# Patient Record
Sex: FEMALE | Race: WHITE | Employment: OTHER | URBAN - METROPOLITAN AREA
[De-identification: names, ages, dates, MRNs, and addresses within clinical notes are randomized per-mention and may not be internally consistent; named-entity substitution may affect disease eponyms.]

---

## 2017-01-24 ENCOUNTER — TELEPHONE (OUTPATIENT)
Dept: FAMILY MEDICINE CLINIC | Age: 77
End: 2017-01-24

## 2017-03-21 ENCOUNTER — OFFICE VISIT (OUTPATIENT)
Dept: FAMILY MEDICINE CLINIC | Age: 77
End: 2017-03-21

## 2017-03-21 VITALS
BODY MASS INDEX: 38.09 KG/M2 | OXYGEN SATURATION: 95 % | DIASTOLIC BLOOD PRESSURE: 70 MMHG | WEIGHT: 236 LBS | HEART RATE: 65 BPM | SYSTOLIC BLOOD PRESSURE: 138 MMHG

## 2017-03-21 DIAGNOSIS — E11.8 TYPE 2 DIABETES MELLITUS WITH COMPLICATION, WITH LONG-TERM CURRENT USE OF INSULIN (HCC): Primary | ICD-10-CM

## 2017-03-21 DIAGNOSIS — Z79.4 TYPE 2 DIABETES MELLITUS WITH COMPLICATION, WITH LONG-TERM CURRENT USE OF INSULIN (HCC): Primary | ICD-10-CM

## 2017-03-21 DIAGNOSIS — I27.20 PULMONARY HYPERTENSION (HCC): ICD-10-CM

## 2017-03-21 DIAGNOSIS — E03.8 OTHER SPECIFIED HYPOTHYROIDISM: ICD-10-CM

## 2017-03-21 DIAGNOSIS — Z12.31 ENCOUNTER FOR SCREENING MAMMOGRAM FOR BREAST CANCER: ICD-10-CM

## 2017-03-21 DIAGNOSIS — E78.5 HYPERLIPIDEMIA, UNSPECIFIED HYPERLIPIDEMIA TYPE: ICD-10-CM

## 2017-03-21 DIAGNOSIS — I10 ESSENTIAL HYPERTENSION: ICD-10-CM

## 2017-03-21 LAB — HBA1C MFR BLD: 7.1 %

## 2017-03-21 PROCEDURE — 83036 HEMOGLOBIN GLYCOSYLATED A1C: CPT | Performed by: FAMILY MEDICINE

## 2017-03-21 PROCEDURE — 99214 OFFICE O/P EST MOD 30 MIN: CPT | Performed by: FAMILY MEDICINE

## 2017-03-21 ASSESSMENT — ENCOUNTER SYMPTOMS
SHORTNESS OF BREATH: 0
COUGH: 0

## 2017-03-24 ENCOUNTER — HOSPITAL ENCOUNTER (OUTPATIENT)
Dept: WOMENS IMAGING | Age: 77
Discharge: HOME | End: 2017-03-24
Attending: FAMILY MEDICINE | Admitting: FAMILY MEDICINE

## 2017-03-24 DIAGNOSIS — Z12.31 ENCOUNTER FOR SCREENING MAMMOGRAM FOR BREAST CANCER: ICD-10-CM

## 2017-03-24 LAB
ALBUMIN SERPL-MCNC: 4.3 GM/DL (ref 3.4–5)
ALP BLD-CCNC: 54 IU/L (ref 40–128)
ALT SERPL-CCNC: 12 U/L (ref 10–40)
ANION GAP SERPL CALCULATED.3IONS-SCNC: 12 MMOL/L (ref 4–16)
AST SERPL-CCNC: 13 IU/L (ref 15–37)
BILIRUB SERPL-MCNC: 0.3 MG/DL (ref 0–1)
BUN BLDV-MCNC: 15 MG/DL (ref 6–23)
CALCIUM SERPL-MCNC: 9.5 MG/DL (ref 8.3–10.6)
CHLORIDE BLD-SCNC: 102 MMOL/L (ref 99–110)
CHOLESTEROL: 184 MG/DL
CO2: 29 MMOL/L (ref 21–32)
CREAT SERPL-MCNC: 0.8 MG/DL (ref 0.6–1.1)
GFR AFRICAN AMERICAN: >60 ML/MIN/1.73M2
GFR NON-AFRICAN AMERICAN: >60 ML/MIN/1.73M2
GLUCOSE BLD-MCNC: 110 MG/DL (ref 70–140)
HCT VFR BLD CALC: 38.6 % (ref 37–47)
HDLC SERPL-MCNC: 62 MG/DL
HEMOGLOBIN: 12.5 GM/DL (ref 12.5–16)
LDL CHOLESTEROL CALCULATED: 81 MG/DL
MCH RBC QN AUTO: 30.2 PG (ref 27–31)
MCHC RBC AUTO-ENTMCNC: 32.4 % (ref 32–36)
MCV RBC AUTO: 93.2 FL (ref 78–100)
PDW BLD-RTO: 14.4 % (ref 11.7–14.9)
PLATELET # BLD: 276 K/CU MM (ref 140–440)
PMV BLD AUTO: 10 FL (ref 7.5–11.1)
POTASSIUM SERPL-SCNC: 4 MMOL/L (ref 3.5–5.1)
RBC # BLD: 4.14 M/CU MM (ref 4.2–5.4)
SODIUM BLD-SCNC: 143 MMOL/L (ref 135–145)
T4 FREE: 1.97 NG/DL (ref 0.9–1.8)
TOTAL PROTEIN: 7.1 GM/DL (ref 6.4–8.2)
TRIGL SERPL-MCNC: 204 MG/DL
TSH HIGH SENSITIVITY: 0.08 UIU/ML (ref 0.27–4.2)
WBC # BLD: 9 K/CU MM (ref 4–10.5)

## 2017-04-21 DIAGNOSIS — E03.8 OTHER SPECIFIED HYPOTHYROIDISM: Primary | ICD-10-CM

## 2017-04-21 DIAGNOSIS — E78.2 MIXED HYPERLIPIDEMIA: ICD-10-CM

## 2017-04-21 RX ORDER — LEVOTHYROXINE SODIUM 0.15 MG/1
150 TABLET ORAL DAILY
Qty: 30 TABLET | Refills: 3 | Status: SHIPPED | OUTPATIENT
Start: 2017-04-21 | End: 2017-06-27 | Stop reason: SDUPTHER

## 2017-04-21 RX ORDER — FENOFIBRATE 145 MG/1
145 TABLET, COATED ORAL DAILY
Qty: 30 TABLET | Refills: 3 | Status: SHIPPED | OUTPATIENT
Start: 2017-04-21 | End: 2017-06-27

## 2017-04-26 DIAGNOSIS — R06.02 SOB (SHORTNESS OF BREATH): ICD-10-CM

## 2017-04-26 DIAGNOSIS — E11.8 TYPE 2 DIABETES MELLITUS WITH COMPLICATION, WITH LONG-TERM CURRENT USE OF INSULIN (HCC): Primary | ICD-10-CM

## 2017-04-26 DIAGNOSIS — Z79.4 TYPE 2 DIABETES MELLITUS WITH COMPLICATION, WITH LONG-TERM CURRENT USE OF INSULIN (HCC): Primary | ICD-10-CM

## 2017-04-27 ENCOUNTER — TELEPHONE (OUTPATIENT)
Dept: FAMILY MEDICINE CLINIC | Age: 77
End: 2017-04-27

## 2017-06-27 ENCOUNTER — OFFICE VISIT (OUTPATIENT)
Dept: FAMILY MEDICINE CLINIC | Age: 77
End: 2017-06-27

## 2017-06-27 VITALS
WEIGHT: 234.6 LBS | SYSTOLIC BLOOD PRESSURE: 123 MMHG | HEIGHT: 66 IN | OXYGEN SATURATION: 98 % | BODY MASS INDEX: 37.7 KG/M2 | DIASTOLIC BLOOD PRESSURE: 70 MMHG | HEART RATE: 66 BPM

## 2017-06-27 DIAGNOSIS — Z79.4 TYPE 2 DIABETES MELLITUS WITH COMPLICATION, WITH LONG-TERM CURRENT USE OF INSULIN (HCC): Primary | ICD-10-CM

## 2017-06-27 DIAGNOSIS — B37.9 CANDIDA INFECTION: ICD-10-CM

## 2017-06-27 DIAGNOSIS — K21.9 GASTROESOPHAGEAL REFLUX DISEASE WITHOUT ESOPHAGITIS: ICD-10-CM

## 2017-06-27 DIAGNOSIS — E78.2 MIXED HYPERLIPIDEMIA: ICD-10-CM

## 2017-06-27 DIAGNOSIS — I10 ESSENTIAL HYPERTENSION: ICD-10-CM

## 2017-06-27 DIAGNOSIS — E03.8 OTHER SPECIFIED HYPOTHYROIDISM: ICD-10-CM

## 2017-06-27 DIAGNOSIS — E11.8 TYPE 2 DIABETES MELLITUS WITH COMPLICATION, WITH LONG-TERM CURRENT USE OF INSULIN (HCC): Primary | ICD-10-CM

## 2017-06-27 LAB
HBA1C MFR BLD: 8.1 %
T4 FREE: 2.1 NG/DL (ref 0.9–1.8)
TSH SERPL DL<=0.05 MIU/L-ACNC: 0.4 UIU/ML (ref 0.27–4.2)

## 2017-06-27 PROCEDURE — 99213 OFFICE O/P EST LOW 20 MIN: CPT | Performed by: FAMILY MEDICINE

## 2017-06-27 PROCEDURE — G0444 DEPRESSION SCREEN ANNUAL: HCPCS | Performed by: FAMILY MEDICINE

## 2017-06-27 PROCEDURE — 36415 COLL VENOUS BLD VENIPUNCTURE: CPT | Performed by: FAMILY MEDICINE

## 2017-06-27 PROCEDURE — 83036 HEMOGLOBIN GLYCOSYLATED A1C: CPT | Performed by: FAMILY MEDICINE

## 2017-06-27 RX ORDER — NYSTATIN 100000 U/G
CREAM TOPICAL
Qty: 60 G | Refills: 2 | Status: SHIPPED | OUTPATIENT
Start: 2017-06-27 | End: 2018-11-19 | Stop reason: SDUPTHER

## 2017-06-27 RX ORDER — LEVOTHYROXINE SODIUM 0.15 MG/1
150 TABLET ORAL DAILY
Qty: 30 TABLET | Refills: 3 | Status: SHIPPED | OUTPATIENT
Start: 2017-06-27 | End: 2017-06-27 | Stop reason: SDUPTHER

## 2017-06-27 RX ORDER — LEVOTHYROXINE SODIUM 0.15 MG/1
150 TABLET ORAL DAILY
Qty: 90 TABLET | Refills: 3 | Status: SHIPPED | OUTPATIENT
Start: 2017-06-27 | End: 2018-08-28 | Stop reason: SDUPTHER

## 2017-06-27 RX ORDER — OMEPRAZOLE 20 MG/1
20 TABLET, DELAYED RELEASE ORAL DAILY
Qty: 90 TABLET | Refills: 3 | Status: SHIPPED | OUTPATIENT
Start: 2017-06-27 | End: 2017-09-20 | Stop reason: SDUPTHER

## 2017-06-27 RX ORDER — NYSTATIN 100000 U/G
CREAM TOPICAL
Qty: 60 G | Refills: 2 | Status: SHIPPED | OUTPATIENT
Start: 2017-06-27 | End: 2017-06-27 | Stop reason: SDUPTHER

## 2017-06-27 RX ORDER — FENOFIBRATE 145 MG/1
145 TABLET, COATED ORAL DAILY
Qty: 30 TABLET | Refills: 3 | Status: CANCELLED | OUTPATIENT
Start: 2017-06-27

## 2017-06-27 ASSESSMENT — PATIENT HEALTH QUESTIONNAIRE - PHQ9
SUM OF ALL RESPONSES TO PHQ QUESTIONS 1-9: 0
3. TROUBLE FALLING OR STAYING ASLEEP: 0
5. POOR APPETITE OR OVEREATING: 0
4. FEELING TIRED OR HAVING LITTLE ENERGY: 0
1. LITTLE INTEREST OR PLEASURE IN DOING THINGS: 0
9. THOUGHTS THAT YOU WOULD BE BETTER OFF DEAD, OR OF HURTING YOURSELF: 0
2. FEELING DOWN, DEPRESSED OR HOPELESS: 0
7. TROUBLE CONCENTRATING ON THINGS, SUCH AS READING THE NEWSPAPER OR WATCHING TELEVISION: 0
8. MOVING OR SPEAKING SO SLOWLY THAT OTHER PEOPLE COULD HAVE NOTICED. OR THE OPPOSITE, BEING SO FIGETY OR RESTLESS THAT YOU HAVE BEEN MOVING AROUND A LOT MORE THAN USUAL: 0
10. IF YOU CHECKED OFF ANY PROBLEMS, HOW DIFFICULT HAVE THESE PROBLEMS MADE IT FOR YOU TO DO YOUR WORK, TAKE CARE OF THINGS AT HOME, OR GET ALONG WITH OTHER PEOPLE: 0
SUM OF ALL RESPONSES TO PHQ9 QUESTIONS 1 & 2: 0
6. FEELING BAD ABOUT YOURSELF - OR THAT YOU ARE A FAILURE OR HAVE LET YOURSELF OR YOUR FAMILY DOWN: 0

## 2017-06-27 ASSESSMENT — ENCOUNTER SYMPTOMS
COUGH: 0
SHORTNESS OF BREATH: 0

## 2017-07-31 DIAGNOSIS — I10 ESSENTIAL HYPERTENSION: ICD-10-CM

## 2017-07-31 RX ORDER — METOPROLOL SUCCINATE 100 MG/1
TABLET, EXTENDED RELEASE ORAL
Qty: 90 TABLET | Refills: 3 | Status: SHIPPED | OUTPATIENT
Start: 2017-07-31 | End: 2018-11-08 | Stop reason: SDUPTHER

## 2017-09-20 DIAGNOSIS — K21.9 GASTROESOPHAGEAL REFLUX DISEASE WITHOUT ESOPHAGITIS: ICD-10-CM

## 2017-09-20 DIAGNOSIS — E11.8 TYPE 2 DIABETES MELLITUS WITH COMPLICATION, WITH LONG-TERM CURRENT USE OF INSULIN (HCC): ICD-10-CM

## 2017-09-20 DIAGNOSIS — I10 ESSENTIAL HYPERTENSION: ICD-10-CM

## 2017-09-20 DIAGNOSIS — Z79.4 TYPE 2 DIABETES MELLITUS WITH COMPLICATION, WITH LONG-TERM CURRENT USE OF INSULIN (HCC): ICD-10-CM

## 2017-09-20 RX ORDER — LISINOPRIL 20 MG/1
20 TABLET ORAL 2 TIMES DAILY
Qty: 180 TABLET | Refills: 3 | Status: SHIPPED | OUTPATIENT
Start: 2017-09-20 | End: 2017-09-21 | Stop reason: SDUPTHER

## 2017-09-20 RX ORDER — OMEPRAZOLE 20 MG/1
20 TABLET, DELAYED RELEASE ORAL DAILY
Qty: 90 TABLET | Refills: 3 | Status: SHIPPED | OUTPATIENT
Start: 2017-09-20 | End: 2017-09-21 | Stop reason: SDUPTHER

## 2017-09-20 RX ORDER — FUROSEMIDE 40 MG/1
40 TABLET ORAL DAILY
Qty: 90 TABLET | Refills: 3 | Status: SHIPPED | OUTPATIENT
Start: 2017-09-20 | End: 2017-09-21 | Stop reason: SDUPTHER

## 2017-09-21 DIAGNOSIS — Z79.4 TYPE 2 DIABETES MELLITUS WITH COMPLICATION, WITH LONG-TERM CURRENT USE OF INSULIN (HCC): ICD-10-CM

## 2017-09-21 DIAGNOSIS — K21.9 GASTROESOPHAGEAL REFLUX DISEASE WITHOUT ESOPHAGITIS: ICD-10-CM

## 2017-09-21 DIAGNOSIS — E11.8 TYPE 2 DIABETES MELLITUS WITH COMPLICATION, WITH LONG-TERM CURRENT USE OF INSULIN (HCC): ICD-10-CM

## 2017-09-21 DIAGNOSIS — I10 ESSENTIAL HYPERTENSION: ICD-10-CM

## 2017-09-21 RX ORDER — FUROSEMIDE 40 MG/1
40 TABLET ORAL DAILY
Qty: 90 TABLET | Refills: 3 | Status: SHIPPED | OUTPATIENT
Start: 2017-09-21 | End: 2018-11-19 | Stop reason: SDUPTHER

## 2017-09-21 RX ORDER — LISINOPRIL 20 MG/1
20 TABLET ORAL 2 TIMES DAILY
Qty: 180 TABLET | Refills: 3 | Status: SHIPPED | OUTPATIENT
Start: 2017-09-21 | End: 2018-11-08 | Stop reason: DRUGHIGH

## 2017-09-21 RX ORDER — OMEPRAZOLE 20 MG/1
20 TABLET, DELAYED RELEASE ORAL DAILY
Qty: 90 TABLET | Refills: 3 | Status: SHIPPED | OUTPATIENT
Start: 2017-09-21

## 2017-10-02 ENCOUNTER — OFFICE VISIT (OUTPATIENT)
Dept: FAMILY MEDICINE CLINIC | Age: 77
End: 2017-10-02

## 2017-10-02 VITALS
BODY MASS INDEX: 36.15 KG/M2 | DIASTOLIC BLOOD PRESSURE: 68 MMHG | OXYGEN SATURATION: 96 % | SYSTOLIC BLOOD PRESSURE: 132 MMHG | WEIGHT: 224 LBS | HEART RATE: 69 BPM

## 2017-10-02 DIAGNOSIS — E11.8 TYPE 2 DIABETES MELLITUS WITH COMPLICATION, WITH LONG-TERM CURRENT USE OF INSULIN (HCC): Primary | ICD-10-CM

## 2017-10-02 DIAGNOSIS — Z79.4 TYPE 2 DIABETES MELLITUS WITH COMPLICATION, WITH LONG-TERM CURRENT USE OF INSULIN (HCC): Primary | ICD-10-CM

## 2017-10-02 DIAGNOSIS — L08.1 ERYTHRASMA: ICD-10-CM

## 2017-10-02 DIAGNOSIS — I10 ESSENTIAL HYPERTENSION: ICD-10-CM

## 2017-10-02 DIAGNOSIS — Z23 NEEDS FLU SHOT: ICD-10-CM

## 2017-10-02 DIAGNOSIS — E03.9 ACQUIRED HYPOTHYROIDISM: ICD-10-CM

## 2017-10-02 DIAGNOSIS — E78.2 MIXED HYPERLIPIDEMIA: ICD-10-CM

## 2017-10-02 LAB — HBA1C MFR BLD: 6.7 %

## 2017-10-02 PROCEDURE — 83036 HEMOGLOBIN GLYCOSYLATED A1C: CPT | Performed by: FAMILY MEDICINE

## 2017-10-02 PROCEDURE — 90662 IIV NO PRSV INCREASED AG IM: CPT | Performed by: FAMILY MEDICINE

## 2017-10-02 PROCEDURE — 99214 OFFICE O/P EST MOD 30 MIN: CPT | Performed by: FAMILY MEDICINE

## 2017-10-02 PROCEDURE — G0008 ADMIN INFLUENZA VIRUS VAC: HCPCS | Performed by: FAMILY MEDICINE

## 2017-10-02 RX ORDER — CLINDAMYCIN PHOSPHATE 10 UG/ML
LOTION TOPICAL
Qty: 60 G | Refills: 2 | Status: SHIPPED | OUTPATIENT
Start: 2017-10-02 | End: 2017-11-01

## 2017-10-02 ASSESSMENT — ENCOUNTER SYMPTOMS
SHORTNESS OF BREATH: 0
COUGH: 0

## 2017-10-02 NOTE — MR AVS SNAPSHOT
Pneumococcal Polysaccharide ([id number]) 1/1/2010      Preventive Care        Date Due    Tetanus Combination Vaccine (1 - Tdap) 5/17/1961    Diabetic Foot Exam 4/2/2017    Yearly Flu Vaccine (1) 9/1/2017    Colonoscopy 7/31/2018 (Originally 5/17/1992)    Cholesterol Screening 3/24/2018    Eye Exam By An Eye Doctor 5/25/2018    Hemoglobin A1C (Test For Long-Term Glucose Control) 6/27/2018    Mammograms are recommended every 2 years for low/average risk patients aged 48 - 69, and every year for high risk patients per updated national guidelines. However these guidelines can be individualized by your provider. 3/24/2019            [employer] Signup           Our records indicate that you have an active [employer] account. You can view your After Visit Summary by going to [email or web address] and logging in with your [employer] username and password. If you don't have a [employer] username and password but a parent or guardian has access to your record, the parent or guardian should login with their own [employer] username and password and access your record to view the After Visit Summary. Additional Information  If you have questions, please contact the physician practice where you receive care. Remember, [employer] is NOT to be used for urgent needs. For medical emergencies, dial 911. For questions regarding your [employer] account call [phone]. If you have a clinical question, please call your doctor's office.

## 2017-10-02 NOTE — PROGRESS NOTES
Donald Floyd  1940  10/02/17    Chief Complaint   Patient presents with    3 Month Follow-Up    Diabetes     BS around 100-130           HPI Comments: Patient here for 3 months f/u regarding DM II, HTN, hyperlipidemia, hypothyroidism. Doing fine the cream did help the rash under the breast but not a lot. Past Medical History:   Diagnosis Date    Abnormal nuclear stress test 4/08;7/07 4/08-EF=67%,Mild->Mod.isch. LAD;7/07-EF=70%,Suggests poss. Mild Ant.wall ischemia.  Cardiac arrest ([facility] [address] 2008    Diabetes mellitus ([facility] [address]     H/O cardiac catheterization 4/08    No signif.CAD.    H/O cardiovascular stress test 04/11/08    mild to mod ischemia in the LAD region, abnormal study, EF 67%, patient developed mild chest pain and throat tightness    H/O cardiovascular stress test 5/3/2016    lexiscan-moderate ischemia apical,RF66%    H/O Doppler ultrasound 07/16/2007    CAROTID DOPLER- intimal thickening but no significant atherosclerotic plaque noted in the right or left internal carotid artery, doppler flow velocities within the right and left internal carotid artery are elevated, consistent with a mild, less than 50% stenosis    H/O echocardiogram 10/14/08    EF>55%, normal LV systolic function, normal left ventricular wall thickness, impaired LV relaxation    H/O echocardiogram 6/18/13, 10/08;07/07 6/13- EF >55% Impaired LV relaxation, Mild concentric LV hypertrophy, No sig AS, THEO underestimated. 10/08-EF=>55%,NL study;7/07-EF=>55%,Mild valv. AS.    H/O echocardiogram 5/16/2016 12/3/14    5/16 EF 55-60% normal study 12/14EF 60%. MIld mitral and tricuspid insufficiencies. Mildly sclerotic aortic valve which appears to be mildly stenosed with aortic valve area of 1.4 however this does not appear to be hemodynamically signficant aortic stenosis. Mildy hypertropic left ventricle with normal global and regional left ventricular systolic function.     H/O left heart catheterization by cutdown 05/23/2016 no significant disease in all vessels. EF 55%    Hernia     after lap band removal    Hx of cardiovascular stress test 06/18/2013 6/13-EF 51%, no scitnigraphic evidence of inducible myocardial ischemia    Hyperlipidemia     Hypertension     Irritable bowel syndrome     Kidney stones     Obesity     Osteoarthritis     Thyroid disease     Type II or unspecified type diabetes mellitus without mention of complication, not stated as uncontrolled      Past Surgical History:   Procedure Laterality Date    CARDIAC CATHETERIZATION  04/18/08    continue risk factor modification with strict control of her risk factors with diet and control of diabetes, high blood pressure and hyperlipidemia    GASTRIC BAND  08/2008    revision of gastric band placement    HYSTERECTOMY      LAP BAND      put in in May Removed in August    LITHOTRIPSY     [id number]      right    ROTATOR CUFF REPAIR       Family History   Problem Relation Age of Onset    Cancer Sister     COPD Brother     Heart Disease Brother     Cancer Mother      melonoma    Migraines Mother     Cancer Father      carcinoma    High Blood Pressure Father     Heart Disease Father      Social History     Social History    Marital status:      Spouse name: N/A    Number of children: N/A    Years of education: N/A     Occupational History    Not on file.      Social History Main Topics    Smoking status: Former Smoker     Packs/day: 0.50     Years: 3.00     Types: Cigarettes     Quit date: 8/9/1994    Smokeless tobacco: Never Used      Comment: reviewed 7/20/15    Alcohol use No    Drug use: No    Sexual activity: Not Currently     Partners: Male      Comment:      Other Topics Concern    Not on file     Social History Narrative    ** Merged History Encounter **            Allergies   Allergen Reactions    Scopolamine      Combative and delusional    Penicillins Hives     Current Outpatient Prescriptions   Medication Systems   Constitutional: Negative for activity change, chills and fever. Respiratory: Negative for cough and shortness of breath. Cardiovascular: Negative for chest pain and leg swelling. Genitourinary: Negative for dysuria and frequency. Skin: Positive for rash (under the breast fold). Neurological: Negative for dizziness and headaches. Psychiatric/Behavioral: Negative for agitation. The patient is not nervous/anxious. /68 (Site: Right Arm, Position: Sitting, Cuff Size: Medium Adult)  Pulse 69  Wt 224 lb (101.6 kg)  SpO2 96%  BMI 36.15 kg/m2    Physical Exam   Constitutional: She is oriented to person, place, and time. She appears well-developed and well-nourished. No distress. HENT:   Head: Normocephalic and atraumatic. Mouth/Throat: No oropharyngeal exudate. Eyes: Conjunctivae are normal. Pupils are equal, round, and reactive to light. Neck: Normal range of motion. No JVD present. No thyromegaly present. Cardiovascular: Normal rate and regular rhythm. Exam reveals no gallop and no friction rub. Murmur heard. Pulmonary/Chest: Effort normal and breath sounds normal. No respiratory distress. She has no wheezes. She has no rales. She exhibits no tenderness. Musculoskeletal: Normal range of motion. She exhibits no edema. Lymphadenopathy:     She has no cervical adenopathy. Neurological: She is alert and oriented to person, place, and time. Skin: Rash (maculopapular rash affecting big area under both breast folds) noted. She is not diaphoretic. Psychiatric: She has a normal mood and affect. Her behavior is normal. Thought content normal.       ASSESSMENT/ PLAN:    1. Type 2 diabetes mellitus with complication, with long-term current use of insulin (HCC)  - POCT glycosylated hemoglobin (Hb A1C)  - Getting better    2. Essential hypertension  - stable    3. Mixed hyperlipidemia  - Stable    4. Acquired hypothyroidism  - stable    5.  Erythrasma  - start:  -

## 2017-11-15 ENCOUNTER — TELEPHONE (OUTPATIENT)
Dept: FAMILY MEDICINE CLINIC | Age: 77
End: 2017-11-15

## 2017-11-15 DIAGNOSIS — E78.5 HYPERLIPIDEMIA, UNSPECIFIED HYPERLIPIDEMIA TYPE: ICD-10-CM

## 2017-11-15 RX ORDER — ALLOPURINOL 300 MG/1
300 TABLET ORAL DAILY
Qty: 90 TABLET | Refills: 1 | Status: SHIPPED | OUTPATIENT
Start: 2017-11-15 | End: 2018-06-21 | Stop reason: SDUPTHER

## 2017-11-15 RX ORDER — SIMVASTATIN 20 MG
TABLET ORAL
Qty: 90 TABLET | Refills: 1 | Status: SHIPPED | OUTPATIENT
Start: 2017-11-15 | End: 2018-06-21 | Stop reason: SDUPTHER

## 2017-11-15 NOTE — TELEPHONE ENCOUNTER
From: Hedy Reynoso  Sent: 11/15/2017 8:03 AM EST  Subject: Medication Renewal Request    Hedy Reynoso would like a refill of the following medications:  simvastatin (ZOCOR) 20 MG tablet Korey Dey MD]  allopurinol (ZYLOPRIM) 300 MG tablet Korey Dey MD]    Preferred pharmacy: [place] [address], [address] [phone] - F [phone]    Comment:  I need new refills called into [facility] for the 2 meds since they have NO REFILLS remaining Thank you [address]

## 2017-12-01 RX ORDER — AMLODIPINE BESYLATE 10 MG/1
10 TABLET ORAL DAILY
Qty: 90 TABLET | Refills: 3 | Status: SHIPPED | OUTPATIENT
Start: 2017-12-01 | End: 2018-11-19 | Stop reason: SDUPTHER

## 2017-12-18 ENCOUNTER — OFFICE VISIT (OUTPATIENT)
Dept: FAMILY MEDICINE CLINIC | Age: 77
End: 2017-12-18

## 2017-12-18 VITALS
OXYGEN SATURATION: 98 % | DIASTOLIC BLOOD PRESSURE: 80 MMHG | HEART RATE: 77 BPM | WEIGHT: 219 LBS | SYSTOLIC BLOOD PRESSURE: 143 MMHG | TEMPERATURE: 98.3 F | BODY MASS INDEX: 35.35 KG/M2

## 2017-12-18 DIAGNOSIS — J02.9 SORE THROAT: Primary | ICD-10-CM

## 2017-12-18 PROCEDURE — 99213 OFFICE O/P EST LOW 20 MIN: CPT | Performed by: FAMILY MEDICINE

## 2017-12-18 RX ORDER — AZITHROMYCIN 250 MG/1
TABLET, FILM COATED ORAL
Qty: 1 PACKET | Refills: 0 | Status: SHIPPED | OUTPATIENT
Start: 2017-12-18 | End: 2017-12-28

## 2017-12-18 ASSESSMENT — ENCOUNTER SYMPTOMS
COUGH: 1
TROUBLE SWALLOWING: 1
VOMITING: 0
NAUSEA: 0
ABDOMINAL PAIN: 0
SINUS PAIN: 0
SINUS PRESSURE: 0
SHORTNESS OF BREATH: 0
RHINORRHEA: 0
SORE THROAT: 1

## 2017-12-18 NOTE — PROGRESS NOTES
Nancy Lance  1940 12/18/17    Chief Complaint   Patient presents with    Pharyngitis     almost 2 weeks     Sinusitis           Patient here c/o:      Pharyngitis   This is a new problem. Episode onset: 1 wk. The problem occurs constantly. The problem has been unchanged. Associated symptoms include congestion, coughing, fatigue, headaches, myalgias and a sore throat. Pertinent negatives include no abdominal pain, arthralgias, chest pain, chills, diaphoresis, fever, nausea or vomiting. Nothing aggravates the symptoms. She has tried NSAIDs and ice for the symptoms. The treatment provided no relief. Past Medical History:   Diagnosis Date    Abnormal nuclear stress test 4/08;7/07 4/08-EF=67%,Mild->Mod.isch. LAD;7/07-EF=70%,Suggests poss. Mild Ant.wall ischemia.  Cardiac arrest ([facility] [address] 2008    Diabetes mellitus ([facility] [address]     H/O cardiac catheterization 4/08    No signif.CAD.    H/O cardiovascular stress test 04/11/08    mild to mod ischemia in the LAD region, abnormal study, EF 67%, patient developed mild chest pain and throat tightness    H/O cardiovascular stress test 5/3/2016    lexiscan-moderate ischemia apical,RF66%    H/O Doppler ultrasound 07/16/2007    CAROTID DOPLER- intimal thickening but no significant atherosclerotic plaque noted in the right or left internal carotid artery, doppler flow velocities within the right and left internal carotid artery are elevated, consistent with a mild, less than 50% stenosis    H/O echocardiogram 10/14/08    EF>55%, normal LV systolic function, normal left ventricular wall thickness, impaired LV relaxation    H/O echocardiogram 6/18/13, 10/08;07/07 6/13- EF >55% Impaired LV relaxation, Mild concentric LV hypertrophy, No sig AS, THEO underestimated. 10/08-EF=>55%,NL study;7/07-EF=>55%,Mild valv. AS.    H/O echocardiogram 5/16/2016 12/3/14    5/16 EF 55-60% normal study 12/14EF 60%. MIld mitral and tricuspid insufficiencies.  Mildly sclerotic aortic valve which appears to be mildly stenosed with aortic valve area of 1.4 however this does not appear to be hemodynamically signficant aortic stenosis. Mildy hypertropic left ventricle with normal global and regional left ventricular systolic function.  H/O left heart catheterization by cutdown 05/23/2016    no significant disease in all vessels. EF 55%    Hernia     after lap band removal    Hx of cardiovascular stress test 06/18/2013 6/13-EF 51%, no scitnigraphic evidence of inducible myocardial ischemia    Hyperlipidemia     Hypertension     Irritable bowel syndrome     Kidney stones     Obesity     Osteoarthritis     Thyroid disease     Type II or unspecified type diabetes mellitus without mention of complication, not stated as uncontrolled      Past Surgical History:   Procedure Laterality Date    CARDIAC CATHETERIZATION  04/18/08    continue risk factor modification with strict control of her risk factors with diet and control of diabetes, high blood pressure and hyperlipidemia    GASTRIC BAND  08/2008    revision of gastric band placement    HYSTERECTOMY      LAP BAND      put in in May Removed in August    LITHOTRIPSY     [address]      right    ROTATOR CUFF REPAIR       Family History   Problem Relation Age of Onset    Cancer Sister     COPD Brother     Heart Disease Brother     Cancer Mother      melonoma    Migraines Mother     Cancer Father      carcinoma    High Blood Pressure Father     Heart Disease Father      Social History     Social History    Marital status:      Spouse name: N/A    Number of children: N/A    Years of education: N/A     Occupational History    Not on file.      Social History Main Topics    Smoking status: Former Smoker     Packs/day: 0.50     Years: 3.00     Types: Cigarettes     Quit date: 8/9/1994    Smokeless tobacco: Never Used      Comment: reviewed 7/20/15    Alcohol use No    Drug use: No    Sexual activity: Not Currently Musculoskeletal: Normal range of motion. She exhibits no edema. Lymphadenopathy:     She has no cervical adenopathy. Neurological: She is alert and oriented to person, place, and time. Skin: She is not diaphoretic. Psychiatric: She has a normal mood and affect. Her behavior is normal.       ASSESSMENT/ PLAN:    1. Sore throat  - azithromycin (ZITHROMAX) 250 MG tablet; Take 2 tabs (500 mg) on Day 1, and take 1 tab (250 mg) on days 2 through 5. Dispense: 1 packet; Refill: 0  - Lots of fluids. - Appropriate prescription are addressed. - After visit summery provided. - Questions answered and patient verbalizes understanding.  - Call for any problem, questions, or concerns. Return if symptoms worsen or fail to improve.

## 2017-12-18 NOTE — PATIENT INSTRUCTIONS
Please contact our office if you have any further questions or concerns. [phone]        We are committed to providing you the best care possible. If you receive a survey after visiting one of our offices, please take time to share your experience concerning your physician office visit. These surveys are confidential and no health information about you is shared. We are eager to improve for you and we are counting on your feedback to help make that happen.

## 2018-04-02 ENCOUNTER — OFFICE VISIT (OUTPATIENT)
Dept: FAMILY MEDICINE CLINIC | Age: 78
End: 2018-04-02

## 2018-04-02 VITALS
HEART RATE: 72 BPM | OXYGEN SATURATION: 98 % | DIASTOLIC BLOOD PRESSURE: 64 MMHG | BODY MASS INDEX: 36.15 KG/M2 | WEIGHT: 224 LBS | SYSTOLIC BLOOD PRESSURE: 132 MMHG

## 2018-04-02 DIAGNOSIS — E11.8 TYPE 2 DIABETES MELLITUS WITH COMPLICATION, WITH LONG-TERM CURRENT USE OF INSULIN (HCC): Primary | ICD-10-CM

## 2018-04-02 DIAGNOSIS — Z79.4 TYPE 2 DIABETES MELLITUS WITH COMPLICATION, WITH LONG-TERM CURRENT USE OF INSULIN (HCC): Primary | ICD-10-CM

## 2018-04-02 DIAGNOSIS — E78.2 MIXED HYPERLIPIDEMIA: ICD-10-CM

## 2018-04-02 DIAGNOSIS — I10 ESSENTIAL HYPERTENSION: ICD-10-CM

## 2018-04-02 DIAGNOSIS — E03.9 ACQUIRED HYPOTHYROIDISM: ICD-10-CM

## 2018-04-02 LAB — HBA1C MFR BLD: 7.1 %

## 2018-04-02 PROCEDURE — 83036 HEMOGLOBIN GLYCOSYLATED A1C: CPT | Performed by: FAMILY MEDICINE

## 2018-04-02 PROCEDURE — 99214 OFFICE O/P EST MOD 30 MIN: CPT | Performed by: FAMILY MEDICINE

## 2018-04-02 ASSESSMENT — ENCOUNTER SYMPTOMS
SHORTNESS OF BREATH: 0
COUGH: 0

## 2018-04-06 ENCOUNTER — NURSE ONLY (OUTPATIENT)
Dept: FAMILY MEDICINE CLINIC | Age: 78
End: 2018-04-06

## 2018-04-06 DIAGNOSIS — E78.2 MIXED HYPERLIPIDEMIA: ICD-10-CM

## 2018-04-06 DIAGNOSIS — E03.9 ACQUIRED HYPOTHYROIDISM: ICD-10-CM

## 2018-04-06 DIAGNOSIS — E11.8 TYPE 2 DIABETES MELLITUS WITH COMPLICATION, WITH LONG-TERM CURRENT USE OF INSULIN (HCC): ICD-10-CM

## 2018-04-06 DIAGNOSIS — Z79.4 TYPE 2 DIABETES MELLITUS WITH COMPLICATION, WITH LONG-TERM CURRENT USE OF INSULIN (HCC): ICD-10-CM

## 2018-04-06 LAB
A/G RATIO: 1.5 (ref 1.1–2.2)
ALBUMIN SERPL-MCNC: 4.3 G/DL (ref 3.4–5)
ALP BLD-CCNC: 56 U/L (ref 40–129)
ALT SERPL-CCNC: 15 U/L (ref 10–40)
ANION GAP SERPL CALCULATED.3IONS-SCNC: 18 MMOL/L (ref 3–16)
AST SERPL-CCNC: 16 U/L (ref 15–37)
BASOPHILS ABSOLUTE: 0 K/UL (ref 0–0.2)
BASOPHILS RELATIVE PERCENT: 0.4 %
BILIRUB SERPL-MCNC: 0.3 MG/DL (ref 0–1)
BUN BLDV-MCNC: 18 MG/DL (ref 7–20)
CALCIUM SERPL-MCNC: 9.3 MG/DL (ref 8.3–10.6)
CHLORIDE BLD-SCNC: 101 MMOL/L (ref 99–110)
CHOLESTEROL, TOTAL: 215 MG/DL (ref 0–199)
CO2: 26 MMOL/L (ref 21–32)
CREAT SERPL-MCNC: 0.7 MG/DL (ref 0.6–1.2)
EOSINOPHILS ABSOLUTE: 0.4 K/UL (ref 0–0.6)
EOSINOPHILS RELATIVE PERCENT: 4.8 %
GFR AFRICAN AMERICAN: >60
GFR NON-AFRICAN AMERICAN: >60
GLOBULIN: 2.9 G/DL
GLUCOSE BLD-MCNC: 100 MG/DL (ref 70–99)
HCT VFR BLD CALC: 37.2 % (ref 36–48)
HDLC SERPL-MCNC: 71 MG/DL (ref 40–60)
HEMOGLOBIN: 12.3 G/DL (ref 12–16)
LDL CHOLESTEROL CALCULATED: 120 MG/DL
LYMPHOCYTES ABSOLUTE: 3.2 K/UL (ref 1–5.1)
LYMPHOCYTES RELATIVE PERCENT: 37.4 %
MCH RBC QN AUTO: 29.8 PG (ref 26–34)
MCHC RBC AUTO-ENTMCNC: 33 G/DL (ref 31–36)
MCV RBC AUTO: 90.2 FL (ref 80–100)
MONOCYTES ABSOLUTE: 0.7 K/UL (ref 0–1.3)
MONOCYTES RELATIVE PERCENT: 7.8 %
NEUTROPHILS ABSOLUTE: 4.2 K/UL (ref 1.7–7.7)
NEUTROPHILS RELATIVE PERCENT: 49.6 %
PDW BLD-RTO: 14.4 % (ref 12.4–15.4)
PLATELET # BLD: 268 K/UL (ref 135–450)
PMV BLD AUTO: 8.1 FL (ref 5–10.5)
POTASSIUM SERPL-SCNC: 3.8 MMOL/L (ref 3.5–5.1)
RBC # BLD: 4.12 M/UL (ref 4–5.2)
SODIUM BLD-SCNC: 145 MMOL/L (ref 136–145)
T4 FREE: 1.4 NG/DL (ref 0.9–1.8)
TOTAL PROTEIN: 7.2 G/DL (ref 6.4–8.2)
TRIGL SERPL-MCNC: 119 MG/DL (ref 0–150)
TSH SERPL DL<=0.05 MIU/L-ACNC: 1.72 UIU/ML (ref 0.27–4.2)
VLDLC SERPL CALC-MCNC: 24 MG/DL
WBC # BLD: 8.5 K/UL (ref 4–11)

## 2018-04-06 PROCEDURE — 36415 COLL VENOUS BLD VENIPUNCTURE: CPT | Performed by: FAMILY MEDICINE

## 2018-06-21 DIAGNOSIS — E78.5 HYPERLIPIDEMIA, UNSPECIFIED HYPERLIPIDEMIA TYPE: ICD-10-CM

## 2018-06-21 RX ORDER — ALLOPURINOL 300 MG/1
300 TABLET ORAL DAILY
Qty: 90 TABLET | Refills: 1 | Status: SHIPPED | OUTPATIENT
Start: 2018-06-21 | End: 2018-11-20 | Stop reason: SDUPTHER

## 2018-06-21 RX ORDER — SIMVASTATIN 20 MG
TABLET ORAL
Qty: 90 TABLET | Refills: 1 | Status: SHIPPED | OUTPATIENT
Start: 2018-06-21 | End: 2018-11-20 | Stop reason: SDUPTHER

## 2018-08-28 DIAGNOSIS — E03.8 OTHER SPECIFIED HYPOTHYROIDISM: ICD-10-CM

## 2018-08-28 RX ORDER — LEVOTHYROXINE SODIUM 0.15 MG/1
150 TABLET ORAL DAILY
Qty: 90 TABLET | Refills: 0 | Status: SHIPPED | OUTPATIENT
Start: 2018-08-28 | End: 2018-11-19 | Stop reason: SDUPTHER

## 2018-11-08 ENCOUNTER — OFFICE VISIT (OUTPATIENT)
Dept: FAMILY MEDICINE CLINIC | Age: 78
End: 2018-11-08
Payer: MEDICARE

## 2018-11-08 VITALS
SYSTOLIC BLOOD PRESSURE: 152 MMHG | HEIGHT: 66 IN | WEIGHT: 210 LBS | HEART RATE: 87 BPM | BODY MASS INDEX: 33.75 KG/M2 | OXYGEN SATURATION: 94 % | DIASTOLIC BLOOD PRESSURE: 80 MMHG

## 2018-11-08 DIAGNOSIS — I10 ESSENTIAL HYPERTENSION: ICD-10-CM

## 2018-11-08 DIAGNOSIS — Z79.4 TYPE 2 DIABETES MELLITUS WITH COMPLICATION, WITH LONG-TERM CURRENT USE OF INSULIN (HCC): Primary | ICD-10-CM

## 2018-11-08 DIAGNOSIS — E03.9 ACQUIRED HYPOTHYROIDISM: ICD-10-CM

## 2018-11-08 DIAGNOSIS — Z23 NEED FOR IMMUNIZATION AGAINST INFLUENZA: ICD-10-CM

## 2018-11-08 DIAGNOSIS — E78.2 MIXED HYPERLIPIDEMIA: ICD-10-CM

## 2018-11-08 DIAGNOSIS — E11.8 TYPE 2 DIABETES MELLITUS WITH COMPLICATION, WITH LONG-TERM CURRENT USE OF INSULIN (HCC): Primary | ICD-10-CM

## 2018-11-08 DIAGNOSIS — L08.1 ERYTHRASMA: ICD-10-CM

## 2018-11-08 LAB — HBA1C MFR BLD: 6.8 %

## 2018-11-08 PROCEDURE — G8427 DOCREV CUR MEDS BY ELIG CLIN: HCPCS | Performed by: FAMILY MEDICINE

## 2018-11-08 PROCEDURE — G8482 FLU IMMUNIZE ORDER/ADMIN: HCPCS | Performed by: FAMILY MEDICINE

## 2018-11-08 PROCEDURE — 1036F TOBACCO NON-USER: CPT | Performed by: FAMILY MEDICINE

## 2018-11-08 PROCEDURE — G8417 CALC BMI ABV UP PARAM F/U: HCPCS | Performed by: FAMILY MEDICINE

## 2018-11-08 PROCEDURE — 4040F PNEUMOC VAC/ADMIN/RCVD: CPT | Performed by: FAMILY MEDICINE

## 2018-11-08 PROCEDURE — 99214 OFFICE O/P EST MOD 30 MIN: CPT | Performed by: FAMILY MEDICINE

## 2018-11-08 PROCEDURE — 1090F PRES/ABSN URINE INCON ASSESS: CPT | Performed by: FAMILY MEDICINE

## 2018-11-08 PROCEDURE — 1123F ACP DISCUSS/DSCN MKR DOCD: CPT | Performed by: FAMILY MEDICINE

## 2018-11-08 PROCEDURE — G0008 ADMIN INFLUENZA VIRUS VAC: HCPCS | Performed by: FAMILY MEDICINE

## 2018-11-08 PROCEDURE — 90662 IIV NO PRSV INCREASED AG IM: CPT | Performed by: FAMILY MEDICINE

## 2018-11-08 PROCEDURE — 83036 HEMOGLOBIN GLYCOSYLATED A1C: CPT | Performed by: FAMILY MEDICINE

## 2018-11-08 PROCEDURE — 1101F PT FALLS ASSESS-DOCD LE1/YR: CPT | Performed by: FAMILY MEDICINE

## 2018-11-08 PROCEDURE — G8399 PT W/DXA RESULTS DOCUMENT: HCPCS | Performed by: FAMILY MEDICINE

## 2018-11-08 RX ORDER — FLUTICASONE PROPIONATE 50 MCG
1 SPRAY, SUSPENSION (ML) NASAL DAILY
Qty: 1 BOTTLE | Refills: 3 | Status: SHIPPED | OUTPATIENT
Start: 2018-11-08

## 2018-11-08 RX ORDER — KETOCONAZOLE 20 MG/G
CREAM TOPICAL
Qty: 60 G | Refills: 2 | Status: SHIPPED | OUTPATIENT
Start: 2018-11-08 | End: 2022-04-20

## 2018-11-08 RX ORDER — LISINOPRIL 40 MG/1
40 TABLET ORAL DAILY
Qty: 90 TABLET | Refills: 3 | Status: SHIPPED | OUTPATIENT
Start: 2018-11-08 | End: 2018-11-19 | Stop reason: SDUPTHER

## 2018-11-08 RX ORDER — LORATADINE 10 MG/1
10 TABLET ORAL DAILY
Qty: 1 TABLET | Refills: 3 | Status: SHIPPED | OUTPATIENT
Start: 2018-11-08 | End: 2019-05-13 | Stop reason: SDUPTHER

## 2018-11-08 RX ORDER — METOPROLOL SUCCINATE 100 MG/1
100 TABLET, EXTENDED RELEASE ORAL DAILY
Qty: 90 TABLET | Refills: 3 | Status: SHIPPED | OUTPATIENT
Start: 2018-11-08 | End: 2019-10-21 | Stop reason: SDUPTHER

## 2018-11-08 RX ORDER — CLINDAMYCIN PHOSPHATE 10 UG/ML
10 LOTION TOPICAL 2 TIMES DAILY
COMMUNITY
End: 2022-04-20

## 2018-11-08 RX ORDER — LISINOPRIL 40 MG/1
40 TABLET ORAL 2 TIMES DAILY
Qty: 90 TABLET | Refills: 3 | Status: SHIPPED | OUTPATIENT
Start: 2018-11-08 | End: 2018-11-08 | Stop reason: DRUGHIGH

## 2018-11-08 ASSESSMENT — PATIENT HEALTH QUESTIONNAIRE - PHQ9
1. LITTLE INTEREST OR PLEASURE IN DOING THINGS: 0
2. FEELING DOWN, DEPRESSED OR HOPELESS: 0
SUM OF ALL RESPONSES TO PHQ QUESTIONS 1-9: 0
SUM OF ALL RESPONSES TO PHQ QUESTIONS 1-9: 0
SUM OF ALL RESPONSES TO PHQ9 QUESTIONS 1 & 2: 0

## 2018-11-08 NOTE — PATIENT INSTRUCTIONS
Watch closely for changes in your health, and be sure to contact your doctor if:    · You do not get better as expected. Where can you learn more? Go to [email or web address] and sign in to your [employer] account. Enter K870 in the [facility] box to learn more about \"Yeast Skin Infection: Care Instructions. \"     If you do not have an account, please click on the \"Sign Up Now\" link. Current as of: April 18, 2018  Content Version: 11.8  © 4498-5686 Healthwise, Incorporated. Care instructions adapted under license by [facility] [place] ([facility]). If you have questions about a medical condition or this instruction, always ask your healthcare professional. [address] any warranty or liability for your use of this information.

## 2018-11-08 NOTE — PROGRESS NOTES
and fatigue. Respiratory: Negative for cough and shortness of breath. Cardiovascular: Negative for chest pain and leg swelling. Genitourinary: Negative for dysuria, frequency and hematuria. Neurological: Negative for dizziness and headaches. Psychiatric/Behavioral: Negative for agitation. The patient is not nervous/anxious.         Lab Results   Component Value Date    WBC 8.5 04/06/2018    HGB 12.3 04/06/2018    HCT 37.2 04/06/2018    MCV 90.2 04/06/2018     04/06/2018     Lab Results   Component Value Date     04/06/2018    K 3.8 04/06/2018     04/06/2018    CO2 26 04/06/2018    BUN 18 04/06/2018    CREATININE 0.7 04/06/2018    GLUCOSE 100 (H) 04/06/2018    CALCIUM 9.3 04/06/2018    PROT 7.2 04/06/2018    LABALBU 4.3 04/06/2018    BILITOT 0.3 04/06/2018    ALKPHOS 56 04/06/2018    AST 16 04/06/2018    ALT 15 04/06/2018    LABGLOM >60 04/06/2018    GFRAA >60 04/06/2018    AGRATIO 1.5 04/06/2018    GLOB 2.9 04/06/2018     Lab Results   Component Value Date    CHOL 215 (H) 04/06/2018    CHOL 184 03/24/2017    CHOL 226 (H) 04/05/2016     Lab Results   Component Value Date    TRIG 119 04/06/2018    TRIG 204 (H) 03/24/2017    TRIG 187 (H) 04/05/2016     Lab Results   Component Value Date    HDL 71 (H) 04/06/2018    HDL 62 03/24/2017    HDL 65 (H) 04/05/2016     Lab Results   Component Value Date    LDLCALC 120 (H) 04/06/2018    LDLCALC 81 03/24/2017    LDLCALC 124 (H) 04/05/2016     Lab Results   Component Value Date    LABA1C 6.8 11/08/2018     Lab Results   Component Value Date    TSH 1.72 04/06/2018    TSHHS 0.079 (L) 03/24/2017         BP (!) 152/80 (Site: Left Upper Arm, Position: Sitting, Cuff Size: Medium Adult)   Pulse 87   Ht 5' 6\" (1.676 m)   Wt 210 lb (95.3 kg)   SpO2 94%   BMI 33.89 kg/m²     BP Readings from Last 3 Encounters:   11/08/18 (!) 152/80   04/02/18 132/64   12/18/17 (!) 143/80       Wt Readings from Last 3 Encounters:   11/08/18 210 lb (95.3 kg)   04/02/18 224 lb

## 2018-11-10 ASSESSMENT — ENCOUNTER SYMPTOMS
COUGH: 0
SHORTNESS OF BREATH: 0

## 2018-11-19 DIAGNOSIS — Z79.4 TYPE 2 DIABETES MELLITUS WITH COMPLICATION, WITH LONG-TERM CURRENT USE OF INSULIN (HCC): ICD-10-CM

## 2018-11-19 DIAGNOSIS — E03.8 OTHER SPECIFIED HYPOTHYROIDISM: ICD-10-CM

## 2018-11-19 DIAGNOSIS — E11.8 TYPE 2 DIABETES MELLITUS WITH COMPLICATION, WITH LONG-TERM CURRENT USE OF INSULIN (HCC): ICD-10-CM

## 2018-11-19 DIAGNOSIS — I10 ESSENTIAL HYPERTENSION: ICD-10-CM

## 2018-11-19 DIAGNOSIS — B37.9 CANDIDA INFECTION: ICD-10-CM

## 2018-11-19 RX ORDER — LISINOPRIL 40 MG/1
40 TABLET ORAL DAILY
Qty: 90 TABLET | Refills: 3 | Status: SHIPPED | OUTPATIENT
Start: 2018-11-19 | End: 2020-01-10

## 2018-11-19 RX ORDER — FUROSEMIDE 40 MG/1
40 TABLET ORAL DAILY
Qty: 90 TABLET | Refills: 3 | Status: SHIPPED | OUTPATIENT
Start: 2018-11-19 | End: 2020-01-10

## 2018-11-19 RX ORDER — AMLODIPINE BESYLATE 10 MG/1
10 TABLET ORAL DAILY
Qty: 90 TABLET | Refills: 3 | Status: SHIPPED | OUTPATIENT
Start: 2018-11-19 | End: 2020-01-10

## 2018-11-19 RX ORDER — LEVOTHYROXINE SODIUM 0.15 MG/1
150 TABLET ORAL DAILY
Qty: 90 TABLET | Refills: 5 | Status: SHIPPED | OUTPATIENT
Start: 2018-11-19 | End: 2018-12-18 | Stop reason: SDUPTHER

## 2018-11-19 RX ORDER — NYSTATIN 100000 U/G
CREAM TOPICAL
Qty: 60 G | Refills: 2 | Status: SHIPPED | OUTPATIENT
Start: 2018-11-19 | End: 2021-11-12

## 2018-11-20 DIAGNOSIS — E78.5 HYPERLIPIDEMIA, UNSPECIFIED HYPERLIPIDEMIA TYPE: ICD-10-CM

## 2018-11-20 RX ORDER — ALLOPURINOL 300 MG/1
300 TABLET ORAL DAILY
Qty: 90 TABLET | Refills: 1 | Status: SHIPPED | OUTPATIENT
Start: 2018-11-20 | End: 2019-08-22 | Stop reason: SDUPTHER

## 2018-11-20 RX ORDER — SIMVASTATIN 20 MG
TABLET ORAL
Qty: 90 TABLET | Refills: 1 | Status: SHIPPED | OUTPATIENT
Start: 2018-11-20 | End: 2019-08-22 | Stop reason: SDUPTHER

## 2018-12-18 DIAGNOSIS — E03.8 OTHER SPECIFIED HYPOTHYROIDISM: ICD-10-CM

## 2018-12-18 RX ORDER — LEVOTHYROXINE SODIUM 0.15 MG/1
150 TABLET ORAL DAILY
Qty: 90 TABLET | Refills: 5 | Status: SHIPPED | OUTPATIENT
Start: 2018-12-18 | End: 2019-08-27 | Stop reason: SDUPTHER

## 2019-05-13 ENCOUNTER — OFFICE VISIT (OUTPATIENT)
Dept: FAMILY MEDICINE CLINIC | Age: 79
End: 2019-05-13
Payer: MEDICARE

## 2019-05-13 VITALS
WEIGHT: 217.4 LBS | OXYGEN SATURATION: 96 % | HEART RATE: 65 BPM | DIASTOLIC BLOOD PRESSURE: 72 MMHG | BODY MASS INDEX: 35.09 KG/M2 | SYSTOLIC BLOOD PRESSURE: 140 MMHG

## 2019-05-13 DIAGNOSIS — E78.2 MIXED HYPERLIPIDEMIA: ICD-10-CM

## 2019-05-13 DIAGNOSIS — I10 ESSENTIAL HYPERTENSION: ICD-10-CM

## 2019-05-13 DIAGNOSIS — E11.8 TYPE 2 DIABETES MELLITUS WITH COMPLICATION, WITH LONG-TERM CURRENT USE OF INSULIN (HCC): Primary | ICD-10-CM

## 2019-05-13 DIAGNOSIS — J44.9 CHRONIC OBSTRUCTIVE PULMONARY DISEASE, UNSPECIFIED COPD TYPE (HCC): ICD-10-CM

## 2019-05-13 DIAGNOSIS — E03.9 ACQUIRED HYPOTHYROIDISM: ICD-10-CM

## 2019-05-13 DIAGNOSIS — Z79.4 TYPE 2 DIABETES MELLITUS WITH COMPLICATION, WITH LONG-TERM CURRENT USE OF INSULIN (HCC): Primary | ICD-10-CM

## 2019-05-13 LAB — HBA1C MFR BLD: 7.7 %

## 2019-05-13 PROCEDURE — G8399 PT W/DXA RESULTS DOCUMENT: HCPCS | Performed by: FAMILY MEDICINE

## 2019-05-13 PROCEDURE — 1123F ACP DISCUSS/DSCN MKR DOCD: CPT | Performed by: FAMILY MEDICINE

## 2019-05-13 PROCEDURE — 1090F PRES/ABSN URINE INCON ASSESS: CPT | Performed by: FAMILY MEDICINE

## 2019-05-13 PROCEDURE — G8926 SPIRO NO PERF OR DOC: HCPCS | Performed by: FAMILY MEDICINE

## 2019-05-13 PROCEDURE — 99214 OFFICE O/P EST MOD 30 MIN: CPT | Performed by: FAMILY MEDICINE

## 2019-05-13 PROCEDURE — 1036F TOBACCO NON-USER: CPT | Performed by: FAMILY MEDICINE

## 2019-05-13 PROCEDURE — G8417 CALC BMI ABV UP PARAM F/U: HCPCS | Performed by: FAMILY MEDICINE

## 2019-05-13 PROCEDURE — 83036 HEMOGLOBIN GLYCOSYLATED A1C: CPT | Performed by: FAMILY MEDICINE

## 2019-05-13 PROCEDURE — G8427 DOCREV CUR MEDS BY ELIG CLIN: HCPCS | Performed by: FAMILY MEDICINE

## 2019-05-13 PROCEDURE — 3023F SPIROM DOC REV: CPT | Performed by: FAMILY MEDICINE

## 2019-05-13 PROCEDURE — 4040F PNEUMOC VAC/ADMIN/RCVD: CPT | Performed by: FAMILY MEDICINE

## 2019-05-13 RX ORDER — BUDESONIDE AND FORMOTEROL FUMARATE DIHYDRATE 80; 4.5 UG/1; UG/1
2 AEROSOL RESPIRATORY (INHALATION) 2 TIMES DAILY
COMMUNITY
End: 2021-10-13 | Stop reason: SDUPTHER

## 2019-05-13 RX ORDER — LISINOPRIL 10 MG/1
10 TABLET ORAL DAILY
Qty: 30 TABLET | Refills: 3 | Status: SHIPPED | OUTPATIENT
Start: 2019-05-13 | End: 2019-06-10 | Stop reason: SDUPTHER

## 2019-05-13 RX ORDER — LORATADINE 10 MG/1
10 TABLET ORAL DAILY
Qty: 1 TABLET | Refills: 3 | Status: SHIPPED | OUTPATIENT
Start: 2019-05-13 | End: 2021-11-12

## 2019-05-13 ASSESSMENT — PATIENT HEALTH QUESTIONNAIRE - PHQ9
SUM OF ALL RESPONSES TO PHQ9 QUESTIONS 1 & 2: 1
1. LITTLE INTEREST OR PLEASURE IN DOING THINGS: 0
2. FEELING DOWN, DEPRESSED OR HOPELESS: 1
SUM OF ALL RESPONSES TO PHQ QUESTIONS 1-9: 1
SUM OF ALL RESPONSES TO PHQ QUESTIONS 1-9: 1

## 2019-05-13 ASSESSMENT — ENCOUNTER SYMPTOMS
SINUS PRESSURE: 0
SINUS PAIN: 0
WHEEZING: 0
COUGH: 0
SHORTNESS OF BREATH: 0
SORE THROAT: 0

## 2019-06-10 DIAGNOSIS — I10 ESSENTIAL HYPERTENSION: ICD-10-CM

## 2019-06-10 RX ORDER — LISINOPRIL 10 MG/1
10 TABLET ORAL DAILY
Qty: 90 TABLET | Refills: 1 | Status: SHIPPED | OUTPATIENT
Start: 2019-06-10 | End: 2020-03-12

## 2019-06-18 ENCOUNTER — OFFICE VISIT (OUTPATIENT)
Dept: FAMILY MEDICINE CLINIC | Age: 79
End: 2019-06-18
Payer: MEDICARE

## 2019-06-18 VITALS
WEIGHT: 211.8 LBS | HEIGHT: 67 IN | HEART RATE: 59 BPM | OXYGEN SATURATION: 95 % | DIASTOLIC BLOOD PRESSURE: 60 MMHG | BODY MASS INDEX: 33.24 KG/M2 | SYSTOLIC BLOOD PRESSURE: 140 MMHG

## 2019-06-18 DIAGNOSIS — E03.9 ACQUIRED HYPOTHYROIDISM: ICD-10-CM

## 2019-06-18 DIAGNOSIS — E11.8 TYPE 2 DIABETES MELLITUS WITH COMPLICATION, WITH LONG-TERM CURRENT USE OF INSULIN (HCC): ICD-10-CM

## 2019-06-18 DIAGNOSIS — I10 ESSENTIAL HYPERTENSION: Primary | ICD-10-CM

## 2019-06-18 DIAGNOSIS — E78.2 MIXED HYPERLIPIDEMIA: ICD-10-CM

## 2019-06-18 DIAGNOSIS — Z00.00 ROUTINE GENERAL MEDICAL EXAMINATION AT A HEALTH CARE FACILITY: ICD-10-CM

## 2019-06-18 DIAGNOSIS — Z79.4 TYPE 2 DIABETES MELLITUS WITH COMPLICATION, WITH LONG-TERM CURRENT USE OF INSULIN (HCC): ICD-10-CM

## 2019-06-18 DIAGNOSIS — Z12.31 ENCOUNTER FOR SCREENING MAMMOGRAM FOR BREAST CANCER: ICD-10-CM

## 2019-06-18 PROCEDURE — G0438 PPPS, INITIAL VISIT: HCPCS | Performed by: FAMILY MEDICINE

## 2019-06-18 PROCEDURE — 4040F PNEUMOC VAC/ADMIN/RCVD: CPT | Performed by: FAMILY MEDICINE

## 2019-06-18 PROCEDURE — 1123F ACP DISCUSS/DSCN MKR DOCD: CPT | Performed by: FAMILY MEDICINE

## 2019-06-18 ASSESSMENT — ANXIETY QUESTIONNAIRES: GAD7 TOTAL SCORE: 2

## 2019-06-18 ASSESSMENT — PATIENT HEALTH QUESTIONNAIRE - PHQ9
SUM OF ALL RESPONSES TO PHQ QUESTIONS 1-9: 2
SUM OF ALL RESPONSES TO PHQ QUESTIONS 1-9: 2

## 2019-06-18 ASSESSMENT — LIFESTYLE VARIABLES: HOW OFTEN DO YOU HAVE A DRINK CONTAINING ALCOHOL: 0

## 2019-06-18 NOTE — PROGRESS NOTES
Medicare Annual Wellness Visit  Name: Fidencio Phillips Date: 2019   MRN: [id number] Sex: Female   Age: 70 y.o. Ethnicity: Non-/Non    : 1940 Race: Landon Carter is here for Medicare AWV    Screenings for behavioral, psychosocial and functional/safety risks, and cognitive dysfunction are all negative except as indicated below. These results, as well as other patient data from the [address] form, are documented in Flowsheets linked to this Encounter. Allergies   Allergen Reactions    Scopolamine      Combative and delusional    Penicillins Hives     Prior to Visit Medications    Medication Sig Taking? Authorizing Provider   lisinopril (PRINIVIL;ZESTRIL) 10 MG tablet Take 1 tablet by mouth daily Yes Romina Sanders MD   loratadine (CLARITIN) 10 MG tablet Take 1 tablet by mouth daily Yes Romina Sanders MD   levothyroxine (LEVOTHROID) 150 MCG tablet Take 1 tablet by mouth daily Yes Romina Sanders MD   simvastatin (ZOCOR) 20 MG tablet TAKE 1 TABLET EVERY MORNINGBEFORE BREAKFAST Yes Romina Sanders MD   allopurinol (ZYLOPRIM) 300 MG tablet Take 1 tablet by mouth daily Yes Romina Sanders MD   furosemide (LASIX) 40 MG tablet Take 1 tablet by mouth daily Yes Romina Sanders MD   metFORMIN (GLUCOPHAGE) 500 MG tablet Take 2 tablets by mouth 2 times daily (with meals) Yes Romina Sanders MD   insulin detemir (LEVEMIR FLEXTOUCH) 100 UNIT/ML injection pen Inject 35 Units into the skin nightly Dose to be adjusted according to AM FBS readings.  Yes Romina Sanders MD   amLODIPine (NORVASC) 10 MG tablet Take 1 tablet by mouth daily Yes Romina Sanders MD   lisinopril (PRINIVIL;ZESTRIL) 40 MG tablet Take 1 tablet by mouth daily Yes Romina Sanders MD   metoprolol succinate (TOPROL XL) 100 MG extended release tablet Take 1 tablet by mouth daily Yes Romina Sanders MD   Insulin Pen Needle 29G X 12.7MM MISC 1 each by Does not apply route daily Yes Romina Sanders MD   calcium carbonate (TUMS) 500 MG chewable tablet Take 2 tablets by mouth as needed. Yes Historical Provider, MD   aspirin 81 MG EC tablet Take 81 mg by mouth daily. Yes Historical Provider, MD   budesonide-formoterol (SYMBICORT) 80-4.5 MCG/ACT AERO Inhale 2 puffs into the lungs 2 times daily  Historical Provider, MD   nystatin (MYCOSTATIN) 731161 UNIT/GM cream Apply topically 2 times daily. Giovanni Cockayne, MD   clindamycin (CLEOCIN T) 1 % lotion Apply 10 mg/mL topically 2 times daily Apply topically 2 times daily. Historical Provider, MD   fluticasone (FLONASE) 50 MCG/ACT nasal spray 1 spray by Each Nare route daily  Giovanni Cockayne, MD   ketoconazole (NIZORAL) 2 % cream Apply topically daily. Giovanni Cockayne, MD   insulin aspart (NOVOLOG FLEXPEN) 100 UNIT/ML injection pen Inject 20 Units into the skin 3 times daily (before meals)  Giovanni Cockayne, MD   omeprazole (PRILOSEC OTC) 20 MG tablet Take 1 tablet by mouth daily  Giovanni Cockayne, MD     Past Medical History:   Diagnosis Date    Abnormal nuclear stress test 4/08;7/07 4/08-EF=67%,Mild->Mod.isch. LAD;7/07-EF=70%,Suggests poss. Mild Ant.wall ischemia.     Cardiac arrest ([facility] [address] 2008    Diabetes mellitus ([facility] [address]     H/O cardiac catheterization 4/08    No signif.CAD.    H/O cardiovascular stress test 04/11/08    mild to mod ischemia in the LAD region, abnormal study, EF 67%, patient developed mild chest pain and throat tightness    H/O cardiovascular stress test 5/3/2016    lexiscan-moderate ischemia apical,RF66%    H/O Doppler ultrasound 07/16/2007    CAROTID DOPLER- intimal thickening but no significant atherosclerotic plaque noted in the right or left internal carotid artery, doppler flow velocities within the right and left internal carotid artery are elevated, consistent with a mild, less than 50% stenosis    H/O echocardiogram 10/14/08    EF>55%, normal LV systolic function, normal left ventricular wall thickness, impaired LV relaxation    H/O echocardiogram 6/18/13, 10/08;07/07 6/13- EF >55% Impaired LV relaxation, Mild concentric LV hypertrophy, No sig AS, THEO underestimated. 10/08-EF=>55%,NL study;7/07-EF=>55%,Mild valv. AS.    H/O echocardiogram 5/16/2016 12/3/14    5/16 EF 55-60% normal study 12/14EF 60%. MIld mitral and tricuspid insufficiencies. Mildly sclerotic aortic valve which appears to be mildly stenosed with aortic valve area of 1.4 however this does not appear to be hemodynamically signficant aortic stenosis. Mildy hypertropic left ventricle with normal global and regional left ventricular systolic function.  H/O left heart catheterization by cutdown 05/23/2016    no significant disease in all vessels.  EF 55%    Hernia     after lap band removal    Hx of cardiovascular stress test 06/18/2013 6/13-EF 51%, no scitnigraphic evidence of inducible myocardial ischemia    Hyperlipidemia     Hypertension     Irritable bowel syndrome     Kidney stones     Obesity     Osteoarthritis     Thyroid disease     Type II or unspecified type diabetes mellitus without mention of complication, not stated as uncontrolled      Past Surgical History:   Procedure Laterality Date    CARDIAC CATHETERIZATION  04/18/08    continue risk factor modification with strict control of her risk factors with diet and control of diabetes, high blood pressure and hyperlipidemia    GASTRIC BAND  08/2008    revision of gastric band placement    HYSTERECTOMY      LAP BAND      put in in May Removed in August    LITHOTRIPSY     [address]      right    ROTATOR CUFF REPAIR       Family History   Problem Relation Age of Onset    Cancer Sister     COPD Brother     Heart Disease Brother     Cancer Mother         melonoma    Migraines Mother     Cancer Father         carcinoma    High Blood Pressure Father     Heart Disease Father        CareTeam (Including outside providers/suppliers regularly involved in providing care):   Patient Care Team:  Giovanni Cockayne, MD as PCP - General (Family Medicine)  Roxie Leyva MD as PCP - [facility] Empaneled Provider  Andressa Ivy MD as Consulting Physician (Cardiology)    Wt Readings from Last 3 Encounters:   06/18/19 211 lb 12.8 oz (96.1 kg)   05/13/19 217 lb 6.4 oz (98.6 kg)   11/08/18 210 lb (95.3 kg)     Vitals:    06/18/19 1107   BP: 126/82   Site: Left Upper Arm   Position: Sitting   Cuff Size: Medium Adult   Pulse: 59   SpO2: 95%   Weight: 211 lb 12.8 oz (96.1 kg)   Height: 5' 7\" (1.702 m)     Body mass index is 33.17 kg/m². Based upon direct observation of the patient, evaluation of cognition reveals recent and remote memory intact. Patient's complete Health Risk Assessment and screening values have been reviewed and are found in Flowsheets. The following problems were reviewed today and where indicated follow up appointments were made and/or referrals ordered. Positive Risk Factor Screenings with Interventions:     General Health:  General  In general, how would you say your health is?: Fair  In the past 7 days, have you experienced any of the following? New or Increased Pain, New or Increased Fatigue, Loneliness, Social Isolation, Stress or Anger?: (!) Stress  Do you get the social and emotional support that you need?: Yes  Do you have a Living Will?: Yes  General Health Risk Interventions:  ·     Health Habits/Nutrition:  Health Habits/Nutrition  Do you exercise for at least 20 minutes 2-3 times per week?: (!) No  Have you lost any weight without trying in the past 3 months?: (!) Yes  Do you eat fewer than 2 meals per day?: No  Have you seen a dentist within the past year?: (!) No  Body mass index is 33.17 kg/m².   Health Habits/Nutrition Interventions:  · good nutrition, going to see a dentiest, her D retiered    Hearing/Vision:  Hearing/Vision  Do you or your family notice any trouble with your hearing?: (!) Yes  Do you have difficulty driving, watching TV, or doing any of your daily activities because of your eyesight?: No  Have you had an eye exam within the past year?: Yes  Hearing/Vision Interventions:  · general are okay, may need to see the ENT, f/u with the eye clinic every year    Personalized Preventive Plan   Current Health Maintenance Status  Immunization History   Administered Date(s) Administered    Influenza, High Dose (Fluzone 65 yrs and older) 09/27/2013, 10/21/2014, 12/21/2016, 10/02/2017, 11/08/2018    Pneumococcal 13-valent Conjugate ([id number]) 12/21/2016    Pneumococcal Polysaccharide ([id number]) 01/01/2010        Health Maintenance   Topic Date Due    DTaP/Tdap/Td vaccine (1 - Tdap) 05/17/1961    Shingles Vaccine (1 of 2) 05/17/1992    Breast cancer screen  03/24/2019    TSH testing  04/06/2019    Potassium monitoring  04/06/2019    Creatinine monitoring  04/06/2019    DEXA (modify frequency per FRAX score)  Completed    Flu vaccine  Completed    Pneumococcal 65+ years Vaccine  Completed     Recommendations for Preventive Services Due: see orders and patient instructions/AVS.  .   Recommended screening schedule for the next 5-10 years is provided to the patient in written form: see Patient Instructions/AVS.

## 2019-06-18 NOTE — PATIENT INSTRUCTIONS
Personalized Preventive Plan for Isaiah Chan - 6/18/2019  Medicare offers a range of preventive health benefits. Some of the tests and screenings are paid in full while other may be subject to a deductible, co-insurance, and/or copay. Some of these benefits include a comprehensive review of your medical history including lifestyle, illnesses that may run in your family, and various assessments and screenings as appropriate. After reviewing your medical record and screening and assessments performed today your provider may have ordered immunizations, labs, imaging, and/or referrals for you. A list of these orders (if applicable) as well as your Preventive Care list are included within your After Visit Summary for your review. Other Preventive Recommendations:    · A preventive eye exam performed by an eye specialist is recommended every 1-2 years to screen for glaucoma; cataracts, macular degeneration, and other eye disorders. · A preventive dental visit is recommended every 6 months. · Try to get at least 150 minutes of exercise per week or 10,000 steps per day on a pedometer . · Order or download the FREE \"Exercise & Physical Activity: Your Everyday Guide\" from [employer]. Call 4-760.824.1647 or search [employer] online. · You need 5013-8084 mg of calcium and 8926-2924 IU of vitamin D per day. It is possible to meet your calcium requirement with diet alone, but a vitamin D supplement is usually necessary to meet this goal.  · When exposed to the sun, use a sunscreen that protects against both UVA and UVB radiation with an SPF of 30 or greater. Reapply every 2 to 3 hours or after sweating, drying off with a towel, or swimming. · Always wear a seat belt when traveling in a car. Always wear a helmet when riding a bicycle or motorcycle.

## 2019-07-29 ENCOUNTER — OFFICE VISIT (OUTPATIENT)
Dept: CARDIOLOGY CLINIC | Age: 79
End: 2019-07-29
Payer: MEDICARE

## 2019-07-29 VITALS
BODY MASS INDEX: 34.39 KG/M2 | DIASTOLIC BLOOD PRESSURE: 80 MMHG | HEIGHT: 66 IN | WEIGHT: 214 LBS | SYSTOLIC BLOOD PRESSURE: 160 MMHG | HEART RATE: 78 BPM

## 2019-07-29 DIAGNOSIS — I10 ESSENTIAL HYPERTENSION: Primary | ICD-10-CM

## 2019-07-29 DIAGNOSIS — E78.5 HYPERLIPIDEMIA, UNSPECIFIED HYPERLIPIDEMIA TYPE: ICD-10-CM

## 2019-07-29 PROCEDURE — G8427 DOCREV CUR MEDS BY ELIG CLIN: HCPCS | Performed by: INTERNAL MEDICINE

## 2019-07-29 PROCEDURE — 1090F PRES/ABSN URINE INCON ASSESS: CPT | Performed by: INTERNAL MEDICINE

## 2019-07-29 PROCEDURE — G8399 PT W/DXA RESULTS DOCUMENT: HCPCS | Performed by: INTERNAL MEDICINE

## 2019-07-29 PROCEDURE — 4040F PNEUMOC VAC/ADMIN/RCVD: CPT | Performed by: INTERNAL MEDICINE

## 2019-07-29 PROCEDURE — 99214 OFFICE O/P EST MOD 30 MIN: CPT | Performed by: INTERNAL MEDICINE

## 2019-07-29 PROCEDURE — G8417 CALC BMI ABV UP PARAM F/U: HCPCS | Performed by: INTERNAL MEDICINE

## 2019-07-29 PROCEDURE — 1036F TOBACCO NON-USER: CPT | Performed by: INTERNAL MEDICINE

## 2019-07-29 PROCEDURE — 1123F ACP DISCUSS/DSCN MKR DOCD: CPT | Performed by: INTERNAL MEDICINE

## 2019-07-29 NOTE — PROGRESS NOTES
control of diabetes, high blood pressure and hyperlipidemia    GASTRIC BAND  2008    revision of gastric band placement    HYSTERECTOMY      LAP BAND      put in in May Removed in August    LITHOTRIPSY     [address]      right    ROTATOR CUFF REPAIR        As reviewed   Family History   Problem Relation Age of Onset    Cancer Sister     COPD Brother     Heart Disease Brother     Cancer Mother         melonoma    Migraines Mother     Cancer Father         carcinoma    High Blood Pressure Father     Heart Disease Father      Social History     Tobacco Use    Smoking status: Former Smoker     Packs/day: 0.50     Years: 3.00     Pack years: 1.50     Types: Cigarettes     Last attempt to quit: 1994     Years since quittin.9    Smokeless tobacco: Never Used   Substance Use Topics    Alcohol use: No     Alcohol/week: 0.0 standard drinks      Review of Systems:    Constitutional: Negative for diaphoresis and fatigue  Psychological:Negative for anxiety or depression  HENT: Negative for headaches, nasal congestion, sinus pain or vertigo  Eyes: Negative for visual disturbance.    Endocrine: Negative for polydipsia/polyuria  Respiratory: Negative for shortness of breath  Cardiovascular: Negative for chest pain, dyspnea on exertion, claudication, edema, irregular heartbeat, murmur, palpitations or shortness of breath  Gastrointestinal: Negative for abdominal pain or heartburn  Genito-Urinary: Negative for urinary frequency/urgency  Musculoskeletal: Negative for muscle pain, muscular weakness, negative for pain in arm and leg or swelling in foot and leg  Neurological: Negative for dizziness, headaches, memory loss, numbness/tingling, visual changes, syncope  Dermatological: Negative for rash    Objective:  BP (!) 160/80   Pulse 78   Ht 5' 6\" (1.676 m)   Wt 214 lb (97.1 kg)   BMI 34.54 kg/m²   Wt Readings from Last 3 Encounters:   19 214 lb (97.1 kg)   19 211 lb 12.8 oz (96.1 MEASURES REVIEWED:      Impression:    No diagnosis found. Patient Active Problem List   Diagnosis Code    Hyperlipidemia E78.5    Hypertension I10    Hyperuricemia E79.0    Vitamin D deficiency X01.5    Complications of bariatric procedures K95.89    Fatigue R53.83    SOB (shortness of breath) on exertion R06.02    Obesity E66.9    Bilateral leg edema R60.0    SOB (shortness of breath) R06.02    Mild persistent asthma without complication Y74.73    Pulmonary hypertension (HCC) I27.20    Abnormal nuclear cardiac imaging test R93.1    Hypothyroidism E03.9    Essential hypertension I10    Type 2 diabetes mellitus with complication, with long-term current use of insulin ([facility]) E11.8, Z79.4    Erythrasma L08.1       Assessment & Plan:    -  Hypertension: Patients blood pressure is abnormal. Patient is advised about low sodium diet. Present medical regimen will not be changed. -  LIPID MANAGEMENT:  Available lipid  lab data reviewed  and patient was given dietary advice. NCEP- ATP III guidelines reviewed with patient. -   Changes  in medicines made: No                                -   DIABETES MELLITUS: Available pertinent lab data reviewed   and  patient was given dietary advice . Advised to check blood glucose level on a regular basis. -   Changes  in medicines made:  No                        Adrian Cotto  [facility] [place]

## 2019-08-12 ENCOUNTER — HOSPITAL ENCOUNTER (OUTPATIENT)
Dept: WOMENS IMAGING | Age: 79
Discharge: HOME | End: 2019-08-12
Payer: MEDICARE

## 2019-08-12 DIAGNOSIS — Z12.31 ENCOUNTER FOR SCREENING MAMMOGRAM FOR BREAST CANCER: ICD-10-CM

## 2019-08-12 PROCEDURE — 77067 SCR MAMMO BI INCL CAD: CPT

## 2019-08-19 ENCOUNTER — HOSPITAL ENCOUNTER (OUTPATIENT)
Age: 79
Discharge: HOME | End: 2019-08-19
Payer: MEDICARE

## 2019-08-19 LAB
ALBUMIN SERPL-MCNC: 4.5 GM/DL (ref 3.4–5)
ALP BLD-CCNC: 60 IU/L (ref 40–128)
ALT SERPL-CCNC: 13 U/L (ref 10–40)
ANION GAP SERPL CALCULATED.3IONS-SCNC: 15 MMOL/L (ref 4–16)
AST SERPL-CCNC: 18 IU/L (ref 15–37)
BASOPHILS ABSOLUTE: 0 K/CU MM
BASOPHILS RELATIVE PERCENT: 0.5 % (ref 0–1)
BILIRUB SERPL-MCNC: 0.4 MG/DL (ref 0–1)
BUN BLDV-MCNC: 32 MG/DL (ref 6–23)
CALCIUM SERPL-MCNC: 10.2 MG/DL (ref 8.3–10.6)
CHLORIDE BLD-SCNC: 103 MMOL/L (ref 99–110)
CHOLESTEROL, FASTING: 216 MG/DL
CO2: 25 MMOL/L (ref 21–32)
CREAT SERPL-MCNC: 1.1 MG/DL (ref 0.6–1.1)
DIFFERENTIAL TYPE: ABNORMAL
EOSINOPHILS ABSOLUTE: 0.5 K/CU MM
EOSINOPHILS RELATIVE PERCENT: 5.2 % (ref 0–3)
GFR AFRICAN AMERICAN: 58 ML/MIN/1.73M2
GFR NON-AFRICAN AMERICAN: 48 ML/MIN/1.73M2
GLUCOSE BLD-MCNC: 92 MG/DL (ref 70–99)
HCT VFR BLD CALC: 39.4 % (ref 37–47)
HDLC SERPL-MCNC: 63 MG/DL
HEMOGLOBIN: 12 GM/DL (ref 12.5–16)
IMMATURE NEUTROPHIL %: 0.2 % (ref 0–0.43)
LDL CHOLESTEROL DIRECT: 132 MG/DL
LYMPHOCYTES ABSOLUTE: 2.8 K/CU MM
LYMPHOCYTES RELATIVE PERCENT: 33 % (ref 24–44)
MCH RBC QN AUTO: 29.5 PG (ref 27–31)
MCHC RBC AUTO-ENTMCNC: 30.5 % (ref 32–36)
MCV RBC AUTO: 96.8 FL (ref 78–100)
MONOCYTES ABSOLUTE: 0.6 K/CU MM
MONOCYTES RELATIVE PERCENT: 7.4 % (ref 0–4)
NUCLEATED RBC %: 0 %
PDW BLD-RTO: 14 % (ref 11.7–14.9)
PLATELET # BLD: 276 K/CU MM (ref 140–440)
PMV BLD AUTO: 9.9 FL (ref 7.5–11.1)
POTASSIUM SERPL-SCNC: 4.1 MMOL/L (ref 3.5–5.1)
RBC # BLD: 4.07 M/CU MM (ref 4.2–5.4)
SEGMENTED NEUTROPHILS ABSOLUTE COUNT: 4.6 K/CU MM
SEGMENTED NEUTROPHILS RELATIVE PERCENT: 53.7 % (ref 36–66)
SODIUM BLD-SCNC: 143 MMOL/L (ref 135–145)
T4 FREE: 1.95 NG/DL (ref 0.9–1.8)
TOTAL IMMATURE NEUTOROPHIL: 0.02 K/CU MM
TOTAL NUCLEATED RBC: 0 K/CU MM
TOTAL PROTEIN: 7.4 GM/DL (ref 6.4–8.2)
TRIGLYCERIDE, FASTING: 177 MG/DL
TSH HIGH SENSITIVITY: 0.22 UIU/ML (ref 0.27–4.2)
WBC # BLD: 8.6 K/CU MM (ref 4–10.5)

## 2019-08-19 PROCEDURE — 80061 LIPID PANEL: CPT

## 2019-08-19 PROCEDURE — 85025 COMPLETE CBC W/AUTO DIFF WBC: CPT

## 2019-08-19 PROCEDURE — 84439 ASSAY OF FREE THYROXINE: CPT

## 2019-08-19 PROCEDURE — 84443 ASSAY THYROID STIM HORMONE: CPT

## 2019-08-19 PROCEDURE — 80053 COMPREHEN METABOLIC PANEL: CPT

## 2019-08-19 PROCEDURE — 36415 COLL VENOUS BLD VENIPUNCTURE: CPT

## 2019-08-22 DIAGNOSIS — E78.5 HYPERLIPIDEMIA, UNSPECIFIED HYPERLIPIDEMIA TYPE: ICD-10-CM

## 2019-08-22 RX ORDER — SIMVASTATIN 20 MG
TABLET ORAL
Qty: 90 TABLET | Refills: 1 | Status: SHIPPED | OUTPATIENT
Start: 2019-08-22 | End: 2020-01-10

## 2019-08-22 RX ORDER — ALLOPURINOL 300 MG/1
300 TABLET ORAL DAILY
Qty: 90 TABLET | Refills: 1 | Status: SHIPPED | OUTPATIENT
Start: 2019-08-22 | End: 2020-01-10

## 2019-08-27 DIAGNOSIS — D64.9 ANEMIA, UNSPECIFIED TYPE: Primary | ICD-10-CM

## 2019-08-27 DIAGNOSIS — E03.8 OTHER SPECIFIED HYPOTHYROIDISM: ICD-10-CM

## 2019-08-27 RX ORDER — LEVOTHYROXINE SODIUM 137 UG/1
137 TABLET ORAL DAILY
Qty: 90 TABLET | Refills: 3 | Status: SHIPPED | OUTPATIENT
Start: 2019-08-27 | End: 2020-06-22 | Stop reason: SDUPTHER

## 2019-09-24 ENCOUNTER — HOSPITAL ENCOUNTER (OUTPATIENT)
Age: 79
Discharge: HOME | End: 2019-09-24
Payer: MEDICARE

## 2019-09-24 LAB
FOLATE: 12.3 NG/ML (ref 3.1–17.5)
VITAMIN B-12: 223.3 PG/ML (ref 211–911)

## 2019-09-24 PROCEDURE — 82746 ASSAY OF FOLIC ACID SERUM: CPT

## 2019-09-24 PROCEDURE — 36415 COLL VENOUS BLD VENIPUNCTURE: CPT

## 2019-09-24 PROCEDURE — 82607 VITAMIN B-12: CPT

## 2019-10-21 DIAGNOSIS — I10 ESSENTIAL HYPERTENSION: ICD-10-CM

## 2019-10-21 RX ORDER — METOPROLOL SUCCINATE 100 MG/1
TABLET, EXTENDED RELEASE ORAL
Qty: 90 TABLET | Refills: 3 | Status: SHIPPED | OUTPATIENT
Start: 2019-10-21 | End: 2021-01-11

## 2019-11-19 ENCOUNTER — NURSE ONLY (OUTPATIENT)
Dept: FAMILY MEDICINE CLINIC | Age: 79
End: 2019-11-19
Payer: MEDICARE

## 2019-11-19 DIAGNOSIS — Z23 NEED FOR VACCINATION FOR H FLU TYPE B: ICD-10-CM

## 2019-11-19 DIAGNOSIS — D50.8 OTHER IRON DEFICIENCY ANEMIA: ICD-10-CM

## 2019-11-19 DIAGNOSIS — Z23 NEED FOR INFLUENZA VACCINATION: ICD-10-CM

## 2019-11-19 DIAGNOSIS — E03.9 ACQUIRED HYPOTHYROIDISM: Primary | ICD-10-CM

## 2019-11-19 LAB
T4 FREE: 1.7 NG/DL (ref 0.9–1.8)
TSH SERPL DL<=0.05 MIU/L-ACNC: 1.37 UIU/ML (ref 0.27–4.2)

## 2019-11-19 PROCEDURE — 96372 THER/PROPH/DIAG INJ SC/IM: CPT | Performed by: FAMILY MEDICINE

## 2019-11-19 PROCEDURE — 36415 COLL VENOUS BLD VENIPUNCTURE: CPT | Performed by: FAMILY MEDICINE

## 2019-11-19 PROCEDURE — G0008 ADMIN INFLUENZA VIRUS VAC: HCPCS | Performed by: FAMILY MEDICINE

## 2019-11-19 PROCEDURE — 90653 IIV ADJUVANT VACCINE IM: CPT | Performed by: FAMILY MEDICINE

## 2019-11-19 RX ORDER — CYANOCOBALAMIN 1000 UG/ML
1000 INJECTION INTRAMUSCULAR; SUBCUTANEOUS ONCE
Status: COMPLETED | OUTPATIENT
Start: 2019-11-19 | End: 2019-11-19

## 2019-11-19 RX ADMIN — CYANOCOBALAMIN 1000 MCG: 1000 INJECTION INTRAMUSCULAR; SUBCUTANEOUS at 11:19

## 2019-12-10 ENCOUNTER — NURSE ONLY (OUTPATIENT)
Dept: FAMILY MEDICINE CLINIC | Age: 79
End: 2019-12-10
Payer: MEDICARE

## 2019-12-10 DIAGNOSIS — E53.8 B12 DEFICIENCY: ICD-10-CM

## 2019-12-10 PROCEDURE — 96372 THER/PROPH/DIAG INJ SC/IM: CPT | Performed by: FAMILY MEDICINE

## 2019-12-10 RX ORDER — CYANOCOBALAMIN 1000 UG/ML
1000 INJECTION INTRAMUSCULAR; SUBCUTANEOUS ONCE
Status: COMPLETED | OUTPATIENT
Start: 2019-12-10 | End: 2019-12-10

## 2019-12-10 RX ADMIN — CYANOCOBALAMIN 1000 MCG: 1000 INJECTION INTRAMUSCULAR; SUBCUTANEOUS at 10:47

## 2019-12-19 ENCOUNTER — NURSE ONLY (OUTPATIENT)
Dept: FAMILY MEDICINE CLINIC | Age: 79
End: 2019-12-19
Payer: MEDICARE

## 2019-12-19 DIAGNOSIS — E53.8 B12 DEFICIENCY: ICD-10-CM

## 2019-12-19 PROCEDURE — 96372 THER/PROPH/DIAG INJ SC/IM: CPT | Performed by: FAMILY MEDICINE

## 2019-12-19 RX ORDER — CYANOCOBALAMIN 1000 UG/ML
1000 INJECTION INTRAMUSCULAR; SUBCUTANEOUS ONCE
Status: COMPLETED | OUTPATIENT
Start: 2019-12-19 | End: 2019-12-19

## 2019-12-19 RX ADMIN — CYANOCOBALAMIN 1000 MCG: 1000 INJECTION INTRAMUSCULAR; SUBCUTANEOUS at 11:28

## 2019-12-24 ENCOUNTER — NURSE ONLY (OUTPATIENT)
Dept: FAMILY MEDICINE CLINIC | Age: 79
End: 2019-12-24
Payer: MEDICARE

## 2019-12-24 DIAGNOSIS — E53.8 B12 DEFICIENCY: Primary | ICD-10-CM

## 2019-12-24 PROCEDURE — 90471 IMMUNIZATION ADMIN: CPT | Performed by: FAMILY MEDICINE

## 2019-12-24 PROCEDURE — 96372 THER/PROPH/DIAG INJ SC/IM: CPT | Performed by: FAMILY MEDICINE

## 2019-12-24 RX ORDER — CYANOCOBALAMIN 1000 UG/ML
1000 INJECTION INTRAMUSCULAR; SUBCUTANEOUS ONCE
Status: COMPLETED | OUTPATIENT
Start: 2019-12-24 | End: 2019-12-24

## 2019-12-24 RX ADMIN — CYANOCOBALAMIN 1000 MCG: 1000 INJECTION INTRAMUSCULAR; SUBCUTANEOUS at 10:35

## 2020-01-10 RX ORDER — ALLOPURINOL 300 MG/1
TABLET ORAL
Qty: 90 TABLET | Refills: 1 | Status: SHIPPED | OUTPATIENT
Start: 2020-01-10 | End: 2020-06-22 | Stop reason: SDUPTHER

## 2020-01-10 RX ORDER — LISINOPRIL 40 MG/1
TABLET ORAL
Qty: 90 TABLET | Refills: 3 | Status: SHIPPED | OUTPATIENT
Start: 2020-01-10 | End: 2021-01-11

## 2020-01-10 RX ORDER — AMLODIPINE BESYLATE 10 MG/1
TABLET ORAL
Qty: 90 TABLET | Refills: 3 | Status: SHIPPED | OUTPATIENT
Start: 2020-01-10 | End: 2021-02-01

## 2020-01-10 RX ORDER — SIMVASTATIN 20 MG
TABLET ORAL
Qty: 90 TABLET | Refills: 1 | Status: SHIPPED | OUTPATIENT
Start: 2020-01-10 | End: 2020-06-22 | Stop reason: SDUPTHER

## 2020-01-10 RX ORDER — FUROSEMIDE 40 MG/1
TABLET ORAL
Qty: 90 TABLET | Refills: 3 | Status: SHIPPED | OUTPATIENT
Start: 2020-01-10 | End: 2021-01-11

## 2020-01-27 ENCOUNTER — NURSE ONLY (OUTPATIENT)
Dept: FAMILY MEDICINE CLINIC | Age: 80
End: 2020-01-27
Payer: MEDICARE

## 2020-01-27 PROCEDURE — 96372 THER/PROPH/DIAG INJ SC/IM: CPT | Performed by: FAMILY MEDICINE

## 2020-01-27 RX ORDER — CYANOCOBALAMIN 1000 UG/ML
1000 INJECTION INTRAMUSCULAR; SUBCUTANEOUS ONCE
Status: COMPLETED | OUTPATIENT
Start: 2020-01-27 | End: 2020-01-27

## 2020-01-27 RX ADMIN — CYANOCOBALAMIN 1000 MCG: 1000 INJECTION INTRAMUSCULAR; SUBCUTANEOUS at 10:30

## 2020-02-24 ENCOUNTER — NURSE ONLY (OUTPATIENT)
Dept: FAMILY MEDICINE CLINIC | Age: 80
End: 2020-02-24
Payer: MEDICARE

## 2020-02-24 PROCEDURE — 96372 THER/PROPH/DIAG INJ SC/IM: CPT | Performed by: FAMILY MEDICINE

## 2020-02-24 RX ORDER — CYANOCOBALAMIN 1000 UG/ML
1000 INJECTION INTRAMUSCULAR; SUBCUTANEOUS ONCE
Status: COMPLETED | OUTPATIENT
Start: 2020-02-24 | End: 2020-02-24

## 2020-02-24 RX ADMIN — CYANOCOBALAMIN 1000 MCG: 1000 INJECTION INTRAMUSCULAR; SUBCUTANEOUS at 11:55

## 2020-03-23 ENCOUNTER — NURSE ONLY (OUTPATIENT)
Dept: FAMILY MEDICINE CLINIC | Age: 80
End: 2020-03-23
Payer: MEDICARE

## 2020-03-23 PROCEDURE — 96372 THER/PROPH/DIAG INJ SC/IM: CPT | Performed by: FAMILY MEDICINE

## 2020-03-23 RX ORDER — CYANOCOBALAMIN 1000 UG/ML
1000 INJECTION INTRAMUSCULAR; SUBCUTANEOUS ONCE
Status: COMPLETED | OUTPATIENT
Start: 2020-03-23 | End: 2020-03-23

## 2020-03-23 RX ADMIN — CYANOCOBALAMIN 1000 MCG: 1000 INJECTION INTRAMUSCULAR; SUBCUTANEOUS at 11:26

## 2020-04-23 ENCOUNTER — NURSE ONLY (OUTPATIENT)
Dept: FAMILY MEDICINE CLINIC | Age: 80
End: 2020-04-23
Payer: MEDICARE

## 2020-04-23 PROCEDURE — 96372 THER/PROPH/DIAG INJ SC/IM: CPT | Performed by: FAMILY MEDICINE

## 2020-04-23 RX ORDER — CYANOCOBALAMIN 1000 UG/ML
1000 INJECTION INTRAMUSCULAR; SUBCUTANEOUS ONCE
Status: COMPLETED | OUTPATIENT
Start: 2020-04-23 | End: 2020-04-23

## 2020-04-23 RX ADMIN — CYANOCOBALAMIN 1000 MCG: 1000 INJECTION INTRAMUSCULAR; SUBCUTANEOUS at 10:14

## 2020-05-05 RX ORDER — LISINOPRIL 10 MG/1
TABLET ORAL
Qty: 90 TABLET | Refills: 3 | Status: SHIPPED | OUTPATIENT
Start: 2020-05-05 | End: 2021-05-31

## 2020-05-05 RX ORDER — INSULIN DETEMIR 100 [IU]/ML
28 INJECTION, SOLUTION SUBCUTANEOUS NIGHTLY
Qty: 15 PEN | Refills: 5 | Status: SHIPPED | OUTPATIENT
Start: 2020-05-05 | End: 2020-05-05 | Stop reason: SDUPTHER

## 2020-05-05 RX ORDER — INSULIN DETEMIR 100 [IU]/ML
28 INJECTION, SOLUTION SUBCUTANEOUS NIGHTLY
Qty: 15 PEN | Refills: 5 | Status: SHIPPED | OUTPATIENT
Start: 2020-05-05 | End: 2020-05-06 | Stop reason: SDUPTHER

## 2020-05-06 ENCOUNTER — TELEPHONE (OUTPATIENT)
Dept: FAMILY MEDICINE CLINIC | Age: 80
End: 2020-05-06

## 2020-05-06 RX ORDER — INSULIN DETEMIR 100 [IU]/ML
35 INJECTION, SOLUTION SUBCUTANEOUS NIGHTLY
Qty: 15 PEN | Refills: 5 | Status: SHIPPED | OUTPATIENT
Start: 2020-05-06 | End: 2021-06-29 | Stop reason: DRUGHIGH

## 2020-06-22 ENCOUNTER — OFFICE VISIT (OUTPATIENT)
Dept: FAMILY MEDICINE CLINIC | Age: 80
End: 2020-06-22
Payer: MEDICARE

## 2020-06-22 VITALS
OXYGEN SATURATION: 98 % | DIASTOLIC BLOOD PRESSURE: 79 MMHG | WEIGHT: 207 LBS | SYSTOLIC BLOOD PRESSURE: 125 MMHG | HEART RATE: 85 BPM | BODY MASS INDEX: 36.68 KG/M2 | HEIGHT: 63 IN

## 2020-06-22 PROBLEM — D64.9 ANEMIA: Status: ACTIVE | Noted: 2020-06-22

## 2020-06-22 PROBLEM — E53.8 B12 DEFICIENCY: Status: ACTIVE | Noted: 2020-06-22

## 2020-06-22 LAB — HBA1C MFR BLD: 6.6 %

## 2020-06-22 PROCEDURE — 1036F TOBACCO NON-USER: CPT | Performed by: FAMILY MEDICINE

## 2020-06-22 PROCEDURE — G0439 PPPS, SUBSEQ VISIT: HCPCS | Performed by: FAMILY MEDICINE

## 2020-06-22 PROCEDURE — 1090F PRES/ABSN URINE INCON ASSESS: CPT | Performed by: FAMILY MEDICINE

## 2020-06-22 PROCEDURE — 4040F PNEUMOC VAC/ADMIN/RCVD: CPT | Performed by: FAMILY MEDICINE

## 2020-06-22 PROCEDURE — 83036 HEMOGLOBIN GLYCOSYLATED A1C: CPT | Performed by: FAMILY MEDICINE

## 2020-06-22 PROCEDURE — 99214 OFFICE O/P EST MOD 30 MIN: CPT | Performed by: FAMILY MEDICINE

## 2020-06-22 PROCEDURE — G8417 CALC BMI ABV UP PARAM F/U: HCPCS | Performed by: FAMILY MEDICINE

## 2020-06-22 PROCEDURE — G8428 CUR MEDS NOT DOCUMENT: HCPCS | Performed by: FAMILY MEDICINE

## 2020-06-22 PROCEDURE — G8399 PT W/DXA RESULTS DOCUMENT: HCPCS | Performed by: FAMILY MEDICINE

## 2020-06-22 PROCEDURE — 1123F ACP DISCUSS/DSCN MKR DOCD: CPT | Performed by: FAMILY MEDICINE

## 2020-06-22 PROCEDURE — 36415 COLL VENOUS BLD VENIPUNCTURE: CPT | Performed by: FAMILY MEDICINE

## 2020-06-22 RX ORDER — SIMVASTATIN 20 MG
20 TABLET ORAL NIGHTLY
Qty: 90 TABLET | Refills: 3 | Status: SHIPPED | OUTPATIENT
Start: 2020-06-22 | End: 2021-07-26

## 2020-06-22 RX ORDER — LEVOTHYROXINE SODIUM 137 UG/1
137 TABLET ORAL DAILY
Qty: 90 TABLET | Refills: 3 | Status: SHIPPED | OUTPATIENT
Start: 2020-06-22 | End: 2021-07-26

## 2020-06-22 RX ORDER — ALLOPURINOL 300 MG/1
300 TABLET ORAL DAILY
Qty: 90 TABLET | Refills: 3 | Status: SHIPPED | OUTPATIENT
Start: 2020-06-22 | End: 2021-07-29

## 2020-06-22 RX ORDER — NAPROXEN 500 MG/1
500 TABLET ORAL 2 TIMES DAILY WITH MEALS
Qty: 14 TABLET | Refills: 3 | Status: SHIPPED | OUTPATIENT
Start: 2020-06-22 | End: 2021-06-07

## 2020-06-22 ASSESSMENT — ENCOUNTER SYMPTOMS
COUGH: 0
SHORTNESS OF BREATH: 0
ABDOMINAL PAIN: 0
BACK PAIN: 0

## 2020-06-22 ASSESSMENT — PATIENT HEALTH QUESTIONNAIRE - PHQ9
SUM OF ALL RESPONSES TO PHQ QUESTIONS 1-9: 2
SUM OF ALL RESPONSES TO PHQ QUESTIONS 1-9: 2

## 2020-06-22 ASSESSMENT — LIFESTYLE VARIABLES: HOW OFTEN DO YOU HAVE A DRINK CONTAINING ALCOHOL: 0

## 2020-06-22 NOTE — PROGRESS NOTES
stenosis    H/O echocardiogram 10/14/08    EF>55%, normal LV systolic function, normal left ventricular wall thickness, impaired LV relaxation    H/O echocardiogram 6/18/13, 10/08;07/07 6/13- EF >55% Impaired LV relaxation, Mild concentric LV hypertrophy, No sig AS, THEO underestimated. 10/08-EF=>55%,NL study;7/07-EF=>55%,Mild valv. AS.    H/O echocardiogram 5/16/2016 12/3/14    5/16 EF 55-60% normal study 12/14EF 60%. MIld mitral and tricuspid insufficiencies. Mildly sclerotic aortic valve which appears to be mildly stenosed with aortic valve area of 1.4 however this does not appear to be hemodynamically signficant aortic stenosis. Mildy hypertropic left ventricle with normal global and regional left ventricular systolic function.  H/O left heart catheterization by cutdown 05/23/2016    no significant disease in all vessels.  EF 55%    Hernia     after lap band removal    Hx of cardiovascular stress test 06/18/2013 6/13-EF 51%, no scitnigraphic evidence of inducible myocardial ischemia    Hyperlipidemia     Hypertension     Irritable bowel syndrome     Kidney stones     Obesity     Osteoarthritis     Thyroid disease     Type II or unspecified type diabetes mellitus without mention of complication, not stated as uncontrolled      Past Surgical History:   Procedure Laterality Date    CARDIAC CATHETERIZATION  04/18/08    continue risk factor modification with strict control of her risk factors with diet and control of diabetes, high blood pressure and hyperlipidemia    GASTRIC BAND  08/2008    revision of gastric band placement    HYSTERECTOMY      LAP BAND      put in in May Removed in August    LITHOTRIPSY      ROTATOR CUFF REPAIR      right    ROTATOR CUFF REPAIR       Family History   Problem Relation Age of Onset    Cancer Sister     COPD Brother     Heart Disease Brother     Cancer Mother         melonoma    Migraines Mother     Cancer Father         carcinoma    High Blood tablet Take 2 tablets by mouth 2 times daily (with meals) 360 tablet 3    simvastatin (ZOCOR) 20 MG tablet Take 1 tablet by mouth nightly 90 tablet 3    allopurinol (ZYLOPRIM) 300 MG tablet Take 1 tablet by mouth daily 90 tablet 3    levothyroxine (SYNTHROID) 137 MCG tablet Take 1 tablet by mouth daily 90 tablet 3    insulin detemir (LEVEMIR FLEXTOUCH) 100 UNIT/ML injection pen Inject 35 Units into the skin nightly Dose to be adjusted according to AM FBS readings. 15 pen 5    lisinopril (PRINIVIL;ZESTRIL) 10 MG tablet Take 1 tablet by mouth once daily 90 tablet 3    amLODIPine (NORVASC) 10 MG tablet TAKE 1 TABLET DAILY 90 tablet 3    furosemide (LASIX) 40 MG tablet TAKE 1 TABLET DAILY 90 tablet 3    lisinopril (PRINIVIL;ZESTRIL) 40 MG tablet TAKE 1 TABLET DAILY 90 tablet 3    metoprolol succinate (TOPROL XL) 100 MG extended release tablet TAKE 1 TABLET DAILY 90 tablet 3    loratadine (CLARITIN) 10 MG tablet Take 1 tablet by mouth daily 1 tablet 3    budesonide-formoterol (SYMBICORT) 80-4.5 MCG/ACT AERO Inhale 2 puffs into the lungs 2 times daily      nystatin (MYCOSTATIN) 883737 UNIT/GM cream Apply topically 2 times daily. 60 g 2    clindamycin (CLEOCIN T) 1 % lotion Apply 10 mg/mL topically 2 times daily Apply topically 2 times daily.  fluticasone (FLONASE) 50 MCG/ACT nasal spray 1 spray by Each Nare route daily 1 Bottle 3    ketoconazole (NIZORAL) 2 % cream Apply topically daily. 60 g 2    Insulin Pen Needle 29G X 12.7MM MISC 1 each by Does not apply route daily 300 each 3    insulin aspart (NOVOLOG FLEXPEN) 100 UNIT/ML injection pen Inject 20 Units into the skin 3 times daily (before meals) 3 Pen 11    omeprazole (PRILOSEC OTC) 20 MG tablet Take 1 tablet by mouth daily 90 tablet 3    calcium carbonate (TUMS) 500 MG chewable tablet Take 2 tablets by mouth as needed.  aspirin 81 MG EC tablet Take 81 mg by mouth daily. No current facility-administered medications for this visit. Review of Systems   Constitutional: Negative for activity change, chills, fatigue and fever. HENT: Negative for congestion. Respiratory: Negative for cough and shortness of breath. Cardiovascular: Negative for chest pain, leg swelling and syncope. Gastrointestinal: Negative for abdominal pain. Genitourinary: Negative for dysuria, frequency and hematuria. Musculoskeletal: Positive for neck pain. Negative for back pain. Neurological: Negative for dizziness, tingling, weakness, numbness and headaches. Psychiatric/Behavioral: Negative for agitation. The patient is not nervous/anxious. Lab Results   Component Value Date    WBC 8.6 08/19/2019    HGB 12.0 (L) 08/19/2019    HCT 39.4 08/19/2019    MCV 96.8 08/19/2019     08/19/2019     Lab Results   Component Value Date     08/19/2019    K 4.1 08/19/2019     08/19/2019    CO2 25 08/19/2019    BUN 32 (H) 08/19/2019    CREATININE 1.1 08/19/2019    GLUCOSE 92 08/19/2019    CALCIUM 10.2 08/19/2019    PROT 7.4 08/19/2019    LABALBU 4.5 08/19/2019    BILITOT 0.4 08/19/2019    ALKPHOS 60 08/19/2019    AST 18 08/19/2019    ALT 13 08/19/2019    LABGLOM 48 (L) 08/19/2019    GFRAA 58 (L) 08/19/2019    AGRATIO 1.5 04/06/2018    GLOB 2.9 04/06/2018     Lab Results   Component Value Date    CHOL 215 (H) 04/06/2018    CHOL 184 03/24/2017    CHOL 226 (H) 04/05/2016     Lab Results   Component Value Date    TRIG 119 04/06/2018    TRIG 204 (H) 03/24/2017    TRIG 187 (H) 04/05/2016     Lab Results   Component Value Date    HDL 63 08/19/2019    HDL 71 (H) 04/06/2018    HDL 62 03/24/2017     Lab Results   Component Value Date    LDLCALC 120 (H) 04/06/2018    LDLCALC 81 03/24/2017    LDLCALC 124 (H) 04/05/2016     Lab Results   Component Value Date    LABA1C 7.7 05/13/2019     Lab Results   Component Value Date    TSH 1.37 11/19/2019    TSHHS 0.222 (L) 08/19/2019         There were no vitals taken for this visit.     BP Readings from Last 3 Encounters:   06/22/20 125/79   07/29/19 (!) 160/80   06/18/19 (!) 140/60       Wt Readings from Last 3 Encounters:   06/22/20 207 lb (93.9 kg)   07/29/19 214 lb (97.1 kg)   06/18/19 211 lb 12.8 oz (96.1 kg)         Physical Exam  Constitutional:       General: She is not in acute distress. Appearance: Normal appearance. She is well-developed. She is obese. She is not ill-appearing or diaphoretic. HENT:      Head: Normocephalic and atraumatic. Eyes:      General: No scleral icterus. Pupils: Pupils are equal, round, and reactive to light. Neck:      Musculoskeletal: Normal range of motion and neck supple. Cardiovascular:      Rate and Rhythm: Normal rate and regular rhythm. Heart sounds: Normal heart sounds. No murmur. Pulmonary:      Effort: Pulmonary effort is normal.      Breath sounds: Normal breath sounds. No wheezing. Musculoskeletal:      Cervical back: She exhibits tenderness. She exhibits normal range of motion, no swelling, no edema, no deformity, no pain and no spasm. Right lower leg: No edema. Left lower leg: No edema. Neurological:      General: No focal deficit present. Mental Status: She is alert and oriented to person, place, and time. Psychiatric:         Mood and Affect: Mood normal.         Behavior: Behavior normal.         ASSESSMENT/ PLAN:    1. Type 2 diabetes mellitus with complication, with long-term current use of insulin (HCC)  - POCT glycosylated hemoglobin (Hb A1C)  - stable    2. Essential hypertension  - stable  - Comprehensive Metabolic Panel, Fasting    3. Hyperlipidemia, unspecified hyperlipidemia type  - stable    4. Acquired hypothyroidism  - stable    5. Anemia, unspecified type  - stable  - CBC Auto Differential    6. B12 deficiency  - Vitamin B12 & Folate    7. Neck pain  - try:  - naproxen (NAPROSYN) 500 MG tablet; Take 1 tablet by mouth 2 times daily (with meals) for 7 days  Dispense: 14 tablet;  Refill: 3                -

## 2020-06-22 NOTE — PROGRESS NOTES
Medicare Annual Wellness Visit  Name: Graciela Palomino Date: 2020   MRN: [id number] Sex: Female   Age: 68 y.o. Ethnicity: Non-/Non    : 1940 Race: Bryce Best is here for Medicare AWV    Screenings for behavioral, psychosocial and functional/safety risks, and cognitive dysfunction are all negative except as indicated below. These results, as well as other patient data from the [address] form, are documented in Flowsheets linked to this Encounter. Allergies   Allergen Reactions    Scopolamine      Combative and delusional    Penicillins Hives         Prior to Visit Medications    Medication Sig Taking? Authorizing Provider   metFORMIN (GLUCOPHAGE) 500 MG tablet Take 2 tablets by mouth 2 times daily (with meals) Yes William Dillon MD   simvastatin (ZOCOR) 20 MG tablet Take 1 tablet by mouth nightly Yes William Dillon MD   allopurinol (ZYLOPRIM) 300 MG tablet Take 1 tablet by mouth daily Yes William Dillon MD   levothyroxine (SYNTHROID) 137 MCG tablet Take 1 tablet by mouth daily Yes William Dillon MD   insulin detemir (LEVEMIR FLEXTOUCH) 100 UNIT/ML injection pen Inject 35 Units into the skin nightly Dose to be adjusted according to AM FBS readings. Yes William Dillon MD   lisinopril (PRINIVIL;ZESTRIL) 10 MG tablet Take 1 tablet by mouth once daily Yes William Dillon MD   amLODIPine (NORVASC) 10 MG tablet TAKE 1 TABLET DAILY Yes William Dillon MD   furosemide (LASIX) 40 MG tablet TAKE 1 TABLET DAILY Yes William Dillon MD   lisinopril (PRINIVIL;ZESTRIL) 40 MG tablet TAKE 1 TABLET DAILY Yes William Dillon MD   metoprolol succinate (TOPROL XL) 100 MG extended release tablet TAKE 1 TABLET DAILY Yes William Dillon MD   nystatin (MYCOSTATIN) 642075 UNIT/GM cream Apply topically 2 times daily.  Yes William Dillon MD   fluticasone (FLONASE) 50 MCG/ACT nasal spray 1 spray by Each Nare route daily Yes William Dillon MD   ketoconazole (NIZORAL) 2 % cream Apply No  Have you had an eye exam within the past year?: Yes  Hearing/Vision Interventions:  · doing fine    Personalized Preventive Plan   Current Health Maintenance Status  Immunization History   Administered Date(s) Administered    Influenza, High Dose (Fluzone 65 yrs and older) 09/27/2013, 10/21/2014, 12/21/2016, 10/02/2017, 11/08/2018    Influenza, Triv, inactivated, subunit, adjuvanted, IM (Fluad 65 yrs and older) 11/19/2019    Pneumococcal Conjugate 13-valent ([id number]) 12/21/2016    Pneumococcal Polysaccharide ([id number]) 01/01/2010        Health Maintenance   Topic Date Due    DTaP/Tdap/Td vaccine (1 - Tdap) 05/17/1961    Shingles Vaccine (1 of 2) 05/17/1992    Annual Wellness Visit (AWV)  05/29/2019    Lipid screen  08/19/2020    Potassium monitoring  08/19/2020    Creatinine monitoring  08/19/2020    TSH testing  11/19/2020    Breast cancer screen  08/12/2021    DEXA (modify frequency per FRAX score)  Completed    Flu vaccine  Completed    Pneumococcal 65+ years Vaccine  Completed    Hepatitis A vaccine  Aged Out    Hib vaccine  Aged Out    Meningococcal (ACWY) vaccine  Aged Out     Recommendations for [employer] [place] Due: see orders and patient instructions/AVS.  . Recommended screening schedule for the next 5-10 years is provided to the patient in written form: see Patient Floresita Medina was seen today for medicare awv. Diagnoses and all orders for this visit:    Routine general medical examination at a health care facility    Uncontrolled type 2 diabetes mellitus with insulin therapy ([facility] [address]  -     metFORMIN (GLUCOPHAGE) 500 MG tablet; Take 2 tablets by mouth 2 times daily (with meals)    Hyperlipidemia, unspecified hyperlipidemia type  -     simvastatin (ZOCOR) 20 MG tablet; Take 1 tablet by mouth nightly    Other specified hypothyroidism  -     levothyroxine (SYNTHROID) 137 MCG tablet;  Take 1 tablet by mouth daily    Other orders  -     allopurinol (ZYLOPRIM) 300 MG tablet;  Take 1 tablet by mouth daily

## 2020-06-23 VITALS
OXYGEN SATURATION: 98 % | SYSTOLIC BLOOD PRESSURE: 125 MMHG | WEIGHT: 207 LBS | TEMPERATURE: 97.2 F | BODY MASS INDEX: 36.67 KG/M2 | DIASTOLIC BLOOD PRESSURE: 79 MMHG | HEART RATE: 85 BPM

## 2020-06-23 LAB
A/G RATIO: 1.4 (ref 1.1–2.2)
ALBUMIN SERPL-MCNC: 4.3 G/DL (ref 3.4–5)
ALP BLD-CCNC: 67 U/L (ref 40–129)
ALT SERPL-CCNC: 17 U/L (ref 10–40)
ANION GAP SERPL CALCULATED.3IONS-SCNC: 19 MMOL/L (ref 3–16)
AST SERPL-CCNC: 24 U/L (ref 15–37)
BASOPHILS ABSOLUTE: 0.1 K/UL (ref 0–0.2)
BASOPHILS RELATIVE PERCENT: 0.8 %
BILIRUB SERPL-MCNC: 0.3 MG/DL (ref 0–1)
BUN BLDV-MCNC: 24 MG/DL (ref 7–20)
CALCIUM SERPL-MCNC: 9.7 MG/DL (ref 8.3–10.6)
CHLORIDE BLD-SCNC: 100 MMOL/L (ref 99–110)
CO2: 25 MMOL/L (ref 21–32)
CREAT SERPL-MCNC: 1.1 MG/DL (ref 0.6–1.2)
EOSINOPHILS ABSOLUTE: 0.4 K/UL (ref 0–0.6)
EOSINOPHILS RELATIVE PERCENT: 5.1 %
FOLATE: 12.8 NG/ML (ref 4.78–24.2)
GFR AFRICAN AMERICAN: 58
GFR NON-AFRICAN AMERICAN: 48
GLOBULIN: 3 G/DL
GLUCOSE FASTING: 94 MG/DL (ref 70–99)
HCT VFR BLD CALC: 37.4 % (ref 36–48)
HEMOGLOBIN: 12.2 G/DL (ref 12–16)
LYMPHOCYTES ABSOLUTE: 2.1 K/UL (ref 1–5.1)
LYMPHOCYTES RELATIVE PERCENT: 27.8 %
MCH RBC QN AUTO: 29.9 PG (ref 26–34)
MCHC RBC AUTO-ENTMCNC: 32.5 G/DL (ref 31–36)
MCV RBC AUTO: 92.2 FL (ref 80–100)
MONOCYTES ABSOLUTE: 0.6 K/UL (ref 0–1.3)
MONOCYTES RELATIVE PERCENT: 7.3 %
NEUTROPHILS ABSOLUTE: 4.5 K/UL (ref 1.7–7.7)
NEUTROPHILS RELATIVE PERCENT: 59 %
PDW BLD-RTO: 16 % (ref 12.4–15.4)
PLATELET # BLD: 276 K/UL (ref 135–450)
PMV BLD AUTO: 8.6 FL (ref 5–10.5)
POTASSIUM SERPL-SCNC: 4 MMOL/L (ref 3.5–5.1)
RBC # BLD: 4.06 M/UL (ref 4–5.2)
SODIUM BLD-SCNC: 144 MMOL/L (ref 136–145)
TOTAL PROTEIN: 7.3 G/DL (ref 6.4–8.2)
VITAMIN B-12: 734 PG/ML (ref 211–911)
WBC # BLD: 7.6 K/UL (ref 4–11)

## 2021-01-09 DIAGNOSIS — I10 ESSENTIAL HYPERTENSION: ICD-10-CM

## 2021-01-11 RX ORDER — METOPROLOL SUCCINATE 100 MG/1
TABLET, EXTENDED RELEASE ORAL
Qty: 90 TABLET | Refills: 3 | Status: SHIPPED | OUTPATIENT
Start: 2021-01-11 | End: 2022-01-10

## 2021-01-11 RX ORDER — FUROSEMIDE 40 MG/1
TABLET ORAL
Qty: 90 TABLET | Refills: 3 | Status: SHIPPED | OUTPATIENT
Start: 2021-01-11 | End: 2021-10-15 | Stop reason: SDUPTHER

## 2021-01-11 RX ORDER — LISINOPRIL 40 MG/1
TABLET ORAL
Qty: 90 TABLET | Refills: 3 | Status: SHIPPED | OUTPATIENT
Start: 2021-01-11 | End: 2022-01-10

## 2021-02-01 RX ORDER — AMLODIPINE BESYLATE 10 MG/1
TABLET ORAL
Qty: 90 TABLET | Refills: 3 | Status: SHIPPED | OUTPATIENT
Start: 2021-02-01 | End: 2022-01-10

## 2021-05-06 ENCOUNTER — OFFICE VISIT (OUTPATIENT)
Dept: FAMILY MEDICINE CLINIC | Age: 81
End: 2021-05-06
Payer: MEDICARE

## 2021-05-06 VITALS
SYSTOLIC BLOOD PRESSURE: 128 MMHG | HEIGHT: 63 IN | DIASTOLIC BLOOD PRESSURE: 76 MMHG | BODY MASS INDEX: 39.12 KG/M2 | WEIGHT: 220.8 LBS | HEART RATE: 56 BPM | OXYGEN SATURATION: 95 % | TEMPERATURE: 97.7 F

## 2021-05-06 DIAGNOSIS — L03.116 LEFT LEG CELLULITIS: Primary | ICD-10-CM

## 2021-05-06 PROCEDURE — 4040F PNEUMOC VAC/ADMIN/RCVD: CPT | Performed by: NURSE PRACTITIONER

## 2021-05-06 PROCEDURE — G8399 PT W/DXA RESULTS DOCUMENT: HCPCS | Performed by: NURSE PRACTITIONER

## 2021-05-06 PROCEDURE — G8427 DOCREV CUR MEDS BY ELIG CLIN: HCPCS | Performed by: NURSE PRACTITIONER

## 2021-05-06 PROCEDURE — 1036F TOBACCO NON-USER: CPT | Performed by: NURSE PRACTITIONER

## 2021-05-06 PROCEDURE — 1123F ACP DISCUSS/DSCN MKR DOCD: CPT | Performed by: NURSE PRACTITIONER

## 2021-05-06 PROCEDURE — 99213 OFFICE O/P EST LOW 20 MIN: CPT | Performed by: NURSE PRACTITIONER

## 2021-05-06 PROCEDURE — G8417 CALC BMI ABV UP PARAM F/U: HCPCS | Performed by: NURSE PRACTITIONER

## 2021-05-06 PROCEDURE — 1090F PRES/ABSN URINE INCON ASSESS: CPT | Performed by: NURSE PRACTITIONER

## 2021-05-06 RX ORDER — CEPHALEXIN 500 MG/1
500 CAPSULE ORAL 4 TIMES DAILY
COMMUNITY
End: 2021-06-07

## 2021-05-06 RX ORDER — LISINOPRIL 10 MG/1
10 TABLET ORAL DAILY
COMMUNITY
End: 2021-06-07

## 2021-05-06 ASSESSMENT — PATIENT HEALTH QUESTIONNAIRE - PHQ9
SUM OF ALL RESPONSES TO PHQ QUESTIONS 1-9: 0
2. FEELING DOWN, DEPRESSED OR HOPELESS: 0
SUM OF ALL RESPONSES TO PHQ QUESTIONS 1-9: 0
1. LITTLE INTEREST OR PLEASURE IN DOING THINGS: 0
SUM OF ALL RESPONSES TO PHQ QUESTIONS 1-9: 0

## 2021-05-06 NOTE — PROGRESS NOTES
2021     David Castro (:  1940) is a 68 y.o. female, here for evaluation of the following medical concerns:    Presented to [facility] ER 2021 with the following complaint     Left leg swelling, redness and \"seeping\". Pt states it has been that way for about 2 weeks. Prior to that the right leg had the same issue but cleared up on its own. Pt noticed it just started seeping today. Painful and itchy. Pt is diabetic. She was given Keflex 500 mg every 6 hours for 7 days. She has 2 days of antibiotic left. States that the right leg has resolved and just has scabs, but the left leg has improved greatly. She has been wearing compression socks and keeping her leg elevated. Reports some itching, but compression socks and soap and water help. Denies fever sweating chills. Blood pressure was elevated at ER visit, but within normal limits today    Average glucose in the mornings 130  Patient is on Metformin and long-acting insulin nightly. States that her blood sugar had been dropping in the mornings and she was feeling shaky, so she stopped taking her Metformin for about 3 weeks and her glucose went up to 150-170 fasting in the morning so she started taking it again. Is currently taking Levemir 28 units nightly. Does not check her glucose before bed. Review of Systems    Prior to Visit Medications    Medication Sig Taking?  Authorizing Provider   cephALEXin (KEFLEX) 500 MG capsule Take 500 mg by mouth 4 times daily Yes Historical Provider, MD   lisinopril (PRINIVIL;ZESTRIL) 10 MG tablet Take 10 mg by mouth daily Yes Historical Provider, MD   amLODIPine (NORVASC) 10 MG tablet TAKE 1 TABLET DAILY Yes Omid Gastelum MD   metoprolol succinate (TOPROL XL) 100 MG extended release tablet TAKE 1 TABLET DAILY Yes Omid Gastelum MD   lisinopril (PRINIVIL;ZESTRIL) 40 MG tablet TAKE 1 TABLET DAILY Yes Omid Gastelum MD   furosemide (LASIX) 40 MG tablet TAKE 1 TABLET DAILY Yes Dinah Catarino Rangel MD   metFORMIN (GLUCOPHAGE) 500 MG tablet Take 2 tablets by mouth 2 times daily (with meals) Yes Sofia Yuan MD   simvastatin (ZOCOR) 20 MG tablet Take 1 tablet by mouth nightly Yes Sofia Yuan MD   allopurinol (ZYLOPRIM) 300 MG tablet Take 1 tablet by mouth daily Yes Sofia Yuan MD   levothyroxine (SYNTHROID) 137 MCG tablet Take 1 tablet by mouth daily Yes Sofia Yuan MD   insulin detemir (LEVEMIR FLEXTOUCH) 100 UNIT/ML injection pen Inject 35 Units into the skin nightly Dose to be adjusted according to AM FBS readings. Yes Sofia Yuan MD   lisinopril (PRINIVIL;ZESTRIL) 10 MG tablet Take 1 tablet by mouth once daily Yes Sofia Yuan MD   Insulin Pen Needle 29G X 12.7MM MISC 1 each by Does not apply route daily Yes Sofia Yuan MD   omeprazole (PRILOSEC OTC) 20 MG tablet Take 1 tablet by mouth daily Yes Sofia Yuan MD   calcium carbonate (TUMS) 500 MG chewable tablet Take 2 tablets by mouth as needed. Yes Historical Provider, MD   aspirin 81 MG EC tablet Take 81 mg by mouth daily. Yes Historical Provider, MD   naproxen (NAPROSYN) 500 MG tablet Take 1 tablet by mouth 2 times daily (with meals) for 7 days  Sofia Yuan MD   loratadine (CLARITIN) 10 MG tablet Take 1 tablet by mouth daily  Patient not taking: Reported on 5/6/2021  Sofia Yuan MD   budesonide-formoterol (SYMBICORT) 80-4.5 MCG/ACT AERO Inhale 2 puffs into the lungs 2 times daily  Historical Provider, MD   nystatin (MYCOSTATIN) 267531 UNIT/GM cream Apply topically 2 times daily. Patient not taking: Reported on 5/6/2021  Sofia Yuan MD   clindamycin (CLEOCIN T) 1 % lotion Apply 10 mg/mL topically 2 times daily Apply topically 2 times daily. Historical Provider, MD   fluticasone (FLONASE) 50 MCG/ACT nasal spray 1 spray by Each Nare route daily  Patient not taking: Reported on 5/6/2021  Sofia Yuan MD   ketoconazole (NIZORAL) 2 % cream Apply topically daily.   Patient not taking: Reported on 5/6/2021 baseline. Psychiatric:         Mood and Affect: Mood normal.         Behavior: Behavior normal.         Thought Content: Thought content normal.         Judgment: Judgment normal.         ASSESSMENT/PLAN:  1. Left leg cellulitis  Improved, but not resolved completely. Continue to wash with soap and water twice daily. Do not itch or scratch off scabs  Keep glucose under control. Elevate extremity wear compression hose  Tylenol for pain    Follow-up if not resolved  Has 2 days left of Keflex. Diabetes  -if glucose less than 100 in the morning fasting, decrease nighttime Levemir by 2 units. Let PCP know if she does need to do this. All care gaps addressed     All questions answered    Discussed use, benefit, and side effects of prescribed medications. Barriers to compliance discussed. All patient questions answered. Pt voiced understanding. Present to the ER for any emergent or acute symptoms not managed at home or in office. Please note that this chart was generated using dragon dictation software. Although every effort was made to ensure the accuracy of this automated transcription, some errors in transcription may have occurred. No follow-ups on file. An electronic signature was used to authenticate this note.     --NIKUNJ Abdul NP on 5/6/2021 at 2:03 PM

## 2021-06-07 ENCOUNTER — OFFICE VISIT (OUTPATIENT)
Dept: FAMILY MEDICINE CLINIC | Age: 81
End: 2021-06-07
Payer: MEDICARE

## 2021-06-07 VITALS
WEIGHT: 218.4 LBS | SYSTOLIC BLOOD PRESSURE: 140 MMHG | HEART RATE: 68 BPM | OXYGEN SATURATION: 97 % | DIASTOLIC BLOOD PRESSURE: 78 MMHG | BODY MASS INDEX: 38.69 KG/M2

## 2021-06-07 DIAGNOSIS — L03.116 LEFT LEG CELLULITIS: ICD-10-CM

## 2021-06-07 DIAGNOSIS — L03.116 LEFT LEG CELLULITIS: Primary | ICD-10-CM

## 2021-06-07 LAB
BASOPHILS ABSOLUTE: 0 K/UL (ref 0–0.2)
BASOPHILS RELATIVE PERCENT: 0.5 %
EOSINOPHILS ABSOLUTE: 0.3 K/UL (ref 0–0.6)
EOSINOPHILS RELATIVE PERCENT: 3.4 %
HCT VFR BLD CALC: 36.3 % (ref 36–48)
HEMOGLOBIN: 12 G/DL (ref 12–16)
LYMPHOCYTES ABSOLUTE: 2.2 K/UL (ref 1–5.1)
LYMPHOCYTES RELATIVE PERCENT: 23.4 %
MCH RBC QN AUTO: 30.8 PG (ref 26–34)
MCHC RBC AUTO-ENTMCNC: 33.1 G/DL (ref 31–36)
MCV RBC AUTO: 93 FL (ref 80–100)
MONOCYTES ABSOLUTE: 0.7 K/UL (ref 0–1.3)
MONOCYTES RELATIVE PERCENT: 7.3 %
NEUTROPHILS ABSOLUTE: 6.2 K/UL (ref 1.7–7.7)
NEUTROPHILS RELATIVE PERCENT: 65.4 %
PDW BLD-RTO: 16.1 % (ref 12.4–15.4)
PLATELET # BLD: 284 K/UL (ref 135–450)
PMV BLD AUTO: 8.7 FL (ref 5–10.5)
RBC # BLD: 3.9 M/UL (ref 4–5.2)
WBC # BLD: 9.5 K/UL (ref 4–11)

## 2021-06-07 PROCEDURE — 99214 OFFICE O/P EST MOD 30 MIN: CPT | Performed by: FAMILY MEDICINE

## 2021-06-07 PROCEDURE — G8427 DOCREV CUR MEDS BY ELIG CLIN: HCPCS | Performed by: FAMILY MEDICINE

## 2021-06-07 PROCEDURE — G8417 CALC BMI ABV UP PARAM F/U: HCPCS | Performed by: FAMILY MEDICINE

## 2021-06-07 PROCEDURE — 1090F PRES/ABSN URINE INCON ASSESS: CPT | Performed by: FAMILY MEDICINE

## 2021-06-07 PROCEDURE — G8399 PT W/DXA RESULTS DOCUMENT: HCPCS | Performed by: FAMILY MEDICINE

## 2021-06-07 PROCEDURE — 4040F PNEUMOC VAC/ADMIN/RCVD: CPT | Performed by: FAMILY MEDICINE

## 2021-06-07 PROCEDURE — 1123F ACP DISCUSS/DSCN MKR DOCD: CPT | Performed by: FAMILY MEDICINE

## 2021-06-07 PROCEDURE — 1036F TOBACCO NON-USER: CPT | Performed by: FAMILY MEDICINE

## 2021-06-07 RX ORDER — SULFAMETHOXAZOLE AND TRIMETHOPRIM 800; 160 MG/1; MG/1
1 TABLET ORAL 2 TIMES DAILY
Qty: 20 TABLET | Refills: 0 | Status: SHIPPED | OUTPATIENT
Start: 2021-06-07 | End: 2021-06-17

## 2021-06-07 RX ORDER — TRIAMCINOLONE ACETONIDE 1 MG/G
CREAM TOPICAL
Qty: 60 G | Refills: 0 | Status: SHIPPED | OUTPATIENT
Start: 2021-06-07 | End: 2021-11-12

## 2021-06-07 ASSESSMENT — ENCOUNTER SYMPTOMS
COUGH: 0
SHORTNESS OF BREATH: 0

## 2021-06-07 NOTE — PROGRESS NOTES
Elizabeth Gordon  1940 06/07/21    Chief Complaint   Patient presents with    Cellulitis     Patient here for cellulitis in left leg           Patient here for f/u regarding her left leg cellulitis, was seen las month, and keflex was given, patient stats still has some redness, itchy, some swelling, denies fever. She use the ointment cream, but didn't help. Past Medical History:   Diagnosis Date    Abnormal nuclear stress test 4/08;7/07 4/08-EF=67%,Mild->Mod.isch. LAD;7/07-EF=70%,Suggests poss. Mild Ant.wall ischemia.  Cardiac arrest ([facility] [address] 2008    Diabetes mellitus ([facility] [address]     H/O cardiac catheterization 4/08    No signif.CAD.    H/O cardiovascular stress test 04/11/08    mild to mod ischemia in the LAD region, abnormal study, EF 67%, patient developed mild chest pain and throat tightness    H/O cardiovascular stress test 5/3/2016    lexiscan-moderate ischemia apical,RF66%    H/O Doppler ultrasound 07/16/2007    CAROTID DOPLER- intimal thickening but no significant atherosclerotic plaque noted in the right or left internal carotid artery, doppler flow velocities within the right and left internal carotid artery are elevated, consistent with a mild, less than 50% stenosis    H/O echocardiogram 10/14/08    EF>55%, normal LV systolic function, normal left ventricular wall thickness, impaired LV relaxation    H/O echocardiogram 6/18/13, 10/08;07/07 6/13- EF >55% Impaired LV relaxation, Mild concentric LV hypertrophy, No sig AS, THEO underestimated. 10/08-EF=>55%,NL study;7/07-EF=>55%,Mild valv. AS.    H/O echocardiogram 5/16/2016 12/3/14    5/16 EF 55-60% normal study 12/14EF 60%. MIld mitral and tricuspid insufficiencies. Mildly sclerotic aortic valve which appears to be mildly stenosed with aortic valve area of 1.4 however this does not appear to be hemodynamically signficant aortic stenosis. Mildy hypertropic left ventricle with normal global and regional left ventricular systolic function.     H/O left heart catheterization by cutdown 2016    no significant disease in all vessels. EF 55%    Hernia     after lap band removal    Hx of cardiovascular stress test 2013-EF 51%, no scitnigraphic evidence of inducible myocardial ischemia    Hyperlipidemia     Hypertension     Irritable bowel syndrome     Kidney stones     Obesity     Osteoarthritis     Thyroid disease     Type II or unspecified type diabetes mellitus without mention of complication, not stated as uncontrolled      Past Surgical History:   Procedure Laterality Date    CARDIAC CATHETERIZATION  08    continue risk factor modification with strict control of her risk factors with diet and control of diabetes, high blood pressure and hyperlipidemia    GASTRIC BAND  2008    revision of gastric band placement    HYSTERECTOMY      LAP BAND      put in in May Removed in August    LITHOTRIPSY     [address]      right    ROTATOR CUFF REPAIR       Family History   Problem Relation Age of Onset    Cancer Sister     COPD Brother     Heart Disease Brother     Cancer Mother         melonoma    Migraines Mother     Cancer Father         carcinoma    High Blood Pressure Father     Heart Disease Father      Social History     Socioeconomic History    Marital status:       Spouse name: Not on file    Number of children: Not on file    Years of education: Not on file    Highest education level: Not on file   Occupational History    Not on file   Tobacco Use    Smoking status: Former Smoker     Packs/day: 0.50     Years: 3.00     Pack years: 1.50     Types: Cigarettes     Quit date: 1994     Years since quittin.8    Smokeless tobacco: Never Used   Substance and Sexual Activity    Alcohol use: No     Alcohol/week: 0.0 standard drinks    Drug use: No    Sexual activity: Not Currently     Partners: Male     Comment:    Other Topics Concern    Not on file   Social History Narrative    ** Merged History Encounter **          Social Determinants of Health     Financial Resource Strain:     Difficulty of Paying Living Expenses:    Food Insecurity:     Worried About Running Out of Food in the Last Year:     [address] in the Last Year:    Transportation Needs:     Lack of Transportation (Medical):  Lack of Transportation (Non-Medical):    Physical Activity:     Days of Exercise per Week:     Minutes of Exercise per Session:    Stress:     Feeling of Stress :    Social Connections:     Frequency of Communication with Friends and Family:     Frequency of Social Gatherings with Friends and Family:     Attends Bahai Services:     Active Member of Clubs or Organizations:     Attends Club or Organization Meetings:     Marital Status:    Intimate Partner Violence:     Fear of Current or Ex-Partner:     Emotionally Abused:     Physically Abused:     Sexually Abused: Allergies   Allergen Reactions    Scopolamine      Combative and delusional    Penicillins Hives     Current Outpatient Medications   Medication Sig Dispense Refill    sulfamethoxazole-trimethoprim (BACTRIM DS;SEPTRA DS) 800-160 MG per tablet Take 1 tablet by mouth 2 times daily for 10 days 20 tablet 0    triamcinolone (KENALOG) 0.1 % cream Apply topically 2 times daily.  60 g 0    lisinopril (PRINIVIL;ZESTRIL) 10 MG tablet Take 1 tablet by mouth once daily 90 tablet 3    amLODIPine (NORVASC) 10 MG tablet TAKE 1 TABLET DAILY 90 tablet 3    metoprolol succinate (TOPROL XL) 100 MG extended release tablet TAKE 1 TABLET DAILY 90 tablet 3    lisinopril (PRINIVIL;ZESTRIL) 40 MG tablet TAKE 1 TABLET DAILY 90 tablet 3    furosemide (LASIX) 40 MG tablet TAKE 1 TABLET DAILY 90 tablet 3    metFORMIN (GLUCOPHAGE) 500 MG tablet Take 2 tablets by mouth 2 times daily (with meals) 360 tablet 3    simvastatin (ZOCOR) 20 MG tablet Take 1 tablet by mouth nightly 90 tablet 3    allopurinol (ZYLOPRIM) 300 MG tablet Take 1 tablet by mouth daily 90 tablet 3    levothyroxine (SYNTHROID) 137 MCG tablet Take 1 tablet by mouth daily 90 tablet 3    insulin detemir (LEVEMIR FLEXTOUCH) 100 UNIT/ML injection pen Inject 35 Units into the skin nightly Dose to be adjusted according to AM FBS readings. 15 pen 5    budesonide-formoterol (SYMBICORT) 80-4.5 MCG/ACT AERO Inhale 2 puffs into the lungs 2 times daily      clindamycin (CLEOCIN T) 1 % lotion Apply 10 mg/mL topically 2 times daily Apply topically 2 times daily.  Insulin Pen Needle 29G X 12.7MM MISC 1 each by Does not apply route daily 300 each 3    calcium carbonate (TUMS) 500 MG chewable tablet Take 2 tablets by mouth as needed.  aspirin 81 MG EC tablet Take 81 mg by mouth daily.  loratadine (CLARITIN) 10 MG tablet Take 1 tablet by mouth daily (Patient not taking: Reported on 5/6/2021) 1 tablet 3    nystatin (MYCOSTATIN) 971109 UNIT/GM cream Apply topically 2 times daily. (Patient not taking: Reported on 5/6/2021) 60 g 2    fluticasone (FLONASE) 50 MCG/ACT nasal spray 1 spray by Each Nare route daily (Patient not taking: Reported on 5/6/2021) 1 Bottle 3    ketoconazole (NIZORAL) 2 % cream Apply topically daily. (Patient not taking: Reported on 5/6/2021) 60 g 2    insulin aspart (NOVOLOG FLEXPEN) 100 UNIT/ML injection pen Inject 20 Units into the skin 3 times daily (before meals) (Patient not taking: Reported on 5/6/2021) 3 Pen 11    omeprazole (PRILOSEC OTC) 20 MG tablet Take 1 tablet by mouth daily (Patient not taking: Reported on 6/7/2021) 90 tablet 3     No current facility-administered medications for this visit. Review of Systems   Constitutional: Negative for activity change, chills, fatigue and fever. Respiratory: Negative for cough and shortness of breath. Cardiovascular: Negative for chest pain and leg swelling. Genitourinary: Negative for dysuria and frequency. Skin: Positive for rash (LLE).    Neurological: Negative for dizziness, weakness, numbness and headaches. Psychiatric/Behavioral: Negative for agitation. The patient is not nervous/anxious. Lab Results   Component Value Date    WBC 7.6 06/22/2020    HGB 12.2 06/22/2020    HCT 37.4 06/22/2020    MCV 92.2 06/22/2020     06/22/2020     Lab Results   Component Value Date     06/22/2020    K 4.0 06/22/2020     06/22/2020    CO2 25 06/22/2020    BUN 24 (H) 06/22/2020    CREATININE 1.1 06/22/2020    GLUCOSE 92 08/19/2019    CALCIUM 9.7 06/22/2020    PROT 7.3 06/22/2020    LABALBU 4.3 06/22/2020    BILITOT 0.3 06/22/2020    ALKPHOS 67 06/22/2020    AST 24 06/22/2020    ALT 17 06/22/2020    LABGLOM 48 (A) 06/22/2020    GFRAA 58 (A) 06/22/2020    AGRATIO 1.4 06/22/2020    GLOB 3.0 06/22/2020     Lab Results   Component Value Date    CHOL 215 (H) 04/06/2018    CHOL 184 03/24/2017    CHOL 226 (H) 04/05/2016     Lab Results   Component Value Date    TRIG 119 04/06/2018    TRIG 204 (H) 03/24/2017    TRIG 187 (H) 04/05/2016     Lab Results   Component Value Date    HDL 63 08/19/2019    HDL 71 (H) 04/06/2018    HDL 62 03/24/2017     Lab Results   Component Value Date    LDLCALC 120 (H) 04/06/2018    LDLCALC 81 03/24/2017    LDLCALC 124 (H) 04/05/2016     Lab Results   Component Value Date    LABA1C 6.6 06/22/2020     Lab Results   Component Value Date    TSH 1.37 11/19/2019    TSHHS 0.222 (L) 08/19/2019         BP (!) 140/78 (Site: Left Upper Arm, Position: Sitting, Cuff Size: Medium Adult)   Pulse 68   Wt 218 lb 6.4 oz (99.1 kg)   SpO2 97%   BMI 38.69 kg/m²     BP Readings from Last 3 Encounters:   06/07/21 (!) 140/78   05/06/21 128/76   06/22/20 125/79       Wt Readings from Last 3 Encounters:   06/07/21 218 lb 6.4 oz (99.1 kg)   05/06/21 220 lb 12.8 oz (100.2 kg)   06/22/20 207 lb (93.9 kg)         Physical Exam  Constitutional:       General: She is not in acute distress. Appearance: Normal appearance. She is well-developed. She is obese.  She is not

## 2021-06-08 LAB
ANION GAP SERPL CALCULATED.3IONS-SCNC: 24 MMOL/L (ref 3–16)
BUN BLDV-MCNC: 37 MG/DL (ref 7–20)
CALCIUM SERPL-MCNC: 10.1 MG/DL (ref 8.3–10.6)
CHLORIDE BLD-SCNC: 108 MMOL/L (ref 99–110)
CO2: 17 MMOL/L (ref 21–32)
CREAT SERPL-MCNC: 1.7 MG/DL (ref 0.6–1.2)
GFR AFRICAN AMERICAN: 35
GFR NON-AFRICAN AMERICAN: 29
GLUCOSE BLD-MCNC: 197 MG/DL (ref 70–99)
POTASSIUM SERPL-SCNC: 4.5 MMOL/L (ref 3.5–5.1)
SODIUM BLD-SCNC: 149 MMOL/L (ref 136–145)

## 2021-06-14 ENCOUNTER — OFFICE VISIT (OUTPATIENT)
Dept: FAMILY MEDICINE CLINIC | Age: 81
End: 2021-06-14
Payer: MEDICARE

## 2021-06-14 VITALS
DIASTOLIC BLOOD PRESSURE: 78 MMHG | HEART RATE: 63 BPM | WEIGHT: 220.2 LBS | OXYGEN SATURATION: 95 % | SYSTOLIC BLOOD PRESSURE: 138 MMHG | BODY MASS INDEX: 39.01 KG/M2

## 2021-06-14 DIAGNOSIS — L03.116 LEFT LEG CELLULITIS: Primary | ICD-10-CM

## 2021-06-14 DIAGNOSIS — N18.9 CHRONIC KIDNEY DISEASE, UNSPECIFIED CKD STAGE: ICD-10-CM

## 2021-06-14 PROCEDURE — 1090F PRES/ABSN URINE INCON ASSESS: CPT | Performed by: FAMILY MEDICINE

## 2021-06-14 PROCEDURE — 1123F ACP DISCUSS/DSCN MKR DOCD: CPT | Performed by: FAMILY MEDICINE

## 2021-06-14 PROCEDURE — 4040F PNEUMOC VAC/ADMIN/RCVD: CPT | Performed by: FAMILY MEDICINE

## 2021-06-14 PROCEDURE — 99214 OFFICE O/P EST MOD 30 MIN: CPT | Performed by: FAMILY MEDICINE

## 2021-06-14 PROCEDURE — G8427 DOCREV CUR MEDS BY ELIG CLIN: HCPCS | Performed by: FAMILY MEDICINE

## 2021-06-14 PROCEDURE — 1036F TOBACCO NON-USER: CPT | Performed by: FAMILY MEDICINE

## 2021-06-14 PROCEDURE — G8417 CALC BMI ABV UP PARAM F/U: HCPCS | Performed by: FAMILY MEDICINE

## 2021-06-14 PROCEDURE — G8399 PT W/DXA RESULTS DOCUMENT: HCPCS | Performed by: FAMILY MEDICINE

## 2021-06-14 ASSESSMENT — ENCOUNTER SYMPTOMS
COUGH: 0
SHORTNESS OF BREATH: 0

## 2021-06-14 NOTE — PROGRESS NOTES
Leeanna Mackenziebeverly  1940 06/14/21    Chief Complaint   Patient presents with    Other     1 week F/U regarding her left leg cellulitis and discussed the lab result           Patient here for 1 week follow-up regarding her left leg cellulitis, she is still on the antibiotic, the swelling is much better and the redness is almost gone. Denies chest pain or shortness of breath, patient also here to discuss the blood work resolved      Past Medical History:   Diagnosis Date    Abnormal nuclear stress test 4/08;7/07 4/08-EF=67%,Mild->Mod.isch. LAD;7/07-EF=70%,Suggests poss. Mild Ant.wall ischemia.  Cardiac arrest ([facility] [address] 2008    Diabetes mellitus ([facility] [address]     H/O cardiac catheterization 4/08    No signif.CAD.    H/O cardiovascular stress test 04/11/08    mild to mod ischemia in the LAD region, abnormal study, EF 67%, patient developed mild chest pain and throat tightness    H/O cardiovascular stress test 5/3/2016    lexiscan-moderate ischemia apical,RF66%    H/O Doppler ultrasound 07/16/2007    CAROTID DOPLER- intimal thickening but no significant atherosclerotic plaque noted in the right or left internal carotid artery, doppler flow velocities within the right and left internal carotid artery are elevated, consistent with a mild, less than 50% stenosis    H/O echocardiogram 10/14/08    EF>55%, normal LV systolic function, normal left ventricular wall thickness, impaired LV relaxation    H/O echocardiogram 6/18/13, 10/08;07/07 6/13- EF >55% Impaired LV relaxation, Mild concentric LV hypertrophy, No sig AS, THEO underestimated. 10/08-EF=>55%,NL study;7/07-EF=>55%,Mild valv. AS.    H/O echocardiogram 5/16/2016 12/3/14    5/16 EF 55-60% normal study 12/14EF 60%. MIld mitral and tricuspid insufficiencies. Mildly sclerotic aortic valve which appears to be mildly stenosed with aortic valve area of 1.4 however this does not appear to be hemodynamically signficant aortic stenosis.  Mildy hypertropic left ventricle with normal global and regional left ventricular systolic function.  H/O left heart catheterization by cutdown 2016    no significant disease in all vessels. EF 55%    Hernia     after lap band removal    Hx of cardiovascular stress test 2013-EF 51%, no scitnigraphic evidence of inducible myocardial ischemia    Hyperlipidemia     Hypertension     Irritable bowel syndrome     Kidney stones     Obesity     Osteoarthritis     Thyroid disease     Type II or unspecified type diabetes mellitus without mention of complication, not stated as uncontrolled      Past Surgical History:   Procedure Laterality Date    CARDIAC CATHETERIZATION  08    continue risk factor modification with strict control of her risk factors with diet and control of diabetes, high blood pressure and hyperlipidemia    GASTRIC BAND  2008    revision of gastric band placement    HYSTERECTOMY      LAP BAND      put in in May Removed in August    LITHOTRIPSY     [address]      right    ROTATOR CUFF REPAIR       Family History   Problem Relation Age of Onset    Cancer Sister     COPD Brother     Heart Disease Brother     Cancer Mother         melonoma    Migraines Mother     Cancer Father         carcinoma    High Blood Pressure Father     Heart Disease Father      Social History     Socioeconomic History    Marital status:       Spouse name: Not on file    Number of children: Not on file    Years of education: Not on file    Highest education level: Not on file   Occupational History    Not on file   Tobacco Use    Smoking status: Former Smoker     Packs/day: 0.50     Years: 3.00     Pack years: 1.50     Types: Cigarettes     Quit date: 1994     Years since quittin.8    Smokeless tobacco: Never Used   Substance and Sexual Activity    Alcohol use: No     Alcohol/week: 0.0 standard drinks    Drug use: No    Sexual activity: Not Currently     Partners: Male     Comment: tablet 3    levothyroxine (SYNTHROID) 137 MCG tablet Take 1 tablet by mouth daily 90 tablet 3    insulin detemir (LEVEMIR FLEXTOUCH) 100 UNIT/ML injection pen Inject 35 Units into the skin nightly Dose to be adjusted according to AM FBS readings. 15 pen 5    loratadine (CLARITIN) 10 MG tablet Take 1 tablet by mouth daily 1 tablet 3    budesonide-formoterol (SYMBICORT) 80-4.5 MCG/ACT AERO Inhale 2 puffs into the lungs 2 times daily      nystatin (MYCOSTATIN) 373774 UNIT/GM cream Apply topically 2 times daily. 60 g 2    clindamycin (CLEOCIN T) 1 % lotion Apply 10 mg/mL topically 2 times daily Apply topically 2 times daily.  fluticasone (FLONASE) 50 MCG/ACT nasal spray 1 spray by Each Nare route daily 1 Bottle 3    ketoconazole (NIZORAL) 2 % cream Apply topically daily. 60 g 2    Insulin Pen Needle 29G X 12.7MM MISC 1 each by Does not apply route daily 300 each 3    insulin aspart (NOVOLOG FLEXPEN) 100 UNIT/ML injection pen Inject 20 Units into the skin 3 times daily (before meals) 3 Pen 11    omeprazole (PRILOSEC OTC) 20 MG tablet Take 1 tablet by mouth daily 90 tablet 3    calcium carbonate (TUMS) 500 MG chewable tablet Take 2 tablets by mouth as needed.  aspirin 81 MG EC tablet Take 81 mg by mouth daily. No current facility-administered medications for this visit. Review of Systems   Constitutional: Negative for activity change, chills, fatigue and fever. Respiratory: Negative for cough and shortness of breath. Cardiovascular: Negative for chest pain and leg swelling. Genitourinary: Negative for dysuria and frequency. Skin: Positive for rash (LLE, better). Neurological: Negative for dizziness, numbness and headaches.        Lab Results   Component Value Date    WBC 9.5 06/07/2021    HGB 12.0 06/07/2021    HCT 36.3 06/07/2021    MCV 93.0 06/07/2021     06/07/2021     Lab Results   Component Value Date     (H) 06/07/2021    K 4.5 06/07/2021     06/07/2021 CO2 17 (L) 06/07/2021    BUN 37 (H) 06/07/2021    CREATININE 1.7 (H) 06/07/2021    GLUCOSE 197 (H) 06/07/2021    CALCIUM 10.1 06/07/2021    PROT 7.3 06/22/2020    LABALBU 4.3 06/22/2020    BILITOT 0.3 06/22/2020    ALKPHOS 67 06/22/2020    AST 24 06/22/2020    ALT 17 06/22/2020    LABGLOM 29 (A) 06/07/2021    GFRAA 35 (A) 06/07/2021    AGRATIO 1.4 06/22/2020    GLOB 3.0 06/22/2020     Lab Results   Component Value Date    CHOL 215 (H) 04/06/2018    CHOL 184 03/24/2017    CHOL 226 (H) 04/05/2016     Lab Results   Component Value Date    TRIG 119 04/06/2018    TRIG 204 (H) 03/24/2017    TRIG 187 (H) 04/05/2016     Lab Results   Component Value Date    HDL 63 08/19/2019    HDL 71 (H) 04/06/2018    HDL 62 03/24/2017     Lab Results   Component Value Date    LDLCALC 120 (H) 04/06/2018    LDLCALC 81 03/24/2017    LDLCALC 124 (H) 04/05/2016     Lab Results   Component Value Date    LABA1C 6.6 06/22/2020     Lab Results   Component Value Date    TSH 1.37 11/19/2019    TSHHS 0.222 (L) 08/19/2019         /78 (Site: Left Upper Arm, Position: Sitting, Cuff Size: Medium Adult)   Pulse 63   Wt 220 lb 3.2 oz (99.9 kg)   SpO2 95%   BMI 39.01 kg/m²     BP Readings from Last 3 Encounters:   06/14/21 138/78   06/07/21 (!) 140/78   05/06/21 128/76       Wt Readings from Last 3 Encounters:   06/14/21 220 lb 3.2 oz (99.9 kg)   06/07/21 218 lb 6.4 oz (99.1 kg)   05/06/21 220 lb 12.8 oz (100.2 kg)         Physical Exam  Constitutional:       General: She is not in acute distress. Appearance: Normal appearance. She is well-developed. She is obese. She is not ill-appearing or diaphoretic. HENT:      Head: Normocephalic and atraumatic. Cardiovascular:      Rate and Rhythm: Normal rate and regular rhythm. Heart sounds: Normal heart sounds. No murmur heard. Pulmonary:      Effort: Pulmonary effort is normal.      Breath sounds: Normal breath sounds.    Musculoskeletal:      Cervical back: Normal range of motion and neck supple. No swelling, deformity or spasms. Right lower leg: No edema. Left lower leg: Edema present. Comments: This morning the erythema and the redness is much better   Neurological:      General: No focal deficit present. Mental Status: She is alert and oriented to person, place, and time. Psychiatric:         Mood and Affect: Mood normal.         Behavior: Behavior normal.         ASSESSMENT/ PLAN:    1. Left leg cellulitis  -Improving and she still did not finish the antibiotic    2. Chronic kidney disease, unspecified CKD stage  -Creatinine 1.7, recommend to avoid the NSAIDs              - All old blood work reviewed with the patient  - Appropriate prescription are addressed. - After visit summery provided. - Questions answered and patient verbalizes understanding.  - Call for any problem, questions, or concerns. Return if symptoms worsen or fail to improve.

## 2021-06-29 ENCOUNTER — OFFICE VISIT (OUTPATIENT)
Dept: FAMILY MEDICINE CLINIC | Age: 81
End: 2021-06-29
Payer: MEDICARE

## 2021-06-29 VITALS
DIASTOLIC BLOOD PRESSURE: 68 MMHG | SYSTOLIC BLOOD PRESSURE: 160 MMHG | OXYGEN SATURATION: 98 % | HEIGHT: 63 IN | HEART RATE: 59 BPM | BODY MASS INDEX: 38.13 KG/M2 | WEIGHT: 215.2 LBS

## 2021-06-29 VITALS
OXYGEN SATURATION: 98 % | HEART RATE: 59 BPM | BODY MASS INDEX: 38.13 KG/M2 | HEIGHT: 63 IN | DIASTOLIC BLOOD PRESSURE: 65 MMHG | WEIGHT: 215.2 LBS | SYSTOLIC BLOOD PRESSURE: 138 MMHG

## 2021-06-29 DIAGNOSIS — E78.5 HYPERLIPIDEMIA, UNSPECIFIED HYPERLIPIDEMIA TYPE: ICD-10-CM

## 2021-06-29 DIAGNOSIS — E03.9 ACQUIRED HYPOTHYROIDISM: ICD-10-CM

## 2021-06-29 DIAGNOSIS — E11.8 TYPE 2 DIABETES MELLITUS WITH COMPLICATION, WITH LONG-TERM CURRENT USE OF INSULIN (HCC): Primary | ICD-10-CM

## 2021-06-29 DIAGNOSIS — Z00.00 ROUTINE GENERAL MEDICAL EXAMINATION AT A HEALTH CARE FACILITY: Primary | ICD-10-CM

## 2021-06-29 DIAGNOSIS — N18.9 CHRONIC KIDNEY DISEASE, UNSPECIFIED CKD STAGE: ICD-10-CM

## 2021-06-29 DIAGNOSIS — E11.8 TYPE 2 DIABETES MELLITUS WITH COMPLICATION, WITH LONG-TERM CURRENT USE OF INSULIN (HCC): ICD-10-CM

## 2021-06-29 DIAGNOSIS — I10 ESSENTIAL HYPERTENSION: ICD-10-CM

## 2021-06-29 DIAGNOSIS — Z79.4 TYPE 2 DIABETES MELLITUS WITH COMPLICATION, WITH LONG-TERM CURRENT USE OF INSULIN (HCC): ICD-10-CM

## 2021-06-29 DIAGNOSIS — Z79.4 TYPE 2 DIABETES MELLITUS WITH COMPLICATION, WITH LONG-TERM CURRENT USE OF INSULIN (HCC): Primary | ICD-10-CM

## 2021-06-29 DIAGNOSIS — Z12.31 ENCOUNTER FOR SCREENING MAMMOGRAM FOR BREAST CANCER: ICD-10-CM

## 2021-06-29 LAB
A/G RATIO: 1.3 (ref 1.1–2.2)
ALBUMIN SERPL-MCNC: 4.1 G/DL (ref 3.4–5)
ALP BLD-CCNC: 81 U/L (ref 40–129)
ALT SERPL-CCNC: 14 U/L (ref 10–40)
ANION GAP SERPL CALCULATED.3IONS-SCNC: 16 MMOL/L (ref 3–16)
AST SERPL-CCNC: 17 U/L (ref 15–37)
BASOPHILS ABSOLUTE: 0 K/UL (ref 0–0.2)
BASOPHILS RELATIVE PERCENT: 0.5 %
BILIRUB SERPL-MCNC: 0.4 MG/DL (ref 0–1)
BUN BLDV-MCNC: 42 MG/DL (ref 7–20)
CALCIUM SERPL-MCNC: 9.7 MG/DL (ref 8.3–10.6)
CHLORIDE BLD-SCNC: 107 MMOL/L (ref 99–110)
CHOLESTEROL, TOTAL: 233 MG/DL (ref 0–199)
CO2: 19 MMOL/L (ref 21–32)
CREAT SERPL-MCNC: 1.7 MG/DL (ref 0.6–1.2)
EOSINOPHILS ABSOLUTE: 0.5 K/UL (ref 0–0.6)
EOSINOPHILS RELATIVE PERCENT: 6.5 %
GFR AFRICAN AMERICAN: 35
GFR NON-AFRICAN AMERICAN: 29
GLOBULIN: 3.2 G/DL
GLUCOSE FASTING: 107 MG/DL (ref 70–99)
HCT VFR BLD CALC: 34.9 % (ref 36–48)
HDLC SERPL-MCNC: 56 MG/DL (ref 40–60)
HEMOGLOBIN: 11.6 G/DL (ref 12–16)
LDL CHOLESTEROL CALCULATED: 145 MG/DL
LYMPHOCYTES ABSOLUTE: 2.2 K/UL (ref 1–5.1)
LYMPHOCYTES RELATIVE PERCENT: 29.1 %
MCH RBC QN AUTO: 31.2 PG (ref 26–34)
MCHC RBC AUTO-ENTMCNC: 33.2 G/DL (ref 31–36)
MCV RBC AUTO: 94 FL (ref 80–100)
MONOCYTES ABSOLUTE: 0.6 K/UL (ref 0–1.3)
MONOCYTES RELATIVE PERCENT: 7.7 %
NEUTROPHILS ABSOLUTE: 4.2 K/UL (ref 1.7–7.7)
NEUTROPHILS RELATIVE PERCENT: 56.2 %
PDW BLD-RTO: 16.9 % (ref 12.4–15.4)
PLATELET # BLD: 237 K/UL (ref 135–450)
PMV BLD AUTO: 8.4 FL (ref 5–10.5)
POTASSIUM SERPL-SCNC: 4.9 MMOL/L (ref 3.5–5.1)
RBC # BLD: 3.71 M/UL (ref 4–5.2)
SODIUM BLD-SCNC: 142 MMOL/L (ref 136–145)
T4 FREE: 1.6 NG/DL (ref 0.9–1.8)
TOTAL PROTEIN: 7.3 G/DL (ref 6.4–8.2)
TRIGL SERPL-MCNC: 160 MG/DL (ref 0–150)
TSH SERPL DL<=0.05 MIU/L-ACNC: 0.73 UIU/ML (ref 0.27–4.2)
VLDLC SERPL CALC-MCNC: 32 MG/DL
WBC # BLD: 7.4 K/UL (ref 4–11)

## 2021-06-29 PROCEDURE — G0439 PPPS, SUBSEQ VISIT: HCPCS | Performed by: FAMILY MEDICINE

## 2021-06-29 PROCEDURE — 4040F PNEUMOC VAC/ADMIN/RCVD: CPT | Performed by: FAMILY MEDICINE

## 2021-06-29 PROCEDURE — 99214 OFFICE O/P EST MOD 30 MIN: CPT | Performed by: FAMILY MEDICINE

## 2021-06-29 PROCEDURE — G8399 PT W/DXA RESULTS DOCUMENT: HCPCS | Performed by: FAMILY MEDICINE

## 2021-06-29 PROCEDURE — 1090F PRES/ABSN URINE INCON ASSESS: CPT | Performed by: FAMILY MEDICINE

## 2021-06-29 PROCEDURE — 1036F TOBACCO NON-USER: CPT | Performed by: FAMILY MEDICINE

## 2021-06-29 PROCEDURE — G8417 CALC BMI ABV UP PARAM F/U: HCPCS | Performed by: FAMILY MEDICINE

## 2021-06-29 PROCEDURE — G8427 DOCREV CUR MEDS BY ELIG CLIN: HCPCS | Performed by: FAMILY MEDICINE

## 2021-06-29 PROCEDURE — 1123F ACP DISCUSS/DSCN MKR DOCD: CPT | Performed by: FAMILY MEDICINE

## 2021-06-29 RX ORDER — INSULIN DETEMIR 100 [IU]/ML
28 INJECTION, SOLUTION SUBCUTANEOUS NIGHTLY
Qty: 15 PEN | Refills: 5 | Status: SHIPPED | OUTPATIENT
Start: 2021-06-29 | End: 2021-09-14

## 2021-06-29 ASSESSMENT — PATIENT HEALTH QUESTIONNAIRE - PHQ9
SUM OF ALL RESPONSES TO PHQ QUESTIONS 1-9: 0
2. FEELING DOWN, DEPRESSED OR HOPELESS: 0
1. LITTLE INTEREST OR PLEASURE IN DOING THINGS: 0
SUM OF ALL RESPONSES TO PHQ9 QUESTIONS 1 & 2: 0

## 2021-06-29 ASSESSMENT — ENCOUNTER SYMPTOMS
BACK PAIN: 0
SHORTNESS OF BREATH: 0
COUGH: 0
ABDOMINAL PAIN: 0

## 2021-06-29 ASSESSMENT — LIFESTYLE VARIABLES: HOW OFTEN DO YOU HAVE A DRINK CONTAINING ALCOHOL: 0

## 2021-06-29 NOTE — PROGRESS NOTES
Medicare Annual Wellness Visit  Name: Martín Smith Date: 2021   MRN: [id number] Sex: Female   Age: 80 y.o. Ethnicity: Non-/Non    : 1940 Race: Samantha Benson is here for Medicare AWV    Screenings for behavioral, psychosocial and functional/safety risks, and cognitive dysfunction are all negative except as indicated below. These results, as well as other patient data from the [address] [facility], [place] by [facility] [address] form, are documented in Flowsheets linked to this Encounter. Allergies   Allergen Reactions    Scopolamine      Combative and delusional    Penicillins Hives         Prior to Visit Medications    Medication Sig Taking? Authorizing Provider   insulin detemir (LEVEMIR FLEXTOUCH) 100 UNIT/ML injection pen Inject 28 Units into the skin nightly Dose to be adjusted according to AM FBS readings. Yes Valentina Marley MD   triamcinolone (KENALOG) 0.1 % cream Apply topically 2 times daily.  Yes Valentina Marley MD   lisinopril (PRINIVIL;ZESTRIL) 10 MG tablet Take 1 tablet by mouth once daily Yes Valentina Marley MD   amLODIPine (NORVASC) 10 MG tablet TAKE 1 TABLET DAILY Yes Valentina Marley MD   metoprolol succinate (TOPROL XL) 100 MG extended release tablet TAKE 1 TABLET DAILY Yes Valentina Marley MD   lisinopril (PRINIVIL;ZESTRIL) 40 MG tablet TAKE 1 TABLET DAILY Yes Valentina Marley MD   furosemide (LASIX) 40 MG tablet TAKE 1 TABLET DAILY Yes Valentina Marley MD   simvastatin (ZOCOR) 20 MG tablet Take 1 tablet by mouth nightly Yes Valentina Marley MD   allopurinol (ZYLOPRIM) 300 MG tablet Take 1 tablet by mouth daily Yes Valentina Marley MD   levothyroxine (SYNTHROID) 137 MCG tablet Take 1 tablet by mouth daily Yes Valentina Marley MD   loratadine (CLARITIN) 10 MG tablet Take 1 tablet by mouth daily Yes Valentina Marley MD   budesonide-formoterol (SYMBICORT) 80-4.5 MCG/ACT AERO Inhale 2 puffs into the lungs 2 times daily Yes Historical MD Georgette   nystatin (MYCOSTATIN) 432550 UNIT/GM cream Apply LV relaxation, Mild concentric LV hypertrophy, No sig AS, THEO underestimated. 10/08-EF=>55%,NL study;7/07-EF=>55%,Mild valv. AS.    H/O echocardiogram 5/16/2016 12/3/14    5/16 EF 55-60% normal study 12/14EF 60%. MIld mitral and tricuspid insufficiencies. Mildly sclerotic aortic valve which appears to be mildly stenosed with aortic valve area of 1.4 however this does not appear to be hemodynamically signficant aortic stenosis. Mildy hypertropic left ventricle with normal global and regional left ventricular systolic function.  H/O left heart catheterization by cutdown 05/23/2016    no significant disease in all vessels.  EF 55%    Hernia     after lap band removal    Hx of cardiovascular stress test 06/18/2013 6/13-EF 51%, no scitnigraphic evidence of inducible myocardial ischemia    Hyperlipidemia     Hypertension     Irritable bowel syndrome     Kidney stones     Obesity     Osteoarthritis     Thyroid disease     Type II or unspecified type diabetes mellitus without mention of complication, not stated as uncontrolled        Past Surgical History:   Procedure Laterality Date    CARDIAC CATHETERIZATION  04/18/08    continue risk factor modification with strict control of her risk factors with diet and control of diabetes, high blood pressure and hyperlipidemia    GASTRIC BAND  08/2008    revision of gastric band placement    HYSTERECTOMY      LAP BAND      put in in May Removed in August    LITHOTRIPSY     [address]      right    ROTATOR CUFF REPAIR           Family History   Problem Relation Age of Onset    Cancer Sister     COPD Brother     Heart Disease Brother     Cancer Mother         melonoma    Migraines Mother     Cancer Father         carcinoma    High Blood Pressure Father     Heart Disease Father        CareTeam (Including outside providers/suppliers regularly involved in providing care):   Patient Care Team:  Debbie Zuluaga MD as PCP - General (Family Medicine)  Lizbeth Iverson MD as PCP - [facility] Empaneled Provider  Arminda Kayser, MD as Consulting Physician (Cardiology)    Wt Readings from Last 3 Encounters:   06/29/21 215 lb 3.2 oz (97.6 kg)   06/29/21 215 lb 3.2 oz (97.6 kg)   06/14/21 220 lb 3.2 oz (99.9 kg)     Vitals:    06/29/21 1041   BP: (!) 160/68   Site: Left Upper Arm   Position: Sitting   Cuff Size: Large Adult   Pulse: 59   SpO2: 98%   Weight: 215 lb 3.2 oz (97.6 kg)   Height: 5' 3\" (1.6 m)     Body mass index is 38.12 kg/m². Based upon direct observation of the patient, evaluation of cognition reveals recent and remote memory intact. Patient's complete Health Risk Assessment and screening values have been reviewed and are found in Flowsheets. The following problems were reviewed today and where indicated follow up appointments were made and/or referrals ordered. Positive Risk Factor Screenings with Interventions:            General Health and ACP:  General  In general, how would you say your health is?: Fair  In the past 7 days, have you experienced any of the following?  New or Increased Pain, New or Increased Fatigue, Loneliness, Social Isolation, Stress or Anger?: None of These  Do you get the social and emotional support that you need?: Yes  Do you have a Living Will?: Yes  Advance Directives     Power of [facility] Will ACP-Advance Directive ACP-Power of     Not on File Not on File Not on File Not on File      General Health Risk Interventions:  · doing fine    Health Habits/Nutrition:  Health Habits/Nutrition  Do you exercise for at least 20 minutes 2-3 times per week?: (!) No  Have you lost any weight without trying in the past 3 months?: No  Do you eat only one meal per day?: No  Have you seen the dentist within the past year?: Yes  Body mass index: (!) 38.12  Health Habits/Nutrition Interventions:  · f/u with the dentist    Hearing/Vision:  No exam data present  Hearing/Vision  Do you or your family notice any trouble with your hearing that hasn't been managed with hearing aids?: (!) Yes  Do you have difficulty driving, watching TV, or doing any of your daily activities because of your eyesight?: (!) Yes  Have you had an eye exam within the past year?: (!) No  Hearing/Vision Interventions:  · both okay      Personalized Preventive Plan   Current Health Maintenance Status  Immunization History   Administered Date(s) Administered    Influenza, High Dose (Fluzone 65 yrs and older) 09/27/2013, 10/21/2014, 12/21/2016, 10/02/2017, 11/08/2018    Influenza, Triv, inactivated, subunit, adjuvanted, IM (Fluad 65 yrs and older) 11/19/2019    Pneumococcal Conjugate 13-valent ([id number]) 12/21/2016    Pneumococcal Polysaccharide ([id number]) 01/01/2010        Health Maintenance   Topic Date Due    Hepatitis C screen  Never done    COVID-19 Vaccine (1) Never done    DTaP/Tdap/Td vaccine (1 - Tdap) Never done    Shingles Vaccine (1 of 2) Never done   [employer] Visit (AWV)  Never done    Lipid screen  08/19/2020    TSH testing  11/19/2020    Breast cancer screen  08/12/2021    Flu vaccine (Season Ended) 09/01/2021    Potassium monitoring  06/07/2022    Creatinine monitoring  06/07/2022    DEXA (modify frequency per FRAX score)  Completed    Pneumococcal 65+ years Vaccine  Completed    Hepatitis A vaccine  Aged Out    Hib vaccine  Aged Out    Meningococcal (ACWY) vaccine  Aged Out     Recommendations for [employer] Due: see orders and patient instructions/AVS.  . Recommended screening schedule for the next 5-10 years is provided to the patient in written form: see Patient Tracy Rivers was seen today for medicare awv. Diagnoses and all orders for this visit:    Routine general medical examination at a health care facility    Other orders  -     insulin detemir (LEVEMIR FLEXTOUCH) 100 UNIT/ML injection pen;  Inject 28 Units into the skin nightly Dose to be adjusted according to AM FBS readings.

## 2021-06-29 NOTE — PROGRESS NOTES
this does not appear to be hemodynamically signficant aortic stenosis. Mildy hypertropic left ventricle with normal global and regional left ventricular systolic function.  H/O left heart catheterization by cutdown 2016    no significant disease in all vessels. EF 55%    Hernia     after lap band removal    Hx of cardiovascular stress test 2013-EF 51%, no scitnigraphic evidence of inducible myocardial ischemia    Hyperlipidemia     Hypertension     Irritable bowel syndrome     Kidney stones     Obesity     Osteoarthritis     Thyroid disease     Type II or unspecified type diabetes mellitus without mention of complication, not stated as uncontrolled      Past Surgical History:   Procedure Laterality Date    CARDIAC CATHETERIZATION  08    continue risk factor modification with strict control of her risk factors with diet and control of diabetes, high blood pressure and hyperlipidemia    GASTRIC BAND  2008    revision of gastric band placement    HYSTERECTOMY      LAP BAND      put in in May Removed in August    LITHOTRIPSY     [address]      right    ROTATOR CUFF REPAIR       Family History   Problem Relation Age of Onset    Cancer Sister     COPD Brother     Heart Disease Brother     Cancer Mother         melonoma    Migraines Mother     Cancer Father         carcinoma    High Blood Pressure Father     Heart Disease Father      Social History     Socioeconomic History    Marital status:      Spouse name: Not on file    Number of children: Not on file    Years of education: Not on file    Highest education level: Not on file   Occupational History    Not on file   Tobacco Use    Smoking status: Former Smoker     Packs/day: 0.50     Years: 3.00     Pack years: 1.50     Types: Cigarettes     Quit date: 1994     Years since quittin.9    Smokeless tobacco: Never Used   Substance and Sexual Activity    Alcohol use:  No Alcohol/week: 0.0 standard drinks    Drug use: No    Sexual activity: Not Currently     Partners: Male     Comment:    Other Topics Concern    Not on file   Social History Narrative    ** Merged History Encounter **          Social Determinants of Health     Financial Resource Strain:     Difficulty of Paying Living Expenses:    Food Insecurity:     Worried About Running Out of Food in the Last Year:     Ran Out of Food in the Last Year:    Transportation Needs:     Lack of Transportation (Medical):  Lack of Transportation (Non-Medical):    Physical Activity:     Days of Exercise per Week:     Minutes of Exercise per Session:    Stress:     Feeling of Stress :    Social Connections:     Frequency of Communication with Friends and Family:     Frequency of Social Gatherings with Friends and Family:     Attends Christianity Services:     Active Member of Clubs or Organizations:     Attends Club or Organization Meetings:     Marital Status:    Intimate Partner Violence:     Fear of Current or Ex-Partner:     Emotionally Abused:     Physically Abused:     Sexually Abused: Allergies   Allergen Reactions    Scopolamine      Combative and delusional    Penicillins Hives     Current Outpatient Medications   Medication Sig Dispense Refill    triamcinolone (KENALOG) 0.1 % cream Apply topically 2 times daily.  60 g 0    lisinopril (PRINIVIL;ZESTRIL) 10 MG tablet Take 1 tablet by mouth once daily 90 tablet 3    amLODIPine (NORVASC) 10 MG tablet TAKE 1 TABLET DAILY 90 tablet 3    metoprolol succinate (TOPROL XL) 100 MG extended release tablet TAKE 1 TABLET DAILY 90 tablet 3    lisinopril (PRINIVIL;ZESTRIL) 40 MG tablet TAKE 1 TABLET DAILY 90 tablet 3    furosemide (LASIX) 40 MG tablet TAKE 1 TABLET DAILY 90 tablet 3    simvastatin (ZOCOR) 20 MG tablet Take 1 tablet by mouth nightly 90 tablet 3    allopurinol (ZYLOPRIM) 300 MG tablet Take 1 tablet by mouth daily 90 tablet 3    06/07/2021    HGB 12.0 06/07/2021    HCT 36.3 06/07/2021    MCV 93.0 06/07/2021     06/07/2021     Lab Results   Component Value Date     (H) 06/07/2021    K 4.5 06/07/2021     06/07/2021    CO2 17 (L) 06/07/2021    BUN 37 (H) 06/07/2021    CREATININE 1.7 (H) 06/07/2021    GLUCOSE 197 (H) 06/07/2021    CALCIUM 10.1 06/07/2021    PROT 7.3 06/22/2020    LABALBU 4.3 06/22/2020    BILITOT 0.3 06/22/2020    ALKPHOS 67 06/22/2020    AST 24 06/22/2020    ALT 17 06/22/2020    LABGLOM 29 (A) 06/07/2021    GFRAA 35 (A) 06/07/2021    AGRATIO 1.4 06/22/2020    GLOB 3.0 06/22/2020     Lab Results   Component Value Date    CHOL 215 (H) 04/06/2018    CHOL 184 03/24/2017    CHOL 226 (H) 04/05/2016     Lab Results   Component Value Date    TRIG 119 04/06/2018    TRIG 204 (H) 03/24/2017    TRIG 187 (H) 04/05/2016     Lab Results   Component Value Date    HDL 63 08/19/2019    HDL 71 (H) 04/06/2018    HDL 62 03/24/2017     Lab Results   Component Value Date    LDLCALC 120 (H) 04/06/2018    LDLCALC 81 03/24/2017    LDLCALC 124 (H) 04/05/2016     Lab Results   Component Value Date    LABA1C 6.6 06/22/2020     Lab Results   Component Value Date    TSH 1.37 11/19/2019    TSHHS 0.222 (L) 08/19/2019     Lab Results   Component Value Date    LABA1C 6.6 06/22/2020     Lab Results   Component Value Date    .8 07/24/2014         /65   Pulse 59   Ht 5' 3\" (1.6 m)   Wt 215 lb 3.2 oz (97.6 kg)   SpO2 98%   BMI 38.12 kg/m²     BP Readings from Last 3 Encounters:   06/29/21 138/65   06/29/21 (!) 160/68   06/14/21 138/78       Wt Readings from Last 3 Encounters:   06/29/21 215 lb 3.2 oz (97.6 kg)   06/29/21 215 lb 3.2 oz (97.6 kg)   06/14/21 220 lb 3.2 oz (99.9 kg)         Physical Exam  Constitutional:       General: She is not in acute distress. Appearance: Normal appearance. She is well-developed. She is obese. She is not ill-appearing or diaphoretic. HENT:      Head: Normocephalic and atraumatic.    Eyes: General: No scleral icterus. Pupils: Pupils are equal, round, and reactive to light. Neck:      Thyroid: No thyromegaly. Cardiovascular:      Rate and Rhythm: Normal rate and regular rhythm. Heart sounds: Normal heart sounds. No murmur heard. Pulmonary:      Effort: Pulmonary effort is normal.      Breath sounds: Normal breath sounds. No wheezing or rales. Musculoskeletal:         General: Normal range of motion. Cervical back: Normal range of motion and neck supple. No rigidity. Right lower leg: No edema. Left lower leg: No edema. Neurological:      General: No focal deficit present. Mental Status: She is alert and oriented to person, place, and time. Psychiatric:         Mood and Affect: Mood normal.         Behavior: Behavior normal.         ASSESSMENT/ PLAN:    1. Type 2 diabetes mellitus with complication, with long-term current use of insulin (HCC)  - stable  - Hemoglobin A1C; Future    2. Essential hypertension  - stable    3. Acquired hypothyroidism  - stable  - TSH without Reflex; Future  - T4, Free; Future    4. Hyperlipidemia, unspecified hyperlipidemia type  - stable  - Lipid Panel; Future    5. Chronic kidney disease, unspecified CKD stage  -stable  - Comprehensive Metabolic Panel, Fasting; Future  - CBC Auto Differential; Future    6. Encounter for screening mammogram for breast cancer  - ZACHARY DIGITAL SCREEN W CAD BILATERAL; Future            - All old blood work reviewed with the patient  - Appropriate prescription are addressed. - After visit summery provided. - Questions answered and patient verbalizes understanding.  - Call for any problem, questions, or concerns. Return in about 6 months (around 12/29/2021).

## 2021-06-30 LAB
ESTIMATED AVERAGE GLUCOSE: 171.4 MG/DL
HBA1C MFR BLD: 7.6 %

## 2021-07-26 DIAGNOSIS — E03.8 OTHER SPECIFIED HYPOTHYROIDISM: ICD-10-CM

## 2021-07-26 DIAGNOSIS — E78.5 HYPERLIPIDEMIA, UNSPECIFIED HYPERLIPIDEMIA TYPE: ICD-10-CM

## 2021-07-26 RX ORDER — SIMVASTATIN 20 MG
TABLET ORAL
Qty: 90 TABLET | Refills: 3 | Status: SHIPPED | OUTPATIENT
Start: 2021-07-26 | End: 2022-09-22 | Stop reason: SDUPTHER

## 2021-07-26 RX ORDER — LEVOTHYROXINE SODIUM 137 MCG
TABLET ORAL
Qty: 90 TABLET | Refills: 3 | Status: SHIPPED | OUTPATIENT
Start: 2021-07-26 | End: 2022-09-22 | Stop reason: SDUPTHER

## 2021-07-29 RX ORDER — ALLOPURINOL 300 MG/1
TABLET ORAL
Qty: 90 TABLET | Refills: 3 | Status: SHIPPED | OUTPATIENT
Start: 2021-07-29 | End: 2022-09-22 | Stop reason: SDUPTHER

## 2021-09-14 RX ORDER — INSULIN DETEMIR 100 [IU]/ML
INJECTION, SOLUTION SUBCUTANEOUS
Qty: 30 ML | Refills: 3 | Status: SHIPPED | OUTPATIENT
Start: 2021-09-14

## 2021-10-13 DIAGNOSIS — J44.9 CHRONIC OBSTRUCTIVE PULMONARY DISEASE, UNSPECIFIED COPD TYPE (HCC): ICD-10-CM

## 2021-10-13 RX ORDER — BUDESONIDE AND FORMOTEROL FUMARATE DIHYDRATE 80; 4.5 UG/1; UG/1
2 AEROSOL RESPIRATORY (INHALATION) 2 TIMES DAILY
Qty: 10.2 G | Refills: 5 | Status: SHIPPED | OUTPATIENT
Start: 2021-10-13

## 2021-10-13 NOTE — TELEPHONE ENCOUNTER
----- Message from Larisa Luong sent at 10/13/2021 12:33 PM EDT -----  Subject: Refill Request    QUESTIONS  Name of Medication? Other - budesonide-formoterol (SYMBICORT) 80-4.5   MCG/ACT AERO  Patient-reported dosage and instructions? Inhale 2 puffs into the lungs 2   times daily  How many days do you have left? 0  Preferred Pharmacy? [facility] phone number (if available)? [phone]  Additional Information for Provider? Please update patient pharmacy   [facility]? [facility] [address] [facility] [address] [place] [address]- Patient would like this medication to   be sent this pharmacy - Thank yo  ---------------------------------------------------------------------------  --------------  [address]  What is the best way for the office to contact you? OK to leave message on   voicemail  Preferred Call Back Phone Number?  [phone]

## 2021-10-15 ENCOUNTER — OFFICE VISIT (OUTPATIENT)
Dept: FAMILY MEDICINE CLINIC | Age: 81
End: 2021-10-15
Payer: MEDICARE

## 2021-10-15 VITALS
OXYGEN SATURATION: 97 % | DIASTOLIC BLOOD PRESSURE: 80 MMHG | SYSTOLIC BLOOD PRESSURE: 130 MMHG | BODY MASS INDEX: 40.21 KG/M2 | WEIGHT: 227 LBS | HEART RATE: 82 BPM

## 2021-10-15 DIAGNOSIS — I27.20 PULMONARY HYPERTENSION (HCC): ICD-10-CM

## 2021-10-15 DIAGNOSIS — I10 ESSENTIAL HYPERTENSION: Primary | ICD-10-CM

## 2021-10-15 DIAGNOSIS — E78.5 HYPERLIPIDEMIA, UNSPECIFIED HYPERLIPIDEMIA TYPE: ICD-10-CM

## 2021-10-15 DIAGNOSIS — E03.9 ACQUIRED HYPOTHYROIDISM: ICD-10-CM

## 2021-10-15 DIAGNOSIS — Z79.4 TYPE 2 DIABETES MELLITUS WITH COMPLICATION, WITH LONG-TERM CURRENT USE OF INSULIN (HCC): ICD-10-CM

## 2021-10-15 DIAGNOSIS — J44.9 CHRONIC OBSTRUCTIVE PULMONARY DISEASE, UNSPECIFIED COPD TYPE (HCC): ICD-10-CM

## 2021-10-15 DIAGNOSIS — E11.8 TYPE 2 DIABETES MELLITUS WITH COMPLICATION, WITH LONG-TERM CURRENT USE OF INSULIN (HCC): ICD-10-CM

## 2021-10-15 DIAGNOSIS — R60.0 LOCALIZED EDEMA: ICD-10-CM

## 2021-10-15 DIAGNOSIS — E66.01 OBESITY, CLASS III, BMI 40-49.9 (MORBID OBESITY) (HCC): ICD-10-CM

## 2021-10-15 PROCEDURE — 3051F HG A1C>EQUAL 7.0%<8.0%: CPT | Performed by: FAMILY MEDICINE

## 2021-10-15 PROCEDURE — 1123F ACP DISCUSS/DSCN MKR DOCD: CPT | Performed by: FAMILY MEDICINE

## 2021-10-15 PROCEDURE — 4040F PNEUMOC VAC/ADMIN/RCVD: CPT | Performed by: FAMILY MEDICINE

## 2021-10-15 PROCEDURE — G8484 FLU IMMUNIZE NO ADMIN: HCPCS | Performed by: FAMILY MEDICINE

## 2021-10-15 PROCEDURE — 99214 OFFICE O/P EST MOD 30 MIN: CPT | Performed by: FAMILY MEDICINE

## 2021-10-15 PROCEDURE — G8399 PT W/DXA RESULTS DOCUMENT: HCPCS | Performed by: FAMILY MEDICINE

## 2021-10-15 PROCEDURE — G8427 DOCREV CUR MEDS BY ELIG CLIN: HCPCS | Performed by: FAMILY MEDICINE

## 2021-10-15 PROCEDURE — G8417 CALC BMI ABV UP PARAM F/U: HCPCS | Performed by: FAMILY MEDICINE

## 2021-10-15 PROCEDURE — 1090F PRES/ABSN URINE INCON ASSESS: CPT | Performed by: FAMILY MEDICINE

## 2021-10-15 PROCEDURE — G8926 SPIRO NO PERF OR DOC: HCPCS | Performed by: FAMILY MEDICINE

## 2021-10-15 PROCEDURE — 1036F TOBACCO NON-USER: CPT | Performed by: FAMILY MEDICINE

## 2021-10-15 PROCEDURE — 3023F SPIROM DOC REV: CPT | Performed by: FAMILY MEDICINE

## 2021-10-15 RX ORDER — FUROSEMIDE 40 MG/1
TABLET ORAL
Qty: 180 TABLET | Refills: 3 | Status: SHIPPED | OUTPATIENT
Start: 2021-10-15 | End: 2022-03-15 | Stop reason: SDUPTHER

## 2021-10-15 NOTE — PROGRESS NOTES
Chuy Reyes  1940  10/16/21    Chief Complaint   Patient presents with    6 Month Follow-Up    Diabetes    Hypertension    Hyperlipidemia    COPD    Hypothyroidism           Patient here for 6 months f/u regarding her DM II, hyperlipidemia, HTN, COPD, and hypothyroidism, her BS doing fine, The patient is taking hypertensive medications compliantly without side effects. Denies chest pain, dyspnea, edema, or TIA's. She is taking her cholesterol medications, she is taking her thyroid medication. Patient also stats her leg swollen, she is on lasix 40 mg daily, not helping. Past Medical History:   Diagnosis Date    Abnormal nuclear stress test 4/08;7/07 4/08-EF=67%,Mild->Mod.isch. LAD;7/07-EF=70%,Suggests poss. Mild Ant.wall ischemia.  Cardiac arrest ([facility] [address] 2008    Diabetes mellitus ([facility] [address]     H/O cardiac catheterization 4/08    No signif.CAD.    H/O cardiovascular stress test 04/11/08    mild to mod ischemia in the LAD region, abnormal study, EF 67%, patient developed mild chest pain and throat tightness    H/O cardiovascular stress test 5/3/2016    lexiscan-moderate ischemia apical,RF66%    H/O Doppler ultrasound 07/16/2007    CAROTID DOPLER- intimal thickening but no significant atherosclerotic plaque noted in the right or left internal carotid artery, doppler flow velocities within the right and left internal carotid artery are elevated, consistent with a mild, less than 50% stenosis    H/O echocardiogram 10/14/08    EF>55%, normal LV systolic function, normal left ventricular wall thickness, impaired LV relaxation    H/O echocardiogram 6/18/13, 10/08;07/07 6/13- EF >55% Impaired LV relaxation, Mild concentric LV hypertrophy, No sig AS, THEO underestimated. 10/08-EF=>55%,NL study;7/07-EF=>55%,Mild valv. AS.    H/O echocardiogram 5/16/2016 12/3/14    5/16 EF 55-60% normal study 12/14EF 60%. MIld mitral and tricuspid insufficiencies.  Mildly sclerotic aortic valve which appears to be mildly stenosed with aortic valve area of 1.4 however this does not appear to be hemodynamically signficant aortic stenosis. Mildy hypertropic left ventricle with normal global and regional left ventricular systolic function.  H/O left heart catheterization by cutdown 2016    no significant disease in all vessels. EF 55%    Hernia     after lap band removal    Hx of cardiovascular stress test 2013-EF 51%, no scitnigraphic evidence of inducible myocardial ischemia    Hyperlipidemia     Hypertension     Irritable bowel syndrome     Kidney stones     Obesity     Osteoarthritis     Thyroid disease     Type II or unspecified type diabetes mellitus without mention of complication, not stated as uncontrolled      Past Surgical History:   Procedure Laterality Date    CARDIAC CATHETERIZATION  08    continue risk factor modification with strict control of her risk factors with diet and control of diabetes, high blood pressure and hyperlipidemia    GASTRIC BAND  2008    revision of gastric band placement    HYSTERECTOMY      LAP BAND      put in in May Removed in August    LITHOTRIPSY     [id number]      right    ROTATOR CUFF REPAIR       Family History   Problem Relation Age of Onset    Cancer Sister     COPD Brother     Heart Disease Brother     Cancer Mother         melonoma    Migraines Mother     Cancer Father         carcinoma    High Blood Pressure Father     Heart Disease Father      Social History     Socioeconomic History    Marital status:       Spouse name: Not on file    Number of children: Not on file    Years of education: Not on file    Highest education level: Not on file   Occupational History    Not on file   Tobacco Use    Smoking status: Former Smoker     Packs/day: 0.50     Years: 3.00     Pack years: 1.50     Types: Cigarettes     Quit date: 1994     Years since quittin.2    Smokeless tobacco: Never Used   Substance and Sexual Activity    Alcohol use: No     Alcohol/week: 0.0 standard drinks    Drug use: No    Sexual activity: Not Currently     Partners: Male     Comment:    Other Topics Concern    Not on file   Social History Narrative    ** Merged History Encounter **          Social Determinants of Health     Financial Resource Strain:     Difficulty of Paying Living Expenses:    Food Insecurity:     Worried About Running Out of Food in the Last Year:     Ran Out of Food in the Last Year:    Transportation Needs:     Lack of Transportation (Medical):  Lack of Transportation (Non-Medical):    Physical Activity:     Days of Exercise per Week:     Minutes of Exercise per Session:    Stress:     Feeling of Stress :    Social Connections:     Frequency of Communication with Friends and Family:     Frequency of Social Gatherings with Friends and Family:     Attends Worship Services:     Active Member of Clubs or Organizations:     Attends Club or Organization Meetings:     Marital Status:    Intimate Partner Violence:     Fear of Current or Ex-Partner:     Emotionally Abused:     Physically Abused:     Sexually Abused: Allergies   Allergen Reactions    Scopolamine      Combative and delusional    Penicillins Hives     Current Outpatient Medications   Medication Sig Dispense Refill    furosemide (LASIX) 40 MG tablet TAKE 1 tab bid 180 tablet 3    budesonide-formoterol (SYMBICORT) 80-4.5 MCG/ACT AERO Inhale 2 puffs into the lungs 2 times daily 10.2 g 5    LEVEMIR FLEXTOUCH 100 UNIT/ML injection pen INJECT 28 UNITS SUBCUTANEOUSLY NIGHTLY, DOSE TO BE ADJUSTED ACCORDING TO MORNING FASTING BLOOD SUGAR READINGS 30 mL 3    allopurinol (ZYLOPRIM) 300 MG tablet TAKE 1 TABLET DAILY 90 tablet 3    simvastatin (ZOCOR) 20 MG tablet TAKE 1 TABLET NIGHTLY 90 tablet 3    SYNTHROID 137 MCG tablet TAKE 1 TABLET DAILY 90 tablet 3    triamcinolone (KENALOG) 0.1 % cream Apply topically 2 times daily.  61 g 0    lisinopril (PRINIVIL;ZESTRIL) 10 MG tablet Take 1 tablet by mouth once daily 90 tablet 3    amLODIPine (NORVASC) 10 MG tablet TAKE 1 TABLET DAILY 90 tablet 3    metoprolol succinate (TOPROL XL) 100 MG extended release tablet TAKE 1 TABLET DAILY 90 tablet 3    lisinopril (PRINIVIL;ZESTRIL) 40 MG tablet TAKE 1 TABLET DAILY 90 tablet 3    loratadine (CLARITIN) 10 MG tablet Take 1 tablet by mouth daily 1 tablet 3    nystatin (MYCOSTATIN) 229428 UNIT/GM cream Apply topically 2 times daily. 60 g 2    clindamycin (CLEOCIN T) 1 % lotion Apply 10 mg/mL topically 2 times daily Apply topically 2 times daily.  fluticasone (FLONASE) 50 MCG/ACT nasal spray 1 spray by Each Nare route daily 1 Bottle 3    ketoconazole (NIZORAL) 2 % cream Apply topically daily. 60 g 2    Insulin Pen Needle 29G X 12.7MM MISC 1 each by Does not apply route daily 300 each 3    insulin aspart (NOVOLOG FLEXPEN) 100 UNIT/ML injection pen Inject 20 Units into the skin 3 times daily (before meals) 3 Pen 11    omeprazole (PRILOSEC OTC) 20 MG tablet Take 1 tablet by mouth daily 90 tablet 3    calcium carbonate (TUMS) 500 MG chewable tablet Take 2 tablets by mouth as needed.  aspirin 81 MG EC tablet Take 81 mg by mouth daily. No current facility-administered medications for this visit. Review of Systems   Constitutional: Negative for activity change, chills, fatigue and fever. HENT: Negative for congestion. Respiratory: Negative for cough and shortness of breath. Cardiovascular: Positive for leg swelling. Negative for chest pain. Genitourinary: Negative for dysuria and frequency. Musculoskeletal: Negative for back pain. Neurological: Negative for dizziness, numbness and headaches. Psychiatric/Behavioral: Negative for agitation and sleep disturbance. The patient is not nervous/anxious.         Lab Results   Component Value Date    WBC 7.4 06/29/2021    HGB 11.6 (L) 06/29/2021    HCT 34.9 (L) 06/29/2021 MCV 94.0 06/29/2021     06/29/2021     Lab Results   Component Value Date     06/29/2021    K 4.9 06/29/2021     06/29/2021    CO2 19 (L) 06/29/2021    BUN 42 (H) 06/29/2021    CREATININE 1.7 (H) 06/29/2021    GLUCOSE 197 (H) 06/07/2021    CALCIUM 9.7 06/29/2021    PROT 7.3 06/29/2021    LABALBU 4.1 06/29/2021    BILITOT 0.4 06/29/2021    ALKPHOS 81 06/29/2021    AST 17 06/29/2021    ALT 14 06/29/2021    LABGLOM 29 (A) 06/29/2021    GFRAA 35 (A) 06/29/2021    AGRATIO 1.3 06/29/2021    GLOB 3.2 06/29/2021     Lab Results   Component Value Date    CHOL 233 (H) 06/29/2021    CHOL 215 (H) 04/06/2018    CHOL 184 03/24/2017     Lab Results   Component Value Date    TRIG 160 (H) 06/29/2021    TRIG 119 04/06/2018    TRIG 204 (H) 03/24/2017     Lab Results   Component Value Date    HDL 56 06/29/2021    HDL 63 08/19/2019    HDL 71 (H) 04/06/2018     Lab Results   Component Value Date    LDLCALC 145 (H) 06/29/2021    LDLCALC 120 (H) 04/06/2018    LDLCALC 81 03/24/2017     Lab Results   Component Value Date    LABA1C 7.6 06/29/2021     Lab Results   Component Value Date    TSH 0.73 06/29/2021    TSHHS 0.222 (L) 08/19/2019         /80   Pulse 82   Wt 227 lb (103 kg)   SpO2 97%   BMI 40.21 kg/m²     BP Readings from Last 3 Encounters:   10/15/21 130/80   06/29/21 138/65   06/29/21 (!) 160/68       Wt Readings from Last 3 Encounters:   10/15/21 227 lb (103 kg)   06/29/21 215 lb 3.2 oz (97.6 kg)   06/29/21 215 lb 3.2 oz (97.6 kg)         Physical Exam  Constitutional:       Appearance: Normal appearance. She is obese. She is not ill-appearing. HENT:      Head: Normocephalic and atraumatic. Eyes:      General: No scleral icterus. Pupils: Pupils are equal, round, and reactive to light. Cardiovascular:      Rate and Rhythm: Normal rate and regular rhythm. Heart sounds: Normal heart sounds. No murmur heard.      Pulmonary:      Effort: Pulmonary effort is normal.      Breath sounds: Normal breath sounds. No wheezing. Musculoskeletal:      Cervical back: Normal range of motion and neck supple. No rigidity. Right lower leg: Edema present. Left lower leg: Edema present. Neurological:      Mental Status: She is alert and oriented to person, place, and time. Psychiatric:         Mood and Affect: Mood normal.         Behavior: Behavior normal.         ASSESSMENT/ PLAN:    1. Essential hypertension  - stable    2. Type 2 diabetes mellitus with complication, with long-term current use of insulin (HCC)  - stable    3. Hyperlipidemia, unspecified hyperlipidemia type  - stable    4. Acquired hypothyroidism  - stable    5. Chronic obstructive pulmonary disease, unspecified COPD type (HCC)  - stable    6. Pulmonary hypertension (HCC)  - stable    7. Obesity, Class III, BMI 40-49.9 (morbid obesity) ([facility] [address]  - encourage lose wt    8. Localized edema  - increase lasix 80 mg 2 times a wk,or as needed  - furosemide (LASIX) 40 MG tablet; TAKE 1 tab bid  Dispense: 180 tablet; Refill: 3              - All old blood work reviewed with the patient  - Appropriate prescription are addressed. - After visit summery provided. - Questions answered and patient verbalizes understanding.  - Call for any problem, questions, or concerns. Return in about 6 months (around 4/15/2022).

## 2021-10-16 PROBLEM — E66.01 OBESITY, CLASS III, BMI 40-49.9 (MORBID OBESITY) (HCC): Status: ACTIVE | Noted: 2021-10-16

## 2021-10-16 PROBLEM — E66.813 OBESITY, CLASS III, BMI 40-49.9 (MORBID OBESITY): Status: ACTIVE | Noted: 2021-10-16

## 2021-10-16 ASSESSMENT — ENCOUNTER SYMPTOMS
COUGH: 0
BACK PAIN: 0
SHORTNESS OF BREATH: 0

## 2021-11-02 ENCOUNTER — OFFICE VISIT (OUTPATIENT)
Dept: CARDIOLOGY CLINIC | Age: 81
End: 2021-11-02
Payer: MEDICARE

## 2021-11-02 VITALS
HEART RATE: 70 BPM | BODY MASS INDEX: 36.35 KG/M2 | HEIGHT: 66 IN | DIASTOLIC BLOOD PRESSURE: 80 MMHG | WEIGHT: 226.2 LBS | SYSTOLIC BLOOD PRESSURE: 130 MMHG

## 2021-11-02 DIAGNOSIS — M79.89 SWELLING OF EXTREMITY: ICD-10-CM

## 2021-11-02 DIAGNOSIS — R06.02 SOB (SHORTNESS OF BREATH): ICD-10-CM

## 2021-11-02 DIAGNOSIS — I10 ESSENTIAL HYPERTENSION: Primary | ICD-10-CM

## 2021-11-02 PROCEDURE — G8399 PT W/DXA RESULTS DOCUMENT: HCPCS | Performed by: INTERNAL MEDICINE

## 2021-11-02 PROCEDURE — 93000 ELECTROCARDIOGRAM COMPLETE: CPT | Performed by: INTERNAL MEDICINE

## 2021-11-02 PROCEDURE — 99214 OFFICE O/P EST MOD 30 MIN: CPT | Performed by: INTERNAL MEDICINE

## 2021-11-02 PROCEDURE — 1036F TOBACCO NON-USER: CPT | Performed by: INTERNAL MEDICINE

## 2021-11-02 PROCEDURE — 4040F PNEUMOC VAC/ADMIN/RCVD: CPT | Performed by: INTERNAL MEDICINE

## 2021-11-02 PROCEDURE — 1090F PRES/ABSN URINE INCON ASSESS: CPT | Performed by: INTERNAL MEDICINE

## 2021-11-02 PROCEDURE — G8484 FLU IMMUNIZE NO ADMIN: HCPCS | Performed by: INTERNAL MEDICINE

## 2021-11-02 PROCEDURE — G8417 CALC BMI ABV UP PARAM F/U: HCPCS | Performed by: INTERNAL MEDICINE

## 2021-11-02 PROCEDURE — G8427 DOCREV CUR MEDS BY ELIG CLIN: HCPCS | Performed by: INTERNAL MEDICINE

## 2021-11-02 PROCEDURE — 1123F ACP DISCUSS/DSCN MKR DOCD: CPT | Performed by: INTERNAL MEDICINE

## 2021-11-02 NOTE — LETTER
Ildefonso Mishra  1940  [id number]    Have you had any Chest Pain that is not new? - No  If Yes DO EKG - How does it feel -   How long does the pain last -      How long have you been having the pain -    Did you take a    And did it relieve the pain -      DO EKG IF: Patient has a Heart Rate above 100 or below 40     CAD (Coronary Artery Disease) patient should have one on file every 6 months        Have you had any Shortness of Breath - Yes  If Yes - When on exertion    Have you had any dizziness - No  If Yes DO ORTHOSTATIC BP - when do you feel dizzy    How long does it last .        Sitting wait 5 minutes do supine (laying down) wait 5 minutes then do standing - log each in \"vitals\" area in Epic   Be sure to ask what symptoms they are having if they get dizzy while completing ortho stats such as: room spinning, nausea, etc.    Have you had any palpitations that are not new? - No  If Yes DO EKG - Do you feel your heart How long does it last - . Is the patient on any of the following medications -   If Yes DO EKG - Needs done every 6 months    Do you have any edema - swelling in Legs, Had celluitis in May 2021    If Yes - CHECK TO SEE IF THE EDEMA IS PITTING  How long have they been having edema - Months  If Yes - Have they worn compression stockings Yes      Vein \"LEG PROBLEM Questionnaire\"  1. Do you have prominent leg veins? No   2. Do you have any skin discoloration? No  3. Do you have any healed or active sores? No  4. Do you have swelling of the legs? Yes  5. Do you have a family history of varicose veins? No  6. Does your profession involve pro-longed        standing or heavy lifting? No  7. Have you been fighting overweight problems? Yes  8. Do you have restless legs? No  9. Do you have any night time cramps? Yes  10.  Do you have any of the following in your legs:             When did you have your last labs drawn   Where did you have them done     If we do not have these labs you are retrieve these labs for these providers!     Do you have a surgery or procedure scheduled in the near future - No

## 2021-11-02 NOTE — PATIENT INSTRUCTIONS
Please hold on to these instructions the  will call you within 1-9 business days when we receive authorization from your insurance. Nuclear Stress Test    WHAT TO EXPECT:   ? You will need to confirm the test or it could be cancelled. ? This test will take approximately 2 hours: 1 hour in the AM &    1 hour in the PM. You will be given a time by the Technologist after the first part is completed to come back. ? You will be given a medication, through an IV in the hand, this will safely simulate exercise. This IV is also needed to inject the radioactive isotope unless you are able toe walk the treadmill. ? You will receive an injection in the AM & PM before the pictures. ? Using a special camera, you will have one set of pictures of your heart taken in the AM and a set of pictures in the PM.     PREPARATION FOR TEST:  ? Eat a light breakfast such as water or juice and toast.  ? If you are DIABETIC: Eat a normal breakfast with NO CAFFEINE and take your insulin as normal.   ? AVOID ALL FOODS & DRINKS containing CAFFEINE 12 HOURS PRIOR TO THE TEST: Including coffee, Tea, Tara and other soft drinks even those labeled  caffeine free or decaffeinated.  ? HOLD THESE MEDICATIONS Persantine & Theophylline (Theodur)  24 hours prior & bring your inhaler with you. Please hold on to these instructions the  will call you within 1-9 business days when we receive authorization from your insurance. Echocardiogram    WHAT TO EXPECT:   ? This test will take approximately 45 minutes. ? It is an ultrasound of the heart. ? It can look at the valves and chambers inside the heart   ? There is no special instructions for this test.     If you are unable to keep this appointment, please notify us 24 hours prior to test at [phone]. Please hold on to these instructions the  will call you within 1-9 business days when we receive authorization from your insurance.    Venous Ultrasound    WHAT TO EXPECT:   ? This test will take approximately 60 minutes. ? It is an ultrasound of the veins. ? It can look for blood clots in the extremities or  for venous reflux. ? There is no special instructions for this test.     If you are unable to keep this appointment, please notify us 24 hours prior to test at [phone].

## 2021-11-02 NOTE — PROGRESS NOTES
CARDIOLOGY NOTE      11/2/2021    RE: Monserrat Bermudez  (1940)                               TO:  Dr. Coleman Bowling MD            CHIEF COMPLAINT   Chintan Billingsley is a 80 y.o. female who was seen today for management of  htn                                    HPI:   Patient is here for    - Hypertension,isnot   well controlled, pt is  compliant with medicines  - Hyperlipidimea, lipids are in acceptable range. Pt  is  compliant with medicines  - Diabetes mellitus, blood glucose level is  well controlled.  Pt is compliant with meds and diet                  The patient does not have cardiac complaints  But is fatigued and is SOB exertional getting worse     Monserrat Bermudez has the following history recorded in care path:  Patient Active Problem List    Diagnosis Date Noted    Obesity, Class III, BMI 40-49.9 (morbid obesity) ([facility] [address] 10/16/2021    Chronic obstructive pulmonary disease, unspecified COPD type ([facility] [address] 10/15/2021    Chronic kidney disease 06/14/2021    Anemia 06/22/2020    B12 deficiency 06/22/2020    Type 2 diabetes mellitus with complication, with long-term current use of insulin ([facility] [address] 10/02/2017    Erythrasma 10/02/2017    Hypothyroidism 12/21/2016    Essential hypertension 12/21/2016    Abnormal nuclear cardiac imaging test     Mild persistent asthma without complication 27/07/6876    Pulmonary hypertension (HCC) 03/16/2016    SOB (shortness of breath) 12/01/2014    Obesity 07/05/2013    Localized edema 07/05/2013    Fatigue 06/04/2013    SOB (shortness of breath) on exertion 06/04/2013    Hyperlipidemia 08/09/2012    Hyperuricemia 08/09/2012    Vitamin D deficiency 98/44/2433    Complications of bariatric procedures 08/09/2012     Current Outpatient Medications   Medication Sig Dispense Refill    furosemide (LASIX) 40 MG tablet TAKE 1 tab bid 180 tablet 3    budesonide-formoterol (SYMBICORT) 80-4.5 MCG/ACT AERO Inhale 2 puffs into the lungs 2 times daily 10.2 g 5    LEVEMIR FLEXTOUCH 100 UNIT/ML injection pen INJECT 28 UNITS SUBCUTANEOUSLY NIGHTLY, DOSE TO BE ADJUSTED ACCORDING TO MORNING FASTING BLOOD SUGAR READINGS 30 mL 3    allopurinol (ZYLOPRIM) 300 MG tablet TAKE 1 TABLET DAILY 90 tablet 3    simvastatin (ZOCOR) 20 MG tablet TAKE 1 TABLET NIGHTLY 90 tablet 3    SYNTHROID 137 MCG tablet TAKE 1 TABLET DAILY 90 tablet 3    triamcinolone (KENALOG) 0.1 % cream Apply topically 2 times daily. 60 g 0    lisinopril (PRINIVIL;ZESTRIL) 10 MG tablet Take 1 tablet by mouth once daily 90 tablet 3    amLODIPine (NORVASC) 10 MG tablet TAKE 1 TABLET DAILY 90 tablet 3    metoprolol succinate (TOPROL XL) 100 MG extended release tablet TAKE 1 TABLET DAILY 90 tablet 3    lisinopril (PRINIVIL;ZESTRIL) 40 MG tablet TAKE 1 TABLET DAILY 90 tablet 3    loratadine (CLARITIN) 10 MG tablet Take 1 tablet by mouth daily 1 tablet 3    nystatin (MYCOSTATIN) 056248 UNIT/GM cream Apply topically 2 times daily. 60 g 2    clindamycin (CLEOCIN T) 1 % lotion Apply 10 mg/mL topically 2 times daily Apply topically 2 times daily.  fluticasone (FLONASE) 50 MCG/ACT nasal spray 1 spray by Each Nare route daily 1 Bottle 3    ketoconazole (NIZORAL) 2 % cream Apply topically daily. 60 g 2    Insulin Pen Needle 29G X 12.7MM MISC 1 each by Does not apply route daily 300 each 3    insulin aspart (NOVOLOG FLEXPEN) 100 UNIT/ML injection pen Inject 20 Units into the skin 3 times daily (before meals) 3 Pen 11    omeprazole (PRILOSEC OTC) 20 MG tablet Take 1 tablet by mouth daily 90 tablet 3    calcium carbonate (TUMS) 500 MG chewable tablet Take 2 tablets by mouth as needed.  aspirin 81 MG EC tablet Take 81 mg by mouth daily. No current facility-administered medications for this visit. Allergies: Scopolamine and Penicillins  Past Medical History:   Diagnosis Date    Abnormal nuclear stress test 4/08;7/07 4/08-EF=67%,Mild->Mod.isch. LAD;7/07-EF=70%,Suggests poss. Mild Ant.wall ischemia.     Cardiac arrest Laterality Date    CARDIAC CATHETERIZATION  08    continue risk factor modification with strict control of her risk factors with diet and control of diabetes, high blood pressure and hyperlipidemia    GASTRIC BAND  2008    revision of gastric band placement    HYSTERECTOMY      LAP BAND      put in in May Removed in August    LITHOTRIPSY     [address]      right    ROTATOR CUFF REPAIR        As reviewed   Family History   Problem Relation Age of Onset    Cancer Sister     COPD Brother     Heart Disease Brother     Cancer Mother         melonoma    Migraines Mother     Cancer Father         carcinoma    High Blood Pressure Father     Heart Disease Father      Social History     Tobacco Use    Smoking status: Former Smoker     Packs/day: 0.50     Years: 3.00     Pack years: 1.50     Types: Cigarettes     Quit date: 1994     Years since quittin.2    Smokeless tobacco: Never Used   Substance Use Topics    Alcohol use: No     Alcohol/week: 0.0 standard drinks      Review of Systems:    Constitutional: Negative for diaphoresis and fatigue  Psychological:Negative for anxiety or depression  HENT: Negative for headaches, nasal congestion, sinus pain or vertigo  Eyes: Negative for visual disturbance.    Endocrine: Negative for polydipsia/polyuria  Respiratory:  shortness of breath  Cardiovascular: Negative for chest pain, dyspnea on exertion, claudication, edema, irregular heartbeat, murmur, palpitations or shortness of breath  Gastrointestinal: Negative for abdominal pain or heartburn  Genito-Urinary: Negative for urinary frequency/urgency  Musculoskeletal: Negative for muscle pain, muscular weakness, negative for pain in arm and leg or swelling in foot and leg  Neurological: Negative for dizziness, headaches, memory loss, numbness/tingling, visual changes, syncope  Dermatological: Negative for rash    Objective:  /80   Pulse 70   Ht 5' 6\" (1.676 m)   Wt 226 lb 3.2 oz (102.6 kg)   BMI 36.51 kg/m²   Wt Readings from Last 3 Encounters:   11/02/21 226 lb 3.2 oz (102.6 kg)   10/15/21 227 lb (103 kg)   06/29/21 215 lb 3.2 oz (97.6 kg)     Body mass index is 36.51 kg/m². GENERAL - Alert, oriented, pleasant, in no apparent distress. EYES: No jaundice, no conjunctival pallor. SKIN: It is warm & dry. No rashes. No Echhymosis    HEENT - No clinically significant abnormalities seen. Neck - Supple. No jugular venous distention noted. No carotid bruits. Cardiovascular - Normal S1 and S2 without obvious murmur or gallop. Extremities - No cyanosis, clubbing, or significant edema. Pulmonary - No respiratory distress. No wheezes or rales. Abdomen - No masses, tenderness, or organomegaly. Musculoskeletal - No significant edema. No joint deformities. No muscle wasting. Neurologic - Cranial nerves II through XII are grossly intact. There were no gross focal neurologic abnormalities.     Lab Review   No results found for: CKTOTAL, CKMB, CKMBINDEX, TROPONINT  BNP:  No results found for: BNP  PT/INR:    Lab Results   Component Value Date    INR 0.93 05/21/2016     Lab Results   Component Value Date    LABA1C 7.6 06/29/2021    LABA1C 6.6 06/22/2020     Lab Results   Component Value Date    WBC 7.4 06/29/2021    HCT 34.9 (L) 06/29/2021    MCV 94.0 06/29/2021     06/29/2021     Lab Results   Component Value Date    CHOL 233 (H) 06/29/2021    TRIG 160 (H) 06/29/2021    HDL 56 06/29/2021    LDLCALC 145 (H) 06/29/2021    LDLDIRECT 132 (H) 08/19/2019     Lab Results   Component Value Date    ALT 14 06/29/2021    AST 17 06/29/2021     BMP:    Lab Results   Component Value Date     06/29/2021    K 4.9 06/29/2021     06/29/2021    CO2 19 06/29/2021    BUN 42 06/29/2021    CREATININE 1.7 06/29/2021     CMP:   Lab Results   Component Value Date     06/29/2021    K 4.9 06/29/2021     06/29/2021    CO2 19 06/29/2021    BUN 42 06/29/2021    PROT 7.3 06/29/2021 PROT 8.4 08/09/2012     TSH:    Lab Results   Component Value Date    TSH 0.73 06/29/2021    TSHHS 0.222 08/19/2019       QUALITY MEASURES REVIEWED:      Impression:    1. Essential hypertension       Patient Active Problem List   Diagnosis Code    Hyperlipidemia E78.5    Hyperuricemia E79.0    Vitamin D deficiency L93.4    Complications of bariatric procedures K95.89    Fatigue R53.83    SOB (shortness of breath) on exertion R06.02    Obesity E66.9    Localized edema R60.0    SOB (shortness of breath) R06.02    Mild persistent asthma without complication D82.51    Pulmonary hypertension ([facility]) I27.20    Abnormal nuclear cardiac imaging test R93.1    Hypothyroidism E03.9    Essential hypertension I10    Type 2 diabetes mellitus with complication, with long-term current use of insulin ([facility]) E11.8, Z79.4    Erythrasma L08.1    Anemia D64.9    B12 deficiency E53.8    Chronic kidney disease N18.9    Chronic obstructive pulmonary disease, unspecified COPD type ([facility]) J44.9    Obesity, Class III, BMI 40-49.9 (morbid obesity) ([facility]) E66.01       Assessment & Plan:    - SOB: ann and echo    -  Hypertension: Patients blood pressure is abnormal. Patient is advised about low sodium diet. Present medical regimen will not be changed. - Cellulitis in LE: on meds  V dopler        -  LIPID MANAGEMENT:  Available lipid  lab data reviewed  and patient was given dietary advice. NCEP- ATP III guidelines reviewed with patient. -   Changes  in medicines made: No                                -   DIABETES MELLITUS: Available pertinent lab data reviewed   and  patient was given dietary advice . Advised to check blood glucose level on a regular basis. -   Changes  in medicines made:  Joanna Zhu MA  [facility] [place]

## 2021-11-03 ENCOUNTER — PROCEDURE VISIT (OUTPATIENT)
Dept: CARDIOLOGY CLINIC | Age: 81
End: 2021-11-03
Payer: MEDICARE

## 2021-11-03 DIAGNOSIS — M79.89 SWELLING OF EXTREMITY: Primary | ICD-10-CM

## 2021-11-03 DIAGNOSIS — R06.02 SOB (SHORTNESS OF BREATH): ICD-10-CM

## 2021-11-03 LAB
LV EF: 58 %
LVEF MODALITY: NORMAL

## 2021-11-03 PROCEDURE — 93970 EXTREMITY STUDY: CPT | Performed by: INTERNAL MEDICINE

## 2021-11-03 PROCEDURE — 93306 TTE W/DOPPLER COMPLETE: CPT | Performed by: INTERNAL MEDICINE

## 2021-11-08 ENCOUNTER — PROCEDURE VISIT (OUTPATIENT)
Dept: CARDIOLOGY CLINIC | Age: 81
End: 2021-11-08
Payer: MEDICARE

## 2021-11-08 DIAGNOSIS — I10 ESSENTIAL HYPERTENSION: ICD-10-CM

## 2021-11-08 DIAGNOSIS — R06.02 SOB (SHORTNESS OF BREATH): ICD-10-CM

## 2021-11-08 LAB
LV EF: 60 %
LVEF MODALITY: NORMAL

## 2021-11-08 PROCEDURE — 93016 CV STRESS TEST SUPVJ ONLY: CPT | Performed by: INTERNAL MEDICINE

## 2021-11-08 PROCEDURE — 93018 CV STRESS TEST I&R ONLY: CPT | Performed by: INTERNAL MEDICINE

## 2021-11-08 PROCEDURE — 78452 HT MUSCLE IMAGE SPECT MULT: CPT | Performed by: INTERNAL MEDICINE

## 2021-11-08 PROCEDURE — 93017 CV STRESS TEST TRACING ONLY: CPT | Performed by: INTERNAL MEDICINE

## 2021-11-08 PROCEDURE — A9500 TC99M SESTAMIBI: HCPCS | Performed by: INTERNAL MEDICINE

## 2021-11-10 ENCOUNTER — TELEPHONE (OUTPATIENT)
Dept: CARDIOLOGY CLINIC | Age: 81
End: 2021-11-10

## 2021-11-10 NOTE — TELEPHONE ENCOUNTER
LM for patient to return my call. Small sized defect of mild severity which is reversible involving septal    wall of myocardium. Abnormal Lexiscan nuclear scintigraphic study suggestive    of abnormal myocardial perfusion. Mild degree of septal wall ischemia noted    involving a small sized area of left ventricular myocardium. Gated images    demonstrate normal left ventricular systolic function with EF of 60 %.      Recommendation    Suggest office visit to discuss results . Left ventricular function and size is normal, EF is estimated at 55-60%. Mild left ventricular hypertrophy. Normal diastolic filling pattern for age. No regional wall motion abnormalities were detected. Moderately dilated left atrium. Mildly sclerotic aortic valve with mild aortic stenosis mean gradient of   15mmHg, THEO 1,64cm2. Moderate mitral and mild tricuspid regurgitation is present. Mild Pulmonary hypertension noted with RVSP of 40mmHg. No evidence of pericardial effusion.

## 2021-11-12 ENCOUNTER — OFFICE VISIT (OUTPATIENT)
Dept: CARDIOLOGY CLINIC | Age: 81
End: 2021-11-12
Payer: MEDICARE

## 2021-11-12 ENCOUNTER — TELEPHONE (OUTPATIENT)
Dept: CARDIOLOGY CLINIC | Age: 81
End: 2021-11-12

## 2021-11-12 VITALS
SYSTOLIC BLOOD PRESSURE: 158 MMHG | BODY MASS INDEX: 36.13 KG/M2 | WEIGHT: 224.8 LBS | HEIGHT: 66 IN | HEART RATE: 62 BPM | DIASTOLIC BLOOD PRESSURE: 70 MMHG

## 2021-11-12 DIAGNOSIS — R07.2 PRECORDIAL CHEST PAIN: Primary | ICD-10-CM

## 2021-11-12 PROCEDURE — 1123F ACP DISCUSS/DSCN MKR DOCD: CPT | Performed by: INTERNAL MEDICINE

## 2021-11-12 PROCEDURE — 1036F TOBACCO NON-USER: CPT | Performed by: INTERNAL MEDICINE

## 2021-11-12 PROCEDURE — 99214 OFFICE O/P EST MOD 30 MIN: CPT | Performed by: INTERNAL MEDICINE

## 2021-11-12 PROCEDURE — G8427 DOCREV CUR MEDS BY ELIG CLIN: HCPCS | Performed by: INTERNAL MEDICINE

## 2021-11-12 PROCEDURE — 4040F PNEUMOC VAC/ADMIN/RCVD: CPT | Performed by: INTERNAL MEDICINE

## 2021-11-12 PROCEDURE — G8484 FLU IMMUNIZE NO ADMIN: HCPCS | Performed by: INTERNAL MEDICINE

## 2021-11-12 PROCEDURE — G8417 CALC BMI ABV UP PARAM F/U: HCPCS | Performed by: INTERNAL MEDICINE

## 2021-11-12 PROCEDURE — G8399 PT W/DXA RESULTS DOCUMENT: HCPCS | Performed by: INTERNAL MEDICINE

## 2021-11-12 PROCEDURE — 1090F PRES/ABSN URINE INCON ASSESS: CPT | Performed by: INTERNAL MEDICINE

## 2021-11-12 NOTE — PROGRESS NOTES
times daily 10.2 g 5    LEVEMIR FLEXTOUCH 100 UNIT/ML injection pen INJECT 28 UNITS SUBCUTANEOUSLY NIGHTLY, DOSE TO BE ADJUSTED ACCORDING TO MORNING FASTING BLOOD SUGAR READINGS 30 mL 3    allopurinol (ZYLOPRIM) 300 MG tablet TAKE 1 TABLET DAILY 90 tablet 3    simvastatin (ZOCOR) 20 MG tablet TAKE 1 TABLET NIGHTLY 90 tablet 3    SYNTHROID 137 MCG tablet TAKE 1 TABLET DAILY 90 tablet 3    lisinopril (PRINIVIL;ZESTRIL) 10 MG tablet Take 1 tablet by mouth once daily 90 tablet 3    amLODIPine (NORVASC) 10 MG tablet TAKE 1 TABLET DAILY 90 tablet 3    metoprolol succinate (TOPROL XL) 100 MG extended release tablet TAKE 1 TABLET DAILY 90 tablet 3    lisinopril (PRINIVIL;ZESTRIL) 40 MG tablet TAKE 1 TABLET DAILY 90 tablet 3    clindamycin (CLEOCIN T) 1 % lotion Apply 10 mg/mL topically 2 times daily Apply topically 2 times daily.  fluticasone (FLONASE) 50 MCG/ACT nasal spray 1 spray by Each Nare route daily 1 Bottle 3    ketoconazole (NIZORAL) 2 % cream Apply topically daily. 60 g 2    Insulin Pen Needle 29G X 12.7MM MISC 1 each by Does not apply route daily 300 each 3    omeprazole (PRILOSEC OTC) 20 MG tablet Take 1 tablet by mouth daily 90 tablet 3    calcium carbonate (TUMS) 500 MG chewable tablet Take 2 tablets by mouth as needed.  aspirin 81 MG EC tablet Take 81 mg by mouth daily. No current facility-administered medications for this visit. Allergies: Scopolamine and Penicillins  Past Medical History:   Diagnosis Date    Abnormal nuclear stress test 4/08;7/07 4/08-EF=67%,Mild->Mod.isch. LAD;7/07-EF=70%,Suggests poss. Mild Ant.wall ischemia.     Cardiac arrest ([facility] [address] 2008    Diabetes mellitus ([facility] [address]     H/O cardiac catheterization 4/08    No signif.CAD.    H/O cardiovascular stress test 04/11/08    mild to mod ischemia in the LAD region, abnormal study, EF 67%, patient developed mild chest pain and throat tightness    H/O cardiovascular stress test 5/3/2016    lexiscan-moderate ischemia apical,RF66%    H/O Doppler ultrasound 07/16/2007    CAROTID DOPLER- intimal thickening but no significant atherosclerotic plaque noted in the right or left internal carotid artery, doppler flow velocities within the right and left internal carotid artery are elevated, consistent with a mild, less than 50% stenosis    H/O echocardiogram 10/14/08    EF>55%, normal LV systolic function, normal left ventricular wall thickness, impaired LV relaxation    H/O echocardiogram 6/18/13, 10/08;07/07 6/13- EF >55% Impaired LV relaxation, Mild concentric LV hypertrophy, No sig AS, THEO underestimated. 10/08-EF=>55%,NL study;7/07-EF=>55%,Mild valv. AS.    H/O echocardiogram 5/16/2016 12/3/14    5/16 EF 55-60% normal study 12/14EF 60%. MIld mitral and tricuspid insufficiencies. Mildly sclerotic aortic valve which appears to be mildly stenosed with aortic valve area of 1.4 however this does not appear to be hemodynamically signficant aortic stenosis. Mildy hypertropic left ventricle with normal global and regional left ventricular systolic function.  H/O left heart catheterization by cutdown 05/23/2016    no significant disease in all vessels.  EF 55%    Hernia     after lap band removal    Hx of cardiovascular stress test 06/18/2013 6/13-EF 51%, no scitnigraphic evidence of inducible myocardial ischemia    Hyperlipidemia     Hypertension     Irritable bowel syndrome     Kidney stones     Obesity     Osteoarthritis     Thyroid disease     Type II or unspecified type diabetes mellitus without mention of complication, not stated as uncontrolled      Past Surgical History:   Procedure Laterality Date    CARDIAC CATHETERIZATION  04/18/08    continue risk factor modification with strict control of her risk factors with diet and control of diabetes, high blood pressure and hyperlipidemia    GASTRIC BAND  08/2008    revision of gastric band placement    HYSTERECTOMY      LAP BAND      put in in May Removed in August    LITHOTRIPSY      ROTATOR CUFF REPAIR      right    ROTATOR CUFF REPAIR        As reviewed   Family History   Problem Relation Age of Onset    Cancer Sister     COPD Brother     Heart Disease Brother     Cancer Mother         melonoma    Migraines Mother     Cancer Father         carcinoma    High Blood Pressure Father     Heart Disease Father      Social History     Tobacco Use    Smoking status: Former Smoker     Packs/day: 0.50     Years: 3.00     Pack years: 1.50     Types: Cigarettes     Quit date: 1994     Years since quittin.2    Smokeless tobacco: Never Used   Substance Use Topics    Alcohol use: No     Alcohol/week: 0.0 standard drinks      Review of Systems:    Constitutional: Negative for diaphoresis and fatigue  Psychological:Negative for anxiety or depression  HENT: Negative for headaches, nasal congestion, sinus pain or vertigo  Eyes: Negative for visual disturbance. Endocrine: Negative for polydipsia/polyuria  Respiratory:  shortness of breath  Cardiovascular: Negative for chest pain, dyspnea on exertion, claudication, edema, irregular heartbeat, murmur, palpitations or shortness of breath  Gastrointestinal: Negative for abdominal pain or heartburn  Genito-Urinary: Negative for urinary frequency/urgency  Musculoskeletal: Negative for muscle pain, muscular weakness, negative for pain in arm and leg or swelling in foot and leg  Neurological: Negative for dizziness, headaches, memory loss, numbness/tingling, visual changes, syncope  Dermatological: Negative for rash    Objective:  BP (!) 158/70 (Site: Left Upper Arm, Position: Sitting, Cuff Size: Large Adult)   Pulse 62   Ht 5' 6\" (1.676 m)   Wt 224 lb 12.8 oz (102 kg)   BMI 36.28 kg/m²   Wt Readings from Last 3 Encounters:   21 224 lb 12.8 oz (102 kg)   21 226 lb 3.2 oz (102.6 kg)   10/15/21 227 lb (103 kg)     Body mass index is 36.28 kg/m².   GENERAL - Alert, oriented, pleasant, in no apparent distress. EYES: No jaundice, no conjunctival pallor. SKIN: It is warm & dry. No rashes. No Echhymosis    HEENT - No clinically significant abnormalities seen. Neck - Supple. No jugular venous distention noted. No carotid bruits. Cardiovascular - Normal S1 and S2 without obvious murmur or gallop. Extremities - No cyanosis, clubbing, or significant edema. Pulmonary - No respiratory distress. No wheezes or rales. Abdomen - No masses, tenderness, or organomegaly. Musculoskeletal - No significant edema. No joint deformities. No muscle wasting. Neurologic - Cranial nerves II through XII are grossly intact. There were no gross focal neurologic abnormalities. Lab Review   No results found for: CKTOTAL, CKMB, CKMBINDEX, TROPONINT  BNP:  No results found for: BNP  PT/INR:    Lab Results   Component Value Date    INR 0.93 05/21/2016     Lab Results   Component Value Date    LABA1C 7.6 06/29/2021    LABA1C 6.6 06/22/2020     Lab Results   Component Value Date    WBC 7.4 06/29/2021    HCT 34.9 (L) 06/29/2021    MCV 94.0 06/29/2021     06/29/2021     Lab Results   Component Value Date    CHOL 233 (H) 06/29/2021    TRIG 160 (H) 06/29/2021    HDL 56 06/29/2021    LDLCALC 145 (H) 06/29/2021    LDLDIRECT 132 (H) 08/19/2019     Lab Results   Component Value Date    ALT 14 06/29/2021    AST 17 06/29/2021     BMP:    Lab Results   Component Value Date     06/29/2021    K 4.9 06/29/2021     06/29/2021    CO2 19 06/29/2021    BUN 42 06/29/2021    CREATININE 1.7 06/29/2021     CMP:   Lab Results   Component Value Date     06/29/2021    K 4.9 06/29/2021     06/29/2021    CO2 19 06/29/2021    BUN 42 06/29/2021    PROT 7.3 06/29/2021    PROT 8.4 08/09/2012     TSH:    Lab Results   Component Value Date    TSH 0.73 06/29/2021    TSHHS 0.222 08/19/2019       QUALITY MEASURES REVIEWED:      Impression:    No diagnosis found.    Patient Active Problem List   Diagnosis Code    Hyperlipidemia E78.5    Hyperuricemia E79.0    Vitamin D deficiency T61.0    Complications of bariatric procedures K95.89    Fatigue R53.83    SOB (shortness of breath) on exertion R06.02    Obesity E66.9    Localized edema R60.0    SOB (shortness of breath) R06.02    Mild persistent asthma without complication Y24.00    Pulmonary hypertension ([facility]) I27.20    Abnormal nuclear cardiac imaging test R93.1    Hypothyroidism E03.9    Essential hypertension I10    Type 2 diabetes mellitus with complication, with long-term current use of insulin ([facility]) E11.8, Z79.4    Erythrasma L08.1    Anemia D64.9    B12 deficiency E53.8    Chronic kidney disease N18.9    Chronic obstructive pulmonary disease, unspecified COPD type ([facility]) J44.9    Obesity, Class III, BMI 40-49.9 (morbid obesity) ([facility]) E66.01       Assessment & Plan:    - SOB: abn cardiolite CTA coronary  Hold diuretics before CTA    Had a normal LHC in 16   Has SOB   LHC  DW pt    -  Hypertension: Patients blood pressure is abnormal. Patient is advised about low sodium diet. Present medical regimen will not be changed.          - [facility] has mild AS        - Cellulitis in LE: on meds  V dopler    Conclusions        Summary        No evidence of DVT or SVT in the bilateral common femoral vein, femoral    vein, popliteal vein, or greater saphenous vein.    Possible chronic, non-occlusive SVT of the Right SSV.    Left SSV poorly visualized; small vessels versus chronic SVT.    No significant reflux noted in the veins of the right lower extremity.    No significant reflux noted in the veins of the left lower extremity.        Signature        ------------------------------------------------------------------    Electronically signed by Dave Fajardo MD   [id number] physician) on 11/03/2021 at 05:48 PM    ------------------------------------------------------------------         -  LIPID MANAGEMENT:  Available lipid  lab data reviewed  and patient was given dietary advice. NCEP- ATP III guidelines reviewed with patient. -   Changes  in medicines made: No                                -   DIABETES MELLITUS: Available pertinent lab data reviewed   and  patient was given dietary advice . Advised to check blood glucose level on a regular basis. -   Changes  in medicines made:  No                        Marifer Hauser MA  [facility]

## 2021-11-12 NOTE — TELEPHONE ENCOUNTER
I have called the patient twice and there is no vm and someone answers and hangs up the phone .      The CTA cardiac is scheduled for 12/22/2021 arrival time 7:30 am test at 8:00 am @ [facility] STAT labs on arrival .     NPO midnight the night before

## 2021-11-12 NOTE — PATIENT INSTRUCTIONS
**It is YOUR responsibilty to bring medication bottles and/or updated medication list to [address] [place] [address]. This will allow us to better serve you and all your healthcare needs**    Please be informed that if you contact our office outside of normal business hours the physician on call cannot help with any scheduling or rescheduling issues, procedure instruction questions or any type of medication issue. We advise you for any urgent/emergency that you go to the nearest emergency room!     PLEASE CALL OUR OFFICE DURING NORMAL BUSINESS HOURS    Monday - Friday   8 am to 5 pm    [place]: [place] [address]: [phone]    [place]:  [phone]

## 2021-11-12 NOTE — PROGRESS NOTES
Pt. Denies CP, dizziness, palpitations, no upcoming surgeries. Pt.  Affirms SOB on exertion, bilateral edema

## 2021-12-22 ENCOUNTER — HOSPITAL ENCOUNTER (OUTPATIENT)
Dept: CT IMAGING | Age: 81
Discharge: HOME | End: 2021-12-22
Payer: MEDICARE

## 2021-12-22 VITALS — DIASTOLIC BLOOD PRESSURE: 81 MMHG | HEART RATE: 79 BPM | SYSTOLIC BLOOD PRESSURE: 180 MMHG

## 2021-12-22 DIAGNOSIS — R07.2 PRECORDIAL CHEST PAIN: ICD-10-CM

## 2021-12-22 LAB
GFR AFRICAN AMERICAN: 50 ML/MIN/1.73M2
GFR NON-AFRICAN AMERICAN: 41 ML/MIN/1.73M2
POC CREATININE: 1.6 MG/DL (ref 0.6–1.1)

## 2021-12-22 PROCEDURE — 6360000004 HC RX CONTRAST MEDICATION: Performed by: INTERNAL MEDICINE

## 2021-12-22 PROCEDURE — 75574 CT ANGIO HRT W/3D IMAGE: CPT | Performed by: INTERNAL MEDICINE

## 2021-12-22 PROCEDURE — 75574 CT ANGIO HRT W/3D IMAGE: CPT

## 2021-12-22 PROCEDURE — 6370000000 HC RX 637 (ALT 250 FOR IP): Performed by: INTERNAL MEDICINE

## 2021-12-22 RX ORDER — METOPROLOL TARTRATE 50 MG/1
100 TABLET, FILM COATED ORAL ONCE
Status: COMPLETED | OUTPATIENT
Start: 2021-12-22 | End: 2021-12-22

## 2021-12-22 RX ADMIN — METOPROLOL TARTRATE 100 MG: 50 TABLET, FILM COATED ORAL at 08:25

## 2021-12-22 RX ADMIN — IOPAMIDOL 120 ML: 755 INJECTION, SOLUTION INTRAVENOUS at 09:34

## 2021-12-29 ENCOUNTER — OFFICE VISIT (OUTPATIENT)
Dept: FAMILY MEDICINE CLINIC | Age: 81
End: 2021-12-29
Payer: MEDICARE

## 2021-12-29 VITALS
WEIGHT: 215.4 LBS | HEART RATE: 69 BPM | SYSTOLIC BLOOD PRESSURE: 130 MMHG | BODY MASS INDEX: 34.77 KG/M2 | OXYGEN SATURATION: 99 % | DIASTOLIC BLOOD PRESSURE: 75 MMHG

## 2021-12-29 DIAGNOSIS — E78.5 HYPERLIPIDEMIA, UNSPECIFIED HYPERLIPIDEMIA TYPE: ICD-10-CM

## 2021-12-29 DIAGNOSIS — E03.9 ACQUIRED HYPOTHYROIDISM: ICD-10-CM

## 2021-12-29 DIAGNOSIS — N30.01 ACUTE CYSTITIS WITH HEMATURIA: ICD-10-CM

## 2021-12-29 DIAGNOSIS — R53.83 FATIGUE, UNSPECIFIED TYPE: ICD-10-CM

## 2021-12-29 DIAGNOSIS — I10 ESSENTIAL HYPERTENSION: ICD-10-CM

## 2021-12-29 DIAGNOSIS — E11.8 TYPE 2 DIABETES MELLITUS WITH COMPLICATION, WITH LONG-TERM CURRENT USE OF INSULIN (HCC): Primary | ICD-10-CM

## 2021-12-29 DIAGNOSIS — J44.9 CHRONIC OBSTRUCTIVE PULMONARY DISEASE, UNSPECIFIED COPD TYPE (HCC): ICD-10-CM

## 2021-12-29 DIAGNOSIS — R60.0 LOCALIZED EDEMA: ICD-10-CM

## 2021-12-29 DIAGNOSIS — N18.9 CHRONIC KIDNEY DISEASE, UNSPECIFIED CKD STAGE: ICD-10-CM

## 2021-12-29 DIAGNOSIS — Z79.4 TYPE 2 DIABETES MELLITUS WITH COMPLICATION, WITH LONG-TERM CURRENT USE OF INSULIN (HCC): Primary | ICD-10-CM

## 2021-12-29 LAB
A/G RATIO: 1.2 (ref 1.1–2.2)
ALBUMIN SERPL-MCNC: 4.3 G/DL (ref 3.4–5)
ALP BLD-CCNC: 86 U/L (ref 40–129)
ALT SERPL-CCNC: 13 U/L (ref 10–40)
ANION GAP SERPL CALCULATED.3IONS-SCNC: 17 MMOL/L (ref 3–16)
AST SERPL-CCNC: 18 U/L (ref 15–37)
BASOPHILS ABSOLUTE: 0 K/UL (ref 0–0.2)
BASOPHILS RELATIVE PERCENT: 0.3 %
BILIRUB SERPL-MCNC: <0.2 MG/DL (ref 0–1)
BILIRUBIN, POC: NEGATIVE
BLOOD URINE, POC: NORMAL
BUN BLDV-MCNC: 27 MG/DL (ref 7–20)
CALCIUM SERPL-MCNC: 9.9 MG/DL (ref 8.3–10.6)
CHLORIDE BLD-SCNC: 106 MMOL/L (ref 99–110)
CLARITY, POC: CLEAR
CO2: 24 MMOL/L (ref 21–32)
COLOR, POC: YELLOW
CREAT SERPL-MCNC: 1.9 MG/DL (ref 0.6–1.2)
EOSINOPHILS ABSOLUTE: 0.8 K/UL (ref 0–0.6)
EOSINOPHILS RELATIVE PERCENT: 7.5 %
GFR AFRICAN AMERICAN: 31
GFR NON-AFRICAN AMERICAN: 25
GLUCOSE FASTING: 126 MG/DL (ref 70–99)
GLUCOSE URINE, POC: NEGATIVE
HBA1C MFR BLD: 7.2 %
HCT VFR BLD CALC: 37.7 % (ref 36–48)
HEMOGLOBIN: 12.1 G/DL (ref 12–16)
KETONES, POC: NEGATIVE
LEUKOCYTE EST, POC: NORMAL
LYMPHOCYTES ABSOLUTE: 3.2 K/UL (ref 1–5.1)
LYMPHOCYTES RELATIVE PERCENT: 30 %
MCH RBC QN AUTO: 30.1 PG (ref 26–34)
MCHC RBC AUTO-ENTMCNC: 32.2 G/DL (ref 31–36)
MCV RBC AUTO: 93.5 FL (ref 80–100)
MONOCYTES ABSOLUTE: 0.8 K/UL (ref 0–1.3)
MONOCYTES RELATIVE PERCENT: 7.3 %
NEUTROPHILS ABSOLUTE: 5.8 K/UL (ref 1.7–7.7)
NEUTROPHILS RELATIVE PERCENT: 54.9 %
NITRITE, POC: POSITIVE
PDW BLD-RTO: 16 % (ref 12.4–15.4)
PH, POC: 5.5
PLATELET # BLD: 310 K/UL (ref 135–450)
PMV BLD AUTO: 8.1 FL (ref 5–10.5)
POTASSIUM SERPL-SCNC: 4.2 MMOL/L (ref 3.5–5.1)
PROTEIN, POC: NORMAL
RBC # BLD: 4.03 M/UL (ref 4–5.2)
SODIUM BLD-SCNC: 147 MMOL/L (ref 136–145)
SPECIFIC GRAVITY, POC: 1.02
T4 FREE: 1.7 NG/DL (ref 0.9–1.8)
TOTAL PROTEIN: 7.9 G/DL (ref 6.4–8.2)
TSH SERPL DL<=0.05 MIU/L-ACNC: 2.85 UIU/ML (ref 0.27–4.2)
UROBILINOGEN, POC: NORMAL
WBC # BLD: 10.5 K/UL (ref 4–11)

## 2021-12-29 PROCEDURE — 1090F PRES/ABSN URINE INCON ASSESS: CPT | Performed by: FAMILY MEDICINE

## 2021-12-29 PROCEDURE — G8427 DOCREV CUR MEDS BY ELIG CLIN: HCPCS | Performed by: FAMILY MEDICINE

## 2021-12-29 PROCEDURE — 1036F TOBACCO NON-USER: CPT | Performed by: FAMILY MEDICINE

## 2021-12-29 PROCEDURE — 83036 HEMOGLOBIN GLYCOSYLATED A1C: CPT | Performed by: FAMILY MEDICINE

## 2021-12-29 PROCEDURE — 3051F HG A1C>EQUAL 7.0%<8.0%: CPT | Performed by: FAMILY MEDICINE

## 2021-12-29 PROCEDURE — 99214 OFFICE O/P EST MOD 30 MIN: CPT | Performed by: FAMILY MEDICINE

## 2021-12-29 PROCEDURE — 1123F ACP DISCUSS/DSCN MKR DOCD: CPT | Performed by: FAMILY MEDICINE

## 2021-12-29 PROCEDURE — 4040F PNEUMOC VAC/ADMIN/RCVD: CPT | Performed by: FAMILY MEDICINE

## 2021-12-29 PROCEDURE — G8417 CALC BMI ABV UP PARAM F/U: HCPCS | Performed by: FAMILY MEDICINE

## 2021-12-29 PROCEDURE — G8926 SPIRO NO PERF OR DOC: HCPCS | Performed by: FAMILY MEDICINE

## 2021-12-29 PROCEDURE — 3023F SPIROM DOC REV: CPT | Performed by: FAMILY MEDICINE

## 2021-12-29 PROCEDURE — G8484 FLU IMMUNIZE NO ADMIN: HCPCS | Performed by: FAMILY MEDICINE

## 2021-12-29 PROCEDURE — G8399 PT W/DXA RESULTS DOCUMENT: HCPCS | Performed by: FAMILY MEDICINE

## 2021-12-29 PROCEDURE — 81002 URINALYSIS NONAUTO W/O SCOPE: CPT | Performed by: FAMILY MEDICINE

## 2021-12-29 RX ORDER — NITROFURANTOIN 25; 75 MG/1; MG/1
100 CAPSULE ORAL 2 TIMES DAILY
Qty: 20 CAPSULE | Refills: 0 | Status: SHIPPED | OUTPATIENT
Start: 2021-12-29 | End: 2022-01-08

## 2021-12-29 ASSESSMENT — ENCOUNTER SYMPTOMS
VOMITING: 0
CONSTIPATION: 0
ABDOMINAL PAIN: 0
NAUSEA: 0
SHORTNESS OF BREATH: 0
COUGH: 0
DIARRHEA: 0
BACK PAIN: 0

## 2021-12-29 NOTE — PROGRESS NOTES
Selwyn Model  1940 12/29/21    Chief Complaint   Patient presents with    3 Month Follow-Up    Diabetes    Hypertension    Hyperlipidemia    Hypothyroidism    COPD    Leg Swelling           Patient here for 3 months f/u regarding her DM II, hyperlipidemia, HTN, COPD, and hypothyroidism, her BS doing fine, The patient is taking hypertensive medications compliantly without side effects. Denies chest pain, dyspnea, edema, or TIA's. She is taking her cholesterol medications, she is taking her thyroid medication. Her leg swelling much better, and on lasix 40 mg daily, and 80 mg may be once a wk.  patient also c/o been fatigue, no energy x 4 days, was having urinary frequency, no fever, no cough, no N/V or abdominal pain. Past Medical History:   Diagnosis Date    Abnormal nuclear stress test 4/08;7/07 4/08-EF=67%,Mild->Mod.isch. LAD;7/07-EF=70%,Suggests poss. Mild Ant.wall ischemia.  Cardiac arrest ([facility] [address] 2008    Diabetes mellitus ([facility] [address]     H/O cardiac catheterization 4/08    No signif.CAD.    H/O cardiovascular stress test 04/11/08    mild to mod ischemia in the LAD region, abnormal study, EF 67%, patient developed mild chest pain and throat tightness    H/O cardiovascular stress test 5/3/2016    lexiscan-moderate ischemia apical,RF66%    H/O Doppler ultrasound 07/16/2007    CAROTID DOPLER- intimal thickening but no significant atherosclerotic plaque noted in the right or left internal carotid artery, doppler flow velocities within the right and left internal carotid artery are elevated, consistent with a mild, less than 50% stenosis    H/O echocardiogram 10/14/08    EF>55%, normal LV systolic function, normal left ventricular wall thickness, impaired LV relaxation    H/O echocardiogram 6/18/13, 10/08;07/07 6/13- EF >55% Impaired LV relaxation, Mild concentric LV hypertrophy, No sig AS, THEO underestimated. 10/08-EF=>55%,NL study;7/07-EF=>55%,Mild valv. AS.    H/O echocardiogram 5/16/2016 12/3/14    5/16 EF 55-60% normal study 12/14EF 60%. MIld mitral and tricuspid insufficiencies. Mildly sclerotic aortic valve which appears to be mildly stenosed with aortic valve area of 1.4 however this does not appear to be hemodynamically signficant aortic stenosis. Mildy hypertropic left ventricle with normal global and regional left ventricular systolic function.  H/O left heart catheterization by cutdown 05/23/2016    no significant disease in all vessels. EF 55%    Hernia     after lap band removal    Hx of cardiovascular stress test 06/18/2013 6/13-EF 51%, no scitnigraphic evidence of inducible myocardial ischemia    Hyperlipidemia     Hypertension     Irritable bowel syndrome     Kidney stones     Obesity     Osteoarthritis     Thyroid disease     Type II or unspecified type diabetes mellitus without mention of complication, not stated as uncontrolled      Past Surgical History:   Procedure Laterality Date    CARDIAC CATHETERIZATION  04/18/08    continue risk factor modification with strict control of her risk factors with diet and control of diabetes, high blood pressure and hyperlipidemia    GASTRIC BAND  08/2008    revision of gastric band placement    HYSTERECTOMY      LAP BAND      put in in May Removed in August    LITHOTRIPSY     [address]      right    ROTATOR CUFF REPAIR       Family History   Problem Relation Age of Onset    Cancer Sister     COPD Brother     Heart Disease Brother     Cancer Mother         melonoma    Migraines Mother     Cancer Father         carcinoma    High Blood Pressure Father     Heart Disease Father      Social History     Socioeconomic History    Marital status:       Spouse name: Not on file    Number of children: Not on file    Years of education: Not on file    Highest education level: Not on file   Occupational History    Not on file   Tobacco Use    Smoking status: Former Smoker     Packs/day: 0.50     Years: 3.00 Pack years: 1.50     Types: Cigarettes     Quit date: 1994     Years since quittin.4    Smokeless tobacco: Never Used   Substance and Sexual Activity    Alcohol use: No     Alcohol/week: 0.0 standard drinks    Drug use: No    Sexual activity: Not Currently     Partners: Male     Comment:    Other Topics Concern    Not on file   Social History Narrative    ** Merged History Encounter **          Social Determinants of Health     Financial Resource Strain:     Difficulty of Paying Living Expenses: Not on file   Food Insecurity:     Worried About Running Out of Food in the Last Year: Not on file    Iram of Food in the Last Year: Not on file   Transportation Needs:     Lack of Transportation (Medical): Not on file    Lack of Transportation (Non-Medical):  Not on file   Physical Activity:     Days of Exercise per Week: Not on file    Minutes of Exercise per Session: Not on file   Stress:     Feeling of Stress : Not on file   Social Connections:     Frequency of Communication with Friends and Family: Not on file    Frequency of Social Gatherings with Friends and Family: Not on file    Attends Christianity Services: Not on file    Active Member of [address] [place] [address] [employer] or Organizations: Not on file    Attends Club or Organization Meetings: Not on file    Marital Status: Not on file   Intimate Partner Violence:     Fear of Current or Ex-Partner: Not on file    Emotionally Abused: Not on file    Physically Abused: Not on file    Sexually Abused: Not on file   Housing Stability:     Unable to Pay for Housing in the Last Year: Not on file    Number of [place] in the Last Year: Not on file    Unstable Housing in the Last Year: Not on file       Allergies   Allergen Reactions    Scopolamine      Combative and delusional    Penicillins Hives     Current Outpatient Medications   Medication Sig Dispense Refill    metFORMIN (GLUCOPHAGE) 500 MG tablet Take 500 mg by mouth 2 times daily (with meals)      nitrofurantoin, macrocrystal-monohydrate, (MACROBID) 100 MG capsule Take 1 capsule by mouth 2 times daily for 10 days 20 capsule 0    furosemide (LASIX) 40 MG tablet TAKE 1 tab bid 180 tablet 3    budesonide-formoterol (SYMBICORT) 80-4.5 MCG/ACT AERO Inhale 2 puffs into the lungs 2 times daily 10.2 g 5    LEVEMIR FLEXTOUCH 100 UNIT/ML injection pen INJECT 28 UNITS SUBCUTANEOUSLY NIGHTLY, DOSE TO BE ADJUSTED ACCORDING TO MORNING FASTING BLOOD SUGAR READINGS 30 mL 3    allopurinol (ZYLOPRIM) 300 MG tablet TAKE 1 TABLET DAILY 90 tablet 3    simvastatin (ZOCOR) 20 MG tablet TAKE 1 TABLET NIGHTLY 90 tablet 3    SYNTHROID 137 MCG tablet TAKE 1 TABLET DAILY 90 tablet 3    lisinopril (PRINIVIL;ZESTRIL) 10 MG tablet Take 1 tablet by mouth once daily 90 tablet 3    amLODIPine (NORVASC) 10 MG tablet TAKE 1 TABLET DAILY 90 tablet 3    metoprolol succinate (TOPROL XL) 100 MG extended release tablet TAKE 1 TABLET DAILY 90 tablet 3    lisinopril (PRINIVIL;ZESTRIL) 40 MG tablet TAKE 1 TABLET DAILY 90 tablet 3    clindamycin (CLEOCIN T) 1 % lotion Apply 10 mg/mL topically 2 times daily Apply topically 2 times daily.  fluticasone (FLONASE) 50 MCG/ACT nasal spray 1 spray by Each Nare route daily 1 Bottle 3    ketoconazole (NIZORAL) 2 % cream Apply topically daily. 60 g 2    Insulin Pen Needle 29G X 12.7MM MISC 1 each by Does not apply route daily 300 each 3    omeprazole (PRILOSEC OTC) 20 MG tablet Take 1 tablet by mouth daily 90 tablet 3    calcium carbonate (TUMS) 500 MG chewable tablet Take 2 tablets by mouth as needed.  aspirin 81 MG EC tablet Take 81 mg by mouth daily. No current facility-administered medications for this visit. Review of Systems   Constitutional: Positive for activity change and fatigue. Negative for chills and fever. HENT: Negative for congestion. Respiratory: Negative for cough and shortness of breath.     Cardiovascular: Positive for leg swelling (much better). Negative for chest pain. Gastrointestinal: Negative for abdominal pain, constipation, diarrhea, nausea and vomiting. Genitourinary: Positive for frequency. Negative for dysuria and hematuria. Musculoskeletal: Negative for back pain. Neurological: Negative for dizziness, numbness and headaches. Psychiatric/Behavioral: Negative for agitation and sleep disturbance. The patient is not nervous/anxious.         Lab Results   Component Value Date    WBC 7.4 06/29/2021    HGB 11.6 (L) 06/29/2021    HCT 34.9 (L) 06/29/2021    MCV 94.0 06/29/2021     06/29/2021     Lab Results   Component Value Date     06/29/2021    K 4.9 06/29/2021     06/29/2021    CO2 19 (L) 06/29/2021    BUN 42 (H) 06/29/2021    CREATININE 1.6 (H) 12/22/2021    GLUCOSE 197 (H) 06/07/2021    CALCIUM 9.7 06/29/2021    PROT 7.3 06/29/2021    LABALBU 4.1 06/29/2021    BILITOT 0.4 06/29/2021    ALKPHOS 81 06/29/2021    AST 17 06/29/2021    ALT 14 06/29/2021    LABGLOM 41 (L) 12/22/2021    GFRAA 50 (L) 12/22/2021    AGRATIO 1.3 06/29/2021    GLOB 3.2 06/29/2021     Lab Results   Component Value Date    CHOL 233 (H) 06/29/2021    CHOL 215 (H) 04/06/2018    CHOL 184 03/24/2017     Lab Results   Component Value Date    TRIG 160 (H) 06/29/2021    TRIG 119 04/06/2018    TRIG 204 (H) 03/24/2017     Lab Results   Component Value Date    HDL 56 06/29/2021    HDL 63 08/19/2019    HDL 71 (H) 04/06/2018     Lab Results   Component Value Date    LDLCALC 145 (H) 06/29/2021    LDLCALC 120 (H) 04/06/2018    LDLCALC 81 03/24/2017     Lab Results   Component Value Date    LABA1C 7.6 06/29/2021     Lab Results   Component Value Date    TSH 0.73 06/29/2021    TSHHS 0.222 (L) 08/19/2019         /75   Pulse 69   Wt 215 lb 6.4 oz (97.7 kg)   SpO2 99%   BMI 34.77 kg/m²     BP Readings from Last 3 Encounters:   12/29/21 130/75   12/22/21 (!) 180/81   11/12/21 (!) 158/70       Wt Readings from Last 3 Encounters:   12/29/21 215 lb 6.4 oz (97.7 kg)   11/12/21 224 lb 12.8 oz (102 kg)   11/02/21 226 lb 3.2 oz (102.6 kg)         Physical Exam  Constitutional:       General: She is not in acute distress. Appearance: Normal appearance. She is obese. She is not ill-appearing or diaphoretic. HENT:      Head: Normocephalic and atraumatic. Eyes:      General: No scleral icterus. Pupils: Pupils are equal, round, and reactive to light. Cardiovascular:      Rate and Rhythm: Normal rate and regular rhythm. Heart sounds: Normal heart sounds. No murmur heard. Pulmonary:      Effort: Pulmonary effort is normal.      Breath sounds: Normal breath sounds. No wheezing. Musculoskeletal:      Cervical back: Normal range of motion and neck supple. No rigidity. Right lower leg: Edema present. Left lower leg: Edema present. Comments: Much better     Neurological:      General: No focal deficit present. Mental Status: She is alert and oriented to person, place, and time. Cranial Nerves: No cranial nerve deficit. Psychiatric:         Mood and Affect: Mood normal.         Behavior: Behavior normal.         ASSESSMENT/ PLAN:    1. Type 2 diabetes mellitus with complication, with long-term current use of insulin (HCC)  - stable, under control  - metFORMIN (GLUCOPHAGE) 500 MG tablet; Take 500 mg by mouth 2 times daily (with meals)  - POCT glycosylated hemoglobin (Hb A1C)    2. Essential hypertension  - stable    3. Hyperlipidemia, unspecified hyperlipidemia type  - stable    4. Acquired hypothyroidism  - stable    5. Chronic obstructive pulmonary disease, unspecified COPD type (HCC)  - stable    6. Localized edema  - better    7. Chronic kidney disease, unspecified CKD stage  - stable    8. Fatigue, unspecified type  - Comprehensive Metabolic Panel, Fasting; Future  - CBC Auto Differential; Future  - TSH without Reflex; Future  - T4, Free; Future  - POCT Urinalysis no Micro    9.  Acute cystitis with hematuria  - nitrofurantoin, macrocrystal-monohydrate, (MACROBID) 100 MG capsule; Take 1 capsule by mouth 2 times daily for 10 days  Dispense: 20 capsule; Refill: 0              - All old blood work reviewed with the patient  - Appropriate prescription are addressed. - After visit summery provided. - Questions answered and patient verbalizes understanding.  - Call for any problem, questions, or concerns.  - RTC if symptoms worse. Return in about 3 months (around 3/29/2022).

## 2022-01-09 DIAGNOSIS — I10 ESSENTIAL HYPERTENSION: ICD-10-CM

## 2022-01-10 RX ORDER — METOPROLOL SUCCINATE 100 MG/1
TABLET, EXTENDED RELEASE ORAL
Qty: 90 TABLET | Refills: 3 | Status: SHIPPED | OUTPATIENT
Start: 2022-01-10

## 2022-01-10 RX ORDER — AMLODIPINE BESYLATE 10 MG/1
TABLET ORAL
Qty: 90 TABLET | Refills: 3 | Status: SHIPPED | OUTPATIENT
Start: 2022-01-10

## 2022-01-10 RX ORDER — LISINOPRIL 40 MG/1
TABLET ORAL
Qty: 90 TABLET | Refills: 3 | Status: SHIPPED | OUTPATIENT
Start: 2022-01-10 | End: 2022-04-20

## 2022-01-12 ENCOUNTER — OFFICE VISIT (OUTPATIENT)
Dept: CARDIOLOGY CLINIC | Age: 82
End: 2022-01-12
Payer: MEDICARE

## 2022-01-12 VITALS
HEIGHT: 66 IN | HEART RATE: 64 BPM | WEIGHT: 218.8 LBS | DIASTOLIC BLOOD PRESSURE: 72 MMHG | BODY MASS INDEX: 35.17 KG/M2 | SYSTOLIC BLOOD PRESSURE: 130 MMHG

## 2022-01-12 DIAGNOSIS — R07.2 PRECORDIAL CHEST PAIN: Primary | ICD-10-CM

## 2022-01-12 PROCEDURE — G8417 CALC BMI ABV UP PARAM F/U: HCPCS | Performed by: INTERNAL MEDICINE

## 2022-01-12 PROCEDURE — 1123F ACP DISCUSS/DSCN MKR DOCD: CPT | Performed by: INTERNAL MEDICINE

## 2022-01-12 PROCEDURE — 99214 OFFICE O/P EST MOD 30 MIN: CPT | Performed by: INTERNAL MEDICINE

## 2022-01-12 PROCEDURE — 1090F PRES/ABSN URINE INCON ASSESS: CPT | Performed by: INTERNAL MEDICINE

## 2022-01-12 PROCEDURE — 1036F TOBACCO NON-USER: CPT | Performed by: INTERNAL MEDICINE

## 2022-01-12 PROCEDURE — G8399 PT W/DXA RESULTS DOCUMENT: HCPCS | Performed by: INTERNAL MEDICINE

## 2022-01-12 PROCEDURE — G8427 DOCREV CUR MEDS BY ELIG CLIN: HCPCS | Performed by: INTERNAL MEDICINE

## 2022-01-12 PROCEDURE — G8484 FLU IMMUNIZE NO ADMIN: HCPCS | Performed by: INTERNAL MEDICINE

## 2022-01-12 PROCEDURE — 4040F PNEUMOC VAC/ADMIN/RCVD: CPT | Performed by: INTERNAL MEDICINE

## 2022-01-12 RX ORDER — LISINOPRIL 10 MG/1
10 TABLET ORAL DAILY
COMMUNITY
End: 2022-03-15 | Stop reason: SDUPTHER

## 2022-01-12 NOTE — PROGRESS NOTES
CARDIOLOGY NOTE      1/12/2022    RE: Yisel Benítez  (1940)                               TO:  Dr. Anabelle Pop MD            CHIEF COMPLAINT   Angela Peterson is a 80 y.o. female who was seen today for management of  htn                          Fu on CTA          HPI:                   Pt has h/o htn, hyperlipidimea, DM, seen today for  fu.  Pt had COVID has no CP, SOB    Yisel Benítez has the following history recorded in care path:  Patient Active Problem List    Diagnosis Date Noted    Uncontrolled type 2 diabetes mellitus with insulin therapy ([facility] [address] 12/29/2021    Obesity, Class III, BMI 40-49.9 (morbid obesity) ([facility] [address] 10/16/2021    Chronic obstructive pulmonary disease, unspecified COPD type ([facility] [address] 10/15/2021    Chronic kidney disease 06/14/2021    Anemia 06/22/2020    B12 deficiency 06/22/2020    Type 2 diabetes mellitus with complication, with long-term current use of insulin ([facility] [address] 10/02/2017    Erythrasma 10/02/2017    Hypothyroidism 12/21/2016    Essential hypertension 12/21/2016    Abnormal nuclear cardiac imaging test     Mild persistent asthma without complication 63/15/6090    Pulmonary hypertension (HCC) 03/16/2016    SOB (shortness of breath) 12/01/2014    Obesity 07/05/2013    Localized edema 07/05/2013    Fatigue 06/04/2013    SOB (shortness of breath) on exertion 06/04/2013    Hyperlipidemia 08/09/2012    Hyperuricemia 08/09/2012    Vitamin D deficiency 91/77/4517    Complications of bariatric procedures 08/09/2012     Current Outpatient Medications   Medication Sig Dispense Refill    lisinopril (PRINIVIL;ZESTRIL) 10 MG tablet Take 10 mg by mouth daily      metoprolol succinate (TOPROL XL) 100 MG extended release tablet TAKE 1 TABLET DAILY 90 tablet 3    lisinopril (PRINIVIL;ZESTRIL) 40 MG tablet TAKE 1 TABLET DAILY 90 tablet 3    amLODIPine (NORVASC) 10 MG tablet TAKE 1 TABLET DAILY 90 tablet 3    furosemide (LASIX) 40 MG tablet TAKE 1 tab bid 180 tablet 3    budesonide-formoterol (SYMBICORT) 80-4.5 MCG/ACT AERO Inhale 2 puffs into the lungs 2 times daily 10.2 g 5    LEVEMIR FLEXTOUCH 100 UNIT/ML injection pen INJECT 28 UNITS SUBCUTANEOUSLY NIGHTLY, DOSE TO BE ADJUSTED ACCORDING TO MORNING FASTING BLOOD SUGAR READINGS 30 mL 3    allopurinol (ZYLOPRIM) 300 MG tablet TAKE 1 TABLET DAILY 90 tablet 3    simvastatin (ZOCOR) 20 MG tablet TAKE 1 TABLET NIGHTLY 90 tablet 3    SYNTHROID 137 MCG tablet TAKE 1 TABLET DAILY 90 tablet 3    clindamycin (CLEOCIN T) 1 % lotion Apply 10 mg/mL topically 2 times daily Apply topically 2 times daily.  fluticasone (FLONASE) 50 MCG/ACT nasal spray 1 spray by Each Nare route daily 1 Bottle 3    ketoconazole (NIZORAL) 2 % cream Apply topically daily. 60 g 2    Insulin Pen Needle 29G X 12.7MM MISC 1 each by Does not apply route daily 300 each 3    omeprazole (PRILOSEC OTC) 20 MG tablet Take 1 tablet by mouth daily 90 tablet 3    calcium carbonate (TUMS) 500 MG chewable tablet Take 2 tablets by mouth as needed.  aspirin 81 MG EC tablet Take 81 mg by mouth daily.  metFORMIN (GLUCOPHAGE) 500 MG tablet Take 500 mg by mouth 2 times daily (with meals) (Patient not taking: Reported on 1/12/2022)       No current facility-administered medications for this visit. Allergies: Scopolamine and Penicillins  Past Medical History:   Diagnosis Date    Abnormal nuclear stress test 4/08;7/07 4/08-EF=67%,Mild->Mod.isch. LAD;7/07-EF=70%,Suggests poss. Mild Ant.wall ischemia.     Cardiac arrest ([facility] [address] 2008    Diabetes mellitus ([facility] [address]     H/O cardiac catheterization 4/08    No signif.CAD.    H/O cardiovascular stress test 04/11/08    mild to mod ischemia in the LAD region, abnormal study, EF 67%, patient developed mild chest pain and throat tightness    H/O cardiovascular stress test 5/3/2016    lexiscan-moderate ischemia apical,RF66%    H/O Doppler ultrasound 07/16/2007    CAROTID DOPLER- intimal thickening but no significant atherosclerotic plaque noted in the right or left internal carotid artery, doppler flow velocities within the right and left internal carotid artery are elevated, consistent with a mild, less than 50% stenosis    H/O echocardiogram 10/14/08    EF>55%, normal LV systolic function, normal left ventricular wall thickness, impaired LV relaxation    H/O echocardiogram 6/18/13, 10/08;07/07 6/13- EF >55% Impaired LV relaxation, Mild concentric LV hypertrophy, No sig AS, THEO underestimated. 10/08-EF=>55%,NL study;7/07-EF=>55%,Mild valv. AS.    H/O echocardiogram 5/16/2016 12/3/14    5/16 EF 55-60% normal study 12/14EF 60%. MIld mitral and tricuspid insufficiencies. Mildly sclerotic aortic valve which appears to be mildly stenosed with aortic valve area of 1.4 however this does not appear to be hemodynamically signficant aortic stenosis. Mildy hypertropic left ventricle with normal global and regional left ventricular systolic function.  H/O left heart catheterization by cutdown 05/23/2016    no significant disease in all vessels.  EF 55%    Hernia     after lap band removal    Hx of cardiovascular stress test 06/18/2013 6/13-EF 51%, no scitnigraphic evidence of inducible myocardial ischemia    Hyperlipidemia     Hypertension     Irritable bowel syndrome     Kidney stones     Obesity     Osteoarthritis     Thyroid disease     Type II or unspecified type diabetes mellitus without mention of complication, not stated as uncontrolled      Past Surgical History:   Procedure Laterality Date    CARDIAC CATHETERIZATION  04/18/08    continue risk factor modification with strict control of her risk factors with diet and control of diabetes, high blood pressure and hyperlipidemia    GASTRIC BAND  08/2008    revision of gastric band placement    HYSTERECTOMY      LAP BAND      put in in May Removed in August    LITHOTRIPSY      ROTATOR CUFF REPAIR      right    ROTATOR CUFF REPAIR        As reviewed   Family History   Problem Relation Age of Onset    Cancer Sister     COPD Brother     Heart Disease Brother     Cancer Mother         melonoma    Migraines Mother     Cancer Father         carcinoma    High Blood Pressure Father     Heart Disease Father      Social History     Tobacco Use    Smoking status: Former Smoker     Packs/day: 0.50     Years: 3.00     Pack years: 1.50     Types: Cigarettes     Quit date: 1994     Years since quittin.4    Smokeless tobacco: Never Used   Substance Use Topics    Alcohol use: No     Alcohol/week: 0.0 standard drinks      Review of Systems:    Constitutional: Negative for diaphoresis and fatigue  Psychological:Negative for anxiety or depression  HENT: Negative for headaches, nasal congestion, sinus pain or vertigo  Eyes: Negative for visual disturbance. Endocrine: Negative for polydipsia/polyuria  Respiratory: Negative for shortness of breath  Cardiovascular: Negative for chest pain, dyspnea on exertion, claudication, edema, irregular heartbeat, murmur, palpitations or shortness of breath  Gastrointestinal: Negative for abdominal pain or heartburn  Genito-Urinary: Negative for urinary frequency/urgency  Musculoskeletal: Negative for muscle pain, muscular weakness, negative for pain in arm and leg or swelling in foot and leg  Neurological: Negative for dizziness, headaches, memory loss, numbness/tingling, visual changes, syncope  Dermatological: Negative for rash    Objective:    Vitals:    22 1058   BP: 130/72   Pulse: 64   Weight: 218 lb 12.8 oz (99.2 kg)   Height: 5' 6\" (1.676 m)     /72   Pulse 64   Ht 5' 6\" (1.676 m)   Wt 218 lb 12.8 oz (99.2 kg)   BMI 35.32 kg/m²     No flowsheet data found. Wt Readings from Last 3 Encounters:   22 218 lb 12.8 oz (99.2 kg)   21 215 lb 6.4 oz (97.7 kg)   21 224 lb 12.8 oz (102 kg)     Body mass index is 35.32 kg/m². GENERAL - Alert, oriented, pleasant, in no apparent distress.   EYES: No jaundice, no conjunctival pallor. SKIN: It is warm & dry. No rashes. No Echhymosis    HEENT  No clinically significant abnormalities seen. Neck - Supple. No jugular venous distention noted. No carotid bruits. Cardiovascular  Normal S1 and S2 without obvious murmur or gallop. Extremities - No cyanosis, clubbing, or significant edema. Pulmonary  No respiratory distress. No wheezes or rales. Abdomen  No masses, tenderness, or organomegaly. Musculoskeletal  No significant edema. No joint deformities. No muscle wasting. Neurologic  Cranial nerves II through XII are grossly intact. There were no gross focal neurologic abnormalities.     Lab Review   No results found for: CKTOTAL, CKMB, CKMBINDEX, TROPONINT  BNP:  No results found for: BNP  PT/INR:    Lab Results   Component Value Date    INR 0.93 05/21/2016     Lab Results   Component Value Date    LABA1C 7.2 12/29/2021    LABA1C 7.6 06/29/2021     Lab Results   Component Value Date    WBC 10.5 12/29/2021    HCT 37.7 12/29/2021    MCV 93.5 12/29/2021     12/29/2021     Lab Results   Component Value Date    CHOL 233 (H) 06/29/2021    TRIG 160 (H) 06/29/2021    HDL 56 06/29/2021    LDLCALC 145 (H) 06/29/2021    LDLDIRECT 132 (H) 08/19/2019     Lab Results   Component Value Date    ALT 13 12/29/2021    AST 18 12/29/2021     BMP:    Lab Results   Component Value Date     12/29/2021    K 4.2 12/29/2021     12/29/2021    CO2 24 12/29/2021    BUN 27 12/29/2021    CREATININE 1.9 12/29/2021     CMP:   Lab Results   Component Value Date     12/29/2021    K 4.2 12/29/2021     12/29/2021    CO2 24 12/29/2021    BUN 27 12/29/2021    PROT 7.9 12/29/2021    PROT 8.4 08/09/2012     TSH:    Lab Results   Component Value Date    TSH 2.85 12/29/2021    TSHHS 0.222 08/19/2019           Assessment & Plan:    - CTA  Will check for coronaries    - CKD per PCP    -  Hypertension: Patients blood pressure is normal. Patient is advised about low sodium diet. Present medical regimen will not be changed. on lisinopril          -  LIPID MANAGEMENT:  Importance of lipid levels discussed with patient   and patient was given dietary advice. NCEP- ATP III guidelines reviewed with patient. -   Changes  in medicines made: No                 On zocor               -   DIABETES MELLITUS: Available pertinent lab data reviewed   and  patient was given dietary advice . Advised to check blood glucose level on a regular basis. -   Changes  in medicines made: No        On onsulin      - VHD: Mild AS  Conclusions      Summary   Left ventricular function and size is normal, EF is estimated at 55-60%. Mild left ventricular hypertrophy. Normal diastolic filling pattern for age. No regional wall motion abnormalities were detected. Moderately dilated left atrium. Mildly sclerotic aortic valve with mild aortic stenosis mean gradient of   15mmHg, THEO 1,64cm2. Moderate mitral and mild tricuspid regurgitation is present. Mild Pulmonary hypertension noted with RVSP of 40mmHg. No evidence of pericardial effusion.         Shanique Villela MD    [facility] [place]

## 2022-01-12 NOTE — PROCEDURES
CARDIAC FINDINGS:  Cardiac CT scanning was performed, CT angiography during contrast administration using a 64. Scanner using a slice thickness of 0.5 mm and Gantry rotation time of 600 ms. Scanning was performed using 110 K  using auto adjustment of mA  Adjustment achieved low dose, retrospective gating was used  Contrast Type: isovue 370   Contrast used: 1cc/lb Max 100 c  Scan length: Excellent    Left Main Coronary artery:  Left main originates normally from the left coronary sinus and gives rise to the left anterior descending artery and left circumflex artery. Left anterior descending artery:  Left anterior descending artery is a normal caliber vessel that is heavily calcified in the proximal and mid segment with eccentric plaque causing about 40 to 50% stenosis mid and distal LAD has a soft plaque about 30 to 40% stenosis distal segment of the LAD is not seen    Left circumflex artery:  Left circumflex artery is a moderate size vessel that gives off a marginal branch. The left circumflex artery is diffusely calcified in the proximal and mid segment at least 40 reports 50% stenosis    Right coronary artery:  Right coronary artery originates from the right coronary sinus. It seems to be the dominant vessel and gives rise to the posterior descending artery and posterior ventricular artery. The RCA is moderate size vessel. Natali Karst Posterior descending artery and posterior lateral branches are free of any significant disease. Pericardium:  Pericardium is normal in appearance without any thickening, calcification or pericardial fluid. Cardiac structures: The estimated ejection fraction is: 64%.      Interpretation summary:  LAD and circumflex both have 40 to 50% of possibly more high-grade eccentric heavily calcified lesions in the proximal and mid segment distal LAD is not seen due to poor imaging and flow through the LAD high-grade stenosis cannot be ruled out in the LAD  RCA is widely patent  Estimated ejection fraction 64%  Myocardium does not show any evidence of infarct or thickening. NON-CARDIAC FINDINGS - []      Please note that only cardiac images and windows were evaluated by cardiology. Images reviewed for interpretation for cardiac structures and coronary artery disease only.   Any non cardiac assessment  require separate radiology report

## 2022-03-14 ENCOUNTER — TELEPHONE (OUTPATIENT)
Dept: FAMILY MEDICINE CLINIC | Age: 82
End: 2022-03-14

## 2022-03-14 ENCOUNTER — CARE COORDINATION (OUTPATIENT)
Dept: CARE COORDINATION | Age: 82
End: 2022-03-14

## 2022-03-14 SDOH — SOCIAL STABILITY: SOCIAL NETWORK: IN A TYPICAL WEEK, HOW MANY TIMES DO YOU TALK ON THE PHONE WITH FAMILY, FRIENDS, OR NEIGHBORS?: THREE TIMES A WEEK

## 2022-03-14 SDOH — HEALTH STABILITY: MENTAL HEALTH: HOW OFTEN DO YOU HAVE A DRINK CONTAINING ALCOHOL?: NEVER

## 2022-03-14 SDOH — ECONOMIC STABILITY: TRANSPORTATION INSECURITY
IN THE PAST 12 MONTHS, HAS THE LACK OF TRANSPORTATION KEPT YOU FROM MEDICAL APPOINTMENTS OR FROM GETTING MEDICATIONS?: NO

## 2022-03-14 SDOH — SOCIAL STABILITY: SOCIAL NETWORK
DO YOU BELONG TO ANY CLUBS OR ORGANIZATIONS SUCH AS CHURCH GROUPS UNIONS, FRATERNAL OR ATHLETIC GROUPS, OR SCHOOL GROUPS?: YES

## 2022-03-14 SDOH — HEALTH STABILITY: MENTAL HEALTH
STRESS IS WHEN SOMEONE FEELS TENSE, NERVOUS, ANXIOUS, OR CAN'T SLEEP AT NIGHT BECAUSE THEIR MIND IS TROUBLED. HOW STRESSED ARE YOU?: ONLY A LITTLE

## 2022-03-14 SDOH — ECONOMIC STABILITY: TRANSPORTATION INSECURITY
IN THE PAST 12 MONTHS, HAS LACK OF TRANSPORTATION KEPT YOU FROM MEETINGS, WORK, OR FROM GETTING THINGS NEEDED FOR DAILY LIVING?: NO

## 2022-03-14 SDOH — SOCIAL STABILITY: SOCIAL NETWORK: ARE YOU MARRIED, WIDOWED, DIVORCED, SEPARATED, NEVER MARRIED, OR LIVING WITH A PARTNER?: WIDOWED

## 2022-03-14 SDOH — SOCIAL STABILITY: SOCIAL NETWORK: HOW OFTEN DO YOU GET TOGETHER WITH FRIENDS OR RELATIVES?: ONCE A WEEK

## 2022-03-14 SDOH — ECONOMIC STABILITY: FOOD INSECURITY: WITHIN THE PAST 12 MONTHS, THE FOOD YOU BOUGHT JUST DIDN'T LAST AND YOU DIDN'T HAVE MONEY TO GET MORE.: NEVER TRUE

## 2022-03-14 SDOH — ECONOMIC STABILITY: HOUSING INSECURITY: IN THE LAST 12 MONTHS, HOW MANY PLACES HAVE YOU LIVED?: 1

## 2022-03-14 SDOH — HEALTH STABILITY: PHYSICAL HEALTH: ON AVERAGE, HOW MANY MINUTES DO YOU ENGAGE IN EXERCISE AT THIS LEVEL?: 10 MIN

## 2022-03-14 SDOH — ECONOMIC STABILITY: INCOME INSECURITY: IN THE LAST 12 MONTHS, WAS THERE A TIME WHEN YOU WERE NOT ABLE TO PAY THE MORTGAGE OR RENT ON TIME?: NO

## 2022-03-14 SDOH — HEALTH STABILITY: PHYSICAL HEALTH: ON AVERAGE, HOW MANY DAYS PER WEEK DO YOU ENGAGE IN MODERATE TO STRENUOUS EXERCISE (LIKE A BRISK WALK)?: 2 DAYS

## 2022-03-14 SDOH — ECONOMIC STABILITY: FOOD INSECURITY: WITHIN THE PAST 12 MONTHS, YOU WORRIED THAT YOUR FOOD WOULD RUN OUT BEFORE YOU GOT MONEY TO BUY MORE.: NEVER TRUE

## 2022-03-14 SDOH — ECONOMIC STABILITY: HOUSING INSECURITY
IN THE LAST 12 MONTHS, WAS THERE A TIME WHEN YOU DID NOT HAVE A STEADY PLACE TO SLEEP OR SLEPT IN A SHELTER (INCLUDING NOW)?: NO

## 2022-03-14 SDOH — ECONOMIC STABILITY: INCOME INSECURITY: HOW HARD IS IT FOR YOU TO PAY FOR THE VERY BASICS LIKE FOOD, HOUSING, MEDICAL CARE, AND HEATING?: NOT VERY HARD

## 2022-03-14 NOTE — TELEPHONE ENCOUNTER
Nurse from Care coordination called to inform provider that patient is having issues with swelling in the feet and ankles, to the point of being painful. Patient reported to only taking her lasix once daily, script is written for twice daily, Please advise.

## 2022-03-14 NOTE — CARE COORDINATION
Contacted Kavin Damico and left voicemail regarding Dietitian referral. Left call back number and will follow up as appropriate.        [facility], [address] [place] [address]

## 2022-03-14 NOTE — CARE COORDINATION
Ambulatory Care Coordination Note  3/14/2022  CM Risk Score: 3  Charlson 10 Year Mortality Risk Score: 100%     ACC: Hanane Jiang RN    Summary Note:   Spoke with patient for ACM follow up; [facility] eligible for enrollment. Oriented patient to the role of ACM. Patient is agreeable to ACM enrollment. Medications:  Medication reconciliation completed. Patient is managing her won medications. Patient is noted to be prescribed Lasix 40 mg BID and has been taking 40 mg once daily and taking a second dose as needed. Instructed on the importance of compliance with medications as directed. Discussed pre-packaged medications. Patient is to consider. Pharmacy referral to be made to support better compliance. COPD:  Patient notes increased swelling to feet and ankles. Denies increased SOB, CP, wheezing or cough. Reports that legs feet tender and tight. Encouraged elevation; low sodium diet. ACM to update Provider in r/t status/ patient report that she is not taking diuretic as directed. Encouraged compliance with medication as directed; routine Provider follow up. Reviewed COPD zone management tool and s.s to report to MD. Copy sent via MY Chart. Patient is agreeable to [facility] PCP follow up. DM;  Most recent BS was 140. Last A1C was 7.6. Patient reports that she generally only monitors her BS twice weekly. Reports that she does check more often when she \"does not feel right\". Patient reports that she is non-compliant with diet at times and has occasional episodes of hypoglycemia thru the evening. Instructed on the importance of compliance with diet, exercise, routine BS monitoring. Instructed on the rile of 15. Reviewed Diabetes zone management tool and s/s to report to MD.  Copy sent via My Chart for reference. RD referral to be made to support improved compliance with diabetic diet. Patient denies any questions or further needs at this time.   ACM contact information provided should needs arise. Plan for next outreach:  Zone management review, assess support needs, Address care gaps. Spoke with PCP office. Message left for MA to update in r/t patient status/ need to clarify medication dosage for patient. Follow up appointment scheduled 3/15/22. Follow up contact with patient. Confirmed appointment date and time as above. Ambulatory Care Coordination Assessment    Care Coordination Protocol  Program Enrollment: Complex Care  Referral from Primary Care Provider: No  Week 1 - Initial Assessment     Do you have all of your prescriptions and are they filled?: Yes  Barriers to medication adherence: None  Are you able to afford your medications?: Yes  How often do you have trouble taking your medications the way you have been told to take them?: I always take them as prescribed. Do you have Home O2 Therapy?: No      Ability to seek help/take action for Emergent Urgent situations i.e. fire, crime, inclement weather or health crisis. : Independent  Ability to ambulate to restroom: Independent  Ability handle personal hygeine needs (bathing/dressing/grooming): Independent  Ability to manage Medications: Independent  Ability to prepare Food Preparation: Independent  Ability to maintain home (clean home, laundry): Needs Assistance  Ability to drive and/or has transportation: Independent  Ability to do shopping: Needs Assistance  Ability to manage finances:  Independent  Is patient able to live independently?: Yes     Current Housing: Private Residence        Per the Fall Risk Screening, did the patient have 2 or more falls or 1 fall with injury in the past year?: No     Frequent urination at night?: Yes  Do you use rails/bars?: No  Do you have a non-slip tub mat?: Yes     Are you experiencing loss of meaning?: No  Are you experiencing loss of hope and peace?: No     Thinking about your patient's physical health needs, are there any symptoms or problems (risk indicators) you are unsure about that require further investigation?: No identified areas of uncertainly or problems already being investigated   Are the patients physical health problems impacting on their mental well-being?: No identified areas of concern   Are there any problems with your patients lifestyle behaviors (alcohol, drugs, diet, exercise) that are impacting on physical or mental well-being?: No identified areas of concern   Do you have any other concerns about your patients mental well-being? How would you rate their severity and impact on the patient?: No identified areas of concern   How would you rate their home environment in terms of safety and stability (including domestic violence, insecure housing, neighbor harassment)?: Consistently safe, supportive, stable, no identified problems   How do daily activities impact on the patient's well-being? (include current or anticipated unemployment, work, caregiving, access to transportation or other): No identified problems or perceived positive benefits   How would you rate their social network (family, work, friends)?: Good participation with social networks   How would you rate their financial resources (including ability to afford all required medical care)?: Financially secure, resources adequate, no identified problems   How wells does the patient now understand their health and well-being (symptoms, signs or risk factors) and what they need to do to manage their health?: Reasonable to good understanding and already engages in managing health or is willing to undertake better management   How well do you think your patient can engage in healthcare discussions?  (Barriers include language, deafness, aphasia, alcohol or drug problems, learning difficulties, concentration): Clear and open communication, no identified barriers   Do other services need to be involved to help this patient?: Other care/services not required at this time   Are current services involved with this patient well-coordinated? (Include coordination with other services you are now recommendation): All required care/services in place and well-coordinated   Suggested Interventions and Community Resources  Fall Risk Prevention: In Process Pharmacist: In Process   Registered Dietician: In Process   Zone Management Tools: In Process         Set up/Review an Education Plan, Set up/Review Goals, Schedule an appointment with the patient's PCP              Prior to Admission medications    Medication Sig Start Date End Date Taking? Authorizing Provider   lisinopril (PRINIVIL;ZESTRIL) 10 MG tablet Take 10 mg by mouth daily   Yes Historical Provider, MD   metoprolol succinate (TOPROL XL) 100 MG extended release tablet TAKE 1 TABLET DAILY 1/10/22  Yes Omid Gastelum MD   lisinopril (PRINIVIL;ZESTRIL) 40 MG tablet TAKE 1 TABLET DAILY 1/10/22  Yes Omid Gastelum MD   amLODIPine (NORVASC) 10 MG tablet TAKE 1 TABLET DAILY 1/10/22  Yes Omid Gastelum MD   metFORMIN (GLUCOPHAGE) 500 MG tablet Take 500 mg by mouth 2 times daily (with meals)    Yes Historical Provider, MD   furosemide (LASIX) 40 MG tablet TAKE 1 tab bid  Patient taking differently: TAKE 1 tab bid:  Patient reports that she has only been taking 40 mg daily.  10/15/21  Yes Omid Gastelum MD   budesonide-formoterol (SYMBICORT) 80-4.5 MCG/ACT AERO Inhale 2 puffs into the lungs 2 times daily 10/13/21  Yes Omid Gastelum MD   LEVEMIR FLEXTOUCH 100 UNIT/ML injection pen INJECT 28 UNITS SUBCUTANEOUSLY NIGHTLY, DOSE TO BE ADJUSTED ACCORDING TO MORNING FASTING BLOOD SUGAR READINGS 9/14/21  Yes Omid Gastelum MD   allopurinol (ZYLOPRIM) 300 MG tablet TAKE 1 TABLET DAILY 7/29/21  Yes Omid Gastelum MD   simvastatin (ZOCOR) 20 MG tablet TAKE 1 TABLET NIGHTLY 7/26/21  Yes Omid Gastelum MD   SYNTHROID 137 MCG tablet TAKE 1 TABLET DAILY 7/26/21  Yes Omid Gastelum MD   fluticasone (FLONASE) 50 MCG/ACT nasal spray 1 spray by Each Nare route daily 11/8/18  Yes Kinjal Pandey MD   ketoconazole (NIZORAL) 2 % cream Apply topically daily. 11/8/18  Yes Kinjal Pandey MD   Insulin Pen Needle 29G X 12.7MM MISC 1 each by Does not apply route daily 9/21/17  Yes Kinjal Pandey MD   omeprazole (PRILOSEC OTC) 20 MG tablet Take 1 tablet by mouth daily 9/21/17  Yes Kinjal Pandey MD   calcium carbonate (TUMS) 500 MG chewable tablet Take 2 tablets by mouth as needed. Yes Historical Provider, MD   aspirin 81 MG EC tablet Take 81 mg by mouth daily. Yes Historical Provider, MD   clindamycin (CLEOCIN T) 1 % lotion Apply 10 mg/mL topically 2 times daily Apply topically 2 times daily. Patient not taking: Reported on 3/14/2022    Historical Provider, MD       Future Appointments   Date Time Provider Teena Opal   [facility]2022 11:00 [facility]   7/5/2022 10:30 AM MD Yoseph Barlow [facility]   7/13/2022 11:30 AM Janna Morejon MD [facility]     ,   Diabetes Assessment    Medic Alert ID: No  Meal Planning: Calorie counting, Carb counting   How often do you test your blood sugar?: Other (Comment: twice weekly and as needed)   Do you have barriers with adherence to non-pharmacologic self-management interventions?  (Nutrition/Exercise/Self-Monitoring): No   Have you ever had to go to the ED for symptoms of low blood sugar?: No       Do you have hyperglycemia symptoms?: No   Do you have hypoglycemia symptoms?: Yes   Frequency of Episodes: 1 Per: Month   Last Blood Sugar Value: 140   Blood Sugar Monitoring Regimen: Once a Day   Blood Sugar Trends: Steady Decrease      ,   COPD Assessment    Does the patient understand envrionmental exposure?: Yes  Is the patient able to verbalize Rescue vs. Long Acting medications?: No  Does the patient have a nebulizer?: No  Does the patient use a space with inhaled medications?: No            Symptoms:     Symptom course: no change  Change in chronic cough?: No/At Baseline  Change in sputum?: No/At Baseline      and   General Assessment    Do you have any symptoms that are causing concern?: Yes  Progression since Onset: Gradually Worsening  Reported Symptoms: Other (Comment: leg swelling)

## 2022-03-15 ENCOUNTER — OFFICE VISIT (OUTPATIENT)
Dept: FAMILY MEDICINE CLINIC | Age: 82
End: 2022-03-15
Payer: MEDICARE

## 2022-03-15 VITALS
BODY MASS INDEX: 36.57 KG/M2 | SYSTOLIC BLOOD PRESSURE: 144 MMHG | HEART RATE: 66 BPM | OXYGEN SATURATION: 97 % | WEIGHT: 226.6 LBS | DIASTOLIC BLOOD PRESSURE: 80 MMHG

## 2022-03-15 DIAGNOSIS — N18.9 CHRONIC KIDNEY DISEASE, UNSPECIFIED CKD STAGE: ICD-10-CM

## 2022-03-15 DIAGNOSIS — Z79.4 TYPE 2 DIABETES MELLITUS WITH COMPLICATION, WITH LONG-TERM CURRENT USE OF INSULIN (HCC): ICD-10-CM

## 2022-03-15 DIAGNOSIS — J44.9 CHRONIC OBSTRUCTIVE PULMONARY DISEASE, UNSPECIFIED COPD TYPE (HCC): ICD-10-CM

## 2022-03-15 DIAGNOSIS — I27.20 PULMONARY HYPERTENSION (HCC): ICD-10-CM

## 2022-03-15 DIAGNOSIS — E11.8 TYPE 2 DIABETES MELLITUS WITH COMPLICATION, WITH LONG-TERM CURRENT USE OF INSULIN (HCC): ICD-10-CM

## 2022-03-15 DIAGNOSIS — G89.29 CHRONIC PAIN OF LEFT KNEE: ICD-10-CM

## 2022-03-15 DIAGNOSIS — M25.562 CHRONIC PAIN OF LEFT KNEE: ICD-10-CM

## 2022-03-15 DIAGNOSIS — R60.0 LOCALIZED EDEMA: Primary | ICD-10-CM

## 2022-03-15 DIAGNOSIS — M79.89 LEG SWELLING: ICD-10-CM

## 2022-03-15 PROCEDURE — G8427 DOCREV CUR MEDS BY ELIG CLIN: HCPCS | Performed by: FAMILY MEDICINE

## 2022-03-15 PROCEDURE — 99214 OFFICE O/P EST MOD 30 MIN: CPT | Performed by: FAMILY MEDICINE

## 2022-03-15 PROCEDURE — 1123F ACP DISCUSS/DSCN MKR DOCD: CPT | Performed by: FAMILY MEDICINE

## 2022-03-15 PROCEDURE — 1036F TOBACCO NON-USER: CPT | Performed by: FAMILY MEDICINE

## 2022-03-15 PROCEDURE — 4040F PNEUMOC VAC/ADMIN/RCVD: CPT | Performed by: FAMILY MEDICINE

## 2022-03-15 PROCEDURE — G8399 PT W/DXA RESULTS DOCUMENT: HCPCS | Performed by: FAMILY MEDICINE

## 2022-03-15 PROCEDURE — G8417 CALC BMI ABV UP PARAM F/U: HCPCS | Performed by: FAMILY MEDICINE

## 2022-03-15 PROCEDURE — G8484 FLU IMMUNIZE NO ADMIN: HCPCS | Performed by: FAMILY MEDICINE

## 2022-03-15 PROCEDURE — 1090F PRES/ABSN URINE INCON ASSESS: CPT | Performed by: FAMILY MEDICINE

## 2022-03-15 PROCEDURE — 3023F SPIROM DOC REV: CPT | Performed by: FAMILY MEDICINE

## 2022-03-15 RX ORDER — LISINOPRIL 10 MG/1
10 TABLET ORAL DAILY
Qty: 90 TABLET | Refills: 3 | Status: SHIPPED | OUTPATIENT
Start: 2022-03-15 | End: 2022-04-20

## 2022-03-15 RX ORDER — FUROSEMIDE 40 MG/1
TABLET ORAL
Qty: 180 TABLET | Refills: 3 | Status: SHIPPED | OUTPATIENT
Start: 2022-03-15 | End: 2022-04-20 | Stop reason: ALTCHOICE

## 2022-03-15 RX ORDER — HYDROCODONE BITARTRATE AND ACETAMINOPHEN 5; 325 MG/1; MG/1
1 TABLET ORAL EVERY 6 HOURS PRN
Qty: 28 TABLET | Refills: 0 | Status: SHIPPED | OUTPATIENT
Start: 2022-03-15 | End: 2022-03-22

## 2022-03-15 RX ORDER — EMPAGLIFLOZIN 10 MG/1
1 TABLET, FILM COATED ORAL DAILY
Qty: 90 TABLET | Refills: 3 | Status: SHIPPED | OUTPATIENT
Start: 2022-03-15

## 2022-03-15 ASSESSMENT — ENCOUNTER SYMPTOMS
BACK PAIN: 0
ABDOMINAL PAIN: 0
SHORTNESS OF BREATH: 0
COUGH: 0

## 2022-03-15 NOTE — PROGRESS NOTES
1.4 however this does not appear to be hemodynamically signficant aortic stenosis. Mildy hypertropic left ventricle with normal global and regional left ventricular systolic function.  H/O left heart catheterization by cutdown 2016    no significant disease in all vessels. EF 55%    Hernia     after lap band removal    Hx of cardiovascular stress test 2013-EF 51%, no scitnigraphic evidence of inducible myocardial ischemia    Hyperlipidemia     Hypertension     Irritable bowel syndrome     Kidney stones     Obesity     Osteoarthritis     Thyroid disease     Type II or unspecified type diabetes mellitus without mention of complication, not stated as uncontrolled      Past Surgical History:   Procedure Laterality Date    CARDIAC CATHETERIZATION  08    continue risk factor modification with strict control of her risk factors with diet and control of diabetes, high blood pressure and hyperlipidemia    GASTRIC BAND  2008    revision of gastric band placement    HYSTERECTOMY      LAP BAND      put in in May Removed in August    LITHOTRIPSY     [address]      right    ROTATOR CUFF REPAIR       Family History   Problem Relation Age of Onset    Cancer Sister     COPD Brother     Heart Disease Brother     Cancer Mother         melonoma    Migraines Mother     Cancer Father         carcinoma    High Blood Pressure Father     Heart Disease Father      Social History     Socioeconomic History    Marital status:      Spouse name: Not on file    Number of children: 3    Years of education: high school    Highest education level: 12th grade   Occupational History    Not on file   Tobacco Use    Smoking status: Former Smoker     Packs/day: 0.50     Years: 3.00     Pack years: 1.50     Types: Cigarettes     Quit date: 1994     Years since quittin.6    Smokeless tobacco: Never Used   Substance and Sexual Activity    Alcohol use:  No Alcohol/week: 0.0 standard drinks    Drug use: No    Sexual activity: Not Currently     Partners: Male     Comment:    Other Topics Concern    Not on file   Social History Narrative    Lives with son and daughter in law; is able to drive      Social Determinants of Health     Financial Resource Strain: Low Risk     Difficulty of Paying Living Expenses: Not very hard   Food Insecurity: No Food Insecurity    Worried About Running Out of Food in the Last Year: Never true    Iram of Food in the Last Year: Never true   Transportation Needs: No Transportation Needs    Lack of Transportation (Medical): No    Lack of Transportation (Non-Medical): No   Physical Activity: Insufficiently Active    Days of Exercise per Week: 2 days    Minutes of Exercise per Session: 10 min   Stress: No Stress Concern Present    Feeling of Stress : Only a little   Social Connections: Unknown    Frequency of Communication with Friends and Family: Three times a week    Frequency of Social Gatherings with Friends and Family: Once a week    Attends [employer]ology Services: Not on file   [employer] of [employer]s or Organizations: Yes    Attends Club or Organization Meetings: Not on file    Marital Status:     Intimate Partner Violence:     Fear of Current or Ex-Partner: Not on file    Emotionally Abused: Not on file    Physically Abused: Not on file    Sexually Abused: Not on file   Housing Stability: [address] Unable to Pay for Housing in the Last Year: No    Number of Places Lived in the Last Year: 1    Unstable Housing in the Last Year: No       Allergies   Allergen Reactions    Scopolamine      Combative and delusional    Penicillins Hives     Current Outpatient Medications   Medication Sig Dispense Refill    lisinopril (PRINIVIL;ZESTRIL) 10 MG tablet Take 1 tablet by mouth daily 90 tablet 3    furosemide (LASIX) 40 MG tablet TAKE 1 tab bid 180 tablet 3    empagliflozin (JARDIANCE) 10 MG tablet Take 1 tablet by mouth daily 90 tablet 3    HYDROcodone-acetaminophen (NORCO) 5-325 MG per tablet Take 1 tablet by mouth every 6 hours as needed for Pain for up to 7 days. Intended supply: 7 days. Take lowest dose possible to manage pain 28 tablet 0    metoprolol succinate (TOPROL XL) 100 MG extended release tablet TAKE 1 TABLET DAILY 90 tablet 3    lisinopril (PRINIVIL;ZESTRIL) 40 MG tablet TAKE 1 TABLET DAILY 90 tablet 3    amLODIPine (NORVASC) 10 MG tablet TAKE 1 TABLET DAILY 90 tablet 3    budesonide-formoterol (SYMBICORT) 80-4.5 MCG/ACT AERO Inhale 2 puffs into the lungs 2 times daily 10.2 g 5    LEVEMIR FLEXTOUCH 100 UNIT/ML injection pen INJECT 28 UNITS SUBCUTANEOUSLY NIGHTLY, DOSE TO BE ADJUSTED ACCORDING TO MORNING FASTING BLOOD SUGAR READINGS 30 mL 3    allopurinol (ZYLOPRIM) 300 MG tablet TAKE 1 TABLET DAILY 90 tablet 3    simvastatin (ZOCOR) 20 MG tablet TAKE 1 TABLET NIGHTLY 90 tablet 3    SYNTHROID 137 MCG tablet TAKE 1 TABLET DAILY 90 tablet 3    fluticasone (FLONASE) 50 MCG/ACT nasal spray 1 spray by Each Nare route daily 1 Bottle 3    ketoconazole (NIZORAL) 2 % cream Apply topically daily. 60 g 2    Insulin Pen Needle 29G X 12.7MM MISC 1 each by Does not apply route daily 300 each 3    omeprazole (PRILOSEC OTC) 20 MG tablet Take 1 tablet by mouth daily 90 tablet 3    calcium carbonate (TUMS) 500 MG chewable tablet Take 2 tablets by mouth as needed.  aspirin 81 MG EC tablet Take 81 mg by mouth daily.  clindamycin (CLEOCIN T) 1 % lotion Apply 10 mg/mL topically 2 times daily Apply topically 2 times daily. (Patient not taking: Reported on 3/14/2022)       No current facility-administered medications for this visit. Review of Systems   Constitutional: Positive for activity change. Negative for chills, fatigue and fever. HENT: Negative for congestion. Respiratory: Negative for cough and shortness of breath. Cardiovascular: Positive for leg swelling (getting worse). Negative for chest pain. Gastrointestinal: Negative for abdominal pain. Genitourinary: Positive for frequency. Negative for dysuria and hematuria. Musculoskeletal: Negative for back pain. Neurological: Negative for dizziness, numbness and headaches. Psychiatric/Behavioral: Negative for agitation and sleep disturbance. The patient is not nervous/anxious.         Lab Results   Component Value Date    WBC 10.5 12/29/2021    HGB 12.1 12/29/2021    HCT 37.7 12/29/2021    MCV 93.5 12/29/2021     12/29/2021     Lab Results   Component Value Date     (H) 12/29/2021    K 4.2 12/29/2021     12/29/2021    CO2 24 12/29/2021    BUN 27 (H) 12/29/2021    CREATININE 1.9 (H) 12/29/2021    GLUCOSE 197 (H) 06/07/2021    CALCIUM 9.9 12/29/2021    PROT 7.9 12/29/2021    LABALBU 4.3 12/29/2021    BILITOT <0.2 12/29/2021    ALKPHOS 86 12/29/2021    AST 18 12/29/2021    ALT 13 12/29/2021    LABGLOM 25 (A) 12/29/2021    GFRAA 31 (A) 12/29/2021    AGRATIO 1.2 12/29/2021    GLOB 3.2 06/29/2021     Lab Results   Component Value Date    CHOL 233 (H) 06/29/2021    CHOL 215 (H) 04/06/2018    CHOL 184 03/24/2017     Lab Results   Component Value Date    TRIG 160 (H) 06/29/2021    TRIG 119 04/06/2018    TRIG 204 (H) 03/24/2017     Lab Results   Component Value Date    HDL 56 06/29/2021    HDL 63 08/19/2019    HDL 71 (H) 04/06/2018     Lab Results   Component Value Date    LDLCALC 145 (H) 06/29/2021    LDLCALC 120 (H) 04/06/2018    LDLCALC 81 03/24/2017     Lab Results   Component Value Date    LABA1C 7.2 12/29/2021     Lab Results   Component Value Date    TSH 2.85 12/29/2021    TSHHS 0.222 (L) 08/19/2019         BP (!) 144/80 (Site: Left Upper Arm, Position: Sitting, Cuff Size: Large Adult)   Pulse 66   Wt 226 lb 9.6 oz (102.8 kg)   SpO2 97%   BMI 36.57 kg/m²     BP Readings from Last 3 Encounters:   03/15/22 (!) 144/80   01/12/22 130/72   12/29/21 130/75       Wt Readings from Last 3 Encounters:   03/15/22 226 lb 9.6 oz (102.8 kg)   01/12/22 218 lb 12.8 oz (99.2 kg)   12/29/21 215 lb 6.4 oz (97.7 kg)         Physical Exam  Constitutional:       General: She is not in acute distress. Appearance: Normal appearance. She is obese. She is not ill-appearing or diaphoretic. HENT:      Head: Normocephalic and atraumatic. Eyes:      General: No scleral icterus. Pupils: Pupils are equal, round, and reactive to light. Cardiovascular:      Rate and Rhythm: Normal rate and regular rhythm. Heart sounds: Normal heart sounds. No murmur heard. Pulmonary:      Effort: Pulmonary effort is normal.      Breath sounds: Normal breath sounds. No wheezing. Musculoskeletal:      Cervical back: Normal range of motion and neck supple. No rigidity. Right lower leg: Edema present. Left lower leg: Edema present. Comments: Getting worse     Neurological:      General: No focal deficit present. Mental Status: She is alert and oriented to person, place, and time. Cranial Nerves: No cranial nerve deficit. Psychiatric:         Mood and Affect: Mood normal.         Behavior: Behavior normal.         ASSESSMENT/ PLAN:    1. Localized edema  - increase the lasix to bid until the swelling getting better  - furosemide (LASIX) 40 MG tablet; TAKE 1 tab bid  Dispense: 180 tablet; Refill: 3  - [facility] ([facility]) - Jed Stinson MD, Nephrology, [place]    2. Leg swelling  - on lasix 40 mg bid  - [facility] ([facility]) - Jed Stinson MD, Nephrology, [place]    3. Chronic kidney disease, unspecified CKD stage  - [facility] ([facility]) - Jed Stinson MD, Nephrology, [place]    4. Chronic obstructive pulmonary disease, unspecified COPD type ([facility])  - stable    5. Type 2 diabetes mellitus with complication, with long-term current use of insulin ([facility])  - d/c the metformin because of the CKD and start: jardiance with the levemir  Last A1C 7.2 last 12/21  - empagliflozin (JARDIANCE) 10 MG tablet;  Take 1 tablet by mouth daily Dispense: 90 tablet; Refill: 3    6. Pulmonary hypertension (H[facility])  - stable  - lisinopril (PRINIVIL;ZESTRIL) 10 MG tablet; Take 1 tablet by mouth daily  Dispense: 90 tablet; Refill: 3    7. Chronic pain of left knee  - try the norcovas needed  - HYDROcodone-acetaminophen (NORCO) 5-325 MG per tablet; Take 1 tablet by mouth every 6 hours as needed for Pain for up to 7 days. Intended supply: 7 days. Take lowest dose possible to manage pain  Dispense: 28 tablet; Refill: 0              - All old blood work reviewed with the patient  - Appropriate prescription are addressed. - After visit summery provided. - Questions answered and patient verbalizes understanding.  - Call for any problem, questions, or concerns. Return in about 4 months (around 7/15/2022).

## 2022-03-21 ENCOUNTER — CARE COORDINATION (OUTPATIENT)
Dept: CARE COORDINATION | Age: 82
End: 2022-03-21

## 2022-03-21 NOTE — CARE COORDINATION
Contacted [address] regarding Dietitian referral. Pt answered, RD explained reason for call and role in care. Pt declined nutrition assessment/education at this time. Patient hung up on RD. RD will notify [facility]. RD will follow/assist with patient return call.       [facility], [address] [place] [address]

## 2022-03-23 ENCOUNTER — TELEPHONE (OUTPATIENT)
Dept: FAMILY MEDICINE CLINIC | Age: 82
End: 2022-03-23

## 2022-03-28 ENCOUNTER — CARE COORDINATION (OUTPATIENT)
Dept: CARE COORDINATION | Age: 82
End: 2022-03-28

## 2022-04-04 ENCOUNTER — CARE COORDINATION (OUTPATIENT)
Dept: CARE COORDINATION | Age: 82
End: 2022-04-04

## 2022-04-08 ENCOUNTER — TELEPHONE (OUTPATIENT)
Dept: FAMILY MEDICINE CLINIC | Age: 82
End: 2022-04-08

## 2022-04-08 NOTE — TELEPHONE ENCOUNTER
Corina [facility] Transitions Initial Follow Up Call    Outreach made within 2 business days of discharge: Yes    Patient: Jo Durant Patient : 1940   MRN:   Reason for Admission: There are no discharge diagnoses documented for the most recent discharge. Discharge Date: 16       Spoke with: care coordinator from [facility]     Discharge department/facility: [facility]    TCM Interactive Patient Contact:  Was patient able to fill all prescriptions: Yes  Was patient instructed to bring all medications to the follow-up visit: Yes  Is patient taking all medications as directed in the discharge summary?  Yes  Does patient understand their discharge instructions: Yes  Does patient have questions or concerns that need addressed prior to 7-14 day follow up office visit: yes - no      Scheduled appointment with PCP within 7-14 days    Follow Up  Future Appointments   Date Time Provider Teena Joseph   2022  1:00 PM MD Yoseph Mart   5/10/2022 11:15 AM Rasheeda Guaman MD [id number] [facility]   2022 10:30 AM MD Yoseph Mart   2022 11:30 AM Bandar Nguyen MD [facility]       Kadie Gray MA

## 2022-04-14 ENCOUNTER — CARE COORDINATION (OUTPATIENT)
Dept: CARE COORDINATION | Age: 82
End: 2022-04-14

## 2022-04-14 NOTE — CARE COORDINATION
Attempted to reach patient for ACM follow up. No answer to phone. Message left with ACM contact information and request for call back. UTR letter sent via My Chart.

## 2022-04-20 PROBLEM — N18.30 CHRONIC RENAL DISEASE, STAGE III (HCC): Status: ACTIVE | Noted: 2022-04-20

## 2022-04-20 PROBLEM — F33.0 MAJOR DEPRESSIVE DISORDER, RECURRENT, MILD (HCC): Status: ACTIVE | Noted: 2022-04-20

## 2022-04-20 PROBLEM — F33.9 MAJOR DEPRESSIVE DISORDER, RECURRENT, UNSPECIFIED (HCC): Status: ACTIVE | Noted: 2022-04-20

## 2022-04-20 PROBLEM — F33.1 MAJOR DEPRESSIVE DISORDER, RECURRENT, MODERATE (HCC): Status: ACTIVE | Noted: 2022-04-20

## 2022-04-20 PROBLEM — N20.0 RENAL STONE: Status: ACTIVE | Noted: 2022-04-20

## 2022-04-20 PROBLEM — N18.4 CHRONIC KIDNEY DISEASE, STAGE IV (SEVERE) (HCC): Status: ACTIVE | Noted: 2022-04-20

## 2022-05-13 ENCOUNTER — TELEPHONE (OUTPATIENT)
Dept: CARDIOLOGY CLINIC | Age: 82
End: 2022-05-13

## 2022-05-13 NOTE — TELEPHONE ENCOUNTER
Cardiologist: Dr. Daigle Shamika  Surgeon: Dr. Marizol Dominique  Surgery: Cysto right SWL right stent  Anesthesia: General  Date: 7/14/2022  FAX# [phone]  # [phone]    Last OV 1/12/2022  W/Mateo    -  Hypertension: Patients blood pressure is normal. Patient is advised about low sodium diet. Present medical regimen will not be changed. on lisinopril        -  LIPID MANAGEMENT:  Importance of lipid levels discussed with patient   and patient was given dietary advice. NCEP- ATP III guidelines reviewed with patient. -   Changes  in medicines made: No                On zocor            -   DIABETES MELLITUS: Available pertinent lab data reviewed   and  patient was given dietary advice . Advised to check blood glucose level on a regular basis. -   Changes  in medicines made: No        On onsulin      - VHD: Mild AS  Conclusions      NM- 11/8/2021  Small sized defect of mild severity which is reversible involving septal    wall of myocardium. Abnormal Lexiscan nuclear scintigraphic study suggestive    of abnormal myocardial perfusion. Mild degree of septal wall ischemia noted    involving a small sized area of left ventricular myocardium. Gated images    demonstrate normal left ventricular systolic function with EF of 60 %.           Echo- 11/3/2021   Left ventricular function and size is normal, EF is estimated at 55-60%. Mild left ventricular hypertrophy. Normal diastolic filling pattern for age. No regional wall motion abnormalities were detected. Moderately dilated left atrium. Mildly sclerotic aortic valve with mild aortic stenosis mean gradient of   15mmHg, THEO 1,64cm2. Moderate mitral and mild tricuspid regurgitation is present. Mild Pulmonary hypertension noted with RVSP of 40mmHg. No evidence of pericardial effusion.       CATH- 4/2008      Aspirin

## 2022-05-19 ENCOUNTER — TELEPHONE (OUTPATIENT)
Dept: CARDIOLOGY CLINIC | Age: 82
End: 2022-05-19

## 2022-05-27 ENCOUNTER — HOSPITAL ENCOUNTER (OUTPATIENT)
Age: 82
Discharge: HOME | End: 2022-05-27
Payer: MEDICARE

## 2022-05-27 DIAGNOSIS — N20.0 RENAL STONE: ICD-10-CM

## 2022-05-27 DIAGNOSIS — I27.20 PULMONARY HYPERTENSION (HCC): ICD-10-CM

## 2022-05-27 DIAGNOSIS — J44.9 CHRONIC OBSTRUCTIVE PULMONARY DISEASE, UNSPECIFIED COPD TYPE (HCC): ICD-10-CM

## 2022-05-27 DIAGNOSIS — N18.4 CHRONIC KIDNEY DISEASE, STAGE IV (SEVERE) (HCC): ICD-10-CM

## 2022-05-27 DIAGNOSIS — E66.9 CLASS 2 OBESITY WITHOUT SERIOUS COMORBIDITY WITH BODY MASS INDEX (BMI) OF 35.0 TO 35.9 IN ADULT, UNSPECIFIED OBESITY TYPE: ICD-10-CM

## 2022-05-27 DIAGNOSIS — E11.8 TYPE 2 DIABETES MELLITUS WITH COMPLICATION, WITH LONG-TERM CURRENT USE OF INSULIN (HCC): ICD-10-CM

## 2022-05-27 DIAGNOSIS — Z79.4 TYPE 2 DIABETES MELLITUS WITH COMPLICATION, WITH LONG-TERM CURRENT USE OF INSULIN (HCC): ICD-10-CM

## 2022-05-27 DIAGNOSIS — R53.83 OTHER FATIGUE: ICD-10-CM

## 2022-05-27 LAB
ALBUMIN SERPL-MCNC: 4.2 GM/DL (ref 3.4–5)
ALBUMIN, U: NORMAL %
ALPHA-1-GLOBULIN, U: NORMAL %
ALPHA-2-GLOBULIN, U: NORMAL %
ANION GAP SERPL CALCULATED.3IONS-SCNC: 13 MMOL/L (ref 4–16)
BACTERIA: NEGATIVE /HPF
BASOPHILS ABSOLUTE: 0 K/CU MM
BASOPHILS RELATIVE PERCENT: 0.3 % (ref 0–1)
BETA GLOBULIN, U: NORMAL %
BILIRUBIN URINE: NEGATIVE MG/DL
BLOOD, URINE: ABNORMAL
BUN BLDV-MCNC: 33 MG/DL (ref 6–23)
CALCIUM SERPL-MCNC: 9.8 MG/DL (ref 8.3–10.6)
CHLORIDE BLD-SCNC: 104 MMOL/L (ref 99–110)
CHLORIDE URINE RANDOM: 113 MMOL/L (ref 43–210)
CLARITY: CLEAR
CO2: 25 MMOL/L (ref 21–32)
COLOR: ABNORMAL
CREAT SERPL-MCNC: 1.8 MG/DL (ref 0.6–1.1)
CREATININE URINE: 25.3 MG/DL (ref 28–217)
DIFFERENTIAL TYPE: ABNORMAL
EOSINOPHILS ABSOLUTE: 0.4 K/CU MM
EOSINOPHILS RELATIVE PERCENT: 3.6 % (ref 0–3)
ESTIMATED AVERAGE GLUCOSE: 194 MG/DL
GAMMA GLOBULIN, U: NORMAL %
GFR AFRICAN AMERICAN: 33 ML/MIN/1.73M2
GFR NON-AFRICAN AMERICAN: 27 ML/MIN/1.73M2
GLUCOSE BLD-MCNC: 145 MG/DL (ref 70–99)
GLUCOSE, URINE: 500 MG/DL
HBA1C MFR BLD: 8.4 % (ref 4.2–6.3)
HCT VFR BLD CALC: 37.9 % (ref 37–47)
HEMOGLOBIN: 11.6 GM/DL (ref 12.5–16)
IMMATURE NEUTROPHIL %: 0.5 % (ref 0–0.43)
KETONES, URINE: NEGATIVE MG/DL
LEUKOCYTE ESTERASE, URINE: ABNORMAL
LYMPHOCYTES ABSOLUTE: 2.8 K/CU MM
LYMPHOCYTES RELATIVE PERCENT: 28.2 % (ref 24–44)
MCH RBC QN AUTO: 30.1 PG (ref 27–31)
MCHC RBC AUTO-ENTMCNC: 30.6 % (ref 32–36)
MCV RBC AUTO: 98.2 FL (ref 78–100)
MONOCYTES ABSOLUTE: 0.8 K/CU MM
MONOCYTES RELATIVE PERCENT: 8.4 % (ref 0–4)
MUCUS: ABNORMAL HPF
NITRITE URINE, QUANTITATIVE: NEGATIVE
NUCLEATED RBC %: 0 %
PDW BLD-RTO: 14.6 % (ref 11.7–14.9)
PH, URINE: 5.5 (ref 5–8)
PHOSPHORUS: 4.7 MG/DL (ref 2.5–4.9)
PLATELET # BLD: 320 K/CU MM (ref 140–440)
PMV BLD AUTO: 10.2 FL (ref 7.5–11.1)
POTASSIUM SERPL-SCNC: 5.2 MMOL/L (ref 3.5–5.1)
POTASSIUM, UR: 15.8 MMOL/L (ref 22–119)
PROT/CREAT RATIO, UR: 4
PROTEIN UA: 100 MG/DL
RBC # BLD: 3.86 M/CU MM (ref 4.2–5.4)
RBC URINE: 12 /HPF (ref 0–6)
SEGMENTED NEUTROPHILS ABSOLUTE COUNT: 5.8 K/CU MM
SEGMENTED NEUTROPHILS RELATIVE PERCENT: 59 % (ref 36–66)
SODIUM BLD-SCNC: 142 MMOL/L (ref 135–145)
SODIUM URINE: 113 MMOL/L (ref 35–167)
SPECIFIC GRAVITY UA: 1.01 (ref 1–1.03)
SPEP INTERPRETATION: NORMAL
SQUAMOUS EPITHELIAL: <1 /HPF
TOTAL IMMATURE NEUTOROPHIL: 0.05 K/CU MM
TOTAL NUCLEATED RBC: 0 K/CU MM
TRICHOMONAS: ABNORMAL /HPF
TSH HIGH SENSITIVITY: 0.75 UIU/ML (ref 0.27–4.2)
URINE TOTAL PROTEIN: 102.3 MG/DL
URINE TOTAL PROTEIN: 102.3 MG/DL
UROBILINOGEN, URINE: 0.2 MG/DL (ref 0.2–1)
WBC # BLD: 9.9 K/CU MM (ref 4–10.5)
WBC CLUMP: ABNORMAL /HPF
WBC UA: 12 /HPF (ref 0–5)

## 2022-05-27 PROCEDURE — 84133 ASSAY OF URINE POTASSIUM: CPT

## 2022-05-27 PROCEDURE — 84100 ASSAY OF PHOSPHORUS: CPT

## 2022-05-27 PROCEDURE — 84156 ASSAY OF PROTEIN URINE: CPT

## 2022-05-27 PROCEDURE — 84443 ASSAY THYROID STIM HORMONE: CPT

## 2022-05-27 PROCEDURE — 86335 IMMUNFIX E-PHORSIS/URINE/CSF: CPT

## 2022-05-27 PROCEDURE — 82040 ASSAY OF SERUM ALBUMIN: CPT

## 2022-05-27 PROCEDURE — 84155 ASSAY OF PROTEIN SERUM: CPT

## 2022-05-27 PROCEDURE — 85025 COMPLETE CBC W/AUTO DIFF WBC: CPT

## 2022-05-27 PROCEDURE — 84300 ASSAY OF URINE SODIUM: CPT

## 2022-05-27 PROCEDURE — 83520 IMMUNOASSAY QUANT NOS NONAB: CPT

## 2022-05-27 PROCEDURE — 36415 COLL VENOUS BLD VENIPUNCTURE: CPT

## 2022-05-27 PROCEDURE — 80048 BASIC METABOLIC PNL TOTAL CA: CPT

## 2022-05-27 PROCEDURE — 81001 URINALYSIS AUTO W/SCOPE: CPT

## 2022-05-27 PROCEDURE — 82436 ASSAY OF URINE CHLORIDE: CPT

## 2022-05-27 PROCEDURE — 84165 PROTEIN E-PHORESIS SERUM: CPT

## 2022-05-27 PROCEDURE — 84166 PROTEIN E-PHORESIS/URINE/CSF: CPT

## 2022-05-27 PROCEDURE — 86160 COMPLEMENT ANTIGEN: CPT

## 2022-05-27 PROCEDURE — 82570 ASSAY OF URINE CREATININE: CPT

## 2022-05-27 PROCEDURE — 83036 HEMOGLOBIN GLYCOSYLATED A1C: CPT

## 2022-05-30 LAB
COMPLEMENT C3: 179 MG/DL (ref 90–180)
COMPLEMENT C4: 39 MG/DL (ref 10–40)
FREE KAPPA LT CHAINS,UR: 42.48 MG/L (ref 0–32.9)
FREE LAMBDA LT CHAINS,UR: 9.9 MG/L (ref 0–3.79)
HOURS COLLECTED: ABNORMAL
INTERPRETATION: ABNORMAL
PROTEIN, TOTAL URINE: ABNORMAL MG/D
URINE FREE KAPPA EXCRETION/DAY: ABNORMAL MG/D
URINE FREE LAMBDA EXCRETION/DAY: ABNORMAL MG/D
VOLUME: ABNORMAL

## 2022-06-02 LAB
ALBUMIN SERPL-MCNC: 3.59 G/DL (ref 3.75–5.01)
ALPHA-1-GLOBULIN: 0.42 G/DL (ref 0.19–0.46)
ALPHA-2-GLOBULIN: 1.2 G/DL (ref 0.48–1.05)
BETA GLOBULIN: 0.92 G/DL (ref 0.48–1.1)
GAMMA: 1.17 G/DL (ref 0.62–1.51)
IMMUNOFIXATION REFLEX: ABNORMAL
PROTEIN ELECTROPHORESIS, SERUM: ABNORMAL
SPE/IFE INTERPRETATION: ABNORMAL
TOTAL PROTEIN: 7.3 G/DL (ref 6.3–8.2)

## 2022-06-03 PROBLEM — Q61.9 CYSTIC KIDNEY DISEASE: Status: ACTIVE | Noted: 2022-06-03

## 2022-06-13 ENCOUNTER — OFFICE VISIT (OUTPATIENT)
Dept: CARDIOLOGY CLINIC | Age: 82
End: 2022-06-13
Payer: MEDICARE

## 2022-06-13 VITALS
HEART RATE: 54 BPM | SYSTOLIC BLOOD PRESSURE: 136 MMHG | DIASTOLIC BLOOD PRESSURE: 86 MMHG | WEIGHT: 215.8 LBS | BODY MASS INDEX: 32.71 KG/M2 | HEIGHT: 68 IN

## 2022-06-13 DIAGNOSIS — I10 ESSENTIAL HYPERTENSION: Primary | ICD-10-CM

## 2022-06-13 PROCEDURE — 99214 OFFICE O/P EST MOD 30 MIN: CPT | Performed by: INTERNAL MEDICINE

## 2022-06-13 PROCEDURE — G8399 PT W/DXA RESULTS DOCUMENT: HCPCS | Performed by: INTERNAL MEDICINE

## 2022-06-13 PROCEDURE — 93000 ELECTROCARDIOGRAM COMPLETE: CPT | Performed by: INTERNAL MEDICINE

## 2022-06-13 PROCEDURE — 1123F ACP DISCUSS/DSCN MKR DOCD: CPT | Performed by: INTERNAL MEDICINE

## 2022-06-13 PROCEDURE — G8417 CALC BMI ABV UP PARAM F/U: HCPCS | Performed by: INTERNAL MEDICINE

## 2022-06-13 PROCEDURE — 1036F TOBACCO NON-USER: CPT | Performed by: INTERNAL MEDICINE

## 2022-06-13 PROCEDURE — 1090F PRES/ABSN URINE INCON ASSESS: CPT | Performed by: INTERNAL MEDICINE

## 2022-06-13 PROCEDURE — G8427 DOCREV CUR MEDS BY ELIG CLIN: HCPCS | Performed by: INTERNAL MEDICINE

## 2022-06-13 NOTE — LETTER
Selina Tavares  1940 20[birth year]    Have you had any Chest Pain that is not new? - No  If Yes DO EKG - How does it feel -    How long does the pain last -      How long have you been having the pain -    Did you take a    And did it relieve the pain -      DO EKG IF: Patient has a Heart Rate above 100 or below 40     CAD (Coronary Artery Disease) patient should have one on file every 6 months        Have you had any Shortness of Breath - Yes  If Yes - When on exertion    Have you had any dizziness - No  If Yes DO ORTHOSTATIC BP - when do you feel dizzy    How long does it last .        Sitting wait 5 minutes do supine (laying down) wait 5 minutes then do standing - log each in \"vitals\" area in Epic   Be sure to ask what symptoms they are having if they get dizzy while completing ortho stats such as: room spinning, nausea, etc.    Have you had any palpitations that are not new? -   If Yes DO EKG - Do you feel your heart   How long does it last - . Is the patient on any of the following medications -   If Yes DO EKG - Needs done every 6 months    Do you have any edema - swelling in Yes for years     If Yes - CHECK TO SEE IF THE EDEMA IS PITTING  How long have they been having edema -   If Yes - Have they worn compression stockings   If they have worn compression stockings      Vein \"LEG PROBLEM Questionnaire\"  1. Do you have prominent leg veins? No   2. Do you have any skin discoloration? No  3. Do you have any healed or active sores? No  4. Do you have swelling of the legs? No  5. Do you have a family history of varicose veins? No  6. Does your profession involve pro-longed        standing or heavy lifting? No  7. Have you been fighting overweight problems? No  8. Do you have restless legs? No  9. Do you have any night time cramps? No  10.  Do you have any of the following in your legs:             When did you have your last labs drawn   Where did you have them done   What doctor ordered     If we do not have these labs you are retrieve these labs for these providers! Do you have a surgery or procedure scheduled in the near future - Yes  If Yes- DO EKG  If Yes - Who is the surgery with? Dr. Jeanne Stcak   Phone number of physician   Fax number of physician   Type of surgery Kidney Stones.  7/14/2022 Quentin 84 THIS INFORMATION TO FANTA ANGLIN     Ask patient if they want to sign up for [employer] if they are not already signed up     Check to see if we have an E-MAIL on file for the patient     Check medication list thoroughly!!! AND RECONCILE OUTSIDE MEDICATIONS  If dose has changed change the entire order not just the MG  BE SURE TO ASK PATIENT IF THEY NEED MEDICATION REFILLS     At check out add to every patient's \"wrap up\" the following dot phrase AFTERHOURSEDUCATION and ensure we explain this to our patients

## 2022-06-13 NOTE — PROGRESS NOTES
CARDIOLOGY NOTE      6/13/2022    RE: Jo Durant  (1940)                               TO:  Dr. Daniela Toribio MD            [address] is a [de-identified] y.o. female who was seen today for management of  htn  Patient is here for preop clearance                                    HPI:                   Pt has h/o htn, hyperlipidimea, DM, seen today for  fu.  Pt had COVID has no CP, but gets SOB  Already had abnormal stress test    Jo Durant has the following history recorded in care path:  Patient Active Problem List    Diagnosis Date Noted    Cystic kidney disease 06/03/2022    Chronic kidney disease, stage IV (severe) ([address] 04/20/2022    Chronic renal disease, stage III ([address] [292904] 04/20/2022    Renal stone 04/20/2022    Major depressive disorder, recurrent, mild 04/20/2022    Major depressive disorder, recurrent, moderate 04/20/2022    Major depressive disorder, recurrent, unspecified 04/20/2022    Leg swelling 03/15/2022    Chronic pain of left knee 03/15/2022    Uncontrolled type 2 diabetes mellitus with insulin therapy ([address] 12/29/2021    Obesity, Class III, BMI 40-49.9 (morbid obesity) ([address] 10/16/2021    Chronic obstructive pulmonary disease, unspecified COPD type ([address] 10/15/2021    Chronic kidney disease 06/14/2021    Anemia 06/22/2020    B12 deficiency 06/22/2020    Type 2 diabetes mellitus with complication, with long-term current use of insulin ([address] 10/02/2017    Erythrasma 10/02/2017    Hypothyroidism 12/21/2016    Essential hypertension 12/21/2016    Abnormal nuclear cardiac imaging test     Mild persistent asthma without complication 14/93/4770    Pulmonary hypertension ([address] 03/16/2016    SOB (shortness of breath) 12/01/2014    Obesity 07/05/2013    Localized edema 07/05/2013    Fatigue 06/04/2013    SOB (shortness of breath) on exertion 06/04/2013    Hyperlipidemia 08/09/2012    Hyperuricemia 08/09/2012    Vitamin D deficiency 08/09/2012    Complications of bariatric procedures 08/09/2012     Current Outpatient Medications   Medication Sig Dispense Refill    torsemide (DEMADEX) 20 MG tablet Take 1 tablet by mouth in the morning and at bedtime 180 tablet 3    citalopram (CELEXA) 20 mg tablet Take 1 tablet by mouth daily 90 tablet 3    losartan (COZAAR) 25 MG tablet Take 1 tablet by mouth daily 90 tablet 3    hydrALAZINE (APRESOLINE) 25 MG tablet Take 1 tablet by mouth 3 times daily 90 tablet 3    Handicap Placard MISC by Does not apply route Expires 5/19/2027 1 each 0    acetaminophen (TYLENOL) 325 MG tablet Take 650 mg by mouth as needed for Pain      empagliflozin (JARDIANCE) 10 MG tablet Take 1 tablet by mouth daily 90 tablet 3    metoprolol succinate (TOPROL XL) 100 MG extended release tablet TAKE 1 TABLET DAILY 90 tablet 3    amLODIPine (NORVASC) 10 MG tablet TAKE 1 TABLET DAILY 90 tablet 3    budesonide-formoterol (SYMBICORT) 80-4.5 MCG/ACT AERO Inhale 2 puffs into the lungs 2 times daily 10.2 g 5    LEVEMIR FLEXTOUCH 100 UNIT/ML injection pen INJECT 28 UNITS SUBCUTANEOUSLY NIGHTLY, DOSE TO BE ADJUSTED ACCORDING TO MORNING FASTING BLOOD SUGAR READINGS 30 mL 3    allopurinol (ZYLOPRIM) 300 MG tablet TAKE 1 TABLET DAILY 90 tablet 3    simvastatin (ZOCOR) 20 MG tablet TAKE 1 TABLET NIGHTLY 90 tablet 3    SYNTHROID 137 MCG tablet TAKE 1 TABLET DAILY 90 tablet 3    fluticasone (FLONASE) 50 MCG/ACT nasal spray 1 spray by Each Nare route daily 1 Bottle 3    Insulin Pen Needle 29G X 12.7MM MISC 1 each by Does not apply route daily 300 each 3    omeprazole (PRILOSEC OTC) 20 MG tablet Take 1 tablet by mouth daily 90 tablet 3    calcium carbonate (TUMS) 500 MG chewable tablet Take 2 tablets by mouth as needed.  aspirin 81 MG EC tablet Take 81 mg by mouth daily. No current facility-administered medications for this visit.      Allergies: Scopolamine, Amoxicillin, Bactrim [sulfamethoxazole-trimethoprim], and Penicillins  Past Medical History:   Diagnosis Date    Abnormal nuclear stress test 4/08;7/07 4/08-EF=67%,Mild->Mod.isch. LAD;7/07-EF=70%,Suggests poss. Mild Ant.wall ischemia.  Cardiac arrest [facility] 2008    Diabetes mellitus ([facility]     H/O cardiac catheterization 4/08    No signif.CAD.    H/O cardiovascular stress test 04/11/08    mild to mod ischemia in the LAD region, abnormal study, EF 67%, patient developed mild chest pain and throat tightness    H/O cardiovascular stress test 5/3/2016    lexiscan-moderate ischemia apical,RF66%    H/O Doppler ultrasound 07/16/2007    CAROTID DOPLER- intimal thickening but no significant atherosclerotic plaque noted in the right or left internal carotid artery, doppler flow velocities within the right and left internal carotid artery are elevated, consistent with a mild, less than 50% stenosis    H/O echocardiogram 10/14/08    EF>55%, normal LV systolic function, normal left ventricular wall thickness, impaired LV relaxation    H/O echocardiogram 6/18/13, 10/08;07/07 6/13- EF >55% Impaired LV relaxation, Mild concentric LV hypertrophy, No sig AS, THEO underestimated. 10/08-EF=>55%,NL study;7/07-EF=>55%,Mild valv. AS.    H/O echocardiogram 5/16/2016 12/3/14    5/16 EF 55-60% normal study 12/14EF 60%. MIld mitral and tricuspid insufficiencies. Mildly sclerotic aortic valve which appears to be mildly stenosed with aortic valve area of 1.4 however this does not appear to be hemodynamically signficant aortic stenosis. Mildy hypertropic left ventricle with normal global and regional left ventricular systolic function.  H/O left heart catheterization by cutdown 05/23/2016    no significant disease in all vessels.  EF 55%    Hernia     after lap band removal    Hx of cardiovascular stress test 06/18/2013 6/13-EF 51%, no scitnigraphic evidence of inducible myocardial ischemia    Hyperlipidemia     Hypertension     Irritable bowel syndrome     Kidney stones     Obesity     Osteoarthritis     Thyroid disease     Type II or unspecified type diabetes mellitus without mention of complication, not stated as uncontrolled      Past Surgical History:   Procedure Laterality Date    CARDIAC CATHETERIZATION  08    continue risk factor modification with strict control of her risk factors with diet and control of diabetes, high blood pressure and hyperlipidemia    GASTRIC BAND  2008    revision of gastric band placement    HYSTERECTOMY (CERVIX STATUS UNKNOWN)      LAP BAND      put in in May Removed in August    LITHOTRIPSY     [address]      right    ROTATOR CUFF REPAIR        As reviewed   Family History   Problem Relation Age of Onset    Cancer Sister     COPD Brother     Heart Disease Brother     Cancer Mother         melonoma    Migraines Mother     Cancer Father         carcinoma    High Blood Pressure Father     Heart Disease Father      Social History     Tobacco Use    Smoking status: Former Smoker     Packs/day: 0.50     Years: 3.00     Pack years: 1.50     Types: Cigarettes     Quit date: 1994     Years since quittin.8    Smokeless tobacco: Never Used   Substance Use Topics    Alcohol use: No     Alcohol/week: 0.0 standard drinks      Review of Systems:    Constitutional: Negative for diaphoresis and fatigue  Psychological:Negative for anxiety or depression  HENT: Negative for headaches, nasal congestion, sinus pain or vertigo  Eyes: Negative for visual disturbance.    Endocrine: Negative for polydipsia/polyuria  Respiratory: Negative for shortness of breath  Cardiovascular: Negative for chest pain, dyspnea on exertion, claudication, edema, irregular heartbeat, murmur, palpitations or shortness of breath  Gastrointestinal: Negative for abdominal pain or heartburn  Genito-Urinary: Negative for urinary frequency/urgency  Musculoskeletal: Negative for muscle pain, muscular weakness, negative for pain in arm and leg or swelling in foot and leg  Neurological: Negative for dizziness, headaches, memory loss, numbness/tingling, visual changes, syncope  Dermatological: Negative for rash    Objective:    Vitals:    06/13/22 1347   BP: 136/86   Pulse: 54   Weight: 215 lb 12.8 oz (97.9 kg)   Height: 5' 8\" (1.727 m)     /86   Pulse 54   Ht 5' 8\" (1.727 m)   Wt 215 lb 12.8 oz (97.9 kg)   BMI 32.81 kg/m²     No flowsheet data found. Wt Readings from Last 3 Encounters:   06/13/22 215 lb 12.8 oz (97.9 kg)   06/03/22 219 lb (99.3 kg)   04/20/22 217 lb 12.8 oz (98.8 kg)     Body mass index is 32.81 kg/m². GENERAL - Alert, oriented, pleasant, in no apparent distress. EYES: No jaundice, no conjunctival pallor. SKIN: It is warm & dry. No rashes. No Echhymosis    HEENT  No clinically significant abnormalities seen. Neck - Supple. No jugular venous distention noted. No carotid bruits. Cardiovascular  Normal S1 and S2 without obvious murmur or gallop. Extremities - No cyanosis, clubbing, or significant edema. Pulmonary  No respiratory distress. No wheezes or rales. Abdomen  No masses, tenderness, or organomegaly. Musculoskeletal  No significant edema. No joint deformities. No muscle wasting. Neurologic  Cranial nerves II through XII are grossly intact. There were no gross focal neurologic abnormalities.     Lab Review   No results found for: CKTOTAL, CKMB, CKMBINDEX, TROPONINT  BNP:  No results found for: BNP  PT/INR:    Lab Results   Component Value Date    INR 0.93 05/21/2016     Lab Results   Component Value Date    LABA1C 8.4 (H) 05/27/2022    LABA1C 7.2 12/29/2021     Lab Results   Component Value Date    WBC 9.9 05/27/2022    HCT 37.9 05/27/2022    MCV 98.2 05/27/2022     05/27/2022     Lab Results   Component Value Date    CHOL 233 (H) 06/29/2021    TRIG 160 (H) 06/29/2021    HDL 56 06/29/2021    LDLCALC 145 (H) 06/29/2021    LDLDIRECT 132 (H) 08/19/2019     Lab Results   Component Value Date    ALT 13 12/29/2021    AST 18 12/29/2021     BMP:    Lab Results   Component Value Date     05/27/2022    K 5.2 05/27/2022     05/27/2022    CO2 25 05/27/2022    BUN 33 05/27/2022    CREATININE 1.8 05/27/2022     CMP:   Lab Results   Component Value Date     05/27/2022    K 5.2 05/27/2022     05/27/2022    CO2 25 05/27/2022    BUN 33 05/27/2022    PROT 7.3 05/27/2022    PROT 8.4 08/09/2012     TSH:    Lab Results   Component Value Date    TSH 2.85 12/29/2021    TSHHS 0.751 05/27/2022           Assessment & Plan:    -Patient is here for preop clearance  Will get LHC   Has been having exertional dyspnea and has abnormal stress test  Will get IV hydration prior to      - CKD per PCP    -  Hypertension: Patients blood pressure is normal. Patient is advised about low sodium diet. Present medical regimen will not be changed. on lisinopril compliant blood pressure is well controlled compliant          -  LIPID MANAGEMENT:  Importance of lipid levels discussed with patient   and patient was given dietary advice. NCEP- ATP III guidelines reviewed with patient. -   Changes  in medicines made: No                 On zocor compliant we will check the lipid profile              -   DIABETES MELLITUS: Available pertinent lab data reviewed   and  patient was given dietary advice . Advised to check blood glucose level on a regular basis. -   Changes  in medicines made: No        On onsulin and Jardiance compliant we will check A1c      - VHD: Mild AS  Conclusions      Summary   Left ventricular function and size is normal, EF is estimated at 55-60%. Mild left ventricular hypertrophy. Normal diastolic filling pattern for age. No regional wall motion abnormalities were detected. Moderately dilated left atrium. Mildly sclerotic aortic valve with mild aortic stenosis mean gradient of   15mmHg, THEO 1,64cm2. Moderate mitral and mild tricuspid regurgitation is present.    Mild Pulmonary hypertension noted with RVSP of 40mmHg. No evidence of pericardial effusion.         Anand Gill MD    [facility]

## 2022-06-14 ENCOUNTER — TELEPHONE (OUTPATIENT)
Dept: CARDIOLOGY CLINIC | Age: 82
End: 2022-06-14

## 2022-06-14 NOTE — TELEPHONE ENCOUNTER
[facility] in regards to cardiac clearance for surgery sent fax 3x an still haven't received anything QV#[phone]

## 2022-06-18 DIAGNOSIS — E03.8 OTHER SPECIFIED HYPOTHYROIDISM: ICD-10-CM

## 2022-06-18 RX ORDER — LEVOTHYROXINE SODIUM 137 MCG
TABLET ORAL
Qty: 90 TABLET | Refills: 3 | OUTPATIENT
Start: 2022-06-18

## 2022-06-23 ENCOUNTER — NURSE ONLY (OUTPATIENT)
Dept: CARDIOLOGY CLINIC | Age: 82
End: 2022-06-23
Payer: MEDICARE

## 2022-06-23 ENCOUNTER — HOSPITAL ENCOUNTER (OUTPATIENT)
Age: 82
Discharge: HOME | End: 2022-06-23
Payer: MEDICARE

## 2022-06-23 ENCOUNTER — HOSPITAL ENCOUNTER (OUTPATIENT)
Dept: GENERAL RADIOLOGY | Age: 82
Discharge: HOME | End: 2022-06-23
Payer: MEDICARE

## 2022-06-23 DIAGNOSIS — Z01.810 PRE-OPERATIVE CARDIOVASCULAR EXAMINATION: ICD-10-CM

## 2022-06-23 DIAGNOSIS — Z01.818 PRE-PROCEDURAL EXAMINATION: ICD-10-CM

## 2022-06-23 LAB
ABO/RH: NORMAL
ANION GAP SERPL CALCULATED.3IONS-SCNC: 15 MMOL/L (ref 4–16)
ANTIBODY SCREEN: NEGATIVE
BUN BLDV-MCNC: 55 MG/DL (ref 6–23)
CALCIUM SERPL-MCNC: 9.4 MG/DL (ref 8.3–10.6)
CHLORIDE BLD-SCNC: 102 MMOL/L (ref 99–110)
CO2: 22 MMOL/L (ref 21–32)
COMMENT: NORMAL
CREAT SERPL-MCNC: 2.2 MG/DL (ref 0.6–1.1)
GFR AFRICAN AMERICAN: 26 ML/MIN/1.73M2
GFR NON-AFRICAN AMERICAN: 21 ML/MIN/1.73M2
GLUCOSE BLD-MCNC: 150 MG/DL (ref 70–99)
POTASSIUM SERPL-SCNC: 4.6 MMOL/L (ref 3.5–5.1)
SODIUM BLD-SCNC: 139 MMOL/L (ref 135–145)

## 2022-06-23 PROCEDURE — 86901 BLOOD TYPING SEROLOGIC RH(D): CPT

## 2022-06-23 PROCEDURE — 80048 BASIC METABOLIC PNL TOTAL CA: CPT

## 2022-06-23 PROCEDURE — 86850 RBC ANTIBODY SCREEN: CPT

## 2022-06-23 PROCEDURE — 71046 X-RAY EXAM CHEST 2 VIEWS: CPT

## 2022-06-23 PROCEDURE — 36415 COLL VENOUS BLD VENIPUNCTURE: CPT

## 2022-06-23 PROCEDURE — 86900 BLOOD TYPING SEROLOGIC ABO: CPT

## 2022-06-23 PROCEDURE — 99211 OFF/OP EST MAY X REQ PHY/QHP: CPT | Performed by: INTERNAL MEDICINE

## 2022-06-23 NOTE — PROGRESS NOTES
Patient was here in office & educated on [facility] for Dx: Stephon Nelson. Procedure is scheduled for 6/28/22 @ 12:00, w/arrival @ 10:00, @ [facility]. Pre-admission orders were given to patient for labs & CXR, which are due 6/23/22 @ [facility]. Procedure and risks were explained to patient. Consent forms were signed. Instructions were given to patient to remain NPO after midnight the night before procedure. Patient may take morning meds the morning of procedure with small amount of water. Patient is asked to call hospital @ [phone], 1 to 2 days before procedure to pre-register. Patient was notified that procedure could be delayed due to an emergency. Patient voiced understanding.  Copies of consent, pre-testing orders, & instructions scanned into media

## 2022-06-27 NOTE — H&P
Latonia Napier is a [de-identified] y.o. female who was seen today for management of  htn  Patient is here for [facility]                                    HPI:                    Pt has h/o htn, hyperlipidimea, DM, seen today for  fu.  Pt had COVID has no CP, but gets SOB  Already had abnormal stress test     Segundo Giraldo has the following history recorded in care path:       Patient Active Problem List     Diagnosis Date Noted    Cystic kidney disease 06/03/2022    Chronic kidney disease, stage IV (severe) ([address] 04/20/2022    Chronic renal disease, stage III ([address] [[id number]] 04/20/2022    Renal stone 04/20/2022    Major depressive disorder, recurrent, mild 04/20/2022    Major depressive disorder, recurrent, moderate 04/20/2022    Major depressive disorder, recurrent, unspecified 04/20/2022    Leg swelling 03/15/2022    Chronic pain of left knee 03/15/2022    Uncontrolled type 2 diabetes mellitus with insulin therapy ([address] 12/29/2021    Obesity, Class III, BMI 40-49.9 (morbid obesity) ([address] 10/16/2021    Chronic obstructive pulmonary disease, unspecified COPD type ([address] 10/15/2021    Chronic kidney disease 06/14/2021    Anemia 06/22/2020    B12 deficiency 06/22/2020    Type 2 diabetes mellitus with complication, with long-term current use of insulin ([address] 10/02/2017    Erythrasma 10/02/2017    Hypothyroidism 12/21/2016    Essential hypertension 12/21/2016    Abnormal nuclear cardiac imaging test      Mild persistent asthma without complication 97/30/4274    Pulmonary hypertension ([address] 03/16/2016    SOB (shortness of breath) 12/01/2014    Obesity 07/05/2013    Localized edema 07/05/2013    Fatigue 06/04/2013    SOB (shortness of breath) on exertion 06/04/2013    Hyperlipidemia 08/09/2012    Hyperuricemia 08/09/2012    Vitamin D deficiency 09/32/1174    Complications of bariatric procedures 08/09/2012      Current Facility-Administered Medications          Current Outpatient Medications   Medication Sig Dispense Refill    torsemide (DEMADEX) 20 MG tablet Take 1 tablet by mouth in the morning and at bedtime 180 tablet 3    citalopram (CELEXA) 20 mg tablet Take 1 tablet by mouth daily 90 tablet 3    losartan (COZAAR) 25 MG tablet Take 1 tablet by mouth daily 90 tablet 3    hydrALAZINE (APRESOLINE) 25 MG tablet Take 1 tablet by mouth 3 times daily 90 tablet 3    Handicap Placard MISC by Does not apply route Expires 5/19/2027 1 each 0    acetaminophen (TYLENOL) 325 MG tablet Take 650 mg by mouth as needed for Pain        empagliflozin (JARDIANCE) 10 MG tablet Take 1 tablet by mouth daily 90 tablet 3    metoprolol succinate (TOPROL XL) 100 MG extended release tablet TAKE 1 TABLET DAILY 90 tablet 3    amLODIPine (NORVASC) 10 MG tablet TAKE 1 TABLET DAILY 90 tablet 3    budesonide-formoterol (SYMBICORT) 80-4.5 MCG/ACT AERO Inhale 2 puffs into the lungs 2 times daily 10.2 g 5    LEVEMIR FLEXTOUCH 100 UNIT/ML injection pen INJECT 28 UNITS SUBCUTANEOUSLY NIGHTLY, DOSE TO BE ADJUSTED ACCORDING TO MORNING FASTING BLOOD SUGAR READINGS 30 mL 3    allopurinol (ZYLOPRIM) 300 MG tablet TAKE 1 TABLET DAILY 90 tablet 3    simvastatin (ZOCOR) 20 MG tablet TAKE 1 TABLET NIGHTLY 90 tablet 3    SYNTHROID 137 MCG tablet TAKE 1 TABLET DAILY 90 tablet 3    fluticasone (FLONASE) 50 MCG/ACT nasal spray 1 spray by Each Nare route daily 1 Bottle 3    Insulin Pen Needle 29G X 12.7MM MISC 1 each by Does not apply route daily 300 each 3    omeprazole (PRILOSEC OTC) 20 MG tablet Take 1 tablet by mouth daily 90 tablet 3    calcium carbonate (TUMS) 500 MG chewable tablet Take 2 tablets by mouth as needed.        aspirin 81 MG EC tablet Take 81 mg by mouth daily.            No current facility-administered medications for this visit.         Allergies: Scopolamine, Amoxicillin, Bactrim [sulfamethoxazole-trimethoprim], and Penicillins  Past Medical History        Past Medical History:   Diagnosis Date    Abnormal nuclear stress test 4/08;7/07 4/08-EF=67%,Mild->Mod.isch. LAD;7/07-EF=70%,Suggests poss. Mild Ant.wall ischemia.  Cardiac arrest ([facility] 2008    Diabetes mellitus ([facility]      H/O cardiac catheterization 4/08     No signif.CAD.    H/O cardiovascular stress test 04/11/08     mild to mod ischemia in the LAD region, abnormal study, EF 67%, patient developed mild chest pain and throat tightness    H/O cardiovascular stress test 5/3/2016     lexiscan-moderate ischemia apical,RF66%    H/O Doppler ultrasound 07/16/2007     CAROTID DOPLER- intimal thickening but no significant atherosclerotic plaque noted in the right or left internal carotid artery, doppler flow velocities within the right and left internal carotid artery are elevated, consistent with a mild, less than 50% stenosis    H/O echocardiogram 10/14/08     EF>55%, normal LV systolic function, normal left ventricular wall thickness, impaired LV relaxation    H/O echocardiogram 6/18/13, 10/08;07/07 6/13- EF >55% Impaired LV relaxation, Mild concentric LV hypertrophy, No sig AS, THEO underestimated. 10/08-EF=>55%,NL study;7/07-EF=>55%,Mild valv. AS.    H/O echocardiogram 5/16/2016 12/3/14     5/16 EF 55-60% normal study 12/14EF 60%. MIld mitral and tricuspid insufficiencies. Mildly sclerotic aortic valve which appears to be mildly stenosed with aortic valve area of 1.4 however this does not appear to be hemodynamically signficant aortic stenosis. Mildy hypertropic left ventricle with normal global and regional left ventricular systolic function.  H/O left heart catheterization by cutdown 05/23/2016     no significant disease in all vessels.  EF 55%    Hernia       after lap band removal    Hx of cardiovascular stress test 06/18/2013 6/13-EF 51%, no scitnigraphic evidence of inducible myocardial ischemia    Hyperlipidemia      Hypertension      Irritable bowel syndrome      Kidney stones      Obesity      Osteoarthritis      Thyroid disease      Type II or unspecified type diabetes mellitus without mention of complication, not stated as uncontrolled           Past Surgical History         Past Surgical History:   Procedure Laterality Date    CARDIAC CATHETERIZATION   08     continue risk factor modification with strict control of her risk factors with diet and control of diabetes, high blood pressure and hyperlipidemia    GASTRIC BAND   2008     revision of gastric band placement    HYSTERECTOMY (CERVIX STATUS UNKNOWN)        LAP BAND         put in in May Removed in August    LITHOTRIPSY        ROTATOR CUFF REPAIR         right    ROTATOR CUFF REPAIR              As reviewed   Family History         Family History   Problem Relation Age of Onset    Cancer Sister      COPD Brother      Heart Disease Brother      Cancer Mother           melonoma    Migraines Mother      Cancer Father           carcinoma    High Blood Pressure Father      Heart Disease Father           Social History            Tobacco Use    Smoking status: Former Smoker       Packs/day: 0.50       Years: 3.00       Pack years: 1.50       Types: Cigarettes       Quit date: 1994       Years since quittin.8    Smokeless tobacco: Never Used   Substance Use Topics    Alcohol use: No       Alcohol/week: 0.0 standard drinks      Review of Systems:    Constitutional: Negative for diaphoresis and fatigue  Psychological:Negative for anxiety or depression  HENT: Negative for headaches, nasal congestion, sinus pain or vertigo  Eyes: Negative for visual disturbance.    Endocrine: Negative for polydipsia/polyuria  Respiratory: Negative for shortness of breath  Cardiovascular: Negative for chest pain, dyspnea on exertion, claudication, edema, irregular heartbeat, murmur, palpitations or shortness of breath  Gastrointestinal: Negative for abdominal pain or heartburn  Genito-Urinary: Negative for urinary frequency/urgency  Musculoskeletal: Negative for muscle pain, muscular weakness, negative for pain in arm and leg or swelling in foot and leg  Neurological: Negative for dizziness, headaches, memory loss, numbness/tingling, visual changes, syncope  Dermatological: Negative for rash     Objective:     Vitals       Vitals:     06/13/22 1347   BP: 136/86   Pulse: 54   Weight: 215 lb 12.8 oz (97.9 kg)   Height: 5' 8\" (1.727 m)         /86   Pulse 54   Ht 5' 8\" (1.727 m)   Wt 215 lb 12.8 oz (97.9 kg)   BMI 32.81 kg/m²      No flowsheet data found.          Wt Readings from Last 3 Encounters:   06/13/22 215 lb 12.8 oz (97.9 kg)   06/03/22 219 lb (99.3 kg)   04/20/22 217 lb 12.8 oz (98.8 kg)      Body mass index is 32.81 kg/m². GENERAL - Alert, oriented, pleasant, in no apparent distress. EYES: No jaundice, no conjunctival pallor. SKIN: It is warm & dry. No rashes. No Echhymosis    HEENT - No clinically significant abnormalities seen. Neck - Supple. No jugular venous distention noted. No carotid bruits. Cardiovascular - Normal S1 and S2 without obvious murmur or gallop. Extremities - No cyanosis, clubbing, or significant edema. Pulmonary - No respiratory distress. No wheezes or rales. Abdomen - No masses, tenderness, or organomegaly. Musculoskeletal - No significant edema. No joint deformities. No muscle wasting. Neurologic - Cranial nerves II through XII are grossly intact.   There were no gross focal neurologic abnormalities.     Lab Review   No results found for: CKTOTAL, CKMB, CKMBINDEX, TROPONINT  BNP:  No results found for: BNP  PT/INR:          Lab Results   Component Value Date     INR 0.93 05/21/2016            Lab Results   Component Value Date     LABA1C 8.4 (H) 05/27/2022     LABA1C 7.2 12/29/2021            Lab Results   Component Value Date     WBC 9.9 05/27/2022     HCT 37.9 05/27/2022     MCV 98.2 05/27/2022      05/27/2022            Lab Results   Component Value Date     CHOL 233 (H) 06/29/2021     TRIG 160 (H) 06/29/2021     HDL 56 06/29/2021     LDLCALC 145 (H) 06/29/2021     LDLDIRECT 132 (H) 08/19/2019            Lab Results   Component Value Date     ALT 13 12/29/2021     AST 18 12/29/2021      BMP:          Lab Results   Component Value Date      05/27/2022     K 5.2 05/27/2022      05/27/2022     CO2 25 05/27/2022     BUN 33 05/27/2022     CREATININE 1.8 05/27/2022      CMP:         Lab Results   Component Value Date      05/27/2022     K 5.2 05/27/2022      05/27/2022     CO2 25 05/27/2022     BUN 33 05/27/2022     PROT 7.3 05/27/2022     PROT 8.4 08/09/2012      TSH:          Lab Results   Component Value Date     TSH 2.85 12/29/2021     TSHHS 0.751 05/27/2022               Assessment & Plan:     -Patient is here for preop clearance  Will get LHC   Has been having exertional dyspnea and has abnormal stress test  Will get IV hydration prior to        - CKD per PCP     -  Hypertension: Patients blood pressure is normal. Patient is advised about low sodium diet. Present medical regimen will not be changed. on lisinopril compliant blood pressure is well controlled compliant              -  LIPID MANAGEMENT:  Importance of lipid levels discussed with patient   and patient was given dietary advice. NCEP- ATP III guidelines reviewed with patient. -   Changes  in medicines made: No                 On zocor compliant we will check the lipid profile                -   DIABETES MELLITUS: Available pertinent lab data reviewed   and  patient was given dietary advice . Advised to check blood glucose level on a regular basis. -   Changes  in medicines made: No        On onsulin and Jardiance compliant we will check A1c      - VHD: Mild AS  Conclusions      Summary   Left ventricular function and size is normal, EF is estimated at 55-60%.    Mild left ventricular hypertrophy.   Normal diastolic filling pattern for age.   No regional wall motion abnormalities were detected.   Moderately dilated left atrium.   CTRSHU sclerotic aortic valve with mild aortic stenosis mean gradient of   15mmHg, THEO 1,64cm2.   Moderate mitral and mild tricuspid regurgitation is present.   Mild Pulmonary hypertension noted with RVSP of 40mmHg.   No evidence of pericardial effusion.           Mariann Dewitt MD    [address] [facility]

## 2022-06-28 ENCOUNTER — HOSPITAL ENCOUNTER (OUTPATIENT)
Dept: CARDIAC CATH/INVASIVE PROCEDURES | Age: 82
Discharge: HOME | End: 2022-06-28
Attending: INTERNAL MEDICINE | Admitting: INTERNAL MEDICINE
Payer: MEDICARE

## 2022-06-28 VITALS
TEMPERATURE: 95.6 F | RESPIRATION RATE: 20 BRPM | HEIGHT: 66 IN | OXYGEN SATURATION: 97 % | WEIGHT: 215 LBS | BODY MASS INDEX: 34.55 KG/M2 | DIASTOLIC BLOOD PRESSURE: 55 MMHG | SYSTOLIC BLOOD PRESSURE: 126 MMHG | HEART RATE: 57 BPM

## 2022-06-28 LAB — GLUCOSE BLD-MCNC: 165 MG/DL (ref 70–99)

## 2022-06-28 PROCEDURE — 93454 CORONARY ARTERY ANGIO S&I: CPT | Performed by: INTERNAL MEDICINE

## 2022-06-28 PROCEDURE — C1769 GUIDE WIRE: HCPCS

## 2022-06-28 PROCEDURE — 2580000003 HC RX 258: Performed by: INTERNAL MEDICINE

## 2022-06-28 PROCEDURE — C1887 CATHETER, GUIDING: HCPCS

## 2022-06-28 PROCEDURE — 6360000002 HC RX W HCPCS

## 2022-06-28 PROCEDURE — 82962 GLUCOSE BLOOD TEST: CPT

## 2022-06-28 PROCEDURE — 2709999900 HC NON-CHARGEABLE SUPPLY

## 2022-06-28 PROCEDURE — 6360000004 HC RX CONTRAST MEDICATION

## 2022-06-28 PROCEDURE — 2500000003 HC RX 250 WO HCPCS

## 2022-06-28 PROCEDURE — 6370000000 HC RX 637 (ALT 250 FOR IP): Performed by: INTERNAL MEDICINE

## 2022-06-28 PROCEDURE — 93454 CORONARY ARTERY ANGIO S&I: CPT

## 2022-06-28 PROCEDURE — C1894 INTRO/SHEATH, NON-LASER: HCPCS

## 2022-06-28 RX ORDER — DIAZEPAM 5 MG/1
5 TABLET ORAL ONCE
Status: COMPLETED | OUTPATIENT
Start: 2022-06-28 | End: 2022-06-28

## 2022-06-28 RX ORDER — SODIUM CHLORIDE 9 MG/ML
INJECTION, SOLUTION INTRAVENOUS CONTINUOUS
Status: DISCONTINUED | OUTPATIENT
Start: 2022-06-28 | End: 2022-06-28 | Stop reason: HOSPADM

## 2022-06-28 RX ORDER — ACETAMINOPHEN 325 MG/1
650 TABLET ORAL EVERY 4 HOURS PRN
Status: DISCONTINUED | OUTPATIENT
Start: 2022-06-28 | End: 2022-06-28 | Stop reason: HOSPADM

## 2022-06-28 RX ORDER — SODIUM CHLORIDE 0.9 % (FLUSH) 0.9 %
5-40 SYRINGE (ML) INJECTION PRN
Status: DISCONTINUED | OUTPATIENT
Start: 2022-06-28 | End: 2022-06-28 | Stop reason: HOSPADM

## 2022-06-28 RX ORDER — SODIUM CHLORIDE 9 MG/ML
INJECTION, SOLUTION INTRAVENOUS PRN
Status: DISCONTINUED | OUTPATIENT
Start: 2022-06-28 | End: 2022-06-28 | Stop reason: HOSPADM

## 2022-06-28 RX ORDER — DIPHENHYDRAMINE HCL 25 MG
25 TABLET ORAL ONCE
Status: COMPLETED | OUTPATIENT
Start: 2022-06-28 | End: 2022-06-28

## 2022-06-28 RX ORDER — SODIUM CHLORIDE 0.9 % (FLUSH) 0.9 %
5-40 SYRINGE (ML) INJECTION EVERY 12 HOURS SCHEDULED
Status: DISCONTINUED | OUTPATIENT
Start: 2022-06-28 | End: 2022-06-28 | Stop reason: HOSPADM

## 2022-06-28 RX ADMIN — DIAZEPAM 5 MG: 5 TABLET ORAL at 08:35

## 2022-06-28 RX ADMIN — SODIUM CHLORIDE: 9 INJECTION, SOLUTION INTRAVENOUS at 08:32

## 2022-06-28 RX ADMIN — DIPHENHYDRAMINE HCL 25 MG: 25 TABLET ORAL at 08:35

## 2022-06-28 NOTE — FLOWSHEET NOTE
To holding area. Assessment completed and questions answered. Call light in reach. Pt with redness and small boil to upper inner arm, states got stung by wasp last HS

## 2022-06-28 NOTE — PROGRESS NOTES
Discharge instructions reviewed. Questions answered. Belongings gathered and checked with admission list. PIV removed. Radial Site WNL. Patient discharged to home.

## 2022-07-12 ENCOUNTER — OFFICE VISIT (OUTPATIENT)
Dept: CARDIOLOGY CLINIC | Age: 82
End: 2022-07-12
Payer: MEDICARE

## 2022-07-12 VITALS
HEART RATE: 56 BPM | HEIGHT: 66 IN | DIASTOLIC BLOOD PRESSURE: 62 MMHG | SYSTOLIC BLOOD PRESSURE: 134 MMHG | BODY MASS INDEX: 34.39 KG/M2 | WEIGHT: 214 LBS

## 2022-07-12 DIAGNOSIS — I73.9 CLAUDICATION (HCC): ICD-10-CM

## 2022-07-12 DIAGNOSIS — Z01.810 PRE-OPERATIVE CARDIOVASCULAR EXAMINATION: Primary | ICD-10-CM

## 2022-07-12 PROCEDURE — 99214 OFFICE O/P EST MOD 30 MIN: CPT | Performed by: INTERNAL MEDICINE

## 2022-07-12 PROCEDURE — G8417 CALC BMI ABV UP PARAM F/U: HCPCS | Performed by: INTERNAL MEDICINE

## 2022-07-12 PROCEDURE — 1124F ACP DISCUSS-NO DSCNMKR DOCD: CPT | Performed by: INTERNAL MEDICINE

## 2022-07-12 PROCEDURE — 1090F PRES/ABSN URINE INCON ASSESS: CPT | Performed by: INTERNAL MEDICINE

## 2022-07-12 PROCEDURE — G8427 DOCREV CUR MEDS BY ELIG CLIN: HCPCS | Performed by: INTERNAL MEDICINE

## 2022-07-12 PROCEDURE — 1036F TOBACCO NON-USER: CPT | Performed by: INTERNAL MEDICINE

## 2022-07-12 PROCEDURE — G8399 PT W/DXA RESULTS DOCUMENT: HCPCS | Performed by: INTERNAL MEDICINE

## 2022-07-12 NOTE — PROGRESS NOTES
Becky Singh  1940 2029    Have you had any Chest Pain that is not new? - No  If Yes DO EKG - How does it feel -    How long does the pain last -      How long have you been having the pain -    Did you take a    And did it relieve the pain -      DO EKG IF: Patient has a Heart Rate above 100 or below 40     CAD (Coronary Artery Disease) patient should have one on file every 6 months        Have you had any Shortness of Breath - Yes  If Yes - When on exertion    Have you had any dizziness - No  If Yes DO ORTHOSTATIC BP - when do you feel dizzy    How long does it last .        Sitting wait 5 minutes do supine (laying down) wait 5 minutes then do standing - log each in \"vitals\" area in Epic   Be sure to ask what symptoms they are having if they get dizzy while completing ortho stats such as: room spinning, nausea, etc.    Have you had any palpitations that are not new? - No  If Yes DO EKG - Do you feel your heart   How long does it last - . Is the patient on any of the following medications -   If Yes DO EKG - Needs done every 6 months    Do you have any edema - swelling in ankles    If Yes - CHECK TO SEE IF THE EDEMA IS PITTING  How long have they been having edema - Years  If Yes - Have they worn compression stockings Yes  If they have worn compression stockings  Years    Vein \"LEG PROBLEM Questionnaire\"  1. Do you have prominent leg veins? 2. Do you have any skin discoloration? 3. Do you have any healed or active sores? 4. Do you have swelling of the legs? 5. Do you have a family history of varicose veins? 6. Does your profession involve pro-longed        standing or heavy lifting? 7. Have you been fighting overweight problems? 8. Do you have restless legs? 9. Do you have any night time cramps?     10. Do you have any of the following in your legs:             When did you have your last labs drawn   Where did you have

## 2022-07-12 NOTE — PROGRESS NOTES
CARDIOLOGY NOTE      7/12/2022    RE: Chuy Reyes  (1940)                               TO:  Dr. Fernando Raya MD            [facility] [address] is a [de-identified] y.o. female who was seen today for management of  htn  Patient is here for preop clearance                                    HPI:                   Pt has h/o htn, hyperlipidimea, DM, seen today for  fu.  Pt had COVID has no CP, but gets SOB  Already had abnormal stress test  Has no CAD on 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    Chronic kidney disease, stage IV (severe) ([address] 04/20/2022    Chronic renal disease, stage III ([address] [175407] 04/20/2022    Renal stone 04/20/2022    Major depressive disorder, recurrent, mild 04/20/2022    Major depressive disorder, recurrent, moderate 04/20/2022    Major depressive disorder, recurrent, unspecified 04/20/2022    Leg swelling 03/15/2022    Chronic pain of left knee 03/15/2022    Uncontrolled type 2 diabetes mellitus with insulin therapy ([address] 12/29/2021    Obesity, Class III, BMI 40-49.9 (morbid obesity) ([address] 10/16/2021    Chronic obstructive pulmonary disease, unspecified COPD type ([address] 10/15/2021    Chronic kidney disease 06/14/2021    Anemia 06/22/2020    B12 deficiency 06/22/2020    Type 2 diabetes mellitus with complication, with long-term current use of insulin ([address] 10/02/2017    Erythrasma 10/02/2017    Hypothyroidism 12/21/2016    Essential hypertension 12/21/2016    Abnormal nuclear cardiac imaging test     Mild persistent asthma without complication 18/70/2663    Pulmonary hypertension ([address] 03/16/2016    SOB (shortness of breath) 12/01/2014    Obesity 07/05/2013    Localized edema 07/05/2013    Fatigue 06/04/2013    SOB (shortness of breath) on exertion 06/04/2013    Hyperlipidemia 08/09/2012    Hyperuricemia 08/09/2012    Vitamin D deficiency 16/19/6461    Complications of bariatric procedures 08/09/2012     Current Outpatient Medications   Medication Sig Dispense Refill    torsemide (DEMADEX) 20 MG tablet Take 1 tablet by mouth in the morning and at bedtime 180 tablet 3    citalopram (CELEXA) 20 mg tablet Take 1 tablet by mouth daily 90 tablet 3    losartan (COZAAR) 25 MG tablet Take 1 tablet by mouth daily 90 tablet 3    hydrALAZINE (APRESOLINE) 25 MG tablet Take 1 tablet by mouth 3 times daily 90 tablet 3    Handicap Placard MISC by Does not apply route Expires 5/19/2027 1 each 0    acetaminophen (TYLENOL) 325 MG tablet Take 650 mg by mouth as needed for Pain      empagliflozin (JARDIANCE) 10 MG tablet Take 1 tablet by mouth daily 90 tablet 3    metoprolol succinate (TOPROL XL) 100 MG extended release tablet TAKE 1 TABLET DAILY 90 tablet 3    amLODIPine (NORVASC) 10 MG tablet TAKE 1 TABLET DAILY 90 tablet 3    budesonide-formoterol (SYMBICORT) 80-4.5 MCG/ACT AERO Inhale 2 puffs into the lungs 2 times daily 10.2 g 5    LEVEMIR FLEXTOUCH 100 UNIT/ML injection pen INJECT 28 UNITS SUBCUTANEOUSLY NIGHTLY, DOSE TO BE ADJUSTED ACCORDING TO MORNING FASTING BLOOD SUGAR READINGS 30 mL 3    allopurinol (ZYLOPRIM) 300 MG tablet TAKE 1 TABLET DAILY 90 tablet 3    simvastatin (ZOCOR) 20 MG tablet TAKE 1 TABLET NIGHTLY 90 tablet 3    SYNTHROID 137 MCG tablet TAKE 1 TABLET DAILY 90 tablet 3    fluticasone (FLONASE) 50 MCG/ACT nasal spray 1 spray by Each Nare route daily 1 Bottle 3    Insulin Pen Needle 29G X 12.7MM MISC 1 each by Does not apply route daily 300 each 3    omeprazole (PRILOSEC OTC) 20 MG tablet Take 1 tablet by mouth daily 90 tablet 3    calcium carbonate (TUMS) 500 MG chewable tablet Take 2 tablets by mouth as needed.  aspirin 81 MG EC tablet Take 81 mg by mouth daily. No current facility-administered medications for this visit. Allergies: Scopolamine, Amoxicillin, Bactrim [sulfamethoxazole-trimethoprim], and Penicillins  Past Medical History:   Diagnosis Date    Abnormal nuclear stress test 4/08;7/07 4/08-EF=67%,Mild->Mod.isch. LAD;7/07-EF=70%,Suggests poss. Mild Ant.wall ischemia.  [facility] 2008    Diabetes mellitus [facility]     H/O cardiac catheterization 4/08    No signif.CAD.    H/O cardiovascular stress test 04/11/08    mild to mod ischemia in the LAD region, abnormal study, EF 67%, patient developed mild chest pain and throat tightness    H/O cardiovascular stress test 5/3/2016    lexiscan-moderate ischemia apical,RF66%    H/O Doppler ultrasound 07/16/2007    CAROTID DOPLER- intimal thickening but no significant atherosclerotic plaque noted in the right or left internal carotid artery, doppler flow velocities within the right and left internal carotid artery are elevated, consistent with a mild, less than 50% stenosis    H/O echocardiogram 10/14/08    EF>55%, normal LV systolic function, normal left ventricular wall thickness, impaired LV relaxation    H/O echocardiogram 6/18/13, 10/08;07/07 6/13- EF >55% Impaired LV relaxation, Mild concentric LV hypertrophy, No sig AS, THEO underestimated. 10/08-EF=>55%,NL study;7/07-EF=>55%,Mild valv. AS.    H/O echocardiogram 5/16/2016 12/3/14    5/16 EF 55-60% normal study 12/14EF 60%. MIld mitral and tricuspid insufficiencies. Mildly sclerotic aortic valve which appears to be mildly stenosed with aortic valve area of 1.4 however this does not appear to be hemodynamically signficant aortic stenosis. Mildy hypertropic left ventricle with normal global and regional left ventricular systolic function.  H/O left heart catheterization by cutdown 05/23/2016    no significant disease in all vessels.  EF 55%    Hernia     after lap band removal    Hx of cardiovascular stress test 06/18/2013 6/13-EF 51%, no scitnigraphic evidence of inducible myocardial ischemia    Hyperlipidemia     Hypertension     Irritable bowel syndrome     Kidney stones     Obesity     Osteoarthritis     Thyroid disease     Type II or unspecified type diabetes mellitus without mention of complication, not stated as uncontrolled      Past Surgical History:   Procedure Laterality Date    CARDIAC CATHETERIZATION  08    continue risk factor modification with strict control of her risk factors with diet and control of diabetes, high blood pressure and hyperlipidemia    GASTRIC BAND  2008    revision of gastric band placement    HYSTERECTOMY (CERVIX STATUS UNKNOWN)      LAP BAND      put in in May Removed in August    LITHOTRIPSY     [address]      right    ROTATOR CUFF REPAIR        As reviewed   Family History   Problem Relation Age of Onset    Cancer Sister     COPD Brother     Heart Disease Brother     Cancer Mother         melonoma    Migraines Mother     Cancer Father         carcinoma    High Blood Pressure Father     Heart Disease Father      Social History     Tobacco Use    Smoking status: Former Smoker     Packs/day: 0.50     Years: 3.00     Pack years: 1.50     Types: Cigarettes     Quit date: 1994     Years since quittin.9    Smokeless tobacco: Never Used   Substance Use Topics    Alcohol use: No     Alcohol/week: 0.0 standard drinks      Review of Systems:    Constitutional: Negative for diaphoresis and fatigue  Psychological:Negative for anxiety or depression  HENT: Negative for headaches, nasal congestion, sinus pain or vertigo  Eyes: Negative for visual disturbance.    Endocrine: Negative for polydipsia/polyuria  Respiratory: Negative for shortness of breath  Cardiovascular: Negative for chest pain, dyspnea on exertion, claudication, edema, irregular heartbeat, murmur, palpitations or shortness of breath  Gastrointestinal: Negative for abdominal pain or heartburn  Genito-Urinary: Negative for urinary frequency/urgency  Musculoskeletal: Negative for muscle pain, muscular weakness, negative for pain in arm and leg or swelling in foot and leg  Neurological: Negative for dizziness, headaches, memory loss, numbness/tingling, visual changes, syncope  Dermatological: Negative for rash    Objective: There were no vitals filed for this visit. There were no vitals taken for this visit. No flowsheet data found. Wt Readings from Last 3 Encounters:   06/28/22 215 lb (97.5 kg)   06/13/22 215 lb 12.8 oz (97.9 kg)   06/03/22 219 lb (99.3 kg)     There is no height or weight on file to calculate BMI. GENERAL - Alert, oriented, pleasant, in no apparent distress. EYES: No jaundice, no conjunctival pallor. SKIN: It is warm & dry. No rashes. No Echhymosis    HEENT - No clinically significant abnormalities seen. Neck - Supple. No jugular venous distention noted. No carotid bruits. Cardiovascular - Normal S1 and S2 without obvious murmur or gallop. Extremities - No cyanosis, clubbing, or significant edema. Pulmonary - No respiratory distress. No wheezes or rales. Abdomen - No masses, tenderness, or organomegaly. Musculoskeletal - No significant edema. No joint deformities. No muscle wasting. Neurologic - Cranial nerves II through XII are grossly intact. There were no gross focal neurologic abnormalities.     Lab Review   No results found for: CKTOTAL, CKMB, CKMBINDEX, TROPONINT  BNP:  No results found for: BNP  PT/INR:    Lab Results   Component Value Date    INR 0.93 05/21/2016     Lab Results   Component Value Date    LABA1C 8.4 (H) 05/27/2022    LABA1C 7.2 12/29/2021     Lab Results   Component Value Date    WBC 9.9 05/27/2022    HCT 37.9 05/27/2022    MCV 98.2 05/27/2022     05/27/2022     Lab Results   Component Value Date    CHOL 233 (H) 06/29/2021    TRIG 160 (H) 06/29/2021    HDL 56 06/29/2021    LDLCALC 145 (H) 06/29/2021    LDLDIRECT 132 (H) 08/19/2019     Lab Results Component Value Date    ALT 13 12/29/2021    AST 18 12/29/2021     BMP:    Lab Results   Component Value Date/Time     06/23/2022 02:01 PM    K 4.6 06/23/2022 02:01 PM     06/23/2022 02:01 PM    CO2 22 06/23/2022 02:01 PM    BUN 55 06/23/2022 02:01 PM    CREATININE 2.2 06/23/2022 02:01 PM     CMP:   Lab Results   Component Value Date/Time     06/23/2022 02:01 PM    K 4.6 06/23/2022 02:01 PM     06/23/2022 02:01 PM    CO2 22 06/23/2022 02:01 PM    BUN 55 06/23/2022 02:01 PM    PROT 7.3 05/27/2022 12:43 PM    PROT 8.4 08/09/2012 11:08 AM     TSH:    Lab Results   Component Value Date/Time    TSH 2.85 12/29/2021 11:27 AM    TSHHS 0.751 05/27/2022 12:43 PM           Assessment & Plan:    -Patient is here for preop clearance   Low cardiac risk    Getting urolithiasis surgery      - CKD per PCP and contreras    -  Hypertension: Patients blood pressure is normal. Patient is advised about low sodium diet. Present medical regimen will not be changed. on lisinopril compliant blood pressure is well controlled compliant          -  LIPID MANAGEMENT:  Importance of lipid levels discussed with patient   and patient was given dietary advice. NCEP- ATP III guidelines reviewed with patient. -   Changes  in medicines made: No                 On zocor compliant we will check the lipid profile         - cluadication : AICHA as patient has multiple risk factors and at high risk of peripheral arterial disease hence will obtain AICHA         -   DIABETES MELLITUS: Available pertinent lab data reviewed   and  patient was given dietary advice . Advised to check blood glucose level on a regular basis. -   Changes  in medicines made: No        On onsulin and Jardiance compliant we will check A1c      - VHD: Mild AS  Conclusions      Summary   Left ventricular function and size is normal, EF is estimated at 55-60%. Mild left ventricular hypertrophy. Normal diastolic filling pattern for age.    No regional wall motion abnormalities were detected. Moderately dilated left atrium. Mildly sclerotic aortic valve with mild aortic stenosis mean gradient of   15mmHg, THEO 1,64cm2. Moderate mitral and mild tricuspid regurgitation is present. Mild Pulmonary hypertension noted with RVSP of 40mmHg. No evidence of pericardial effusion.     LOW CARDIAC RISK FOR UROLOGIC SURGERY  MONITOR RENAL STATUS    Damian Sierra MD    [facility] [place]

## 2022-07-12 NOTE — PATIENT INSTRUCTIONS
Please be informed that if you contact our office outside of normal business hours the physician on call cannot help with any scheduling or rescheduling issues, procedure instruction questions or any type of medication issue. We advise you for any urgent/emergency that you go to the nearest emergency room! PLEASE CALL OUR OFFICE DURING NORMAL BUSINESS HOURS    Monday - Friday   8 am to 5 pm    [place]: [phone]    [place]:  [phone]  **It is YOUR responsibilty to bring medication bottles and/or updated medication list to [address] [place] [address]. This will allow us to better serve you and all your healthcare needs**  [facility] Locations - No appointment necessary. Sites open Monday to Friday. Call your preferred location for test preparation, business hours and other information you need. [employer] accepts [employer]. [facility] [address] [place] [address], [address] [facility]  Phone: [phone] [facility] [address] [facility]  [phone].   [place], [address]  Phone: [phone]

## 2022-07-22 ENCOUNTER — HOSPITAL ENCOUNTER (OUTPATIENT)
Age: 82
Discharge: HOME | End: 2022-07-22
Payer: MEDICARE

## 2022-07-22 ENCOUNTER — PROCEDURE VISIT (OUTPATIENT)
Dept: CARDIOLOGY CLINIC | Age: 82
End: 2022-07-22
Payer: MEDICARE

## 2022-07-22 DIAGNOSIS — N20.0 RENAL STONE: ICD-10-CM

## 2022-07-22 DIAGNOSIS — N18.4 CHRONIC KIDNEY DISEASE, STAGE IV (SEVERE) (HCC): ICD-10-CM

## 2022-07-22 DIAGNOSIS — Q61.9 CYSTIC KIDNEY DISEASE: ICD-10-CM

## 2022-07-22 DIAGNOSIS — E66.9 CLASS 2 OBESITY WITHOUT SERIOUS COMORBIDITY WITH BODY MASS INDEX (BMI) OF 35.0 TO 35.9 IN ADULT, UNSPECIFIED OBESITY TYPE: ICD-10-CM

## 2022-07-22 DIAGNOSIS — F33.0 MAJOR DEPRESSIVE DISORDER, RECURRENT, MILD (HCC): ICD-10-CM

## 2022-07-22 DIAGNOSIS — I73.9 CLAUDICATION (HCC): ICD-10-CM

## 2022-07-22 DIAGNOSIS — I10 ESSENTIAL HYPERTENSION: ICD-10-CM

## 2022-07-22 LAB
ALBUMIN SERPL-MCNC: 4.3 GM/DL (ref 3.4–5)
ANION GAP SERPL CALCULATED.3IONS-SCNC: 14 MMOL/L (ref 4–16)
BACTERIA: ABNORMAL /HPF
BASOPHILS ABSOLUTE: 0 K/CU MM
BASOPHILS RELATIVE PERCENT: 0.3 % (ref 0–1)
BILIRUBIN URINE: NEGATIVE MG/DL
BLOOD, URINE: ABNORMAL
BUN BLDV-MCNC: 38 MG/DL (ref 6–23)
CALCIUM SERPL-MCNC: 9.6 MG/DL (ref 8.3–10.6)
CHLORIDE BLD-SCNC: 101 MMOL/L (ref 99–110)
CLARITY: ABNORMAL
CO2: 24 MMOL/L (ref 21–32)
COLOR: YELLOW
CREAT SERPL-MCNC: 1.9 MG/DL (ref 0.6–1.1)
CREATININE URINE: 75.1 MG/DL (ref 28–217)
DIFFERENTIAL TYPE: ABNORMAL
EOSINOPHILS ABSOLUTE: 0.3 K/CU MM
EOSINOPHILS RELATIVE PERCENT: 3.4 % (ref 0–3)
GFR AFRICAN AMERICAN: 31 ML/MIN/1.73M2
GFR NON-AFRICAN AMERICAN: 25 ML/MIN/1.73M2
GLUCOSE BLD-MCNC: 173 MG/DL (ref 70–99)
GLUCOSE, URINE: >1000 MG/DL
HCT VFR BLD CALC: 36.3 % (ref 37–47)
HEMOGLOBIN: 11.4 GM/DL (ref 12.5–16)
IMMATURE NEUTROPHIL %: 0.3 % (ref 0–0.43)
KETONES, URINE: NEGATIVE MG/DL
LEUKOCYTE ESTERASE, URINE: ABNORMAL
LYMPHOCYTES ABSOLUTE: 2.3 K/CU MM
LYMPHOCYTES RELATIVE PERCENT: 25.3 % (ref 24–44)
MCH RBC QN AUTO: 29.5 PG (ref 27–31)
MCHC RBC AUTO-ENTMCNC: 31.4 % (ref 32–36)
MCV RBC AUTO: 93.8 FL (ref 78–100)
MONOCYTES ABSOLUTE: 0.8 K/CU MM
MONOCYTES RELATIVE PERCENT: 8.6 % (ref 0–4)
MUCUS: ABNORMAL HPF
NITRITE URINE, QUANTITATIVE: NEGATIVE
NUCLEATED RBC %: 0 %
PDW BLD-RTO: 14.6 % (ref 11.7–14.9)
PH, URINE: 5.5 (ref 5–8)
PHOSPHORUS: 4.7 MG/DL (ref 2.5–4.9)
PLATELET # BLD: 298 K/CU MM (ref 140–440)
PMV BLD AUTO: 10.2 FL (ref 7.5–11.1)
POTASSIUM SERPL-SCNC: 3.8 MMOL/L (ref 3.5–5.1)
PROT/CREAT RATIO, UR: 2.4
PROTEIN UA: 100 MG/DL
RBC # BLD: 3.87 M/CU MM (ref 4.2–5.4)
RBC URINE: 1 /HPF (ref 0–6)
SEGMENTED NEUTROPHILS ABSOLUTE COUNT: 5.6 K/CU MM
SEGMENTED NEUTROPHILS RELATIVE PERCENT: 62.1 % (ref 36–66)
SODIUM BLD-SCNC: 139 MMOL/L (ref 135–145)
SPECIFIC GRAVITY UA: 1.02 (ref 1–1.03)
SQUAMOUS EPITHELIAL: 2 /HPF
TOTAL IMMATURE NEUTOROPHIL: 0.03 K/CU MM
TOTAL NUCLEATED RBC: 0 K/CU MM
TRICHOMONAS: ABNORMAL /HPF
URIC ACID: 5.4 MG/DL (ref 2.6–6)
URINE TOTAL PROTEIN: 179.3 MG/DL
UROBILINOGEN, URINE: 0.2 MG/DL (ref 0.2–1)
WBC # BLD: 8.9 K/CU MM (ref 4–10.5)
WBC CLUMP: ABNORMAL /HPF
WBC UA: 74 /HPF (ref 0–5)

## 2022-07-22 PROCEDURE — 81001 URINALYSIS AUTO W/SCOPE: CPT

## 2022-07-22 PROCEDURE — 93922 UPR/L XTREMITY ART 2 LEVELS: CPT | Performed by: INTERNAL MEDICINE

## 2022-07-22 PROCEDURE — 82040 ASSAY OF SERUM ALBUMIN: CPT

## 2022-07-22 PROCEDURE — 85025 COMPLETE CBC W/AUTO DIFF WBC: CPT

## 2022-07-22 PROCEDURE — 83970 ASSAY OF PARATHORMONE: CPT

## 2022-07-22 PROCEDURE — 84550 ASSAY OF BLOOD/URIC ACID: CPT

## 2022-07-22 PROCEDURE — 84100 ASSAY OF PHOSPHORUS: CPT

## 2022-07-22 PROCEDURE — 36415 COLL VENOUS BLD VENIPUNCTURE: CPT

## 2022-07-22 PROCEDURE — 82570 ASSAY OF URINE CREATININE: CPT

## 2022-07-22 PROCEDURE — 80048 BASIC METABOLIC PNL TOTAL CA: CPT

## 2022-07-22 PROCEDURE — 84156 ASSAY OF PROTEIN URINE: CPT

## 2022-07-24 LAB — PARATHYROID HORMONE INTACT: 126 PG/ML (ref 15–65)

## 2022-08-30 RX ORDER — INSULIN DETEMIR 100 [IU]/ML
INJECTION, SOLUTION SUBCUTANEOUS
Qty: 30 ML | Refills: 3 | OUTPATIENT
Start: 2022-08-30

## 2022-09-22 ENCOUNTER — OFFICE VISIT (OUTPATIENT)
Dept: CARDIOLOGY CLINIC | Age: 82
End: 2022-09-22
Payer: MEDICARE

## 2022-09-22 ENCOUNTER — HOSPITAL ENCOUNTER (OUTPATIENT)
Age: 82
Discharge: HOME | End: 2022-09-22
Payer: MEDICARE

## 2022-09-22 VITALS
HEART RATE: 46 BPM | SYSTOLIC BLOOD PRESSURE: 132 MMHG | DIASTOLIC BLOOD PRESSURE: 78 MMHG | WEIGHT: 218 LBS | OXYGEN SATURATION: 98 % | BODY MASS INDEX: 35.03 KG/M2 | HEIGHT: 66 IN | RESPIRATION RATE: 16 BRPM

## 2022-09-22 DIAGNOSIS — E03.8 OTHER SPECIFIED HYPOTHYROIDISM: ICD-10-CM

## 2022-09-22 DIAGNOSIS — E78.5 HYPERLIPIDEMIA, UNSPECIFIED HYPERLIPIDEMIA TYPE: ICD-10-CM

## 2022-09-22 DIAGNOSIS — R00.1 BRADYCARDIA: Primary | ICD-10-CM

## 2022-09-22 LAB
ALBUMIN SERPL-MCNC: 4.6 GM/DL (ref 3.4–5)
ANION GAP SERPL CALCULATED.3IONS-SCNC: 15 MMOL/L (ref 4–16)
BACTERIA: ABNORMAL /HPF
BASOPHILS ABSOLUTE: 0 K/CU MM
BASOPHILS RELATIVE PERCENT: 0.4 % (ref 0–1)
BILIRUBIN URINE: NEGATIVE MG/DL
BLOOD, URINE: ABNORMAL
BUN BLDV-MCNC: 57 MG/DL (ref 6–23)
CALCIUM SERPL-MCNC: 9.6 MG/DL (ref 8.3–10.6)
CHLORIDE BLD-SCNC: 99 MMOL/L (ref 99–110)
CLARITY: ABNORMAL
CO2: 22 MMOL/L (ref 21–32)
COLOR: YELLOW
CREAT SERPL-MCNC: 3 MG/DL (ref 0.6–1.1)
CREATININE URINE: 39 MG/DL (ref 28–217)
DIFFERENTIAL TYPE: ABNORMAL
EOSINOPHILS ABSOLUTE: 0.3 K/CU MM
EOSINOPHILS RELATIVE PERCENT: 3 % (ref 0–3)
GFR AFRICAN AMERICAN: 18 ML/MIN/1.73M2
GFR NON-AFRICAN AMERICAN: 15 ML/MIN/1.73M2
GLUCOSE FASTING: 128 MG/DL (ref 70–99)
GLUCOSE, URINE: 250 MG/DL
HCT VFR BLD CALC: 40.2 % (ref 37–47)
HEMOGLOBIN: 12.5 GM/DL (ref 12.5–16)
HYALINE CASTS: 2 /LPF
IMMATURE NEUTROPHIL %: 0.5 % (ref 0–0.43)
KETONES, URINE: NEGATIVE MG/DL
LEUKOCYTE ESTERASE, URINE: ABNORMAL
LYMPHOCYTES ABSOLUTE: 2.4 K/CU MM
LYMPHOCYTES RELATIVE PERCENT: 29 % (ref 24–44)
MCH RBC QN AUTO: 29.3 PG (ref 27–31)
MCHC RBC AUTO-ENTMCNC: 31.1 % (ref 32–36)
MCV RBC AUTO: 94.4 FL (ref 78–100)
MONOCYTES ABSOLUTE: 0.5 K/CU MM
MONOCYTES RELATIVE PERCENT: 6.5 % (ref 0–4)
NITRITE URINE, QUANTITATIVE: NEGATIVE
NUCLEATED RBC %: 0 %
PDW BLD-RTO: 15.7 % (ref 11.7–14.9)
PH, URINE: 5.5 (ref 5–8)
PLATELET # BLD: 249 K/CU MM (ref 140–440)
PMV BLD AUTO: 10 FL (ref 7.5–11.1)
POTASSIUM SERPL-SCNC: 4.9 MMOL/L (ref 3.5–5.1)
PROT/CREAT RATIO, UR: 1.7
PROTEIN UA: 30 MG/DL
RBC # BLD: 4.26 M/CU MM (ref 4.2–5.4)
RBC URINE: 18 /HPF (ref 0–6)
SEGMENTED NEUTROPHILS ABSOLUTE COUNT: 5 K/CU MM
SEGMENTED NEUTROPHILS RELATIVE PERCENT: 60.6 % (ref 36–66)
SODIUM BLD-SCNC: 136 MMOL/L (ref 135–145)
SPECIFIC GRAVITY UA: 1.01 (ref 1–1.03)
SQUAMOUS EPITHELIAL: <1 /HPF
TOTAL IMMATURE NEUTOROPHIL: 0.04 K/CU MM
TOTAL NUCLEATED RBC: 0 K/CU MM
TRICHOMONAS: ABNORMAL /HPF
URINE TOTAL PROTEIN: 65.2 MG/DL
UROBILINOGEN, URINE: 0.2 MG/DL (ref 0.2–1)
WBC # BLD: 8.3 K/CU MM (ref 4–10.5)
WBC CLUMP: ABNORMAL /HPF
WBC UA: 131 /HPF (ref 0–5)

## 2022-09-22 PROCEDURE — 1124F ACP DISCUSS-NO DSCNMKR DOCD: CPT | Performed by: INTERNAL MEDICINE

## 2022-09-22 PROCEDURE — 93000 ELECTROCARDIOGRAM COMPLETE: CPT | Performed by: INTERNAL MEDICINE

## 2022-09-22 PROCEDURE — G8417 CALC BMI ABV UP PARAM F/U: HCPCS | Performed by: INTERNAL MEDICINE

## 2022-09-22 PROCEDURE — 1090F PRES/ABSN URINE INCON ASSESS: CPT | Performed by: INTERNAL MEDICINE

## 2022-09-22 PROCEDURE — 84156 ASSAY OF PROTEIN URINE: CPT

## 2022-09-22 PROCEDURE — 82570 ASSAY OF URINE CREATININE: CPT

## 2022-09-22 PROCEDURE — 82040 ASSAY OF SERUM ALBUMIN: CPT

## 2022-09-22 PROCEDURE — 36415 COLL VENOUS BLD VENIPUNCTURE: CPT

## 2022-09-22 PROCEDURE — G8427 DOCREV CUR MEDS BY ELIG CLIN: HCPCS | Performed by: INTERNAL MEDICINE

## 2022-09-22 PROCEDURE — 85025 COMPLETE CBC W/AUTO DIFF WBC: CPT

## 2022-09-22 PROCEDURE — 1036F TOBACCO NON-USER: CPT | Performed by: INTERNAL MEDICINE

## 2022-09-22 PROCEDURE — 80048 BASIC METABOLIC PNL TOTAL CA: CPT

## 2022-09-22 PROCEDURE — 99214 OFFICE O/P EST MOD 30 MIN: CPT | Performed by: INTERNAL MEDICINE

## 2022-09-22 PROCEDURE — 81001 URINALYSIS AUTO W/SCOPE: CPT

## 2022-09-22 PROCEDURE — G8399 PT W/DXA RESULTS DOCUMENT: HCPCS | Performed by: INTERNAL MEDICINE

## 2022-09-22 RX ORDER — SIMVASTATIN 20 MG
TABLET ORAL
Qty: 90 TABLET | Refills: 3 | Status: SHIPPED | OUTPATIENT
Start: 2022-09-22

## 2022-09-22 RX ORDER — ALLOPURINOL 300 MG/1
TABLET ORAL
Qty: 90 TABLET | Refills: 3 | Status: SHIPPED | OUTPATIENT
Start: 2022-09-22

## 2022-09-22 RX ORDER — LEVOTHYROXINE SODIUM 137 UG/1
TABLET ORAL
Qty: 90 TABLET | Refills: 3 | Status: SHIPPED | OUTPATIENT
Start: 2022-09-22

## 2022-09-22 NOTE — PROGRESS NOTES
CARDIOLOGY NOTE      9/22/2022    RE: Jasmeet Jean  (1940)                               TO:  Dr. Timothy Johnson MD            Noble Mcmillan is a [de-identified] y.o. female who was seen today for management of  htn  Patient is here for follow-up on AICHA                                  HPI:                   Pt has h/o htn, hyperlipidimea, DM, seen today for  fu.  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    Chronic kidney disease, stage IV (severe) ([facility] [address] 04/20/2022    Chronic renal disease, stage III [facility]) [350524] 04/20/2022    Renal stone 04/20/2022    Major depressive disorder, recurrent, mild 04/20/2022    Major depressive disorder, recurrent, moderate 04/20/2022    Major depressive disorder, recurrent, unspecified 04/20/2022    Leg swelling 03/15/2022    Chronic pain of left knee 03/15/2022    Uncontrolled type 2 diabetes mellitus with insulin therapy ([address] 12/29/2021    Obesity, Class III, BMI 40-49.9 (morbid obesity) ([address] 10/16/2021    Chronic obstructive pulmonary disease, unspecified COPD type ([address] 10/15/2021    Chronic kidney disease 06/14/2021    Anemia 06/22/2020    B12 deficiency 06/22/2020    Type 2 diabetes mellitus with complication, with long-term current use of insulin ([address] 10/02/2017    Erythrasma 10/02/2017    Hypothyroidism 12/21/2016    Essential hypertension 12/21/2016    Abnormal nuclear cardiac imaging test     Mild persistent asthma without complication 93/19/3134    Pulmonary hypertension ([address] 03/16/2016    SOB (shortness of breath) 12/01/2014    Obesity 07/05/2013    Localized edema 07/05/2013    Fatigue 06/04/2013    SOB (shortness of breath) on exertion 06/04/2013    Hyperlipidemia 08/09/2012    Hyperuricemia 08/09/2012    Vitamin D deficiency 08/09/2012 Complications of bariatric procedures 08/09/2012     Current Outpatient Medications   Medication Sig Dispense Refill    levothyroxine (SYNTHROID) 137 MCG tablet TAKE 1 TABLET DAILY 90 tablet 3    allopurinol (ZYLOPRIM) 300 MG tablet TAKE 1 TABLET DAILY 90 tablet 3    simvastatin (ZOCOR) 20 MG tablet TAKE 1 TABLET NIGHTLY 90 tablet 3    torsemide (DEMADEX) 20 MG tablet Take 1 tablet by mouth in the morning and at bedtime 180 tablet 3    citalopram (CELEXA) 20 mg tablet Take 1 tablet by mouth daily 90 tablet 3    losartan (COZAAR) 25 MG tablet Take 1 tablet by mouth daily 90 tablet 3    hydrALAZINE (APRESOLINE) 25 MG tablet Take 1 tablet by mouth 3 times daily 90 tablet 3    Handicap Placard MISC by Does not apply route Expires 5/19/2027 1 each 0    acetaminophen (TYLENOL) 325 MG tablet Take 650 mg by mouth as needed for Pain      empagliflozin (JARDIANCE) 10 MG tablet Take 1 tablet by mouth daily 90 tablet 3    metoprolol succinate (TOPROL XL) 100 MG extended release tablet TAKE 1 TABLET DAILY 90 tablet 3    amLODIPine (NORVASC) 10 MG tablet TAKE 1 TABLET DAILY 90 tablet 3    budesonide-formoterol (SYMBICORT) 80-4.5 MCG/ACT AERO Inhale 2 puffs into the lungs 2 times daily 10.2 g 5    LEVEMIR FLEXTOUCH 100 UNIT/ML injection pen INJECT 28 UNITS SUBCUTANEOUSLY NIGHTLY, DOSE TO BE ADJUSTED ACCORDING TO MORNING FASTING BLOOD SUGAR READINGS 30 mL 3    fluticasone (FLONASE) 50 MCG/ACT nasal spray 1 spray by Each Nare route daily 1 Bottle 3    Insulin Pen Needle 29G X 12.7MM MISC 1 each by Does not apply route daily 300 each 3    omeprazole (PRILOSEC OTC) 20 MG tablet Take 1 tablet by mouth daily 90 tablet 3    calcium carbonate (TUMS) 500 MG chewable tablet Take 2 tablets by mouth as needed. aspirin 81 MG EC tablet Take 81 mg by mouth daily. No current facility-administered medications for this visit.      Allergies: Scopolamine, Amoxicillin, Bactrim [sulfamethoxazole-trimethoprim], and Penicillins  Past Medical History:   Diagnosis Date    Abnormal nuclear stress test 04/08/2007 4/08-EF=67%,Mild->Mod.isch. LAD;7/07-EF=70%,Suggests poss. Mild Ant.wall ischemia. Cardiac arrest ([facility] [address] 2008    Diabetes mellitus ([facility] [address]     H/O cardiac catheterization 04/2008    No signif.CAD.    H/O cardiovascular stress test 04/11/2008    mild to mod ischemia in the LAD region, abnormal study, EF 67%, patient developed mild chest pain and throat tightness    H/O cardiovascular stress test 05/03/2016    lexiscan-moderate ischemia apical,RF66%    H/O Doppler ultrasound 07/16/2007    CAROTID DOPLER- intimal thickening but no significant atherosclerotic plaque noted in the right or left internal carotid artery, doppler flow velocities within the right and left internal carotid artery are elevated, consistent with a mild, less than 50% stenosis    H/O echocardiogram 10/14/2008    EF>55%, normal LV systolic function, normal left ventricular wall thickness, impaired LV relaxation    H/O echocardiogram 06/18/2013 6/13- EF >55% Impaired LV relaxation, Mild concentric LV hypertrophy, No sig AS, THEO underestimated. 10/08-EF=>55%,NL study;7/07-EF=>55%,Mild valv. AS.    H/O echocardiogram 05/16/2016 5/16 EF 55-60% normal study 12/14EF 60%. MIld mitral and tricuspid insufficiencies. Mildly sclerotic aortic valve which appears to be mildly stenosed with aortic valve area of 1.4 however this does not appear to be hemodynamically signficant aortic stenosis. Mildy hypertropic left ventricle with normal global and regional left ventricular systolic function. H/O left heart cath 05/23/2016    no significant disease in all vessels.  EF 55%    H/O left heart cath 06/08/2022    No significant disease    Hernia     after lap band removal    Hx of cardiovascular stress test 06/18/2013 6/13-EF 51%, no scitnigraphic evidence of inducible myocardial ischemia    Hyperlipidemia     Hypertension     Irritable bowel syndrome     Kidney stones     Obesity Osteoarthritis     Thyroid disease     Type II or unspecified type diabetes mellitus without mention of complication, not stated as uncontrolled      Past Surgical History:   Procedure Laterality Date    CARDIAC CATHETERIZATION  08    continue risk factor modification with strict control of her risk factors with diet and control of diabetes, high blood pressure and hyperlipidemia    GASTRIC BAND  2008    revision of gastric band placement    HYSTERECTOMY (CERVIX STATUS UNKNOWN)      LAP BAND      put in in May Removed in August    LITHOTRIPSY      [address]      right    ROTATOR CUFF REPAIR        As reviewed   Family History   Problem Relation Age of Onset    Cancer Sister     COPD Brother     Heart Disease Brother     Cancer Mother         melonoma    Migraines Mother     Cancer Father         carcinoma    High Blood Pressure Father     Heart Disease Father      Social History     Tobacco Use    Smoking status: Former     Packs/day: 0.50     Years: 3.00     Pack years: 1.50     Types: Cigarettes     Quit date: 1994     Years since quittin.1    Smokeless tobacco: Never   Substance Use Topics    Alcohol use: No     Alcohol/week: 0.0 standard drinks      Review of Systems:    Constitutional: Negative for diaphoresis and fatigue  Psychological:Negative for anxiety or depression  HENT: Negative for headaches, nasal congestion, sinus pain or vertigo  Eyes: Negative for visual disturbance.    Endocrine: Negative for polydipsia/polyuria  Respiratory: Negative for shortness of breath  Cardiovascular: Negative for chest pain, dyspnea on exertion, claudication, edema, irregular heartbeat, murmur, palpitations or shortness of breath  Gastrointestinal: Negative for abdominal pain or heartburn  Genito-Urinary: Negative for urinary frequency/urgency  Musculoskeletal: Negative for muscle pain, muscular weakness, negative for pain in arm and leg or swelling in foot and leg  Neurological: Negative for dizziness, headaches, memory loss, numbness/tingling, visual changes, syncope  Dermatological: Negative for rash    Objective:    Vitals:    09/22/22 1059   BP: 132/78   Pulse: (!) 46   Resp: 16   SpO2: 98%   Weight: 218 lb (98.9 kg)   Height: 5' 6\" (1.676 m)     /78   Pulse (!) 46   Resp 16   Ht 5' 6\" (1.676 m)   Wt 218 lb (98.9 kg)   SpO2 98%   BMI 35.19 kg/m²     No flowsheet data found. Wt Readings from Last 3 Encounters:   09/22/22 218 lb (98.9 kg)   08/04/22 210 lb (95.3 kg)   07/12/22 214 lb (97.1 kg)     Body mass index is 35.19 kg/m². GENERAL - Alert, oriented, pleasant, in no apparent distress. EYES: No jaundice, no conjunctival pallor. SKIN: It is warm & dry. No rashes. No Echhymosis    HEENT - No clinically significant abnormalities seen. Neck - Supple. No jugular venous distention noted. No carotid bruits. Cardiovascular - Normal S1 and S2 without obvious murmur or gallop. Extremities - No cyanosis, clubbing, or significant edema. Pulmonary - No respiratory distress. No wheezes or rales. Abdomen - No masses, tenderness, or organomegaly. Musculoskeletal - No significant edema. No joint deformities. No muscle wasting. Neurologic - Cranial nerves II through XII are grossly intact. There were no gross focal neurologic abnormalities.     Lab Review   No results found for: CKTOTAL, CKMB, CKMBINDEX, TROPONINT  BNP:  No results found for: BNP  PT/INR:    Lab Results   Component Value Date    INR 0.93 05/21/2016     Lab Results   Component Value Date    LABA1C 8.4 (H) 05/27/2022    LABA1C 7.2 12/29/2021     Lab Results   Component Value Date    WBC 8.9 07/22/2022    HCT 36.3 (L) 07/22/2022    MCV 93.8 07/22/2022     07/22/2022     Lab Results   Component Value Date    CHOL 233 (H) 06/29/2021    TRIG 160 (H) 06/29/2021    HDL 56 06/29/2021    LDLCALC 145 (H) 06/29/2021    LDLDIRECT 132 (H) 08/19/2019     Lab Results   Component Value Date    ALT 13 12/29/2021    AST 18 12/29/2021     BMP:    Lab Results   Component Value Date/Time     07/22/2022 10:03 AM    K 3.8 07/22/2022 10:03 AM     07/22/2022 10:03 AM    CO2 24 07/22/2022 10:03 AM    BUN 38 07/22/2022 10:03 AM    CREATININE 1.9 07/22/2022 10:03 AM     CMP:   Lab Results   Component Value Date/Time     07/22/2022 10:03 AM    K 3.8 07/22/2022 10:03 AM     07/22/2022 10:03 AM    CO2 24 07/22/2022 10:03 AM    BUN 38 07/22/2022 10:03 AM    PROT 7.3 05/27/2022 12:43 PM    PROT 8.4 08/09/2012 11:08 AM     TSH:    Lab Results   Component Value Date/Time    TSH 2.85 12/29/2021 11:27 AM    TSHHS 0.751 05/27/2022 12:43 PM           Assessment & Plan:    -Patient is here for preop clearance   Low cardiac risk    Getting urolithiasis surgery      - CKD per PCP and diane Olson 1.9    -  Hypertension: Patients blood pressure is normal. Patient is advised about low sodium diet. Present medical regimen will not be changed. on lisinopril compliant blood pressure is well controlled compliant          -  LIPID MANAGEMENT:  Importance of lipid levels discussed with patient   and patient was given dietary advice. NCEP- ATP III guidelines reviewed with patient. -   Changes  in medicines made: No                 On zocor compliant we will check the lipid profile         - cluadication : AICHA as patient has multiple risk factors and at high risk of peripheral arterial disease hence will obtain AICHA were normal    - Bradycardia: will monitor     -   DIABETES MELLITUS: Available pertinent lab data reviewed   and  patient was given dietary advice . Advised to check blood glucose level on a regular basis. -   Changes  in medicines made: No        On onsulin and Jardiance compliant we will check A1c      - VHD: Mild AS  Yearly echo  Conclusions      Summary   Left ventricular function and size is normal, EF is estimated at 55-60%. Mild left ventricular hypertrophy. Normal diastolic filling pattern for age.    No

## 2022-10-07 ENCOUNTER — TELEPHONE (OUTPATIENT)
Dept: CARDIOLOGY CLINIC | Age: 82
End: 2022-10-07

## 2022-10-07 NOTE — TELEPHONE ENCOUNTER
[address] [place] [address]., [facility] needs faxed over: All recent testing, When last test where performed.  Fax number is : [phone] Attn: Coby/ICU

## 2022-10-24 ENCOUNTER — OFFICE VISIT (OUTPATIENT)
Dept: CARDIOLOGY CLINIC | Age: 82
End: 2022-10-24
Payer: MEDICARE

## 2022-10-24 VITALS — SYSTOLIC BLOOD PRESSURE: 126 MMHG | HEART RATE: 58 BPM | DIASTOLIC BLOOD PRESSURE: 52 MMHG

## 2022-10-24 DIAGNOSIS — I50.30 HEART FAILURE WITH PRESERVED EJECTION FRACTION, UNSPECIFIED HF CHRONICITY (HCC): Primary | ICD-10-CM

## 2022-10-24 PROCEDURE — 99214 OFFICE O/P EST MOD 30 MIN: CPT | Performed by: INTERNAL MEDICINE

## 2022-10-24 PROCEDURE — 1124F ACP DISCUSS-NO DSCNMKR DOCD: CPT | Performed by: INTERNAL MEDICINE

## 2022-10-24 PROCEDURE — 1036F TOBACCO NON-USER: CPT | Performed by: INTERNAL MEDICINE

## 2022-10-24 PROCEDURE — G8484 FLU IMMUNIZE NO ADMIN: HCPCS | Performed by: INTERNAL MEDICINE

## 2022-10-24 PROCEDURE — 1090F PRES/ABSN URINE INCON ASSESS: CPT | Performed by: INTERNAL MEDICINE

## 2022-10-24 PROCEDURE — G8417 CALC BMI ABV UP PARAM F/U: HCPCS | Performed by: INTERNAL MEDICINE

## 2022-10-24 PROCEDURE — G8399 PT W/DXA RESULTS DOCUMENT: HCPCS | Performed by: INTERNAL MEDICINE

## 2022-10-24 PROCEDURE — G8427 DOCREV CUR MEDS BY ELIG CLIN: HCPCS | Performed by: INTERNAL MEDICINE

## 2022-10-24 RX ORDER — TRAMADOL HYDROCHLORIDE 50 MG/1
TABLET ORAL
COMMUNITY
Start: 2022-10-11

## 2022-10-24 RX ORDER — HYDROCODONE BITARTRATE AND ACETAMINOPHEN 5; 325 MG/1; MG/1
TABLET ORAL
COMMUNITY
Start: 2022-10-14

## 2022-10-24 NOTE — PROGRESS NOTES
CARDIOLOGY NOTE      10/24/2022    RE: Deepti Valente  (1940)                               TO:  Dr. Manolo Platt MD            Wilner Ferguson is a [de-identified] y.o. female who was seen today for management of  htn                                    HPI:                   Pt has h/o htn, hyperlipidimea, DM, seen today for  fu.  Pt had COVID has no CP, had a normal cath has normal EF  Had a mechanical fall fracture of the left shoulder and needed surgery however the patient had some cardiac labs done i.e. 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    Chronic kidney disease, stage IV (severe) ([address] 04/20/2022    Chronic renal disease, stage III [facility]) [007567] 04/20/2022    Renal stone 04/20/2022    Major depressive disorder, recurrent, mild 04/20/2022    Major depressive disorder, recurrent, moderate 04/20/2022    Major depressive disorder, recurrent, unspecified 04/20/2022    Leg swelling 03/15/2022    Chronic pain of left knee 03/15/2022    Uncontrolled type 2 diabetes mellitus with insulin therapy 12/29/2021    Obesity, Class III, BMI 40-49.9 (morbid obesity) ([address] 10/16/2021    Chronic obstructive pulmonary disease, unspecified COPD type ([address] 10/15/2021    Chronic kidney disease 06/14/2021    Anemia 06/22/2020    B12 deficiency 06/22/2020    Type 2 diabetes mellitus with complication, with long-term current use of insulin ([address] 10/02/2017    Erythrasma 10/02/2017    Hypothyroidism 12/21/2016    Essential hypertension 12/21/2016    Abnormal nuclear cardiac imaging test     Mild persistent asthma without complication 09/53/5804    Pulmonary hypertension ([address] 03/16/2016    SOB (shortness of breath) 12/01/2014    Obesity 07/05/2013    Localized edema 07/05/2013    Fatigue 06/04/2013    SOB (shortness of breath) on exertion 06/04/2013    Hyperlipidemia 08/09/2012    Hyperuricemia 08/09/2012    Vitamin D deficiency 75/12/8390    Complications of bariatric procedures 08/09/2012     Current Outpatient Medications   Medication Sig Dispense Refill    traMADol (ULTRAM) 50 MG tablet tramadol 50 mg tablet   TAKE 1 TABLET BY MOUTH EVERY 6 HOURS AS NEEDED FOR UP TO 7 DAYS.      levothyroxine (SYNTHROID) 137 MCG tablet TAKE 1 TABLET DAILY 90 tablet 3    allopurinol (ZYLOPRIM) 300 MG tablet TAKE 1 TABLET DAILY 90 tablet 3    simvastatin (ZOCOR) 20 MG tablet TAKE 1 TABLET NIGHTLY 90 tablet 3    torsemide (DEMADEX) 20 MG tablet Take 1 tablet by mouth in the morning and at bedtime 180 tablet 3    citalopram (CELEXA) 20 mg tablet Take 1 tablet by mouth daily 90 tablet 3    losartan (COZAAR) 25 MG tablet Take 1 tablet by mouth daily 90 tablet 3    hydrALAZINE (APRESOLINE) 25 MG tablet Take 1 tablet by mouth 3 times daily 90 tablet 3    Handicap Placard MISC by Does not apply route Expires 5/19/2027 1 each 0    acetaminophen (TYLENOL) 325 MG tablet Take 650 mg by mouth as needed for Pain      empagliflozin (JARDIANCE) 10 MG tablet Take 1 tablet by mouth daily 90 tablet 3    metoprolol succinate (TOPROL XL) 100 MG extended release tablet TAKE 1 TABLET DAILY 90 tablet 3    amLODIPine (NORVASC) 10 MG tablet TAKE 1 TABLET DAILY 90 tablet 3    budesonide-formoterol (SYMBICORT) 80-4.5 MCG/ACT AERO Inhale 2 puffs into the lungs 2 times daily 10.2 g 5    LEVEMIR FLEXTOUCH 100 UNIT/ML injection pen INJECT 28 UNITS SUBCUTANEOUSLY NIGHTLY, DOSE TO BE ADJUSTED ACCORDING TO MORNING FASTING BLOOD SUGAR READINGS 30 mL 3    fluticasone (FLONASE) 50 MCG/ACT nasal spray 1 spray by Each Nare route daily 1 Bottle 3    Insulin Pen Needle 29G X 12.7MM MISC 1 each by Does not apply route daily 300 each 3    omeprazole (PRILOSEC OTC) 20 MG tablet Take 1 tablet by mouth daily 90 tablet 3    calcium carbonate (TUMS) 500 MG chewable tablet Take 2 tablets by mouth as needed. aspirin 81 MG EC tablet Take 81 mg by mouth daily. HYDROcodone-acetaminophen (NORCO) 5-325 MG per tablet       aspirin 325 MG EC tablet TAKE 1 TABLET BY MOUTH TWICE DAILY FOR 14 DAYS       No current facility-administered medications for this visit. Allergies: Scopolamine, Amoxicillin, Bactrim [sulfamethoxazole-trimethoprim], and Penicillins  Past Medical History:   Diagnosis Date    Abnormal nuclear stress test 04/08/2007 4/08-EF=67%,Mild->Mod.isch. LAD;7/07-EF=70%,Suggests poss. Mild Ant.wall ischemia. Cardiac arrest [facility] 2008    Diabetes mellitus ([facility]     H/O cardiac catheterization 04/2008    No signif.CAD.    H/O cardiovascular stress test 04/11/2008    mild to mod ischemia in the LAD region, abnormal study, EF 67%, patient developed mild chest pain and throat tightness    H/O cardiovascular stress test 05/03/2016    lexiscan-moderate ischemia apical,RF66%    H/O Doppler ultrasound 07/16/2007    CAROTID DOPLER- intimal thickening but no significant atherosclerotic plaque noted in the right or left internal carotid artery, doppler flow velocities within the right and left internal carotid artery are elevated, consistent with a mild, less than 50% stenosis    H/O echocardiogram 10/14/2008    EF>55%, normal LV systolic function, normal left ventricular wall thickness, impaired LV relaxation    H/O echocardiogram 06/18/2013 6/13- EF >55% Impaired LV relaxation, Mild concentric LV hypertrophy, No sig AS, THEO underestimated. 10/08-EF=>55%,NL study;7/07-EF=>55%,Mild valv. AS.    H/O echocardiogram 05/16/2016 5/16 EF 55-60% normal study 12/14EF 60%. MIld mitral and tricuspid insufficiencies.  Mildly sclerotic aortic valve which appears to be mildly stenosed with aortic valve area of 1.4 however this does not appear to be hemodynamically signficant aortic stenosis. Mildy hypertropic left ventricle with normal global and regional left ventricular systolic function. H/O left heart cath 2016    no significant disease in all vessels. EF 55%    H/O left heart cath 2022    No significant disease    Hernia     after lap band removal    Hx of cardiovascular stress test 2013-EF 51%, no scitnigraphic evidence of inducible myocardial ischemia    Hyperlipidemia     Hypertension     Irritable bowel syndrome     Kidney stones     Obesity     Osteoarthritis     Thyroid disease     Type II or unspecified type diabetes mellitus without mention of complication, not stated as uncontrolled      Past Surgical History:   Procedure Laterality Date    CARDIAC CATHETERIZATION  08    continue risk factor modification with strict control of her risk factors with diet and control of diabetes, high blood pressure and hyperlipidemia    GASTRIC BAND  2008    revision of gastric band placement    HYSTERECTOMY (CERVIX STATUS UNKNOWN)      LAP BAND      put in in May Removed in August    LITHOTRIPSY      [address]      right    ROTATOR CUFF REPAIR        As reviewed   Family History   Problem Relation Age of Onset    Cancer Sister     COPD Brother     Heart Disease Brother     Cancer Mother         melonoma    Migraines Mother     Cancer Father         carcinoma    High Blood Pressure Father     Heart Disease Father      Social History     Tobacco Use    Smoking status: Former     Packs/day: 0.50     Years: 3.00     Pack years: 1.50     Types: Cigarettes     Quit date: 1994     Years since quittin.2    Smokeless tobacco: Never   Substance Use Topics    Alcohol use: No     Alcohol/week: 0.0 standard drinks      Review of Systems:    Constitutional: Negative for diaphoresis and fatigue  Psychological:Negative for anxiety or depression  HENT: Negative for headaches, nasal congestion, sinus pain or vertigo  Eyes: Negative for visual disturbance. Endocrine: Negative for polydipsia/polyuria  Respiratory: Negative for shortness of breath  Cardiovascular: Negative for chest pain, dyspnea on exertion, claudication, edema, irregular heartbeat, murmur, palpitations or shortness of breath  Gastrointestinal: Negative for abdominal pain or heartburn  Genito-Urinary: Negative for urinary frequency/urgency  Musculoskeletal: Negative for muscle pain, muscular weakness, negative for pain in arm and leg or swelling in foot and leg  Neurological: Negative for dizziness, headaches, memory loss, numbness/tingling, visual changes, syncope  Dermatological: Negative for rash    Objective:    Vitals:    10/24/22 1326   BP: (!) 126/52   Site: Right Upper Arm   Position: Sitting   Cuff Size: Medium Adult   Pulse: 58     BP (!) 126/52 (Site: Right Upper Arm, Position: Sitting, Cuff Size: Medium Adult)   Pulse 58     No flowsheet data found. Wt Readings from Last 3 Encounters:   09/22/22 218 lb (98.9 kg)   08/04/22 210 lb (95.3 kg)   07/12/22 214 lb (97.1 kg)     There is no height or weight on file to calculate BMI. GENERAL - Alert, oriented, pleasant, in no apparent distress. EYES: No jaundice, no conjunctival pallor. SKIN: It is warm & dry. No rashes. No Echhymosis    HEENT - No clinically significant abnormalities seen. Neck - Supple. No jugular venous distention noted. No carotid bruits. Cardiovascular - Normal S1 and S2 without obvious murmur or gallop. Extremities - No cyanosis, clubbing, or significant edema. Pulmonary - No respiratory distress. No wheezes or rales. Abdomen - No masses, tenderness, or organomegaly. Musculoskeletal - No significant edema. No joint deformities. No muscle wasting. Neurologic - Cranial nerves II through XII are grossly intact. There were no gross focal neurologic abnormalities.     Lab Review   No results found for: CKTOTAL, CKMB, CKMBINDEX, TROPONINT  BNP:  No results found for: BNP  PT/INR:    Lab Results   Component Value Date    INR 0.93 05/21/2016     Lab Results   Component Value Date    LABA1C 8.4 (H) 05/27/2022    LABA1C 7.2 12/29/2021     Lab Results   Component Value Date    WBC 8.3 09/22/2022    HCT 40.2 09/22/2022    MCV 94.4 09/22/2022     09/22/2022     Lab Results   Component Value Date    CHOL 233 (H) 06/29/2021    TRIG 160 (H) 06/29/2021    HDL 56 06/29/2021    LDLCALC 145 (H) 06/29/2021    LDLDIRECT 132 (H) 08/19/2019     Lab Results   Component Value Date    ALT 13 12/29/2021    AST 18 12/29/2021     BMP:    Lab Results   Component Value Date/Time     09/22/2022 11:54 AM    K 4.9 09/22/2022 11:54 AM    CL 99 09/22/2022 11:54 AM    CO2 22 09/22/2022 11:54 AM    BUN 57 09/22/2022 11:54 AM    CREATININE 3.0 09/22/2022 11:54 AM     CMP:   Lab Results   Component Value Date/Time     09/22/2022 11:54 AM    K 4.9 09/22/2022 11:54 AM    CL 99 09/22/2022 11:54 AM    CO2 22 09/22/2022 11:54 AM    BUN 57 09/22/2022 11:54 AM    PROT 7.3 05/27/2022 12:43 PM    PROT 8.4 08/09/2012 11:08 AM     TSH:    Lab Results   Component Value Date/Time    TSH 2.85 12/29/2021 11:27 AM    TSHHS 0.751 05/27/2022 12:43 PM           Assessment & Plan:          - CKD per PCP and diane Olson 1.9    -  Hypertension: Patients blood pressure is normal. Patient is advised about low sodium diet. Present medical regimen will not be changed. on lisinopril compliant blood pressure is well controlled compliant          -  LIPID MANAGEMENT:  Importance of lipid levels discussed with patient   and patient was given dietary advice. NCEP- ATP III guidelines reviewed with patient. -   Changes  in medicines made:  No                 On zocor compliant we will check the lipid profile         - cluadication : AICHA as patient has multiple risk factors and at high risk of peripheral arterial disease hence will obtain AICHA were normal    - Bradycardia: will monitor     -   DIABETES MELLITUS: Available pertinent lab data reviewed   and patient was given dietary advice . Advised to check blood glucose level on a regular basis.       -   Changes  in medicines made: No        On onsulin and Jardiance compliant we will check A1c      - VHD: Mild AS  Candiecho  Patient's BNP was elevated however for her age it was not an elevated level hence will obtain an echocardiogram for further assessment      From [facility]    [de-identified] y.o. female with a past medical history of  type 2 diabetes, GERD, hypertension, hyperlipidemia, morbid obesity and kidney stone who is presenting to ED with accidental fall that led into left shoulder fracture , patient denied having any symptoms prior to the fall, she said that she fell over a chair leg hitting the left shoulder and her face and she denies any loss of consciousness, she did have some nausea and headache afterwards , left shoulder pain is severe, no numbness or weakness to non-dominant left hand, no neck pain, CP, AP , the patient was admitted for further eval.      Shoshana Thompson MD    [facility] [place]

## 2022-11-13 DIAGNOSIS — E78.5 HYPERLIPIDEMIA, UNSPECIFIED HYPERLIPIDEMIA TYPE: ICD-10-CM

## 2022-11-13 DIAGNOSIS — E03.8 OTHER SPECIFIED HYPOTHYROIDISM: ICD-10-CM

## 2022-11-13 RX ORDER — LEVOTHYROXINE SODIUM 137 MCG
TABLET ORAL
Qty: 90 TABLET | Refills: 3 | OUTPATIENT
Start: 2022-11-13

## 2022-11-13 RX ORDER — SIMVASTATIN 20 MG
TABLET ORAL
Qty: 90 TABLET | Refills: 3 | OUTPATIENT
Start: 2022-11-13

## 2022-11-13 RX ORDER — ALLOPURINOL 300 MG/1
TABLET ORAL
Qty: 90 TABLET | Refills: 3 | OUTPATIENT
Start: 2022-11-13

## 2022-11-13 RX ORDER — INSULIN DETEMIR 100 [IU]/ML
INJECTION, SOLUTION SUBCUTANEOUS
Qty: 30 ML | Refills: 3 | OUTPATIENT
Start: 2022-11-13

## 2022-11-17 ENCOUNTER — PROCEDURE VISIT (OUTPATIENT)
Dept: CARDIOLOGY CLINIC | Age: 82
End: 2022-11-17
Payer: MEDICARE

## 2022-11-17 DIAGNOSIS — I50.30 HEART FAILURE WITH PRESERVED EJECTION FRACTION, UNSPECIFIED HF CHRONICITY (HCC): ICD-10-CM

## 2022-11-17 DIAGNOSIS — I38 VHD (VALVULAR HEART DISEASE): Primary | ICD-10-CM

## 2022-11-17 LAB
LV EF: 58 %
LVEF MODALITY: NORMAL

## 2022-11-17 PROCEDURE — 93306 TTE W/DOPPLER COMPLETE: CPT | Performed by: INTERNAL MEDICINE

## 2022-11-18 ENCOUNTER — TELEPHONE (OUTPATIENT)
Dept: CARDIOLOGY CLINIC | Age: 82
End: 2022-11-18

## 2022-11-18 NOTE — TELEPHONE ENCOUNTER
Technically difficult examination, patient unable to lie flat or on left   side due to recent shoulder surgery. Left ventricular function and size is normal, EF is estimated at 55-60%. Mild left ventricular hypertrophy. Normal diastolic filling pattern for age. No regional wall motion abnormalities were detected. Mildly dilated left atrium. Moderately sclerotic aortic valve with mild aortic stenosis, mean gradient   of 19mmHg, THEO 1.46cm2. Mitral annular calcification is present. Mild mitral and tricuspid regurgitation is present. Mild Pulmonary hypertension with RVSP of 42mmhg. No evidence of pericardial effusion. Compared to previous echo ,no significant changes noted. sara in 6 mos for AS    Left message of normal results, OK per patient communication form.

## 2023-01-25 DIAGNOSIS — Z79.4 TYPE 2 DIABETES MELLITUS WITH COMPLICATION, WITH LONG-TERM CURRENT USE OF INSULIN (HCC): ICD-10-CM

## 2023-01-25 DIAGNOSIS — I10 ESSENTIAL HYPERTENSION: ICD-10-CM

## 2023-01-25 DIAGNOSIS — E11.8 TYPE 2 DIABETES MELLITUS WITH COMPLICATION, WITH LONG-TERM CURRENT USE OF INSULIN (HCC): ICD-10-CM

## 2023-01-25 RX ORDER — METOPROLOL SUCCINATE 100 MG/1
TABLET, EXTENDED RELEASE ORAL
Qty: 90 TABLET | Refills: 3 | OUTPATIENT
Start: 2023-01-25

## 2023-01-25 RX ORDER — AMLODIPINE BESYLATE 10 MG/1
TABLET ORAL
Qty: 90 TABLET | Refills: 3 | OUTPATIENT
Start: 2023-01-25

## 2023-01-25 RX ORDER — EMPAGLIFLOZIN 10 MG/1
TABLET, FILM COATED ORAL
Qty: 90 TABLET | Refills: 3 | OUTPATIENT
Start: 2023-01-25

## 2023-02-09 ENCOUNTER — TELEPHONE (OUTPATIENT)
Dept: CARDIOLOGY CLINIC | Age: 83
End: 2023-02-09

## 2023-02-09 NOTE — TELEPHONE ENCOUNTER
[facility] needs EKG and interpretation sent to fax [phone]. Having procedure 02/10/23.

## 2023-02-20 DIAGNOSIS — I10 ESSENTIAL HYPERTENSION: ICD-10-CM

## 2023-02-20 RX ORDER — METOPROLOL SUCCINATE 100 MG/1
TABLET, EXTENDED RELEASE ORAL
Qty: 90 TABLET | Refills: 3 | OUTPATIENT
Start: 2023-02-20

## 2023-03-01 PROBLEM — I38 VALVULAR HEART DISEASE: Status: ACTIVE | Noted: 2023-03-01

## 2023-03-01 PROBLEM — N30.00 ACUTE CYSTITIS WITHOUT HEMATURIA: Status: ACTIVE | Noted: 2023-03-01

## 2023-03-13 ENCOUNTER — TELEPHONE (OUTPATIENT)
Dept: CARDIOLOGY CLINIC | Age: 83
End: 2023-03-13

## 2023-03-13 NOTE — TELEPHONE ENCOUNTER
Cardiologist: Dr. Ling Galindo  Surgeon: Dr. Elver Farmer  Surgery: Colonoscopy  Anesthesia: Propofol  Date: 3/14/2023  FAX# [phone]  # [phone]    Last OV 10/24/2022 w/Mateo         - CKD per PCP and diane Olson 1.9     -  Hypertension: Patients blood pressure is normal. Patient is advised about low sodium diet. Present medical regimen will not be changed. on lisinopril compliant blood pressure is well controlled compliant              -  LIPID MANAGEMENT:  Importance of lipid levels discussed with patient   and patient was given dietary advice. NCEP- ATP III guidelines reviewed with patient. -   Changes  in medicines made: No                 On zocor compliant we will check the lipid profile          - cluadication : AICHA as patient has multiple risk factors and at high risk of peripheral arterial disease hence will obtain AICHA were normal     - Bradycardia: will monitor     -   DIABETES MELLITUS: Available pertinent lab data reviewed   and  patient was given dietary advice . Advised to check blood glucose level on a regular basis. -   Changes  in medicines made: No        On onsulin and Jardiance compliant we will check A1c      - VHD: Mild AS  Anita  Patient's BNP was elevated however for her age it was not an elevated level hence will obtain an echocardiogram for further assessment          Last EKG- 9/22/2022        NM-11/8/2021  Small sized defect of mild severity which is reversible involving septal    wall of myocardium. Abnormal Lexiscan nuclear scintigraphic study suggestive    of abnormal myocardial perfusion. Mild degree of septal wall ischemia noted    involving a small sized area of left ventricular myocardium. Gated images    demonstrate normal left ventricular systolic function with EF of 60 %. Echo- 11/17/2022   Technically difficult examination, patient unable to lie flat or on left   side due to recent shoulder surgery.    Left ventricular function and size is normal, EF is estimated at 55-60%. Mild left ventricular hypertrophy. Normal diastolic filling pattern for age. No regional wall motion abnormalities were detected. Mildly dilated left atrium. Moderately sclerotic aortic valve with mild aortic stenosis, mean gradient   of 19mmHg, THEO 1.46cm2. Mitral annular calcification is present. Mild mitral and tricuspid regurgitation is present. Mild Pulmonary hypertension with RVSP of 42mmhg. No evidence of pericardial effusion. Compared to previous echo ,no significant changes noted.    reecho in 6 mos for AS      Cath- 6/29/2022   lad, cx and rca patent   no sig disease        Aspirin

## 2023-03-14 ENCOUNTER — HOSPITAL ENCOUNTER (OUTPATIENT)
Age: 83
Setting detail: SPECIMEN
Discharge: HOME | End: 2023-03-14
Payer: MEDICARE

## 2023-03-14 PROCEDURE — 88305 TISSUE EXAM BY PATHOLOGIST: CPT

## 2023-03-20 ENCOUNTER — HOSPITAL ENCOUNTER (OUTPATIENT)
Age: 83
Setting detail: SPECIMEN
Discharge: HOME | End: 2023-03-20

## 2023-03-20 PROCEDURE — 82360 CALCULUS ASSAY QUANT: CPT

## 2023-03-24 LAB
APPEARANCE STONE: NORMAL
COMPN STONE: NORMAL
SPECIMEN WT: 157 MG

## 2023-03-28 ENCOUNTER — OFFICE VISIT (OUTPATIENT)
Dept: CARDIOLOGY CLINIC | Age: 83
End: 2023-03-28
Payer: MEDICARE

## 2023-03-28 VITALS
WEIGHT: 191.45 LBS | DIASTOLIC BLOOD PRESSURE: 80 MMHG | BODY MASS INDEX: 30.77 KG/M2 | SYSTOLIC BLOOD PRESSURE: 138 MMHG | HEIGHT: 66 IN | HEART RATE: 88 BPM

## 2023-03-28 DIAGNOSIS — I38 VALVULAR HEART DISEASE: Primary | ICD-10-CM

## 2023-03-28 PROCEDURE — 3079F DIAST BP 80-89 MM HG: CPT | Performed by: INTERNAL MEDICINE

## 2023-03-28 PROCEDURE — G8417 CALC BMI ABV UP PARAM F/U: HCPCS | Performed by: INTERNAL MEDICINE

## 2023-03-28 PROCEDURE — G8399 PT W/DXA RESULTS DOCUMENT: HCPCS | Performed by: INTERNAL MEDICINE

## 2023-03-28 PROCEDURE — 1090F PRES/ABSN URINE INCON ASSESS: CPT | Performed by: INTERNAL MEDICINE

## 2023-03-28 PROCEDURE — 3075F SYST BP GE 130 - 139MM HG: CPT | Performed by: INTERNAL MEDICINE

## 2023-03-28 PROCEDURE — 99214 OFFICE O/P EST MOD 30 MIN: CPT | Performed by: INTERNAL MEDICINE

## 2023-03-28 PROCEDURE — 1124F ACP DISCUSS-NO DSCNMKR DOCD: CPT | Performed by: INTERNAL MEDICINE

## 2023-03-28 PROCEDURE — G8427 DOCREV CUR MEDS BY ELIG CLIN: HCPCS | Performed by: INTERNAL MEDICINE

## 2023-03-28 PROCEDURE — G8484 FLU IMMUNIZE NO ADMIN: HCPCS | Performed by: INTERNAL MEDICINE

## 2023-03-28 PROCEDURE — 1036F TOBACCO NON-USER: CPT | Performed by: INTERNAL MEDICINE

## 2023-03-28 NOTE — PROGRESS NOTES
for muscle pain, muscular weakness, negative for pain in arm and leg or swelling in foot and leg  Neurological: Negative for dizziness, headaches, memory loss, numbness/tingling, visual changes, syncope  Dermatological: Negative for rash    Objective:    Vitals:    03/28/23 1441   BP: 138/80   Site: Right Upper Arm   Position: Sitting   Cuff Size: Medium Adult   Pulse: 88   Weight: 191 lb 7.2 oz (86.8 kg)   Height: 5' 6\" (1.676 m)     /80 (Site: Right Upper Arm, Position: Sitting, Cuff Size: Medium Adult)   Pulse 88   Ht 5' 6\" (1.676 m)   Wt 191 lb 7.2 oz (86.8 kg)   BMI 30.90 kg/m²     No flowsheet data found. Wt Readings from Last 3 Encounters:   03/28/23 191 lb 7.2 oz (86.8 kg)   03/01/23 186 lb 9.6 oz (84.6 kg)   09/22/22 218 lb (98.9 kg)     Body mass index is 30.9 kg/m². GENERAL - Alert, oriented, pleasant, in no apparent distress. EYES: No jaundice, no conjunctival pallor. SKIN: It is warm & dry. No rashes. No Echhymosis    HEENT - No clinically significant abnormalities seen. Neck - Supple. No jugular venous distention noted. No carotid bruits. Cardiovascular - Normal S1 and S2 with3/6 SE murmur . Extremities - No cyanosis, clubbing, or significant edema. Pulmonary - No respiratory distress. No wheezes or rales. Abdomen - No masses, tenderness, or organomegaly. Musculoskeletal - No significant edema. No joint deformities. No muscle wasting. Neurologic - Cranial nerves II through XII are grossly intact. There were no gross focal neurologic abnormalities.     Lab Review   No results found for: CKTOTAL, CKMB, CKMBINDEX, TROPONINT  BNP:  No results found for: BNP  PT/INR:    Lab Results   Component Value Date    INR 0.93 05/21/2016     Lab Results   Component Value Date    LABA1C 8.4 (H) 05/27/2022    LABA1C 7.2 12/29/2021     Lab Results   Component Value Date    WBC 8.3 09/22/2022    HCT 40.2 09/22/2022    MCV 94.4 09/22/2022     09/22/2022     Lab Results   Component

## 2023-03-28 NOTE — PATIENT INSTRUCTIONS
**It is YOUR responsibilty to bring medication bottles and/or updated medication list to [address] [place] [address]. This will allow us to better serve you and all your healthcare needs**    Please be informed that if you contact our office outside of normal business hours the physician on call cannot help with any scheduling or rescheduling issues, procedure instruction questions or any type of medication issue. We advise you for any urgent/emergency that you go to the nearest emergency room! PLEASE CALL OUR OFFICE DURING NORMAL BUSINESS HOURS    Monday - Friday   8 am to 5 pm    [address]: [phone]    [place]:  [phone]    Thank you for allowing us to care for you today! We want to ensure we can follow your treatment plan and we strive to give you the best outcomes and experience possible. If you ever have a life threatening emergency and call 911 - for an ambulance (EMS)   Our providers can only care for you at:   [facility] or [facility]. Even if you have someone take you or you drive yourself we can only care for you in a [facility] facility. Our providers are not setup at the other healthcare locations!

## 2023-03-29 ENCOUNTER — TELEPHONE (OUTPATIENT)
Dept: CARDIOLOGY CLINIC | Age: 83
End: 2023-03-29

## 2023-03-29 NOTE — TELEPHONE ENCOUNTER
Cardiologist: Dr. Jory Liu  Surgeon: Dr. Lainey Salomon  Surgery: cystoscopy, Quilla Presto manipulation  Anesthesia: General  Date: ASAP- 4/19/2023  FAX# [phone]  # [phone]    Last OV 3/28/2023 w/Mateo         - [facility] recent past no sig disease        - CKD per PCP and contrears     -  Hypertension: Patients blood pressure is normal. Patient is advised about low sodium diet. Present medical regimen will not be changed. on lisinopril compliant blood pressure is well controlled compliant              -  LIPID MANAGEMENT:  Importance of lipid levels discussed with patient   and patient was given dietary advice. NCEP- ATP III guidelines reviewed with patient. -   Changes  in medicines made: No                 On zocor compliant we will check the lipid profile          - cluadication : AICHA as patient has multiple risk factors and at high risk of peripheral arterial disease hence will obtain AICHA were normal           -   DIABETES MELLITUS: Available pertinent lab data reviewed   and  patient was given dietary advice . Advised to check blood glucose level on a regular basis. -   Changes  in medicines made: No        On onsulin and Jardiance compliant we will check A1c      - VHD: Mild AS  Candiecho  Patient's BNP was elevated however for her age it was not an elevated level hence will obtain an echocardiogram for further assessment  Summary   Technically difficult examination, patient unable to lie flat or on left   side due to recent shoulder surgery. Left ventricular function and size is normal, EF is estimated at 55-60%. Mild left ventricular hypertrophy. Normal diastolic filling pattern for age. No regional wall motion abnormalities were detected. Mildly dilated left atrium. Moderately sclerotic aortic valve with mild aortic stenosis, mean gradient   of 19mmHg, THEO 1.46cm2. Mitral annular calcification is present. Mild mitral and tricuspid regurgitation is present.    Mild Pulmonary hypertension

## 2023-03-30 ENCOUNTER — HOSPITAL ENCOUNTER (OUTPATIENT)
Age: 83
Discharge: HOME | End: 2023-03-30
Payer: MEDICARE

## 2023-03-30 DIAGNOSIS — E11.8 TYPE 2 DIABETES MELLITUS WITH COMPLICATION, WITH LONG-TERM CURRENT USE OF INSULIN (HCC): ICD-10-CM

## 2023-03-30 DIAGNOSIS — I10 ESSENTIAL HYPERTENSION: ICD-10-CM

## 2023-03-30 DIAGNOSIS — N30.00 ACUTE CYSTITIS WITHOUT HEMATURIA: ICD-10-CM

## 2023-03-30 DIAGNOSIS — Q61.9 CYSTIC KIDNEY DISEASE: ICD-10-CM

## 2023-03-30 DIAGNOSIS — N18.4 CHRONIC KIDNEY DISEASE, STAGE IV (SEVERE) (HCC): ICD-10-CM

## 2023-03-30 DIAGNOSIS — Z79.4 TYPE 2 DIABETES MELLITUS WITH COMPLICATION, WITH LONG-TERM CURRENT USE OF INSULIN (HCC): ICD-10-CM

## 2023-03-30 DIAGNOSIS — I38 VALVULAR HEART DISEASE: ICD-10-CM

## 2023-03-30 DIAGNOSIS — N20.0 RENAL STONE: ICD-10-CM

## 2023-03-30 DIAGNOSIS — F33.0 MAJOR DEPRESSIVE DISORDER, RECURRENT, MILD (HCC): ICD-10-CM

## 2023-03-30 LAB
ALBUMIN SERPL-MCNC: 3.9 GM/DL (ref 3.4–5)
ANION GAP SERPL CALCULATED.3IONS-SCNC: 10 MMOL/L (ref 4–16)
BACTERIA: NEGATIVE /HPF
BASOPHILS ABSOLUTE: 0 K/CU MM
BASOPHILS RELATIVE PERCENT: 0.3 % (ref 0–1)
BILIRUBIN URINE: NEGATIVE MG/DL
BLOOD, URINE: ABNORMAL
BUN SERPL-MCNC: 27 MG/DL (ref 6–23)
CALCIUM SERPL-MCNC: 9.1 MG/DL (ref 8.3–10.6)
CHLORIDE BLD-SCNC: 102 MMOL/L (ref 99–110)
CLARITY: ABNORMAL
CO2: 23 MMOL/L (ref 21–32)
COLOR: YELLOW
CREAT SERPL-MCNC: 1.5 MG/DL (ref 0.6–1.1)
CREATININE URINE: 40.7 MG/DL (ref 28–217)
DIFFERENTIAL TYPE: ABNORMAL
EOSINOPHILS ABSOLUTE: 0.2 K/CU MM
EOSINOPHILS RELATIVE PERCENT: 1.9 % (ref 0–3)
GFR SERPL CREATININE-BSD FRML MDRD: 35 ML/MIN/1.73M2
GLUCOSE SERPL-MCNC: 114 MG/DL (ref 70–99)
GLUCOSE, URINE: NEGATIVE MG/DL
HCT VFR BLD CALC: 30.7 % (ref 37–47)
HEMOGLOBIN: 9.3 GM/DL (ref 12.5–16)
HYALINE CASTS: 2 /LPF
IMMATURE NEUTROPHIL %: 0.3 % (ref 0–0.43)
KETONES, URINE: NEGATIVE MG/DL
LEUKOCYTE ESTERASE, URINE: ABNORMAL
LYMPHOCYTES ABSOLUTE: 3.1 K/CU MM
LYMPHOCYTES RELATIVE PERCENT: 26.1 % (ref 24–44)
MCH RBC QN AUTO: 28.6 PG (ref 27–31)
MCHC RBC AUTO-ENTMCNC: 30.3 % (ref 32–36)
MCV RBC AUTO: 94.5 FL (ref 78–100)
MONOCYTES ABSOLUTE: 0.8 K/CU MM
MONOCYTES RELATIVE PERCENT: 6.3 % (ref 0–4)
MUCUS: ABNORMAL HPF
NITRITE URINE, QUANTITATIVE: NEGATIVE
NUCLEATED RBC %: 0 %
PDW BLD-RTO: 17.1 % (ref 11.7–14.9)
PH, URINE: 5.5 (ref 5–8)
PHOSPHORUS: 3.4 MG/DL (ref 2.5–4.9)
PLATELET # BLD: 294 K/CU MM (ref 140–440)
PMV BLD AUTO: 9.4 FL (ref 7.5–11.1)
POTASSIUM SERPL-SCNC: 4.2 MMOL/L (ref 3.5–5.1)
PROT/CREAT RATIO, UR: 1.8
PROTEIN UA: 100 MG/DL
RBC # BLD: 3.25 M/CU MM (ref 4.2–5.4)
RBC URINE: 16 /HPF (ref 0–6)
SEGMENTED NEUTROPHILS ABSOLUTE COUNT: 7.7 K/CU MM
SEGMENTED NEUTROPHILS RELATIVE PERCENT: 65.1 % (ref 36–66)
SODIUM BLD-SCNC: 135 MMOL/L (ref 135–145)
SPECIFIC GRAVITY UA: 1.01 (ref 1–1.03)
SQUAMOUS EPITHELIAL: <1 /HPF
TOTAL IMMATURE NEUTOROPHIL: 0.04 K/CU MM
TOTAL NUCLEATED RBC: 0 K/CU MM
UNCLASSIFIED CAST: ABNORMAL /LPF
URINE TOTAL PROTEIN: 74.6 MG/DL
UROBILINOGEN, URINE: 0.2 MG/DL (ref 0.2–1)
WBC # BLD: 11.9 K/CU MM (ref 4–10.5)
WBC CLUMP: ABNORMAL /HPF
WBC UA: 96 /HPF (ref 0–5)

## 2023-03-30 PROCEDURE — 81001 URINALYSIS AUTO W/SCOPE: CPT

## 2023-03-30 PROCEDURE — 36415 COLL VENOUS BLD VENIPUNCTURE: CPT

## 2023-03-30 PROCEDURE — 85025 COMPLETE CBC W/AUTO DIFF WBC: CPT

## 2023-03-30 PROCEDURE — 80048 BASIC METABOLIC PNL TOTAL CA: CPT

## 2023-03-30 PROCEDURE — 87086 URINE CULTURE/COLONY COUNT: CPT

## 2023-03-30 PROCEDURE — 82040 ASSAY OF SERUM ALBUMIN: CPT

## 2023-03-30 PROCEDURE — 84100 ASSAY OF PHOSPHORUS: CPT

## 2023-03-30 PROCEDURE — 84156 ASSAY OF PROTEIN URINE: CPT

## 2023-03-30 PROCEDURE — 82570 ASSAY OF URINE CREATININE: CPT

## 2023-03-31 LAB
CULTURE: NORMAL
Lab: NORMAL
SPECIMEN: NORMAL

## 2023-04-19 ENCOUNTER — HOSPITAL ENCOUNTER (OUTPATIENT)
Age: 83
Setting detail: SPECIMEN
Discharge: HOME | End: 2023-04-19

## 2023-04-19 PROCEDURE — 82360 CALCULUS ASSAY QUANT: CPT

## 2023-04-20 ENCOUNTER — TELEPHONE (OUTPATIENT)
Dept: SURGERY | Age: 83
End: 2023-04-20

## 2023-04-20 NOTE — TELEPHONE ENCOUNTER
SPOKE TO Tuan Ruano REGARDING SURGERY (MAINE RT LEELEE W/ INC HERNIA REPAIR) SCHEDULED @ [facility].  NOTIFIED OF DATES, TIMES AND LOCATION    PHONE ASSESSMENT   SURGERY - 5/5/23 @ 100  P/O - 5/18/23 @ 200    NPO AFTER MIDNIGHT  SUPREP - 12 TABS 4PM DAY BEFORE   HOLD BLOOD THINNERS - 81MG ASA DO NOT HOLD

## 2023-04-21 ENCOUNTER — TELEPHONE (OUTPATIENT)
Dept: SURGERY | Age: 83
End: 2023-04-21

## 2023-04-21 NOTE — TELEPHONE ENCOUNTER
CASE DETAILS  NOTIFICATION/PRIOR AUTHORIZATION NUMBER CASE STATUS CASE STATUS REASON PRIMARY CARE PHYSICIAN  [id number] Anticipated Admission Complete Awaiting Admission Victoria Bueno  ADVANCE NOTIFY DATE/TIME ADMISSION NOTIFY DATE/TIME EXPECTED DATES OF SERVICE ACTUAL DATES OF SERVICE  04/20/2023 11:09 AM CDT - 05/05/2023 - 05/06/2023 -  COVERAGE STATUS  OVERALL COVERAGE STATUS  Covered/Approved  1-2 CODE DESCRIPTION COVERAGE  STATUS DECISION DATE  [facility]  Coverage determination is reflected for the facility admission and is not a guarantee of payment for ongoing services. Covered/Approved 04/20/2023 1 [id number] Repair of anterior abdominal hernia(s) ( more Covered/Approved 04/20/2023 2 [id number] Laparoscopy, surgical; colectomy, partia more Covered/Approved 04/20/2023   VIEW PRIOR AUTHORIZATION LETTERS IN DOCUMENT LIBRARY      CLINICAL NOTES  Add a clinical note  ATTACH CLINICAL DOCUMENTATION  DATE FILE NAME STATUS   04/20/2023 cuco. pdf [id number] Initial Clinical 1 View PDF   Select files    The maximum file size that can be attached is 25MB, but you can attach multiple files. Acceptable file types are bmp, doc, docx, gif, jpg, jpeg, pdf, png, tiff and txt. Filename can contain only: a-z, A-Z, 0-9, space, underscore and dash/hyphen. Please refer to Medical Records Requirements for Pre-Service to determine what specific clinical documentation is required to be attached to this request.      PATIENT DETAILS  PATIENT NAME RELATIONSHIP VERBAL LANGUAGE PREFERENCE MESSAGE  [employer] Employee English A future timeline may be available for this member. For future coverage please call the telephone number located on the back of the [facility] card.   MEMBER NUMBER EFFECTIVE DATE WRITTEN LANGUAGE PREFERENCE  [id number] 01/01/2023 English  GROUP NUMBER TERMINATION DATE  [id number] 12/31/2023  PRODUCT INSURANCE TYPE  HMO Medicare  SUBMITTING PROVIDER DETAILS  NAME ADDRESS   Amanda Tillman  [address]

## 2023-04-24 ENCOUNTER — HOSPITAL ENCOUNTER (OUTPATIENT)
Age: 83
Discharge: HOME | End: 2023-04-24
Payer: MEDICARE

## 2023-04-24 ENCOUNTER — TELEPHONE (OUTPATIENT)
Dept: SURGERY | Age: 83
End: 2023-04-24

## 2023-04-24 DIAGNOSIS — I10 ESSENTIAL HYPERTENSION: ICD-10-CM

## 2023-04-24 DIAGNOSIS — Q61.9 CYSTIC KIDNEY DISEASE: ICD-10-CM

## 2023-04-24 DIAGNOSIS — N18.4 CHRONIC KIDNEY DISEASE, STAGE IV (SEVERE) (HCC): ICD-10-CM

## 2023-04-24 DIAGNOSIS — N20.0 RENAL STONE: ICD-10-CM

## 2023-04-24 DIAGNOSIS — F33.0 MAJOR DEPRESSIVE DISORDER, RECURRENT, MILD (HCC): ICD-10-CM

## 2023-04-24 LAB
ALBUMIN SERPL-MCNC: 4.1 GM/DL (ref 3.4–5)
ANION GAP SERPL CALCULATED.3IONS-SCNC: 11 MMOL/L (ref 4–16)
BACTERIA: NEGATIVE /HPF
BILIRUBIN URINE: NEGATIVE MG/DL
BLOOD, URINE: ABNORMAL
BUN SERPL-MCNC: 34 MG/DL (ref 6–23)
CALCIUM SERPL-MCNC: 9.5 MG/DL (ref 8.3–10.6)
CHLORIDE BLD-SCNC: 103 MMOL/L (ref 99–110)
CLARITY: CLEAR
CO2: 26 MMOL/L (ref 21–32)
COLOR: YELLOW
CREAT SERPL-MCNC: 1.6 MG/DL (ref 0.6–1.1)
CREATININE URINE: 29.9 MG/DL (ref 28–217)
GFR SERPL CREATININE-BSD FRML MDRD: 32 ML/MIN/1.73M2
GLUCOSE SERPL-MCNC: 104 MG/DL (ref 70–99)
GLUCOSE, URINE: NEGATIVE MG/DL
HYALINE CASTS: 0 /LPF
KETONES, URINE: NEGATIVE MG/DL
LEUKOCYTE ESTERASE, URINE: ABNORMAL
MUCUS: ABNORMAL HPF
NITRITE URINE, QUANTITATIVE: NEGATIVE
PH, URINE: 5 (ref 5–8)
PHOSPHORUS: 4.3 MG/DL (ref 2.5–4.9)
POTASSIUM SERPL-SCNC: 4.6 MMOL/L (ref 3.5–5.1)
PROT/CREAT RATIO, UR: 1.9
PROTEIN UA: 30 MG/DL
RBC URINE: ABNORMAL /HPF (ref 0–6)
REASON FOR REJECTION: NORMAL
REJECTED TEST: NORMAL
SODIUM BLD-SCNC: 140 MMOL/L (ref 135–145)
SPECIFIC GRAVITY UA: 1.01 (ref 1–1.03)
TRICHOMONAS: ABNORMAL /HPF
URINE TOTAL PROTEIN: 56.4 MG/DL
UROBILINOGEN, URINE: 0.2 MG/DL (ref 0.2–1)
WBC UA: 9 /HPF (ref 0–5)

## 2023-04-24 PROCEDURE — 84100 ASSAY OF PHOSPHORUS: CPT

## 2023-04-24 PROCEDURE — 82040 ASSAY OF SERUM ALBUMIN: CPT

## 2023-04-24 PROCEDURE — 82570 ASSAY OF URINE CREATININE: CPT

## 2023-04-24 PROCEDURE — 80048 BASIC METABOLIC PNL TOTAL CA: CPT

## 2023-04-24 PROCEDURE — 81001 URINALYSIS AUTO W/SCOPE: CPT

## 2023-04-24 PROCEDURE — 36415 COLL VENOUS BLD VENIPUNCTURE: CPT

## 2023-04-24 PROCEDURE — 84156 ASSAY OF PROTEIN URINE: CPT

## 2023-04-24 PROCEDURE — 87086 URINE CULTURE/COLONY COUNT: CPT

## 2023-04-25 LAB
CULTURE: NORMAL
Lab: NORMAL
SPECIMEN: NORMAL

## 2023-04-26 ENCOUNTER — HOSPITAL ENCOUNTER (OUTPATIENT)
Age: 83
Setting detail: SPECIMEN
Discharge: HOME | End: 2023-04-26
Payer: MEDICARE

## 2023-04-26 LAB
APPEARANCE STONE: NORMAL
BASOPHILS ABSOLUTE: 0 K/CU MM
BASOPHILS RELATIVE PERCENT: 0.3 % (ref 0–1)
COMPN STONE: NORMAL
DIFFERENTIAL TYPE: ABNORMAL
EOSINOPHILS ABSOLUTE: 0.3 K/CU MM
EOSINOPHILS RELATIVE PERCENT: 2.8 % (ref 0–3)
HCT VFR BLD CALC: 31.9 % (ref 37–47)
HEMOGLOBIN: 9.9 GM/DL (ref 12.5–16)
IMMATURE NEUTROPHIL %: 0.3 % (ref 0–0.43)
LYMPHOCYTES ABSOLUTE: 3.4 K/CU MM
LYMPHOCYTES RELATIVE PERCENT: 33.8 % (ref 24–44)
MCH RBC QN AUTO: 28.4 PG (ref 27–31)
MCHC RBC AUTO-ENTMCNC: 31 % (ref 32–36)
MCV RBC AUTO: 91.4 FL (ref 78–100)
MONOCYTES ABSOLUTE: 0.8 K/CU MM
MONOCYTES RELATIVE PERCENT: 7.8 % (ref 0–4)
NUCLEATED RBC %: 0 %
PDW BLD-RTO: 16.5 % (ref 11.7–14.9)
PLATELET # BLD: 281 K/CU MM (ref 140–440)
PMV BLD AUTO: 9.3 FL (ref 7.5–11.1)
RBC # BLD: 3.49 M/CU MM (ref 4.2–5.4)
SEGMENTED NEUTROPHILS ABSOLUTE COUNT: 5.5 K/CU MM
SEGMENTED NEUTROPHILS RELATIVE PERCENT: 55 % (ref 36–66)
SPECIMEN WT: 38 MG
TOTAL IMMATURE NEUTOROPHIL: 0.03 K/CU MM
TOTAL NUCLEATED RBC: 0 K/CU MM
WBC # BLD: 10 K/CU MM (ref 4–10.5)

## 2023-04-26 PROCEDURE — 85025 COMPLETE CBC W/AUTO DIFF WBC: CPT

## 2023-05-02 ENCOUNTER — ANESTHESIA EVENT (OUTPATIENT)
Dept: OPERATING ROOM | Age: 83
End: 2023-05-02
Payer: MEDICARE

## 2023-05-02 NOTE — ANESTHESIA PRE PROCEDURE
Department of Anesthesiology  Preprocedure Note       Name:  Cam Whitefield   Age:  [de-identified] y.o.  :  1940                                          MRN:  [id number]         Date:  2023      Surgeon: Peace Vivas):  Pancho Aranda MD    Procedure: Procedure(s):  RIGHT BOWEL RESECTION HEMICOLECTOMY LAPAROSCOPIC ROBOTIC XI  HERNIA INCISIONAL REPAIR LAPAROSCOPIC ROBOTIC    Medications prior to admission:   Prior to Admission medications    Medication Sig Start Date End Date Taking?  Authorizing Provider   estradiol (ESTRACE) 0.1 MG/GM vaginal cream INSERT 1 GRAM VAGINALLY THREE TIMES A WEEK  Patient not taking: Reported on 2023 2/10/23   Historical Provider, MD   torsemide (DEMADEX) 20 MG tablet Take 1 tablet by mouth daily 3/1/23   Salvador Lang MD   traMADol (ULTRAM) 50 MG tablet  10/11/22   Historical Provider, MD   levothyroxine (SYNTHROID) 137 MCG tablet TAKE 1 TABLET DAILY 22   Santana Romero MD   allopurinol (ZYLOPRIM) 300 MG tablet TAKE 1 TABLET DAILY 22   Santana Romero MD   simvastatin (ZOCOR) 20 MG tablet TAKE 1 TABLET NIGHTLY 22   Santana Romero MD   citalopram (CELEXA) 20 mg tablet Take 1 tablet by mouth daily  Patient not taking: Reported on 2023 6/3/22   Salvador Lang MD   hydrALAZINE (APRESOLINE) 25 MG tablet Take 1 tablet by mouth 3 times daily 6/3/22   Ingrid Taylor MD   Handicap Placard MISC by Does not apply route Expires 2027   NIKUNJ Smyth - CNP   acetaminophen (TYLENOL) 325 MG tablet Take 2 tablets by mouth as needed for Pain    Historical Provider, MD   empagliflozin (JARDIANCE) 10 MG tablet Take 1 tablet by mouth daily  Patient not taking: Reported on 3/28/2023 3/15/22   Kristen House MD   metoprolol succinate (TOPROL XL) 100 MG extended release tablet TAKE 1 TABLET DAILY 1/10/22   Kristen House MD   amLODIPine (NORVASC) 10 MG tablet TAKE 1 TABLET DAILY 1/10/22   Kristen House MD   budesonide-formoterol (SYMBICORT) 80-4.5 MCG/ACT AERO

## 2023-05-03 ENCOUNTER — HOSPITAL ENCOUNTER (INPATIENT)
Age: 83
LOS: 21 days | Discharge: NURSING, REHAB OR LONG-TERM CARE | DRG: 329 | End: 2023-05-25
Attending: SURGERY | Admitting: SURGERY
Payer: MEDICARE

## 2023-05-03 ENCOUNTER — ANESTHESIA (OUTPATIENT)
Dept: OPERATING ROOM | Age: 83
End: 2023-05-03
Payer: MEDICARE

## 2023-05-03 DIAGNOSIS — K63.89 CECUM MASS: ICD-10-CM

## 2023-05-03 DIAGNOSIS — R06.03 RESPIRATORY DISTRESS: Primary | ICD-10-CM

## 2023-05-03 LAB
BASOPHILS ABSOLUTE: 0 K/CU MM
BASOPHILS RELATIVE PERCENT: 0.1 % (ref 0–1)
DIFFERENTIAL TYPE: ABNORMAL
EOSINOPHILS ABSOLUTE: 0 K/CU MM
EOSINOPHILS RELATIVE PERCENT: 0 % (ref 0–3)
GLUCOSE BLD-MCNC: 107 MG/DL (ref 70–99)
GLUCOSE BLD-MCNC: 140 MG/DL (ref 70–99)
GLUCOSE BLD-MCNC: 236 MG/DL (ref 70–99)
GLUCOSE BLD-MCNC: 247 MG/DL (ref 70–99)
HCT VFR BLD CALC: 28.4 % (ref 37–47)
HEMOGLOBIN: 8.7 GM/DL (ref 12.5–16)
IMMATURE NEUTROPHIL %: 0.4 % (ref 0–0.43)
LYMPHOCYTES ABSOLUTE: 1.2 K/CU MM
LYMPHOCYTES RELATIVE PERCENT: 10.2 % (ref 24–44)
MCH RBC QN AUTO: 28.2 PG (ref 27–31)
MCHC RBC AUTO-ENTMCNC: 30.6 % (ref 32–36)
MCV RBC AUTO: 91.9 FL (ref 78–100)
MONOCYTES ABSOLUTE: 0.4 K/CU MM
MONOCYTES RELATIVE PERCENT: 3 % (ref 0–4)
NUCLEATED RBC %: 0 %
PDW BLD-RTO: 16.1 % (ref 11.7–14.9)
PLATELET # BLD: 242 K/CU MM (ref 140–440)
PMV BLD AUTO: 9.3 FL (ref 7.5–11.1)
RBC # BLD: 3.09 M/CU MM (ref 4.2–5.4)
SEGMENTED NEUTROPHILS ABSOLUTE COUNT: 10.3 K/CU MM
SEGMENTED NEUTROPHILS RELATIVE PERCENT: 86.3 % (ref 36–66)
TOTAL IMMATURE NEUTOROPHIL: 0.05 K/CU MM
TOTAL NUCLEATED RBC: 0 K/CU MM
WBC # BLD: 11.9 K/CU MM (ref 4–10.5)

## 2023-05-03 PROCEDURE — 3700000000 HC ANESTHESIA ATTENDED CARE: Performed by: SURGERY

## 2023-05-03 PROCEDURE — 2580000003 HC RX 258: Performed by: PHYSICIAN ASSISTANT

## 2023-05-03 PROCEDURE — 3600000009 HC SURGERY ROBOT BASE: Performed by: SURGERY

## 2023-05-03 PROCEDURE — 44205 LAP COLECTOMY PART W/ILEUM: CPT | Performed by: SURGERY

## 2023-05-03 PROCEDURE — 2709999900 HC NON-CHARGEABLE SUPPLY: Performed by: SURGERY

## 2023-05-03 PROCEDURE — C1889 IMPLANT/INSERT DEVICE, NOC: HCPCS | Performed by: SURGERY

## 2023-05-03 PROCEDURE — 2700000000 HC OXYGEN THERAPY PER DAY

## 2023-05-03 PROCEDURE — 94150 VITAL CAPACITY TEST: CPT

## 2023-05-03 PROCEDURE — 36415 COLL VENOUS BLD VENIPUNCTURE: CPT

## 2023-05-03 PROCEDURE — 0DTF4ZZ RESECTION OF RIGHT LARGE INTESTINE, PERCUTANEOUS ENDOSCOPIC APPROACH: ICD-10-PCS | Performed by: SURGERY

## 2023-05-03 PROCEDURE — 2580000003 HC RX 258: Performed by: ANESTHESIOLOGY

## 2023-05-03 PROCEDURE — 2500000003 HC RX 250 WO HCPCS: Performed by: PHYSICIAN ASSISTANT

## 2023-05-03 PROCEDURE — 6360000002 HC RX W HCPCS: Performed by: NURSE ANESTHETIST, CERTIFIED REGISTERED

## 2023-05-03 PROCEDURE — S2900 ROBOTIC SURGICAL SYSTEM: HCPCS | Performed by: SURGERY

## 2023-05-03 PROCEDURE — 0WQF0ZZ REPAIR ABDOMINAL WALL, OPEN APPROACH: ICD-10-PCS | Performed by: SURGERY

## 2023-05-03 PROCEDURE — 85025 COMPLETE CBC W/AUTO DIFF WBC: CPT

## 2023-05-03 PROCEDURE — 6370000000 HC RX 637 (ALT 250 FOR IP): Performed by: PHYSICIAN ASSISTANT

## 2023-05-03 PROCEDURE — 82962 GLUCOSE BLOOD TEST: CPT

## 2023-05-03 PROCEDURE — 6360000002 HC RX W HCPCS: Performed by: ANESTHESIOLOGY

## 2023-05-03 PROCEDURE — 3700000001 HC ADD 15 MINUTES (ANESTHESIA): Performed by: SURGERY

## 2023-05-03 PROCEDURE — 94664 DEMO&/EVAL PT USE INHALER: CPT

## 2023-05-03 PROCEDURE — 2500000003 HC RX 250 WO HCPCS: Performed by: NURSE ANESTHETIST, CERTIFIED REGISTERED

## 2023-05-03 PROCEDURE — 87040 BLOOD CULTURE FOR BACTERIA: CPT

## 2023-05-03 PROCEDURE — 3600000019 HC SURGERY ROBOT ADDTL 15MIN: Performed by: SURGERY

## 2023-05-03 PROCEDURE — 2720000010 HC SURG SUPPLY STERILE: Performed by: SURGERY

## 2023-05-03 PROCEDURE — 6360000002 HC RX W HCPCS: Performed by: PHYSICIAN ASSISTANT

## 2023-05-03 PROCEDURE — 7100000000 HC PACU RECOVERY - FIRST 15 MIN: Performed by: SURGERY

## 2023-05-03 PROCEDURE — 2500000003 HC RX 250 WO HCPCS: Performed by: SURGERY

## 2023-05-03 PROCEDURE — 8E0W4CZ ROBOTIC ASSISTED PROCEDURE OF TRUNK REGION, PERCUTANEOUS ENDOSCOPIC APPROACH: ICD-10-PCS | Performed by: SURGERY

## 2023-05-03 PROCEDURE — 44205 LAP COLECTOMY PART W/ILEUM: CPT | Performed by: PHYSICIAN ASSISTANT

## 2023-05-03 PROCEDURE — 88307 TISSUE EXAM BY PATHOLOGIST: CPT

## 2023-05-03 PROCEDURE — 94761 N-INVAS EAR/PLS OXIMETRY MLT: CPT

## 2023-05-03 PROCEDURE — APPNB180 APP NON BILLABLE TIME > 60 MINS: Performed by: PHYSICIAN ASSISTANT

## 2023-05-03 PROCEDURE — 7100000001 HC PACU RECOVERY - ADDTL 15 MIN: Performed by: SURGERY

## 2023-05-03 PROCEDURE — 94640 AIRWAY INHALATION TREATMENT: CPT

## 2023-05-03 DEVICE — CLIP INT L POLYMER LOK LIG HEM O LOK: Type: IMPLANTABLE DEVICE | Site: ABDOMEN | Status: FUNCTIONAL

## 2023-05-03 RX ORDER — SODIUM CHLORIDE 0.9 % (FLUSH) 0.9 %
5-40 SYRINGE (ML) INJECTION PRN
Status: DISCONTINUED | OUTPATIENT
Start: 2023-05-03 | End: 2023-05-03 | Stop reason: HOSPADM

## 2023-05-03 RX ORDER — SODIUM CHLORIDE 9 MG/ML
INJECTION, SOLUTION INTRAVENOUS CONTINUOUS
Status: DISCONTINUED | OUTPATIENT
Start: 2023-05-03 | End: 2023-05-04

## 2023-05-03 RX ORDER — INSULIN LISPRO 100 [IU]/ML
0-8 INJECTION, SOLUTION INTRAVENOUS; SUBCUTANEOUS
Status: DISCONTINUED | OUTPATIENT
Start: 2023-05-03 | End: 2023-05-04

## 2023-05-03 RX ORDER — SODIUM CHLORIDE 0.9 % (FLUSH) 0.9 %
5-40 SYRINGE (ML) INJECTION EVERY 12 HOURS SCHEDULED
Status: DISCONTINUED | OUTPATIENT
Start: 2023-05-03 | End: 2023-05-03 | Stop reason: HOSPADM

## 2023-05-03 RX ORDER — DEXAMETHASONE SODIUM PHOSPHATE 4 MG/ML
INJECTION, SOLUTION INTRA-ARTICULAR; INTRALESIONAL; INTRAMUSCULAR; INTRAVENOUS; SOFT TISSUE PRN
Status: DISCONTINUED | OUTPATIENT
Start: 2023-05-03 | End: 2023-05-03 | Stop reason: SDUPTHER

## 2023-05-03 RX ORDER — DEXTROSE MONOHYDRATE 100 MG/ML
INJECTION, SOLUTION INTRAVENOUS CONTINUOUS PRN
Status: DISCONTINUED | OUTPATIENT
Start: 2023-05-03 | End: 2023-05-19

## 2023-05-03 RX ORDER — EPHEDRINE SULFATE 50 MG/ML
INJECTION INTRAVENOUS PRN
Status: DISCONTINUED | OUTPATIENT
Start: 2023-05-03 | End: 2023-05-03 | Stop reason: SDUPTHER

## 2023-05-03 RX ORDER — TORSEMIDE 20 MG/1
20 TABLET ORAL DAILY
Status: DISCONTINUED | OUTPATIENT
Start: 2023-05-03 | End: 2023-05-14

## 2023-05-03 RX ORDER — OXYCODONE HYDROCHLORIDE 5 MG/1
5 TABLET ORAL EVERY 4 HOURS PRN
Status: DISCONTINUED | OUTPATIENT
Start: 2023-05-03 | End: 2023-05-04

## 2023-05-03 RX ORDER — ROCURONIUM BROMIDE 10 MG/ML
INJECTION, SOLUTION INTRAVENOUS PRN
Status: DISCONTINUED | OUTPATIENT
Start: 2023-05-03 | End: 2023-05-03 | Stop reason: SDUPTHER

## 2023-05-03 RX ORDER — GLUCAGON 1 MG/ML
1 KIT INJECTION PRN
Status: DISCONTINUED | OUTPATIENT
Start: 2023-05-03 | End: 2023-05-25 | Stop reason: HOSPADM

## 2023-05-03 RX ORDER — ONDANSETRON 2 MG/ML
4 INJECTION INTRAMUSCULAR; INTRAVENOUS
Status: DISCONTINUED | OUTPATIENT
Start: 2023-05-03 | End: 2023-05-03 | Stop reason: HOSPADM

## 2023-05-03 RX ORDER — SODIUM CHLORIDE, SODIUM LACTATE, POTASSIUM CHLORIDE, CALCIUM CHLORIDE 600; 310; 30; 20 MG/100ML; MG/100ML; MG/100ML; MG/100ML
INJECTION, SOLUTION INTRAVENOUS CONTINUOUS
Status: DISCONTINUED | OUTPATIENT
Start: 2023-05-03 | End: 2023-05-03 | Stop reason: HOSPADM

## 2023-05-03 RX ORDER — FENTANYL CITRATE 50 UG/ML
25 INJECTION, SOLUTION INTRAMUSCULAR; INTRAVENOUS EVERY 5 MIN PRN
Status: COMPLETED | OUTPATIENT
Start: 2023-05-03 | End: 2023-05-03

## 2023-05-03 RX ORDER — ALBUMIN, HUMAN INJ 5% 5 %
12.5 SOLUTION INTRAVENOUS ONCE
Status: DISCONTINUED | OUTPATIENT
Start: 2023-05-03 | End: 2023-05-03 | Stop reason: CLARIF

## 2023-05-03 RX ORDER — SODIUM CHLORIDE 9 MG/ML
INJECTION, SOLUTION INTRAVENOUS PRN
Status: DISCONTINUED | OUTPATIENT
Start: 2023-05-03 | End: 2023-05-25 | Stop reason: HOSPADM

## 2023-05-03 RX ORDER — ALLOPURINOL 100 MG/1
300 TABLET ORAL DAILY
Status: DISCONTINUED | OUTPATIENT
Start: 2023-05-03 | End: 2023-05-25 | Stop reason: HOSPADM

## 2023-05-03 RX ORDER — LABETALOL HYDROCHLORIDE 5 MG/ML
10 INJECTION, SOLUTION INTRAVENOUS
Status: DISCONTINUED | OUTPATIENT
Start: 2023-05-03 | End: 2023-05-03 | Stop reason: HOSPADM

## 2023-05-03 RX ORDER — METRONIDAZOLE 500 MG/100ML
500 INJECTION, SOLUTION INTRAVENOUS ONCE
Status: COMPLETED | OUTPATIENT
Start: 2023-05-03 | End: 2023-05-03

## 2023-05-03 RX ORDER — METOPROLOL SUCCINATE 50 MG/1
100 TABLET, EXTENDED RELEASE ORAL DAILY
Status: DISCONTINUED | OUTPATIENT
Start: 2023-05-04 | End: 2023-05-05

## 2023-05-03 RX ORDER — SODIUM CHLORIDE 0.9 % (FLUSH) 0.9 %
5-40 SYRINGE (ML) INJECTION EVERY 12 HOURS SCHEDULED
Status: DISCONTINUED | OUTPATIENT
Start: 2023-05-03 | End: 2023-05-25 | Stop reason: HOSPADM

## 2023-05-03 RX ORDER — BUDESONIDE AND FORMOTEROL FUMARATE DIHYDRATE 80; 4.5 UG/1; UG/1
2 AEROSOL RESPIRATORY (INHALATION) 2 TIMES DAILY
Status: DISCONTINUED | OUTPATIENT
Start: 2023-05-03 | End: 2023-05-08

## 2023-05-03 RX ORDER — HYDRALAZINE HYDROCHLORIDE 20 MG/ML
10 INJECTION INTRAMUSCULAR; INTRAVENOUS
Status: DISCONTINUED | OUTPATIENT
Start: 2023-05-03 | End: 2023-05-03 | Stop reason: HOSPADM

## 2023-05-03 RX ORDER — ONDANSETRON 2 MG/ML
4 INJECTION INTRAMUSCULAR; INTRAVENOUS
Status: COMPLETED | OUTPATIENT
Start: 2023-05-03 | End: 2023-05-03

## 2023-05-03 RX ORDER — SODIUM CHLORIDE 9 MG/ML
INJECTION, SOLUTION INTRAVENOUS PRN
Status: DISCONTINUED | OUTPATIENT
Start: 2023-05-03 | End: 2023-05-03 | Stop reason: HOSPADM

## 2023-05-03 RX ORDER — HYDROMORPHONE HYDROCHLORIDE 1 MG/ML
1 INJECTION, SOLUTION INTRAMUSCULAR; INTRAVENOUS; SUBCUTANEOUS EVERY 4 HOURS PRN
Status: DISCONTINUED | OUTPATIENT
Start: 2023-05-03 | End: 2023-05-04

## 2023-05-03 RX ORDER — SODIUM CHLORIDE, SODIUM LACTATE, POTASSIUM CHLORIDE, CALCIUM CHLORIDE 600; 310; 30; 20 MG/100ML; MG/100ML; MG/100ML; MG/100ML
INJECTION, SOLUTION INTRAVENOUS CONTINUOUS PRN
Status: DISCONTINUED | OUTPATIENT
Start: 2023-05-03 | End: 2023-05-03 | Stop reason: SDUPTHER

## 2023-05-03 RX ORDER — INSULIN LISPRO 100 [IU]/ML
0-4 INJECTION, SOLUTION INTRAVENOUS; SUBCUTANEOUS NIGHTLY
Status: DISCONTINUED | OUTPATIENT
Start: 2023-05-03 | End: 2023-05-04

## 2023-05-03 RX ORDER — ONDANSETRON 4 MG/1
4 TABLET, ORALLY DISINTEGRATING ORAL EVERY 8 HOURS PRN
Status: DISCONTINUED | OUTPATIENT
Start: 2023-05-03 | End: 2023-05-25 | Stop reason: HOSPADM

## 2023-05-03 RX ORDER — FENTANYL CITRATE 50 UG/ML
INJECTION, SOLUTION INTRAMUSCULAR; INTRAVENOUS PRN
Status: DISCONTINUED | OUTPATIENT
Start: 2023-05-03 | End: 2023-05-03 | Stop reason: SDUPTHER

## 2023-05-03 RX ORDER — SODIUM CHLORIDE 9 MG/ML
25 INJECTION, SOLUTION INTRAVENOUS PRN
Status: DISCONTINUED | OUTPATIENT
Start: 2023-05-03 | End: 2023-05-03 | Stop reason: HOSPADM

## 2023-05-03 RX ORDER — FENTANYL CITRATE 50 UG/ML
50 INJECTION, SOLUTION INTRAMUSCULAR; INTRAVENOUS EVERY 5 MIN PRN
Status: DISCONTINUED | OUTPATIENT
Start: 2023-05-03 | End: 2023-05-03 | Stop reason: HOSPADM

## 2023-05-03 RX ORDER — LIDOCAINE HYDROCHLORIDE 20 MG/ML
INJECTION, SOLUTION INTRAVENOUS PRN
Status: DISCONTINUED | OUTPATIENT
Start: 2023-05-03 | End: 2023-05-03 | Stop reason: SDUPTHER

## 2023-05-03 RX ORDER — HYDRALAZINE HYDROCHLORIDE 25 MG/1
25 TABLET, FILM COATED ORAL 3 TIMES DAILY
Status: DISCONTINUED | OUTPATIENT
Start: 2023-05-03 | End: 2023-05-05

## 2023-05-03 RX ORDER — ONDANSETRON 2 MG/ML
INJECTION INTRAMUSCULAR; INTRAVENOUS PRN
Status: DISCONTINUED | OUTPATIENT
Start: 2023-05-03 | End: 2023-05-03 | Stop reason: SDUPTHER

## 2023-05-03 RX ORDER — ENOXAPARIN SODIUM 100 MG/ML
40 INJECTION SUBCUTANEOUS DAILY
Status: DISCONTINUED | OUTPATIENT
Start: 2023-05-03 | End: 2023-05-04

## 2023-05-03 RX ORDER — CYCLOBENZAPRINE HCL 10 MG
10 TABLET ORAL 3 TIMES DAILY PRN
Status: DISCONTINUED | OUTPATIENT
Start: 2023-05-03 | End: 2023-05-25 | Stop reason: HOSPADM

## 2023-05-03 RX ORDER — SODIUM CHLORIDE 0.9 % (FLUSH) 0.9 %
5-40 SYRINGE (ML) INJECTION PRN
Status: DISCONTINUED | OUTPATIENT
Start: 2023-05-03 | End: 2023-05-25 | Stop reason: HOSPADM

## 2023-05-03 RX ORDER — PROPOFOL 10 MG/ML
INJECTION, EMULSION INTRAVENOUS PRN
Status: DISCONTINUED | OUTPATIENT
Start: 2023-05-03 | End: 2023-05-03 | Stop reason: SDUPTHER

## 2023-05-03 RX ORDER — BUPIVACAINE HYDROCHLORIDE 5 MG/ML
INJECTION, SOLUTION EPIDURAL; INTRACAUDAL
Status: COMPLETED | OUTPATIENT
Start: 2023-05-03 | End: 2023-05-03

## 2023-05-03 RX ORDER — ONDANSETRON 2 MG/ML
4 INJECTION INTRAMUSCULAR; INTRAVENOUS EVERY 6 HOURS PRN
Status: DISCONTINUED | OUTPATIENT
Start: 2023-05-03 | End: 2023-05-25 | Stop reason: HOSPADM

## 2023-05-03 RX ORDER — AMLODIPINE BESYLATE 10 MG/1
10 TABLET ORAL DAILY
Status: DISCONTINUED | OUTPATIENT
Start: 2023-05-04 | End: 2023-05-05

## 2023-05-03 RX ADMIN — FENTANYL CITRATE 25 MCG: 50 INJECTION, SOLUTION INTRAMUSCULAR; INTRAVENOUS at 08:39

## 2023-05-03 RX ADMIN — ALLOPURINOL 300 MG: 100 TABLET ORAL at 16:29

## 2023-05-03 RX ADMIN — FENTANYL CITRATE 50 MCG: 50 INJECTION, SOLUTION INTRAMUSCULAR; INTRAVENOUS at 07:58

## 2023-05-03 RX ADMIN — LIDOCAINE HYDROCHLORIDE 100 MG: 20 INJECTION, SOLUTION INTRAVENOUS at 07:58

## 2023-05-03 RX ADMIN — METRONIDAZOLE 500 MG: 500 INJECTION, SOLUTION INTRAVENOUS at 07:51

## 2023-05-03 RX ADMIN — INSULIN LISPRO 2 UNITS: 100 INJECTION, SOLUTION INTRAVENOUS; SUBCUTANEOUS at 17:21

## 2023-05-03 RX ADMIN — OXYCODONE 5 MG: 5 TABLET ORAL at 22:54

## 2023-05-03 RX ADMIN — EPHEDRINE SULFATE 10 MG: 50 INJECTION INTRAVENOUS at 08:30

## 2023-05-03 RX ADMIN — ENOXAPARIN SODIUM 40 MG: 100 INJECTION SUBCUTANEOUS at 16:31

## 2023-05-03 RX ADMIN — ONDANSETRON 4 MG: 2 INJECTION INTRAMUSCULAR; INTRAVENOUS at 09:58

## 2023-05-03 RX ADMIN — SODIUM CHLORIDE: 9 INJECTION, SOLUTION INTRAVENOUS at 10:31

## 2023-05-03 RX ADMIN — DEXAMETHASONE SODIUM PHOSPHATE 4 MG: 4 INJECTION, SOLUTION INTRAMUSCULAR; INTRAVENOUS at 07:58

## 2023-05-03 RX ADMIN — HYDRALAZINE HYDROCHLORIDE 25 MG: 25 TABLET, FILM COATED ORAL at 22:55

## 2023-05-03 RX ADMIN — ONDANSETRON 4 MG: 2 INJECTION INTRAMUSCULAR; INTRAVENOUS at 07:58

## 2023-05-03 RX ADMIN — EPHEDRINE SULFATE 10 MG: 50 INJECTION INTRAVENOUS at 09:00

## 2023-05-03 RX ADMIN — EPHEDRINE SULFATE 10 MG: 50 INJECTION INTRAVENOUS at 08:43

## 2023-05-03 RX ADMIN — BUDESONIDE AND FORMOTEROL FUMARATE DIHYDRATE 2 PUFF: 80; 4.5 AEROSOL RESPIRATORY (INHALATION) at 21:19

## 2023-05-03 RX ADMIN — TORSEMIDE 20 MG: 20 TABLET ORAL at 16:29

## 2023-05-03 RX ADMIN — PROPOFOL 110 MG: 10 INJECTION, EMULSION INTRAVENOUS at 07:58

## 2023-05-03 RX ADMIN — EPHEDRINE SULFATE 10 MG: 50 INJECTION INTRAVENOUS at 08:22

## 2023-05-03 RX ADMIN — SODIUM CHLORIDE, SODIUM LACTATE, POTASSIUM CHLORIDE, CALCIUM CHLORIDE: 600; 310; 30; 20 INJECTION, SOLUTION INTRAVENOUS at 07:36

## 2023-05-03 RX ADMIN — FENTANYL CITRATE 25 MCG: 50 INJECTION, SOLUTION INTRAMUSCULAR; INTRAVENOUS at 10:35

## 2023-05-03 RX ADMIN — HYDRALAZINE HYDROCHLORIDE 25 MG: 25 TABLET, FILM COATED ORAL at 16:30

## 2023-05-03 RX ADMIN — ROCURONIUM BROMIDE 10 MG: 10 INJECTION INTRAVENOUS at 08:57

## 2023-05-03 RX ADMIN — OXYCODONE 5 MG: 5 TABLET ORAL at 16:30

## 2023-05-03 RX ADMIN — FENTANYL CITRATE 25 MCG: 50 INJECTION, SOLUTION INTRAMUSCULAR; INTRAVENOUS at 12:35

## 2023-05-03 RX ADMIN — GENTAMICIN SULFATE 296.4 MG: 40 INJECTION, SOLUTION INTRAMUSCULAR; INTRAVENOUS at 07:51

## 2023-05-03 RX ADMIN — ROCURONIUM BROMIDE 20 MG: 10 INJECTION INTRAVENOUS at 08:36

## 2023-05-03 RX ADMIN — SUGAMMADEX 167 MG: 100 INJECTION, SOLUTION INTRAVENOUS at 09:28

## 2023-05-03 RX ADMIN — SODIUM CHLORIDE, POTASSIUM CHLORIDE, SODIUM LACTATE AND CALCIUM CHLORIDE: 600; 310; 30; 20 INJECTION, SOLUTION INTRAVENOUS at 06:53

## 2023-05-03 RX ADMIN — SODIUM CHLORIDE: 9 INJECTION, SOLUTION INTRAVENOUS at 22:55

## 2023-05-03 RX ADMIN — FENTANYL CITRATE 25 MCG: 50 INJECTION, SOLUTION INTRAMUSCULAR; INTRAVENOUS at 13:33

## 2023-05-03 RX ADMIN — FENTANYL CITRATE 25 MCG: 50 INJECTION, SOLUTION INTRAMUSCULAR; INTRAVENOUS at 10:56

## 2023-05-03 RX ADMIN — EPHEDRINE SULFATE 10 MG: 50 INJECTION INTRAVENOUS at 09:09

## 2023-05-03 RX ADMIN — FENTANYL CITRATE 25 MCG: 50 INJECTION, SOLUTION INTRAMUSCULAR; INTRAVENOUS at 09:23

## 2023-05-03 SDOH — ECONOMIC STABILITY: HOUSING INSECURITY: IN THE LAST 12 MONTHS, HOW MANY PLACES HAVE YOU LIVED?: 1

## 2023-05-03 SDOH — HEALTH STABILITY: PHYSICAL HEALTH: ON AVERAGE, HOW MANY MINUTES DO YOU ENGAGE IN EXERCISE AT THIS LEVEL?: 10 MIN

## 2023-05-03 SDOH — HEALTH STABILITY: PHYSICAL HEALTH: ON AVERAGE, HOW MANY DAYS PER WEEK DO YOU ENGAGE IN MODERATE TO STRENUOUS EXERCISE (LIKE A BRISK WALK)?: 3 DAYS

## 2023-05-03 SDOH — ECONOMIC STABILITY: FOOD INSECURITY: WITHIN THE PAST 12 MONTHS, THE FOOD YOU BOUGHT JUST DIDN'T LAST AND YOU DIDN'T HAVE MONEY TO GET MORE.: NEVER TRUE

## 2023-05-03 SDOH — ECONOMIC STABILITY: FOOD INSECURITY: WITHIN THE PAST 12 MONTHS, YOU WORRIED THAT YOUR FOOD WOULD RUN OUT BEFORE YOU GOT MONEY TO BUY MORE.: NEVER TRUE

## 2023-05-03 SDOH — ECONOMIC STABILITY: INCOME INSECURITY: IN THE LAST 12 MONTHS, WAS THERE A TIME WHEN YOU WERE NOT ABLE TO PAY THE MORTGAGE OR RENT ON TIME?: NO

## 2023-05-03 ASSESSMENT — PAIN DESCRIPTION - PAIN TYPE
TYPE: SURGICAL PAIN

## 2023-05-03 ASSESSMENT — PAIN DESCRIPTION - LOCATION
LOCATION: ABDOMEN

## 2023-05-03 ASSESSMENT — SOCIAL DETERMINANTS OF HEALTH (SDOH)
HOW OFTEN DO YOU GET TOGETHER WITH FRIENDS OR RELATIVES?: MORE THAN THREE TIMES A WEEK
WITHIN THE LAST YEAR, HAVE TO BEEN RAPED OR FORCED TO HAVE ANY KIND OF SEXUAL ACTIVITY BY YOUR PARTNER OR EX-PARTNER?: NO
HOW OFTEN DO YOU ATTEND CHURCH OR RELIGIOUS SERVICES?: NEVER
WITHIN THE LAST YEAR, HAVE YOU BEEN HUMILIATED OR EMOTIONALLY ABUSED IN OTHER WAYS BY YOUR PARTNER OR EX-PARTNER?: NO
IN A TYPICAL WEEK, HOW MANY TIMES DO YOU TALK ON THE PHONE WITH FAMILY, FRIENDS, OR NEIGHBORS?: MORE THAN THREE TIMES A WEEK
HOW OFTEN DO YOU ATTENT MEETINGS OF THE CLUB OR ORGANIZATION YOU BELONG TO?: NEVER
WITHIN THE LAST YEAR, HAVE YOU BEEN KICKED, HIT, SLAPPED, OR OTHERWISE PHYSICALLY HURT BY YOUR PARTNER OR EX-PARTNER?: NO
HOW HARD IS IT FOR YOU TO PAY FOR THE VERY BASICS LIKE FOOD, HOUSING, MEDICAL CARE, AND HEATING?: SOMEWHAT HARD
WITHIN THE LAST YEAR, HAVE YOU BEEN AFRAID OF YOUR PARTNER OR EX-PARTNER?: NO
DO YOU BELONG TO ANY CLUBS OR ORGANIZATIONS SUCH AS CHURCH GROUPS UNIONS, FRATERNAL OR ATHLETIC GROUPS, OR SCHOOL GROUPS?: NO

## 2023-05-03 ASSESSMENT — PAIN DESCRIPTION - FREQUENCY
FREQUENCY: INTERMITTENT
FREQUENCY: CONTINUOUS
FREQUENCY: INTERMITTENT
FREQUENCY: INTERMITTENT
FREQUENCY: CONTINUOUS
FREQUENCY: CONTINUOUS

## 2023-05-03 ASSESSMENT — PATIENT HEALTH QUESTIONNAIRE - PHQ9
1. LITTLE INTEREST OR PLEASURE IN DOING THINGS: NOT AT ALL
SUM OF ALL RESPONSES TO PHQ9 QUESTIONS 1 & 2: 0
2. FEELING DOWN, DEPRESSED OR HOPELESS: NOT AT ALL

## 2023-05-03 ASSESSMENT — PAIN DESCRIPTION - DESCRIPTORS
DESCRIPTORS: DISCOMFORT

## 2023-05-03 ASSESSMENT — PAIN DESCRIPTION - DIRECTION: RADIATING_TOWARDS: NONW

## 2023-05-03 ASSESSMENT — PAIN DESCRIPTION - ORIENTATION
ORIENTATION: MID

## 2023-05-03 ASSESSMENT — PAIN - FUNCTIONAL ASSESSMENT
PAIN_FUNCTIONAL_ASSESSMENT: PREVENTS OR INTERFERES SOME ACTIVE ACTIVITIES AND ADLS
PAIN_FUNCTIONAL_ASSESSMENT: PREVENTS OR INTERFERES SOME ACTIVE ACTIVITIES AND ADLS
PAIN_FUNCTIONAL_ASSESSMENT: 0-10
PAIN_FUNCTIONAL_ASSESSMENT: PREVENTS OR INTERFERES SOME ACTIVE ACTIVITIES AND ADLS

## 2023-05-03 ASSESSMENT — PAIN DESCRIPTION - ONSET
ONSET: GRADUAL

## 2023-05-03 ASSESSMENT — LIFESTYLE VARIABLES
HOW OFTEN DO YOU HAVE A DRINK CONTAINING ALCOHOL: NEVER
HOW MANY STANDARD DRINKS CONTAINING ALCOHOL DO YOU HAVE ON A TYPICAL DAY: PATIENT DOES NOT DRINK
HOW OFTEN DO YOU HAVE A DRINK CONTAINING ALCOHOL: NEVER
HOW MANY STANDARD DRINKS CONTAINING ALCOHOL DO YOU HAVE ON A TYPICAL DAY: PATIENT DOES NOT DRINK

## 2023-05-03 ASSESSMENT — PAIN SCALES - GENERAL
PAINLEVEL_OUTOF10: 10
PAINLEVEL_OUTOF10: 6
PAINLEVEL_OUTOF10: 6
PAINLEVEL_OUTOF10: 5
PAINLEVEL_OUTOF10: 6
PAINLEVEL_OUTOF10: 5
PAINLEVEL_OUTOF10: 6

## 2023-05-03 ASSESSMENT — ENCOUNTER SYMPTOMS: SHORTNESS OF BREATH: 1

## 2023-05-03 NOTE — ANESTHESIA PROCEDURE NOTES
Arterial Line:    An arterial line was placed using surface landmarks, in the OR for the following indication(s): Leigh Schmitz A 20 gauge (size), 1 and 1/4 inch (length), Arrow (type) catheter was placed, Seldinger technique used, into the left radial artery, secured by suture and tape. Anesthesia type: Local  Local infiltration: Injection    Events:  patient tolerated procedure well with no complications, EBL 0mL and EBL < 5mL. 5/3/2023 7:43 AM5/3/2023 7:43 AM  Anesthesiologist: Ivan Nuñez MD  Resident/CRNA: NIKUNJ Key - CRNA  Performed: Anesthesiologist   Preanesthetic Checklist  Completed: patient identified, IV checked, site marked, risks and benefits discussed, surgical/procedural consents, equipment checked, pre-op evaluation, timeout performed, anesthesia consent given, oxygen available, monitors applied/VS acknowledged, fire risk safety assessment completed and verbalized and blood product R/B/A discussed and consented

## 2023-05-04 ENCOUNTER — APPOINTMENT (OUTPATIENT)
Dept: GENERAL RADIOLOGY | Age: 83
DRG: 329 | End: 2023-05-04
Attending: SURGERY
Payer: MEDICARE

## 2023-05-04 PROBLEM — K63.89 MASS OF CECUM: Status: ACTIVE | Noted: 2023-05-04

## 2023-05-04 LAB
ALBUMIN SERPL-MCNC: 3.7 GM/DL (ref 3.4–5)
ALP BLD-CCNC: 68 IU/L (ref 40–128)
ALT SERPL-CCNC: 8 U/L (ref 10–40)
ANION GAP SERPL CALCULATED.3IONS-SCNC: 10 MMOL/L (ref 4–16)
ANION GAP SERPL CALCULATED.3IONS-SCNC: 11 MMOL/L (ref 4–16)
ANION GAP SERPL CALCULATED.3IONS-SCNC: 13 MMOL/L (ref 4–16)
ANION GAP SERPL CALCULATED.3IONS-SCNC: 13 MMOL/L (ref 4–16)
ANION GAP SERPL CALCULATED.3IONS-SCNC: 17 MMOL/L (ref 4–16)
ANISOCYTOSIS: ABNORMAL
AST SERPL-CCNC: 13 IU/L (ref 15–37)
BANDED NEUTROPHILS ABSOLUTE COUNT: 0.55 K/CU MM
BANDED NEUTROPHILS RELATIVE PERCENT: 2 % (ref 5–11)
BASE EXCESS: 11 (ref 0–2.4)
BASE EXCESS: 3 (ref 0–2.4)
BASE EXCESS: 3 (ref 0–2.4)
BASE EXCESS: 5 (ref 0–2.4)
BASOPHILS ABSOLUTE: 0 K/CU MM
BASOPHILS ABSOLUTE: 0 K/CU MM
BASOPHILS RELATIVE PERCENT: 0.2 % (ref 0–1)
BASOPHILS RELATIVE PERCENT: 0.2 % (ref 0–1)
BILIRUB SERPL-MCNC: 0.2 MG/DL (ref 0–1)
BUN SERPL-MCNC: 37 MG/DL (ref 6–23)
BUN SERPL-MCNC: 38 MG/DL (ref 6–23)
BUN SERPL-MCNC: 38 MG/DL (ref 6–23)
BUN SERPL-MCNC: 39 MG/DL (ref 6–23)
BUN SERPL-MCNC: 40 MG/DL (ref 6–23)
CALCIUM SERPL-MCNC: 8.7 MG/DL (ref 8.3–10.6)
CALCIUM SERPL-MCNC: 9 MG/DL (ref 8.3–10.6)
CALCIUM SERPL-MCNC: 9 MG/DL (ref 8.3–10.6)
CALCIUM SERPL-MCNC: 9.5 MG/DL (ref 8.3–10.6)
CALCIUM SERPL-MCNC: 9.9 MG/DL (ref 8.3–10.6)
CARBON MONOXIDE, BLOOD: 1.5 % (ref 0–5)
CARBON MONOXIDE, BLOOD: 1.6 % (ref 0–5)
CARBON MONOXIDE, BLOOD: 2.1 % (ref 0–5)
CARBON MONOXIDE, BLOOD: 2.2 % (ref 0–5)
CHLORIDE BLD-SCNC: 104 MMOL/L (ref 99–110)
CHLORIDE BLD-SCNC: 104 MMOL/L (ref 99–110)
CHLORIDE BLD-SCNC: 105 MMOL/L (ref 99–110)
CO2 CONTENT: 21.6 MMOL/L (ref 19–24)
CO2 CONTENT: 23 MMOL/L (ref 19–24)
CO2 CONTENT: 23.8 MMOL/L (ref 19–24)
CO2 CONTENT: 24.6 MMOL/L (ref 19–24)
CO2: 19 MMOL/L (ref 21–32)
CO2: 20 MMOL/L (ref 21–32)
CO2: 21 MMOL/L (ref 21–32)
CO2: 21 MMOL/L (ref 21–32)
CO2: 23 MMOL/L (ref 21–32)
COMMENT: ABNORMAL
CREAT SERPL-MCNC: 2 MG/DL (ref 0.6–1.1)
CREAT SERPL-MCNC: 2.1 MG/DL (ref 0.6–1.1)
CREAT SERPL-MCNC: 2.2 MG/DL (ref 0.6–1.1)
CREAT SERPL-MCNC: 2.2 MG/DL (ref 0.6–1.1)
CREAT SERPL-MCNC: 2.3 MG/DL (ref 0.6–1.1)
DIFFERENTIAL TYPE: ABNORMAL
EOSINOPHILS ABSOLUTE: 0 K/CU MM
EOSINOPHILS ABSOLUTE: 0 K/CU MM
EOSINOPHILS RELATIVE PERCENT: 0 % (ref 0–3)
EOSINOPHILS RELATIVE PERCENT: 0 % (ref 0–3)
GFR SERPL CREATININE-BSD FRML MDRD: 21 ML/MIN/1.73M2
GFR SERPL CREATININE-BSD FRML MDRD: 22 ML/MIN/1.73M2
GFR SERPL CREATININE-BSD FRML MDRD: 22 ML/MIN/1.73M2
GFR SERPL CREATININE-BSD FRML MDRD: 23 ML/MIN/1.73M2
GFR SERPL CREATININE-BSD FRML MDRD: 25 ML/MIN/1.73M2
GLUCOSE BLD-MCNC: 156 MG/DL (ref 70–99)
GLUCOSE BLD-MCNC: 178 MG/DL (ref 70–99)
GLUCOSE BLD-MCNC: 196 MG/DL (ref 70–99)
GLUCOSE BLD-MCNC: 203 MG/DL (ref 70–99)
GLUCOSE BLD-MCNC: 207 MG/DL (ref 70–99)
GLUCOSE SERPL-MCNC: 176 MG/DL (ref 70–99)
GLUCOSE SERPL-MCNC: 181 MG/DL (ref 70–99)
GLUCOSE SERPL-MCNC: 210 MG/DL (ref 70–99)
GLUCOSE SERPL-MCNC: 212 MG/DL (ref 70–99)
GLUCOSE SERPL-MCNC: 309 MG/DL (ref 70–99)
HCO3 ARTERIAL: 19.7 MMOL/L (ref 18–23)
HCO3 ARTERIAL: 21.6 MMOL/L (ref 18–23)
HCO3 ARTERIAL: 22.6 MMOL/L (ref 18–23)
HCO3 ARTERIAL: 23.2 MMOL/L (ref 18–23)
HCT VFR BLD CALC: 27.5 % (ref 37–47)
HCT VFR BLD CALC: 28.6 % (ref 37–47)
HCT VFR BLD CALC: 30.2 % (ref 37–47)
HEMOGLOBIN: 8.3 GM/DL (ref 12.5–16)
HEMOGLOBIN: 8.5 GM/DL (ref 12.5–16)
HEMOGLOBIN: 8.8 GM/DL (ref 12.5–16)
IMMATURE NEUTROPHIL %: 0.3 % (ref 0–0.43)
IMMATURE NEUTROPHIL %: 0.4 % (ref 0–0.43)
LACTATE: 0.7 MMOL/L (ref 0.5–1.9)
LACTATE: 1.3 MMOL/L (ref 0.5–1.9)
LACTIC ACID, SEPSIS: 5.1 MMOL/L (ref 0.5–1.9)
LV EF: 58 %
LVEF MODALITY: NORMAL
LYMPHOCYTES ABSOLUTE: 1.6 K/CU MM
LYMPHOCYTES ABSOLUTE: 1.7 K/CU MM
LYMPHOCYTES ABSOLUTE: 7.2 K/CU MM
LYMPHOCYTES RELATIVE PERCENT: 14.5 % (ref 24–44)
LYMPHOCYTES RELATIVE PERCENT: 26 % (ref 24–44)
LYMPHOCYTES RELATIVE PERCENT: 8.3 % (ref 24–44)
MAGNESIUM: 2.1 MG/DL (ref 1.8–2.4)
MAGNESIUM: 2.1 MG/DL (ref 1.8–2.4)
MAGNESIUM: 2.4 MG/DL (ref 1.8–2.4)
MCH RBC QN AUTO: 27.9 PG (ref 27–31)
MCH RBC QN AUTO: 28 PG (ref 27–31)
MCH RBC QN AUTO: 28 PG (ref 27–31)
MCHC RBC AUTO-ENTMCNC: 29.1 % (ref 32–36)
MCHC RBC AUTO-ENTMCNC: 29.7 % (ref 32–36)
MCHC RBC AUTO-ENTMCNC: 30.2 % (ref 32–36)
MCV RBC AUTO: 92.9 FL (ref 78–100)
MCV RBC AUTO: 94.1 FL (ref 78–100)
MCV RBC AUTO: 95.9 FL (ref 78–100)
METAMYELOCYTES ABSOLUTE COUNT: 0.83 K/CU MM
METAMYELOCYTES PERCENT: 3 %
METHEMOGLOBIN ARTERIAL: 1.2 %
METHEMOGLOBIN ARTERIAL: 1.3 %
METHEMOGLOBIN ARTERIAL: 1.5 %
METHEMOGLOBIN ARTERIAL: 1.5 %
MONOCYTES ABSOLUTE: 0.9 K/CU MM
MONOCYTES ABSOLUTE: 1.3 K/CU MM
MONOCYTES ABSOLUTE: 2.8 K/CU MM
MONOCYTES RELATIVE PERCENT: 10 % (ref 0–4)
MONOCYTES RELATIVE PERCENT: 7 % (ref 0–4)
MONOCYTES RELATIVE PERCENT: 7.2 % (ref 0–4)
NUCLEATED RBC %: 0 %
NUCLEATED RBC %: 0 %
O2 SATURATION: 90.8 % (ref 96–97)
O2 SATURATION: 93.1 % (ref 96–97)
O2 SATURATION: 93.7 % (ref 96–97)
O2 SATURATION: 95.7 % (ref 96–97)
PCO2 ARTERIAL: 40 MMHG (ref 32–45)
PCO2 ARTERIAL: 45 MMHG (ref 32–45)
PCO2 ARTERIAL: 47 MMHG (ref 32–45)
PCO2 ARTERIAL: 62 MMHG (ref 32–45)
PDW BLD-RTO: 16.2 % (ref 11.7–14.9)
PH BLOOD: 7.11 (ref 7.34–7.45)
PH BLOOD: 7.27 (ref 7.34–7.45)
PH BLOOD: 7.32 (ref 7.34–7.45)
PH BLOOD: 7.36 (ref 7.34–7.45)
PHOSPHORUS: 4.8 MG/DL (ref 2.5–4.9)
PHOSPHORUS: 4.8 MG/DL (ref 2.5–4.9)
PHOSPHORUS: 7.2 MG/DL (ref 2.5–4.9)
PLATELET # BLD: 242 K/CU MM (ref 140–440)
PLATELET # BLD: 245 K/CU MM (ref 140–440)
PLATELET # BLD: 402 K/CU MM (ref 140–440)
PMV BLD AUTO: 10 FL (ref 7.5–11.1)
PMV BLD AUTO: 9.6 FL (ref 7.5–11.1)
PMV BLD AUTO: 9.7 FL (ref 7.5–11.1)
PO2 ARTERIAL: 126 MMHG (ref 75–100)
PO2 ARTERIAL: 67 MMHG (ref 75–100)
PO2 ARTERIAL: 73 MMHG (ref 75–100)
PO2 ARTERIAL: 90 MMHG (ref 75–100)
POTASSIUM SERPL-SCNC: 4.3 MMOL/L (ref 3.5–5.1)
POTASSIUM SERPL-SCNC: 4.9 MMOL/L (ref 3.5–5.1)
POTASSIUM SERPL-SCNC: 5.2 MMOL/L (ref 3.5–5.1)
POTASSIUM SERPL-SCNC: 5.4 MMOL/L (ref 3.5–5.1)
POTASSIUM SERPL-SCNC: 5.5 MMOL/L (ref 3.5–5.1)
RBC # BLD: 2.96 M/CU MM (ref 4.2–5.4)
RBC # BLD: 3.04 M/CU MM (ref 4.2–5.4)
RBC # BLD: 3.15 M/CU MM (ref 4.2–5.4)
RBC # BLD: ABNORMAL 10*6/UL
SEGMENTED NEUTROPHILS ABSOLUTE COUNT: 15.8 K/CU MM
SEGMENTED NEUTROPHILS ABSOLUTE COUNT: 16.1 K/CU MM
SEGMENTED NEUTROPHILS ABSOLUTE COUNT: 9.2 K/CU MM
SEGMENTED NEUTROPHILS RELATIVE PERCENT: 59 % (ref 36–66)
SEGMENTED NEUTROPHILS RELATIVE PERCENT: 77.8 % (ref 36–66)
SEGMENTED NEUTROPHILS RELATIVE PERCENT: 84.1 % (ref 36–66)
SODIUM BLD-SCNC: 137 MMOL/L (ref 135–145)
SODIUM BLD-SCNC: 137 MMOL/L (ref 135–145)
SODIUM BLD-SCNC: 138 MMOL/L (ref 135–145)
SODIUM BLD-SCNC: 139 MMOL/L (ref 135–145)
SODIUM BLD-SCNC: 140 MMOL/L (ref 135–145)
TOTAL IMMATURE NEUTOROPHIL: 0.03 K/CU MM
TOTAL IMMATURE NEUTOROPHIL: 0.08 K/CU MM
TOTAL NUCLEATED RBC: 0 K/CU MM
TOTAL NUCLEATED RBC: 0 K/CU MM
TOTAL PROTEIN: 6.5 GM/DL (ref 6.4–8.2)
WBC # BLD: 11.8 K/CU MM (ref 4–10.5)
WBC # BLD: 18.8 K/CU MM (ref 4–10.5)
WBC # BLD: 27.5 K/CU MM (ref 4–10.5)
WBC # BLD: ABNORMAL 10*3/UL

## 2023-05-04 PROCEDURE — 5A1223Z PERFORMANCE OF CARDIAC PACING, CONTINUOUS: ICD-10-PCS | Performed by: SPECIALIST

## 2023-05-04 PROCEDURE — 2500000003 HC RX 250 WO HCPCS: Performed by: SPECIALIST

## 2023-05-04 PROCEDURE — 2700000000 HC OXYGEN THERAPY PER DAY

## 2023-05-04 PROCEDURE — 0BH17EZ INSERTION OF ENDOTRACHEAL AIRWAY INTO TRACHEA, VIA NATURAL OR ARTIFICIAL OPENING: ICD-10-PCS | Performed by: SPECIALIST

## 2023-05-04 PROCEDURE — 93306 TTE W/DOPPLER COMPLETE: CPT

## 2023-05-04 PROCEDURE — 6360000002 HC RX W HCPCS: Performed by: SPECIALIST

## 2023-05-04 PROCEDURE — 2000000000 HC ICU R&B

## 2023-05-04 PROCEDURE — 80048 BASIC METABOLIC PNL TOTAL CA: CPT

## 2023-05-04 PROCEDURE — 6370000000 HC RX 637 (ALT 250 FOR IP): Performed by: NURSE PRACTITIONER

## 2023-05-04 PROCEDURE — 31500 INSERT EMERGENCY AIRWAY: CPT

## 2023-05-04 PROCEDURE — 74018 RADEX ABDOMEN 1 VIEW: CPT

## 2023-05-04 PROCEDURE — 89220 SPUTUM SPECIMEN COLLECTION: CPT

## 2023-05-04 PROCEDURE — 51702 INSERT TEMP BLADDER CATH: CPT

## 2023-05-04 PROCEDURE — 6360000002 HC RX W HCPCS: Performed by: NURSE PRACTITIONER

## 2023-05-04 PROCEDURE — C9113 INJ PANTOPRAZOLE SODIUM, VIA: HCPCS | Performed by: SPECIALIST

## 2023-05-04 PROCEDURE — 2500000003 HC RX 250 WO HCPCS

## 2023-05-04 PROCEDURE — 85007 BL SMEAR W/DIFF WBC COUNT: CPT

## 2023-05-04 PROCEDURE — 36415 COLL VENOUS BLD VENIPUNCTURE: CPT

## 2023-05-04 PROCEDURE — 6360000002 HC RX W HCPCS

## 2023-05-04 PROCEDURE — 85027 COMPLETE CBC AUTOMATED: CPT

## 2023-05-04 PROCEDURE — 84100 ASSAY OF PHOSPHORUS: CPT

## 2023-05-04 PROCEDURE — 80053 COMPREHEN METABOLIC PANEL: CPT

## 2023-05-04 PROCEDURE — 83735 ASSAY OF MAGNESIUM: CPT

## 2023-05-04 PROCEDURE — 82962 GLUCOSE BLOOD TEST: CPT

## 2023-05-04 PROCEDURE — 93005 ELECTROCARDIOGRAM TRACING: CPT | Performed by: SPECIALIST

## 2023-05-04 PROCEDURE — 83605 ASSAY OF LACTIC ACID: CPT

## 2023-05-04 PROCEDURE — 94002 VENT MGMT INPAT INIT DAY: CPT

## 2023-05-04 PROCEDURE — 71045 X-RAY EXAM CHEST 1 VIEW: CPT

## 2023-05-04 PROCEDURE — 94640 AIRWAY INHALATION TREATMENT: CPT

## 2023-05-04 PROCEDURE — 5A1935Z RESPIRATORY VENTILATION, LESS THAN 24 CONSECUTIVE HOURS: ICD-10-PCS | Performed by: SPECIALIST

## 2023-05-04 PROCEDURE — 37799 UNLISTED PX VASCULAR SURGERY: CPT

## 2023-05-04 PROCEDURE — 36556 INSERT NON-TUNNEL CV CATH: CPT

## 2023-05-04 PROCEDURE — 36620 INSERTION CATHETER ARTERY: CPT

## 2023-05-04 PROCEDURE — 99222 1ST HOSP IP/OBS MODERATE 55: CPT | Performed by: INTERNAL MEDICINE

## 2023-05-04 PROCEDURE — 2580000003 HC RX 258: Performed by: PHYSICIAN ASSISTANT

## 2023-05-04 PROCEDURE — 85025 COMPLETE CBC W/AUTO DIFF WBC: CPT

## 2023-05-04 PROCEDURE — 04HY32Z INSERTION OF MONITORING DEVICE INTO LOWER ARTERY, PERCUTANEOUS APPROACH: ICD-10-PCS | Performed by: SURGERY

## 2023-05-04 PROCEDURE — 02HV33Z INSERTION OF INFUSION DEVICE INTO SUPERIOR VENA CAVA, PERCUTANEOUS APPROACH: ICD-10-PCS | Performed by: SURGERY

## 2023-05-04 PROCEDURE — 94761 N-INVAS EAR/PLS OXIMETRY MLT: CPT

## 2023-05-04 PROCEDURE — 6370000000 HC RX 637 (ALT 250 FOR IP): Performed by: PHYSICIAN ASSISTANT

## 2023-05-04 PROCEDURE — 82803 BLOOD GASES ANY COMBINATION: CPT

## 2023-05-04 RX ORDER — CALCIUM CHLORIDE 100 MG/ML
INJECTION INTRAVENOUS; INTRAVENTRICULAR
Status: COMPLETED | OUTPATIENT
Start: 2023-05-04 | End: 2023-05-04

## 2023-05-04 RX ORDER — DEXTROSE MONOHYDRATE 25 G/50ML
INJECTION, SOLUTION INTRAVENOUS
Status: DISPENSED
Start: 2023-05-04 | End: 2023-05-04

## 2023-05-04 RX ORDER — MIDAZOLAM HYDROCHLORIDE 2 MG/2ML
1 INJECTION, SOLUTION INTRAMUSCULAR; INTRAVENOUS
Status: DISCONTINUED | OUTPATIENT
Start: 2023-05-04 | End: 2023-05-05

## 2023-05-04 RX ORDER — INSULIN LISPRO 100 [IU]/ML
0-8 INJECTION, SOLUTION INTRAVENOUS; SUBCUTANEOUS EVERY 4 HOURS
Status: DISCONTINUED | OUTPATIENT
Start: 2023-05-04 | End: 2023-05-06

## 2023-05-04 RX ORDER — ATROPINE SULFATE 0.1 MG/ML
INJECTION INTRAVENOUS
Status: COMPLETED | OUTPATIENT
Start: 2023-05-04 | End: 2023-05-04

## 2023-05-04 RX ORDER — EPINEPHRINE 0.1 MG/ML
SYRINGE (ML) INJECTION
Status: COMPLETED | OUTPATIENT
Start: 2023-05-04 | End: 2023-05-04

## 2023-05-04 RX ORDER — FENTANYL CITRATE 50 UG/ML
50 INJECTION, SOLUTION INTRAMUSCULAR; INTRAVENOUS ONCE
Status: COMPLETED | OUTPATIENT
Start: 2023-05-04 | End: 2023-05-04

## 2023-05-04 RX ORDER — FENTANYL CITRATE 50 UG/ML
INJECTION, SOLUTION INTRAMUSCULAR; INTRAVENOUS
Status: COMPLETED
Start: 2023-05-04 | End: 2023-05-04

## 2023-05-04 RX ORDER — NOREPINEPHRINE BIT/0.9 % NACL 16MG/250ML
INFUSION BOTTLE (ML) INTRAVENOUS
Status: COMPLETED
Start: 2023-05-04 | End: 2023-05-04

## 2023-05-04 RX ORDER — PROPOFOL 10 MG/ML
5-50 INJECTION, EMULSION INTRAVENOUS CONTINUOUS
Status: DISCONTINUED | OUTPATIENT
Start: 2023-05-04 | End: 2023-05-05

## 2023-05-04 RX ORDER — FENTANYL CITRATE 50 UG/ML
50 INJECTION, SOLUTION INTRAMUSCULAR; INTRAVENOUS
Status: DISCONTINUED | OUTPATIENT
Start: 2023-05-04 | End: 2023-05-05

## 2023-05-04 RX ORDER — DEXTROSE MONOHYDRATE 25 G/50ML
INJECTION, SOLUTION INTRAVENOUS
Status: COMPLETED | OUTPATIENT
Start: 2023-05-04 | End: 2023-05-04

## 2023-05-04 RX ORDER — PANTOPRAZOLE SODIUM 40 MG/10ML
40 INJECTION, POWDER, LYOPHILIZED, FOR SOLUTION INTRAVENOUS DAILY
Status: DISCONTINUED | OUTPATIENT
Start: 2023-05-04 | End: 2023-05-07

## 2023-05-04 RX ORDER — DEXTROSE MONOHYDRATE 100 MG/ML
INJECTION, SOLUTION INTRAVENOUS CONTINUOUS PRN
Status: DISCONTINUED | OUTPATIENT
Start: 2023-05-04 | End: 2023-05-10

## 2023-05-04 RX ORDER — HEPARIN SODIUM 5000 [USP'U]/ML
5000 INJECTION, SOLUTION INTRAVENOUS; SUBCUTANEOUS EVERY 8 HOURS SCHEDULED
Status: DISCONTINUED | OUTPATIENT
Start: 2023-05-04 | End: 2023-05-25 | Stop reason: HOSPADM

## 2023-05-04 RX ORDER — CHLORHEXIDINE GLUCONATE 0.12 MG/ML
15 RINSE ORAL 2 TIMES DAILY
Status: DISCONTINUED | OUTPATIENT
Start: 2023-05-04 | End: 2023-05-05

## 2023-05-04 RX ORDER — FAMOTIDINE 10 MG/ML
20 INJECTION, SOLUTION INTRAVENOUS DAILY
Status: CANCELLED | OUTPATIENT
Start: 2023-05-04

## 2023-05-04 RX ORDER — NOREPINEPHRINE BIT/0.9 % NACL 16MG/250ML
1-100 INFUSION BOTTLE (ML) INTRAVENOUS CONTINUOUS
Status: DISCONTINUED | OUTPATIENT
Start: 2023-05-04 | End: 2023-05-05

## 2023-05-04 RX ORDER — INSULIN LISPRO 100 [IU]/ML
0-8 INJECTION, SOLUTION INTRAVENOUS; SUBCUTANEOUS EVERY 4 HOURS
Status: DISCONTINUED | OUTPATIENT
Start: 2023-05-04 | End: 2023-05-04

## 2023-05-04 RX ORDER — PROPOFOL 10 MG/ML
INJECTION, EMULSION INTRAVENOUS
Status: COMPLETED
Start: 2023-05-04 | End: 2023-05-04

## 2023-05-04 RX ORDER — GLUCAGON 1 MG/ML
1 KIT INJECTION PRN
Status: DISCONTINUED | OUTPATIENT
Start: 2023-05-04 | End: 2023-05-10

## 2023-05-04 RX ADMIN — Medication 1 MG: at 07:27

## 2023-05-04 RX ADMIN — FENTANYL CITRATE 50 MCG: 50 INJECTION, SOLUTION INTRAMUSCULAR; INTRAVENOUS at 08:15

## 2023-05-04 RX ADMIN — BUDESONIDE AND FORMOTEROL FUMARATE DIHYDRATE 2 PUFF: 80; 4.5 AEROSOL RESPIRATORY (INHALATION) at 19:51

## 2023-05-04 RX ADMIN — INSULIN LISPRO 2 UNITS: 100 INJECTION, SOLUTION INTRAVENOUS; SUBCUTANEOUS at 11:52

## 2023-05-04 RX ADMIN — CHLORHEXIDINE GLUCONATE 0.12% ORAL RINSE 15 ML: 1.2 LIQUID ORAL at 11:54

## 2023-05-04 RX ADMIN — PROPOFOL 20 MCG/KG/MIN: 10 INJECTION, EMULSION INTRAVENOUS at 08:03

## 2023-05-04 RX ADMIN — CHLORHEXIDINE GLUCONATE 0.12% ORAL RINSE 15 ML: 1.2 LIQUID ORAL at 20:04

## 2023-05-04 RX ADMIN — CALCIUM CHLORIDE 1000 MG: 100 INJECTION, SOLUTION INTRAVENOUS; INTRAVENTRICULAR at 07:30

## 2023-05-04 RX ADMIN — Medication 1 MG: at 07:30

## 2023-05-04 RX ADMIN — Medication 10 MCG/MIN: at 08:15

## 2023-05-04 RX ADMIN — DEXTROSE MONOHYDRATE 25 G: 25 INJECTION, SOLUTION INTRAVENOUS at 07:33

## 2023-05-04 RX ADMIN — PROPOFOL 30 MCG/KG/MIN: 10 INJECTION, EMULSION INTRAVENOUS at 19:51

## 2023-05-04 RX ADMIN — LEVOTHYROXINE SODIUM 137 MCG: 0.11 TABLET ORAL at 06:33

## 2023-05-04 RX ADMIN — PANTOPRAZOLE SODIUM 40 MG: 40 INJECTION, POWDER, FOR SOLUTION INTRAVENOUS at 11:54

## 2023-05-04 RX ADMIN — PROPOFOL 30 MCG/KG/MIN: 10 INJECTION, EMULSION INTRAVENOUS at 13:40

## 2023-05-04 RX ADMIN — SODIUM CHLORIDE, PRESERVATIVE FREE 10 ML: 5 INJECTION INTRAVENOUS at 09:28

## 2023-05-04 RX ADMIN — FENTANYL CITRATE 50 MCG: 50 INJECTION, SOLUTION INTRAMUSCULAR; INTRAVENOUS at 08:48

## 2023-05-04 RX ADMIN — SODIUM CHLORIDE, PRESERVATIVE FREE 10 ML: 5 INJECTION INTRAVENOUS at 21:48

## 2023-05-04 RX ADMIN — HEPARIN SODIUM 5000 UNITS: 5000 INJECTION INTRAVENOUS; SUBCUTANEOUS at 15:34

## 2023-05-04 RX ADMIN — HEPARIN SODIUM 5000 UNITS: 5000 INJECTION INTRAVENOUS; SUBCUTANEOUS at 21:50

## 2023-05-04 RX ADMIN — ATROPINE SULFATE 1 MG: 0.1 INJECTION, SOLUTION INTRAVENOUS at 07:29

## 2023-05-04 ASSESSMENT — PAIN SCALES - GENERAL
PAINLEVEL_OUTOF10: 0

## 2023-05-04 ASSESSMENT — PULMONARY FUNCTION TESTS
PIF_VALUE: 20
PIF_VALUE: 18
PIF_VALUE: 25
PIF_VALUE: 21
PIF_VALUE: 23
PIF_VALUE: 17
PIF_VALUE: 26
PIF_VALUE: 26

## 2023-05-04 NOTE — ANESTHESIA POSTPROCEDURE EVALUATION
Department of Anesthesiology  Postprocedure Note    Patient: Reina Estevez  MRN: [id number]  YOB: 1940  Date of evaluation: 5/4/2023      Procedure Summary     Date: 05/03/23 Room / Location: [place] [address] / [facility]    Anesthesia Start: 9755 Anesthesia Stop: 0940    Procedures:       RIGHT BOWEL RESECTION HEMICOLECTOMY LAPAROSCOPIC ROBOTIC XI (Right: Abdomen)      OPEN HERNIA INCISIONAL REPAIR (Abdomen) Diagnosis:       Cecum mass      (Cecum mass [K63.89])    Surgeons: Millicent Ralph MD Responsible Provider: Gladys Leonard MD    Anesthesia Type: general, regional ASA Status: 3          Anesthesia Type: No value filed.     Fernando Phase I: Fernando Score: 9    Fernando Phase II:        Anesthesia Post Evaluation    Patient location during evaluation: PACU  Patient participation: complete - patient participated  Level of consciousness: sleepy but conscious  Pain score: 2  Airway patency: patent  Nausea & Vomiting: no nausea and no vomiting  Complications: no  Cardiovascular status: hemodynamically stable  Respiratory status: acceptable  Hydration status: euvolemic

## 2023-05-05 ENCOUNTER — APPOINTMENT (OUTPATIENT)
Dept: GENERAL RADIOLOGY | Age: 83
DRG: 329 | End: 2023-05-05
Attending: SURGERY
Payer: MEDICARE

## 2023-05-05 LAB
ANION GAP SERPL CALCULATED.3IONS-SCNC: 10 MMOL/L (ref 4–16)
ANION GAP SERPL CALCULATED.3IONS-SCNC: 13 MMOL/L (ref 4–16)
BASE EXCESS: 2 (ref 0–2.4)
BASOPHILS ABSOLUTE: 0 K/CU MM
BASOPHILS RELATIVE PERCENT: 0.2 % (ref 0–1)
BUN SERPL-MCNC: 36 MG/DL (ref 6–23)
BUN SERPL-MCNC: 38 MG/DL (ref 6–23)
CALCIUM SERPL-MCNC: 9 MG/DL (ref 8.3–10.6)
CALCIUM SERPL-MCNC: 9.1 MG/DL (ref 8.3–10.6)
CARBON MONOXIDE, BLOOD: 2 % (ref 0–5)
CHLORIDE BLD-SCNC: 106 MMOL/L (ref 99–110)
CHLORIDE BLD-SCNC: 106 MMOL/L (ref 99–110)
CO2 CONTENT: 24 MMOL/L (ref 19–24)
CO2: 21 MMOL/L (ref 21–32)
CO2: 21 MMOL/L (ref 21–32)
COMMENT: ABNORMAL
CREAT SERPL-MCNC: 2.2 MG/DL (ref 0.6–1.1)
CREAT SERPL-MCNC: 2.2 MG/DL (ref 0.6–1.1)
DIFFERENTIAL TYPE: ABNORMAL
EKG ATRIAL RATE: 20 BPM
EKG ATRIAL RATE: 66 BPM
EKG DIAGNOSIS: NORMAL
EKG DIAGNOSIS: NORMAL
EKG P AXIS: 55 DEGREES
EKG P-R INTERVAL: 256 MS
EKG Q-T INTERVAL: 466 MS
EKG Q-T INTERVAL: 654 MS
EKG QRS DURATION: 146 MS
EKG QRS DURATION: 158 MS
EKG QTC CALCULATION (BAZETT): 488 MS
EKG QTC CALCULATION (BAZETT): 505 MS
EKG R AXIS: -55 DEGREES
EKG R AXIS: -63 DEGREES
EKG T AXIS: 119 DEGREES
EKG T AXIS: 95 DEGREES
EKG VENTRICULAR RATE: 36 BPM
EKG VENTRICULAR RATE: 66 BPM
EOSINOPHILS ABSOLUTE: 0.2 K/CU MM
EOSINOPHILS RELATIVE PERCENT: 1.5 % (ref 0–3)
ESTIMATED AVERAGE GLUCOSE: 114 MG/DL
GFR SERPL CREATININE-BSD FRML MDRD: 22 ML/MIN/1.73M2
GFR SERPL CREATININE-BSD FRML MDRD: 22 ML/MIN/1.73M2
GLUCOSE BLD-MCNC: 124 MG/DL (ref 70–99)
GLUCOSE BLD-MCNC: 128 MG/DL (ref 70–99)
GLUCOSE BLD-MCNC: 133 MG/DL (ref 70–99)
GLUCOSE BLD-MCNC: 136 MG/DL (ref 70–99)
GLUCOSE BLD-MCNC: 147 MG/DL (ref 70–99)
GLUCOSE BLD-MCNC: 149 MG/DL (ref 70–99)
GLUCOSE SERPL-MCNC: 140 MG/DL (ref 70–99)
GLUCOSE SERPL-MCNC: 158 MG/DL (ref 70–99)
HBA1C MFR BLD: 5.6 % (ref 4.2–6.3)
HCO3 ARTERIAL: 22.9 MMOL/L (ref 18–23)
HCT VFR BLD CALC: 27.8 % (ref 37–47)
HEMOGLOBIN: 8.2 GM/DL (ref 12.5–16)
IMMATURE NEUTROPHIL %: 0.4 % (ref 0–0.43)
LACTATE: 0.6 MMOL/L (ref 0.5–1.9)
LACTATE: 0.6 MMOL/L (ref 0.5–1.9)
LYMPHOCYTES ABSOLUTE: 1.8 K/CU MM
LYMPHOCYTES RELATIVE PERCENT: 13.3 % (ref 24–44)
MAGNESIUM: 2 MG/DL (ref 1.8–2.4)
MAGNESIUM: 2.1 MG/DL (ref 1.8–2.4)
MCH RBC QN AUTO: 27.8 PG (ref 27–31)
MCHC RBC AUTO-ENTMCNC: 29.5 % (ref 32–36)
MCV RBC AUTO: 94.2 FL (ref 78–100)
METHEMOGLOBIN ARTERIAL: 0.8 %
MONOCYTES ABSOLUTE: 1.3 K/CU MM
MONOCYTES RELATIVE PERCENT: 9.6 % (ref 0–4)
NUCLEATED RBC %: 0 %
O2 SATURATION: 96.7 % (ref 96–97)
PCO2 ARTERIAL: 37 MMHG (ref 32–45)
PDW BLD-RTO: 16.4 % (ref 11.7–14.9)
PH BLOOD: 7.4 (ref 7.34–7.45)
PHOSPHORUS: 3.4 MG/DL (ref 2.5–4.9)
PHOSPHORUS: 4.2 MG/DL (ref 2.5–4.9)
PLATELET # BLD: 216 K/CU MM (ref 140–440)
PMV BLD AUTO: 9.5 FL (ref 7.5–11.1)
PO2 ARTERIAL: 134 MMHG (ref 75–100)
POTASSIUM SERPL-SCNC: 4.6 MMOL/L (ref 3.5–5.1)
POTASSIUM SERPL-SCNC: 5 MMOL/L (ref 3.5–5.1)
RBC # BLD: 2.95 M/CU MM (ref 4.2–5.4)
SEGMENTED NEUTROPHILS ABSOLUTE COUNT: 10.2 K/CU MM
SEGMENTED NEUTROPHILS RELATIVE PERCENT: 75 % (ref 36–66)
SODIUM BLD-SCNC: 137 MMOL/L (ref 135–145)
SODIUM BLD-SCNC: 140 MMOL/L (ref 135–145)
TOTAL IMMATURE NEUTOROPHIL: 0.06 K/CU MM
TOTAL NUCLEATED RBC: 0 K/CU MM
WBC # BLD: 13.6 K/CU MM (ref 4–10.5)

## 2023-05-05 PROCEDURE — 93010 ELECTROCARDIOGRAM REPORT: CPT | Performed by: INTERNAL MEDICINE

## 2023-05-05 PROCEDURE — 87205 SMEAR GRAM STAIN: CPT

## 2023-05-05 PROCEDURE — 2000000000 HC ICU R&B

## 2023-05-05 PROCEDURE — 94003 VENT MGMT INPAT SUBQ DAY: CPT

## 2023-05-05 PROCEDURE — 82962 GLUCOSE BLOOD TEST: CPT

## 2023-05-05 PROCEDURE — 87077 CULTURE AEROBIC IDENTIFY: CPT

## 2023-05-05 PROCEDURE — 93005 ELECTROCARDIOGRAM TRACING: CPT | Performed by: STUDENT IN AN ORGANIZED HEALTH CARE EDUCATION/TRAINING PROGRAM

## 2023-05-05 PROCEDURE — 6370000000 HC RX 637 (ALT 250 FOR IP): Performed by: NURSE PRACTITIONER

## 2023-05-05 PROCEDURE — 87186 SC STD MICRODIL/AGAR DIL: CPT

## 2023-05-05 PROCEDURE — 83036 HEMOGLOBIN GLYCOSYLATED A1C: CPT

## 2023-05-05 PROCEDURE — 82803 BLOOD GASES ANY COMBINATION: CPT

## 2023-05-05 PROCEDURE — 71045 X-RAY EXAM CHEST 1 VIEW: CPT

## 2023-05-05 PROCEDURE — 94761 N-INVAS EAR/PLS OXIMETRY MLT: CPT

## 2023-05-05 PROCEDURE — 6360000002 HC RX W HCPCS: Performed by: SPECIALIST

## 2023-05-05 PROCEDURE — 99233 SBSQ HOSP IP/OBS HIGH 50: CPT | Performed by: INTERNAL MEDICINE

## 2023-05-05 PROCEDURE — 85025 COMPLETE CBC W/AUTO DIFF WBC: CPT

## 2023-05-05 PROCEDURE — 80048 BASIC METABOLIC PNL TOTAL CA: CPT

## 2023-05-05 PROCEDURE — C9113 INJ PANTOPRAZOLE SODIUM, VIA: HCPCS | Performed by: SPECIALIST

## 2023-05-05 PROCEDURE — 87070 CULTURE OTHR SPECIMN AEROBIC: CPT

## 2023-05-05 PROCEDURE — 6370000000 HC RX 637 (ALT 250 FOR IP): Performed by: PHYSICIAN ASSISTANT

## 2023-05-05 PROCEDURE — 2700000000 HC OXYGEN THERAPY PER DAY

## 2023-05-05 PROCEDURE — 84100 ASSAY OF PHOSPHORUS: CPT

## 2023-05-05 PROCEDURE — 6360000002 HC RX W HCPCS: Performed by: NURSE PRACTITIONER

## 2023-05-05 PROCEDURE — 2580000003 HC RX 258: Performed by: SPECIALIST

## 2023-05-05 PROCEDURE — 94640 AIRWAY INHALATION TREATMENT: CPT

## 2023-05-05 PROCEDURE — 6370000000 HC RX 637 (ALT 250 FOR IP): Performed by: STUDENT IN AN ORGANIZED HEALTH CARE EDUCATION/TRAINING PROGRAM

## 2023-05-05 PROCEDURE — 89220 SPUTUM SPECIMEN COLLECTION: CPT

## 2023-05-05 PROCEDURE — 2580000003 HC RX 258: Performed by: PHYSICIAN ASSISTANT

## 2023-05-05 RX ORDER — ACETAMINOPHEN 325 MG/1
650 TABLET ORAL EVERY 6 HOURS PRN
Status: DISCONTINUED | OUTPATIENT
Start: 2023-05-05 | End: 2023-05-05

## 2023-05-05 RX ORDER — HYDRALAZINE HYDROCHLORIDE 25 MG/1
25 TABLET, FILM COATED ORAL 3 TIMES DAILY
Status: DISCONTINUED | OUTPATIENT
Start: 2023-05-05 | End: 2023-05-14

## 2023-05-05 RX ORDER — HYDROMORPHONE HYDROCHLORIDE 1 MG/ML
1 INJECTION, SOLUTION INTRAMUSCULAR; INTRAVENOUS; SUBCUTANEOUS ONCE
Status: COMPLETED | OUTPATIENT
Start: 2023-05-05 | End: 2023-05-05

## 2023-05-05 RX ORDER — AMLODIPINE BESYLATE 10 MG/1
10 TABLET ORAL DAILY
Status: DISCONTINUED | OUTPATIENT
Start: 2023-05-05 | End: 2023-05-25 | Stop reason: HOSPADM

## 2023-05-05 RX ORDER — HYDRALAZINE HYDROCHLORIDE 20 MG/ML
10 INJECTION INTRAMUSCULAR; INTRAVENOUS EVERY 6 HOURS PRN
Status: DISCONTINUED | OUTPATIENT
Start: 2023-05-05 | End: 2023-05-05

## 2023-05-05 RX ORDER — HYDROMORPHONE HYDROCHLORIDE 1 MG/ML
0.5 INJECTION, SOLUTION INTRAMUSCULAR; INTRAVENOUS; SUBCUTANEOUS EVERY 4 HOURS PRN
Status: DISCONTINUED | OUTPATIENT
Start: 2023-05-05 | End: 2023-05-11

## 2023-05-05 RX ORDER — METOPROLOL SUCCINATE 50 MG/1
100 TABLET, EXTENDED RELEASE ORAL DAILY
Status: DISCONTINUED | OUTPATIENT
Start: 2023-05-05 | End: 2023-05-10

## 2023-05-05 RX ORDER — ACETAMINOPHEN 325 MG/1
650 TABLET ORAL EVERY 6 HOURS PRN
Status: DISCONTINUED | OUTPATIENT
Start: 2023-05-05 | End: 2023-05-25 | Stop reason: HOSPADM

## 2023-05-05 RX ORDER — HYDRALAZINE HYDROCHLORIDE 20 MG/ML
10 INJECTION INTRAMUSCULAR; INTRAVENOUS EVERY 4 HOURS PRN
Status: DISCONTINUED | OUTPATIENT
Start: 2023-05-05 | End: 2023-05-15

## 2023-05-05 RX ADMIN — BUDESONIDE AND FORMOTEROL FUMARATE DIHYDRATE 2 PUFF: 80; 4.5 AEROSOL RESPIRATORY (INHALATION) at 06:58

## 2023-05-05 RX ADMIN — SODIUM CHLORIDE 5 ML: 9 INJECTION, SOLUTION INTRAVENOUS at 12:12

## 2023-05-05 RX ADMIN — HYDROMORPHONE HYDROCHLORIDE 0.5 MG: 1 INJECTION, SOLUTION INTRAMUSCULAR; INTRAVENOUS; SUBCUTANEOUS at 15:29

## 2023-05-05 RX ADMIN — HYDRALAZINE HYDROCHLORIDE 10 MG: 20 INJECTION INTRAMUSCULAR; INTRAVENOUS at 15:42

## 2023-05-05 RX ADMIN — HYDRALAZINE HYDROCHLORIDE 25 MG: 25 TABLET, FILM COATED ORAL at 22:13

## 2023-05-05 RX ADMIN — PROPOFOL 50 MCG/KG/MIN: 10 INJECTION, EMULSION INTRAVENOUS at 09:47

## 2023-05-05 RX ADMIN — METOPROLOL SUCCINATE 100 MG: 50 TABLET, EXTENDED RELEASE ORAL at 22:13

## 2023-05-05 RX ADMIN — FENTANYL CITRATE 50 MCG: 50 INJECTION, SOLUTION INTRAMUSCULAR; INTRAVENOUS at 06:18

## 2023-05-05 RX ADMIN — BUDESONIDE AND FORMOTEROL FUMARATE DIHYDRATE 2 PUFF: 80; 4.5 AEROSOL RESPIRATORY (INHALATION) at 19:17

## 2023-05-05 RX ADMIN — HEPARIN SODIUM 5000 UNITS: 5000 INJECTION INTRAVENOUS; SUBCUTANEOUS at 21:36

## 2023-05-05 RX ADMIN — PROPOFOL 40 MCG/KG/MIN: 10 INJECTION, EMULSION INTRAVENOUS at 05:19

## 2023-05-05 RX ADMIN — AMLODIPINE BESYLATE 10 MG: 10 TABLET ORAL at 22:13

## 2023-05-05 RX ADMIN — HEPARIN SODIUM 5000 UNITS: 5000 INJECTION INTRAVENOUS; SUBCUTANEOUS at 14:21

## 2023-05-05 RX ADMIN — SODIUM CHLORIDE, PRESERVATIVE FREE 10 ML: 5 INJECTION INTRAVENOUS at 20:03

## 2023-05-05 RX ADMIN — ACETAMINOPHEN 650 MG: 325 TABLET ORAL at 21:37

## 2023-05-05 RX ADMIN — LEVOTHYROXINE SODIUM 137 MCG: 0.11 TABLET ORAL at 06:33

## 2023-05-05 RX ADMIN — PANTOPRAZOLE SODIUM 40 MG: 40 INJECTION, POWDER, FOR SOLUTION INTRAVENOUS at 09:14

## 2023-05-05 RX ADMIN — CYCLOBENZAPRINE 10 MG: 10 TABLET, FILM COATED ORAL at 15:31

## 2023-05-05 RX ADMIN — CEFTRIAXONE 1000 MG: 1 INJECTION, POWDER, FOR SOLUTION INTRAMUSCULAR; INTRAVENOUS at 12:21

## 2023-05-05 RX ADMIN — HYDRALAZINE HYDROCHLORIDE 10 MG: 20 INJECTION INTRAMUSCULAR; INTRAVENOUS at 19:59

## 2023-05-05 RX ADMIN — HEPARIN SODIUM 5000 UNITS: 5000 INJECTION INTRAVENOUS; SUBCUTANEOUS at 06:23

## 2023-05-05 RX ADMIN — SODIUM CHLORIDE, PRESERVATIVE FREE 10 ML: 5 INJECTION INTRAVENOUS at 09:14

## 2023-05-05 RX ADMIN — CHLORHEXIDINE GLUCONATE 0.12% ORAL RINSE 15 ML: 1.2 LIQUID ORAL at 09:15

## 2023-05-05 RX ADMIN — HYDROMORPHONE HYDROCHLORIDE 1 MG: 1 INJECTION, SOLUTION INTRAMUSCULAR; INTRAVENOUS; SUBCUTANEOUS at 12:14

## 2023-05-05 RX ADMIN — PROPOFOL 35 MCG/KG/MIN: 10 INJECTION, EMULSION INTRAVENOUS at 00:17

## 2023-05-05 ASSESSMENT — PAIN SCALES - GENERAL
PAINLEVEL_OUTOF10: 10
PAINLEVEL_OUTOF10: 0
PAINLEVEL_OUTOF10: 0
PAINLEVEL_OUTOF10: 8

## 2023-05-05 ASSESSMENT — PAIN DESCRIPTION - FREQUENCY: FREQUENCY: CONTINUOUS

## 2023-05-05 ASSESSMENT — PAIN DESCRIPTION - PAIN TYPE
TYPE: ACUTE PAIN
TYPE: ACUTE PAIN;OTHER (COMMENT)

## 2023-05-05 ASSESSMENT — PULMONARY FUNCTION TESTS
PIF_VALUE: 16
PIF_VALUE: 24
PIF_VALUE: 23
PIF_VALUE: 23
PIF_VALUE: 25
PIF_VALUE: 23

## 2023-05-05 ASSESSMENT — PAIN DESCRIPTION - DESCRIPTORS
DESCRIPTORS: ACHING;HEAVINESS
DESCRIPTORS: HEAVINESS;ACHING

## 2023-05-05 ASSESSMENT — PAIN DESCRIPTION - LOCATION
LOCATION: CHEST
LOCATION: CHEST

## 2023-05-05 ASSESSMENT — PAIN DESCRIPTION - ORIENTATION: ORIENTATION: UPPER

## 2023-05-06 ENCOUNTER — APPOINTMENT (OUTPATIENT)
Dept: GENERAL RADIOLOGY | Age: 83
DRG: 329 | End: 2023-05-06
Attending: SURGERY
Payer: MEDICARE

## 2023-05-06 LAB
ANION GAP SERPL CALCULATED.3IONS-SCNC: 12 MMOL/L (ref 4–16)
BASOPHILS ABSOLUTE: 0.1 K/CU MM
BASOPHILS RELATIVE PERCENT: 0.4 % (ref 0–1)
BUN SERPL-MCNC: 30 MG/DL (ref 6–23)
CALCIUM SERPL-MCNC: 9 MG/DL (ref 8.3–10.6)
CHLORIDE BLD-SCNC: 109 MMOL/L (ref 99–110)
CO2: 22 MMOL/L (ref 21–32)
CREAT SERPL-MCNC: 1.9 MG/DL (ref 0.6–1.1)
DIFFERENTIAL TYPE: ABNORMAL
EOSINOPHILS ABSOLUTE: 0.6 K/CU MM
EOSINOPHILS RELATIVE PERCENT: 4 % (ref 0–3)
GFR SERPL CREATININE-BSD FRML MDRD: 26 ML/MIN/1.73M2
GLUCOSE BLD-MCNC: 131 MG/DL (ref 70–99)
GLUCOSE BLD-MCNC: 135 MG/DL (ref 70–99)
GLUCOSE BLD-MCNC: 144 MG/DL (ref 70–99)
GLUCOSE BLD-MCNC: 162 MG/DL (ref 70–99)
GLUCOSE BLD-MCNC: 162 MG/DL (ref 70–99)
GLUCOSE BLD-MCNC: 165 MG/DL (ref 70–99)
GLUCOSE SERPL-MCNC: 141 MG/DL (ref 70–99)
HCT VFR BLD CALC: 27.7 % (ref 37–47)
HEMOGLOBIN: 8.3 GM/DL (ref 12.5–16)
IMMATURE NEUTROPHIL %: 0.5 % (ref 0–0.43)
LYMPHOCYTES ABSOLUTE: 1.9 K/CU MM
LYMPHOCYTES RELATIVE PERCENT: 13.3 % (ref 24–44)
MAGNESIUM: 2.1 MG/DL (ref 1.8–2.4)
MCH RBC QN AUTO: 28.2 PG (ref 27–31)
MCHC RBC AUTO-ENTMCNC: 30 % (ref 32–36)
MCV RBC AUTO: 94.2 FL (ref 78–100)
MONOCYTES ABSOLUTE: 1.3 K/CU MM
MONOCYTES RELATIVE PERCENT: 9.2 % (ref 0–4)
NUCLEATED RBC %: 0 %
PDW BLD-RTO: 16.5 % (ref 11.7–14.9)
PHOSPHORUS: 2.7 MG/DL (ref 2.5–4.9)
PLATELET # BLD: 221 K/CU MM (ref 140–440)
PMV BLD AUTO: 9.6 FL (ref 7.5–11.1)
POTASSIUM SERPL-SCNC: 4.5 MMOL/L (ref 3.5–5.1)
RBC # BLD: 2.94 M/CU MM (ref 4.2–5.4)
SEGMENTED NEUTROPHILS ABSOLUTE COUNT: 10.1 K/CU MM
SEGMENTED NEUTROPHILS RELATIVE PERCENT: 72.6 % (ref 36–66)
SODIUM BLD-SCNC: 143 MMOL/L (ref 135–145)
TOTAL IMMATURE NEUTOROPHIL: 0.07 K/CU MM
TOTAL NUCLEATED RBC: 0 K/CU MM
WBC # BLD: 13.9 K/CU MM (ref 4–10.5)

## 2023-05-06 PROCEDURE — 6370000000 HC RX 637 (ALT 250 FOR IP): Performed by: STUDENT IN AN ORGANIZED HEALTH CARE EDUCATION/TRAINING PROGRAM

## 2023-05-06 PROCEDURE — 6370000000 HC RX 637 (ALT 250 FOR IP): Performed by: PHYSICIAN ASSISTANT

## 2023-05-06 PROCEDURE — 82962 GLUCOSE BLOOD TEST: CPT

## 2023-05-06 PROCEDURE — 99233 SBSQ HOSP IP/OBS HIGH 50: CPT | Performed by: INTERNAL MEDICINE

## 2023-05-06 PROCEDURE — 37799 UNLISTED PX VASCULAR SURGERY: CPT

## 2023-05-06 PROCEDURE — 6360000002 HC RX W HCPCS: Performed by: SPECIALIST

## 2023-05-06 PROCEDURE — 84100 ASSAY OF PHOSPHORUS: CPT

## 2023-05-06 PROCEDURE — 2000000000 HC ICU R&B

## 2023-05-06 PROCEDURE — 85025 COMPLETE CBC W/AUTO DIFF WBC: CPT

## 2023-05-06 PROCEDURE — 99024 POSTOP FOLLOW-UP VISIT: CPT | Performed by: SURGERY

## 2023-05-06 PROCEDURE — C9113 INJ PANTOPRAZOLE SODIUM, VIA: HCPCS | Performed by: SPECIALIST

## 2023-05-06 PROCEDURE — 94761 N-INVAS EAR/PLS OXIMETRY MLT: CPT

## 2023-05-06 PROCEDURE — 94640 AIRWAY INHALATION TREATMENT: CPT

## 2023-05-06 PROCEDURE — 2580000003 HC RX 258: Performed by: PHYSICIAN ASSISTANT

## 2023-05-06 PROCEDURE — 2580000003 HC RX 258: Performed by: SPECIALIST

## 2023-05-06 PROCEDURE — 94150 VITAL CAPACITY TEST: CPT

## 2023-05-06 PROCEDURE — 80048 BASIC METABOLIC PNL TOTAL CA: CPT

## 2023-05-06 PROCEDURE — 74018 RADEX ABDOMEN 1 VIEW: CPT

## 2023-05-06 PROCEDURE — 2700000000 HC OXYGEN THERAPY PER DAY

## 2023-05-06 PROCEDURE — 83735 ASSAY OF MAGNESIUM: CPT

## 2023-05-06 RX ORDER — INSULIN LISPRO 100 [IU]/ML
0-4 INJECTION, SOLUTION INTRAVENOUS; SUBCUTANEOUS NIGHTLY
Status: DISCONTINUED | OUTPATIENT
Start: 2023-05-06 | End: 2023-05-09

## 2023-05-06 RX ORDER — QUETIAPINE FUMARATE 25 MG/1
25 TABLET, FILM COATED ORAL ONCE
Status: COMPLETED | OUTPATIENT
Start: 2023-05-06 | End: 2023-05-06

## 2023-05-06 RX ORDER — LANOLIN ALCOHOL/MO/W.PET/CERES
3 CREAM (GRAM) TOPICAL NIGHTLY PRN
Status: DISCONTINUED | OUTPATIENT
Start: 2023-05-06 | End: 2023-05-25 | Stop reason: HOSPADM

## 2023-05-06 RX ORDER — INSULIN LISPRO 100 [IU]/ML
0-8 INJECTION, SOLUTION INTRAVENOUS; SUBCUTANEOUS
Status: DISCONTINUED | OUTPATIENT
Start: 2023-05-07 | End: 2023-05-09

## 2023-05-06 RX ADMIN — HYDRALAZINE HYDROCHLORIDE 25 MG: 25 TABLET, FILM COATED ORAL at 08:08

## 2023-05-06 RX ADMIN — LEVOTHYROXINE SODIUM 137 MCG: 0.11 TABLET ORAL at 06:14

## 2023-05-06 RX ADMIN — HYDRALAZINE HYDROCHLORIDE 25 MG: 25 TABLET, FILM COATED ORAL at 20:30

## 2023-05-06 RX ADMIN — Medication 3 MG: at 20:50

## 2023-05-06 RX ADMIN — HEPARIN SODIUM 5000 UNITS: 5000 INJECTION INTRAVENOUS; SUBCUTANEOUS at 14:47

## 2023-05-06 RX ADMIN — AMLODIPINE BESYLATE 10 MG: 10 TABLET ORAL at 08:08

## 2023-05-06 RX ADMIN — SODIUM CHLORIDE, PRESERVATIVE FREE 10 ML: 5 INJECTION INTRAVENOUS at 20:50

## 2023-05-06 RX ADMIN — HYDRALAZINE HYDROCHLORIDE 25 MG: 25 TABLET, FILM COATED ORAL at 14:48

## 2023-05-06 RX ADMIN — BUDESONIDE AND FORMOTEROL FUMARATE DIHYDRATE 2 PUFF: 80; 4.5 AEROSOL RESPIRATORY (INHALATION) at 19:39

## 2023-05-06 RX ADMIN — CEFTRIAXONE 1000 MG: 1 INJECTION, POWDER, FOR SOLUTION INTRAMUSCULAR; INTRAVENOUS at 11:59

## 2023-05-06 RX ADMIN — ALLOPURINOL 300 MG: 100 TABLET ORAL at 08:08

## 2023-05-06 RX ADMIN — HEPARIN SODIUM 5000 UNITS: 5000 INJECTION INTRAVENOUS; SUBCUTANEOUS at 21:41

## 2023-05-06 RX ADMIN — BUDESONIDE AND FORMOTEROL FUMARATE DIHYDRATE 2 PUFF: 80; 4.5 AEROSOL RESPIRATORY (INHALATION) at 07:56

## 2023-05-06 RX ADMIN — METOPROLOL SUCCINATE 100 MG: 50 TABLET, EXTENDED RELEASE ORAL at 08:08

## 2023-05-06 RX ADMIN — SODIUM CHLORIDE, PRESERVATIVE FREE 10 ML: 5 INJECTION INTRAVENOUS at 08:08

## 2023-05-06 RX ADMIN — HEPARIN SODIUM 5000 UNITS: 5000 INJECTION INTRAVENOUS; SUBCUTANEOUS at 06:14

## 2023-05-06 RX ADMIN — QUETIAPINE FUMARATE 25 MG: 25 TABLET ORAL at 22:54

## 2023-05-06 RX ADMIN — PANTOPRAZOLE SODIUM 40 MG: 40 INJECTION, POWDER, FOR SOLUTION INTRAVENOUS at 08:08

## 2023-05-06 ASSESSMENT — ENCOUNTER SYMPTOMS
EYES NEGATIVE: 1
CHEST TIGHTNESS: 1
ABDOMINAL DISTENTION: 1
COUGH: 1
CONSTIPATION: 1
ALLERGIC/IMMUNOLOGIC NEGATIVE: 1
ABDOMINAL PAIN: 1

## 2023-05-07 LAB
ANION GAP SERPL CALCULATED.3IONS-SCNC: 13 MMOL/L (ref 4–16)
BASOPHILS ABSOLUTE: 0 K/CU MM
BASOPHILS RELATIVE PERCENT: 0.3 % (ref 0–1)
BUN SERPL-MCNC: 32 MG/DL (ref 6–23)
CALCIUM SERPL-MCNC: 9.1 MG/DL (ref 8.3–10.6)
CHLORIDE BLD-SCNC: 109 MMOL/L (ref 99–110)
CO2: 21 MMOL/L (ref 21–32)
CREAT SERPL-MCNC: 1.8 MG/DL (ref 0.6–1.1)
CULTURE: ABNORMAL
CULTURE: ABNORMAL
DIFFERENTIAL TYPE: ABNORMAL
EOSINOPHILS ABSOLUTE: 0.4 K/CU MM
EOSINOPHILS RELATIVE PERCENT: 2.9 % (ref 0–3)
GFR SERPL CREATININE-BSD FRML MDRD: 28 ML/MIN/1.73M2
GLUCOSE BLD-MCNC: 114 MG/DL (ref 70–99)
GLUCOSE BLD-MCNC: 145 MG/DL (ref 70–99)
GLUCOSE BLD-MCNC: 152 MG/DL (ref 70–99)
GLUCOSE SERPL-MCNC: 162 MG/DL (ref 70–99)
GRAM SMEAR: ABNORMAL
HCT VFR BLD CALC: 25.2 % (ref 37–47)
HEMOGLOBIN: 7.4 GM/DL (ref 12.5–16)
IMMATURE NEUTROPHIL %: 0.6 % (ref 0–0.43)
LYMPHOCYTES ABSOLUTE: 1.6 K/CU MM
LYMPHOCYTES RELATIVE PERCENT: 13.8 % (ref 24–44)
Lab: ABNORMAL
MAGNESIUM: 2 MG/DL (ref 1.8–2.4)
MCH RBC QN AUTO: 28 PG (ref 27–31)
MCHC RBC AUTO-ENTMCNC: 29.4 % (ref 32–36)
MCV RBC AUTO: 95.5 FL (ref 78–100)
MONOCYTES ABSOLUTE: 1 K/CU MM
MONOCYTES RELATIVE PERCENT: 8.1 % (ref 0–4)
NUCLEATED RBC %: 0 %
PDW BLD-RTO: 16.3 % (ref 11.7–14.9)
PHOSPHORUS: 2.8 MG/DL (ref 2.5–4.9)
PLATELET # BLD: 223 K/CU MM (ref 140–440)
PMV BLD AUTO: 9.3 FL (ref 7.5–11.1)
POTASSIUM SERPL-SCNC: 4.2 MMOL/L (ref 3.5–5.1)
RBC # BLD: 2.64 M/CU MM (ref 4.2–5.4)
SEGMENTED NEUTROPHILS ABSOLUTE COUNT: 8.8 K/CU MM
SEGMENTED NEUTROPHILS RELATIVE PERCENT: 74.3 % (ref 36–66)
SODIUM BLD-SCNC: 143 MMOL/L (ref 135–145)
SPECIMEN: ABNORMAL
TOTAL IMMATURE NEUTOROPHIL: 0.07 K/CU MM
TOTAL NUCLEATED RBC: 0 K/CU MM
WBC # BLD: 11.9 K/CU MM (ref 4–10.5)

## 2023-05-07 PROCEDURE — C9113 INJ PANTOPRAZOLE SODIUM, VIA: HCPCS | Performed by: SPECIALIST

## 2023-05-07 PROCEDURE — 2000000000 HC ICU R&B

## 2023-05-07 PROCEDURE — 2500000003 HC RX 250 WO HCPCS: Performed by: STUDENT IN AN ORGANIZED HEALTH CARE EDUCATION/TRAINING PROGRAM

## 2023-05-07 PROCEDURE — 99233 SBSQ HOSP IP/OBS HIGH 50: CPT | Performed by: INTERNAL MEDICINE

## 2023-05-07 PROCEDURE — 6360000002 HC RX W HCPCS: Performed by: SPECIALIST

## 2023-05-07 PROCEDURE — 99024 POSTOP FOLLOW-UP VISIT: CPT | Performed by: SURGERY

## 2023-05-07 PROCEDURE — 6370000000 HC RX 637 (ALT 250 FOR IP): Performed by: STUDENT IN AN ORGANIZED HEALTH CARE EDUCATION/TRAINING PROGRAM

## 2023-05-07 PROCEDURE — 94761 N-INVAS EAR/PLS OXIMETRY MLT: CPT

## 2023-05-07 PROCEDURE — 83735 ASSAY OF MAGNESIUM: CPT

## 2023-05-07 PROCEDURE — 6370000000 HC RX 637 (ALT 250 FOR IP): Performed by: PHYSICIAN ASSISTANT

## 2023-05-07 PROCEDURE — 82962 GLUCOSE BLOOD TEST: CPT

## 2023-05-07 PROCEDURE — 94150 VITAL CAPACITY TEST: CPT

## 2023-05-07 PROCEDURE — 94640 AIRWAY INHALATION TREATMENT: CPT

## 2023-05-07 PROCEDURE — 85025 COMPLETE CBC W/AUTO DIFF WBC: CPT

## 2023-05-07 PROCEDURE — 2700000000 HC OXYGEN THERAPY PER DAY

## 2023-05-07 PROCEDURE — 2580000003 HC RX 258: Performed by: PHYSICIAN ASSISTANT

## 2023-05-07 PROCEDURE — 84100 ASSAY OF PHOSPHORUS: CPT

## 2023-05-07 PROCEDURE — 2580000003 HC RX 258: Performed by: SPECIALIST

## 2023-05-07 PROCEDURE — 80048 BASIC METABOLIC PNL TOTAL CA: CPT

## 2023-05-07 PROCEDURE — 2060000000 HC ICU INTERMEDIATE R&B

## 2023-05-07 RX ORDER — QUETIAPINE FUMARATE 25 MG/1
25 TABLET, FILM COATED ORAL NIGHTLY
Status: DISCONTINUED | OUTPATIENT
Start: 2023-05-07 | End: 2023-05-25 | Stop reason: HOSPADM

## 2023-05-07 RX ORDER — PANTOPRAZOLE SODIUM 40 MG/1
40 TABLET, DELAYED RELEASE ORAL
Status: DISCONTINUED | OUTPATIENT
Start: 2023-05-08 | End: 2023-05-09

## 2023-05-07 RX ORDER — DEXMEDETOMIDINE HYDROCHLORIDE 4 UG/ML
.1-1.5 INJECTION, SOLUTION INTRAVENOUS CONTINUOUS
Status: DISCONTINUED | OUTPATIENT
Start: 2023-05-07 | End: 2023-05-07

## 2023-05-07 RX ADMIN — DEXMEDETOMIDINE HYDROCHLORIDE 0.2 MCG/KG/HR: 4 INJECTION, SOLUTION INTRAVENOUS at 02:22

## 2023-05-07 RX ADMIN — HEPARIN SODIUM 5000 UNITS: 5000 INJECTION INTRAVENOUS; SUBCUTANEOUS at 06:12

## 2023-05-07 RX ADMIN — CEFTRIAXONE 1000 MG: 1 INJECTION, POWDER, FOR SOLUTION INTRAMUSCULAR; INTRAVENOUS at 10:45

## 2023-05-07 RX ADMIN — SODIUM CHLORIDE, PRESERVATIVE FREE 10 ML: 5 INJECTION INTRAVENOUS at 09:02

## 2023-05-07 RX ADMIN — HYDRALAZINE HYDROCHLORIDE 25 MG: 25 TABLET, FILM COATED ORAL at 20:42

## 2023-05-07 RX ADMIN — SODIUM CHLORIDE, PRESERVATIVE FREE 10 ML: 5 INJECTION INTRAVENOUS at 20:42

## 2023-05-07 RX ADMIN — HEPARIN SODIUM 5000 UNITS: 5000 INJECTION INTRAVENOUS; SUBCUTANEOUS at 22:20

## 2023-05-07 RX ADMIN — LEVOTHYROXINE SODIUM 137 MCG: 0.11 TABLET ORAL at 06:12

## 2023-05-07 RX ADMIN — HYDRALAZINE HYDROCHLORIDE 25 MG: 25 TABLET, FILM COATED ORAL at 14:03

## 2023-05-07 RX ADMIN — HEPARIN SODIUM 5000 UNITS: 5000 INJECTION INTRAVENOUS; SUBCUTANEOUS at 14:03

## 2023-05-07 RX ADMIN — QUETIAPINE FUMARATE 25 MG: 25 TABLET ORAL at 20:42

## 2023-05-07 RX ADMIN — BUDESONIDE AND FORMOTEROL FUMARATE DIHYDRATE 2 PUFF: 80; 4.5 AEROSOL RESPIRATORY (INHALATION) at 19:25

## 2023-05-07 RX ADMIN — BUDESONIDE AND FORMOTEROL FUMARATE DIHYDRATE 2 PUFF: 80; 4.5 AEROSOL RESPIRATORY (INHALATION) at 08:07

## 2023-05-07 RX ADMIN — PANTOPRAZOLE SODIUM 40 MG: 40 INJECTION, POWDER, FOR SOLUTION INTRAVENOUS at 09:02

## 2023-05-07 ASSESSMENT — PAIN SCALES - WONG BAKER: WONGBAKER_NUMERICALRESPONSE: 0

## 2023-05-08 ENCOUNTER — APPOINTMENT (OUTPATIENT)
Dept: GENERAL RADIOLOGY | Age: 83
DRG: 329 | End: 2023-05-08
Attending: SURGERY
Payer: MEDICARE

## 2023-05-08 LAB
ANION GAP SERPL CALCULATED.3IONS-SCNC: 14 MMOL/L (ref 4–16)
BUN SERPL-MCNC: 35 MG/DL (ref 6–23)
CALCIUM SERPL-MCNC: 9.5 MG/DL (ref 8.3–10.6)
CHLORIDE BLD-SCNC: 110 MMOL/L (ref 99–110)
CO2: 22 MMOL/L (ref 21–32)
CREAT SERPL-MCNC: 1.6 MG/DL (ref 0.6–1.1)
CULTURE: NORMAL
CULTURE: NORMAL
GFR SERPL CREATININE-BSD FRML MDRD: 32 ML/MIN/1.73M2
GLUCOSE BLD-MCNC: 158 MG/DL (ref 70–99)
GLUCOSE BLD-MCNC: 183 MG/DL (ref 70–99)
GLUCOSE BLD-MCNC: 200 MG/DL (ref 70–99)
GLUCOSE BLD-MCNC: 277 MG/DL (ref 70–99)
GLUCOSE SERPL-MCNC: 147 MG/DL (ref 70–99)
Lab: NORMAL
Lab: NORMAL
MAGNESIUM: 2.1 MG/DL (ref 1.8–2.4)
PHOSPHORUS: 2.8 MG/DL (ref 2.5–4.9)
POTASSIUM SERPL-SCNC: 4.1 MMOL/L (ref 3.5–5.1)
SODIUM BLD-SCNC: 146 MMOL/L (ref 135–145)
SPECIMEN: NORMAL
SPECIMEN: NORMAL

## 2023-05-08 PROCEDURE — 71045 X-RAY EXAM CHEST 1 VIEW: CPT

## 2023-05-08 PROCEDURE — 2000000000 HC ICU R&B

## 2023-05-08 PROCEDURE — 6360000002 HC RX W HCPCS: Performed by: NURSE PRACTITIONER

## 2023-05-08 PROCEDURE — 83735 ASSAY OF MAGNESIUM: CPT

## 2023-05-08 PROCEDURE — 97112 NEUROMUSCULAR REEDUCATION: CPT

## 2023-05-08 PROCEDURE — 6370000000 HC RX 637 (ALT 250 FOR IP): Performed by: PHYSICIAN ASSISTANT

## 2023-05-08 PROCEDURE — 6370000000 HC RX 637 (ALT 250 FOR IP): Performed by: STUDENT IN AN ORGANIZED HEALTH CARE EDUCATION/TRAINING PROGRAM

## 2023-05-08 PROCEDURE — 97530 THERAPEUTIC ACTIVITIES: CPT

## 2023-05-08 PROCEDURE — 94150 VITAL CAPACITY TEST: CPT

## 2023-05-08 PROCEDURE — 99233 SBSQ HOSP IP/OBS HIGH 50: CPT | Performed by: INTERNAL MEDICINE

## 2023-05-08 PROCEDURE — 80048 BASIC METABOLIC PNL TOTAL CA: CPT

## 2023-05-08 PROCEDURE — 2700000000 HC OXYGEN THERAPY PER DAY

## 2023-05-08 PROCEDURE — 2500000003 HC RX 250 WO HCPCS: Performed by: STUDENT IN AN ORGANIZED HEALTH CARE EDUCATION/TRAINING PROGRAM

## 2023-05-08 PROCEDURE — 84100 ASSAY OF PHOSPHORUS: CPT

## 2023-05-08 PROCEDURE — 94640 AIRWAY INHALATION TREATMENT: CPT

## 2023-05-08 PROCEDURE — 82962 GLUCOSE BLOOD TEST: CPT

## 2023-05-08 PROCEDURE — 6360000002 HC RX W HCPCS: Performed by: SPECIALIST

## 2023-05-08 PROCEDURE — 94660 CPAP INITIATION&MGMT: CPT

## 2023-05-08 PROCEDURE — 97162 PT EVAL MOD COMPLEX 30 MIN: CPT

## 2023-05-08 PROCEDURE — 2580000003 HC RX 258: Performed by: PHYSICIAN ASSISTANT

## 2023-05-08 PROCEDURE — 94761 N-INVAS EAR/PLS OXIMETRY MLT: CPT

## 2023-05-08 RX ORDER — ALBUTEROL SULFATE 2.5 MG/3ML
SOLUTION RESPIRATORY (INHALATION)
Status: DISPENSED
Start: 2023-05-08 | End: 2023-05-09

## 2023-05-08 RX ORDER — IPRATROPIUM BROMIDE AND ALBUTEROL SULFATE 2.5; .5 MG/3ML; MG/3ML
1 SOLUTION RESPIRATORY (INHALATION) EVERY 4 HOURS
Status: DISCONTINUED | OUTPATIENT
Start: 2023-05-08 | End: 2023-05-15

## 2023-05-08 RX ORDER — BUDESONIDE AND FORMOTEROL FUMARATE DIHYDRATE 160; 4.5 UG/1; UG/1
2 AEROSOL RESPIRATORY (INHALATION) 2 TIMES DAILY
Status: DISCONTINUED | OUTPATIENT
Start: 2023-05-08 | End: 2023-05-25 | Stop reason: HOSPADM

## 2023-05-08 RX ORDER — DEXMEDETOMIDINE HYDROCHLORIDE 4 UG/ML
.1-1.5 INJECTION, SOLUTION INTRAVENOUS CONTINUOUS
Status: DISCONTINUED | OUTPATIENT
Start: 2023-05-08 | End: 2023-05-10

## 2023-05-08 RX ORDER — CEFDINIR 300 MG/1
300 CAPSULE ORAL EVERY 12 HOURS SCHEDULED
Status: DISCONTINUED | OUTPATIENT
Start: 2023-05-08 | End: 2023-05-09

## 2023-05-08 RX ORDER — ALBUTEROL SULFATE 2.5 MG/3ML
2.5 SOLUTION RESPIRATORY (INHALATION) EVERY 6 HOURS PRN
Status: DISCONTINUED | OUTPATIENT
Start: 2023-05-08 | End: 2023-05-25 | Stop reason: HOSPADM

## 2023-05-08 RX ADMIN — INSULIN LISPRO 2 UNITS: 100 INJECTION, SOLUTION INTRAVENOUS; SUBCUTANEOUS at 17:54

## 2023-05-08 RX ADMIN — HYDRALAZINE HYDROCHLORIDE 10 MG: 20 INJECTION INTRAMUSCULAR; INTRAVENOUS at 01:03

## 2023-05-08 RX ADMIN — CEFDINIR 300 MG: 300 CAPSULE ORAL at 21:27

## 2023-05-08 RX ADMIN — HYDRALAZINE HYDROCHLORIDE 25 MG: 25 TABLET, FILM COATED ORAL at 10:02

## 2023-05-08 RX ADMIN — IPRATROPIUM BROMIDE AND ALBUTEROL SULFATE 1 AMPULE: 2.5; .5 SOLUTION RESPIRATORY (INHALATION) at 23:44

## 2023-05-08 RX ADMIN — IPRATROPIUM BROMIDE AND ALBUTEROL SULFATE 1 AMPULE: 2.5; .5 SOLUTION RESPIRATORY (INHALATION) at 16:09

## 2023-05-08 RX ADMIN — IPRATROPIUM BROMIDE AND ALBUTEROL SULFATE 1 AMPULE: 2.5; .5 SOLUTION RESPIRATORY (INHALATION) at 15:28

## 2023-05-08 RX ADMIN — QUETIAPINE FUMARATE 25 MG: 25 TABLET ORAL at 20:41

## 2023-05-08 RX ADMIN — HYDRALAZINE HYDROCHLORIDE 25 MG: 25 TABLET, FILM COATED ORAL at 20:41

## 2023-05-08 RX ADMIN — HEPARIN SODIUM 5000 UNITS: 5000 INJECTION INTRAVENOUS; SUBCUTANEOUS at 17:53

## 2023-05-08 RX ADMIN — IPRATROPIUM BROMIDE AND ALBUTEROL SULFATE 1 AMPULE: 2.5; .5 SOLUTION RESPIRATORY (INHALATION) at 12:14

## 2023-05-08 RX ADMIN — HEPARIN SODIUM 5000 UNITS: 5000 INJECTION INTRAVENOUS; SUBCUTANEOUS at 20:41

## 2023-05-08 RX ADMIN — HEPARIN SODIUM 5000 UNITS: 5000 INJECTION INTRAVENOUS; SUBCUTANEOUS at 06:10

## 2023-05-08 RX ADMIN — SODIUM CHLORIDE, PRESERVATIVE FREE 10 ML: 5 INJECTION INTRAVENOUS at 10:02

## 2023-05-08 RX ADMIN — SODIUM CHLORIDE, PRESERVATIVE FREE 10 ML: 5 INJECTION INTRAVENOUS at 20:41

## 2023-05-08 RX ADMIN — HYDRALAZINE HYDROCHLORIDE 25 MG: 25 TABLET, FILM COATED ORAL at 17:54

## 2023-05-08 RX ADMIN — CEFDINIR 300 MG: 300 CAPSULE ORAL at 11:54

## 2023-05-08 RX ADMIN — BUDESONIDE AND FORMOTEROL FUMARATE DIHYDRATE 2 PUFF: 80; 4.5 AEROSOL RESPIRATORY (INHALATION) at 07:27

## 2023-05-08 RX ADMIN — ALLOPURINOL 300 MG: 100 TABLET ORAL at 10:01

## 2023-05-08 RX ADMIN — IPRATROPIUM BROMIDE AND ALBUTEROL SULFATE 1 AMPULE: 2.5; .5 SOLUTION RESPIRATORY (INHALATION) at 18:58

## 2023-05-08 RX ADMIN — LEVOTHYROXINE SODIUM 137 MCG: 0.11 TABLET ORAL at 06:10

## 2023-05-08 RX ADMIN — PANTOPRAZOLE SODIUM 40 MG: 40 TABLET, DELAYED RELEASE ORAL at 06:10

## 2023-05-08 RX ADMIN — DEXMEDETOMIDINE HYDROCHLORIDE 0.4 MCG/KG/HR: 4 INJECTION, SOLUTION INTRAVENOUS at 22:34

## 2023-05-08 RX ADMIN — AMLODIPINE BESYLATE 10 MG: 10 TABLET ORAL at 10:02

## 2023-05-08 RX ADMIN — IPRATROPIUM BROMIDE AND ALBUTEROL SULFATE 4 AMPULE: 2.5; .5 SOLUTION RESPIRATORY (INHALATION) at 19:52

## 2023-05-08 RX ADMIN — METOPROLOL SUCCINATE 100 MG: 50 TABLET, EXTENDED RELEASE ORAL at 10:02

## 2023-05-08 ASSESSMENT — PAIN SCALES - WONG BAKER

## 2023-05-08 ASSESSMENT — PAIN SCALES - GENERAL
PAINLEVEL_OUTOF10: 0
PAINLEVEL_OUTOF10: 0

## 2023-05-09 LAB
ANION GAP SERPL CALCULATED.3IONS-SCNC: 9 MMOL/L (ref 4–16)
BUN SERPL-MCNC: 35 MG/DL (ref 6–23)
CALCIUM SERPL-MCNC: 7 MG/DL (ref 8.3–10.6)
CHLORIDE BLD-SCNC: 120 MMOL/L (ref 99–110)
CO2: 18 MMOL/L (ref 21–32)
CREAT SERPL-MCNC: 1.5 MG/DL (ref 0.6–1.1)
GFR SERPL CREATININE-BSD FRML MDRD: 35 ML/MIN/1.73M2
GLUCOSE BLD-MCNC: 150 MG/DL (ref 70–99)
GLUCOSE BLD-MCNC: 194 MG/DL (ref 70–99)
GLUCOSE BLD-MCNC: 233 MG/DL (ref 70–99)
GLUCOSE BLD-MCNC: 238 MG/DL (ref 70–99)
GLUCOSE BLD-MCNC: 249 MG/DL (ref 70–99)
GLUCOSE BLD-MCNC: 270 MG/DL (ref 70–99)
GLUCOSE SERPL-MCNC: 203 MG/DL (ref 70–99)
HCT VFR BLD CALC: 26.9 % (ref 37–47)
HEMOGLOBIN: 7.9 GM/DL (ref 12.5–16)
MAGNESIUM: 1.7 MG/DL (ref 1.8–2.4)
MCH RBC QN AUTO: 28 PG (ref 27–31)
MCHC RBC AUTO-ENTMCNC: 29.4 % (ref 32–36)
MCV RBC AUTO: 95.4 FL (ref 78–100)
PDW BLD-RTO: 16.3 % (ref 11.7–14.9)
PHOSPHORUS: 3.6 MG/DL (ref 2.5–4.9)
PLATELET # BLD: 249 K/CU MM (ref 140–440)
PMV BLD AUTO: 10.2 FL (ref 7.5–11.1)
POTASSIUM SERPL-SCNC: 3.6 MMOL/L (ref 3.5–5.1)
PRO-BNP: ABNORMAL PG/ML
RBC # BLD: 2.82 M/CU MM (ref 4.2–5.4)
SODIUM BLD-SCNC: 147 MMOL/L (ref 135–145)
WBC # BLD: 11.4 K/CU MM (ref 4–10.5)

## 2023-05-09 PROCEDURE — C9113 INJ PANTOPRAZOLE SODIUM, VIA: HCPCS | Performed by: STUDENT IN AN ORGANIZED HEALTH CARE EDUCATION/TRAINING PROGRAM

## 2023-05-09 PROCEDURE — 6360000002 HC RX W HCPCS: Performed by: SURGERY

## 2023-05-09 PROCEDURE — 6360000002 HC RX W HCPCS: Performed by: STUDENT IN AN ORGANIZED HEALTH CARE EDUCATION/TRAINING PROGRAM

## 2023-05-09 PROCEDURE — 83735 ASSAY OF MAGNESIUM: CPT

## 2023-05-09 PROCEDURE — 94761 N-INVAS EAR/PLS OXIMETRY MLT: CPT

## 2023-05-09 PROCEDURE — 6370000000 HC RX 637 (ALT 250 FOR IP): Performed by: PHYSICIAN ASSISTANT

## 2023-05-09 PROCEDURE — 6360000002 HC RX W HCPCS: Performed by: SPECIALIST

## 2023-05-09 PROCEDURE — 84100 ASSAY OF PHOSPHORUS: CPT

## 2023-05-09 PROCEDURE — 83880 ASSAY OF NATRIURETIC PEPTIDE: CPT

## 2023-05-09 PROCEDURE — 2700000000 HC OXYGEN THERAPY PER DAY

## 2023-05-09 PROCEDURE — 85027 COMPLETE CBC AUTOMATED: CPT

## 2023-05-09 PROCEDURE — 6370000000 HC RX 637 (ALT 250 FOR IP): Performed by: STUDENT IN AN ORGANIZED HEALTH CARE EDUCATION/TRAINING PROGRAM

## 2023-05-09 PROCEDURE — 2500000003 HC RX 250 WO HCPCS: Performed by: STUDENT IN AN ORGANIZED HEALTH CARE EDUCATION/TRAINING PROGRAM

## 2023-05-09 PROCEDURE — 94640 AIRWAY INHALATION TREATMENT: CPT

## 2023-05-09 PROCEDURE — 6360000002 HC RX W HCPCS: Performed by: NURSE PRACTITIONER

## 2023-05-09 PROCEDURE — 2580000003 HC RX 258: Performed by: STUDENT IN AN ORGANIZED HEALTH CARE EDUCATION/TRAINING PROGRAM

## 2023-05-09 PROCEDURE — 2580000003 HC RX 258: Performed by: PHYSICIAN ASSISTANT

## 2023-05-09 PROCEDURE — 94660 CPAP INITIATION&MGMT: CPT

## 2023-05-09 PROCEDURE — 82962 GLUCOSE BLOOD TEST: CPT

## 2023-05-09 PROCEDURE — 2000000000 HC ICU R&B

## 2023-05-09 PROCEDURE — 80048 BASIC METABOLIC PNL TOTAL CA: CPT

## 2023-05-09 RX ORDER — FUROSEMIDE 10 MG/ML
20 INJECTION INTRAMUSCULAR; INTRAVENOUS ONCE
Status: COMPLETED | OUTPATIENT
Start: 2023-05-09 | End: 2023-05-09

## 2023-05-09 RX ORDER — PANTOPRAZOLE SODIUM 40 MG/10ML
40 INJECTION, POWDER, LYOPHILIZED, FOR SOLUTION INTRAVENOUS DAILY
Status: DISCONTINUED | OUTPATIENT
Start: 2023-05-09 | End: 2023-05-16

## 2023-05-09 RX ORDER — MAGNESIUM SULFATE IN WATER 40 MG/ML
2000 INJECTION, SOLUTION INTRAVENOUS ONCE
Status: COMPLETED | OUTPATIENT
Start: 2023-05-09 | End: 2023-05-09

## 2023-05-09 RX ORDER — INSULIN LISPRO 100 [IU]/ML
0-4 INJECTION, SOLUTION INTRAVENOUS; SUBCUTANEOUS NIGHTLY
Status: DISCONTINUED | OUTPATIENT
Start: 2023-05-09 | End: 2023-05-09

## 2023-05-09 RX ORDER — FUROSEMIDE 10 MG/ML
40 INJECTION INTRAMUSCULAR; INTRAVENOUS ONCE
Status: COMPLETED | OUTPATIENT
Start: 2023-05-09 | End: 2023-05-09

## 2023-05-09 RX ORDER — METHYLPREDNISOLONE SODIUM SUCCINATE 40 MG/ML
40 INJECTION, POWDER, LYOPHILIZED, FOR SOLUTION INTRAMUSCULAR; INTRAVENOUS EVERY 6 HOURS
Status: DISCONTINUED | OUTPATIENT
Start: 2023-05-09 | End: 2023-05-12

## 2023-05-09 RX ORDER — CALCIUM GLUCONATE 20 MG/ML
1000 INJECTION, SOLUTION INTRAVENOUS ONCE
Status: COMPLETED | OUTPATIENT
Start: 2023-05-09 | End: 2023-05-09

## 2023-05-09 RX ORDER — INSULIN LISPRO 100 [IU]/ML
0-16 INJECTION, SOLUTION INTRAVENOUS; SUBCUTANEOUS EVERY 4 HOURS
Status: DISCONTINUED | OUTPATIENT
Start: 2023-05-09 | End: 2023-05-25 | Stop reason: HOSPADM

## 2023-05-09 RX ORDER — INSULIN LISPRO 100 [IU]/ML
0-8 INJECTION, SOLUTION INTRAVENOUS; SUBCUTANEOUS EVERY 4 HOURS
Status: DISCONTINUED | OUTPATIENT
Start: 2023-05-09 | End: 2023-05-09

## 2023-05-09 RX ADMIN — INSULIN LISPRO 4 UNITS: 100 INJECTION, SOLUTION INTRAVENOUS; SUBCUTANEOUS at 12:31

## 2023-05-09 RX ADMIN — CEFTRIAXONE SODIUM 1000 MG: 1 INJECTION, POWDER, FOR SOLUTION INTRAMUSCULAR; INTRAVENOUS at 12:48

## 2023-05-09 RX ADMIN — PANTOPRAZOLE SODIUM 40 MG: 40 INJECTION, POWDER, FOR SOLUTION INTRAVENOUS at 12:47

## 2023-05-09 RX ADMIN — DEXMEDETOMIDINE HYDROCHLORIDE 1.4 MCG/KG/HR: 4 INJECTION, SOLUTION INTRAVENOUS at 06:27

## 2023-05-09 RX ADMIN — IPRATROPIUM BROMIDE AND ALBUTEROL SULFATE 1 AMPULE: 2.5; .5 SOLUTION RESPIRATORY (INHALATION) at 22:16

## 2023-05-09 RX ADMIN — METHYLPREDNISOLONE SODIUM SUCCINATE 40 MG: 40 INJECTION, POWDER, FOR SOLUTION INTRAMUSCULAR; INTRAVENOUS at 23:18

## 2023-05-09 RX ADMIN — QUETIAPINE FUMARATE 25 MG: 25 TABLET ORAL at 19:51

## 2023-05-09 RX ADMIN — IPRATROPIUM BROMIDE AND ALBUTEROL SULFATE 1 AMPULE: 2.5; .5 SOLUTION RESPIRATORY (INHALATION) at 15:31

## 2023-05-09 RX ADMIN — SODIUM CHLORIDE, PRESERVATIVE FREE 10 ML: 5 INJECTION INTRAVENOUS at 19:51

## 2023-05-09 RX ADMIN — FUROSEMIDE 20 MG: 10 INJECTION, SOLUTION INTRAMUSCULAR; INTRAVENOUS at 11:07

## 2023-05-09 RX ADMIN — DEXMEDETOMIDINE HYDROCHLORIDE 1.5 MCG/KG/HR: 4 INJECTION, SOLUTION INTRAVENOUS at 09:53

## 2023-05-09 RX ADMIN — FUROSEMIDE 40 MG: 10 INJECTION, SOLUTION INTRAMUSCULAR; INTRAVENOUS at 01:52

## 2023-05-09 RX ADMIN — BUDESONIDE AND FORMOTEROL FUMARATE DIHYDRATE 2 PUFF: 160; 4.5 AEROSOL RESPIRATORY (INHALATION) at 07:37

## 2023-05-09 RX ADMIN — HYDRALAZINE HYDROCHLORIDE 10 MG: 20 INJECTION INTRAMUSCULAR; INTRAVENOUS at 23:18

## 2023-05-09 RX ADMIN — HYDROMORPHONE HYDROCHLORIDE 0.5 MG: 1 INJECTION, SOLUTION INTRAMUSCULAR; INTRAVENOUS; SUBCUTANEOUS at 22:09

## 2023-05-09 RX ADMIN — IPRATROPIUM BROMIDE AND ALBUTEROL SULFATE 1 AMPULE: 2.5; .5 SOLUTION RESPIRATORY (INHALATION) at 11:15

## 2023-05-09 RX ADMIN — HEPARIN SODIUM 5000 UNITS: 5000 INJECTION INTRAVENOUS; SUBCUTANEOUS at 05:13

## 2023-05-09 RX ADMIN — HEPARIN SODIUM 5000 UNITS: 5000 INJECTION INTRAVENOUS; SUBCUTANEOUS at 12:48

## 2023-05-09 RX ADMIN — Medication 3 MG: at 22:09

## 2023-05-09 RX ADMIN — METHYLPREDNISOLONE SODIUM SUCCINATE 40 MG: 40 INJECTION, POWDER, FOR SOLUTION INTRAMUSCULAR; INTRAVENOUS at 12:11

## 2023-05-09 RX ADMIN — MAGNESIUM SULFATE HEPTAHYDRATE 2000 MG: 40 INJECTION, SOLUTION INTRAVENOUS at 06:37

## 2023-05-09 RX ADMIN — INSULIN LISPRO 4 UNITS: 100 INJECTION, SOLUTION INTRAVENOUS; SUBCUTANEOUS at 19:51

## 2023-05-09 RX ADMIN — INSULIN LISPRO 2 UNITS: 100 INJECTION, SOLUTION INTRAVENOUS; SUBCUTANEOUS at 18:26

## 2023-05-09 RX ADMIN — CALCIUM GLUCONATE 1000 MG: 20 INJECTION, SOLUTION INTRAVENOUS at 07:01

## 2023-05-09 RX ADMIN — DEXMEDETOMIDINE HYDROCHLORIDE 1 MCG/KG/HR: 4 INJECTION, SOLUTION INTRAVENOUS at 02:33

## 2023-05-09 RX ADMIN — METHYLPREDNISOLONE SODIUM SUCCINATE 40 MG: 40 INJECTION, POWDER, FOR SOLUTION INTRAMUSCULAR; INTRAVENOUS at 18:27

## 2023-05-09 RX ADMIN — IPRATROPIUM BROMIDE AND ALBUTEROL SULFATE 1 AMPULE: 2.5; .5 SOLUTION RESPIRATORY (INHALATION) at 03:14

## 2023-05-09 RX ADMIN — IPRATROPIUM BROMIDE AND ALBUTEROL SULFATE 1 AMPULE: 2.5; .5 SOLUTION RESPIRATORY (INHALATION) at 07:39

## 2023-05-09 RX ADMIN — HEPARIN SODIUM 5000 UNITS: 5000 INJECTION INTRAVENOUS; SUBCUTANEOUS at 21:05

## 2023-05-09 ASSESSMENT — PAIN SCALES - GENERAL
PAINLEVEL_OUTOF10: 0
PAINLEVEL_OUTOF10: 6
PAINLEVEL_OUTOF10: 0
PAINLEVEL_OUTOF10: 0

## 2023-05-10 ENCOUNTER — APPOINTMENT (OUTPATIENT)
Dept: GENERAL RADIOLOGY | Age: 83
DRG: 329 | End: 2023-05-10
Attending: SURGERY
Payer: MEDICARE

## 2023-05-10 ENCOUNTER — APPOINTMENT (OUTPATIENT)
Dept: ULTRASOUND IMAGING | Age: 83
DRG: 329 | End: 2023-05-10
Attending: SURGERY
Payer: MEDICARE

## 2023-05-10 LAB
ANION GAP SERPL CALCULATED.3IONS-SCNC: 16 MMOL/L (ref 4–16)
BACTERIA: NEGATIVE /HPF
BASE EXCESS: 2 (ref 0–2.4)
BILIRUBIN URINE: NEGATIVE MG/DL
BLOOD, URINE: NEGATIVE
BUN SERPL-MCNC: 49 MG/DL (ref 6–23)
CALCIUM SERPL-MCNC: 9.6 MG/DL (ref 8.3–10.6)
CARBON MONOXIDE, BLOOD: 1.6 % (ref 0–5)
CHLORIDE BLD-SCNC: 109 MMOL/L (ref 99–110)
CLARITY: CLEAR
CO2 CONTENT: 24.2 MMOL/L (ref 19–24)
CO2: 21 MMOL/L (ref 21–32)
COLOR: YELLOW
COMMENT: ABNORMAL
CREAT SERPL-MCNC: 2.1 MG/DL (ref 0.6–1.1)
EKG ATRIAL RATE: 122 BPM
EKG ATRIAL RATE: 136 BPM
EKG ATRIAL RATE: 78 BPM
EKG DIAGNOSIS: NORMAL
EKG Q-T INTERVAL: 374 MS
EKG Q-T INTERVAL: 386 MS
EKG Q-T INTERVAL: 414 MS
EKG QRS DURATION: 138 MS
EKG QRS DURATION: 142 MS
EKG QRS DURATION: 150 MS
EKG QTC CALCULATION (BAZETT): 459 MS
EKG QTC CALCULATION (BAZETT): 547 MS
EKG QTC CALCULATION (BAZETT): 563 MS
EKG R AXIS: -64 DEGREES
EKG R AXIS: -65 DEGREES
EKG R AXIS: -70 DEGREES
EKG T AXIS: 104 DEGREES
EKG T AXIS: 117 DEGREES
EKG T AXIS: 143 DEGREES
EKG VENTRICULAR RATE: 128 BPM
EKG VENTRICULAR RATE: 129 BPM
EKG VENTRICULAR RATE: 74 BPM
GFR SERPL CREATININE-BSD FRML MDRD: 23 ML/MIN/1.73M2
GLUCOSE BLD-MCNC: 178 MG/DL (ref 70–99)
GLUCOSE BLD-MCNC: 189 MG/DL (ref 70–99)
GLUCOSE BLD-MCNC: 206 MG/DL (ref 70–99)
GLUCOSE BLD-MCNC: 248 MG/DL (ref 70–99)
GLUCOSE BLD-MCNC: 263 MG/DL (ref 70–99)
GLUCOSE SERPL-MCNC: 215 MG/DL (ref 70–99)
GLUCOSE, URINE: NEGATIVE MG/DL
HCO3 ARTERIAL: 23 MMOL/L (ref 18–23)
HCT VFR BLD CALC: 29.2 % (ref 37–47)
HEMOGLOBIN: 8.8 GM/DL (ref 12.5–16)
HYALINE CASTS: 5 /LPF
KETONES, URINE: NEGATIVE MG/DL
LEUKOCYTE ESTERASE, URINE: ABNORMAL
MAGNESIUM: 2.7 MG/DL (ref 1.8–2.4)
MCH RBC QN AUTO: 27.9 PG (ref 27–31)
MCHC RBC AUTO-ENTMCNC: 30.1 % (ref 32–36)
MCV RBC AUTO: 92.7 FL (ref 78–100)
METHEMOGLOBIN ARTERIAL: 1.2 %
NITRITE URINE, QUANTITATIVE: NEGATIVE
O2 SATURATION: 96.4 % (ref 96–97)
PCO2 ARTERIAL: 38 MMHG (ref 32–45)
PDW BLD-RTO: 16.1 % (ref 11.7–14.9)
PH BLOOD: 7.39 (ref 7.34–7.45)
PH, URINE: 5 (ref 5–8)
PHOSPHORUS: 5 MG/DL (ref 2.5–4.9)
PLATELET # BLD: 302 K/CU MM (ref 140–440)
PMV BLD AUTO: 9.5 FL (ref 7.5–11.1)
PO2 ARTERIAL: 141 MMHG (ref 75–100)
POTASSIUM SERPL-SCNC: 4.4 MMOL/L (ref 3.5–5.1)
PROTEIN UA: 100 MG/DL
RBC # BLD: 3.15 M/CU MM (ref 4.2–5.4)
RBC URINE: 3 /HPF (ref 0–6)
SODIUM BLD-SCNC: 146 MMOL/L (ref 135–145)
SPECIFIC GRAVITY UA: >1.03 (ref 1–1.03)
SQUAMOUS EPITHELIAL: <1 /HPF
TRICHOMONAS: ABNORMAL /HPF
UNCLASSIFIED CRYSTAL: ABNORMAL /HPF
UROBILINOGEN, URINE: 0.2 MG/DL (ref 0.2–1)
WBC # BLD: 8.3 K/CU MM (ref 4–10.5)
WBC UA: 25 /HPF (ref 0–5)

## 2023-05-10 PROCEDURE — 87205 SMEAR GRAM STAIN: CPT

## 2023-05-10 PROCEDURE — 2580000003 HC RX 258: Performed by: PHYSICIAN ASSISTANT

## 2023-05-10 PROCEDURE — 31645 BRNCHSC W/THER ASPIR 1ST: CPT

## 2023-05-10 PROCEDURE — 93005 ELECTROCARDIOGRAM TRACING: CPT | Performed by: STUDENT IN AN ORGANIZED HEALTH CARE EDUCATION/TRAINING PROGRAM

## 2023-05-10 PROCEDURE — 2720000010 HC SURG SUPPLY STERILE

## 2023-05-10 PROCEDURE — 6360000002 HC RX W HCPCS

## 2023-05-10 PROCEDURE — APPNB30 APP NON BILLABLE TIME 0-30 MINS: Performed by: PHYSICIAN ASSISTANT

## 2023-05-10 PROCEDURE — 2700000000 HC OXYGEN THERAPY PER DAY

## 2023-05-10 PROCEDURE — 31500 INSERT EMERGENCY AIRWAY: CPT

## 2023-05-10 PROCEDURE — 6370000000 HC RX 637 (ALT 250 FOR IP): Performed by: PHYSICIAN ASSISTANT

## 2023-05-10 PROCEDURE — 87070 CULTURE OTHR SPECIMN AEROBIC: CPT

## 2023-05-10 PROCEDURE — 94003 VENT MGMT INPAT SUBQ DAY: CPT

## 2023-05-10 PROCEDURE — 2580000003 HC RX 258: Performed by: STUDENT IN AN ORGANIZED HEALTH CARE EDUCATION/TRAINING PROGRAM

## 2023-05-10 PROCEDURE — 71045 X-RAY EXAM CHEST 1 VIEW: CPT

## 2023-05-10 PROCEDURE — 82962 GLUCOSE BLOOD TEST: CPT

## 2023-05-10 PROCEDURE — 76775 US EXAM ABDO BACK WALL LIM: CPT

## 2023-05-10 PROCEDURE — 0BH17EZ INSERTION OF ENDOTRACHEAL AIRWAY INTO TRACHEA, VIA NATURAL OR ARTIFICIAL OPENING: ICD-10-PCS | Performed by: STUDENT IN AN ORGANIZED HEALTH CARE EDUCATION/TRAINING PROGRAM

## 2023-05-10 PROCEDURE — 0B9G8ZX DRAINAGE OF LEFT UPPER LUNG LOBE, VIA NATURAL OR ARTIFICIAL OPENING ENDOSCOPIC, DIAGNOSTIC: ICD-10-PCS | Performed by: STUDENT IN AN ORGANIZED HEALTH CARE EDUCATION/TRAINING PROGRAM

## 2023-05-10 PROCEDURE — 83735 ASSAY OF MAGNESIUM: CPT

## 2023-05-10 PROCEDURE — 6360000002 HC RX W HCPCS: Performed by: INTERNAL MEDICINE

## 2023-05-10 PROCEDURE — 6360000002 HC RX W HCPCS: Performed by: STUDENT IN AN ORGANIZED HEALTH CARE EDUCATION/TRAINING PROGRAM

## 2023-05-10 PROCEDURE — 94761 N-INVAS EAR/PLS OXIMETRY MLT: CPT

## 2023-05-10 PROCEDURE — 81001 URINALYSIS AUTO W/SCOPE: CPT

## 2023-05-10 PROCEDURE — 80048 BASIC METABOLIC PNL TOTAL CA: CPT

## 2023-05-10 PROCEDURE — 2000000000 HC ICU R&B

## 2023-05-10 PROCEDURE — 5A1945Z RESPIRATORY VENTILATION, 24-96 CONSECUTIVE HOURS: ICD-10-PCS | Performed by: STUDENT IN AN ORGANIZED HEALTH CARE EDUCATION/TRAINING PROGRAM

## 2023-05-10 PROCEDURE — 0B9C8ZX DRAINAGE OF RIGHT UPPER LUNG LOBE, VIA NATURAL OR ARTIFICIAL OPENING ENDOSCOPIC, DIAGNOSTIC: ICD-10-PCS | Performed by: STUDENT IN AN ORGANIZED HEALTH CARE EDUCATION/TRAINING PROGRAM

## 2023-05-10 PROCEDURE — 85027 COMPLETE CBC AUTOMATED: CPT

## 2023-05-10 PROCEDURE — 2500000003 HC RX 250 WO HCPCS: Performed by: STUDENT IN AN ORGANIZED HEALTH CARE EDUCATION/TRAINING PROGRAM

## 2023-05-10 PROCEDURE — 87077 CULTURE AEROBIC IDENTIFY: CPT

## 2023-05-10 PROCEDURE — 31624 DX BRONCHOSCOPE/LAVAGE: CPT

## 2023-05-10 PROCEDURE — 6370000000 HC RX 637 (ALT 250 FOR IP): Performed by: NURSE PRACTITIONER

## 2023-05-10 PROCEDURE — 36600 WITHDRAWAL OF ARTERIAL BLOOD: CPT

## 2023-05-10 PROCEDURE — 6370000000 HC RX 637 (ALT 250 FOR IP): Performed by: STUDENT IN AN ORGANIZED HEALTH CARE EDUCATION/TRAINING PROGRAM

## 2023-05-10 PROCEDURE — 6360000002 HC RX W HCPCS: Performed by: NURSE PRACTITIONER

## 2023-05-10 PROCEDURE — C9113 INJ PANTOPRAZOLE SODIUM, VIA: HCPCS | Performed by: STUDENT IN AN ORGANIZED HEALTH CARE EDUCATION/TRAINING PROGRAM

## 2023-05-10 PROCEDURE — 93010 ELECTROCARDIOGRAM REPORT: CPT | Performed by: INTERNAL MEDICINE

## 2023-05-10 PROCEDURE — 94660 CPAP INITIATION&MGMT: CPT

## 2023-05-10 PROCEDURE — 74018 RADEX ABDOMEN 1 VIEW: CPT

## 2023-05-10 PROCEDURE — 6360000002 HC RX W HCPCS: Performed by: SPECIALIST

## 2023-05-10 PROCEDURE — 87086 URINE CULTURE/COLONY COUNT: CPT

## 2023-05-10 PROCEDURE — 82803 BLOOD GASES ANY COMBINATION: CPT

## 2023-05-10 PROCEDURE — 94640 AIRWAY INHALATION TREATMENT: CPT

## 2023-05-10 PROCEDURE — 99024 POSTOP FOLLOW-UP VISIT: CPT | Performed by: PHYSICIAN ASSISTANT

## 2023-05-10 PROCEDURE — 84100 ASSAY OF PHOSPHORUS: CPT

## 2023-05-10 PROCEDURE — 0BC78ZZ EXTIRPATION OF MATTER FROM LEFT MAIN BRONCHUS, VIA NATURAL OR ARTIFICIAL OPENING ENDOSCOPIC: ICD-10-PCS | Performed by: STUDENT IN AN ORGANIZED HEALTH CARE EDUCATION/TRAINING PROGRAM

## 2023-05-10 PROCEDURE — 31622 DX BRONCHOSCOPE/WASH: CPT

## 2023-05-10 PROCEDURE — 2500000003 HC RX 250 WO HCPCS

## 2023-05-10 RX ORDER — MIDAZOLAM HYDROCHLORIDE 1 MG/ML
INJECTION INTRAMUSCULAR; INTRAVENOUS
Status: COMPLETED
Start: 2023-05-10 | End: 2023-05-10

## 2023-05-10 RX ORDER — CHLORHEXIDINE GLUCONATE 0.12 MG/ML
15 RINSE ORAL 2 TIMES DAILY
Status: DISCONTINUED | OUTPATIENT
Start: 2023-05-10 | End: 2023-05-18

## 2023-05-10 RX ORDER — GUAIFENESIN 600 MG/1
600 TABLET, EXTENDED RELEASE ORAL 2 TIMES DAILY
Status: DISCONTINUED | OUTPATIENT
Start: 2023-05-10 | End: 2023-05-10

## 2023-05-10 RX ORDER — FENTANYL CITRATE 50 UG/ML
50 INJECTION, SOLUTION INTRAMUSCULAR; INTRAVENOUS
Status: DISCONTINUED | OUTPATIENT
Start: 2023-05-10 | End: 2023-05-11

## 2023-05-10 RX ORDER — FENTANYL CITRATE 50 UG/ML
50 INJECTION, SOLUTION INTRAMUSCULAR; INTRAVENOUS
Status: DISCONTINUED | OUTPATIENT
Start: 2023-05-10 | End: 2023-05-16

## 2023-05-10 RX ORDER — METOPROLOL TARTRATE 5 MG/5ML
INJECTION INTRAVENOUS
Status: COMPLETED
Start: 2023-05-10 | End: 2023-05-10

## 2023-05-10 RX ORDER — FENTANYL CITRATE 50 UG/ML
50 INJECTION, SOLUTION INTRAMUSCULAR; INTRAVENOUS ONCE
Status: COMPLETED | OUTPATIENT
Start: 2023-05-10 | End: 2023-05-10

## 2023-05-10 RX ORDER — FUROSEMIDE 10 MG/ML
40 INJECTION INTRAMUSCULAR; INTRAVENOUS ONCE
Status: COMPLETED | OUTPATIENT
Start: 2023-05-10 | End: 2023-05-10

## 2023-05-10 RX ORDER — GUAIFENESIN 200 MG/10ML
200 LIQUID ORAL EVERY 4 HOURS
Status: DISCONTINUED | OUTPATIENT
Start: 2023-05-10 | End: 2023-05-25 | Stop reason: HOSPADM

## 2023-05-10 RX ORDER — ACETYLCYSTEINE 200 MG/ML
600 SOLUTION ORAL; RESPIRATORY (INHALATION) EVERY 4 HOURS
Status: DISCONTINUED | OUTPATIENT
Start: 2023-05-10 | End: 2023-05-11

## 2023-05-10 RX ORDER — METOPROLOL TARTRATE 5 MG/5ML
5 INJECTION INTRAVENOUS ONCE
Status: COMPLETED | OUTPATIENT
Start: 2023-05-10 | End: 2023-05-10

## 2023-05-10 RX ORDER — MIDAZOLAM HYDROCHLORIDE 2 MG/2ML
2 INJECTION, SOLUTION INTRAMUSCULAR; INTRAVENOUS
Status: DISCONTINUED | OUTPATIENT
Start: 2023-05-10 | End: 2023-05-11

## 2023-05-10 RX ORDER — METOPROLOL TARTRATE 50 MG/1
50 TABLET, FILM COATED ORAL 2 TIMES DAILY
Status: DISCONTINUED | OUTPATIENT
Start: 2023-05-10 | End: 2023-05-25 | Stop reason: HOSPADM

## 2023-05-10 RX ORDER — MIDAZOLAM HYDROCHLORIDE 2 MG/2ML
2 INJECTION, SOLUTION INTRAMUSCULAR; INTRAVENOUS ONCE
Status: COMPLETED | OUTPATIENT
Start: 2023-05-10 | End: 2023-05-10

## 2023-05-10 RX ORDER — LEVALBUTEROL INHALATION SOLUTION 0.63 MG/3ML
0.63 SOLUTION RESPIRATORY (INHALATION)
Status: COMPLETED | OUTPATIENT
Start: 2023-05-10 | End: 2023-05-10

## 2023-05-10 RX ORDER — DEXMEDETOMIDINE HYDROCHLORIDE 4 UG/ML
.1-1.5 INJECTION, SOLUTION INTRAVENOUS CONTINUOUS
Status: DISCONTINUED | OUTPATIENT
Start: 2023-05-10 | End: 2023-05-13

## 2023-05-10 RX ORDER — 0.9 % SODIUM CHLORIDE 0.9 %
500 INTRAVENOUS SOLUTION INTRAVENOUS ONCE
Status: DISCONTINUED | OUTPATIENT
Start: 2023-05-10 | End: 2023-05-10

## 2023-05-10 RX ADMIN — QUETIAPINE FUMARATE 25 MG: 25 TABLET ORAL at 20:27

## 2023-05-10 RX ADMIN — METHYLPREDNISOLONE SODIUM SUCCINATE 40 MG: 40 INJECTION, POWDER, FOR SOLUTION INTRAMUSCULAR; INTRAVENOUS at 12:16

## 2023-05-10 RX ADMIN — HEPARIN SODIUM 5000 UNITS: 5000 INJECTION INTRAVENOUS; SUBCUTANEOUS at 21:57

## 2023-05-10 RX ADMIN — IPRATROPIUM BROMIDE AND ALBUTEROL SULFATE 1 AMPULE: 2.5; .5 SOLUTION RESPIRATORY (INHALATION) at 12:06

## 2023-05-10 RX ADMIN — MIDAZOLAM 2 MG: 1 INJECTION INTRAMUSCULAR; INTRAVENOUS at 14:51

## 2023-05-10 RX ADMIN — INSULIN LISPRO 4 UNITS: 100 INJECTION, SOLUTION INTRAVENOUS; SUBCUTANEOUS at 03:40

## 2023-05-10 RX ADMIN — MIDAZOLAM 2 MG: 1 INJECTION INTRAMUSCULAR; INTRAVENOUS at 16:53

## 2023-05-10 RX ADMIN — SODIUM CHLORIDE 25 ML: 9 INJECTION, SOLUTION INTRAVENOUS at 12:27

## 2023-05-10 RX ADMIN — HEPARIN SODIUM 5000 UNITS: 5000 INJECTION INTRAVENOUS; SUBCUTANEOUS at 16:08

## 2023-05-10 RX ADMIN — INSULIN LISPRO 2 UNITS: 100 INJECTION, SOLUTION INTRAVENOUS; SUBCUTANEOUS at 20:26

## 2023-05-10 RX ADMIN — IPRATROPIUM BROMIDE AND ALBUTEROL SULFATE 1 AMPULE: 2.5; .5 SOLUTION RESPIRATORY (INHALATION) at 08:00

## 2023-05-10 RX ADMIN — MIDAZOLAM HYDROCHLORIDE 2 MG: 2 INJECTION, SOLUTION INTRAMUSCULAR; INTRAVENOUS at 14:51

## 2023-05-10 RX ADMIN — LEVOTHYROXINE SODIUM 137 MCG: 0.11 TABLET ORAL at 05:49

## 2023-05-10 RX ADMIN — FUROSEMIDE 40 MG: 10 INJECTION, SOLUTION INTRAMUSCULAR; INTRAVENOUS at 18:11

## 2023-05-10 RX ADMIN — METHYLPREDNISOLONE SODIUM SUCCINATE 40 MG: 40 INJECTION, POWDER, FOR SOLUTION INTRAMUSCULAR; INTRAVENOUS at 05:50

## 2023-05-10 RX ADMIN — HYDRALAZINE HYDROCHLORIDE 10 MG: 20 INJECTION INTRAMUSCULAR; INTRAVENOUS at 12:17

## 2023-05-10 RX ADMIN — SODIUM CHLORIDE, PRESERVATIVE FREE 10 ML: 5 INJECTION INTRAVENOUS at 20:28

## 2023-05-10 RX ADMIN — METOPROLOL TARTRATE 50 MG: 50 TABLET, FILM COATED ORAL at 20:27

## 2023-05-10 RX ADMIN — METOPROLOL TARTRATE 5 MG: 5 INJECTION INTRAVENOUS at 15:05

## 2023-05-10 RX ADMIN — INSULIN LISPRO 8 UNITS: 100 INJECTION, SOLUTION INTRAVENOUS; SUBCUTANEOUS at 12:33

## 2023-05-10 RX ADMIN — IPRATROPIUM BROMIDE AND ALBUTEROL SULFATE 1 AMPULE: 2.5; .5 SOLUTION RESPIRATORY (INHALATION) at 23:16

## 2023-05-10 RX ADMIN — INSULIN LISPRO 2 UNITS: 100 INJECTION, SOLUTION INTRAVENOUS; SUBCUTANEOUS at 08:44

## 2023-05-10 RX ADMIN — HEPARIN SODIUM 5000 UNITS: 5000 INJECTION INTRAVENOUS; SUBCUTANEOUS at 05:49

## 2023-05-10 RX ADMIN — Medication 0.63 MG: at 14:01

## 2023-05-10 RX ADMIN — IPRATROPIUM BROMIDE AND ALBUTEROL SULFATE 1 AMPULE: 2.5; .5 SOLUTION RESPIRATORY (INHALATION) at 19:32

## 2023-05-10 RX ADMIN — ACETYLCYSTEINE 600 MG: 200 SOLUTION ORAL; RESPIRATORY (INHALATION) at 23:17

## 2023-05-10 RX ADMIN — GUAIFENESIN 200 MG: 100 SOLUTION ORAL at 22:00

## 2023-05-10 RX ADMIN — BUDESONIDE AND FORMOTEROL FUMARATE DIHYDRATE 2 PUFF: 160; 4.5 AEROSOL RESPIRATORY (INHALATION) at 07:58

## 2023-05-10 RX ADMIN — DEXMEDETOMIDINE HYDROCHLORIDE 0.6 MCG/KG/HR: 4 INJECTION, SOLUTION INTRAVENOUS at 22:33

## 2023-05-10 RX ADMIN — MIDAZOLAM 2 MG: 1 INJECTION INTRAMUSCULAR; INTRAVENOUS at 20:38

## 2023-05-10 RX ADMIN — ACETYLCYSTEINE 600 MG: 200 SOLUTION ORAL; RESPIRATORY (INHALATION) at 19:33

## 2023-05-10 RX ADMIN — CHLORHEXIDINE GLUCONATE 0.12% ORAL RINSE 15 ML: 1.2 LIQUID ORAL at 20:27

## 2023-05-10 RX ADMIN — SODIUM CHLORIDE, PRESERVATIVE FREE 10 ML: 5 INJECTION INTRAVENOUS at 08:46

## 2023-05-10 RX ADMIN — INSULIN LISPRO 4 UNITS: 100 INJECTION, SOLUTION INTRAVENOUS; SUBCUTANEOUS at 16:22

## 2023-05-10 RX ADMIN — BUDESONIDE AND FORMOTEROL FUMARATE DIHYDRATE 2 PUFF: 160; 4.5 AEROSOL RESPIRATORY (INHALATION) at 19:34

## 2023-05-10 RX ADMIN — IPRATROPIUM BROMIDE AND ALBUTEROL SULFATE 1 AMPULE: 2.5; .5 SOLUTION RESPIRATORY (INHALATION) at 05:40

## 2023-05-10 RX ADMIN — MIDAZOLAM 2 MG: 1 INJECTION INTRAMUSCULAR; INTRAVENOUS at 15:24

## 2023-05-10 RX ADMIN — METHYLPREDNISOLONE SODIUM SUCCINATE 40 MG: 40 INJECTION, POWDER, FOR SOLUTION INTRAMUSCULAR; INTRAVENOUS at 18:15

## 2023-05-10 RX ADMIN — CEFTRIAXONE SODIUM 1000 MG: 1 INJECTION, POWDER, FOR SOLUTION INTRAMUSCULAR; INTRAVENOUS at 12:29

## 2023-05-10 RX ADMIN — PANTOPRAZOLE SODIUM 40 MG: 40 INJECTION, POWDER, FOR SOLUTION INTRAVENOUS at 08:47

## 2023-05-10 RX ADMIN — FENTANYL CITRATE 50 MCG: 50 INJECTION, SOLUTION INTRAMUSCULAR; INTRAVENOUS at 15:45

## 2023-05-10 RX ADMIN — ACETYLCYSTEINE 600 MG: 200 SOLUTION ORAL; RESPIRATORY (INHALATION) at 14:03

## 2023-05-10 RX ADMIN — DEXMEDETOMIDINE HYDROCHLORIDE 0.2 MCG/KG/HR: 4 INJECTION, SOLUTION INTRAVENOUS at 14:57

## 2023-05-10 RX ADMIN — IPRATROPIUM BROMIDE AND ALBUTEROL SULFATE 1 AMPULE: 2.5; .5 SOLUTION RESPIRATORY (INHALATION) at 01:30

## 2023-05-10 ASSESSMENT — PULMONARY FUNCTION TESTS
PIF_VALUE: 27
PIF_VALUE: 26
PIF_VALUE: 24
PIF_VALUE: 40
PIF_VALUE: 29
PIF_VALUE: 27
PIF_VALUE: 26
PIF_VALUE: 25
PIF_VALUE: 24
PIF_VALUE: 26
PIF_VALUE: 27
PIF_VALUE: 23
PIF_VALUE: 25
PIF_VALUE: 26
PIF_VALUE: 26
PIF_VALUE: 22
PIF_VALUE: 29
PIF_VALUE: 24
PIF_VALUE: 26
PIF_VALUE: 24

## 2023-05-10 ASSESSMENT — PAIN SCALES - GENERAL
PAINLEVEL_OUTOF10: 0

## 2023-05-11 ENCOUNTER — APPOINTMENT (OUTPATIENT)
Dept: GENERAL RADIOLOGY | Age: 83
DRG: 329 | End: 2023-05-11
Attending: SURGERY
Payer: MEDICARE

## 2023-05-11 PROBLEM — E44.0 MODERATE MALNUTRITION (HCC): Status: ACTIVE | Noted: 2023-05-11

## 2023-05-11 LAB
ANION GAP SERPL CALCULATED.3IONS-SCNC: 14 MMOL/L (ref 4–16)
BUN SERPL-MCNC: 67 MG/DL (ref 6–23)
CALCIUM SERPL-MCNC: 9 MG/DL (ref 8.3–10.6)
CHLORIDE BLD-SCNC: 112 MMOL/L (ref 99–110)
CO2: 21 MMOL/L (ref 21–32)
CREAT SERPL-MCNC: 2.5 MG/DL (ref 0.6–1.1)
CULTURE: ABNORMAL
CULTURE: ABNORMAL
GFR SERPL CREATININE-BSD FRML MDRD: 19 ML/MIN/1.73M2
GLUCOSE BLD-MCNC: 194 MG/DL (ref 70–99)
GLUCOSE BLD-MCNC: 209 MG/DL (ref 70–99)
GLUCOSE BLD-MCNC: 230 MG/DL (ref 70–99)
GLUCOSE BLD-MCNC: 240 MG/DL (ref 70–99)
GLUCOSE BLD-MCNC: 247 MG/DL (ref 70–99)
GLUCOSE BLD-MCNC: 275 MG/DL (ref 70–99)
GLUCOSE BLD-MCNC: 305 MG/DL (ref 70–99)
GLUCOSE SERPL-MCNC: 218 MG/DL (ref 70–99)
HCT VFR BLD CALC: 24.9 % (ref 37–47)
HEMOGLOBIN: 7 GM/DL (ref 12.5–16)
Lab: ABNORMAL
MAGNESIUM: 2.7 MG/DL (ref 1.8–2.4)
MCH RBC QN AUTO: 28.1 PG (ref 27–31)
MCHC RBC AUTO-ENTMCNC: 28.1 % (ref 32–36)
MCV RBC AUTO: 100 FL (ref 78–100)
PDW BLD-RTO: 16.7 % (ref 11.7–14.9)
PHOSPHORUS: 4.7 MG/DL (ref 2.5–4.9)
PLATELET # BLD: 259 K/CU MM (ref 140–440)
PMV BLD AUTO: 10.3 FL (ref 7.5–11.1)
POTASSIUM SERPL-SCNC: 3.9 MMOL/L (ref 3.5–5.1)
PRO-BNP: ABNORMAL PG/ML
PROCALCITONIN SERPL-MCNC: 0.25 NG/ML
RBC # BLD: 2.49 M/CU MM (ref 4.2–5.4)
SODIUM BLD-SCNC: 147 MMOL/L (ref 135–145)
SPECIMEN: ABNORMAL
TRIGL SERPL-MCNC: 171 MG/DL
WBC # BLD: 12.1 K/CU MM (ref 4–10.5)

## 2023-05-11 PROCEDURE — 6370000000 HC RX 637 (ALT 250 FOR IP): Performed by: STUDENT IN AN ORGANIZED HEALTH CARE EDUCATION/TRAINING PROGRAM

## 2023-05-11 PROCEDURE — 6360000002 HC RX W HCPCS: Performed by: INTERNAL MEDICINE

## 2023-05-11 PROCEDURE — 84478 ASSAY OF TRIGLYCERIDES: CPT

## 2023-05-11 PROCEDURE — 94667 MNPJ CHEST WALL 1ST: CPT

## 2023-05-11 PROCEDURE — 6360000002 HC RX W HCPCS: Performed by: STUDENT IN AN ORGANIZED HEALTH CARE EDUCATION/TRAINING PROGRAM

## 2023-05-11 PROCEDURE — 82962 GLUCOSE BLOOD TEST: CPT

## 2023-05-11 PROCEDURE — 6370000000 HC RX 637 (ALT 250 FOR IP): Performed by: NURSE PRACTITIONER

## 2023-05-11 PROCEDURE — 83880 ASSAY OF NATRIURETIC PEPTIDE: CPT

## 2023-05-11 PROCEDURE — 6370000000 HC RX 637 (ALT 250 FOR IP): Performed by: PHYSICIAN ASSISTANT

## 2023-05-11 PROCEDURE — 83735 ASSAY OF MAGNESIUM: CPT

## 2023-05-11 PROCEDURE — 2500000003 HC RX 250 WO HCPCS: Performed by: STUDENT IN AN ORGANIZED HEALTH CARE EDUCATION/TRAINING PROGRAM

## 2023-05-11 PROCEDURE — 94003 VENT MGMT INPAT SUBQ DAY: CPT

## 2023-05-11 PROCEDURE — 84145 PROCALCITONIN (PCT): CPT

## 2023-05-11 PROCEDURE — 2580000003 HC RX 258: Performed by: PHYSICIAN ASSISTANT

## 2023-05-11 PROCEDURE — 84100 ASSAY OF PHOSPHORUS: CPT

## 2023-05-11 PROCEDURE — 82272 OCCULT BLD FECES 1-3 TESTS: CPT

## 2023-05-11 PROCEDURE — 6360000002 HC RX W HCPCS: Performed by: SPECIALIST

## 2023-05-11 PROCEDURE — 80053 COMPREHEN METABOLIC PANEL: CPT

## 2023-05-11 PROCEDURE — 2000000000 HC ICU R&B

## 2023-05-11 PROCEDURE — 94640 AIRWAY INHALATION TREATMENT: CPT

## 2023-05-11 PROCEDURE — C9113 INJ PANTOPRAZOLE SODIUM, VIA: HCPCS | Performed by: STUDENT IN AN ORGANIZED HEALTH CARE EDUCATION/TRAINING PROGRAM

## 2023-05-11 PROCEDURE — 89220 SPUTUM SPECIMEN COLLECTION: CPT

## 2023-05-11 PROCEDURE — 80048 BASIC METABOLIC PNL TOTAL CA: CPT

## 2023-05-11 PROCEDURE — 71045 X-RAY EXAM CHEST 1 VIEW: CPT

## 2023-05-11 PROCEDURE — 2580000003 HC RX 258: Performed by: STUDENT IN AN ORGANIZED HEALTH CARE EDUCATION/TRAINING PROGRAM

## 2023-05-11 PROCEDURE — 2700000000 HC OXYGEN THERAPY PER DAY

## 2023-05-11 PROCEDURE — 94761 N-INVAS EAR/PLS OXIMETRY MLT: CPT

## 2023-05-11 PROCEDURE — 6360000002 HC RX W HCPCS: Performed by: NURSE PRACTITIONER

## 2023-05-11 PROCEDURE — 85027 COMPLETE CBC AUTOMATED: CPT

## 2023-05-11 PROCEDURE — P9047 ALBUMIN (HUMAN), 25%, 50ML: HCPCS | Performed by: STUDENT IN AN ORGANIZED HEALTH CARE EDUCATION/TRAINING PROGRAM

## 2023-05-11 PROCEDURE — 94668 MNPJ CHEST WALL SBSQ: CPT

## 2023-05-11 RX ORDER — SODIUM CHLORIDE FOR INHALATION 3 %
4 VIAL, NEBULIZER (ML) INHALATION EVERY 4 HOURS
Status: DISPENSED | OUTPATIENT
Start: 2023-05-11 | End: 2023-05-14

## 2023-05-11 RX ORDER — FUROSEMIDE 10 MG/ML
40 INJECTION INTRAMUSCULAR; INTRAVENOUS ONCE
Status: COMPLETED | OUTPATIENT
Start: 2023-05-11 | End: 2023-05-11

## 2023-05-11 RX ORDER — ALBUMIN (HUMAN) 12.5 G/50ML
25 SOLUTION INTRAVENOUS ONCE
Status: COMPLETED | OUTPATIENT
Start: 2023-05-11 | End: 2023-05-11

## 2023-05-11 RX ORDER — 0.9 % SODIUM CHLORIDE 0.9 %
500 INTRAVENOUS SOLUTION INTRAVENOUS ONCE
Status: DISCONTINUED | OUTPATIENT
Start: 2023-05-11 | End: 2023-05-11

## 2023-05-11 RX ORDER — ACETYLCYSTEINE 100 MG/ML
4 SOLUTION ORAL; RESPIRATORY (INHALATION) 3 TIMES DAILY
Status: COMPLETED | OUTPATIENT
Start: 2023-05-11 | End: 2023-05-12

## 2023-05-11 RX ORDER — MIDAZOLAM HYDROCHLORIDE 2 MG/2ML
2 INJECTION, SOLUTION INTRAMUSCULAR; INTRAVENOUS
Status: DISCONTINUED | OUTPATIENT
Start: 2023-05-11 | End: 2023-05-16

## 2023-05-11 RX ADMIN — Medication 4 ML: at 11:30

## 2023-05-11 RX ADMIN — HEPARIN SODIUM 5000 UNITS: 5000 INJECTION INTRAVENOUS; SUBCUTANEOUS at 14:15

## 2023-05-11 RX ADMIN — BUDESONIDE AND FORMOTEROL FUMARATE DIHYDRATE 2 PUFF: 160; 4.5 AEROSOL RESPIRATORY (INHALATION) at 19:51

## 2023-05-11 RX ADMIN — GUAIFENESIN 200 MG: 100 SOLUTION ORAL at 04:39

## 2023-05-11 RX ADMIN — METHYLPREDNISOLONE SODIUM SUCCINATE 40 MG: 40 INJECTION, POWDER, FOR SOLUTION INTRAMUSCULAR; INTRAVENOUS at 18:07

## 2023-05-11 RX ADMIN — ALBUMIN (HUMAN) 25 G: 0.25 INJECTION, SOLUTION INTRAVENOUS at 10:43

## 2023-05-11 RX ADMIN — INSULIN LISPRO 12 UNITS: 100 INJECTION, SOLUTION INTRAVENOUS; SUBCUTANEOUS at 21:31

## 2023-05-11 RX ADMIN — SODIUM CHLORIDE, PRESERVATIVE FREE 10 ML: 5 INJECTION INTRAVENOUS at 08:10

## 2023-05-11 RX ADMIN — MIDAZOLAM 2 MG: 1 INJECTION INTRAMUSCULAR; INTRAVENOUS at 06:29

## 2023-05-11 RX ADMIN — DEXMEDETOMIDINE HYDROCHLORIDE 0.6 MCG/KG/HR: 4 INJECTION, SOLUTION INTRAVENOUS at 06:46

## 2023-05-11 RX ADMIN — CYCLOBENZAPRINE 10 MG: 10 TABLET, FILM COATED ORAL at 08:28

## 2023-05-11 RX ADMIN — MIDAZOLAM 2 MG: 1 INJECTION INTRAMUSCULAR; INTRAVENOUS at 14:15

## 2023-05-11 RX ADMIN — DEXMEDETOMIDINE HYDROCHLORIDE 1 MCG/KG/HR: 4 INJECTION, SOLUTION INTRAVENOUS at 18:41

## 2023-05-11 RX ADMIN — IPRATROPIUM BROMIDE AND ALBUTEROL SULFATE 1 AMPULE: 2.5; .5 SOLUTION RESPIRATORY (INHALATION) at 16:33

## 2023-05-11 RX ADMIN — DEXMEDETOMIDINE HYDROCHLORIDE 1 MCG/KG/HR: 4 INJECTION, SOLUTION INTRAVENOUS at 23:37

## 2023-05-11 RX ADMIN — IPRATROPIUM BROMIDE AND ALBUTEROL SULFATE 1 AMPULE: 2.5; .5 SOLUTION RESPIRATORY (INHALATION) at 19:51

## 2023-05-11 RX ADMIN — CEFTRIAXONE SODIUM 1000 MG: 1 INJECTION, POWDER, FOR SOLUTION INTRAMUSCULAR; INTRAVENOUS at 12:10

## 2023-05-11 RX ADMIN — SODIUM CHLORIDE: 9 INJECTION, SOLUTION INTRAVENOUS at 16:48

## 2023-05-11 RX ADMIN — METHYLPREDNISOLONE SODIUM SUCCINATE 40 MG: 40 INJECTION, POWDER, FOR SOLUTION INTRAMUSCULAR; INTRAVENOUS at 12:03

## 2023-05-11 RX ADMIN — INSULIN LISPRO 4 UNITS: 100 INJECTION, SOLUTION INTRAVENOUS; SUBCUTANEOUS at 08:45

## 2023-05-11 RX ADMIN — IPRATROPIUM BROMIDE AND ALBUTEROL SULFATE 1 AMPULE: 2.5; .5 SOLUTION RESPIRATORY (INHALATION) at 07:49

## 2023-05-11 RX ADMIN — ACETYLCYSTEINE 600 MG: 200 SOLUTION ORAL; RESPIRATORY (INHALATION) at 07:50

## 2023-05-11 RX ADMIN — CHLORHEXIDINE GLUCONATE 0.12% ORAL RINSE 15 ML: 1.2 LIQUID ORAL at 08:15

## 2023-05-11 RX ADMIN — CHLORHEXIDINE GLUCONATE 0.12% ORAL RINSE 15 ML: 1.2 LIQUID ORAL at 21:31

## 2023-05-11 RX ADMIN — ACETYLCYSTEINE 400 MG: 100 SOLUTION ORAL; RESPIRATORY (INHALATION) at 16:34

## 2023-05-11 RX ADMIN — MIDAZOLAM 2 MG: 1 INJECTION INTRAMUSCULAR; INTRAVENOUS at 16:46

## 2023-05-11 RX ADMIN — GUAIFENESIN 200 MG: 100 SOLUTION ORAL at 16:32

## 2023-05-11 RX ADMIN — SODIUM CHLORIDE, PRESERVATIVE FREE 10 ML: 5 INJECTION INTRAVENOUS at 21:37

## 2023-05-11 RX ADMIN — FUROSEMIDE 40 MG: 10 INJECTION, SOLUTION INTRAMUSCULAR; INTRAVENOUS at 07:11

## 2023-05-11 RX ADMIN — METOPROLOL TARTRATE 50 MG: 50 TABLET, FILM COATED ORAL at 21:31

## 2023-05-11 RX ADMIN — Medication 4 ML: at 19:51

## 2023-05-11 RX ADMIN — GUAIFENESIN 200 MG: 100 SOLUTION ORAL at 00:32

## 2023-05-11 RX ADMIN — DEXMEDETOMIDINE HYDROCHLORIDE 0.8 MCG/KG/HR: 4 INJECTION, SOLUTION INTRAVENOUS at 13:22

## 2023-05-11 RX ADMIN — INSULIN LISPRO 4 UNITS: 100 INJECTION, SOLUTION INTRAVENOUS; SUBCUTANEOUS at 16:25

## 2023-05-11 RX ADMIN — ALBUMIN (HUMAN) 25 G: 0.25 INJECTION, SOLUTION INTRAVENOUS at 16:51

## 2023-05-11 RX ADMIN — INSULIN LISPRO 2 UNITS: 100 INJECTION, SOLUTION INTRAVENOUS; SUBCUTANEOUS at 12:01

## 2023-05-11 RX ADMIN — QUETIAPINE FUMARATE 25 MG: 25 TABLET ORAL at 21:31

## 2023-05-11 RX ADMIN — INSULIN LISPRO 4 UNITS: 100 INJECTION, SOLUTION INTRAVENOUS; SUBCUTANEOUS at 00:32

## 2023-05-11 RX ADMIN — METOPROLOL TARTRATE 50 MG: 50 TABLET, FILM COATED ORAL at 08:28

## 2023-05-11 RX ADMIN — IPRATROPIUM BROMIDE AND ALBUTEROL SULFATE 1 AMPULE: 2.5; .5 SOLUTION RESPIRATORY (INHALATION) at 11:29

## 2023-05-11 RX ADMIN — CYCLOBENZAPRINE 10 MG: 10 TABLET, FILM COATED ORAL at 16:32

## 2023-05-11 RX ADMIN — FENTANYL CITRATE 50 MCG: 50 INJECTION, SOLUTION INTRAMUSCULAR; INTRAVENOUS at 03:14

## 2023-05-11 RX ADMIN — GUAIFENESIN 200 MG: 100 SOLUTION ORAL at 08:28

## 2023-05-11 RX ADMIN — MIDAZOLAM 2 MG: 1 INJECTION INTRAMUSCULAR; INTRAVENOUS at 00:34

## 2023-05-11 RX ADMIN — PANTOPRAZOLE SODIUM 40 MG: 40 INJECTION, POWDER, FOR SOLUTION INTRAVENOUS at 08:09

## 2023-05-11 RX ADMIN — INSULIN LISPRO 4 UNITS: 100 INJECTION, SOLUTION INTRAVENOUS; SUBCUTANEOUS at 04:39

## 2023-05-11 RX ADMIN — MIDAZOLAM 2 MG: 1 INJECTION INTRAMUSCULAR; INTRAVENOUS at 12:00

## 2023-05-11 RX ADMIN — METHYLPREDNISOLONE SODIUM SUCCINATE 40 MG: 40 INJECTION, POWDER, FOR SOLUTION INTRAMUSCULAR; INTRAVENOUS at 00:32

## 2023-05-11 RX ADMIN — Medication 10 ML: at 18:10

## 2023-05-11 RX ADMIN — ACETAMINOPHEN 650 MG: 325 TABLET ORAL at 16:32

## 2023-05-11 RX ADMIN — HEPARIN SODIUM 5000 UNITS: 5000 INJECTION INTRAVENOUS; SUBCUTANEOUS at 06:29

## 2023-05-11 RX ADMIN — ACETYLCYSTEINE 600 MG: 200 SOLUTION ORAL; RESPIRATORY (INHALATION) at 03:39

## 2023-05-11 RX ADMIN — HEPARIN SODIUM 5000 UNITS: 5000 INJECTION INTRAVENOUS; SUBCUTANEOUS at 21:32

## 2023-05-11 RX ADMIN — GUAIFENESIN 200 MG: 100 SOLUTION ORAL at 12:09

## 2023-05-11 RX ADMIN — METHYLPREDNISOLONE SODIUM SUCCINATE 40 MG: 40 INJECTION, POWDER, FOR SOLUTION INTRAMUSCULAR; INTRAVENOUS at 06:29

## 2023-05-11 RX ADMIN — LEVOTHYROXINE SODIUM 137 MCG: 0.11 TABLET ORAL at 06:29

## 2023-05-11 RX ADMIN — BUDESONIDE AND FORMOTEROL FUMARATE DIHYDRATE 2 PUFF: 160; 4.5 AEROSOL RESPIRATORY (INHALATION) at 11:30

## 2023-05-11 RX ADMIN — IPRATROPIUM BROMIDE AND ALBUTEROL SULFATE 1 AMPULE: 2.5; .5 SOLUTION RESPIRATORY (INHALATION) at 03:38

## 2023-05-11 RX ADMIN — MIDAZOLAM 2 MG: 1 INJECTION INTRAMUSCULAR; INTRAVENOUS at 08:09

## 2023-05-11 RX ADMIN — GUAIFENESIN 200 MG: 100 SOLUTION ORAL at 21:30

## 2023-05-11 RX ADMIN — ACETYLCYSTEINE 400 MG: 100 SOLUTION ORAL; RESPIRATORY (INHALATION) at 19:51

## 2023-05-11 ASSESSMENT — PULMONARY FUNCTION TESTS
PIF_VALUE: 26
PIF_VALUE: 25
PIF_VALUE: 23
PIF_VALUE: 21
PIF_VALUE: 22
PIF_VALUE: 25
PIF_VALUE: 21
PIF_VALUE: 17
PIF_VALUE: 21
PIF_VALUE: 21
PIF_VALUE: 20
PIF_VALUE: 23
PIF_VALUE: 20
PIF_VALUE: 25
PIF_VALUE: 21
PIF_VALUE: 20
PIF_VALUE: 15
PIF_VALUE: 20
PIF_VALUE: 25
PIF_VALUE: 20
PIF_VALUE: 21
PIF_VALUE: 25
PIF_VALUE: 21
PIF_VALUE: 24
PIF_VALUE: 25
PIF_VALUE: 22
PIF_VALUE: 25

## 2023-05-11 ASSESSMENT — PAIN SCALES - GENERAL
PAINLEVEL_OUTOF10: 0
PAINLEVEL_OUTOF10: 0
PAINLEVEL_OUTOF10: 4
PAINLEVEL_OUTOF10: 0

## 2023-05-12 ENCOUNTER — APPOINTMENT (OUTPATIENT)
Dept: GENERAL RADIOLOGY | Age: 83
DRG: 329 | End: 2023-05-12
Attending: SURGERY
Payer: MEDICARE

## 2023-05-12 LAB
ANION GAP SERPL CALCULATED.3IONS-SCNC: 16 MMOL/L (ref 4–16)
BASE EXCESS: 2 (ref 0–2.4)
BASOPHILS ABSOLUTE: 0 K/CU MM
BASOPHILS RELATIVE PERCENT: 0.1 % (ref 0–1)
BUN SERPL-MCNC: 76 MG/DL (ref 6–23)
CALCIUM SERPL-MCNC: 9.4 MG/DL (ref 8.3–10.6)
CARBON MONOXIDE, BLOOD: 1.7 % (ref 0–5)
CHLORIDE BLD-SCNC: 110 MMOL/L (ref 99–110)
CO2 CONTENT: 23.7 MMOL/L (ref 19–24)
CO2: 19 MMOL/L (ref 21–32)
COMMENT: ABNORMAL
CREAT SERPL-MCNC: 2.5 MG/DL (ref 0.6–1.1)
CULTURE: NORMAL
DIFFERENTIAL TYPE: ABNORMAL
EOSINOPHILS ABSOLUTE: 0 K/CU MM
EOSINOPHILS RELATIVE PERCENT: 0 % (ref 0–3)
FERRITIN: 344 NG/ML (ref 15–150)
GFR SERPL CREATININE-BSD FRML MDRD: 19 ML/MIN/1.73M2
GLUCOSE BLD-MCNC: 235 MG/DL (ref 70–99)
GLUCOSE BLD-MCNC: 246 MG/DL (ref 70–99)
GLUCOSE BLD-MCNC: 255 MG/DL (ref 70–99)
GLUCOSE BLD-MCNC: 263 MG/DL (ref 70–99)
GLUCOSE BLD-MCNC: 274 MG/DL (ref 70–99)
GLUCOSE SERPL-MCNC: 261 MG/DL (ref 70–99)
GRAM SMEAR: NORMAL
HCO3 ARTERIAL: 22.5 MMOL/L (ref 18–23)
HCT VFR BLD CALC: 24.1 % (ref 37–47)
HEMOGLOBIN: 7.1 GM/DL (ref 12.5–16)
IMMATURE NEUTROPHIL %: 2.7 % (ref 0–0.43)
INR BLD: 0.94 INDEX
IRON: 50 UG/DL (ref 37–145)
LYMPHOCYTES ABSOLUTE: 0.9 K/CU MM
LYMPHOCYTES RELATIVE PERCENT: 6.1 % (ref 24–44)
Lab: NORMAL
MAGNESIUM: 2.7 MG/DL (ref 1.8–2.4)
MCH RBC QN AUTO: 27.6 PG (ref 27–31)
MCHC RBC AUTO-ENTMCNC: 29.5 % (ref 32–36)
MCV RBC AUTO: 93.8 FL (ref 78–100)
METHEMOGLOBIN ARTERIAL: 1.3 %
MONOCYTES ABSOLUTE: 0.4 K/CU MM
MONOCYTES RELATIVE PERCENT: 2.4 % (ref 0–4)
NUCLEATED RBC %: 0 %
O2 SATURATION: 92.6 % (ref 96–97)
PCO2 ARTERIAL: 38 MMHG (ref 32–45)
PCT TRANSFERRIN: 22 % (ref 10–44)
PDW BLD-RTO: 16.4 % (ref 11.7–14.9)
PH BLOOD: 7.38 (ref 7.34–7.45)
PHOSPHORUS: 3.4 MG/DL (ref 2.5–4.9)
PLATELET # BLD: 248 K/CU MM (ref 140–440)
PMV BLD AUTO: 9.8 FL (ref 7.5–11.1)
PO2 ARTERIAL: 73 MMHG (ref 75–100)
POTASSIUM SERPL-SCNC: 4.3 MMOL/L (ref 3.5–5.1)
PROTHROMBIN TIME: 11.9 SECONDS (ref 11.7–14.5)
RBC # BLD: 2.57 M/CU MM (ref 4.2–5.4)
SEGMENTED NEUTROPHILS ABSOLUTE COUNT: 13.3 K/CU MM
SEGMENTED NEUTROPHILS RELATIVE PERCENT: 88.7 % (ref 36–66)
SODIUM BLD-SCNC: 145 MMOL/L (ref 135–145)
SPECIMEN: NORMAL
TOTAL IMMATURE NEUTOROPHIL: 0.4 K/CU MM
TOTAL IRON BINDING CAPACITY: 227 UG/DL (ref 250–450)
TOTAL NUCLEATED RBC: 0 K/CU MM
UNSATURATED IRON BINDING CAPACITY: 177 UG/DL (ref 110–370)
WBC # BLD: 15 K/CU MM (ref 4–10.5)

## 2023-05-12 PROCEDURE — 2700000000 HC OXYGEN THERAPY PER DAY

## 2023-05-12 PROCEDURE — 6360000002 HC RX W HCPCS: Performed by: STUDENT IN AN ORGANIZED HEALTH CARE EDUCATION/TRAINING PROGRAM

## 2023-05-12 PROCEDURE — 6370000000 HC RX 637 (ALT 250 FOR IP): Performed by: NURSE PRACTITIONER

## 2023-05-12 PROCEDURE — 84100 ASSAY OF PHOSPHORUS: CPT

## 2023-05-12 PROCEDURE — 71045 X-RAY EXAM CHEST 1 VIEW: CPT

## 2023-05-12 PROCEDURE — 31645 BRNCHSC W/THER ASPIR 1ST: CPT

## 2023-05-12 PROCEDURE — 85025 COMPLETE CBC W/AUTO DIFF WBC: CPT

## 2023-05-12 PROCEDURE — 94003 VENT MGMT INPAT SUBQ DAY: CPT

## 2023-05-12 PROCEDURE — 6370000000 HC RX 637 (ALT 250 FOR IP): Performed by: STUDENT IN AN ORGANIZED HEALTH CARE EDUCATION/TRAINING PROGRAM

## 2023-05-12 PROCEDURE — 87070 CULTURE OTHR SPECIMN AEROBIC: CPT

## 2023-05-12 PROCEDURE — 94668 MNPJ CHEST WALL SBSQ: CPT

## 2023-05-12 PROCEDURE — 0B9F8ZX DRAINAGE OF RIGHT LOWER LUNG LOBE, VIA NATURAL OR ARTIFICIAL OPENING ENDOSCOPIC, DIAGNOSTIC: ICD-10-PCS | Performed by: STUDENT IN AN ORGANIZED HEALTH CARE EDUCATION/TRAINING PROGRAM

## 2023-05-12 PROCEDURE — 6370000000 HC RX 637 (ALT 250 FOR IP): Performed by: PHYSICIAN ASSISTANT

## 2023-05-12 PROCEDURE — 83550 IRON BINDING TEST: CPT

## 2023-05-12 PROCEDURE — 2580000003 HC RX 258: Performed by: STUDENT IN AN ORGANIZED HEALTH CARE EDUCATION/TRAINING PROGRAM

## 2023-05-12 PROCEDURE — 82803 BLOOD GASES ANY COMBINATION: CPT

## 2023-05-12 PROCEDURE — 2000000000 HC ICU R&B

## 2023-05-12 PROCEDURE — 89220 SPUTUM SPECIMEN COLLECTION: CPT

## 2023-05-12 PROCEDURE — 31624 DX BRONCHOSCOPE/LAVAGE: CPT

## 2023-05-12 PROCEDURE — 80048 BASIC METABOLIC PNL TOTAL CA: CPT

## 2023-05-12 PROCEDURE — 82962 GLUCOSE BLOOD TEST: CPT

## 2023-05-12 PROCEDURE — 6360000002 HC RX W HCPCS: Performed by: NURSE PRACTITIONER

## 2023-05-12 PROCEDURE — 85610 PROTHROMBIN TIME: CPT

## 2023-05-12 PROCEDURE — 87205 SMEAR GRAM STAIN: CPT

## 2023-05-12 PROCEDURE — 94640 AIRWAY INHALATION TREATMENT: CPT

## 2023-05-12 PROCEDURE — 83735 ASSAY OF MAGNESIUM: CPT

## 2023-05-12 PROCEDURE — 36600 WITHDRAWAL OF ARTERIAL BLOOD: CPT

## 2023-05-12 PROCEDURE — 2500000003 HC RX 250 WO HCPCS: Performed by: STUDENT IN AN ORGANIZED HEALTH CARE EDUCATION/TRAINING PROGRAM

## 2023-05-12 PROCEDURE — 31622 DX BRONCHOSCOPE/WASH: CPT

## 2023-05-12 PROCEDURE — 83540 ASSAY OF IRON: CPT

## 2023-05-12 PROCEDURE — 82728 ASSAY OF FERRITIN: CPT

## 2023-05-12 PROCEDURE — 94660 CPAP INITIATION&MGMT: CPT

## 2023-05-12 PROCEDURE — 2580000003 HC RX 258: Performed by: PHYSICIAN ASSISTANT

## 2023-05-12 PROCEDURE — 2720000010 HC SURG SUPPLY STERILE

## 2023-05-12 PROCEDURE — 6360000002 HC RX W HCPCS: Performed by: SPECIALIST

## 2023-05-12 PROCEDURE — 94761 N-INVAS EAR/PLS OXIMETRY MLT: CPT

## 2023-05-12 PROCEDURE — C9113 INJ PANTOPRAZOLE SODIUM, VIA: HCPCS | Performed by: STUDENT IN AN ORGANIZED HEALTH CARE EDUCATION/TRAINING PROGRAM

## 2023-05-12 RX ORDER — MIDAZOLAM HYDROCHLORIDE 2 MG/2ML
2 INJECTION, SOLUTION INTRAMUSCULAR; INTRAVENOUS ONCE
Status: COMPLETED | OUTPATIENT
Start: 2023-05-12 | End: 2023-05-12

## 2023-05-12 RX ORDER — METHYLPREDNISOLONE SODIUM SUCCINATE 40 MG/ML
40 INJECTION, POWDER, LYOPHILIZED, FOR SOLUTION INTRAMUSCULAR; INTRAVENOUS EVERY 8 HOURS
Status: DISCONTINUED | OUTPATIENT
Start: 2023-05-12 | End: 2023-05-13

## 2023-05-12 RX ORDER — LABETALOL HYDROCHLORIDE 5 MG/ML
10 INJECTION, SOLUTION INTRAVENOUS
Status: DISCONTINUED | OUTPATIENT
Start: 2023-05-12 | End: 2023-05-24

## 2023-05-12 RX ADMIN — ACETAMINOPHEN 650 MG: 325 TABLET ORAL at 23:33

## 2023-05-12 RX ADMIN — MIDAZOLAM 2 MG: 1 INJECTION INTRAMUSCULAR; INTRAVENOUS at 02:00

## 2023-05-12 RX ADMIN — SODIUM CHLORIDE, PRESERVATIVE FREE 10 ML: 5 INJECTION INTRAVENOUS at 20:49

## 2023-05-12 RX ADMIN — METHYLPREDNISOLONE SODIUM SUCCINATE 40 MG: 40 INJECTION, POWDER, FOR SOLUTION INTRAMUSCULAR; INTRAVENOUS at 05:57

## 2023-05-12 RX ADMIN — HEPARIN SODIUM 5000 UNITS: 5000 INJECTION INTRAVENOUS; SUBCUTANEOUS at 13:57

## 2023-05-12 RX ADMIN — METOPROLOL TARTRATE 50 MG: 50 TABLET, FILM COATED ORAL at 09:59

## 2023-05-12 RX ADMIN — LABETALOL HYDROCHLORIDE 10 MG: 5 INJECTION, SOLUTION INTRAVENOUS at 05:57

## 2023-05-12 RX ADMIN — METHYLPREDNISOLONE SODIUM SUCCINATE 40 MG: 40 INJECTION, POWDER, FOR SOLUTION INTRAMUSCULAR; INTRAVENOUS at 13:56

## 2023-05-12 RX ADMIN — Medication 4 ML: at 04:30

## 2023-05-12 RX ADMIN — IPRATROPIUM BROMIDE AND ALBUTEROL SULFATE 1 AMPULE: 2.5; .5 SOLUTION RESPIRATORY (INHALATION) at 23:22

## 2023-05-12 RX ADMIN — INSULIN LISPRO 8 UNITS: 100 INJECTION, SOLUTION INTRAVENOUS; SUBCUTANEOUS at 20:57

## 2023-05-12 RX ADMIN — MIDAZOLAM 2 MG: 1 INJECTION INTRAMUSCULAR; INTRAVENOUS at 10:00

## 2023-05-12 RX ADMIN — HEPARIN SODIUM 5000 UNITS: 5000 INJECTION INTRAVENOUS; SUBCUTANEOUS at 05:57

## 2023-05-12 RX ADMIN — HEPARIN SODIUM 5000 UNITS: 5000 INJECTION INTRAVENOUS; SUBCUTANEOUS at 21:40

## 2023-05-12 RX ADMIN — GUAIFENESIN 200 MG: 100 SOLUTION ORAL at 01:09

## 2023-05-12 RX ADMIN — CHLORHEXIDINE GLUCONATE 0.12% ORAL RINSE 15 ML: 1.2 LIQUID ORAL at 09:59

## 2023-05-12 RX ADMIN — INSULIN LISPRO 4 UNITS: 100 INJECTION, SOLUTION INTRAVENOUS; SUBCUTANEOUS at 13:56

## 2023-05-12 RX ADMIN — MIDAZOLAM 2 MG: 1 INJECTION INTRAMUSCULAR; INTRAVENOUS at 06:01

## 2023-05-12 RX ADMIN — BUDESONIDE AND FORMOTEROL FUMARATE DIHYDRATE 2 PUFF: 160; 4.5 AEROSOL RESPIRATORY (INHALATION) at 19:31

## 2023-05-12 RX ADMIN — FENTANYL CITRATE 50 MCG: 50 INJECTION, SOLUTION INTRAMUSCULAR; INTRAVENOUS at 10:00

## 2023-05-12 RX ADMIN — QUETIAPINE FUMARATE 25 MG: 25 TABLET ORAL at 20:47

## 2023-05-12 RX ADMIN — INSULIN LISPRO 8 UNITS: 100 INJECTION, SOLUTION INTRAVENOUS; SUBCUTANEOUS at 00:00

## 2023-05-12 RX ADMIN — INSULIN LISPRO 4 UNITS: 100 INJECTION, SOLUTION INTRAVENOUS; SUBCUTANEOUS at 17:54

## 2023-05-12 RX ADMIN — INSULIN LISPRO 8 UNITS: 100 INJECTION, SOLUTION INTRAVENOUS; SUBCUTANEOUS at 10:42

## 2023-05-12 RX ADMIN — Medication 4 ML: at 00:24

## 2023-05-12 RX ADMIN — IPRATROPIUM BROMIDE AND ALBUTEROL SULFATE 1 AMPULE: 2.5; .5 SOLUTION RESPIRATORY (INHALATION) at 00:24

## 2023-05-12 RX ADMIN — GUAIFENESIN 200 MG: 100 SOLUTION ORAL at 13:56

## 2023-05-12 RX ADMIN — CEFTRIAXONE SODIUM 1000 MG: 1 INJECTION, POWDER, FOR SOLUTION INTRAMUSCULAR; INTRAVENOUS at 14:01

## 2023-05-12 RX ADMIN — Medication 3 MG: at 22:26

## 2023-05-12 RX ADMIN — ACETYLCYSTEINE 400 MG: 100 SOLUTION ORAL; RESPIRATORY (INHALATION) at 07:12

## 2023-05-12 RX ADMIN — PANTOPRAZOLE SODIUM 40 MG: 40 INJECTION, POWDER, FOR SOLUTION INTRAVENOUS at 09:59

## 2023-05-12 RX ADMIN — IPRATROPIUM BROMIDE AND ALBUTEROL SULFATE 1 AMPULE: 2.5; .5 SOLUTION RESPIRATORY (INHALATION) at 15:44

## 2023-05-12 RX ADMIN — LEVOTHYROXINE SODIUM 137 MCG: 0.11 TABLET ORAL at 05:57

## 2023-05-12 RX ADMIN — IPRATROPIUM BROMIDE AND ALBUTEROL SULFATE 1 AMPULE: 2.5; .5 SOLUTION RESPIRATORY (INHALATION) at 19:29

## 2023-05-12 RX ADMIN — IPRATROPIUM BROMIDE AND ALBUTEROL SULFATE 1 AMPULE: 2.5; .5 SOLUTION RESPIRATORY (INHALATION) at 11:47

## 2023-05-12 RX ADMIN — INSULIN LISPRO 8 UNITS: 100 INJECTION, SOLUTION INTRAVENOUS; SUBCUTANEOUS at 05:58

## 2023-05-12 RX ADMIN — GUAIFENESIN 200 MG: 100 SOLUTION ORAL at 10:18

## 2023-05-12 RX ADMIN — METHYLPREDNISOLONE SODIUM SUCCINATE 40 MG: 40 INJECTION, POWDER, FOR SOLUTION INTRAMUSCULAR; INTRAVENOUS at 21:40

## 2023-05-12 RX ADMIN — SODIUM CHLORIDE, PRESERVATIVE FREE 10 ML: 5 INJECTION INTRAVENOUS at 09:59

## 2023-05-12 RX ADMIN — IPRATROPIUM BROMIDE AND ALBUTEROL SULFATE 1 AMPULE: 2.5; .5 SOLUTION RESPIRATORY (INHALATION) at 04:31

## 2023-05-12 RX ADMIN — IPRATROPIUM BROMIDE AND ALBUTEROL SULFATE 1 AMPULE: 2.5; .5 SOLUTION RESPIRATORY (INHALATION) at 07:11

## 2023-05-12 RX ADMIN — BUDESONIDE AND FORMOTEROL FUMARATE DIHYDRATE 2 PUFF: 160; 4.5 AEROSOL RESPIRATORY (INHALATION) at 07:16

## 2023-05-12 RX ADMIN — DEXMEDETOMIDINE HYDROCHLORIDE 1.4 MCG/KG/HR: 4 INJECTION, SOLUTION INTRAVENOUS at 09:17

## 2023-05-12 RX ADMIN — GUAIFENESIN 200 MG: 100 SOLUTION ORAL at 05:59

## 2023-05-12 RX ADMIN — METHYLPREDNISOLONE SODIUM SUCCINATE 40 MG: 40 INJECTION, POWDER, FOR SOLUTION INTRAMUSCULAR; INTRAVENOUS at 00:00

## 2023-05-12 RX ADMIN — METOPROLOL TARTRATE 50 MG: 50 TABLET, FILM COATED ORAL at 20:47

## 2023-05-12 RX ADMIN — Medication 4 ML: at 11:48

## 2023-05-12 RX ADMIN — DEXMEDETOMIDINE HYDROCHLORIDE 1.4 MCG/KG/HR: 4 INJECTION, SOLUTION INTRAVENOUS at 13:10

## 2023-05-12 RX ADMIN — DEXMEDETOMIDINE HYDROCHLORIDE 1 MCG/KG/HR: 4 INJECTION, SOLUTION INTRAVENOUS at 04:58

## 2023-05-12 RX ADMIN — FENTANYL CITRATE 50 MCG: 50 INJECTION, SOLUTION INTRAMUSCULAR; INTRAVENOUS at 03:15

## 2023-05-12 RX ADMIN — Medication 4 ML: at 19:30

## 2023-05-12 RX ADMIN — GUAIFENESIN 200 MG: 100 SOLUTION ORAL at 20:50

## 2023-05-12 ASSESSMENT — PAIN SCALES - GENERAL
PAINLEVEL_OUTOF10: 0
PAINLEVEL_OUTOF10: 4

## 2023-05-12 ASSESSMENT — PULMONARY FUNCTION TESTS
PIF_VALUE: 15
PIF_VALUE: 22
PIF_VALUE: 23
PIF_VALUE: 21
PIF_VALUE: 22
PIF_VALUE: 22
PIF_VALUE: 20
PIF_VALUE: 21
PIF_VALUE: 15
PIF_VALUE: 10
PIF_VALUE: 22
PIF_VALUE: 22
PIF_VALUE: 21
PIF_VALUE: 23

## 2023-05-13 ENCOUNTER — APPOINTMENT (OUTPATIENT)
Dept: GENERAL RADIOLOGY | Age: 83
DRG: 329 | End: 2023-05-13
Attending: SURGERY
Payer: MEDICARE

## 2023-05-13 LAB
ANION GAP SERPL CALCULATED.3IONS-SCNC: 11 MMOL/L (ref 4–16)
ANISOCYTOSIS: ABNORMAL
BANDED NEUTROPHILS ABSOLUTE COUNT: 0.55 K/CU MM
BANDED NEUTROPHILS RELATIVE PERCENT: 3 % (ref 5–11)
BASOPHILS ABSOLUTE: 0 K/CU MM
BASOPHILS RELATIVE PERCENT: 0 % (ref 0–1)
BUN SERPL-MCNC: 72 MG/DL (ref 6–23)
CALCIUM SERPL-MCNC: 9.1 MG/DL (ref 8.3–10.6)
CHLORIDE BLD-SCNC: 116 MMOL/L (ref 99–110)
CO2: 23 MMOL/L (ref 21–32)
CREAT SERPL-MCNC: 2 MG/DL (ref 0.6–1.1)
DIFFERENTIAL TYPE: ABNORMAL
EOSINOPHILS ABSOLUTE: 0 K/CU MM
EOSINOPHILS RELATIVE PERCENT: 0 % (ref 0–3)
GFR SERPL CREATININE-BSD FRML MDRD: 25 ML/MIN/1.73M2
GLUCOSE BLD-MCNC: 114 MG/DL (ref 70–99)
GLUCOSE BLD-MCNC: 177 MG/DL (ref 70–99)
GLUCOSE BLD-MCNC: 185 MG/DL (ref 70–99)
GLUCOSE BLD-MCNC: 197 MG/DL (ref 70–99)
GLUCOSE BLD-MCNC: 219 MG/DL (ref 70–99)
GLUCOSE BLD-MCNC: 238 MG/DL (ref 70–99)
GLUCOSE BLD-MCNC: 244 MG/DL (ref 70–99)
GLUCOSE SERPL-MCNC: 197 MG/DL (ref 70–99)
HCT VFR BLD CALC: 23.8 % (ref 37–47)
HEMOGLOBIN: 7.1 GM/DL (ref 12.5–16)
HYPOCHROMIA: ABNORMAL
IMMATURE NEUTROPHIL %: 0 % (ref 0–0.43)
LYMPHOCYTES ABSOLUTE: 1.1 K/CU MM
LYMPHOCYTES RELATIVE PERCENT: 6 % (ref 24–44)
MACROCYTES: ABNORMAL
MAGNESIUM: 2.7 MG/DL (ref 1.8–2.4)
MCH RBC QN AUTO: 27.8 PG (ref 27–31)
MCHC RBC AUTO-ENTMCNC: 29.8 % (ref 32–36)
MCV RBC AUTO: 93.3 FL (ref 78–100)
MONOCYTES ABSOLUTE: 0.7 K/CU MM
MONOCYTES RELATIVE PERCENT: 4 % (ref 0–4)
PDW BLD-RTO: 16.4 % (ref 11.7–14.9)
PHOSPHORUS: 3 MG/DL (ref 2.5–4.9)
PLATELET # BLD: 320 K/CU MM (ref 140–440)
PMV BLD AUTO: 10.2 FL (ref 7.5–11.1)
POTASSIUM SERPL-SCNC: 4.3 MMOL/L (ref 3.5–5.1)
RBC # BLD: 2.55 M/CU MM (ref 4.2–5.4)
SEGMENTED NEUTROPHILS ABSOLUTE COUNT: 15.9 K/CU MM
SEGMENTED NEUTROPHILS RELATIVE PERCENT: 87 % (ref 36–66)
SODIUM BLD-SCNC: 150 MMOL/L (ref 135–145)
TOTAL IMMATURE NEUTOROPHIL: 0 K/CU MM
WBC # BLD: 18.3 K/CU MM (ref 4–10.5)

## 2023-05-13 PROCEDURE — 94761 N-INVAS EAR/PLS OXIMETRY MLT: CPT

## 2023-05-13 PROCEDURE — 94668 MNPJ CHEST WALL SBSQ: CPT

## 2023-05-13 PROCEDURE — 2060000000 HC ICU INTERMEDIATE R&B

## 2023-05-13 PROCEDURE — 2580000003 HC RX 258: Performed by: PHYSICIAN ASSISTANT

## 2023-05-13 PROCEDURE — 2500000003 HC RX 250 WO HCPCS: Performed by: STUDENT IN AN ORGANIZED HEALTH CARE EDUCATION/TRAINING PROGRAM

## 2023-05-13 PROCEDURE — 6370000000 HC RX 637 (ALT 250 FOR IP): Performed by: PHYSICIAN ASSISTANT

## 2023-05-13 PROCEDURE — 6360000002 HC RX W HCPCS: Performed by: PHYSICIAN ASSISTANT

## 2023-05-13 PROCEDURE — 85025 COMPLETE CBC W/AUTO DIFF WBC: CPT

## 2023-05-13 PROCEDURE — 2700000000 HC OXYGEN THERAPY PER DAY

## 2023-05-13 PROCEDURE — 94640 AIRWAY INHALATION TREATMENT: CPT

## 2023-05-13 PROCEDURE — 99024 POSTOP FOLLOW-UP VISIT: CPT | Performed by: NURSE PRACTITIONER

## 2023-05-13 PROCEDURE — 84100 ASSAY OF PHOSPHORUS: CPT

## 2023-05-13 PROCEDURE — 82962 GLUCOSE BLOOD TEST: CPT

## 2023-05-13 PROCEDURE — 6360000002 HC RX W HCPCS: Performed by: SPECIALIST

## 2023-05-13 PROCEDURE — 92610 EVALUATE SWALLOWING FUNCTION: CPT

## 2023-05-13 PROCEDURE — 74018 RADEX ABDOMEN 1 VIEW: CPT

## 2023-05-13 PROCEDURE — APPNB30 APP NON BILLABLE TIME 0-30 MINS: Performed by: NURSE PRACTITIONER

## 2023-05-13 PROCEDURE — 6370000000 HC RX 637 (ALT 250 FOR IP): Performed by: STUDENT IN AN ORGANIZED HEALTH CARE EDUCATION/TRAINING PROGRAM

## 2023-05-13 PROCEDURE — C9113 INJ PANTOPRAZOLE SODIUM, VIA: HCPCS | Performed by: STUDENT IN AN ORGANIZED HEALTH CARE EDUCATION/TRAINING PROGRAM

## 2023-05-13 PROCEDURE — 71045 X-RAY EXAM CHEST 1 VIEW: CPT

## 2023-05-13 PROCEDURE — 6370000000 HC RX 637 (ALT 250 FOR IP): Performed by: NURSE PRACTITIONER

## 2023-05-13 PROCEDURE — 83735 ASSAY OF MAGNESIUM: CPT

## 2023-05-13 PROCEDURE — 2580000003 HC RX 258: Performed by: STUDENT IN AN ORGANIZED HEALTH CARE EDUCATION/TRAINING PROGRAM

## 2023-05-13 PROCEDURE — 6360000002 HC RX W HCPCS: Performed by: STUDENT IN AN ORGANIZED HEALTH CARE EDUCATION/TRAINING PROGRAM

## 2023-05-13 PROCEDURE — 94660 CPAP INITIATION&MGMT: CPT

## 2023-05-13 PROCEDURE — 80048 BASIC METABOLIC PNL TOTAL CA: CPT

## 2023-05-13 RX ORDER — METHYLPREDNISOLONE SODIUM SUCCINATE 40 MG/ML
40 INJECTION, POWDER, LYOPHILIZED, FOR SOLUTION INTRAMUSCULAR; INTRAVENOUS EVERY 8 HOURS
Status: COMPLETED | OUTPATIENT
Start: 2023-05-13 | End: 2023-05-13

## 2023-05-13 RX ORDER — METHYLPREDNISOLONE SODIUM SUCCINATE 40 MG/ML
40 INJECTION, POWDER, LYOPHILIZED, FOR SOLUTION INTRAMUSCULAR; INTRAVENOUS EVERY 12 HOURS
Status: COMPLETED | OUTPATIENT
Start: 2023-05-14 | End: 2023-05-15

## 2023-05-13 RX ADMIN — INSULIN LISPRO 2 UNITS: 100 INJECTION, SOLUTION INTRAVENOUS; SUBCUTANEOUS at 05:24

## 2023-05-13 RX ADMIN — METHYLPREDNISOLONE SODIUM SUCCINATE 40 MG: 40 INJECTION, POWDER, FOR SOLUTION INTRAMUSCULAR; INTRAVENOUS at 14:42

## 2023-05-13 RX ADMIN — INSULIN LISPRO 2 UNITS: 100 INJECTION, SOLUTION INTRAVENOUS; SUBCUTANEOUS at 20:20

## 2023-05-13 RX ADMIN — SODIUM CHLORIDE, PRESERVATIVE FREE 10 ML: 5 INJECTION INTRAVENOUS at 20:20

## 2023-05-13 RX ADMIN — IPRATROPIUM BROMIDE AND ALBUTEROL SULFATE 1 AMPULE: 2.5; .5 SOLUTION RESPIRATORY (INHALATION) at 03:15

## 2023-05-13 RX ADMIN — QUETIAPINE FUMARATE 25 MG: 25 TABLET ORAL at 20:20

## 2023-05-13 RX ADMIN — HEPARIN SODIUM 5000 UNITS: 5000 INJECTION INTRAVENOUS; SUBCUTANEOUS at 20:21

## 2023-05-13 RX ADMIN — AMLODIPINE BESYLATE 10 MG: 10 TABLET ORAL at 09:09

## 2023-05-13 RX ADMIN — SODIUM CHLORIDE, PRESERVATIVE FREE 10 ML: 5 INJECTION INTRAVENOUS at 08:51

## 2023-05-13 RX ADMIN — GUAIFENESIN 200 MG: 100 SOLUTION ORAL at 05:24

## 2023-05-13 RX ADMIN — BUDESONIDE AND FORMOTEROL FUMARATE DIHYDRATE 2 PUFF: 160; 4.5 AEROSOL RESPIRATORY (INHALATION) at 07:21

## 2023-05-13 RX ADMIN — METOPROLOL TARTRATE 25 MG: 50 TABLET, FILM COATED ORAL at 09:07

## 2023-05-13 RX ADMIN — IPRATROPIUM BROMIDE AND ALBUTEROL SULFATE 1 AMPULE: 2.5; .5 SOLUTION RESPIRATORY (INHALATION) at 11:18

## 2023-05-13 RX ADMIN — INSULIN LISPRO 4 UNITS: 100 INJECTION, SOLUTION INTRAVENOUS; SUBCUTANEOUS at 18:39

## 2023-05-13 RX ADMIN — INSULIN LISPRO 4 UNITS: 100 INJECTION, SOLUTION INTRAVENOUS; SUBCUTANEOUS at 14:42

## 2023-05-13 RX ADMIN — HYDRALAZINE HYDROCHLORIDE 25 MG: 25 TABLET, FILM COATED ORAL at 20:20

## 2023-05-13 RX ADMIN — CEFTRIAXONE SODIUM 1000 MG: 1 INJECTION, POWDER, FOR SOLUTION INTRAMUSCULAR; INTRAVENOUS at 17:09

## 2023-05-13 RX ADMIN — IPRATROPIUM BROMIDE AND ALBUTEROL SULFATE 1 AMPULE: 2.5; .5 SOLUTION RESPIRATORY (INHALATION) at 15:33

## 2023-05-13 RX ADMIN — BUDESONIDE AND FORMOTEROL FUMARATE DIHYDRATE 2 PUFF: 160; 4.5 AEROSOL RESPIRATORY (INHALATION) at 20:45

## 2023-05-13 RX ADMIN — METHYLPREDNISOLONE SODIUM SUCCINATE 40 MG: 40 INJECTION, POWDER, FOR SOLUTION INTRAMUSCULAR; INTRAVENOUS at 20:21

## 2023-05-13 RX ADMIN — INSULIN LISPRO 2 UNITS: 100 INJECTION, SOLUTION INTRAVENOUS; SUBCUTANEOUS at 01:07

## 2023-05-13 RX ADMIN — LEVOTHYROXINE SODIUM 137 MCG: 0.11 TABLET ORAL at 06:24

## 2023-05-13 RX ADMIN — HEPARIN SODIUM 5000 UNITS: 5000 INJECTION INTRAVENOUS; SUBCUTANEOUS at 14:42

## 2023-05-13 RX ADMIN — IPRATROPIUM BROMIDE AND ALBUTEROL SULFATE 1 AMPULE: 2.5; .5 SOLUTION RESPIRATORY (INHALATION) at 20:45

## 2023-05-13 RX ADMIN — INSULIN LISPRO 4 UNITS: 100 INJECTION, SOLUTION INTRAVENOUS; SUBCUTANEOUS at 08:50

## 2023-05-13 RX ADMIN — PANTOPRAZOLE SODIUM 40 MG: 40 INJECTION, POWDER, FOR SOLUTION INTRAVENOUS at 08:51

## 2023-05-13 RX ADMIN — METHYLPREDNISOLONE SODIUM SUCCINATE 40 MG: 40 INJECTION, POWDER, FOR SOLUTION INTRAMUSCULAR; INTRAVENOUS at 23:57

## 2023-05-13 RX ADMIN — GUAIFENESIN 200 MG: 100 SOLUTION ORAL at 08:50

## 2023-05-13 RX ADMIN — GUAIFENESIN 200 MG: 100 SOLUTION ORAL at 20:23

## 2023-05-13 RX ADMIN — GUAIFENESIN 200 MG: 100 SOLUTION ORAL at 23:57

## 2023-05-13 RX ADMIN — IPRATROPIUM BROMIDE AND ALBUTEROL SULFATE 1 AMPULE: 2.5; .5 SOLUTION RESPIRATORY (INHALATION) at 07:23

## 2023-05-13 RX ADMIN — METOPROLOL TARTRATE 50 MG: 50 TABLET, FILM COATED ORAL at 20:20

## 2023-05-13 RX ADMIN — HEPARIN SODIUM 5000 UNITS: 5000 INJECTION INTRAVENOUS; SUBCUTANEOUS at 05:30

## 2023-05-13 RX ADMIN — METHYLPREDNISOLONE SODIUM SUCCINATE 40 MG: 40 INJECTION, POWDER, FOR SOLUTION INTRAMUSCULAR; INTRAVENOUS at 05:26

## 2023-05-13 RX ADMIN — GUAIFENESIN 200 MG: 100 SOLUTION ORAL at 01:02

## 2023-05-13 ASSESSMENT — PAIN SCALES - GENERAL
PAINLEVEL_OUTOF10: 0

## 2023-05-14 LAB
ALBUMIN SERPL-MCNC: 3.8 GM/DL (ref 3.4–5)
ALP BLD-CCNC: 72 IU/L (ref 40–128)
ALT SERPL-CCNC: 12 U/L (ref 10–40)
ANION GAP SERPL CALCULATED.3IONS-SCNC: 13 MMOL/L (ref 4–16)
AST SERPL-CCNC: 13 IU/L (ref 15–37)
BANDED NEUTROPHILS ABSOLUTE COUNT: 0.19 K/CU MM
BANDED NEUTROPHILS RELATIVE PERCENT: 1 % (ref 5–11)
BILIRUB SERPL-MCNC: 0.2 MG/DL (ref 0–1)
BUN SERPL-MCNC: 71 MG/DL (ref 6–23)
CALCIUM SERPL-MCNC: 9.7 MG/DL (ref 8.3–10.6)
CHLORIDE BLD-SCNC: 121 MMOL/L (ref 99–110)
CO2: 21 MMOL/L (ref 21–32)
CREAT SERPL-MCNC: 1.8 MG/DL (ref 0.6–1.1)
DIFFERENTIAL TYPE: ABNORMAL
GFR SERPL CREATININE-BSD FRML MDRD: 28 ML/MIN/1.73M2
GLUCOSE BLD-MCNC: 181 MG/DL (ref 70–99)
GLUCOSE BLD-MCNC: 204 MG/DL (ref 70–99)
GLUCOSE BLD-MCNC: 218 MG/DL (ref 70–99)
GLUCOSE BLD-MCNC: 292 MG/DL (ref 70–99)
GLUCOSE BLD-MCNC: 327 MG/DL (ref 70–99)
GLUCOSE SERPL-MCNC: 186 MG/DL (ref 70–99)
HCT VFR BLD CALC: 27.2 % (ref 37–47)
HEMOGLOBIN: 8 GM/DL (ref 12.5–16)
LYMPHOCYTES ABSOLUTE: 3 K/CU MM
LYMPHOCYTES RELATIVE PERCENT: 16 % (ref 24–44)
MCH RBC QN AUTO: 27.4 PG (ref 27–31)
MCHC RBC AUTO-ENTMCNC: 29.4 % (ref 32–36)
MCV RBC AUTO: 93.2 FL (ref 78–100)
PDW BLD-RTO: 16.6 % (ref 11.7–14.9)
PLATELET # BLD: 389 K/CU MM (ref 140–440)
PMV BLD AUTO: 10.2 FL (ref 7.5–11.1)
POTASSIUM SERPL-SCNC: 3.9 MMOL/L (ref 3.5–5.1)
PROCALCITONIN SERPL-MCNC: 0.1 NG/ML
RBC # BLD: 2.92 M/CU MM (ref 4.2–5.4)
SEGMENTED NEUTROPHILS ABSOLUTE COUNT: 15.8 K/CU MM
SEGMENTED NEUTROPHILS RELATIVE PERCENT: 83 % (ref 36–66)
SODIUM BLD-SCNC: 155 MMOL/L (ref 135–145)
TOTAL PROTEIN: 6.9 GM/DL (ref 6.4–8.2)
WBC # BLD: 19 K/CU MM (ref 4–10.5)

## 2023-05-14 PROCEDURE — 82962 GLUCOSE BLOOD TEST: CPT

## 2023-05-14 PROCEDURE — 6370000000 HC RX 637 (ALT 250 FOR IP): Performed by: PHYSICIAN ASSISTANT

## 2023-05-14 PROCEDURE — 6370000000 HC RX 637 (ALT 250 FOR IP): Performed by: INTERNAL MEDICINE

## 2023-05-14 PROCEDURE — 6370000000 HC RX 637 (ALT 250 FOR IP): Performed by: STUDENT IN AN ORGANIZED HEALTH CARE EDUCATION/TRAINING PROGRAM

## 2023-05-14 PROCEDURE — 80053 COMPREHEN METABOLIC PANEL: CPT

## 2023-05-14 PROCEDURE — 2060000000 HC ICU INTERMEDIATE R&B

## 2023-05-14 PROCEDURE — 2580000003 HC RX 258: Performed by: PHYSICIAN ASSISTANT

## 2023-05-14 PROCEDURE — 2580000003 HC RX 258: Performed by: STUDENT IN AN ORGANIZED HEALTH CARE EDUCATION/TRAINING PROGRAM

## 2023-05-14 PROCEDURE — 2700000000 HC OXYGEN THERAPY PER DAY

## 2023-05-14 PROCEDURE — 85007 BL SMEAR W/DIFF WBC COUNT: CPT

## 2023-05-14 PROCEDURE — 6360000002 HC RX W HCPCS: Performed by: STUDENT IN AN ORGANIZED HEALTH CARE EDUCATION/TRAINING PROGRAM

## 2023-05-14 PROCEDURE — C9113 INJ PANTOPRAZOLE SODIUM, VIA: HCPCS | Performed by: STUDENT IN AN ORGANIZED HEALTH CARE EDUCATION/TRAINING PROGRAM

## 2023-05-14 PROCEDURE — 6360000002 HC RX W HCPCS: Performed by: PHYSICIAN ASSISTANT

## 2023-05-14 PROCEDURE — 85027 COMPLETE CBC AUTOMATED: CPT

## 2023-05-14 PROCEDURE — 84145 PROCALCITONIN (PCT): CPT

## 2023-05-14 PROCEDURE — 94761 N-INVAS EAR/PLS OXIMETRY MLT: CPT

## 2023-05-14 PROCEDURE — 2500000003 HC RX 250 WO HCPCS: Performed by: STUDENT IN AN ORGANIZED HEALTH CARE EDUCATION/TRAINING PROGRAM

## 2023-05-14 PROCEDURE — 94640 AIRWAY INHALATION TREATMENT: CPT

## 2023-05-14 PROCEDURE — 6370000000 HC RX 637 (ALT 250 FOR IP): Performed by: NURSE PRACTITIONER

## 2023-05-14 PROCEDURE — 6360000002 HC RX W HCPCS: Performed by: NURSE PRACTITIONER

## 2023-05-14 PROCEDURE — 2580000003 HC RX 258: Performed by: INTERNAL MEDICINE

## 2023-05-14 RX ORDER — CLONIDINE HYDROCHLORIDE 0.1 MG/1
0.1 TABLET ORAL 3 TIMES DAILY
Status: DISCONTINUED | OUTPATIENT
Start: 2023-05-14 | End: 2023-05-15

## 2023-05-14 RX ORDER — CLONIDINE HYDROCHLORIDE 0.1 MG/1
0.1 TABLET ORAL 2 TIMES DAILY
Status: DISCONTINUED | OUTPATIENT
Start: 2023-05-14 | End: 2023-05-14

## 2023-05-14 RX ORDER — HYDRALAZINE HYDROCHLORIDE 50 MG/1
50 TABLET, FILM COATED ORAL 3 TIMES DAILY
Status: DISCONTINUED | OUTPATIENT
Start: 2023-05-14 | End: 2023-05-17

## 2023-05-14 RX ORDER — DEXTROSE MONOHYDRATE 50 MG/ML
INJECTION, SOLUTION INTRAVENOUS CONTINUOUS
Status: DISPENSED | OUTPATIENT
Start: 2023-05-14 | End: 2023-05-14

## 2023-05-14 RX ADMIN — METOPROLOL TARTRATE 50 MG: 50 TABLET, FILM COATED ORAL at 08:40

## 2023-05-14 RX ADMIN — GUAIFENESIN 200 MG: 100 SOLUTION ORAL at 13:08

## 2023-05-14 RX ADMIN — DEXTROSE MONOHYDRATE: 50 INJECTION, SOLUTION INTRAVENOUS at 10:31

## 2023-05-14 RX ADMIN — Medication 4 ML: at 08:45

## 2023-05-14 RX ADMIN — GUAIFENESIN 200 MG: 100 SOLUTION ORAL at 20:56

## 2023-05-14 RX ADMIN — GUAIFENESIN 200 MG: 100 SOLUTION ORAL at 16:33

## 2023-05-14 RX ADMIN — PANTOPRAZOLE SODIUM 40 MG: 40 INJECTION, POWDER, FOR SOLUTION INTRAVENOUS at 08:43

## 2023-05-14 RX ADMIN — QUETIAPINE FUMARATE 25 MG: 25 TABLET ORAL at 20:54

## 2023-05-14 RX ADMIN — AMLODIPINE BESYLATE 10 MG: 10 TABLET ORAL at 08:40

## 2023-05-14 RX ADMIN — IPRATROPIUM BROMIDE AND ALBUTEROL SULFATE 1 AMPULE: 2.5; .5 SOLUTION RESPIRATORY (INHALATION) at 12:04

## 2023-05-14 RX ADMIN — LEVOTHYROXINE SODIUM 137 MCG: 0.11 TABLET ORAL at 05:45

## 2023-05-14 RX ADMIN — GUAIFENESIN 200 MG: 100 SOLUTION ORAL at 05:45

## 2023-05-14 RX ADMIN — INSULIN LISPRO 8 UNITS: 100 INJECTION, SOLUTION INTRAVENOUS; SUBCUTANEOUS at 16:32

## 2023-05-14 RX ADMIN — HYDRALAZINE HYDROCHLORIDE 10 MG: 20 INJECTION INTRAMUSCULAR; INTRAVENOUS at 11:47

## 2023-05-14 RX ADMIN — IPRATROPIUM BROMIDE AND ALBUTEROL SULFATE 1 AMPULE: 2.5; .5 SOLUTION RESPIRATORY (INHALATION) at 08:45

## 2023-05-14 RX ADMIN — CLONIDINE HYDROCHLORIDE 0.1 MG: 0.1 TABLET ORAL at 10:25

## 2023-05-14 RX ADMIN — BUDESONIDE AND FORMOTEROL FUMARATE DIHYDRATE 2 PUFF: 160; 4.5 AEROSOL RESPIRATORY (INHALATION) at 08:45

## 2023-05-14 RX ADMIN — INSULIN LISPRO 2 UNITS: 100 INJECTION, SOLUTION INTRAVENOUS; SUBCUTANEOUS at 13:08

## 2023-05-14 RX ADMIN — INSULIN LISPRO 4 UNITS: 100 INJECTION, SOLUTION INTRAVENOUS; SUBCUTANEOUS at 03:48

## 2023-05-14 RX ADMIN — LABETALOL HYDROCHLORIDE 10 MG: 5 INJECTION, SOLUTION INTRAVENOUS at 16:35

## 2023-05-14 RX ADMIN — HYDRALAZINE HYDROCHLORIDE 25 MG: 25 TABLET, FILM COATED ORAL at 08:40

## 2023-05-14 RX ADMIN — SODIUM CHLORIDE, PRESERVATIVE FREE 10 ML: 5 INJECTION INTRAVENOUS at 08:40

## 2023-05-14 RX ADMIN — INSULIN LISPRO 4 UNITS: 100 INJECTION, SOLUTION INTRAVENOUS; SUBCUTANEOUS at 08:40

## 2023-05-14 RX ADMIN — CEFTRIAXONE SODIUM 1000 MG: 1 INJECTION, POWDER, FOR SOLUTION INTRAMUSCULAR; INTRAVENOUS at 11:49

## 2023-05-14 RX ADMIN — GUAIFENESIN 200 MG: 100 SOLUTION ORAL at 08:47

## 2023-05-14 RX ADMIN — INSULIN LISPRO 12 UNITS: 100 INJECTION, SOLUTION INTRAVENOUS; SUBCUTANEOUS at 20:54

## 2023-05-14 RX ADMIN — HYDRALAZINE HYDROCHLORIDE 25 MG: 25 TABLET, FILM COATED ORAL at 13:08

## 2023-05-14 RX ADMIN — HYDRALAZINE HYDROCHLORIDE 50 MG: 50 TABLET, FILM COATED ORAL at 20:54

## 2023-05-14 RX ADMIN — IPRATROPIUM BROMIDE AND ALBUTEROL SULFATE 1 AMPULE: 2.5; .5 SOLUTION RESPIRATORY (INHALATION) at 16:12

## 2023-05-14 RX ADMIN — BUDESONIDE AND FORMOTEROL FUMARATE DIHYDRATE 2 PUFF: 160; 4.5 AEROSOL RESPIRATORY (INHALATION) at 18:59

## 2023-05-14 RX ADMIN — IPRATROPIUM BROMIDE AND ALBUTEROL SULFATE 1 AMPULE: 2.5; .5 SOLUTION RESPIRATORY (INHALATION) at 18:59

## 2023-05-14 RX ADMIN — METOPROLOL TARTRATE 50 MG: 50 TABLET, FILM COATED ORAL at 20:54

## 2023-05-14 RX ADMIN — METHYLPREDNISOLONE SODIUM SUCCINATE 40 MG: 40 INJECTION, POWDER, FOR SOLUTION INTRAMUSCULAR; INTRAVENOUS at 11:47

## 2023-05-14 ASSESSMENT — PAIN SCALES - GENERAL
PAINLEVEL_OUTOF10: 0

## 2023-05-15 LAB
ANION GAP SERPL CALCULATED.3IONS-SCNC: 11 MMOL/L (ref 4–16)
BASOPHILS ABSOLUTE: 0 K/CU MM
BASOPHILS RELATIVE PERCENT: 0.1 % (ref 0–1)
BUN SERPL-MCNC: 72 MG/DL (ref 6–23)
CALCIUM SERPL-MCNC: 8.7 MG/DL (ref 8.3–10.6)
CHLORIDE BLD-SCNC: 116 MMOL/L (ref 99–110)
CO2: 20 MMOL/L (ref 21–32)
CREAT SERPL-MCNC: 1.8 MG/DL (ref 0.6–1.1)
CULTURE: NORMAL
DIFFERENTIAL TYPE: ABNORMAL
EOSINOPHILS ABSOLUTE: 0 K/CU MM
EOSINOPHILS RELATIVE PERCENT: 0 % (ref 0–3)
GFR SERPL CREATININE-BSD FRML MDRD: 28 ML/MIN/1.73M2
GLUCOSE BLD-MCNC: 237 MG/DL (ref 70–99)
GLUCOSE BLD-MCNC: 257 MG/DL (ref 70–99)
GLUCOSE BLD-MCNC: 296 MG/DL (ref 70–99)
GLUCOSE BLD-MCNC: 312 MG/DL (ref 70–99)
GLUCOSE BLD-MCNC: 336 MG/DL (ref 70–99)
GLUCOSE BLD-MCNC: 360 MG/DL (ref 70–99)
GLUCOSE SERPL-MCNC: 264 MG/DL (ref 70–99)
GRAM SMEAR: NORMAL
HCT VFR BLD CALC: 20.8 % (ref 37–47)
HCT VFR BLD CALC: 24.7 % (ref 37–47)
HEMOGLOBIN: 6.3 GM/DL (ref 12.5–16)
HEMOGLOBIN: 7.8 GM/DL (ref 12.5–16)
IMMATURE NEUTROPHIL %: 2.9 % (ref 0–0.43)
LYMPHOCYTES ABSOLUTE: 1 K/CU MM
LYMPHOCYTES RELATIVE PERCENT: 5.6 % (ref 24–44)
Lab: NORMAL
MCH RBC QN AUTO: 28.3 PG (ref 27–31)
MCHC RBC AUTO-ENTMCNC: 30.3 % (ref 32–36)
MCV RBC AUTO: 93.3 FL (ref 78–100)
MONOCYTES ABSOLUTE: 0.5 K/CU MM
MONOCYTES RELATIVE PERCENT: 2.6 % (ref 0–4)
NUCLEATED RBC %: 0.4 %
PDW BLD-RTO: 16.7 % (ref 11.7–14.9)
PLATELET # BLD: 310 K/CU MM (ref 140–440)
PMV BLD AUTO: 10.3 FL (ref 7.5–11.1)
POTASSIUM SERPL-SCNC: 4.1 MMOL/L (ref 3.5–5.1)
RBC # BLD: 2.23 M/CU MM (ref 4.2–5.4)
SEGMENTED NEUTROPHILS ABSOLUTE COUNT: 16.2 K/CU MM
SEGMENTED NEUTROPHILS RELATIVE PERCENT: 88.8 % (ref 36–66)
SODIUM BLD-SCNC: 147 MMOL/L (ref 135–145)
SPECIMEN: NORMAL
TOTAL IMMATURE NEUTOROPHIL: 0.53 K/CU MM
TOTAL NUCLEATED RBC: 0.1 K/CU MM
WBC # BLD: 18.3 K/CU MM (ref 4–10.5)

## 2023-05-15 PROCEDURE — 6370000000 HC RX 637 (ALT 250 FOR IP): Performed by: PHYSICIAN ASSISTANT

## 2023-05-15 PROCEDURE — 82962 GLUCOSE BLOOD TEST: CPT

## 2023-05-15 PROCEDURE — 6370000000 HC RX 637 (ALT 250 FOR IP): Performed by: GENERAL PRACTICE

## 2023-05-15 PROCEDURE — 6370000000 HC RX 637 (ALT 250 FOR IP): Performed by: STUDENT IN AN ORGANIZED HEALTH CARE EDUCATION/TRAINING PROGRAM

## 2023-05-15 PROCEDURE — 2500000003 HC RX 250 WO HCPCS: Performed by: STUDENT IN AN ORGANIZED HEALTH CARE EDUCATION/TRAINING PROGRAM

## 2023-05-15 PROCEDURE — 6360000002 HC RX W HCPCS: Performed by: STUDENT IN AN ORGANIZED HEALTH CARE EDUCATION/TRAINING PROGRAM

## 2023-05-15 PROCEDURE — 2580000003 HC RX 258: Performed by: PHYSICIAN ASSISTANT

## 2023-05-15 PROCEDURE — 94150 VITAL CAPACITY TEST: CPT

## 2023-05-15 PROCEDURE — 6360000002 HC RX W HCPCS: Performed by: PHYSICIAN ASSISTANT

## 2023-05-15 PROCEDURE — 94668 MNPJ CHEST WALL SBSQ: CPT

## 2023-05-15 PROCEDURE — 2700000000 HC OXYGEN THERAPY PER DAY

## 2023-05-15 PROCEDURE — 36430 TRANSFUSION BLD/BLD COMPNT: CPT

## 2023-05-15 PROCEDURE — 6370000000 HC RX 637 (ALT 250 FOR IP): Performed by: INTERNAL MEDICINE

## 2023-05-15 PROCEDURE — 80048 BASIC METABOLIC PNL TOTAL CA: CPT

## 2023-05-15 PROCEDURE — 94761 N-INVAS EAR/PLS OXIMETRY MLT: CPT

## 2023-05-15 PROCEDURE — 30243N1 TRANSFUSION OF NONAUTOLOGOUS RED BLOOD CELLS INTO CENTRAL VEIN, PERCUTANEOUS APPROACH: ICD-10-PCS | Performed by: GENERAL PRACTICE

## 2023-05-15 PROCEDURE — 76937 US GUIDE VASCULAR ACCESS: CPT

## 2023-05-15 PROCEDURE — 2500000003 HC RX 250 WO HCPCS: Performed by: INTERNAL MEDICINE

## 2023-05-15 PROCEDURE — 86900 BLOOD TYPING SEROLOGIC ABO: CPT

## 2023-05-15 PROCEDURE — 94640 AIRWAY INHALATION TREATMENT: CPT

## 2023-05-15 PROCEDURE — 6370000000 HC RX 637 (ALT 250 FOR IP): Performed by: NURSE PRACTITIONER

## 2023-05-15 PROCEDURE — 94660 CPAP INITIATION&MGMT: CPT

## 2023-05-15 PROCEDURE — P9016 RBC LEUKOCYTES REDUCED: HCPCS

## 2023-05-15 PROCEDURE — 86850 RBC ANTIBODY SCREEN: CPT

## 2023-05-15 PROCEDURE — 85025 COMPLETE CBC W/AUTO DIFF WBC: CPT

## 2023-05-15 PROCEDURE — 86922 COMPATIBILITY TEST ANTIGLOB: CPT

## 2023-05-15 PROCEDURE — C9113 INJ PANTOPRAZOLE SODIUM, VIA: HCPCS | Performed by: STUDENT IN AN ORGANIZED HEALTH CARE EDUCATION/TRAINING PROGRAM

## 2023-05-15 PROCEDURE — 2060000000 HC ICU INTERMEDIATE R&B

## 2023-05-15 PROCEDURE — 85018 HEMOGLOBIN: CPT

## 2023-05-15 PROCEDURE — 92526 ORAL FUNCTION THERAPY: CPT

## 2023-05-15 PROCEDURE — 85014 HEMATOCRIT: CPT

## 2023-05-15 PROCEDURE — 86901 BLOOD TYPING SEROLOGIC RH(D): CPT

## 2023-05-15 RX ORDER — SODIUM CHLORIDE 9 MG/ML
INJECTION, SOLUTION INTRAVENOUS PRN
Status: DISCONTINUED | OUTPATIENT
Start: 2023-05-15 | End: 2023-05-25 | Stop reason: HOSPADM

## 2023-05-15 RX ORDER — HYDRALAZINE HYDROCHLORIDE 20 MG/ML
10 INJECTION INTRAMUSCULAR; INTRAVENOUS EVERY 4 HOURS PRN
Status: DISCONTINUED | OUTPATIENT
Start: 2023-05-15 | End: 2023-05-24

## 2023-05-15 RX ORDER — IPRATROPIUM BROMIDE AND ALBUTEROL SULFATE 2.5; .5 MG/3ML; MG/3ML
1 SOLUTION RESPIRATORY (INHALATION) 2 TIMES DAILY
Status: DISCONTINUED | OUTPATIENT
Start: 2023-05-15 | End: 2023-05-25 | Stop reason: HOSPADM

## 2023-05-15 RX ORDER — NITROGLYCERIN 20 MG/100ML
5-200 INJECTION INTRAVENOUS CONTINUOUS
Status: DISCONTINUED | OUTPATIENT
Start: 2023-05-15 | End: 2023-05-17

## 2023-05-15 RX ADMIN — CHLORHEXIDINE GLUCONATE 0.12% ORAL RINSE 15 ML: 1.2 LIQUID ORAL at 08:38

## 2023-05-15 RX ADMIN — QUETIAPINE FUMARATE 25 MG: 25 TABLET ORAL at 20:16

## 2023-05-15 RX ADMIN — GUAIFENESIN 200 MG: 100 SOLUTION ORAL at 06:22

## 2023-05-15 RX ADMIN — INSULIN LISPRO 12 UNITS: 100 INJECTION, SOLUTION INTRAVENOUS; SUBCUTANEOUS at 08:38

## 2023-05-15 RX ADMIN — HYDRALAZINE HYDROCHLORIDE 50 MG: 50 TABLET, FILM COATED ORAL at 14:16

## 2023-05-15 RX ADMIN — IPRATROPIUM BROMIDE AND ALBUTEROL SULFATE 1 AMPULE: 2.5; .5 SOLUTION RESPIRATORY (INHALATION) at 19:21

## 2023-05-15 RX ADMIN — BUDESONIDE AND FORMOTEROL FUMARATE DIHYDRATE 2 PUFF: 160; 4.5 AEROSOL RESPIRATORY (INHALATION) at 19:22

## 2023-05-15 RX ADMIN — HYDRALAZINE HYDROCHLORIDE 50 MG: 50 TABLET, FILM COATED ORAL at 20:16

## 2023-05-15 RX ADMIN — SODIUM CHLORIDE, PRESERVATIVE FREE 10 ML: 5 INJECTION INTRAVENOUS at 08:37

## 2023-05-15 RX ADMIN — BUDESONIDE AND FORMOTEROL FUMARATE DIHYDRATE 2 PUFF: 160; 4.5 AEROSOL RESPIRATORY (INHALATION) at 07:40

## 2023-05-15 RX ADMIN — METOPROLOL TARTRATE 50 MG: 50 TABLET, FILM COATED ORAL at 08:38

## 2023-05-15 RX ADMIN — LEVOTHYROXINE SODIUM 137 MCG: 0.11 TABLET ORAL at 06:22

## 2023-05-15 RX ADMIN — INSULIN LISPRO 4 UNITS: 100 INJECTION, SOLUTION INTRAVENOUS; SUBCUTANEOUS at 01:12

## 2023-05-15 RX ADMIN — INSULIN LISPRO 16 UNITS: 100 INJECTION, SOLUTION INTRAVENOUS; SUBCUTANEOUS at 20:16

## 2023-05-15 RX ADMIN — IPRATROPIUM BROMIDE AND ALBUTEROL SULFATE 1 AMPULE: 2.5; .5 SOLUTION RESPIRATORY (INHALATION) at 04:06

## 2023-05-15 RX ADMIN — METHYLPREDNISOLONE SODIUM SUCCINATE 40 MG: 40 INJECTION, POWDER, FOR SOLUTION INTRAMUSCULAR; INTRAVENOUS at 01:12

## 2023-05-15 RX ADMIN — HYDRALAZINE HYDROCHLORIDE 50 MG: 50 TABLET, FILM COATED ORAL at 08:38

## 2023-05-15 RX ADMIN — NITROGLYCERIN 5 MCG/MIN: 20 INJECTION INTRAVENOUS at 14:15

## 2023-05-15 RX ADMIN — INSULIN LISPRO 8 UNITS: 100 INJECTION, SOLUTION INTRAVENOUS; SUBCUTANEOUS at 12:02

## 2023-05-15 RX ADMIN — INSULIN LISPRO 12 UNITS: 100 INJECTION, SOLUTION INTRAVENOUS; SUBCUTANEOUS at 06:22

## 2023-05-15 RX ADMIN — AMLODIPINE BESYLATE 10 MG: 10 TABLET ORAL at 08:38

## 2023-05-15 RX ADMIN — GUAIFENESIN 200 MG: 100 SOLUTION ORAL at 12:02

## 2023-05-15 RX ADMIN — METHYLPREDNISOLONE SODIUM SUCCINATE 40 MG: 40 INJECTION, POWDER, FOR SOLUTION INTRAMUSCULAR; INTRAVENOUS at 11:45

## 2023-05-15 RX ADMIN — GUAIFENESIN 200 MG: 100 SOLUTION ORAL at 08:39

## 2023-05-15 RX ADMIN — INSULIN LISPRO 8 UNITS: 100 INJECTION, SOLUTION INTRAVENOUS; SUBCUTANEOUS at 18:56

## 2023-05-15 RX ADMIN — IPRATROPIUM BROMIDE AND ALBUTEROL SULFATE 1 AMPULE: 2.5; .5 SOLUTION RESPIRATORY (INHALATION) at 07:39

## 2023-05-15 RX ADMIN — CHLORHEXIDINE GLUCONATE 0.12% ORAL RINSE 15 ML: 1.2 LIQUID ORAL at 20:16

## 2023-05-15 RX ADMIN — GUAIFENESIN 200 MG: 100 SOLUTION ORAL at 01:12

## 2023-05-15 RX ADMIN — Medication 3 MG: at 20:16

## 2023-05-15 RX ADMIN — METOPROLOL TARTRATE 50 MG: 50 TABLET, FILM COATED ORAL at 20:16

## 2023-05-15 RX ADMIN — SODIUM CHLORIDE, PRESERVATIVE FREE 10 ML: 5 INJECTION INTRAVENOUS at 20:17

## 2023-05-15 RX ADMIN — PANTOPRAZOLE SODIUM 40 MG: 40 INJECTION, POWDER, FOR SOLUTION INTRAVENOUS at 08:38

## 2023-05-16 LAB
ALBUMIN SERPL-MCNC: 3 GM/DL (ref 3.4–5)
ALP BLD-CCNC: 56 IU/L (ref 40–128)
ALT SERPL-CCNC: 10 U/L (ref 10–40)
ANION GAP SERPL CALCULATED.3IONS-SCNC: 9 MMOL/L (ref 4–16)
AST SERPL-CCNC: 10 IU/L (ref 15–37)
BASOPHILS ABSOLUTE: 0 K/CU MM
BASOPHILS RELATIVE PERCENT: 0.1 % (ref 0–1)
BILIRUB SERPL-MCNC: 0.4 MG/DL (ref 0–1)
BUN SERPL-MCNC: 71 MG/DL (ref 6–23)
CALCIUM SERPL-MCNC: 9 MG/DL (ref 8.3–10.6)
CHLORIDE BLD-SCNC: 120 MMOL/L (ref 99–110)
CO2: 20 MMOL/L (ref 21–32)
CREAT SERPL-MCNC: 1.8 MG/DL (ref 0.6–1.1)
DIFFERENTIAL TYPE: ABNORMAL
EOSINOPHILS ABSOLUTE: 0 K/CU MM
EOSINOPHILS RELATIVE PERCENT: 0 % (ref 0–3)
GFR SERPL CREATININE-BSD FRML MDRD: 28 ML/MIN/1.73M2
GLUCOSE BLD-MCNC: 105 MG/DL (ref 70–99)
GLUCOSE BLD-MCNC: 156 MG/DL (ref 70–99)
GLUCOSE BLD-MCNC: 174 MG/DL (ref 70–99)
GLUCOSE BLD-MCNC: 213 MG/DL (ref 70–99)
GLUCOSE BLD-MCNC: 290 MG/DL (ref 70–99)
GLUCOSE BLD-MCNC: 339 MG/DL (ref 70–99)
GLUCOSE SERPL-MCNC: 130 MG/DL (ref 70–99)
HCT VFR BLD CALC: 21.1 % (ref 37–47)
HCT VFR BLD CALC: 27 % (ref 37–47)
HEMOGLOBIN: 6.5 GM/DL (ref 12.5–16)
HEMOGLOBIN: 8.6 GM/DL (ref 12.5–16)
IMMATURE NEUTROPHIL %: 2.7 % (ref 0–0.43)
LYMPHOCYTES ABSOLUTE: 2.5 K/CU MM
LYMPHOCYTES RELATIVE PERCENT: 14.5 % (ref 24–44)
MCH RBC QN AUTO: 27.9 PG (ref 27–31)
MCHC RBC AUTO-ENTMCNC: 30.8 % (ref 32–36)
MCV RBC AUTO: 90.6 FL (ref 78–100)
MONOCYTES ABSOLUTE: 1 K/CU MM
MONOCYTES RELATIVE PERCENT: 6.1 % (ref 0–4)
NUCLEATED RBC %: 0.7 %
PDW BLD-RTO: 16.7 % (ref 11.7–14.9)
PLATELET # BLD: 263 K/CU MM (ref 140–440)
PMV BLD AUTO: 10.1 FL (ref 7.5–11.1)
POTASSIUM SERPL-SCNC: 3.8 MMOL/L (ref 3.5–5.1)
PRO-BNP: ABNORMAL PG/ML
RBC # BLD: 2.33 M/CU MM (ref 4.2–5.4)
SEGMENTED NEUTROPHILS ABSOLUTE COUNT: 13 K/CU MM
SEGMENTED NEUTROPHILS RELATIVE PERCENT: 76.6 % (ref 36–66)
SODIUM BLD-SCNC: 149 MMOL/L (ref 135–145)
TOTAL IMMATURE NEUTOROPHIL: 0.45 K/CU MM
TOTAL NUCLEATED RBC: 0.1 K/CU MM
TOTAL PROTEIN: 5.2 GM/DL (ref 6.4–8.2)
WBC # BLD: 16.9 K/CU MM (ref 4–10.5)

## 2023-05-16 PROCEDURE — 85025 COMPLETE CBC W/AUTO DIFF WBC: CPT

## 2023-05-16 PROCEDURE — 86900 BLOOD TYPING SEROLOGIC ABO: CPT

## 2023-05-16 PROCEDURE — 6370000000 HC RX 637 (ALT 250 FOR IP): Performed by: PHYSICIAN ASSISTANT

## 2023-05-16 PROCEDURE — 6370000000 HC RX 637 (ALT 250 FOR IP): Performed by: GENERAL PRACTICE

## 2023-05-16 PROCEDURE — 94150 VITAL CAPACITY TEST: CPT

## 2023-05-16 PROCEDURE — 85018 HEMOGLOBIN: CPT

## 2023-05-16 PROCEDURE — 6370000000 HC RX 637 (ALT 250 FOR IP): Performed by: STUDENT IN AN ORGANIZED HEALTH CARE EDUCATION/TRAINING PROGRAM

## 2023-05-16 PROCEDURE — 36430 TRANSFUSION BLD/BLD COMPNT: CPT

## 2023-05-16 PROCEDURE — 6360000002 HC RX W HCPCS: Performed by: STUDENT IN AN ORGANIZED HEALTH CARE EDUCATION/TRAINING PROGRAM

## 2023-05-16 PROCEDURE — C9113 INJ PANTOPRAZOLE SODIUM, VIA: HCPCS | Performed by: STUDENT IN AN ORGANIZED HEALTH CARE EDUCATION/TRAINING PROGRAM

## 2023-05-16 PROCEDURE — 6360000002 HC RX W HCPCS: Performed by: SURGERY

## 2023-05-16 PROCEDURE — 80053 COMPREHEN METABOLIC PANEL: CPT

## 2023-05-16 PROCEDURE — 94669 MECHANICAL CHEST WALL OSCILL: CPT

## 2023-05-16 PROCEDURE — 2060000000 HC ICU INTERMEDIATE R&B

## 2023-05-16 PROCEDURE — 94640 AIRWAY INHALATION TREATMENT: CPT

## 2023-05-16 PROCEDURE — 2500000003 HC RX 250 WO HCPCS: Performed by: STUDENT IN AN ORGANIZED HEALTH CARE EDUCATION/TRAINING PROGRAM

## 2023-05-16 PROCEDURE — 82962 GLUCOSE BLOOD TEST: CPT

## 2023-05-16 PROCEDURE — 92526 ORAL FUNCTION THERAPY: CPT

## 2023-05-16 PROCEDURE — 94761 N-INVAS EAR/PLS OXIMETRY MLT: CPT

## 2023-05-16 PROCEDURE — 6360000002 HC RX W HCPCS: Performed by: INTERNAL MEDICINE

## 2023-05-16 PROCEDURE — 85014 HEMATOCRIT: CPT

## 2023-05-16 PROCEDURE — 80048 BASIC METABOLIC PNL TOTAL CA: CPT

## 2023-05-16 PROCEDURE — 2580000003 HC RX 258: Performed by: PHYSICIAN ASSISTANT

## 2023-05-16 PROCEDURE — 83880 ASSAY OF NATRIURETIC PEPTIDE: CPT

## 2023-05-16 PROCEDURE — 86901 BLOOD TYPING SEROLOGIC RH(D): CPT

## 2023-05-16 PROCEDURE — 6370000000 HC RX 637 (ALT 250 FOR IP): Performed by: NURSE PRACTITIONER

## 2023-05-16 PROCEDURE — 6370000000 HC RX 637 (ALT 250 FOR IP): Performed by: INTERNAL MEDICINE

## 2023-05-16 PROCEDURE — P9016 RBC LEUKOCYTES REDUCED: HCPCS

## 2023-05-16 PROCEDURE — 99232 SBSQ HOSP IP/OBS MODERATE 35: CPT | Performed by: INTERNAL MEDICINE

## 2023-05-16 PROCEDURE — 2700000000 HC OXYGEN THERAPY PER DAY

## 2023-05-16 PROCEDURE — 86850 RBC ANTIBODY SCREEN: CPT

## 2023-05-16 RX ORDER — PANTOPRAZOLE SODIUM 40 MG/1
40 TABLET, DELAYED RELEASE ORAL
Status: DISCONTINUED | OUTPATIENT
Start: 2023-05-17 | End: 2023-05-25 | Stop reason: HOSPADM

## 2023-05-16 RX ORDER — ACETYLCYSTEINE 200 MG/ML
2 SOLUTION ORAL; RESPIRATORY (INHALATION) 3 TIMES DAILY
Status: DISCONTINUED | OUTPATIENT
Start: 2023-05-16 | End: 2023-05-19

## 2023-05-16 RX ORDER — SODIUM CHLORIDE 9 MG/ML
INJECTION, SOLUTION INTRAVENOUS PRN
Status: DISCONTINUED | OUTPATIENT
Start: 2023-05-16 | End: 2023-05-25 | Stop reason: HOSPADM

## 2023-05-16 RX ADMIN — METOPROLOL TARTRATE 50 MG: 50 TABLET, FILM COATED ORAL at 08:30

## 2023-05-16 RX ADMIN — INSULIN LISPRO 8 UNITS: 100 INJECTION, SOLUTION INTRAVENOUS; SUBCUTANEOUS at 20:41

## 2023-05-16 RX ADMIN — ACETYLCYSTEINE 400 MG: 200 SOLUTION ORAL; RESPIRATORY (INHALATION) at 16:17

## 2023-05-16 RX ADMIN — Medication 3 MG: at 20:41

## 2023-05-16 RX ADMIN — MIDAZOLAM 2 MG: 1 INJECTION INTRAMUSCULAR; INTRAVENOUS at 02:41

## 2023-05-16 RX ADMIN — IPRATROPIUM BROMIDE AND ALBUTEROL SULFATE 1 AMPULE: 2.5; .5 SOLUTION RESPIRATORY (INHALATION) at 19:25

## 2023-05-16 RX ADMIN — SODIUM CHLORIDE, PRESERVATIVE FREE 10 ML: 5 INJECTION INTRAVENOUS at 08:30

## 2023-05-16 RX ADMIN — BUDESONIDE AND FORMOTEROL FUMARATE DIHYDRATE 2 PUFF: 160; 4.5 AEROSOL RESPIRATORY (INHALATION) at 07:57

## 2023-05-16 RX ADMIN — CHLORHEXIDINE GLUCONATE 0.12% ORAL RINSE 15 ML: 1.2 LIQUID ORAL at 08:30

## 2023-05-16 RX ADMIN — HYDRALAZINE HYDROCHLORIDE 50 MG: 50 TABLET, FILM COATED ORAL at 20:41

## 2023-05-16 RX ADMIN — AMLODIPINE BESYLATE 10 MG: 10 TABLET ORAL at 08:30

## 2023-05-16 RX ADMIN — METOPROLOL TARTRATE 50 MG: 50 TABLET, FILM COATED ORAL at 20:41

## 2023-05-16 RX ADMIN — ACETYLCYSTEINE 400 MG: 200 SOLUTION ORAL; RESPIRATORY (INHALATION) at 19:26

## 2023-05-16 RX ADMIN — QUETIAPINE FUMARATE 25 MG: 25 TABLET ORAL at 20:41

## 2023-05-16 RX ADMIN — LEVOTHYROXINE SODIUM 137 MCG: 0.11 TABLET ORAL at 05:28

## 2023-05-16 RX ADMIN — INSULIN LISPRO 4 UNITS: 100 INJECTION, SOLUTION INTRAVENOUS; SUBCUTANEOUS at 01:09

## 2023-05-16 RX ADMIN — IPRATROPIUM BROMIDE AND ALBUTEROL SULFATE 1 AMPULE: 2.5; .5 SOLUTION RESPIRATORY (INHALATION) at 07:58

## 2023-05-16 RX ADMIN — IPRATROPIUM BROMIDE AND ALBUTEROL SULFATE 1 AMPULE: 2.5; .5 SOLUTION RESPIRATORY (INHALATION) at 16:16

## 2023-05-16 RX ADMIN — INSULIN LISPRO 2 UNITS: 100 INJECTION, SOLUTION INTRAVENOUS; SUBCUTANEOUS at 05:28

## 2023-05-16 RX ADMIN — PANTOPRAZOLE SODIUM 40 MG: 40 INJECTION, POWDER, FOR SOLUTION INTRAVENOUS at 08:30

## 2023-05-16 RX ADMIN — INSULIN LISPRO 12 UNITS: 100 INJECTION, SOLUTION INTRAVENOUS; SUBCUTANEOUS at 16:57

## 2023-05-16 RX ADMIN — SODIUM CHLORIDE, PRESERVATIVE FREE 10 ML: 5 INJECTION INTRAVENOUS at 20:41

## 2023-05-16 RX ADMIN — CHLORHEXIDINE GLUCONATE 0.12% ORAL RINSE 15 ML: 1.2 LIQUID ORAL at 20:41

## 2023-05-16 RX ADMIN — BUDESONIDE AND FORMOTEROL FUMARATE DIHYDRATE 2 PUFF: 160; 4.5 AEROSOL RESPIRATORY (INHALATION) at 19:27

## 2023-05-16 RX ADMIN — ALBUTEROL SULFATE 2.5 MG: 2.5 SOLUTION RESPIRATORY (INHALATION) at 02:49

## 2023-05-16 RX ADMIN — SALINE NASAL SPRAY 1 SPRAY: 1.5 SOLUTION NASAL at 15:14

## 2023-05-16 RX ADMIN — HYDRALAZINE HYDROCHLORIDE 10 MG: 20 INJECTION INTRAMUSCULAR; INTRAVENOUS at 15:09

## 2023-05-16 RX ADMIN — HYDRALAZINE HYDROCHLORIDE 50 MG: 50 TABLET, FILM COATED ORAL at 08:30

## 2023-05-17 ENCOUNTER — APPOINTMENT (OUTPATIENT)
Dept: GENERAL RADIOLOGY | Age: 83
DRG: 329 | End: 2023-05-17
Attending: SURGERY
Payer: MEDICARE

## 2023-05-17 LAB
ABO/RH: NORMAL
ALBUMIN SERPL-MCNC: 3.4 GM/DL (ref 3.4–5)
ALP BLD-CCNC: 70 IU/L (ref 40–129)
ALT SERPL-CCNC: 13 U/L (ref 10–40)
ANION GAP SERPL CALCULATED.3IONS-SCNC: 8 MMOL/L (ref 4–16)
ANTIBODY SCREEN: NEGATIVE
AST SERPL-CCNC: 15 IU/L (ref 15–37)
BASOPHILS ABSOLUTE: 0 K/CU MM
BASOPHILS RELATIVE PERCENT: 0.2 % (ref 0–1)
BILIRUB SERPL-MCNC: 0.4 MG/DL (ref 0–1)
BUN SERPL-MCNC: 58 MG/DL (ref 6–23)
CALCIUM SERPL-MCNC: 9 MG/DL (ref 8.3–10.6)
CHLORIDE BLD-SCNC: 119 MMOL/L (ref 99–110)
CO2: 21 MMOL/L (ref 21–32)
COMPONENT: NORMAL
COMPONENT: NORMAL
CREAT SERPL-MCNC: 1.7 MG/DL (ref 0.6–1.1)
CROSSMATCH RESULT: NORMAL
CROSSMATCH RESULT: NORMAL
DIFFERENTIAL TYPE: ABNORMAL
EOSINOPHILS ABSOLUTE: 0 K/CU MM
EOSINOPHILS RELATIVE PERCENT: 0.2 % (ref 0–3)
GFR SERPL CREATININE-BSD FRML MDRD: 30 ML/MIN/1.73M2
GLUCOSE BLD-MCNC: 108 MG/DL (ref 70–99)
GLUCOSE BLD-MCNC: 183 MG/DL (ref 70–99)
GLUCOSE BLD-MCNC: 192 MG/DL (ref 70–99)
GLUCOSE BLD-MCNC: 222 MG/DL (ref 70–99)
GLUCOSE BLD-MCNC: 231 MG/DL (ref 70–99)
GLUCOSE BLD-MCNC: 289 MG/DL (ref 70–99)
GLUCOSE SERPL-MCNC: 182 MG/DL (ref 70–99)
HCT VFR BLD CALC: 23.7 % (ref 37–47)
HEMOGLOBIN: 8.4 GM/DL (ref 12.5–16)
IMMATURE NEUTROPHIL %: 2.4 % (ref 0–0.43)
LYMPHOCYTES ABSOLUTE: 2.3 K/CU MM
LYMPHOCYTES RELATIVE PERCENT: 13.7 % (ref 24–44)
MCH RBC QN AUTO: 32.7 PG (ref 27–31)
MCHC RBC AUTO-ENTMCNC: 35.4 % (ref 32–36)
MCV RBC AUTO: 92.2 FL (ref 78–100)
MONOCYTES ABSOLUTE: 1 K/CU MM
MONOCYTES RELATIVE PERCENT: 6.1 % (ref 0–4)
NUCLEATED RBC %: 0.4 %
PDW BLD-RTO: 20.5 % (ref 11.7–14.9)
PLATELET # BLD: 200 K/CU MM (ref 140–440)
PMV BLD AUTO: 10.4 FL (ref 7.5–11.1)
POTASSIUM SERPL-SCNC: 3.7 MMOL/L (ref 3.5–5.1)
RBC # BLD: 2.57 M/CU MM (ref 4.2–5.4)
SEGMENTED NEUTROPHILS ABSOLUTE COUNT: 12.8 K/CU MM
SEGMENTED NEUTROPHILS RELATIVE PERCENT: 77.4 % (ref 36–66)
SODIUM BLD-SCNC: 148 MMOL/L (ref 135–145)
STATUS: NORMAL
STATUS: NORMAL
TOTAL IMMATURE NEUTOROPHIL: 0.39 K/CU MM
TOTAL NUCLEATED RBC: 0.1 K/CU MM
TOTAL PROTEIN: 5.8 GM/DL (ref 6.4–8.2)
TRANSFUSION STATUS: NORMAL
TRANSFUSION STATUS: NORMAL
UNIT DIVISION: 0
UNIT DIVISION: 0
UNIT NUMBER: NORMAL
UNIT NUMBER: NORMAL
WBC # BLD: 16.6 K/CU MM (ref 4–10.5)

## 2023-05-17 PROCEDURE — 94150 VITAL CAPACITY TEST: CPT

## 2023-05-17 PROCEDURE — 6370000000 HC RX 637 (ALT 250 FOR IP): Performed by: NURSE PRACTITIONER

## 2023-05-17 PROCEDURE — 97530 THERAPEUTIC ACTIVITIES: CPT

## 2023-05-17 PROCEDURE — 6370000000 HC RX 637 (ALT 250 FOR IP): Performed by: INTERNAL MEDICINE

## 2023-05-17 PROCEDURE — 6370000000 HC RX 637 (ALT 250 FOR IP): Performed by: PHYSICIAN ASSISTANT

## 2023-05-17 PROCEDURE — 97166 OT EVAL MOD COMPLEX 45 MIN: CPT

## 2023-05-17 PROCEDURE — 6360000002 HC RX W HCPCS: Performed by: INTERNAL MEDICINE

## 2023-05-17 PROCEDURE — 6360000002 HC RX W HCPCS: Performed by: SURGERY

## 2023-05-17 PROCEDURE — 6370000000 HC RX 637 (ALT 250 FOR IP): Performed by: STUDENT IN AN ORGANIZED HEALTH CARE EDUCATION/TRAINING PROGRAM

## 2023-05-17 PROCEDURE — 94669 MECHANICAL CHEST WALL OSCILL: CPT

## 2023-05-17 PROCEDURE — 99024 POSTOP FOLLOW-UP VISIT: CPT | Performed by: PHYSICIAN ASSISTANT

## 2023-05-17 PROCEDURE — 99232 SBSQ HOSP IP/OBS MODERATE 35: CPT | Performed by: INTERNAL MEDICINE

## 2023-05-17 PROCEDURE — 97535 SELF CARE MNGMENT TRAINING: CPT

## 2023-05-17 PROCEDURE — 71045 X-RAY EXAM CHEST 1 VIEW: CPT

## 2023-05-17 PROCEDURE — 82962 GLUCOSE BLOOD TEST: CPT

## 2023-05-17 PROCEDURE — 6370000000 HC RX 637 (ALT 250 FOR IP): Performed by: GENERAL PRACTICE

## 2023-05-17 PROCEDURE — 2500000003 HC RX 250 WO HCPCS: Performed by: STUDENT IN AN ORGANIZED HEALTH CARE EDUCATION/TRAINING PROGRAM

## 2023-05-17 PROCEDURE — 94640 AIRWAY INHALATION TREATMENT: CPT

## 2023-05-17 PROCEDURE — 85025 COMPLETE CBC W/AUTO DIFF WBC: CPT

## 2023-05-17 PROCEDURE — 2580000003 HC RX 258: Performed by: PHYSICIAN ASSISTANT

## 2023-05-17 PROCEDURE — APPNB30 APP NON BILLABLE TIME 0-30 MINS: Performed by: PHYSICIAN ASSISTANT

## 2023-05-17 PROCEDURE — 2060000000 HC ICU INTERMEDIATE R&B

## 2023-05-17 PROCEDURE — 94761 N-INVAS EAR/PLS OXIMETRY MLT: CPT

## 2023-05-17 PROCEDURE — 80053 COMPREHEN METABOLIC PANEL: CPT

## 2023-05-17 RX ORDER — SPIRONOLACTONE 50 MG/1
25 TABLET, FILM COATED ORAL 2 TIMES DAILY
Status: DISCONTINUED | OUTPATIENT
Start: 2023-05-17 | End: 2023-05-24

## 2023-05-17 RX ORDER — ISOSORBIDE MONONITRATE 60 MG/1
60 TABLET, EXTENDED RELEASE ORAL DAILY
Status: DISCONTINUED | OUTPATIENT
Start: 2023-05-17 | End: 2023-05-25 | Stop reason: HOSPADM

## 2023-05-17 RX ADMIN — CHLORHEXIDINE GLUCONATE 0.12% ORAL RINSE 15 ML: 1.2 LIQUID ORAL at 21:19

## 2023-05-17 RX ADMIN — ACETYLCYSTEINE 400 MG: 200 SOLUTION ORAL; RESPIRATORY (INHALATION) at 15:11

## 2023-05-17 RX ADMIN — ALBUTEROL SULFATE 2.5 MG: 2.5 SOLUTION RESPIRATORY (INHALATION) at 03:18

## 2023-05-17 RX ADMIN — SPIRONOLACTONE 25 MG: 50 TABLET ORAL at 16:29

## 2023-05-17 RX ADMIN — METOPROLOL TARTRATE 50 MG: 50 TABLET, FILM COATED ORAL at 08:22

## 2023-05-17 RX ADMIN — LEVOTHYROXINE SODIUM 137 MCG: 0.11 TABLET ORAL at 05:40

## 2023-05-17 RX ADMIN — HYDRALAZINE HYDROCHLORIDE 75 MG: 50 TABLET, FILM COATED ORAL at 08:23

## 2023-05-17 RX ADMIN — IPRATROPIUM BROMIDE AND ALBUTEROL SULFATE 1 AMPULE: 2.5; .5 SOLUTION RESPIRATORY (INHALATION) at 08:12

## 2023-05-17 RX ADMIN — QUETIAPINE FUMARATE 25 MG: 25 TABLET ORAL at 21:19

## 2023-05-17 RX ADMIN — IPRATROPIUM BROMIDE AND ALBUTEROL SULFATE 1 AMPULE: 2.5; .5 SOLUTION RESPIRATORY (INHALATION) at 15:10

## 2023-05-17 RX ADMIN — INSULIN LISPRO 2 UNITS: 100 INJECTION, SOLUTION INTRAVENOUS; SUBCUTANEOUS at 05:39

## 2023-05-17 RX ADMIN — SODIUM CHLORIDE, PRESERVATIVE FREE 10 ML: 5 INJECTION INTRAVENOUS at 21:22

## 2023-05-17 RX ADMIN — INSULIN LISPRO 4 UNITS: 100 INJECTION, SOLUTION INTRAVENOUS; SUBCUTANEOUS at 08:29

## 2023-05-17 RX ADMIN — INSULIN LISPRO 2 UNITS: 100 INJECTION, SOLUTION INTRAVENOUS; SUBCUTANEOUS at 12:05

## 2023-05-17 RX ADMIN — SPIRONOLACTONE 25 MG: 50 TABLET ORAL at 08:23

## 2023-05-17 RX ADMIN — SODIUM CHLORIDE, PRESERVATIVE FREE 10 ML: 5 INJECTION INTRAVENOUS at 08:32

## 2023-05-17 RX ADMIN — HYDRALAZINE HYDROCHLORIDE 75 MG: 50 TABLET, FILM COATED ORAL at 14:11

## 2023-05-17 RX ADMIN — BUDESONIDE AND FORMOTEROL FUMARATE DIHYDRATE 2 PUFF: 160; 4.5 AEROSOL RESPIRATORY (INHALATION) at 19:27

## 2023-05-17 RX ADMIN — Medication 3 MG: at 21:19

## 2023-05-17 RX ADMIN — ISOSORBIDE MONONITRATE 60 MG: 60 TABLET, EXTENDED RELEASE ORAL at 08:23

## 2023-05-17 RX ADMIN — HYDRALAZINE HYDROCHLORIDE 75 MG: 50 TABLET, FILM COATED ORAL at 21:19

## 2023-05-17 RX ADMIN — CHLORHEXIDINE GLUCONATE 0.12% ORAL RINSE 15 ML: 1.2 LIQUID ORAL at 08:26

## 2023-05-17 RX ADMIN — IPRATROPIUM BROMIDE AND ALBUTEROL SULFATE 1 AMPULE: 2.5; .5 SOLUTION RESPIRATORY (INHALATION) at 19:25

## 2023-05-17 RX ADMIN — BUDESONIDE AND FORMOTEROL FUMARATE DIHYDRATE 2 PUFF: 160; 4.5 AEROSOL RESPIRATORY (INHALATION) at 08:13

## 2023-05-17 RX ADMIN — CYCLOBENZAPRINE 10 MG: 10 TABLET, FILM COATED ORAL at 21:19

## 2023-05-17 RX ADMIN — ACETYLCYSTEINE 400 MG: 200 SOLUTION ORAL; RESPIRATORY (INHALATION) at 08:12

## 2023-05-17 RX ADMIN — ACETYLCYSTEINE 400 MG: 200 SOLUTION ORAL; RESPIRATORY (INHALATION) at 19:26

## 2023-05-17 RX ADMIN — LABETALOL HYDROCHLORIDE 10 MG: 5 INJECTION, SOLUTION INTRAVENOUS at 17:57

## 2023-05-17 RX ADMIN — INSULIN LISPRO 8 UNITS: 100 INJECTION, SOLUTION INTRAVENOUS; SUBCUTANEOUS at 21:21

## 2023-05-17 RX ADMIN — HYDRALAZINE HYDROCHLORIDE 10 MG: 20 INJECTION INTRAMUSCULAR; INTRAVENOUS at 16:29

## 2023-05-17 RX ADMIN — METOPROLOL TARTRATE 50 MG: 50 TABLET, FILM COATED ORAL at 21:19

## 2023-05-17 RX ADMIN — AMLODIPINE BESYLATE 10 MG: 10 TABLET ORAL at 08:22

## 2023-05-17 RX ADMIN — INSULIN LISPRO 4 UNITS: 100 INJECTION, SOLUTION INTRAVENOUS; SUBCUTANEOUS at 16:29

## 2023-05-17 RX ADMIN — PANTOPRAZOLE SODIUM 40 MG: 40 TABLET, DELAYED RELEASE ORAL at 05:40

## 2023-05-17 ASSESSMENT — ENCOUNTER SYMPTOMS
CHOKING: 0
STRIDOR: 0
RECTAL PAIN: 0
ANAL BLEEDING: 0
EYE ITCHING: 0
NAUSEA: 0
PHOTOPHOBIA: 0
SORE THROAT: 0
VOMITING: 0
BACK PAIN: 0
CONSTIPATION: 0
ABDOMINAL PAIN: 0
EYE REDNESS: 0
APNEA: 0
COLOR CHANGE: 0

## 2023-05-18 LAB
ALBUMIN SERPL-MCNC: 2.9 GM/DL (ref 3.4–5)
ALP BLD-CCNC: 68 IU/L (ref 40–128)
ALT SERPL-CCNC: 10 U/L (ref 10–40)
ANION GAP SERPL CALCULATED.3IONS-SCNC: 9 MMOL/L (ref 4–16)
AST SERPL-CCNC: 10 IU/L (ref 15–37)
BASOPHILS ABSOLUTE: 0 K/CU MM
BASOPHILS RELATIVE PERCENT: 0.2 % (ref 0–1)
BILIRUB SERPL-MCNC: 0.3 MG/DL (ref 0–1)
BUN SERPL-MCNC: 47 MG/DL (ref 6–23)
CALCIUM SERPL-MCNC: 8.8 MG/DL (ref 8.3–10.6)
CHLORIDE BLD-SCNC: 125 MMOL/L (ref 99–110)
CO2: 20 MMOL/L (ref 21–32)
CREAT SERPL-MCNC: 1.7 MG/DL (ref 0.6–1.1)
DIFFERENTIAL TYPE: ABNORMAL
EOSINOPHILS ABSOLUTE: 0.1 K/CU MM
EOSINOPHILS RELATIVE PERCENT: 0.6 % (ref 0–3)
GFR SERPL CREATININE-BSD FRML MDRD: 30 ML/MIN/1.73M2
GLUCOSE BLD-MCNC: 129 MG/DL (ref 70–99)
GLUCOSE BLD-MCNC: 163 MG/DL (ref 70–99)
GLUCOSE BLD-MCNC: 172 MG/DL (ref 70–99)
GLUCOSE BLD-MCNC: 192 MG/DL (ref 70–99)
GLUCOSE BLD-MCNC: 329 MG/DL (ref 70–99)
GLUCOSE BLD-MCNC: 418 MG/DL (ref 70–99)
GLUCOSE SERPL-MCNC: 109 MG/DL (ref 70–99)
HCT VFR BLD CALC: 27.3 % (ref 37–47)
HEMOGLOBIN: 8.4 GM/DL (ref 12.5–16)
IMMATURE NEUTROPHIL %: 1.7 % (ref 0–0.43)
LYMPHOCYTES ABSOLUTE: 1.6 K/CU MM
LYMPHOCYTES RELATIVE PERCENT: 13.9 % (ref 24–44)
MAGNESIUM: 2.4 MG/DL (ref 1.8–2.4)
MCH RBC QN AUTO: 28.6 PG (ref 27–31)
MCHC RBC AUTO-ENTMCNC: 30.8 % (ref 32–36)
MCV RBC AUTO: 92.9 FL (ref 78–100)
MONOCYTES ABSOLUTE: 0.8 K/CU MM
MONOCYTES RELATIVE PERCENT: 7.2 % (ref 0–4)
NUCLEATED RBC %: 0 %
PDW BLD-RTO: 17.7 % (ref 11.7–14.9)
PLATELET # BLD: 250 K/CU MM (ref 140–440)
PMV BLD AUTO: 10.2 FL (ref 7.5–11.1)
POTASSIUM SERPL-SCNC: 3.5 MMOL/L (ref 3.5–5.1)
RBC # BLD: 2.94 M/CU MM (ref 4.2–5.4)
SEGMENTED NEUTROPHILS ABSOLUTE COUNT: 8.8 K/CU MM
SEGMENTED NEUTROPHILS RELATIVE PERCENT: 76.4 % (ref 36–66)
SODIUM BLD-SCNC: 154 MMOL/L (ref 135–145)
TOTAL IMMATURE NEUTOROPHIL: 0.2 K/CU MM
TOTAL NUCLEATED RBC: 0 K/CU MM
TOTAL PROTEIN: 5.2 GM/DL (ref 6.4–8.2)
WBC # BLD: 11.5 K/CU MM (ref 4–10.5)

## 2023-05-18 PROCEDURE — 6370000000 HC RX 637 (ALT 250 FOR IP): Performed by: GENERAL PRACTICE

## 2023-05-18 PROCEDURE — 6370000000 HC RX 637 (ALT 250 FOR IP): Performed by: NURSE PRACTITIONER

## 2023-05-18 PROCEDURE — 99232 SBSQ HOSP IP/OBS MODERATE 35: CPT | Performed by: INTERNAL MEDICINE

## 2023-05-18 PROCEDURE — 6360000002 HC RX W HCPCS: Performed by: SURGERY

## 2023-05-18 PROCEDURE — 94669 MECHANICAL CHEST WALL OSCILL: CPT

## 2023-05-18 PROCEDURE — 6370000000 HC RX 637 (ALT 250 FOR IP): Performed by: STUDENT IN AN ORGANIZED HEALTH CARE EDUCATION/TRAINING PROGRAM

## 2023-05-18 PROCEDURE — 36415 COLL VENOUS BLD VENIPUNCTURE: CPT

## 2023-05-18 PROCEDURE — 6370000000 HC RX 637 (ALT 250 FOR IP): Performed by: PHYSICIAN ASSISTANT

## 2023-05-18 PROCEDURE — 2580000003 HC RX 258: Performed by: INTERNAL MEDICINE

## 2023-05-18 PROCEDURE — 97116 GAIT TRAINING THERAPY: CPT

## 2023-05-18 PROCEDURE — 94640 AIRWAY INHALATION TREATMENT: CPT

## 2023-05-18 PROCEDURE — 2060000000 HC ICU INTERMEDIATE R&B

## 2023-05-18 PROCEDURE — 99024 POSTOP FOLLOW-UP VISIT: CPT | Performed by: PHYSICIAN ASSISTANT

## 2023-05-18 PROCEDURE — 6370000000 HC RX 637 (ALT 250 FOR IP): Performed by: INTERNAL MEDICINE

## 2023-05-18 PROCEDURE — 80053 COMPREHEN METABOLIC PANEL: CPT

## 2023-05-18 PROCEDURE — 6360000002 HC RX W HCPCS: Performed by: INTERNAL MEDICINE

## 2023-05-18 PROCEDURE — 82962 GLUCOSE BLOOD TEST: CPT

## 2023-05-18 PROCEDURE — 97164 PT RE-EVAL EST PLAN CARE: CPT

## 2023-05-18 PROCEDURE — 94761 N-INVAS EAR/PLS OXIMETRY MLT: CPT

## 2023-05-18 PROCEDURE — 2580000003 HC RX 258: Performed by: PHYSICIAN ASSISTANT

## 2023-05-18 PROCEDURE — 85025 COMPLETE CBC W/AUTO DIFF WBC: CPT

## 2023-05-18 PROCEDURE — APPNB15 APP NON BILLABLE TIME 0-15 MINS: Performed by: PHYSICIAN ASSISTANT

## 2023-05-18 PROCEDURE — 83735 ASSAY OF MAGNESIUM: CPT

## 2023-05-18 PROCEDURE — 92526 ORAL FUNCTION THERAPY: CPT

## 2023-05-18 RX ORDER — DEXTROSE MONOHYDRATE 50 MG/ML
INJECTION, SOLUTION INTRAVENOUS CONTINUOUS
Status: DISCONTINUED | OUTPATIENT
Start: 2023-05-18 | End: 2023-05-19

## 2023-05-18 RX ADMIN — HYDRALAZINE HYDROCHLORIDE 75 MG: 50 TABLET, FILM COATED ORAL at 20:19

## 2023-05-18 RX ADMIN — INSULIN LISPRO 2 UNITS: 100 INJECTION, SOLUTION INTRAVENOUS; SUBCUTANEOUS at 16:44

## 2023-05-18 RX ADMIN — DEXTROSE MONOHYDRATE: 50 INJECTION, SOLUTION INTRAVENOUS at 09:10

## 2023-05-18 RX ADMIN — AMLODIPINE BESYLATE 10 MG: 10 TABLET ORAL at 09:07

## 2023-05-18 RX ADMIN — IPRATROPIUM BROMIDE AND ALBUTEROL SULFATE 1 AMPULE: 2.5; .5 SOLUTION RESPIRATORY (INHALATION) at 08:08

## 2023-05-18 RX ADMIN — SODIUM CHLORIDE, PRESERVATIVE FREE 10 ML: 5 INJECTION INTRAVENOUS at 20:19

## 2023-05-18 RX ADMIN — ALBUTEROL SULFATE 2.5 MG: 2.5 SOLUTION RESPIRATORY (INHALATION) at 15:58

## 2023-05-18 RX ADMIN — METOPROLOL TARTRATE 50 MG: 50 TABLET, FILM COATED ORAL at 09:08

## 2023-05-18 RX ADMIN — HYDRALAZINE HYDROCHLORIDE 75 MG: 50 TABLET, FILM COATED ORAL at 09:07

## 2023-05-18 RX ADMIN — BUDESONIDE AND FORMOTEROL FUMARATE DIHYDRATE 2 PUFF: 160; 4.5 AEROSOL RESPIRATORY (INHALATION) at 19:17

## 2023-05-18 RX ADMIN — METOPROLOL TARTRATE 50 MG: 50 TABLET, FILM COATED ORAL at 20:19

## 2023-05-18 RX ADMIN — ISOSORBIDE MONONITRATE 60 MG: 60 TABLET, EXTENDED RELEASE ORAL at 09:07

## 2023-05-18 RX ADMIN — PANTOPRAZOLE SODIUM 40 MG: 40 TABLET, DELAYED RELEASE ORAL at 06:34

## 2023-05-18 RX ADMIN — IPRATROPIUM BROMIDE AND ALBUTEROL SULFATE 1 AMPULE: 2.5; .5 SOLUTION RESPIRATORY (INHALATION) at 19:18

## 2023-05-18 RX ADMIN — BUDESONIDE AND FORMOTEROL FUMARATE DIHYDRATE 2 PUFF: 160; 4.5 AEROSOL RESPIRATORY (INHALATION) at 08:11

## 2023-05-18 RX ADMIN — QUETIAPINE FUMARATE 25 MG: 25 TABLET ORAL at 20:19

## 2023-05-18 RX ADMIN — ACETYLCYSTEINE 400 MG: 200 SOLUTION ORAL; RESPIRATORY (INHALATION) at 16:00

## 2023-05-18 RX ADMIN — INSULIN LISPRO 16 UNITS: 100 INJECTION, SOLUTION INTRAVENOUS; SUBCUTANEOUS at 20:18

## 2023-05-18 RX ADMIN — SPIRONOLACTONE 25 MG: 50 TABLET ORAL at 16:44

## 2023-05-18 RX ADMIN — HYDRALAZINE HYDROCHLORIDE 75 MG: 50 TABLET, FILM COATED ORAL at 14:09

## 2023-05-18 RX ADMIN — LEVOTHYROXINE SODIUM 137 MCG: 0.11 TABLET ORAL at 06:35

## 2023-05-18 RX ADMIN — ACETYLCYSTEINE 400 MG: 200 SOLUTION ORAL; RESPIRATORY (INHALATION) at 08:09

## 2023-05-18 RX ADMIN — SPIRONOLACTONE 25 MG: 50 TABLET ORAL at 09:07

## 2023-05-18 RX ADMIN — SODIUM CHLORIDE, PRESERVATIVE FREE 10 ML: 5 INJECTION INTRAVENOUS at 09:11

## 2023-05-18 RX ADMIN — INSULIN LISPRO 2 UNITS: 100 INJECTION, SOLUTION INTRAVENOUS; SUBCUTANEOUS at 00:56

## 2023-05-18 RX ADMIN — INSULIN LISPRO 12 UNITS: 100 INJECTION, SOLUTION INTRAVENOUS; SUBCUTANEOUS at 12:05

## 2023-05-18 RX ADMIN — ACETYLCYSTEINE 400 MG: 200 SOLUTION ORAL; RESPIRATORY (INHALATION) at 19:17

## 2023-05-18 RX ADMIN — DEXTROSE MONOHYDRATE: 50 INJECTION, SOLUTION INTRAVENOUS at 22:20

## 2023-05-18 ASSESSMENT — ENCOUNTER SYMPTOMS
COLOR CHANGE: 0
EYE ITCHING: 0
CHOKING: 0
SORE THROAT: 0
RECTAL PAIN: 0
CONSTIPATION: 0
NAUSEA: 0
STRIDOR: 0
BACK PAIN: 0
PHOTOPHOBIA: 0
EYE REDNESS: 0
VOMITING: 0
ANAL BLEEDING: 0
APNEA: 0
ABDOMINAL PAIN: 0

## 2023-05-18 ASSESSMENT — PAIN SCALES - GENERAL: PAINLEVEL_OUTOF10: 0

## 2023-05-19 LAB
ALBUMIN SERPL-MCNC: 3 GM/DL (ref 3.4–5)
ALP BLD-CCNC: 82 IU/L (ref 40–128)
ALT SERPL-CCNC: 11 U/L (ref 10–40)
ANION GAP SERPL CALCULATED.3IONS-SCNC: 9 MMOL/L (ref 4–16)
AST SERPL-CCNC: 14 IU/L (ref 15–37)
BASOPHILS ABSOLUTE: 0 K/CU MM
BASOPHILS RELATIVE PERCENT: 0.1 % (ref 0–1)
BILIRUB SERPL-MCNC: 0.3 MG/DL (ref 0–1)
BUN SERPL-MCNC: 38 MG/DL (ref 6–23)
CALCIUM SERPL-MCNC: 8.4 MG/DL (ref 8.3–10.6)
CHLORIDE BLD-SCNC: 113 MMOL/L (ref 99–110)
CO2: 20 MMOL/L (ref 21–32)
CREAT SERPL-MCNC: 1.7 MG/DL (ref 0.6–1.1)
DIFFERENTIAL TYPE: ABNORMAL
EOSINOPHILS ABSOLUTE: 0.2 K/CU MM
EOSINOPHILS RELATIVE PERCENT: 2 % (ref 0–3)
GFR SERPL CREATININE-BSD FRML MDRD: 30 ML/MIN/1.73M2
GLUCOSE BLD-MCNC: 149 MG/DL (ref 70–99)
GLUCOSE BLD-MCNC: 179 MG/DL (ref 70–99)
GLUCOSE BLD-MCNC: 193 MG/DL (ref 70–99)
GLUCOSE BLD-MCNC: 195 MG/DL (ref 70–99)
GLUCOSE BLD-MCNC: 198 MG/DL (ref 70–99)
GLUCOSE BLD-MCNC: 231 MG/DL (ref 70–99)
GLUCOSE BLD-MCNC: 232 MG/DL (ref 70–99)
GLUCOSE SERPL-MCNC: 194 MG/DL (ref 70–99)
HCT VFR BLD CALC: 27.8 % (ref 37–47)
HEMOGLOBIN: 8.3 GM/DL (ref 12.5–16)
IMMATURE NEUTROPHIL %: 1.3 % (ref 0–0.43)
LYMPHOCYTES ABSOLUTE: 1.6 K/CU MM
LYMPHOCYTES RELATIVE PERCENT: 14.6 % (ref 24–44)
MAGNESIUM: 2.4 MG/DL (ref 1.8–2.4)
MCH RBC QN AUTO: 27.7 PG (ref 27–31)
MCHC RBC AUTO-ENTMCNC: 29.9 % (ref 32–36)
MCV RBC AUTO: 92.7 FL (ref 78–100)
MONOCYTES ABSOLUTE: 0.8 K/CU MM
MONOCYTES RELATIVE PERCENT: 7.2 % (ref 0–4)
NUCLEATED RBC %: 0 %
PDW BLD-RTO: 17.5 % (ref 11.7–14.9)
PLATELET # BLD: 259 K/CU MM (ref 140–440)
PMV BLD AUTO: 10.1 FL (ref 7.5–11.1)
POTASSIUM SERPL-SCNC: 3.4 MMOL/L (ref 3.5–5.1)
RBC # BLD: 3 M/CU MM (ref 4.2–5.4)
SEGMENTED NEUTROPHILS ABSOLUTE COUNT: 8.3 K/CU MM
SEGMENTED NEUTROPHILS RELATIVE PERCENT: 74.8 % (ref 36–66)
SODIUM BLD-SCNC: 142 MMOL/L (ref 135–145)
TOTAL IMMATURE NEUTOROPHIL: 0.14 K/CU MM
TOTAL NUCLEATED RBC: 0 K/CU MM
TOTAL PROTEIN: 5.4 GM/DL (ref 6.4–8.2)
WBC # BLD: 11.1 K/CU MM (ref 4–10.5)

## 2023-05-19 PROCEDURE — 2580000003 HC RX 258: Performed by: PHYSICIAN ASSISTANT

## 2023-05-19 PROCEDURE — 6370000000 HC RX 637 (ALT 250 FOR IP): Performed by: STUDENT IN AN ORGANIZED HEALTH CARE EDUCATION/TRAINING PROGRAM

## 2023-05-19 PROCEDURE — 2500000003 HC RX 250 WO HCPCS: Performed by: STUDENT IN AN ORGANIZED HEALTH CARE EDUCATION/TRAINING PROGRAM

## 2023-05-19 PROCEDURE — 6370000000 HC RX 637 (ALT 250 FOR IP): Performed by: INTERNAL MEDICINE

## 2023-05-19 PROCEDURE — 83735 ASSAY OF MAGNESIUM: CPT

## 2023-05-19 PROCEDURE — 6370000000 HC RX 637 (ALT 250 FOR IP): Performed by: PHYSICIAN ASSISTANT

## 2023-05-19 PROCEDURE — 6370000000 HC RX 637 (ALT 250 FOR IP): Performed by: NURSE PRACTITIONER

## 2023-05-19 PROCEDURE — 80053 COMPREHEN METABOLIC PANEL: CPT

## 2023-05-19 PROCEDURE — 1200000000 HC SEMI PRIVATE

## 2023-05-19 PROCEDURE — 82962 GLUCOSE BLOOD TEST: CPT

## 2023-05-19 PROCEDURE — 85025 COMPLETE CBC W/AUTO DIFF WBC: CPT

## 2023-05-19 PROCEDURE — 94761 N-INVAS EAR/PLS OXIMETRY MLT: CPT

## 2023-05-19 PROCEDURE — 6370000000 HC RX 637 (ALT 250 FOR IP): Performed by: GENERAL PRACTICE

## 2023-05-19 PROCEDURE — 99024 POSTOP FOLLOW-UP VISIT: CPT | Performed by: SURGERY

## 2023-05-19 PROCEDURE — 6360000002 HC RX W HCPCS: Performed by: INTERNAL MEDICINE

## 2023-05-19 RX ORDER — DEXTROSE MONOHYDRATE 100 MG/ML
INJECTION, SOLUTION INTRAVENOUS CONTINUOUS PRN
Status: DISCONTINUED | OUTPATIENT
Start: 2023-05-19 | End: 2023-05-25 | Stop reason: HOSPADM

## 2023-05-19 RX ORDER — POTASSIUM CHLORIDE 20 MEQ/1
20 TABLET, EXTENDED RELEASE ORAL 2 TIMES DAILY WITH MEALS
Status: COMPLETED | OUTPATIENT
Start: 2023-05-19 | End: 2023-05-19

## 2023-05-19 RX ORDER — ACETYLCYSTEINE 200 MG/ML
2 SOLUTION ORAL; RESPIRATORY (INHALATION) 2 TIMES DAILY
Status: DISCONTINUED | OUTPATIENT
Start: 2023-05-19 | End: 2023-05-24

## 2023-05-19 RX ADMIN — SODIUM CHLORIDE, PRESERVATIVE FREE 10 ML: 5 INJECTION INTRAVENOUS at 23:07

## 2023-05-19 RX ADMIN — INSULIN LISPRO 2 UNITS: 100 INJECTION, SOLUTION INTRAVENOUS; SUBCUTANEOUS at 08:42

## 2023-05-19 RX ADMIN — ACETYLCYSTEINE 400 MG: 200 SOLUTION ORAL; RESPIRATORY (INHALATION) at 07:56

## 2023-05-19 RX ADMIN — HYDRALAZINE HYDROCHLORIDE 75 MG: 50 TABLET, FILM COATED ORAL at 23:06

## 2023-05-19 RX ADMIN — METOPROLOL TARTRATE 50 MG: 50 TABLET, FILM COATED ORAL at 08:42

## 2023-05-19 RX ADMIN — HYDRALAZINE HYDROCHLORIDE 75 MG: 50 TABLET, FILM COATED ORAL at 08:41

## 2023-05-19 RX ADMIN — ISOSORBIDE MONONITRATE 60 MG: 60 TABLET, EXTENDED RELEASE ORAL at 08:41

## 2023-05-19 RX ADMIN — AMLODIPINE BESYLATE 10 MG: 10 TABLET ORAL at 08:42

## 2023-05-19 RX ADMIN — BUDESONIDE AND FORMOTEROL FUMARATE DIHYDRATE 2 PUFF: 160; 4.5 AEROSOL RESPIRATORY (INHALATION) at 08:12

## 2023-05-19 RX ADMIN — IPRATROPIUM BROMIDE AND ALBUTEROL SULFATE 1 AMPULE: 2.5; .5 SOLUTION RESPIRATORY (INHALATION) at 07:55

## 2023-05-19 RX ADMIN — SODIUM CHLORIDE, PRESERVATIVE FREE 10 ML: 5 INJECTION INTRAVENOUS at 08:42

## 2023-05-19 RX ADMIN — PANTOPRAZOLE SODIUM 40 MG: 40 TABLET, DELAYED RELEASE ORAL at 07:28

## 2023-05-19 RX ADMIN — Medication 3 MG: at 23:06

## 2023-05-19 RX ADMIN — INSULIN LISPRO 4 UNITS: 100 INJECTION, SOLUTION INTRAVENOUS; SUBCUTANEOUS at 18:15

## 2023-05-19 RX ADMIN — IPRATROPIUM BROMIDE AND ALBUTEROL SULFATE 1 AMPULE: 2.5; .5 SOLUTION RESPIRATORY (INHALATION) at 20:30

## 2023-05-19 RX ADMIN — INSULIN LISPRO 4 UNITS: 100 INJECTION, SOLUTION INTRAVENOUS; SUBCUTANEOUS at 13:10

## 2023-05-19 RX ADMIN — POTASSIUM CHLORIDE 20 MEQ: 1500 TABLET, EXTENDED RELEASE ORAL at 15:48

## 2023-05-19 RX ADMIN — METOPROLOL TARTRATE 50 MG: 50 TABLET, FILM COATED ORAL at 23:06

## 2023-05-19 RX ADMIN — POTASSIUM CHLORIDE 20 MEQ: 1500 TABLET, EXTENDED RELEASE ORAL at 08:41

## 2023-05-19 RX ADMIN — INSULIN LISPRO 2 UNITS: 100 INJECTION, SOLUTION INTRAVENOUS; SUBCUTANEOUS at 23:06

## 2023-05-19 RX ADMIN — LEVOTHYROXINE SODIUM 137 MCG: 0.11 TABLET ORAL at 07:28

## 2023-05-19 RX ADMIN — ACETYLCYSTEINE 400 MG: 200 SOLUTION ORAL; RESPIRATORY (INHALATION) at 20:31

## 2023-05-19 RX ADMIN — SPIRONOLACTONE 25 MG: 50 TABLET ORAL at 08:41

## 2023-05-19 RX ADMIN — QUETIAPINE FUMARATE 25 MG: 25 TABLET ORAL at 23:06

## 2023-05-19 RX ADMIN — HYDRALAZINE HYDROCHLORIDE 75 MG: 50 TABLET, FILM COATED ORAL at 15:45

## 2023-05-19 RX ADMIN — SPIRONOLACTONE 25 MG: 50 TABLET ORAL at 18:12

## 2023-05-19 RX ADMIN — INSULIN LISPRO 2 UNITS: 100 INJECTION, SOLUTION INTRAVENOUS; SUBCUTANEOUS at 05:35

## 2023-05-19 ASSESSMENT — PAIN SCALES - GENERAL
PAINLEVEL_OUTOF10: 0
PAINLEVEL_OUTOF10: 0

## 2023-05-20 LAB
ALBUMIN SERPL-MCNC: 2.8 GM/DL (ref 3.4–5)
ALP BLD-CCNC: 83 IU/L (ref 40–128)
ALT SERPL-CCNC: 11 U/L (ref 10–40)
ANION GAP SERPL CALCULATED.3IONS-SCNC: 10 MMOL/L (ref 4–16)
AST SERPL-CCNC: 13 IU/L (ref 15–37)
BASOPHILS ABSOLUTE: 0 K/CU MM
BASOPHILS RELATIVE PERCENT: 0.1 % (ref 0–1)
BILIRUB SERPL-MCNC: 0.2 MG/DL (ref 0–1)
BUN SERPL-MCNC: 34 MG/DL (ref 6–23)
CALCIUM SERPL-MCNC: 8.2 MG/DL (ref 8.3–10.6)
CHLORIDE BLD-SCNC: 119 MMOL/L (ref 99–110)
CO2: 18 MMOL/L (ref 21–32)
CREAT SERPL-MCNC: 2.1 MG/DL (ref 0.6–1.1)
DIFFERENTIAL TYPE: ABNORMAL
EOSINOPHILS ABSOLUTE: 0.3 K/CU MM
EOSINOPHILS RELATIVE PERCENT: 2.3 % (ref 0–3)
GFR SERPL CREATININE-BSD FRML MDRD: 23 ML/MIN/1.73M2
GLUCOSE BLD-MCNC: 109 MG/DL (ref 70–99)
GLUCOSE BLD-MCNC: 133 MG/DL (ref 70–99)
GLUCOSE BLD-MCNC: 178 MG/DL (ref 70–99)
GLUCOSE BLD-MCNC: 200 MG/DL (ref 70–99)
GLUCOSE BLD-MCNC: 219 MG/DL (ref 70–99)
GLUCOSE BLD-MCNC: 238 MG/DL (ref 70–99)
GLUCOSE SERPL-MCNC: 121 MG/DL (ref 70–99)
HCT VFR BLD CALC: 26.1 % (ref 37–47)
HEMOGLOBIN: 8.1 GM/DL (ref 12.5–16)
IMMATURE NEUTROPHIL %: 0.9 % (ref 0–0.43)
LYMPHOCYTES ABSOLUTE: 1.6 K/CU MM
LYMPHOCYTES RELATIVE PERCENT: 14 % (ref 24–44)
MCH RBC QN AUTO: 28.1 PG (ref 27–31)
MCHC RBC AUTO-ENTMCNC: 31 % (ref 32–36)
MCV RBC AUTO: 90.6 FL (ref 78–100)
MONOCYTES ABSOLUTE: 0.9 K/CU MM
MONOCYTES RELATIVE PERCENT: 8.1 % (ref 0–4)
NUCLEATED RBC %: 0 %
PDW BLD-RTO: 17.4 % (ref 11.7–14.9)
PLATELET # BLD: 253 K/CU MM (ref 140–440)
PMV BLD AUTO: 10.1 FL (ref 7.5–11.1)
POTASSIUM SERPL-SCNC: 3.7 MMOL/L (ref 3.5–5.1)
RBC # BLD: 2.88 M/CU MM (ref 4.2–5.4)
SEGMENTED NEUTROPHILS ABSOLUTE COUNT: 8.6 K/CU MM
SEGMENTED NEUTROPHILS RELATIVE PERCENT: 74.6 % (ref 36–66)
SODIUM BLD-SCNC: 147 MMOL/L (ref 135–145)
TOTAL IMMATURE NEUTOROPHIL: 0.1 K/CU MM
TOTAL NUCLEATED RBC: 0 K/CU MM
TOTAL PROTEIN: 5 GM/DL (ref 6.4–8.2)
WBC # BLD: 11.5 K/CU MM (ref 4–10.5)

## 2023-05-20 PROCEDURE — 6370000000 HC RX 637 (ALT 250 FOR IP): Performed by: PHYSICIAN ASSISTANT

## 2023-05-20 PROCEDURE — 94640 AIRWAY INHALATION TREATMENT: CPT

## 2023-05-20 PROCEDURE — 6370000000 HC RX 637 (ALT 250 FOR IP): Performed by: INTERNAL MEDICINE

## 2023-05-20 PROCEDURE — 2500000003 HC RX 250 WO HCPCS: Performed by: STUDENT IN AN ORGANIZED HEALTH CARE EDUCATION/TRAINING PROGRAM

## 2023-05-20 PROCEDURE — 6370000000 HC RX 637 (ALT 250 FOR IP): Performed by: GENERAL PRACTICE

## 2023-05-20 PROCEDURE — 6370000000 HC RX 637 (ALT 250 FOR IP): Performed by: NURSE PRACTITIONER

## 2023-05-20 PROCEDURE — 94761 N-INVAS EAR/PLS OXIMETRY MLT: CPT

## 2023-05-20 PROCEDURE — 85025 COMPLETE CBC W/AUTO DIFF WBC: CPT

## 2023-05-20 PROCEDURE — 94669 MECHANICAL CHEST WALL OSCILL: CPT

## 2023-05-20 PROCEDURE — 2580000003 HC RX 258: Performed by: PHYSICIAN ASSISTANT

## 2023-05-20 PROCEDURE — 82962 GLUCOSE BLOOD TEST: CPT

## 2023-05-20 PROCEDURE — 6370000000 HC RX 637 (ALT 250 FOR IP): Performed by: STUDENT IN AN ORGANIZED HEALTH CARE EDUCATION/TRAINING PROGRAM

## 2023-05-20 PROCEDURE — 1200000000 HC SEMI PRIVATE

## 2023-05-20 PROCEDURE — 6360000002 HC RX W HCPCS: Performed by: INTERNAL MEDICINE

## 2023-05-20 PROCEDURE — 36415 COLL VENOUS BLD VENIPUNCTURE: CPT

## 2023-05-20 PROCEDURE — 80053 COMPREHEN METABOLIC PANEL: CPT

## 2023-05-20 RX ADMIN — ISOSORBIDE MONONITRATE 60 MG: 60 TABLET, EXTENDED RELEASE ORAL at 08:48

## 2023-05-20 RX ADMIN — IPRATROPIUM BROMIDE AND ALBUTEROL SULFATE 1 AMPULE: 2.5; .5 SOLUTION RESPIRATORY (INHALATION) at 07:44

## 2023-05-20 RX ADMIN — BUDESONIDE AND FORMOTEROL FUMARATE DIHYDRATE 2 PUFF: 160; 4.5 AEROSOL RESPIRATORY (INHALATION) at 19:55

## 2023-05-20 RX ADMIN — SPIRONOLACTONE 25 MG: 50 TABLET ORAL at 08:48

## 2023-05-20 RX ADMIN — SODIUM CHLORIDE, PRESERVATIVE FREE 10 ML: 5 INJECTION INTRAVENOUS at 08:47

## 2023-05-20 RX ADMIN — ACETAMINOPHEN 650 MG: 325 TABLET ORAL at 18:41

## 2023-05-20 RX ADMIN — METOPROLOL TARTRATE 50 MG: 50 TABLET, FILM COATED ORAL at 08:48

## 2023-05-20 RX ADMIN — ACETAMINOPHEN 650 MG: 325 TABLET ORAL at 11:52

## 2023-05-20 RX ADMIN — PANTOPRAZOLE SODIUM 40 MG: 40 TABLET, DELAYED RELEASE ORAL at 05:18

## 2023-05-20 RX ADMIN — INSULIN LISPRO 4 UNITS: 100 INJECTION, SOLUTION INTRAVENOUS; SUBCUTANEOUS at 01:02

## 2023-05-20 RX ADMIN — AMLODIPINE BESYLATE 10 MG: 10 TABLET ORAL at 08:48

## 2023-05-20 RX ADMIN — ACETYLCYSTEINE 400 MG: 200 SOLUTION ORAL; RESPIRATORY (INHALATION) at 07:47

## 2023-05-20 RX ADMIN — IPRATROPIUM BROMIDE AND ALBUTEROL SULFATE 1 AMPULE: 2.5; .5 SOLUTION RESPIRATORY (INHALATION) at 19:56

## 2023-05-20 RX ADMIN — HYDRALAZINE HYDROCHLORIDE 75 MG: 50 TABLET, FILM COATED ORAL at 15:48

## 2023-05-20 RX ADMIN — ACETYLCYSTEINE 400 MG: 200 SOLUTION ORAL; RESPIRATORY (INHALATION) at 19:56

## 2023-05-20 RX ADMIN — HYDRALAZINE HYDROCHLORIDE 75 MG: 50 TABLET, FILM COATED ORAL at 08:49

## 2023-05-20 RX ADMIN — BUDESONIDE AND FORMOTEROL FUMARATE DIHYDRATE 2 PUFF: 160; 4.5 AEROSOL RESPIRATORY (INHALATION) at 07:48

## 2023-05-20 RX ADMIN — Medication 3 MG: at 21:14

## 2023-05-20 RX ADMIN — GUAIFENESIN 200 MG: 100 SOLUTION ORAL at 08:48

## 2023-05-20 RX ADMIN — METOPROLOL TARTRATE 50 MG: 50 TABLET, FILM COATED ORAL at 21:07

## 2023-05-20 RX ADMIN — SPIRONOLACTONE 25 MG: 50 TABLET ORAL at 17:48

## 2023-05-20 RX ADMIN — HYDRALAZINE HYDROCHLORIDE 75 MG: 50 TABLET, FILM COATED ORAL at 21:07

## 2023-05-20 RX ADMIN — INSULIN LISPRO 4 UNITS: 100 INJECTION, SOLUTION INTRAVENOUS; SUBCUTANEOUS at 11:46

## 2023-05-20 RX ADMIN — INSULIN LISPRO 4 UNITS: 100 INJECTION, SOLUTION INTRAVENOUS; SUBCUTANEOUS at 21:04

## 2023-05-20 RX ADMIN — LEVOTHYROXINE SODIUM 137 MCG: 0.11 TABLET ORAL at 05:18

## 2023-05-20 RX ADMIN — QUETIAPINE FUMARATE 25 MG: 25 TABLET ORAL at 21:07

## 2023-05-20 RX ADMIN — SODIUM CHLORIDE, PRESERVATIVE FREE 10 ML: 5 INJECTION INTRAVENOUS at 21:20

## 2023-05-20 ASSESSMENT — PAIN DESCRIPTION - ORIENTATION
ORIENTATION: RIGHT;LEFT;MID

## 2023-05-20 ASSESSMENT — PAIN DESCRIPTION - DESCRIPTORS
DESCRIPTORS: ACHING;OTHER (COMMENT)
DESCRIPTORS: ACHING
DESCRIPTORS: ACHING

## 2023-05-20 ASSESSMENT — PAIN SCALES - GENERAL
PAINLEVEL_OUTOF10: 6
PAINLEVEL_OUTOF10: 4
PAINLEVEL_OUTOF10: 2
PAINLEVEL_OUTOF10: 2
PAINLEVEL_OUTOF10: 4

## 2023-05-20 ASSESSMENT — PAIN SCALES - WONG BAKER
WONGBAKER_NUMERICALRESPONSE: 4
WONGBAKER_NUMERICALRESPONSE: 0
WONGBAKER_NUMERICALRESPONSE: 4
WONGBAKER_NUMERICALRESPONSE: 0
WONGBAKER_NUMERICALRESPONSE: 0

## 2023-05-20 ASSESSMENT — PAIN - FUNCTIONAL ASSESSMENT
PAIN_FUNCTIONAL_ASSESSMENT: ACTIVITIES ARE NOT PREVENTED

## 2023-05-20 ASSESSMENT — PAIN DESCRIPTION - LOCATION
LOCATION: CHEST

## 2023-05-21 LAB
BASOPHILS ABSOLUTE: 0 K/CU MM
BASOPHILS RELATIVE PERCENT: 0.1 % (ref 0–1)
DIFFERENTIAL TYPE: ABNORMAL
EOSINOPHILS ABSOLUTE: 0.2 K/CU MM
EOSINOPHILS RELATIVE PERCENT: 2.5 % (ref 0–3)
GLUCOSE BLD-MCNC: 113 MG/DL (ref 70–99)
GLUCOSE BLD-MCNC: 126 MG/DL (ref 70–99)
GLUCOSE BLD-MCNC: 132 MG/DL (ref 70–99)
GLUCOSE BLD-MCNC: 142 MG/DL (ref 70–99)
GLUCOSE BLD-MCNC: 182 MG/DL (ref 70–99)
GLUCOSE BLD-MCNC: 185 MG/DL (ref 70–99)
GLUCOSE BLD-MCNC: 289 MG/DL (ref 70–99)
HCT VFR BLD CALC: 26.8 % (ref 37–47)
HEMOGLOBIN: 7.8 GM/DL (ref 12.5–16)
IMMATURE NEUTROPHIL %: 0.8 % (ref 0–0.43)
LYMPHOCYTES ABSOLUTE: 1.5 K/CU MM
LYMPHOCYTES RELATIVE PERCENT: 16.1 % (ref 24–44)
MCH RBC QN AUTO: 28 PG (ref 27–31)
MCHC RBC AUTO-ENTMCNC: 29.1 % (ref 32–36)
MCV RBC AUTO: 96.1 FL (ref 78–100)
MONOCYTES ABSOLUTE: 0.8 K/CU MM
MONOCYTES RELATIVE PERCENT: 8.4 % (ref 0–4)
NUCLEATED RBC %: 0 %
PDW BLD-RTO: 17.9 % (ref 11.7–14.9)
PLATELET # BLD: 166 K/CU MM (ref 140–440)
PMV BLD AUTO: 12 FL (ref 7.5–11.1)
RBC # BLD: 2.79 M/CU MM (ref 4.2–5.4)
SEGMENTED NEUTROPHILS ABSOLUTE COUNT: 6.6 K/CU MM
SEGMENTED NEUTROPHILS RELATIVE PERCENT: 72.1 % (ref 36–66)
TOTAL IMMATURE NEUTOROPHIL: 0.07 K/CU MM
TOTAL NUCLEATED RBC: 0 K/CU MM
WBC # BLD: 9.2 K/CU MM (ref 4–10.5)

## 2023-05-21 PROCEDURE — 6370000000 HC RX 637 (ALT 250 FOR IP): Performed by: STUDENT IN AN ORGANIZED HEALTH CARE EDUCATION/TRAINING PROGRAM

## 2023-05-21 PROCEDURE — 6370000000 HC RX 637 (ALT 250 FOR IP): Performed by: NURSE PRACTITIONER

## 2023-05-21 PROCEDURE — 6370000000 HC RX 637 (ALT 250 FOR IP): Performed by: PHYSICIAN ASSISTANT

## 2023-05-21 PROCEDURE — 94669 MECHANICAL CHEST WALL OSCILL: CPT

## 2023-05-21 PROCEDURE — 94640 AIRWAY INHALATION TREATMENT: CPT

## 2023-05-21 PROCEDURE — 82962 GLUCOSE BLOOD TEST: CPT

## 2023-05-21 PROCEDURE — 6360000002 HC RX W HCPCS: Performed by: INTERNAL MEDICINE

## 2023-05-21 PROCEDURE — 85025 COMPLETE CBC W/AUTO DIFF WBC: CPT

## 2023-05-21 PROCEDURE — 6370000000 HC RX 637 (ALT 250 FOR IP): Performed by: INTERNAL MEDICINE

## 2023-05-21 PROCEDURE — 94761 N-INVAS EAR/PLS OXIMETRY MLT: CPT

## 2023-05-21 PROCEDURE — 2580000003 HC RX 258: Performed by: PHYSICIAN ASSISTANT

## 2023-05-21 PROCEDURE — 6370000000 HC RX 637 (ALT 250 FOR IP): Performed by: GENERAL PRACTICE

## 2023-05-21 PROCEDURE — 36415 COLL VENOUS BLD VENIPUNCTURE: CPT

## 2023-05-21 PROCEDURE — 1200000000 HC SEMI PRIVATE

## 2023-05-21 RX ADMIN — SPIRONOLACTONE 25 MG: 50 TABLET ORAL at 09:02

## 2023-05-21 RX ADMIN — IPRATROPIUM BROMIDE AND ALBUTEROL SULFATE 1 AMPULE: 2.5; .5 SOLUTION RESPIRATORY (INHALATION) at 20:11

## 2023-05-21 RX ADMIN — ISOSORBIDE MONONITRATE 60 MG: 60 TABLET, EXTENDED RELEASE ORAL at 09:03

## 2023-05-21 RX ADMIN — IPRATROPIUM BROMIDE AND ALBUTEROL SULFATE 1 AMPULE: 2.5; .5 SOLUTION RESPIRATORY (INHALATION) at 09:07

## 2023-05-21 RX ADMIN — INSULIN LISPRO 8 UNITS: 100 INJECTION, SOLUTION INTRAVENOUS; SUBCUTANEOUS at 12:08

## 2023-05-21 RX ADMIN — HYDRALAZINE HYDROCHLORIDE 75 MG: 50 TABLET, FILM COATED ORAL at 09:02

## 2023-05-21 RX ADMIN — HYDRALAZINE HYDROCHLORIDE 75 MG: 50 TABLET, FILM COATED ORAL at 15:57

## 2023-05-21 RX ADMIN — ACETYLCYSTEINE 400 MG: 200 SOLUTION ORAL; RESPIRATORY (INHALATION) at 09:07

## 2023-05-21 RX ADMIN — INSULIN LISPRO 2 UNITS: 100 INJECTION, SOLUTION INTRAVENOUS; SUBCUTANEOUS at 20:36

## 2023-05-21 RX ADMIN — LEVOTHYROXINE SODIUM 137 MCG: 0.11 TABLET ORAL at 05:37

## 2023-05-21 RX ADMIN — SODIUM CHLORIDE, PRESERVATIVE FREE 10 ML: 5 INJECTION INTRAVENOUS at 20:31

## 2023-05-21 RX ADMIN — METOPROLOL TARTRATE 50 MG: 50 TABLET, FILM COATED ORAL at 09:03

## 2023-05-21 RX ADMIN — AMLODIPINE BESYLATE 10 MG: 10 TABLET ORAL at 09:03

## 2023-05-21 RX ADMIN — PANTOPRAZOLE SODIUM 40 MG: 40 TABLET, DELAYED RELEASE ORAL at 05:37

## 2023-05-21 RX ADMIN — BUDESONIDE AND FORMOTEROL FUMARATE DIHYDRATE 2 PUFF: 160; 4.5 AEROSOL RESPIRATORY (INHALATION) at 09:07

## 2023-05-21 RX ADMIN — SODIUM CHLORIDE, PRESERVATIVE FREE 10 ML: 5 INJECTION INTRAVENOUS at 09:04

## 2023-05-21 RX ADMIN — ACETAMINOPHEN 650 MG: 325 TABLET ORAL at 18:48

## 2023-05-21 RX ADMIN — SPIRONOLACTONE 25 MG: 50 TABLET ORAL at 18:47

## 2023-05-21 RX ADMIN — BUDESONIDE AND FORMOTEROL FUMARATE DIHYDRATE 2 PUFF: 160; 4.5 AEROSOL RESPIRATORY (INHALATION) at 20:11

## 2023-05-21 RX ADMIN — HYDRALAZINE HYDROCHLORIDE 75 MG: 50 TABLET, FILM COATED ORAL at 20:31

## 2023-05-21 RX ADMIN — ACETYLCYSTEINE 400 MG: 200 SOLUTION ORAL; RESPIRATORY (INHALATION) at 20:11

## 2023-05-21 RX ADMIN — QUETIAPINE FUMARATE 25 MG: 25 TABLET ORAL at 20:32

## 2023-05-21 RX ADMIN — METOPROLOL TARTRATE 50 MG: 50 TABLET, FILM COATED ORAL at 20:31

## 2023-05-21 ASSESSMENT — PAIN DESCRIPTION - LOCATION
LOCATION: BACK
LOCATION: GENERALIZED

## 2023-05-21 ASSESSMENT — PAIN DESCRIPTION - DESCRIPTORS
DESCRIPTORS: ACHING
DESCRIPTORS: ACHING

## 2023-05-21 ASSESSMENT — PAIN DESCRIPTION - ORIENTATION: ORIENTATION: MID

## 2023-05-21 ASSESSMENT — PAIN SCALES - WONG BAKER
WONGBAKER_NUMERICALRESPONSE: 0

## 2023-05-21 ASSESSMENT — PAIN SCALES - GENERAL
PAINLEVEL_OUTOF10: 0
PAINLEVEL_OUTOF10: 4
PAINLEVEL_OUTOF10: 5

## 2023-05-22 LAB
ANION GAP SERPL CALCULATED.3IONS-SCNC: 10 MMOL/L (ref 4–16)
BASOPHILS ABSOLUTE: 0 K/CU MM
BASOPHILS RELATIVE PERCENT: 0.1 % (ref 0–1)
BUN SERPL-MCNC: 30 MG/DL (ref 6–23)
CALCIUM SERPL-MCNC: 8.4 MG/DL (ref 8.3–10.6)
CHLORIDE BLD-SCNC: 111 MMOL/L (ref 99–110)
CO2: 19 MMOL/L (ref 21–32)
CREAT SERPL-MCNC: 1.8 MG/DL (ref 0.6–1.1)
DIFFERENTIAL TYPE: ABNORMAL
EOSINOPHILS ABSOLUTE: 0.2 K/CU MM
EOSINOPHILS RELATIVE PERCENT: 2.4 % (ref 0–3)
GFR SERPL CREATININE-BSD FRML MDRD: 28 ML/MIN/1.73M2
GLUCOSE BLD-MCNC: 126 MG/DL (ref 70–99)
GLUCOSE BLD-MCNC: 127 MG/DL (ref 70–99)
GLUCOSE BLD-MCNC: 135 MG/DL (ref 70–99)
GLUCOSE BLD-MCNC: 154 MG/DL (ref 70–99)
GLUCOSE BLD-MCNC: 167 MG/DL (ref 70–99)
GLUCOSE BLD-MCNC: 221 MG/DL (ref 70–99)
GLUCOSE SERPL-MCNC: 199 MG/DL (ref 70–99)
HCT VFR BLD CALC: 27 % (ref 37–47)
HEMOGLOBIN: 8.1 GM/DL (ref 12.5–16)
IMMATURE NEUTROPHIL %: 0.8 % (ref 0–0.43)
LYMPHOCYTES ABSOLUTE: 1.4 K/CU MM
LYMPHOCYTES RELATIVE PERCENT: 15 % (ref 24–44)
MCH RBC QN AUTO: 27.7 PG (ref 27–31)
MCHC RBC AUTO-ENTMCNC: 30 % (ref 32–36)
MCV RBC AUTO: 92.5 FL (ref 78–100)
MONOCYTES ABSOLUTE: 0.7 K/CU MM
MONOCYTES RELATIVE PERCENT: 7.5 % (ref 0–4)
NUCLEATED RBC %: 0 %
PDW BLD-RTO: 17.7 % (ref 11.7–14.9)
PLATELET # BLD: 246 K/CU MM (ref 140–440)
PMV BLD AUTO: 10 FL (ref 7.5–11.1)
POTASSIUM SERPL-SCNC: 4.5 MMOL/L (ref 3.5–5.1)
RBC # BLD: 2.92 M/CU MM (ref 4.2–5.4)
SEGMENTED NEUTROPHILS ABSOLUTE COUNT: 6.8 K/CU MM
SEGMENTED NEUTROPHILS RELATIVE PERCENT: 74.2 % (ref 36–66)
SODIUM BLD-SCNC: 140 MMOL/L (ref 135–145)
TOTAL IMMATURE NEUTOROPHIL: 0.07 K/CU MM
TOTAL NUCLEATED RBC: 0 K/CU MM
WBC # BLD: 9.2 K/CU MM (ref 4–10.5)

## 2023-05-22 PROCEDURE — 6370000000 HC RX 637 (ALT 250 FOR IP): Performed by: PHYSICIAN ASSISTANT

## 2023-05-22 PROCEDURE — 82962 GLUCOSE BLOOD TEST: CPT

## 2023-05-22 PROCEDURE — 6370000000 HC RX 637 (ALT 250 FOR IP): Performed by: STUDENT IN AN ORGANIZED HEALTH CARE EDUCATION/TRAINING PROGRAM

## 2023-05-22 PROCEDURE — 2580000003 HC RX 258: Performed by: PHYSICIAN ASSISTANT

## 2023-05-22 PROCEDURE — 94761 N-INVAS EAR/PLS OXIMETRY MLT: CPT

## 2023-05-22 PROCEDURE — 80048 BASIC METABOLIC PNL TOTAL CA: CPT

## 2023-05-22 PROCEDURE — 36415 COLL VENOUS BLD VENIPUNCTURE: CPT

## 2023-05-22 PROCEDURE — 94669 MECHANICAL CHEST WALL OSCILL: CPT

## 2023-05-22 PROCEDURE — 94640 AIRWAY INHALATION TREATMENT: CPT

## 2023-05-22 PROCEDURE — APPNB30 APP NON BILLABLE TIME 0-30 MINS: Performed by: PHYSICIAN ASSISTANT

## 2023-05-22 PROCEDURE — 6370000000 HC RX 637 (ALT 250 FOR IP): Performed by: GENERAL PRACTICE

## 2023-05-22 PROCEDURE — 85025 COMPLETE CBC W/AUTO DIFF WBC: CPT

## 2023-05-22 PROCEDURE — 6370000000 HC RX 637 (ALT 250 FOR IP): Performed by: NURSE PRACTITIONER

## 2023-05-22 PROCEDURE — 99024 POSTOP FOLLOW-UP VISIT: CPT | Performed by: PHYSICIAN ASSISTANT

## 2023-05-22 PROCEDURE — 1200000000 HC SEMI PRIVATE

## 2023-05-22 PROCEDURE — 6360000002 HC RX W HCPCS: Performed by: INTERNAL MEDICINE

## 2023-05-22 PROCEDURE — 97116 GAIT TRAINING THERAPY: CPT

## 2023-05-22 PROCEDURE — 6370000000 HC RX 637 (ALT 250 FOR IP): Performed by: INTERNAL MEDICINE

## 2023-05-22 RX ORDER — SPIRONOLACTONE 25 MG/1
25 TABLET ORAL 2 TIMES DAILY
Qty: 60 TABLET | Refills: 3 | Status: ON HOLD | OUTPATIENT
Start: 2023-05-22 | End: 2023-05-25

## 2023-05-22 RX ORDER — LANOLIN ALCOHOL/MO/W.PET/CERES
3 CREAM (GRAM) TOPICAL NIGHTLY PRN
Qty: 30 TABLET | Refills: 2 | Status: ON HOLD | OUTPATIENT
Start: 2023-05-22 | End: 2023-05-25

## 2023-05-22 RX ORDER — PANTOPRAZOLE SODIUM 40 MG/1
40 TABLET, DELAYED RELEASE ORAL
Qty: 30 TABLET | Refills: 3 | Status: ON HOLD | OUTPATIENT
Start: 2023-05-23 | End: 2023-05-25

## 2023-05-22 RX ORDER — QUETIAPINE FUMARATE 25 MG/1
25 TABLET, FILM COATED ORAL NIGHTLY
Qty: 30 TABLET | Refills: 2 | Status: ON HOLD | OUTPATIENT
Start: 2023-05-22 | End: 2023-05-25

## 2023-05-22 RX ORDER — ISOSORBIDE MONONITRATE 60 MG/1
60 TABLET, EXTENDED RELEASE ORAL DAILY
Qty: 30 TABLET | Refills: 3 | Status: ON HOLD | OUTPATIENT
Start: 2023-05-22

## 2023-05-22 RX ORDER — ALBUTEROL SULFATE 2.5 MG/3ML
2.5 SOLUTION RESPIRATORY (INHALATION) EVERY 6 HOURS PRN
Qty: 120 EACH | Refills: 3 | Status: ON HOLD | OUTPATIENT
Start: 2023-05-22 | End: 2023-05-25

## 2023-05-22 RX ORDER — HYDRALAZINE HYDROCHLORIDE 25 MG/1
75 TABLET, FILM COATED ORAL 3 TIMES DAILY
Qty: 90 TABLET | Refills: 3 | Status: ON HOLD | OUTPATIENT
Start: 2023-05-22

## 2023-05-22 RX ADMIN — SPIRONOLACTONE 25 MG: 50 TABLET ORAL at 17:26

## 2023-05-22 RX ADMIN — SPIRONOLACTONE 25 MG: 50 TABLET ORAL at 09:22

## 2023-05-22 RX ADMIN — BUDESONIDE AND FORMOTEROL FUMARATE DIHYDRATE 2 PUFF: 160; 4.5 AEROSOL RESPIRATORY (INHALATION) at 20:25

## 2023-05-22 RX ADMIN — HYDRALAZINE HYDROCHLORIDE 75 MG: 50 TABLET, FILM COATED ORAL at 17:26

## 2023-05-22 RX ADMIN — ACETYLCYSTEINE 400 MG: 200 SOLUTION ORAL; RESPIRATORY (INHALATION) at 20:25

## 2023-05-22 RX ADMIN — HYDRALAZINE HYDROCHLORIDE 75 MG: 50 TABLET, FILM COATED ORAL at 09:22

## 2023-05-22 RX ADMIN — ISOSORBIDE MONONITRATE 60 MG: 60 TABLET, EXTENDED RELEASE ORAL at 09:22

## 2023-05-22 RX ADMIN — LEVOTHYROXINE SODIUM 137 MCG: 0.11 TABLET ORAL at 05:23

## 2023-05-22 RX ADMIN — BUDESONIDE AND FORMOTEROL FUMARATE DIHYDRATE 2 PUFF: 160; 4.5 AEROSOL RESPIRATORY (INHALATION) at 08:40

## 2023-05-22 RX ADMIN — INSULIN LISPRO 4 UNITS: 100 INJECTION, SOLUTION INTRAVENOUS; SUBCUTANEOUS at 17:26

## 2023-05-22 RX ADMIN — METOPROLOL TARTRATE 50 MG: 50 TABLET, FILM COATED ORAL at 21:11

## 2023-05-22 RX ADMIN — SODIUM CHLORIDE, PRESERVATIVE FREE 10 ML: 5 INJECTION INTRAVENOUS at 09:21

## 2023-05-22 RX ADMIN — METOPROLOL TARTRATE 50 MG: 50 TABLET, FILM COATED ORAL at 09:22

## 2023-05-22 RX ADMIN — ACETYLCYSTEINE 400 MG: 200 SOLUTION ORAL; RESPIRATORY (INHALATION) at 08:40

## 2023-05-22 RX ADMIN — HYDRALAZINE HYDROCHLORIDE 75 MG: 50 TABLET, FILM COATED ORAL at 22:11

## 2023-05-22 RX ADMIN — PANTOPRAZOLE SODIUM 40 MG: 40 TABLET, DELAYED RELEASE ORAL at 05:23

## 2023-05-22 RX ADMIN — QUETIAPINE FUMARATE 25 MG: 25 TABLET ORAL at 21:10

## 2023-05-22 RX ADMIN — AMLODIPINE BESYLATE 10 MG: 10 TABLET ORAL at 09:22

## 2023-05-22 RX ADMIN — IPRATROPIUM BROMIDE AND ALBUTEROL SULFATE 1 AMPULE: 2.5; .5 SOLUTION RESPIRATORY (INHALATION) at 08:40

## 2023-05-22 RX ADMIN — SODIUM CHLORIDE, PRESERVATIVE FREE 10 ML: 5 INJECTION INTRAVENOUS at 21:12

## 2023-05-22 RX ADMIN — IPRATROPIUM BROMIDE AND ALBUTEROL SULFATE 1 AMPULE: 2.5; .5 SOLUTION RESPIRATORY (INHALATION) at 20:25

## 2023-05-22 ASSESSMENT — ENCOUNTER SYMPTOMS
SORE THROAT: 0
RECTAL PAIN: 0
NAUSEA: 0
CHOKING: 0
ANAL BLEEDING: 0
EYE REDNESS: 0
BACK PAIN: 0
PHOTOPHOBIA: 0
APNEA: 0
CONSTIPATION: 0
ABDOMINAL PAIN: 0
COLOR CHANGE: 0
VOMITING: 0
EYE ITCHING: 0
STRIDOR: 0

## 2023-05-22 ASSESSMENT — PAIN SCALES - GENERAL: PAINLEVEL_OUTOF10: 0

## 2023-05-22 NOTE — CARE COORDINATION
PS'd the PA regarding the noted P2P option from insurance that was received over weekend. P2P must be completed by noon today if MD or PA wishes to pursue. #[phone] opt. 5    ARU will follow for final determination.

## 2023-05-22 NOTE — CARE COORDINATION
Bismark Doherty brought appeal paper for ARU over for Dr. Magali Juarez to sign tomorrow when he is back. I will leave appeal papers on my keyboard.

## 2023-05-22 NOTE — CARE COORDINATION
Chart reviewed. Note from this weekend that P2P has been requested by insurance company and Needs completed today prior to noon. PS sent to [place].  P2P information provided below:     Member ID: [id number]   Phone number : [phone]  Option 5

## 2023-05-23 LAB
BASOPHILS ABSOLUTE: 0 K/CU MM
BASOPHILS RELATIVE PERCENT: 0.1 % (ref 0–1)
DIFFERENTIAL TYPE: ABNORMAL
EOSINOPHILS ABSOLUTE: 0.2 K/CU MM
EOSINOPHILS RELATIVE PERCENT: 2.4 % (ref 0–3)
GLUCOSE BLD-MCNC: 129 MG/DL (ref 70–99)
GLUCOSE BLD-MCNC: 131 MG/DL (ref 70–99)
GLUCOSE BLD-MCNC: 133 MG/DL (ref 70–99)
GLUCOSE BLD-MCNC: 147 MG/DL (ref 70–99)
GLUCOSE BLD-MCNC: 153 MG/DL (ref 70–99)
GLUCOSE BLD-MCNC: 174 MG/DL (ref 70–99)
HCT VFR BLD CALC: 29.7 % (ref 37–47)
HEMOGLOBIN: 9 GM/DL (ref 12.5–16)
IMMATURE NEUTROPHIL %: 0.5 % (ref 0–0.43)
LYMPHOCYTES ABSOLUTE: 1.4 K/CU MM
LYMPHOCYTES RELATIVE PERCENT: 17.3 % (ref 24–44)
MCH RBC QN AUTO: 28.1 PG (ref 27–31)
MCHC RBC AUTO-ENTMCNC: 30.3 % (ref 32–36)
MCV RBC AUTO: 92.8 FL (ref 78–100)
MONOCYTES ABSOLUTE: 0.6 K/CU MM
MONOCYTES RELATIVE PERCENT: 7.1 % (ref 0–4)
NUCLEATED RBC %: 0 %
PDW BLD-RTO: 17.7 % (ref 11.7–14.9)
PLATELET # BLD: 297 K/CU MM (ref 140–440)
PMV BLD AUTO: 10.3 FL (ref 7.5–11.1)
RBC # BLD: 3.2 M/CU MM (ref 4.2–5.4)
SEGMENTED NEUTROPHILS ABSOLUTE COUNT: 5.9 K/CU MM
SEGMENTED NEUTROPHILS RELATIVE PERCENT: 72.6 % (ref 36–66)
TOTAL IMMATURE NEUTOROPHIL: 0.04 K/CU MM
TOTAL NUCLEATED RBC: 0 K/CU MM
WBC # BLD: 8.1 K/CU MM (ref 4–10.5)

## 2023-05-23 PROCEDURE — 2580000003 HC RX 258: Performed by: PHYSICIAN ASSISTANT

## 2023-05-23 PROCEDURE — 1200000000 HC SEMI PRIVATE

## 2023-05-23 PROCEDURE — 6370000000 HC RX 637 (ALT 250 FOR IP): Performed by: STUDENT IN AN ORGANIZED HEALTH CARE EDUCATION/TRAINING PROGRAM

## 2023-05-23 PROCEDURE — 36415 COLL VENOUS BLD VENIPUNCTURE: CPT

## 2023-05-23 PROCEDURE — 94761 N-INVAS EAR/PLS OXIMETRY MLT: CPT

## 2023-05-23 PROCEDURE — 2500000003 HC RX 250 WO HCPCS: Performed by: STUDENT IN AN ORGANIZED HEALTH CARE EDUCATION/TRAINING PROGRAM

## 2023-05-23 PROCEDURE — 82962 GLUCOSE BLOOD TEST: CPT

## 2023-05-23 PROCEDURE — 6370000000 HC RX 637 (ALT 250 FOR IP): Performed by: GENERAL PRACTICE

## 2023-05-23 PROCEDURE — 94669 MECHANICAL CHEST WALL OSCILL: CPT

## 2023-05-23 PROCEDURE — 6360000002 HC RX W HCPCS: Performed by: INTERNAL MEDICINE

## 2023-05-23 PROCEDURE — 94640 AIRWAY INHALATION TREATMENT: CPT

## 2023-05-23 PROCEDURE — 6370000000 HC RX 637 (ALT 250 FOR IP): Performed by: PHYSICIAN ASSISTANT

## 2023-05-23 PROCEDURE — 97535 SELF CARE MNGMENT TRAINING: CPT

## 2023-05-23 PROCEDURE — 6370000000 HC RX 637 (ALT 250 FOR IP): Performed by: INTERNAL MEDICINE

## 2023-05-23 PROCEDURE — 85025 COMPLETE CBC W/AUTO DIFF WBC: CPT

## 2023-05-23 PROCEDURE — 6370000000 HC RX 637 (ALT 250 FOR IP): Performed by: NURSE PRACTITIONER

## 2023-05-23 RX ADMIN — BUDESONIDE AND FORMOTEROL FUMARATE DIHYDRATE 2 PUFF: 160; 4.5 AEROSOL RESPIRATORY (INHALATION) at 22:22

## 2023-05-23 RX ADMIN — INSULIN LISPRO 2 UNITS: 100 INJECTION, SOLUTION INTRAVENOUS; SUBCUTANEOUS at 13:06

## 2023-05-23 RX ADMIN — QUETIAPINE FUMARATE 25 MG: 25 TABLET ORAL at 21:49

## 2023-05-23 RX ADMIN — METOPROLOL TARTRATE 50 MG: 50 TABLET, FILM COATED ORAL at 21:50

## 2023-05-23 RX ADMIN — IPRATROPIUM BROMIDE AND ALBUTEROL SULFATE 1 AMPULE: 2.5; .5 SOLUTION RESPIRATORY (INHALATION) at 08:37

## 2023-05-23 RX ADMIN — PANTOPRAZOLE SODIUM 40 MG: 40 TABLET, DELAYED RELEASE ORAL at 05:54

## 2023-05-23 RX ADMIN — BUDESONIDE AND FORMOTEROL FUMARATE DIHYDRATE 2 PUFF: 160; 4.5 AEROSOL RESPIRATORY (INHALATION) at 08:37

## 2023-05-23 RX ADMIN — SODIUM CHLORIDE, PRESERVATIVE FREE 10 ML: 5 INJECTION INTRAVENOUS at 09:06

## 2023-05-23 RX ADMIN — AMLODIPINE BESYLATE 10 MG: 10 TABLET ORAL at 09:07

## 2023-05-23 RX ADMIN — HYDRALAZINE HYDROCHLORIDE 75 MG: 50 TABLET, FILM COATED ORAL at 16:08

## 2023-05-23 RX ADMIN — ISOSORBIDE MONONITRATE 60 MG: 60 TABLET, EXTENDED RELEASE ORAL at 09:05

## 2023-05-23 RX ADMIN — INSULIN LISPRO 2 UNITS: 100 INJECTION, SOLUTION INTRAVENOUS; SUBCUTANEOUS at 16:08

## 2023-05-23 RX ADMIN — ACETYLCYSTEINE 400 MG: 200 SOLUTION ORAL; RESPIRATORY (INHALATION) at 22:14

## 2023-05-23 RX ADMIN — SODIUM CHLORIDE, PRESERVATIVE FREE 10 ML: 5 INJECTION INTRAVENOUS at 21:50

## 2023-05-23 RX ADMIN — SPIRONOLACTONE 25 MG: 50 TABLET ORAL at 09:05

## 2023-05-23 RX ADMIN — HYDRALAZINE HYDROCHLORIDE 75 MG: 50 TABLET, FILM COATED ORAL at 21:50

## 2023-05-23 RX ADMIN — HYDRALAZINE HYDROCHLORIDE 75 MG: 50 TABLET, FILM COATED ORAL at 09:05

## 2023-05-23 RX ADMIN — METOPROLOL TARTRATE 50 MG: 50 TABLET, FILM COATED ORAL at 09:04

## 2023-05-23 RX ADMIN — LEVOTHYROXINE SODIUM 137 MCG: 0.11 TABLET ORAL at 05:54

## 2023-05-23 RX ADMIN — SPIRONOLACTONE 25 MG: 50 TABLET ORAL at 18:07

## 2023-05-23 RX ADMIN — IPRATROPIUM BROMIDE AND ALBUTEROL SULFATE 1 AMPULE: 2.5; .5 SOLUTION RESPIRATORY (INHALATION) at 22:23

## 2023-05-23 ASSESSMENT — PAIN SCALES - GENERAL: PAINLEVEL_OUTOF10: 0

## 2023-05-23 NOTE — CARE COORDINATION
SHARON collaborated with Dr Berkley Paris. Exp Appeal papers signed. SHARON called [place] and left a VM.  [address] [facility]

## 2023-05-23 NOTE — CARE COORDINATION
Signed expedited appeal letter obtained. Placed whiteboard for updated OT note for appeal.    Faxed appeal letter, clinicals, and most recent therapy notes to expedited appeal department. Will send updated OT note once in patients chart. ARU expedited appeal pending at this time. Will follow for determination.

## 2023-05-23 NOTE — PLAN OF CARE

## 2023-05-24 LAB
ANION GAP SERPL CALCULATED.3IONS-SCNC: 10 MMOL/L (ref 4–16)
BASOPHILS ABSOLUTE: 0 K/CU MM
BASOPHILS RELATIVE PERCENT: 0.2 % (ref 0–1)
BUN SERPL-MCNC: 21 MG/DL (ref 6–23)
CALCIUM SERPL-MCNC: 8 MG/DL (ref 8.3–10.6)
CHLORIDE BLD-SCNC: 112 MMOL/L (ref 99–110)
CO2: 17 MMOL/L (ref 21–32)
CREAT SERPL-MCNC: 1.7 MG/DL (ref 0.6–1.1)
DIFFERENTIAL TYPE: ABNORMAL
EOSINOPHILS ABSOLUTE: 0.2 K/CU MM
EOSINOPHILS RELATIVE PERCENT: 2.6 % (ref 0–3)
GFR SERPL CREATININE-BSD FRML MDRD: 30 ML/MIN/1.73M2
GLUCOSE BLD-MCNC: 125 MG/DL (ref 70–99)
GLUCOSE BLD-MCNC: 127 MG/DL (ref 70–99)
GLUCOSE BLD-MCNC: 141 MG/DL (ref 70–99)
GLUCOSE BLD-MCNC: 152 MG/DL (ref 70–99)
GLUCOSE BLD-MCNC: 170 MG/DL (ref 70–99)
GLUCOSE BLD-MCNC: 188 MG/DL (ref 70–99)
GLUCOSE BLD-MCNC: 199 MG/DL (ref 70–99)
GLUCOSE SERPL-MCNC: 119 MG/DL (ref 70–99)
HCT VFR BLD CALC: 29.5 % (ref 37–47)
HEMOGLOBIN: 8.6 GM/DL (ref 12.5–16)
IMMATURE NEUTROPHIL %: 0.7 % (ref 0–0.43)
LYMPHOCYTES ABSOLUTE: 1.5 K/CU MM
LYMPHOCYTES RELATIVE PERCENT: 18.4 % (ref 24–44)
MCH RBC QN AUTO: 28.5 PG (ref 27–31)
MCHC RBC AUTO-ENTMCNC: 29.2 % (ref 32–36)
MCV RBC AUTO: 97.7 FL (ref 78–100)
MONOCYTES ABSOLUTE: 0.6 K/CU MM
MONOCYTES RELATIVE PERCENT: 7.4 % (ref 0–4)
NUCLEATED RBC %: 0 %
PDW BLD-RTO: 17.9 % (ref 11.7–14.9)
PLATELET # BLD: 292 K/CU MM (ref 140–440)
PMV BLD AUTO: 10.9 FL (ref 7.5–11.1)
POTASSIUM SERPL-SCNC: 4.4 MMOL/L (ref 3.5–5.1)
RBC # BLD: 3.02 M/CU MM (ref 4.2–5.4)
SEGMENTED NEUTROPHILS ABSOLUTE COUNT: 5.7 K/CU MM
SEGMENTED NEUTROPHILS RELATIVE PERCENT: 70.7 % (ref 36–66)
SODIUM BLD-SCNC: 139 MMOL/L (ref 135–145)
TOTAL IMMATURE NEUTOROPHIL: 0.06 K/CU MM
TOTAL NUCLEATED RBC: 0 K/CU MM
WBC # BLD: 8.1 K/CU MM (ref 4–10.5)

## 2023-05-24 PROCEDURE — APPNB30 APP NON BILLABLE TIME 0-30 MINS: Performed by: PHYSICIAN ASSISTANT

## 2023-05-24 PROCEDURE — 94640 AIRWAY INHALATION TREATMENT: CPT

## 2023-05-24 PROCEDURE — 6370000000 HC RX 637 (ALT 250 FOR IP): Performed by: STUDENT IN AN ORGANIZED HEALTH CARE EDUCATION/TRAINING PROGRAM

## 2023-05-24 PROCEDURE — 80048 BASIC METABOLIC PNL TOTAL CA: CPT

## 2023-05-24 PROCEDURE — 6370000000 HC RX 637 (ALT 250 FOR IP): Performed by: PHYSICIAN ASSISTANT

## 2023-05-24 PROCEDURE — 85025 COMPLETE CBC W/AUTO DIFF WBC: CPT

## 2023-05-24 PROCEDURE — 97535 SELF CARE MNGMENT TRAINING: CPT

## 2023-05-24 PROCEDURE — 82962 GLUCOSE BLOOD TEST: CPT

## 2023-05-24 PROCEDURE — 6370000000 HC RX 637 (ALT 250 FOR IP): Performed by: INTERNAL MEDICINE

## 2023-05-24 PROCEDURE — 97530 THERAPEUTIC ACTIVITIES: CPT

## 2023-05-24 PROCEDURE — 6370000000 HC RX 637 (ALT 250 FOR IP): Performed by: NURSE PRACTITIONER

## 2023-05-24 PROCEDURE — 6370000000 HC RX 637 (ALT 250 FOR IP): Performed by: GENERAL PRACTICE

## 2023-05-24 PROCEDURE — 2580000003 HC RX 258: Performed by: PHYSICIAN ASSISTANT

## 2023-05-24 PROCEDURE — 94761 N-INVAS EAR/PLS OXIMETRY MLT: CPT

## 2023-05-24 PROCEDURE — 94669 MECHANICAL CHEST WALL OSCILL: CPT

## 2023-05-24 PROCEDURE — 36415 COLL VENOUS BLD VENIPUNCTURE: CPT

## 2023-05-24 PROCEDURE — 99024 POSTOP FOLLOW-UP VISIT: CPT | Performed by: PHYSICIAN ASSISTANT

## 2023-05-24 PROCEDURE — 6360000002 HC RX W HCPCS: Performed by: INTERNAL MEDICINE

## 2023-05-24 PROCEDURE — 1200000000 HC SEMI PRIVATE

## 2023-05-24 RX ORDER — GLUCAGON 1 MG/ML
1 KIT INJECTION PRN
Status: CANCELLED | OUTPATIENT
Start: 2023-05-24

## 2023-05-24 RX ORDER — ACETAMINOPHEN 325 MG/1
650 TABLET ORAL EVERY 6 HOURS PRN
Status: CANCELLED | OUTPATIENT
Start: 2023-05-24

## 2023-05-24 RX ORDER — ACETYLCYSTEINE 200 MG/ML
2 SOLUTION ORAL; RESPIRATORY (INHALATION) PRN
Status: CANCELLED | OUTPATIENT
Start: 2023-05-24

## 2023-05-24 RX ORDER — INSULIN LISPRO 100 [IU]/ML
0-16 INJECTION, SOLUTION INTRAVENOUS; SUBCUTANEOUS EVERY 4 HOURS
Status: CANCELLED | OUTPATIENT
Start: 2023-05-24

## 2023-05-24 RX ORDER — SODIUM CHLORIDE 9 MG/ML
INJECTION, SOLUTION INTRAVENOUS PRN
Status: CANCELLED | OUTPATIENT
Start: 2023-05-24

## 2023-05-24 RX ORDER — CYCLOBENZAPRINE HCL 10 MG
10 TABLET ORAL 3 TIMES DAILY PRN
Status: CANCELLED | OUTPATIENT
Start: 2023-05-24

## 2023-05-24 RX ORDER — ONDANSETRON 4 MG/1
4 TABLET, ORALLY DISINTEGRATING ORAL EVERY 8 HOURS PRN
Status: CANCELLED | OUTPATIENT
Start: 2023-05-24

## 2023-05-24 RX ORDER — SODIUM CHLORIDE 0.9 % (FLUSH) 0.9 %
5-40 SYRINGE (ML) INJECTION EVERY 12 HOURS SCHEDULED
Status: CANCELLED | OUTPATIENT
Start: 2023-05-24

## 2023-05-24 RX ORDER — ACETYLCYSTEINE 200 MG/ML
2 SOLUTION ORAL; RESPIRATORY (INHALATION) PRN
Status: DISCONTINUED | OUTPATIENT
Start: 2023-05-24 | End: 2023-05-25 | Stop reason: HOSPADM

## 2023-05-24 RX ORDER — LANOLIN ALCOHOL/MO/W.PET/CERES
3 CREAM (GRAM) TOPICAL NIGHTLY PRN
Status: CANCELLED | OUTPATIENT
Start: 2023-05-24

## 2023-05-24 RX ORDER — ISOSORBIDE MONONITRATE 60 MG/1
60 TABLET, EXTENDED RELEASE ORAL DAILY
Status: CANCELLED | OUTPATIENT
Start: 2023-05-25

## 2023-05-24 RX ORDER — ONDANSETRON 2 MG/ML
4 INJECTION INTRAMUSCULAR; INTRAVENOUS EVERY 6 HOURS PRN
Status: CANCELLED | OUTPATIENT
Start: 2023-05-24

## 2023-05-24 RX ORDER — METOPROLOL TARTRATE 50 MG/1
50 TABLET, FILM COATED ORAL 2 TIMES DAILY
Status: CANCELLED | OUTPATIENT
Start: 2023-05-24

## 2023-05-24 RX ORDER — DEXTROSE MONOHYDRATE 100 MG/ML
INJECTION, SOLUTION INTRAVENOUS CONTINUOUS PRN
Status: CANCELLED | OUTPATIENT
Start: 2023-05-24

## 2023-05-24 RX ORDER — AMLODIPINE BESYLATE 10 MG/1
10 TABLET ORAL DAILY
Status: CANCELLED | OUTPATIENT
Start: 2023-05-25

## 2023-05-24 RX ORDER — QUETIAPINE FUMARATE 25 MG/1
25 TABLET, FILM COATED ORAL NIGHTLY
Status: CANCELLED | OUTPATIENT
Start: 2023-05-24

## 2023-05-24 RX ORDER — SODIUM CHLORIDE 0.9 % (FLUSH) 0.9 %
5-40 SYRINGE (ML) INJECTION PRN
Status: CANCELLED | OUTPATIENT
Start: 2023-05-24

## 2023-05-24 RX ORDER — ASPIRIN 81 MG/1
81 TABLET, CHEWABLE ORAL DAILY
Status: CANCELLED | OUTPATIENT
Start: 2023-05-24

## 2023-05-24 RX ORDER — BUDESONIDE AND FORMOTEROL FUMARATE DIHYDRATE 160; 4.5 UG/1; UG/1
2 AEROSOL RESPIRATORY (INHALATION) 2 TIMES DAILY
Status: CANCELLED | OUTPATIENT
Start: 2023-05-24

## 2023-05-24 RX ORDER — PANTOPRAZOLE SODIUM 40 MG/1
40 TABLET, DELAYED RELEASE ORAL
Status: CANCELLED | OUTPATIENT
Start: 2023-05-25

## 2023-05-24 RX ORDER — GUAIFENESIN 200 MG/10ML
200 LIQUID ORAL EVERY 4 HOURS
Status: CANCELLED | OUTPATIENT
Start: 2023-05-24

## 2023-05-24 RX ORDER — IPRATROPIUM BROMIDE AND ALBUTEROL SULFATE 2.5; .5 MG/3ML; MG/3ML
1 SOLUTION RESPIRATORY (INHALATION) 2 TIMES DAILY
Status: CANCELLED | OUTPATIENT
Start: 2023-05-24

## 2023-05-24 RX ORDER — ALBUTEROL SULFATE 2.5 MG/3ML
2.5 SOLUTION RESPIRATORY (INHALATION) EVERY 6 HOURS PRN
Status: CANCELLED | OUTPATIENT
Start: 2023-05-24

## 2023-05-24 RX ADMIN — SODIUM CHLORIDE, PRESERVATIVE FREE 10 ML: 5 INJECTION INTRAVENOUS at 22:08

## 2023-05-24 RX ADMIN — HYDRALAZINE HYDROCHLORIDE 75 MG: 50 TABLET, FILM COATED ORAL at 08:20

## 2023-05-24 RX ADMIN — SPIRONOLACTONE 25 MG: 50 TABLET ORAL at 08:20

## 2023-05-24 RX ADMIN — PANTOPRAZOLE SODIUM 40 MG: 40 TABLET, DELAYED RELEASE ORAL at 05:28

## 2023-05-24 RX ADMIN — LEVOTHYROXINE SODIUM 137 MCG: 0.11 TABLET ORAL at 05:29

## 2023-05-24 RX ADMIN — ISOSORBIDE MONONITRATE 60 MG: 60 TABLET, EXTENDED RELEASE ORAL at 08:20

## 2023-05-24 RX ADMIN — INSULIN LISPRO 2 UNITS: 100 INJECTION, SOLUTION INTRAVENOUS; SUBCUTANEOUS at 22:13

## 2023-05-24 RX ADMIN — BUDESONIDE AND FORMOTEROL FUMARATE DIHYDRATE 2 PUFF: 160; 4.5 AEROSOL RESPIRATORY (INHALATION) at 08:31

## 2023-05-24 RX ADMIN — INSULIN LISPRO 2 UNITS: 100 INJECTION, SOLUTION INTRAVENOUS; SUBCUTANEOUS at 16:49

## 2023-05-24 RX ADMIN — AMLODIPINE BESYLATE 10 MG: 10 TABLET ORAL at 08:26

## 2023-05-24 RX ADMIN — ACETYLCYSTEINE 400 MG: 200 SOLUTION ORAL; RESPIRATORY (INHALATION) at 08:31

## 2023-05-24 RX ADMIN — INSULIN LISPRO 2 UNITS: 100 INJECTION, SOLUTION INTRAVENOUS; SUBCUTANEOUS at 11:14

## 2023-05-24 RX ADMIN — Medication 3 MG: at 22:10

## 2023-05-24 RX ADMIN — IPRATROPIUM BROMIDE AND ALBUTEROL SULFATE 1 AMPULE: 2.5; .5 SOLUTION RESPIRATORY (INHALATION) at 21:48

## 2023-05-24 RX ADMIN — BUDESONIDE AND FORMOTEROL FUMARATE DIHYDRATE 2 PUFF: 160; 4.5 AEROSOL RESPIRATORY (INHALATION) at 21:49

## 2023-05-24 RX ADMIN — HYDRALAZINE HYDROCHLORIDE 75 MG: 50 TABLET, FILM COATED ORAL at 22:10

## 2023-05-24 RX ADMIN — IPRATROPIUM BROMIDE AND ALBUTEROL SULFATE 1 AMPULE: 2.5; .5 SOLUTION RESPIRATORY (INHALATION) at 08:31

## 2023-05-24 RX ADMIN — QUETIAPINE FUMARATE 25 MG: 25 TABLET ORAL at 22:09

## 2023-05-24 RX ADMIN — SODIUM CHLORIDE, PRESERVATIVE FREE 10 ML: 5 INJECTION INTRAVENOUS at 08:20

## 2023-05-24 RX ADMIN — HYDRALAZINE HYDROCHLORIDE 75 MG: 50 TABLET, FILM COATED ORAL at 15:40

## 2023-05-24 RX ADMIN — METOPROLOL TARTRATE 50 MG: 50 TABLET, FILM COATED ORAL at 22:09

## 2023-05-24 RX ADMIN — METOPROLOL TARTRATE 50 MG: 50 TABLET, FILM COATED ORAL at 08:20

## 2023-05-24 ASSESSMENT — ENCOUNTER SYMPTOMS
EYE REDNESS: 0
APNEA: 0
COLOR CHANGE: 0
CHOKING: 0
PHOTOPHOBIA: 0
EYE ITCHING: 0
STRIDOR: 0
RECTAL PAIN: 0
BACK PAIN: 0
ANAL BLEEDING: 0
SORE THROAT: 0
ABDOMINAL PAIN: 0
NAUSEA: 0
CONSTIPATION: 0
VOMITING: 0

## 2023-05-24 ASSESSMENT — PAIN SCALES - GENERAL: PAINLEVEL_OUTOF10: 0

## 2023-05-24 NOTE — DISCHARGE SUMMARY
Genie Contreras MD, Internal Medicine    [address] [facility]   [phone]   Patient ID  Wan Sheldon   1940  [id number]          Admit date: 5/3/2023   Discharge date: 5/25/2023      Admitting Physician: Pancho Aranda MD   Discharge Physician: Marlon Echavarria PA-C    Discharge Diagnoses: KAREN on CKD: creatinine is 1.7. Encourage p.o. fluid intake. Monitor. Acute hypoxic respiratory failure: improved. Off vent. On room air. Pneumococcal pneumonia: given rocephin. CHF: Improved. Anemia: Hgb 8.6, s/p 2nd unit prbc. Monitor hgb. S/p PEA/bradycardia cardiac arrest: cardiology following. S/p laparoscopic right hemicolectomy for colon cancer (05/03/23): recovering. HTN/HLD/CAD: adjust meds per cardiology. DM 2: ssi, poc glucose, hypoglycemia protocol  Moderate aortic stenosis  Depression  Hypernatremia:  Now resolved.      Patient Active Problem List   Diagnosis    Hyperlipidemia    Hyperuricemia    Vitamin D deficiency    Complications of bariatric procedures    Fatigue    SOB (shortness of breath) on exertion    Obesity    Localized edema    SOB (shortness of breath)    Mild persistent asthma without complication    Pulmonary hypertension (HCC)    Abnormal nuclear cardiac imaging test    Hypothyroidism    Essential hypertension    Type 2 diabetes mellitus with complication, with long-term current use of insulin ([facility])    Erythrasma    Anemia    B12 deficiency    Chronic kidney disease    Chronic obstructive pulmonary disease, unspecified COPD type (HCC)    Obesity, Class III, BMI 40-49.9 (morbid obesity) ([facility] [address]    Uncontrolled type 2 diabetes mellitus with insulin therapy    Leg swelling    Chronic pain of left knee    Chronic kidney disease, stage IV (severe) (HCC)    Chronic renal disease, stage III ([address] [[id number]]    Renal stone    Major depressive disorder, recurrent, mild    Major depressive disorder, recurrent, moderate    Major depressive disorder, recurrent, unspecified

## 2023-05-25 ENCOUNTER — HOSPITAL ENCOUNTER (INPATIENT)
Age: 83
DRG: 091 | End: 2023-05-25
Attending: PHYSICAL MEDICINE & REHABILITATION | Admitting: PHYSICAL MEDICINE & REHABILITATION
Payer: MEDICARE

## 2023-05-25 VITALS
TEMPERATURE: 98.2 F | OXYGEN SATURATION: 95 % | DIASTOLIC BLOOD PRESSURE: 49 MMHG | SYSTOLIC BLOOD PRESSURE: 136 MMHG | BODY MASS INDEX: 29.66 KG/M2 | RESPIRATION RATE: 16 BRPM | HEIGHT: 66 IN | HEART RATE: 61 BPM | WEIGHT: 184.53 LBS

## 2023-05-25 PROBLEM — J96.01 ACUTE RESPIRATORY FAILURE WITH HYPOXIA (HCC): Status: ACTIVE | Noted: 2023-05-25

## 2023-05-25 PROBLEM — G72.81 CRITICAL ILLNESS MYOPATHY: Status: ACTIVE | Noted: 2023-05-25

## 2023-05-25 PROBLEM — I50.21 ACUTE SYSTOLIC (CONGESTIVE) HEART FAILURE (HCC): Status: ACTIVE | Noted: 2023-05-25

## 2023-05-25 PROBLEM — G93.1 HYPOXIC ENCEPHALOPATHY (HCC): Status: ACTIVE | Noted: 2023-05-25

## 2023-05-25 PROBLEM — I46.8 CARDIAC ARREST DUE TO OTHER UNDERLYING CONDITION (HCC): Status: ACTIVE | Noted: 2023-05-25

## 2023-05-25 PROBLEM — C18.2 PRIMARY ADENOCARCINOMA OF ASCENDING COLON (HCC): Status: ACTIVE | Noted: 2023-05-25

## 2023-05-25 PROBLEM — D62 ACUTE BLOOD LOSS ANEMIA: Status: ACTIVE | Noted: 2023-05-25

## 2023-05-25 PROBLEM — E11.65 UNCONTROLLED TYPE 2 DIABETES MELLITUS WITH HYPERGLYCEMIA (HCC): Status: ACTIVE | Noted: 2021-12-29

## 2023-05-25 PROBLEM — R26.9 GAIT DISTURBANCE: Status: ACTIVE | Noted: 2023-05-25

## 2023-05-25 LAB
ALBUMIN SERPL-MCNC: 3.1 GM/DL (ref 3.4–5)
ALP BLD-CCNC: 122 IU/L (ref 40–128)
ALT SERPL-CCNC: 9 U/L (ref 10–40)
ANION GAP SERPL CALCULATED.3IONS-SCNC: 10 MMOL/L (ref 4–16)
AST SERPL-CCNC: 11 IU/L (ref 15–37)
BASOPHILS ABSOLUTE: 0 K/CU MM
BASOPHILS RELATIVE PERCENT: 0.1 % (ref 0–1)
BILIRUB SERPL-MCNC: 0.3 MG/DL (ref 0–1)
BUN SERPL-MCNC: 19 MG/DL (ref 6–23)
CALCIUM SERPL-MCNC: 8 MG/DL (ref 8.3–10.6)
CHLORIDE BLD-SCNC: 110 MMOL/L (ref 99–110)
CO2: 19 MMOL/L (ref 21–32)
CREAT SERPL-MCNC: 1.7 MG/DL (ref 0.6–1.1)
DIFFERENTIAL TYPE: ABNORMAL
EOSINOPHILS ABSOLUTE: 0.2 K/CU MM
EOSINOPHILS RELATIVE PERCENT: 2.8 % (ref 0–3)
GFR SERPL CREATININE-BSD FRML MDRD: 30 ML/MIN/1.73M2
GLUCOSE BLD-MCNC: 109 MG/DL (ref 70–99)
GLUCOSE BLD-MCNC: 114 MG/DL (ref 70–99)
GLUCOSE BLD-MCNC: 122 MG/DL (ref 70–99)
GLUCOSE BLD-MCNC: 144 MG/DL (ref 70–99)
GLUCOSE BLD-MCNC: 175 MG/DL (ref 70–99)
GLUCOSE BLD-MCNC: 191 MG/DL (ref 70–99)
GLUCOSE SERPL-MCNC: 111 MG/DL (ref 70–99)
HCT VFR BLD CALC: 27.4 % (ref 37–47)
HEMOGLOBIN: 8.6 GM/DL (ref 12.5–16)
IMMATURE NEUTROPHIL %: 0.3 % (ref 0–0.43)
LYMPHOCYTES ABSOLUTE: 1.7 K/CU MM
LYMPHOCYTES RELATIVE PERCENT: 25.9 % (ref 24–44)
MAGNESIUM: 2.1 MG/DL (ref 1.8–2.4)
MCH RBC QN AUTO: 28.8 PG (ref 27–31)
MCHC RBC AUTO-ENTMCNC: 31.4 % (ref 32–36)
MCV RBC AUTO: 91.6 FL (ref 78–100)
MONOCYTES ABSOLUTE: 0.6 K/CU MM
MONOCYTES RELATIVE PERCENT: 8.4 % (ref 0–4)
NUCLEATED RBC %: 0 %
PDW BLD-RTO: 17.2 % (ref 11.7–14.9)
PHOSPHORUS: 3.5 MG/DL (ref 2.5–4.9)
PLATELET # BLD: 261 K/CU MM (ref 140–440)
PMV BLD AUTO: 9.5 FL (ref 7.5–11.1)
POTASSIUM SERPL-SCNC: 4.3 MMOL/L (ref 3.5–5.1)
RBC # BLD: 2.99 M/CU MM (ref 4.2–5.4)
SEGMENTED NEUTROPHILS ABSOLUTE COUNT: 4.2 K/CU MM
SEGMENTED NEUTROPHILS RELATIVE PERCENT: 62.5 % (ref 36–66)
SODIUM BLD-SCNC: 139 MMOL/L (ref 135–145)
TOTAL IMMATURE NEUTOROPHIL: 0.02 K/CU MM
TOTAL NUCLEATED RBC: 0 K/CU MM
TOTAL PROTEIN: 5.5 GM/DL (ref 6.4–8.2)
WBC # BLD: 6.7 K/CU MM (ref 4–10.5)

## 2023-05-25 PROCEDURE — 84100 ASSAY OF PHOSPHORUS: CPT

## 2023-05-25 PROCEDURE — 6370000000 HC RX 637 (ALT 250 FOR IP): Performed by: NURSE PRACTITIONER

## 2023-05-25 PROCEDURE — 36415 COLL VENOUS BLD VENIPUNCTURE: CPT

## 2023-05-25 PROCEDURE — 2580000003 HC RX 258: Performed by: PHYSICIAN ASSISTANT

## 2023-05-25 PROCEDURE — 6360000002 HC RX W HCPCS: Performed by: GENERAL PRACTICE

## 2023-05-25 PROCEDURE — 6370000000 HC RX 637 (ALT 250 FOR IP): Performed by: GENERAL PRACTICE

## 2023-05-25 PROCEDURE — 6370000000 HC RX 637 (ALT 250 FOR IP): Performed by: PHYSICIAN ASSISTANT

## 2023-05-25 PROCEDURE — 94640 AIRWAY INHALATION TREATMENT: CPT

## 2023-05-25 PROCEDURE — 99223 1ST HOSP IP/OBS HIGH 75: CPT | Performed by: PHYSICAL MEDICINE & REHABILITATION

## 2023-05-25 PROCEDURE — 6370000000 HC RX 637 (ALT 250 FOR IP): Performed by: STUDENT IN AN ORGANIZED HEALTH CARE EDUCATION/TRAINING PROGRAM

## 2023-05-25 PROCEDURE — 80053 COMPREHEN METABOLIC PANEL: CPT

## 2023-05-25 PROCEDURE — 85025 COMPLETE CBC W/AUTO DIFF WBC: CPT

## 2023-05-25 PROCEDURE — 6370000000 HC RX 637 (ALT 250 FOR IP): Performed by: PHYSICAL MEDICINE & REHABILITATION

## 2023-05-25 PROCEDURE — 94669 MECHANICAL CHEST WALL OSCILL: CPT

## 2023-05-25 PROCEDURE — 6370000000 HC RX 637 (ALT 250 FOR IP): Performed by: INTERNAL MEDICINE

## 2023-05-25 PROCEDURE — 94150 VITAL CAPACITY TEST: CPT

## 2023-05-25 PROCEDURE — 94664 DEMO&/EVAL PT USE INHALER: CPT

## 2023-05-25 PROCEDURE — 1280000000 HC REHAB R&B

## 2023-05-25 PROCEDURE — 83735 ASSAY OF MAGNESIUM: CPT

## 2023-05-25 PROCEDURE — 82962 GLUCOSE BLOOD TEST: CPT

## 2023-05-25 PROCEDURE — 94761 N-INVAS EAR/PLS OXIMETRY MLT: CPT

## 2023-05-25 RX ORDER — ONDANSETRON 2 MG/ML
4 INJECTION INTRAMUSCULAR; INTRAVENOUS EVERY 6 HOURS PRN
Status: DISCONTINUED | OUTPATIENT
Start: 2023-05-25 | End: 2023-05-25

## 2023-05-25 RX ORDER — PANTOPRAZOLE SODIUM 40 MG/1
40 TABLET, DELAYED RELEASE ORAL
Status: DISCONTINUED | OUTPATIENT
Start: 2023-05-26 | End: 2023-06-02 | Stop reason: HOSPADM

## 2023-05-25 RX ORDER — ISOSORBIDE MONONITRATE 30 MG/1
60 TABLET, EXTENDED RELEASE ORAL DAILY
Status: DISCONTINUED | OUTPATIENT
Start: 2023-05-26 | End: 2023-06-02 | Stop reason: HOSPADM

## 2023-05-25 RX ORDER — CYCLOBENZAPRINE HCL 10 MG
10 TABLET ORAL 3 TIMES DAILY PRN
Status: DISCONTINUED | OUTPATIENT
Start: 2023-05-25 | End: 2023-05-25

## 2023-05-25 RX ORDER — SODIUM CHLORIDE 0.9 % (FLUSH) 0.9 %
5-40 SYRINGE (ML) INJECTION EVERY 12 HOURS SCHEDULED
Status: DISCONTINUED | OUTPATIENT
Start: 2023-05-25 | End: 2023-06-02 | Stop reason: HOSPADM

## 2023-05-25 RX ORDER — LANOLIN ALCOHOL/MO/W.PET/CERES
3 CREAM (GRAM) TOPICAL NIGHTLY PRN
Status: DISCONTINUED | OUTPATIENT
Start: 2023-05-25 | End: 2023-06-02 | Stop reason: HOSPADM

## 2023-05-25 RX ORDER — GUAIFENESIN 600 MG/1
600 TABLET, EXTENDED RELEASE ORAL DAILY
Status: DISCONTINUED | OUTPATIENT
Start: 2023-05-25 | End: 2023-06-02 | Stop reason: HOSPADM

## 2023-05-25 RX ORDER — GLUCAGON 1 MG/ML
1 KIT INJECTION PRN
Status: DISCONTINUED | OUTPATIENT
Start: 2023-05-25 | End: 2023-06-02 | Stop reason: HOSPADM

## 2023-05-25 RX ORDER — POLYETHYLENE GLYCOL 3350 17 G/17G
17 POWDER, FOR SOLUTION ORAL DAILY PRN
Status: DISCONTINUED | OUTPATIENT
Start: 2023-05-25 | End: 2023-06-02 | Stop reason: HOSPADM

## 2023-05-25 RX ORDER — GUAIFENESIN 200 MG/10ML
200 LIQUID ORAL EVERY 4 HOURS
Status: DISCONTINUED | OUTPATIENT
Start: 2023-05-25 | End: 2023-05-25

## 2023-05-25 RX ORDER — AMLODIPINE BESYLATE 10 MG/1
10 TABLET ORAL DAILY
Status: DISCONTINUED | OUTPATIENT
Start: 2023-05-26 | End: 2023-06-02 | Stop reason: HOSPADM

## 2023-05-25 RX ORDER — SODIUM CHLORIDE 9 MG/ML
INJECTION, SOLUTION INTRAVENOUS PRN
Status: DISCONTINUED | OUTPATIENT
Start: 2023-05-25 | End: 2023-06-02 | Stop reason: HOSPADM

## 2023-05-25 RX ORDER — METOPROLOL TARTRATE 50 MG/1
50 TABLET, FILM COATED ORAL 2 TIMES DAILY
Status: DISCONTINUED | OUTPATIENT
Start: 2023-05-25 | End: 2023-06-02 | Stop reason: HOSPADM

## 2023-05-25 RX ORDER — SODIUM BICARBONATE 650 MG/1
650 TABLET ORAL 2 TIMES DAILY
Status: DISCONTINUED | OUTPATIENT
Start: 2023-05-25 | End: 2023-06-02 | Stop reason: HOSPADM

## 2023-05-25 RX ORDER — INSULIN LISPRO 100 [IU]/ML
0-16 INJECTION, SOLUTION INTRAVENOUS; SUBCUTANEOUS EVERY 4 HOURS
Status: DISCONTINUED | OUTPATIENT
Start: 2023-05-25 | End: 2023-05-25

## 2023-05-25 RX ORDER — ASPIRIN 81 MG/1
81 TABLET, CHEWABLE ORAL DAILY
Status: DISCONTINUED | OUTPATIENT
Start: 2023-05-25 | End: 2023-06-02 | Stop reason: HOSPADM

## 2023-05-25 RX ORDER — BUDESONIDE AND FORMOTEROL FUMARATE DIHYDRATE 160; 4.5 UG/1; UG/1
2 AEROSOL RESPIRATORY (INHALATION) 2 TIMES DAILY
Status: DISCONTINUED | OUTPATIENT
Start: 2023-05-25 | End: 2023-06-02 | Stop reason: HOSPADM

## 2023-05-25 RX ORDER — ALBUTEROL SULFATE 2.5 MG/3ML
2.5 SOLUTION RESPIRATORY (INHALATION) EVERY 6 HOURS PRN
Status: DISCONTINUED | OUTPATIENT
Start: 2023-05-25 | End: 2023-05-27

## 2023-05-25 RX ORDER — INSULIN LISPRO 100 [IU]/ML
0-4 INJECTION, SOLUTION INTRAVENOUS; SUBCUTANEOUS
Status: DISCONTINUED | OUTPATIENT
Start: 2023-05-25 | End: 2023-06-01

## 2023-05-25 RX ORDER — ACETAMINOPHEN 325 MG/1
650 TABLET ORAL EVERY 6 HOURS PRN
Status: DISCONTINUED | OUTPATIENT
Start: 2023-05-25 | End: 2023-05-25 | Stop reason: SDUPTHER

## 2023-05-25 RX ORDER — ACETYLCYSTEINE 200 MG/ML
2 SOLUTION ORAL; RESPIRATORY (INHALATION) PRN
Status: DISCONTINUED | OUTPATIENT
Start: 2023-05-25 | End: 2023-05-25

## 2023-05-25 RX ORDER — SODIUM BICARBONATE 650 MG/1
650 TABLET ORAL 2 TIMES DAILY
Status: CANCELLED | OUTPATIENT
Start: 2023-05-25

## 2023-05-25 RX ORDER — IPRATROPIUM BROMIDE AND ALBUTEROL SULFATE 2.5; .5 MG/3ML; MG/3ML
1 SOLUTION RESPIRATORY (INHALATION) 2 TIMES DAILY
Status: DISCONTINUED | OUTPATIENT
Start: 2023-05-25 | End: 2023-05-27

## 2023-05-25 RX ORDER — DEXTROSE MONOHYDRATE 100 MG/ML
INJECTION, SOLUTION INTRAVENOUS CONTINUOUS PRN
Status: DISCONTINUED | OUTPATIENT
Start: 2023-05-25 | End: 2023-06-02 | Stop reason: HOSPADM

## 2023-05-25 RX ORDER — SODIUM CHLORIDE 0.9 % (FLUSH) 0.9 %
5-40 SYRINGE (ML) INJECTION PRN
Status: DISCONTINUED | OUTPATIENT
Start: 2023-05-25 | End: 2023-06-02 | Stop reason: HOSPADM

## 2023-05-25 RX ORDER — ACETAMINOPHEN 325 MG/1
650 TABLET ORAL EVERY 4 HOURS PRN
Status: DISCONTINUED | OUTPATIENT
Start: 2023-05-25 | End: 2023-06-02 | Stop reason: HOSPADM

## 2023-05-25 RX ORDER — POLYETHYLENE GLYCOL 3350 17 G/17G
17 POWDER, FOR SOLUTION ORAL DAILY PRN
Status: CANCELLED | OUTPATIENT
Start: 2023-05-25

## 2023-05-25 RX ORDER — INSULIN LISPRO 100 [IU]/ML
0-4 INJECTION, SOLUTION INTRAVENOUS; SUBCUTANEOUS NIGHTLY
Status: DISCONTINUED | OUTPATIENT
Start: 2023-05-25 | End: 2023-06-01

## 2023-05-25 RX ORDER — HEPARIN SODIUM 5000 [USP'U]/ML
5000 INJECTION, SOLUTION INTRAVENOUS; SUBCUTANEOUS EVERY 8 HOURS SCHEDULED
Status: CANCELLED | OUTPATIENT
Start: 2023-05-25

## 2023-05-25 RX ORDER — HEPARIN SODIUM 5000 [USP'U]/ML
5000 INJECTION, SOLUTION INTRAVENOUS; SUBCUTANEOUS EVERY 8 HOURS SCHEDULED
Status: ON HOLD | COMMUNITY
Start: 2023-05-25 | End: 2023-05-25

## 2023-05-25 RX ORDER — HEPARIN SODIUM 5000 [USP'U]/ML
5000 INJECTION, SOLUTION INTRAVENOUS; SUBCUTANEOUS EVERY 8 HOURS SCHEDULED
Status: DISCONTINUED | OUTPATIENT
Start: 2023-05-25 | End: 2023-06-01

## 2023-05-25 RX ORDER — ONDANSETRON 4 MG/1
4 TABLET, ORALLY DISINTEGRATING ORAL EVERY 8 HOURS PRN
Status: DISCONTINUED | OUTPATIENT
Start: 2023-05-25 | End: 2023-06-02 | Stop reason: HOSPADM

## 2023-05-25 RX ORDER — ACETAMINOPHEN 325 MG/1
650 TABLET ORAL EVERY 4 HOURS PRN
Status: CANCELLED | OUTPATIENT
Start: 2023-05-25

## 2023-05-25 RX ORDER — QUETIAPINE FUMARATE 25 MG/1
25 TABLET, FILM COATED ORAL NIGHTLY
Status: DISCONTINUED | OUTPATIENT
Start: 2023-05-25 | End: 2023-06-01

## 2023-05-25 RX ORDER — SODIUM BICARBONATE 650 MG/1
650 TABLET ORAL 2 TIMES DAILY
Status: DISCONTINUED | OUTPATIENT
Start: 2023-05-25 | End: 2023-05-25 | Stop reason: HOSPADM

## 2023-05-25 RX ADMIN — AMLODIPINE BESYLATE 10 MG: 10 TABLET ORAL at 08:58

## 2023-05-25 RX ADMIN — LEVOTHYROXINE SODIUM 137 MCG: 0.11 TABLET ORAL at 05:56

## 2023-05-25 RX ADMIN — IPRATROPIUM BROMIDE AND ALBUTEROL SULFATE 1 AMPULE: .5; 3 SOLUTION RESPIRATORY (INHALATION) at 19:51

## 2023-05-25 RX ADMIN — BUDESONIDE AND FORMOTEROL FUMARATE DIHYDRATE 2 PUFF: 160; 4.5 AEROSOL RESPIRATORY (INHALATION) at 07:20

## 2023-05-25 RX ADMIN — GUAIFENESIN 600 MG: 600 TABLET, EXTENDED RELEASE ORAL at 17:30

## 2023-05-25 RX ADMIN — HYDRALAZINE HYDROCHLORIDE 75 MG: 50 TABLET, FILM COATED ORAL at 08:58

## 2023-05-25 RX ADMIN — PANTOPRAZOLE SODIUM 40 MG: 40 TABLET, DELAYED RELEASE ORAL at 05:56

## 2023-05-25 RX ADMIN — SODIUM CHLORIDE, PRESERVATIVE FREE 10 ML: 5 INJECTION INTRAVENOUS at 22:12

## 2023-05-25 RX ADMIN — SODIUM BICARBONATE 650 MG: 650 TABLET ORAL at 22:11

## 2023-05-25 RX ADMIN — HYDRALAZINE HYDROCHLORIDE 75 MG: 50 TABLET ORAL at 22:10

## 2023-05-25 RX ADMIN — QUETIAPINE FUMARATE 25 MG: 25 TABLET ORAL at 22:10

## 2023-05-25 RX ADMIN — SODIUM CHLORIDE, PRESERVATIVE FREE 10 ML: 5 INJECTION INTRAVENOUS at 08:59

## 2023-05-25 RX ADMIN — HYDRALAZINE HYDROCHLORIDE 75 MG: 50 TABLET ORAL at 14:55

## 2023-05-25 RX ADMIN — ISOSORBIDE MONONITRATE 60 MG: 60 TABLET, EXTENDED RELEASE ORAL at 08:58

## 2023-05-25 RX ADMIN — HEPARIN SODIUM 5000 UNITS: 5000 INJECTION INTRAVENOUS; SUBCUTANEOUS at 14:55

## 2023-05-25 RX ADMIN — HEPARIN SODIUM 5000 UNITS: 5000 INJECTION INTRAVENOUS; SUBCUTANEOUS at 22:11

## 2023-05-25 RX ADMIN — METOPROLOL TARTRATE 50 MG: 50 TABLET, FILM COATED ORAL at 22:11

## 2023-05-25 RX ADMIN — IPRATROPIUM BROMIDE AND ALBUTEROL SULFATE 1 AMPULE: 2.5; .5 SOLUTION RESPIRATORY (INHALATION) at 07:20

## 2023-05-25 RX ADMIN — METOPROLOL TARTRATE 50 MG: 50 TABLET, FILM COATED ORAL at 08:58

## 2023-05-25 RX ADMIN — ASPIRIN 81 MG CHEWABLE TABLET 81 MG: 81 TABLET CHEWABLE at 14:55

## 2023-05-25 ASSESSMENT — PAIN SCALES - GENERAL
PAINLEVEL_OUTOF10: 0

## 2023-05-25 NOTE — PROGRESS NOTES
INTERNAL MEDICINE PROGRESS NOTE        Arturo Pinon   1940   Primary Care Physician:  Libby Jeffers MD  Admit Date: 5/3/2023     Subjective:   Patient awake, sitting up in chair, doing okay. Awaiting expedited appeal.  She voices no acute concerns. Objective:   BP (!) 169/48   Pulse 65   Temp 97.8 °F (36.6 °C) (Oral)   Resp 24   Ht 5' 6\" (1.676 m)   Wt 184 lb 8.4 oz (83.7 kg)   SpO2 96%   BMI 29.78 kg/m²    Recent Labs     05/23/23 2021 05/24/23  0007 05/24/23  0414 05/24/23  0642   POCGLU 147* 141* 127* 125*       I/O last 3 completed shifts: In: 5 [P.O.:420]  Out: 800 [Urine:800]  No intake/output data recorded. Neck: no adenopathy and supple, symmetrical, trachea midline  Lungs: clear to auscultation bilaterally  Heart: regular rate and rhythm and S1, S2 normal, 3/6 sm  Abdomen: soft, non-tender; bowel sounds normal; no masses,  no organomegaly  Extremities: extremities normal, atraumatic, no cyanosis or edema  Neurologic: Grossly normal    Data Review  CBC with Differential:    Recent Labs     05/22/23  0051 05/23/23  0545 05/24/23  0654   WBC 9.2 8.1 8.1   RBC 2.92* 3.20* 3.02*   HGB 8.1* 9.0* 8.6*   HCT 27.0* 29.7* 29.5*    297  --    MCV 92.5 92.8 97.7   MCH 27.7 28.1 28.5   MCHC 30.0* 30.3* 29.2*   RDW 17.7* 17.7* 17.9*   SEGSPCT 74.2* 72.6* 70.7*   LYMPHOPCT 15.0* 17.3* 18.4*   MONOPCT 7.5* 7.1* 7.4*   BASOPCT 0.1 0.1 0.2   MONOSABS 0.7 0.6 0.6   LYMPHSABS 1.4 1.4 1.5   EOSABS 0.2 0.2 0.2   BASOSABS 0.0 0.0 0.0   DIFFTYPE AUTOMATED DIFFERENTIAL AUTOMATED DIFFERENTIAL AUTOMATED DIFFERENTIAL     CMP:    Recent Labs     05/22/23  1244 05/24/23  0654    139   K 4.5 4.4   * 112*   CO2 19* 17*   BUN 30* 21   CREATININE 1.8* 1.7*   LABGLOM 28* 30*   GLUCOSE 199* 119*   CALCIUM 8.4 8.0*     PT/INR:  No results for input(s): PROTIME, INR in the last 72 hours.   Meds:    acetylcysteine  2 mL Inhalation BID    hydrALAZINE  75 mg Oral TID    isosorbide mononitrate  60
INTERNAL MEDICINE PROGRESS NOTE        Eliecer Dickson   1940   Primary Care Physician:  Noble Snell MD  Admit Date: 5/3/2023     Subjective:   Patient awake, sitting up in chair. Doing well. [id number] denied. Expedited appeal letter has been signed today. Denies chest pain, shortness breath, abdominal pain, blood in stool. Hemoglobin is good at 9.0. Objective:   BP (!) 157/54   Pulse 64   Temp 97.8 °F (36.6 °C) (Oral)   Resp 27   Ht 5' 6\" (1.676 m)   Wt 184 lb 8.4 oz (83.7 kg)   SpO2 96%   BMI 29.78 kg/m²    Recent Labs     05/22/23  2153 05/23/23  0005 05/23/23  0350 05/23/23  0648   POCGLU 126* 133* 131* 129*       I/O last 3 completed shifts: In: 180 [P.O.:180]  Out: 450 [Urine:450]  No intake/output data recorded. Neck: no adenopathy and supple, symmetrical, trachea midline  Lungs: clear to auscultation bilaterally  Heart: regular rate and rhythm and S1, S2 normal, 3/6 sm  Abdomen: soft, non-tender; bowel sounds normal; no masses,  no organomegaly  Extremities: extremities normal, atraumatic, no cyanosis or edema  Neurologic: Grossly normal    Data Review  CBC with Differential:    Recent Labs     05/21/23  0312 05/22/23  0051 05/23/23  0545   WBC 9.2 9.2 8.1   RBC 2.79* 2.92* 3.20*   HGB 7.8* 8.1* 9.0*   HCT 26.8* 27.0* 29.7*    246 297   MCV 96.1 92.5 92.8   MCH 28.0 27.7 28.1   MCHC 29.1* 30.0* 30.3*   RDW 17.9* 17.7* 17.7*   SEGSPCT 72.1* 74.2* 72.6*   LYMPHOPCT 16.1* 15.0* 17.3*   MONOPCT 8.4* 7.5* 7.1*   BASOPCT 0.1 0.1 0.1   MONOSABS 0.8 0.7 0.6   LYMPHSABS 1.5 1.4 1.4   EOSABS 0.2 0.2 0.2   BASOSABS 0.0 0.0 0.0   DIFFTYPE AUTOMATED DIFFERENTIAL AUTOMATED DIFFERENTIAL AUTOMATED DIFFERENTIAL     CMP:    Recent Labs     05/22/23  1244      K 4.5   *   CO2 19*   BUN 30*   CREATININE 1.8*   LABGLOM 28*   GLUCOSE 199*   CALCIUM 8.4     PT/INR:  No results for input(s): PROTIME, INR in the last 72 hours.   Meds:    acetylcysteine  2 mL Inhalation BID
INTERNAL MEDICINE PROGRESS NOTE        Pheobe Marrow   1940   Primary Care Physician:  Aroldo Cardona MD  Admit Date: 5/3/2023     Subjective:   Patient awake, sitting up in chair. Doing better. Denies chest pain, shortness breath, abdominal pain, blood in stool. Reports appetite is fair. Hemoglobin is 8.1 this morning. Objective:   BP (!) 138/48   Pulse 58   Temp 98.4 °F (36.9 °C) (Oral)   Resp 20   Ht 5' 6\" (1.676 m)   Wt 184 lb 8.4 oz (83.7 kg)   SpO2 96%   BMI 29.78 kg/m²    Recent Labs     05/21/23 2028 05/21/23 2325 05/22/23 0315 05/22/23  0649   POCGLU 185* 113* 127* 154*       I/O last 3 completed shifts:  In: -   Out: 720 [Urine:720]  No intake/output data recorded. Neck: no adenopathy and supple, symmetrical, trachea midline  Lungs: clear to auscultation bilaterally  Heart: regular rate and rhythm and S1, S2 normal, 3/6 sm  Abdomen: soft, non-tender; bowel sounds normal; no masses,  no organomegaly  Extremities: extremities normal, atraumatic, no cyanosis or edema  Neurologic: Grossly normal    Data Review  CBC with Differential:    Recent Labs     05/20/23  0146 05/21/23 0312 05/22/23  0051   WBC 11.5* 9.2 9.2   RBC 2.88* 2.79* 2.92*   HGB 8.1* 7.8* 8.1*   HCT 26.1* 26.8* 27.0*    166 246   MCV 90.6 96.1 92.5   MCH 28.1 28.0 27.7   MCHC 31.0* 29.1* 30.0*   RDW 17.4* 17.9* 17.7*   SEGSPCT 74.6* 72.1* 74.2*   LYMPHOPCT 14.0* 16.1* 15.0*   MONOPCT 8.1* 8.4* 7.5*   BASOPCT 0.1 0.1 0.1   MONOSABS 0.9 0.8 0.7   LYMPHSABS 1.6 1.5 1.4   EOSABS 0.3 0.2 0.2   BASOSABS 0.0 0.0 0.0   DIFFTYPE AUTOMATED DIFFERENTIAL AUTOMATED DIFFERENTIAL AUTOMATED DIFFERENTIAL     CMP:    Recent Labs     05/20/23  0146   *   K 3.7   *   CO2 18*   BUN 34*   CREATININE 2.1*   LABGLOM 23*   GLUCOSE 121*   PROT 5.0*   LABALBU 2.8*   CALCIUM 8.2*   BILITOT 0.2   ALKPHOS 83   AST 13*   ALT 11     PT/INR:  No results for input(s): PROTIME, INR in the last 72 hours.   Meds:    acetylcysteine
Nephrology Progress Note  5/22/2023 9:22 AM  Subjective: Interval History: Reina Estevez is a 82 y.o. female. Doing better overall no distress resting in chair  Data:   Scheduled Meds:   acetylcysteine  2 mL Inhalation BID    hydrALAZINE  75 mg Oral TID    isosorbide mononitrate  60 mg Oral Daily    spironolactone  25 mg Oral BID    pantoprazole  40 mg Oral QAM AC    ipratropium 0.5 mg-albuterol 2.5 mg  1 ampule Inhalation BID    metoprolol tartrate  50 mg Oral BID    guaiFENesin  200 mg Oral Q4H    insulin lispro  0-16 Units SubCUTAneous Q4H    budesonide-formoterol  2 puff Inhalation BID    QUEtiapine  25 mg Oral Nightly    amLODIPine  10 mg Oral Daily    [Held by provider] heparin (porcine)  5,000 Units SubCUTAneous 3 times per day    [Held by provider] allopurinol  300 mg Oral Daily    levothyroxine  137 mcg Oral Daily    sodium chloride flush  5-40 mL IntraVENous 2 times per day     Continuous Infusions:   dextrose      sodium chloride      sodium chloride      sodium chloride 5 mL/hr at 05/11/23 1648         CBC   Recent Labs     05/20/23  0146 05/21/23  0312 05/22/23  0051   WBC 11.5* 9.2 9.2   HGB 8.1* 7.8* 8.1*   HCT 26.1* 26.8* 27.0*    166 246      BMP   Recent Labs     05/20/23  0146   *   K 3.7   *   CO2 18*   BUN 34*   CREATININE 2.1*     Hepatic:   Recent Labs     05/20/23  0146   AST 13*   ALT 11   BILITOT 0.2   ALKPHOS 83     Troponin: No results for input(s): TROPONINI in the last 72 hours. BNP: No results for input(s): BNP in the last 72 hours. Lipids: No results for input(s): CHOL, HDL in the last 72 hours. Invalid input(s): LDLCALCU  ABGs:   Lab Results   Component Value Date/Time    PO2ART 73 05/12/2023 07:00 AM    VLT8CKD 38.0 05/12/2023 07:00 AM     INR:   No results for input(s): INR in the last 72 hours.     Renal Labs  Albumin:    Lab Results   Component Value Date/Time    LABALBU 2.8 05/20/2023 01:46 AM    LABALBU DUPLICATE ORDER 38/30/4973 12:43 PM     Calcium:
Nephrology Progress Note  5/23/2023 10:46 AM  Subjective: Interval History: Cherelle Foster is a 82 y.o. female. Doing well in general no distress  Data:   Scheduled Meds:   acetylcysteine  2 mL Inhalation BID    hydrALAZINE  75 mg Oral TID    isosorbide mononitrate  60 mg Oral Daily    spironolactone  25 mg Oral BID    pantoprazole  40 mg Oral QAM AC    ipratropium 0.5 mg-albuterol 2.5 mg  1 ampule Inhalation BID    metoprolol tartrate  50 mg Oral BID    guaiFENesin  200 mg Oral Q4H    insulin lispro  0-16 Units SubCUTAneous Q4H    budesonide-formoterol  2 puff Inhalation BID    QUEtiapine  25 mg Oral Nightly    amLODIPine  10 mg Oral Daily    [Held by provider] heparin (porcine)  5,000 Units SubCUTAneous 3 times per day    [Held by provider] allopurinol  300 mg Oral Daily    levothyroxine  137 mcg Oral Daily    sodium chloride flush  5-40 mL IntraVENous 2 times per day     Continuous Infusions:   dextrose      sodium chloride      sodium chloride      sodium chloride 5 mL/hr at 05/11/23 1648         CBC   Recent Labs     05/21/23  0312 05/22/23  0051 05/23/23  0545   WBC 9.2 9.2 8.1   HGB 7.8* 8.1* 9.0*   HCT 26.8* 27.0* 29.7*    246 297      BMP   Recent Labs     05/22/23  1244      K 4.5   *   CO2 19*   BUN 30*   CREATININE 1.8*     Hepatic:   No results for input(s): AST, ALT, ALB, BILITOT, ALKPHOS in the last 72 hours. Troponin: No results for input(s): TROPONINI in the last 72 hours. BNP: No results for input(s): BNP in the last 72 hours. Lipids: No results for input(s): CHOL, HDL in the last 72 hours. Invalid input(s): LDLCALCU  ABGs:   Lab Results   Component Value Date/Time    PO2ART 73 05/12/2023 07:00 AM    ZNK1DFY 38.0 05/12/2023 07:00 AM     INR:   No results for input(s): INR in the last 72 hours.     Renal Labs  Albumin:    Lab Results   Component Value Date/Time    LABALBU 2.8 05/20/2023 01:46 AM    LABALBU DUPLICATE ORDER 15/15/3319 12:43 PM     Calcium:    Lab Results
Nephrology Progress Note  5/24/2023 12:41 PM        Subjective:   Admit Date: 5/3/2023  PCP: Kylei John MD    Interval History:  patient seen in early morning, this is a late entry   no major event overnight    Diet:  unsure how much nutrition she is getting    ROS:   no overt shortness of breath or confusion   urine output recorded only 450 cc for the last 24 hours   no fever variable blood pressure recorded some of them are as high as 257 systolic- probably need manual blood pressure confirmation      Data:     Current meds:    hydrALAZINE  75 mg Oral TID    isosorbide mononitrate  60 mg Oral Daily    spironolactone  25 mg Oral BID    pantoprazole  40 mg Oral QAM AC    ipratropium 0.5 mg-albuterol 2.5 mg  1 ampule Inhalation BID    metoprolol tartrate  50 mg Oral BID    guaiFENesin  200 mg Oral Q4H    insulin lispro  0-16 Units SubCUTAneous Q4H    budesonide-formoterol  2 puff Inhalation BID    QUEtiapine  25 mg Oral Nightly    amLODIPine  10 mg Oral Daily    [Held by provider] heparin (porcine)  5,000 Units SubCUTAneous 3 times per day    [Held by provider] allopurinol  300 mg Oral Daily    levothyroxine  137 mcg Oral Daily    sodium chloride flush  5-40 mL IntraVENous 2 times per day      dextrose      sodium chloride      sodium chloride      sodium chloride 5 mL/hr at 05/11/23 1648         I/O last 3 completed shifts:   In: 420 [P.O.:420]  Out: 800 [Urine:800]    CBC:   Recent Labs     05/22/23  0051 05/23/23  0545 05/24/23  0654   WBC 9.2 8.1 8.1   HGB 8.1* 9.0* 8.6*    297 292          Recent Labs     05/22/23  1244 05/24/23  0654    139   K 4.5 4.4   * 112*   CO2 19* 17*   BUN 30* 21   CREATININE 1.8* 1.7*   GLUCOSE 199* 119*       Lab Results   Component Value Date    CALCIUM 8.0 (L) 05/24/2023    PHOS 3.0 05/13/2023       Objective:     Vitals: BP (!) 169/48   Pulse 65   Temp 97.8 °F (36.6 °C) (Oral)   Resp 24   Ht 5' 6\" (1.676 m)   Wt 184 lb 8.4 oz (83.7 kg)   SpO2
Occupational Therapy    Occupational Therapy Treatment Note    Name: Mercy Cornejo MRN: [id number] :   1940   Date:  2023   Admission Date: 5/3/2023 Room:  [facility]     Primary Problem:  mass of cecum    Restrictions/Precautions:   Contact Precautions, General Precautions, Fall Risk    Communication with other providers:  Per chart review patient appropriate for therapy session     Subjective:  Patient states: Pt agreeable to therapy session. Pain:   Location, Type, Intensity (0/10 to 10/10):  10 in chest from CPR    Objective:    Observation: Pt seated in chair upon arrival.   Objective Measures:  Pt alert and oriented. Treatment, including education:    Self Care Training:   Cues were given for safety, sequence, UE/LE placement, visual cues, and balance. Activities performed today included UB/LB dressing tasks, toileting, hand hygiene at sink    Pt seated in chair upon arrival and completed lower body dressing and doffed and donned socks with MIN A with increased time and adapted figure four technique. Pt self reports use of sock aid prior admission. Pt completed sit to stand from armed chair with [address] and gait belt donned and verbal cues for hand placement with CGA and increased time. Pt completed functional mobility to bathroom and WW and CGA and completed toilet transfer with [address] and grab bar with CGA and required increased time. Pt completed sit to stand from low toilet with MIN A and completed pericare with MAX A. Pt completed functional mobility back to chair and seated with CGA.     Assessment / Impression:    Patient's tolerance of treatment: Well  Adverse Reaction: None  Significant change in status and impact: Improved from initial evaluation  Barriers to improvement: None noted    Plan for Next Session:    Continue OT POC and progress    Time in:  1143  Time out:  1202  Timed treatment minutes:  19  Total treatment time:  19    Electronically signed by:    DREW Beavers,   2023,
Occupational Therapy  . Occupational Therapy Treatment Note    Name: Benita Neil MRN: [id number] :   1940   Date:  2023   Admission Date: 5/3/2023 Room:  [facility]     Primary Problem:  The primary encounter diagnosis was Respiratory distress. A diagnosis of Cecum mass was also pertinent to this visit. Restrictions/Precautions:          Contact Precautions; General Precautions; Fall risk    Communication with other providers: Per chart review, patient is appropriate for therapeutic intervention. Nurse Rigoberto Jain. Subjective:  Patient states:  \"I need to use the toilet! \" Pt reports that prior to surgery, \"I dressed myself, I showered myself, I cooked and I drove. \"   Pain:   Location, Type, Intensity (0/10 to 10/10):  0/10, denies pain    Objective:    Observation: Pt received in semi-fowlers in bed, A&O x4, actively participated c increased time for BM. Abdominal incisions. Objective Measures:  Telemetry, [place]    Treatment, including education:  Therapeutic Activity Training:   Therapeutic activity training was instructed today. Cues were given for safety, sequence, UE/LE placement, awareness, and balance. Activities performed today included bed mobility training, sup-sit, sit-stand, SPT. Supine to sit: CGA for steadying trunk + cues for sequencing to protect abdominal incisions, use of bed features  Scooting: SBA  Sit to stands: Min A to CGA c RW, varied c surfaces heights + increased time / effort  Stand to sits: CGA c RW + min cues for safe body positioning, hand placement    Standing balance / tolerance: SBA static, CGA for steadying assist during dynamic reaching. Tolerated 3 stands, all <5 minutes c pt identifying need for seated rest break    SPT: See Toilet Transfer    Functional Mobility: CGA c RW inside room to / from bathroom, required seated rest break prior to functional mobility to / from window to bedside chair.     Self Care Training:   Cues were given for safety,
Physical Therapy    Physical Therapy Treatment Note  Name: Enio Loyd MRN: [id number] :   1940   Date:  2023   Admission Date: 5/3/2023 [facility]   Restrictions/Precautions:          general precautions, fall risk, contact isolation, abdominal precautions  Communication with other providers:  per chart review pt is appropriate for tx  Subjective:  Patient states:  motivated and agreeable to tx. Pt reports sleeping in recliner at home and would prefer to return home with home care services and reports having a lot of medical equipment at home that was her husbands. Pain:   Location, Type, Intensity (0/10 to 10/10):  pt denies having pain at this time  Objective:    Observation:  alert and oriented    Treatment, including education/measures:  Sit<=>stand sba  Amb with rw 80' x 1. Pt has slow but steady gt. Pt declined second walk, reports dghter is coming to help her bath. Safety  Patient left safely in the chair, with call light/phone in reach with alarm applied. Gait belt was used for transfers and gait. Assessment / Impression:      Patient's tolerance of treatment:  good   Adverse Reaction: na  Significant change in status and impact:  na  Barriers to improvement:  strength and safety  Plan for Next Session:    Cont.  POC  Time in:  1345  Time out:  1405  Timed treatment minutes:  20  Total treatment time:  20    Previously filed items:  Social/Functional History  Lives With: Son, Other (comment) (son's girlfriend; son works from home)  Type of Home: [address] [place] [address],[address]: One level  Home Access: Stairs to enter with [address] - Number of Steps: 3  Bathroom Shower/Tub: Tub/Shower unit  Bathroom Toilet: Standard  Bathroom Equipment: Grab bars in shower, Tub transfer [address] [facility]: [address], [place], [address] [facility] bed (BSC; independent in the home without a device, mod I with Rollator in community)  ADL Assistance: [facility]:
Pt identified as a candidate for COPD Gold assessment. Patient does not have Pulmonary Function testing on record. When patient is discharged and able to complete PFT testing as an out-patient, they would benefit from baseline PFT testing and COPD Gold could be assessed if patient were to have readmission at a later date.
Pulmonary   Progress Note    Subjective: The patient is doing fairly well. On RA. Shortness of breath none. Chest pain none. Addressing respiratory complaints Patient is negative for  hemoptysis and cyanosis  CONSTITUTIONAL:  negative for fevers and chills      Past Medical History:     has a past medical history of Abnormal nuclear stress test, Cardiac arrest ([facility] [address] Diabetes mellitus ([facility] [address] H/O cardiac catheterization, H/O cardiovascular stress test, H/O cardiovascular stress test, H/O Doppler ultrasound, H/O echocardiogram, H/O echocardiogram, H/O echocardiogram, H/O left heart cath, H/O left heart cath, Hernia, Hx of cardiovascular stress test, Hyperlipidemia, Hypertension, Irritable bowel syndrome, Kidney stones, Obesity, Osteoarthritis, Thyroid disease, and Type II or unspecified type diabetes mellitus without mention of complication, not stated as uncontrolled. has a past surgical history that includes Lithotripsy; Rotator cuff repair; Gastric Band (08/2008); Cardiac catheterization (04/18/08); Hysterectomy; Lap Band; Rotator cuff repair; hemicolectomy (Right, 5/3/2023); and hernia repair (N/A, 5/3/2023). reports that she quit smoking about 28 years ago. Her smoking use included cigarettes. She has a 1.50 pack-year smoking history. She has never used smokeless tobacco. She reports that she does not drink alcohol and does not use drugs. Family history:  family history includes COPD in her brother; Cancer in her father, mother, and sister; Heart Disease in her brother and father; High Blood Pressure in her father; Migraines in her mother.     Allergies   Allergen Reactions    Scopolamine      Combative and delusional    Amoxicillin     Bactrim [Sulfamethoxazole-Trimethoprim]      Had increase creatinine not true reaction    Penicillins Hives     Social History:    Reviewed; no changes    Objective:   PHYSICAL EXAM:        VITALS:  BP (!) 129/44   Pulse 66   Temp 97.7 °F (36.5 °C) (Oral)   Resp 23
Pulmonary   Progress Note    Subjective: The patient is doing well. On RA 96%. Shortness of breath none. Chest pain none. Addressing respiratory complaints Patient is negative for  hemoptysis and cyanosis  CONSTITUTIONAL:  negative for fevers and chills      Past Medical History:     has a past medical history of Abnormal nuclear stress test, Cardiac arrest ([facility] [address] Diabetes mellitus ([facility] [address] H/O cardiac catheterization, H/O cardiovascular stress test, H/O cardiovascular stress test, H/O Doppler ultrasound, H/O echocardiogram, H/O echocardiogram, H/O echocardiogram, H/O left heart cath, H/O left heart cath, Hernia, Hx of cardiovascular stress test, Hyperlipidemia, Hypertension, Irritable bowel syndrome, Kidney stones, Obesity, Osteoarthritis, Thyroid disease, and Type II or unspecified type diabetes mellitus without mention of complication, not stated as uncontrolled. has a past surgical history that includes Lithotripsy; Rotator cuff repair; Gastric Band (08/2008); Cardiac catheterization (04/18/08); Hysterectomy; Lap Band; Rotator cuff repair; hemicolectomy (Right, 5/3/2023); and hernia repair (N/A, 5/3/2023). reports that she quit smoking about 28 years ago. Her smoking use included cigarettes. She has a 1.50 pack-year smoking history. She has never used smokeless tobacco. She reports that she does not drink alcohol and does not use drugs. Family history:  family history includes COPD in her brother; Cancer in her father, mother, and sister; Heart Disease in her brother and father; High Blood Pressure in her father; Migraines in her mother.     Allergies   Allergen Reactions    Scopolamine      Combative and delusional    Amoxicillin     Bactrim [Sulfamethoxazole-Trimethoprim]      Had increase creatinine not true reaction    Penicillins Hives     Social History:    Reviewed; no changes    Objective:   PHYSICAL EXAM:        VITALS:  BP (!) 136/49   Pulse 61   Temp 98.2 °F (36.8 °C) (Oral)   Resp 16   Ht
Pulmonary   Progress Note    Subjective: The patient is doing well. On RA 96%. Shortness of breath none. Chest pain none. Addressing respiratory complaints Patient is negative for  hemoptysis and cyanosis  CONSTITUTIONAL:  negative for fevers and chills      Past Medical History:     has a past medical history of Abnormal nuclear stress test, Cardiac arrest ([facility] [address] Diabetes mellitus ([facility] [address] H/O cardiac catheterization, H/O cardiovascular stress test, H/O cardiovascular stress test, H/O Doppler ultrasound, H/O echocardiogram, H/O echocardiogram, H/O echocardiogram, H/O left heart cath, H/O left heart cath, Hernia, Hx of cardiovascular stress test, Hyperlipidemia, Hypertension, Irritable bowel syndrome, Kidney stones, Obesity, Osteoarthritis, Thyroid disease, and Type II or unspecified type diabetes mellitus without mention of complication, not stated as uncontrolled. has a past surgical history that includes Lithotripsy; Rotator cuff repair; Gastric Band (08/2008); Cardiac catheterization (04/18/08); Hysterectomy; Lap Band; Rotator cuff repair; hemicolectomy (Right, 5/3/2023); and hernia repair (N/A, 5/3/2023). reports that she quit smoking about 28 years ago. Her smoking use included cigarettes. She has a 1.50 pack-year smoking history. She has never used smokeless tobacco. She reports that she does not drink alcohol and does not use drugs. Family history:  family history includes COPD in her brother; Cancer in her father, mother, and sister; Heart Disease in her brother and father; High Blood Pressure in her father; Migraines in her mother.     Allergies   Allergen Reactions    Scopolamine      Combative and delusional    Amoxicillin     Bactrim [Sulfamethoxazole-Trimethoprim]      Had increase creatinine not true reaction    Penicillins Hives     Social History:    Reviewed; no changes    Objective:   PHYSICAL EXAM:        VITALS:  BP (!) 147/40   Pulse 66   Temp 98 °F (36.7 °C) (Oral)   Resp 25   Ht 5'
Report given to Dwain Barajas RN on ARU
ischemia apical,RF66%    H/O Doppler ultrasound 2007    CAROTID DOPLER- intimal thickening but no significant atherosclerotic plaque noted in the right or left internal carotid artery, doppler flow velocities within the right and left internal carotid artery are elevated, consistent with a mild, less than 50% stenosis    H/O echocardiogram 10/14/2008    EF>55%, normal LV systolic function, normal left ventricular wall thickness, impaired LV relaxation    H/O echocardiogram 2013- EF >55% Impaired LV relaxation, Mild concentric LV hypertrophy, No sig AS, THEO underestimated. 10/08-EF=>55%,NL study;-EF=>55%,Mild valv. AS.    H/O echocardiogram 2016 EF 55-60% normal study EF 60%. MIld mitral and tricuspid insufficiencies. Mildly sclerotic aortic valve which appears to be mildly stenosed with aortic valve area of 1.4 however this does not appear to be hemodynamically signficant aortic stenosis. Mildy hypertropic left ventricle with normal global and regional left ventricular systolic function. H/O left heart cath 2016    no significant disease in all vessels. EF 55%    H/O left heart cath 2022    No significant disease    Hernia     after lap band removal    Hx of cardiovascular stress test 2013-EF 51%, no scitnigraphic evidence of inducible myocardial ischemia    Hyperlipidemia     Hypertension     Irritable bowel syndrome     Kidney stones     Obesity     Osteoarthritis     Thyroid disease     Type II or unspecified type diabetes mellitus without mention of complication, not stated as uncontrolled        Objective:     Vitals:    23 0837   BP:    Pulse:    Resp:    Temp:    SpO2: 95%       TEMPERATURE:  Current - Temp: 97.8 °F (36.6 °C); Max - Temp  Av.1 °F (36.7 °C)  Min: 97.8 °F (36.6 °C)  Max: 98.5 °F (36.9 °C)    No intake/output data recorded. I/O last 3 completed shifts:   In: 5 [P.O.:420]  Out: 800 [Urine:800]      Physical
oriented  HEENT: 1+ conjunctival pallor  Neck: Seems supple  Lungs: Crackles on auscultation  Heart: Regular rate and rhythm with crescendo decrescendo murmur best heard at the right upper sternal and left lower sternal border as well as pansystolic murmur at fifth intercostal mid axillary area  Abdomen: Soft, nontender  Extremities: Trace leg edema      Problem List :         Impression :     Acute kidney injury with underlying chronic kidney disease stage G3 B/4-requiring a creatinine criteria-  Metabolic acidosis mainly from acute kidney injury and perhaps from gastrointestinal bicarbonate losses improving  Colon cancer status post right hemicolectomy, multifactorial anemia  Recent cardiopulmonary arrest, also has diabetes and high blood pressure and other chronic condition    Recommendation/Plan  :     Spironolactone has been discontinued because of risk of high potassium-blood pressure seems to be acceptable now-venous blood gas never been done-we will add sodium bicarbonate-650 mg twice a day-okay to discharge from kidney standpoint-comprehensive metabolic panel in 1 week-follow-up with Dr. Brenna Hester  in 2 weeks      Nel Frye MD MD
Chronic obstructive pulmonary disease, unspecified COPD type ([facility]    Obesity, Class III, BMI 40-49.9 (morbid obesity) ([facility]    Uncontrolled type 2 diabetes mellitus with insulin therapy    Leg swelling    Chronic pain of left knee    Chronic kidney disease, stage IV (severe) (HCC)    Chronic renal disease, stage III ([facility] [[id number]]    Renal stone    Major depressive disorder, recurrent, mild    Major depressive disorder, recurrent, moderate    Major depressive disorder, recurrent, unspecified    Cystic kidney disease    ASCVD (arteriosclerotic cardiovascular disease)    Valvular heart disease    Acute cystitis without hematuria    Cecum mass    Mass of cecum    Moderate malnutrition ([facility]       Plan:   1. Overall the patient has improved. 2. Inc. Activity.   3. Awaiting rehab approval.  Michelle Kent MD   5/24/2023  10:36 AM
Acute cystitis without hematuria     Cecum mass     Mass of cecum     Moderate malnutrition (HCC)      Plan:     Continue to monitor H&H and BMs. Hold AC for now. PT/OT  Continue diet as tolerated.      D/c per primary    Madison Murcia PA-C

## 2023-05-25 NOTE — CARE COORDINATION
Received approval for admission to ARU. Patient meets criteria and is approved to come to ARU. Patient able to admit once medically stable and after ARU Medical Director and  sign the pre-admission screen (PAS). No rapid COVID needed.      Met with patient to notify her of approval.

## 2023-05-25 NOTE — H&P
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 She has had cardiac arrest with PEA, hypoxic encephalopathy and prolonged respiratory failure requiring mechanical intubation through 9 days. Strengths for the patient: Supportive family, her independent habits prior to admission and a reasonably accessible home. Limitations/barriers for the patient: Her age, the anticipated need for supervision around-the-clock after discharge and her cardiac vulnerability. Recommendation: Acute inpatient rehabilitation with occupational and physical therapy 180 minutes 5 out of every 7 days. Will address basic and  advancing mobility with self-care instruction and adaptive equipment training. Caregiver education will be offered. Expected length of stay  prior to a supervised level of function for discharge home with a walker and [facility] OT/PT is 2 weeks. Additional recommendation:    Critical illness myopathy with gait disturbance: The patient requires daily occupational and physical therapy with speech-language pathology. Due to her generalized weakness, poor insight and reasoning and cardiac deconditioning, she is at risk for fall with injury during any standing ADLs or mobility activities. Therefore, we must provide her adaptive equipment training with reconditioning exercises and balance retraining.   She needs aggressive pulmonary hygiene measures

## 2023-05-25 NOTE — CARE COORDINATION
This RN case manager has been advised by Natalie Siddiqi with ARU that patient has been approved to go to ARU.

## 2023-05-26 LAB
GLUCOSE BLD-MCNC: 109 MG/DL (ref 70–99)
GLUCOSE BLD-MCNC: 162 MG/DL (ref 70–99)
GLUCOSE BLD-MCNC: 175 MG/DL (ref 70–99)
GLUCOSE BLD-MCNC: 199 MG/DL (ref 70–99)

## 2023-05-26 PROCEDURE — 97167 OT EVAL HIGH COMPLEX 60 MIN: CPT

## 2023-05-26 PROCEDURE — 99232 SBSQ HOSP IP/OBS MODERATE 35: CPT | Performed by: PHYSICAL MEDICINE & REHABILITATION

## 2023-05-26 PROCEDURE — 82962 GLUCOSE BLOOD TEST: CPT

## 2023-05-26 PROCEDURE — 97110 THERAPEUTIC EXERCISES: CPT

## 2023-05-26 PROCEDURE — 97162 PT EVAL MOD COMPLEX 30 MIN: CPT

## 2023-05-26 PROCEDURE — 2580000003 HC RX 258: Performed by: PHYSICIAN ASSISTANT

## 2023-05-26 PROCEDURE — 99211 OFF/OP EST MAY X REQ PHY/QHP: CPT

## 2023-05-26 PROCEDURE — 6370000000 HC RX 637 (ALT 250 FOR IP): Performed by: GENERAL PRACTICE

## 2023-05-26 PROCEDURE — 6370000000 HC RX 637 (ALT 250 FOR IP): Performed by: PHYSICIAN ASSISTANT

## 2023-05-26 PROCEDURE — 94761 N-INVAS EAR/PLS OXIMETRY MLT: CPT

## 2023-05-26 PROCEDURE — 92610 EVALUATE SWALLOWING FUNCTION: CPT

## 2023-05-26 PROCEDURE — 94150 VITAL CAPACITY TEST: CPT

## 2023-05-26 PROCEDURE — 97530 THERAPEUTIC ACTIVITIES: CPT

## 2023-05-26 PROCEDURE — 6360000002 HC RX W HCPCS: Performed by: GENERAL PRACTICE

## 2023-05-26 PROCEDURE — 94640 AIRWAY INHALATION TREATMENT: CPT

## 2023-05-26 PROCEDURE — 97116 GAIT TRAINING THERAPY: CPT

## 2023-05-26 PROCEDURE — 1280000000 HC REHAB R&B

## 2023-05-26 PROCEDURE — 97535 SELF CARE MNGMENT TRAINING: CPT

## 2023-05-26 PROCEDURE — 94669 MECHANICAL CHEST WALL OSCILL: CPT

## 2023-05-26 PROCEDURE — 6370000000 HC RX 637 (ALT 250 FOR IP): Performed by: PHYSICAL MEDICINE & REHABILITATION

## 2023-05-26 RX ADMIN — BUDESONIDE AND FORMOTEROL FUMARATE DIHYDRATE 2 PUFF: 160; 4.5 AEROSOL RESPIRATORY (INHALATION) at 19:18

## 2023-05-26 RX ADMIN — LEVOTHYROXINE SODIUM 137 MCG: 0.07 TABLET ORAL at 06:25

## 2023-05-26 RX ADMIN — Medication 3 MG: at 21:36

## 2023-05-26 RX ADMIN — HEPARIN SODIUM 5000 UNITS: 5000 INJECTION INTRAVENOUS; SUBCUTANEOUS at 15:58

## 2023-05-26 RX ADMIN — ASPIRIN 81 MG CHEWABLE TABLET 81 MG: 81 TABLET CHEWABLE at 10:52

## 2023-05-26 RX ADMIN — METOPROLOL TARTRATE 50 MG: 50 TABLET, FILM COATED ORAL at 10:55

## 2023-05-26 RX ADMIN — QUETIAPINE FUMARATE 25 MG: 25 TABLET ORAL at 21:34

## 2023-05-26 RX ADMIN — HEPARIN SODIUM 5000 UNITS: 5000 INJECTION INTRAVENOUS; SUBCUTANEOUS at 06:21

## 2023-05-26 RX ADMIN — HYDRALAZINE HYDROCHLORIDE 75 MG: 50 TABLET ORAL at 10:54

## 2023-05-26 RX ADMIN — HYDRALAZINE HYDROCHLORIDE 75 MG: 50 TABLET ORAL at 21:34

## 2023-05-26 RX ADMIN — IPRATROPIUM BROMIDE AND ALBUTEROL SULFATE 3 ML: .5; 3 SOLUTION RESPIRATORY (INHALATION) at 19:17

## 2023-05-26 RX ADMIN — GUAIFENESIN 600 MG: 600 TABLET, EXTENDED RELEASE ORAL at 17:41

## 2023-05-26 RX ADMIN — SODIUM CHLORIDE, PRESERVATIVE FREE 10 ML: 5 INJECTION INTRAVENOUS at 10:56

## 2023-05-26 RX ADMIN — IPRATROPIUM BROMIDE AND ALBUTEROL SULFATE 1 AMPULE: .5; 3 SOLUTION RESPIRATORY (INHALATION) at 10:52

## 2023-05-26 RX ADMIN — HEPARIN SODIUM 5000 UNITS: 5000 INJECTION INTRAVENOUS; SUBCUTANEOUS at 21:34

## 2023-05-26 RX ADMIN — BUDESONIDE AND FORMOTEROL FUMARATE DIHYDRATE 2 PUFF: 160; 4.5 AEROSOL RESPIRATORY (INHALATION) at 10:53

## 2023-05-26 RX ADMIN — SODIUM BICARBONATE 650 MG: 650 TABLET ORAL at 21:34

## 2023-05-26 RX ADMIN — AMLODIPINE BESYLATE 10 MG: 10 TABLET ORAL at 10:54

## 2023-05-26 RX ADMIN — HYDRALAZINE HYDROCHLORIDE 75 MG: 50 TABLET ORAL at 15:57

## 2023-05-26 RX ADMIN — SODIUM BICARBONATE 650 MG: 650 TABLET ORAL at 10:54

## 2023-05-26 RX ADMIN — PANTOPRAZOLE SODIUM 40 MG: 40 TABLET, DELAYED RELEASE ORAL at 06:25

## 2023-05-26 RX ADMIN — SODIUM CHLORIDE, PRESERVATIVE FREE 10 ML: 5 INJECTION INTRAVENOUS at 21:36

## 2023-05-26 RX ADMIN — METOPROLOL TARTRATE 50 MG: 50 TABLET, FILM COATED ORAL at 21:34

## 2023-05-26 RX ADMIN — ISOSORBIDE MONONITRATE 60 MG: 30 TABLET, EXTENDED RELEASE ORAL at 10:54

## 2023-05-26 ASSESSMENT — PAIN SCALES - GENERAL
PAINLEVEL_OUTOF10: 0

## 2023-05-26 ASSESSMENT — PAIN SCALES - WONG BAKER
WONGBAKER_NUMERICALRESPONSE: 0

## 2023-05-26 NOTE — CARE COORDINATION
Case Management Admission Note    Patient:Gwendolyn Wayne      DKV:[birth year]  [facility]:[id number]  Rehab Dx/Hx: Critical illness myopathy [G72.81]    Chief Complaint:   Past Medical History:   Diagnosis Date    Abnormal nuclear stress test 04/08/2007 4/08-EF=67%,Mild->Mod.isch. LAD;7/07-EF=70%,Suggests poss. Mild Ant.wall ischemia. Cardiac arrest ([facility] 2008    Diabetes mellitus ([facility]     H/O cardiac catheterization 04/2008    No signif.CAD.    H/O cardiovascular stress test 04/11/2008    mild to mod ischemia in the LAD region, abnormal study, EF 67%, patient developed mild chest pain and throat tightness    H/O cardiovascular stress test 05/03/2016    lexiscan-moderate ischemia apical,RF66%    H/O Doppler ultrasound 07/16/2007    CAROTID DOPLER- intimal thickening but no significant atherosclerotic plaque noted in the right or left internal carotid artery, doppler flow velocities within the right and left internal carotid artery are elevated, consistent with a mild, less than 50% stenosis    H/O echocardiogram 10/14/2008    EF>55%, normal LV systolic function, normal left ventricular wall thickness, impaired LV relaxation    H/O echocardiogram 06/18/2013 6/13- EF >55% Impaired LV relaxation, Mild concentric LV hypertrophy, No sig AS, THEO underestimated. 10/08-EF=>55%,NL study;7/07-EF=>55%,Mild valv. AS.    H/O echocardiogram 05/16/2016 5/16 EF 55-60% normal study 12/14EF 60%. MIld mitral and tricuspid insufficiencies. Mildly sclerotic aortic valve which appears to be mildly stenosed with aortic valve area of 1.4 however this does not appear to be hemodynamically signficant aortic stenosis. Mildy hypertropic left ventricle with normal global and regional left ventricular systolic function. H/O left heart cath 05/23/2016    no significant disease in all vessels.  EF 55%    H/O left heart cath 06/08/2022    No significant disease    Hernia     after lap band removal    Hx of cardiovascular stress test 06/18/2013

## 2023-05-26 NOTE — PATIENT CARE CONFERENCE
get basic needs met on unit  [] Pt will improve swallowing for safe diet advancement with use of strategies  []  Plan for discharge to home. []  Overall team goal being targeted this week: ***     Patient Strengths: {Patient Strengths:[id number]}    Justification for Continued Stay  Based on my medical assessment of the patient and review of information from the interdisciplinary team as part of this weekly team conference, the patient continues to meet the following criteria for IRF level of care: The patient requires active and ongoing intervention of multiple therapy disciplines  The patient requires and intensive rehabilitation therapy program  The patient requires continued physician supervision by a rehabilitation physician  The patient requires 24 hours rehab nursing care  The patient requires an intensive and coordinated interdisciplinary team approach to the delivery of rehabilitative care. Assessment/Plan   []  The patient is making good progression towards their long term goals and is actively participating in and has a reasonable expectation to continue to benefit from the intensive rehabilitation therapy program   []  The estimated discharge date has been changed from initial team conference due to:   []  The estimated discharge destination has been changed from initial team conference due to:         Ongoing tx following discharge: []HHC     []OUTPATIENT     [x] No Further Treatment for OT      [] Family/Caregiver Training  []  Transitional Living Arrangement   [] Home Assessment (date  )     [] Family Conference   []  Therapeutic Pass       []  Other: (specify)    Estimated Discharge Date: ***    Estimated Discharge Destination: []home alone   []home alone with assist prn  []Continuous supervision []Return home with s/o/spouse/family   [] Assisted living    []SNF     Team members participating in today's conference.     [x] HERLINDA Scott, Medical Director  [x] Morgan Duckworth DPT, Program

## 2023-05-27 LAB
GLUCOSE BLD-MCNC: 112 MG/DL (ref 70–99)
GLUCOSE BLD-MCNC: 128 MG/DL (ref 70–99)
GLUCOSE BLD-MCNC: 145 MG/DL (ref 70–99)
GLUCOSE BLD-MCNC: 183 MG/DL (ref 70–99)

## 2023-05-27 PROCEDURE — 97150 GROUP THERAPEUTIC PROCEDURES: CPT

## 2023-05-27 PROCEDURE — 97530 THERAPEUTIC ACTIVITIES: CPT

## 2023-05-27 PROCEDURE — 94669 MECHANICAL CHEST WALL OSCILL: CPT

## 2023-05-27 PROCEDURE — 94150 VITAL CAPACITY TEST: CPT

## 2023-05-27 PROCEDURE — 97535 SELF CARE MNGMENT TRAINING: CPT

## 2023-05-27 PROCEDURE — 94761 N-INVAS EAR/PLS OXIMETRY MLT: CPT

## 2023-05-27 PROCEDURE — 6370000000 HC RX 637 (ALT 250 FOR IP): Performed by: GENERAL PRACTICE

## 2023-05-27 PROCEDURE — 6370000000 HC RX 637 (ALT 250 FOR IP): Performed by: PHYSICAL MEDICINE & REHABILITATION

## 2023-05-27 PROCEDURE — 1280000000 HC REHAB R&B

## 2023-05-27 PROCEDURE — 82962 GLUCOSE BLOOD TEST: CPT

## 2023-05-27 PROCEDURE — 6360000002 HC RX W HCPCS: Performed by: GENERAL PRACTICE

## 2023-05-27 PROCEDURE — 97112 NEUROMUSCULAR REEDUCATION: CPT

## 2023-05-27 PROCEDURE — 6370000000 HC RX 637 (ALT 250 FOR IP): Performed by: PHYSICIAN ASSISTANT

## 2023-05-27 PROCEDURE — 97110 THERAPEUTIC EXERCISES: CPT

## 2023-05-27 PROCEDURE — 2580000003 HC RX 258: Performed by: PHYSICIAN ASSISTANT

## 2023-05-27 PROCEDURE — 94640 AIRWAY INHALATION TREATMENT: CPT

## 2023-05-27 PROCEDURE — 97116 GAIT TRAINING THERAPY: CPT

## 2023-05-27 RX ORDER — ALBUTEROL SULFATE 90 UG/1
2 AEROSOL, METERED RESPIRATORY (INHALATION) EVERY 6 HOURS PRN
Status: DISCONTINUED | OUTPATIENT
Start: 2023-05-27 | End: 2023-06-02 | Stop reason: HOSPADM

## 2023-05-27 RX ADMIN — QUETIAPINE FUMARATE 25 MG: 25 TABLET ORAL at 22:14

## 2023-05-27 RX ADMIN — HYDRALAZINE HYDROCHLORIDE 75 MG: 50 TABLET ORAL at 09:36

## 2023-05-27 RX ADMIN — SODIUM CHLORIDE, PRESERVATIVE FREE 10 ML: 5 INJECTION INTRAVENOUS at 22:14

## 2023-05-27 RX ADMIN — GUAIFENESIN 600 MG: 600 TABLET, EXTENDED RELEASE ORAL at 17:47

## 2023-05-27 RX ADMIN — HEPARIN SODIUM 5000 UNITS: 5000 INJECTION INTRAVENOUS; SUBCUTANEOUS at 06:18

## 2023-05-27 RX ADMIN — SODIUM BICARBONATE 650 MG: 650 TABLET ORAL at 22:14

## 2023-05-27 RX ADMIN — ASPIRIN 81 MG CHEWABLE TABLET 81 MG: 81 TABLET CHEWABLE at 09:36

## 2023-05-27 RX ADMIN — SODIUM CHLORIDE, PRESERVATIVE FREE 10 ML: 5 INJECTION INTRAVENOUS at 09:37

## 2023-05-27 RX ADMIN — ACETAMINOPHEN 650 MG: 325 TABLET ORAL at 06:14

## 2023-05-27 RX ADMIN — HEPARIN SODIUM 5000 UNITS: 5000 INJECTION INTRAVENOUS; SUBCUTANEOUS at 13:45

## 2023-05-27 RX ADMIN — SODIUM BICARBONATE 650 MG: 650 TABLET ORAL at 09:36

## 2023-05-27 RX ADMIN — BUDESONIDE AND FORMOTEROL FUMARATE DIHYDRATE 2 PUFF: 160; 4.5 AEROSOL RESPIRATORY (INHALATION) at 07:59

## 2023-05-27 RX ADMIN — METOPROLOL TARTRATE 50 MG: 50 TABLET, FILM COATED ORAL at 09:37

## 2023-05-27 RX ADMIN — PANTOPRAZOLE SODIUM 40 MG: 40 TABLET, DELAYED RELEASE ORAL at 06:14

## 2023-05-27 RX ADMIN — METOPROLOL TARTRATE 50 MG: 50 TABLET, FILM COATED ORAL at 22:13

## 2023-05-27 RX ADMIN — HEPARIN SODIUM 5000 UNITS: 5000 INJECTION INTRAVENOUS; SUBCUTANEOUS at 22:14

## 2023-05-27 RX ADMIN — HYDRALAZINE HYDROCHLORIDE 75 MG: 50 TABLET ORAL at 22:13

## 2023-05-27 RX ADMIN — HYDRALAZINE HYDROCHLORIDE 75 MG: 50 TABLET ORAL at 13:46

## 2023-05-27 RX ADMIN — LEVOTHYROXINE SODIUM 137 MCG: 0.07 TABLET ORAL at 06:14

## 2023-05-27 RX ADMIN — ISOSORBIDE MONONITRATE 60 MG: 30 TABLET, EXTENDED RELEASE ORAL at 09:37

## 2023-05-27 RX ADMIN — AMLODIPINE BESYLATE 10 MG: 10 TABLET ORAL at 09:37

## 2023-05-27 ASSESSMENT — PAIN SCALES - GENERAL
PAINLEVEL_OUTOF10: 0
PAINLEVEL_OUTOF10: 0

## 2023-05-27 ASSESSMENT — PAIN SCALES - WONG BAKER: WONGBAKER_NUMERICALRESPONSE: 0

## 2023-05-27 NOTE — RT PROTOCOL NOTE
RT Inhaler-Nebulizer Bronchodilator Protocol Note    There is a bronchodilator order in the chart from a provider indicating to follow the RT Bronchodilator Protocol and there is an Initiate RT Inhaler-Nebulizer Bronchodilator Protocol order as well (see protocol at bottom of note). CXR Findings:  No results found. The findings from the last RT Protocol Assessment were as follows:   History Pulmonary Disease: None or smoker <15 pack years  Respiratory Pattern: Regular pattern and RR 12-20 bpm  Breath Sounds: Slightly diminished and/or crackles  Cough: Strong, spontaneous, non-productive  Indication for Bronchodilator Therapy:    Bronchodilator Assessment Score: 2    Aerosolized bronchodilator medication orders have been revised according to the RT Inhaler-Nebulizer Bronchodilator Protocol below. Respiratory Therapist to perform RT Therapy Protocol Assessment initially then follow the protocol. Repeat RT Therapy Protocol Assessment PRN for score 0-3 or on second treatment, BID, and PRN for scores above 3. No Indications - adjust the frequency to every 6 hours PRN wheezing or bronchospasm, if no treatments needed after 48 hours then discontinue using Per Protocol order mode. If indication present, adjust the RT bronchodilator orders based on the Bronchodilator Assessment Score as indicated below. Use Inhaler orders unless patient has one or more of the following: on home nebulizer, not able to hold breath for 10 seconds, is not alert and oriented, cannot activate and use MDI correctly, or respiratory rate 25 breaths per minute or more, then use the equivalent nebulizer order(s) with same Frequency and PRN reasons based on the score. If a patient is on this medication at home then do not decrease Frequency below that used at home.     0-3 - enter or revise RT bronchodilator order(s) to equivalent RT Bronchodilator order with Frequency of every 4 hours PRN for wheezing or increased work of breathing

## 2023-05-28 VITALS
SYSTOLIC BLOOD PRESSURE: 153 MMHG | DIASTOLIC BLOOD PRESSURE: 54 MMHG | HEART RATE: 65 BPM | TEMPERATURE: 98.2 F | OXYGEN SATURATION: 97 % | BODY MASS INDEX: 28.27 KG/M2 | HEIGHT: 66 IN | RESPIRATION RATE: 18 BRPM | WEIGHT: 175.93 LBS

## 2023-05-28 LAB
GLUCOSE BLD-MCNC: 102 MG/DL (ref 70–99)
GLUCOSE BLD-MCNC: 134 MG/DL (ref 70–99)
GLUCOSE BLD-MCNC: 150 MG/DL (ref 70–99)
GLUCOSE BLD-MCNC: 167 MG/DL (ref 70–99)

## 2023-05-28 PROCEDURE — 97116 GAIT TRAINING THERAPY: CPT

## 2023-05-28 PROCEDURE — 94150 VITAL CAPACITY TEST: CPT

## 2023-05-28 PROCEDURE — 6360000002 HC RX W HCPCS: Performed by: GENERAL PRACTICE

## 2023-05-28 PROCEDURE — 97530 THERAPEUTIC ACTIVITIES: CPT

## 2023-05-28 PROCEDURE — 94664 DEMO&/EVAL PT USE INHALER: CPT

## 2023-05-28 PROCEDURE — 82962 GLUCOSE BLOOD TEST: CPT

## 2023-05-28 PROCEDURE — 94669 MECHANICAL CHEST WALL OSCILL: CPT

## 2023-05-28 PROCEDURE — 6370000000 HC RX 637 (ALT 250 FOR IP): Performed by: GENERAL PRACTICE

## 2023-05-28 PROCEDURE — 6370000000 HC RX 637 (ALT 250 FOR IP): Performed by: PHYSICIAN ASSISTANT

## 2023-05-28 PROCEDURE — 1280000000 HC REHAB R&B

## 2023-05-28 PROCEDURE — 2580000003 HC RX 258: Performed by: PHYSICIAN ASSISTANT

## 2023-05-28 PROCEDURE — 94640 AIRWAY INHALATION TREATMENT: CPT

## 2023-05-28 PROCEDURE — 97110 THERAPEUTIC EXERCISES: CPT

## 2023-05-28 PROCEDURE — 97535 SELF CARE MNGMENT TRAINING: CPT

## 2023-05-28 PROCEDURE — 6370000000 HC RX 637 (ALT 250 FOR IP): Performed by: PHYSICAL MEDICINE & REHABILITATION

## 2023-05-28 RX ADMIN — HYDRALAZINE HYDROCHLORIDE 75 MG: 50 TABLET ORAL at 10:25

## 2023-05-28 RX ADMIN — ISOSORBIDE MONONITRATE 60 MG: 30 TABLET, EXTENDED RELEASE ORAL at 10:25

## 2023-05-28 RX ADMIN — HEPARIN SODIUM 5000 UNITS: 5000 INJECTION INTRAVENOUS; SUBCUTANEOUS at 06:21

## 2023-05-28 RX ADMIN — ASPIRIN 81 MG CHEWABLE TABLET 81 MG: 81 TABLET CHEWABLE at 10:25

## 2023-05-28 RX ADMIN — HYDRALAZINE HYDROCHLORIDE 75 MG: 50 TABLET ORAL at 16:05

## 2023-05-28 RX ADMIN — METOPROLOL TARTRATE 50 MG: 50 TABLET, FILM COATED ORAL at 20:51

## 2023-05-28 RX ADMIN — SODIUM BICARBONATE 650 MG: 650 TABLET ORAL at 20:51

## 2023-05-28 RX ADMIN — SODIUM CHLORIDE, PRESERVATIVE FREE 10 ML: 5 INJECTION INTRAVENOUS at 10:26

## 2023-05-28 RX ADMIN — AMLODIPINE BESYLATE 10 MG: 10 TABLET ORAL at 10:25

## 2023-05-28 RX ADMIN — HYDRALAZINE HYDROCHLORIDE 75 MG: 50 TABLET ORAL at 20:51

## 2023-05-28 RX ADMIN — SODIUM CHLORIDE, PRESERVATIVE FREE 10 ML: 5 INJECTION INTRAVENOUS at 21:00

## 2023-05-28 RX ADMIN — HEPARIN SODIUM 5000 UNITS: 5000 INJECTION INTRAVENOUS; SUBCUTANEOUS at 16:06

## 2023-05-28 RX ADMIN — Medication 3 MG: at 20:51

## 2023-05-28 RX ADMIN — GUAIFENESIN 600 MG: 600 TABLET, EXTENDED RELEASE ORAL at 16:05

## 2023-05-28 RX ADMIN — HEPARIN SODIUM 5000 UNITS: 5000 INJECTION INTRAVENOUS; SUBCUTANEOUS at 20:51

## 2023-05-28 RX ADMIN — SODIUM BICARBONATE 650 MG: 650 TABLET ORAL at 10:25

## 2023-05-28 RX ADMIN — LEVOTHYROXINE SODIUM 137 MCG: 0.07 TABLET ORAL at 06:21

## 2023-05-28 RX ADMIN — QUETIAPINE FUMARATE 25 MG: 25 TABLET ORAL at 20:51

## 2023-05-28 RX ADMIN — BUDESONIDE AND FORMOTEROL FUMARATE DIHYDRATE 2 PUFF: 160; 4.5 AEROSOL RESPIRATORY (INHALATION) at 07:30

## 2023-05-28 RX ADMIN — METOPROLOL TARTRATE 50 MG: 50 TABLET, FILM COATED ORAL at 10:25

## 2023-05-28 RX ADMIN — PANTOPRAZOLE SODIUM 40 MG: 40 TABLET, DELAYED RELEASE ORAL at 06:22

## 2023-05-28 ASSESSMENT — PAIN SCALES - WONG BAKER
WONGBAKER_NUMERICALRESPONSE: 0

## 2023-05-29 LAB
GLUCOSE BLD-MCNC: 104 MG/DL (ref 70–99)
GLUCOSE BLD-MCNC: 142 MG/DL (ref 70–99)
GLUCOSE BLD-MCNC: 162 MG/DL (ref 70–99)
GLUCOSE BLD-MCNC: 166 MG/DL (ref 70–99)

## 2023-05-29 PROCEDURE — 82962 GLUCOSE BLOOD TEST: CPT

## 2023-05-29 PROCEDURE — 2580000003 HC RX 258: Performed by: PHYSICIAN ASSISTANT

## 2023-05-29 PROCEDURE — 94664 DEMO&/EVAL PT USE INHALER: CPT

## 2023-05-29 PROCEDURE — 99232 SBSQ HOSP IP/OBS MODERATE 35: CPT | Performed by: PHYSICAL MEDICINE & REHABILITATION

## 2023-05-29 PROCEDURE — 6370000000 HC RX 637 (ALT 250 FOR IP): Performed by: GENERAL PRACTICE

## 2023-05-29 PROCEDURE — 1280000000 HC REHAB R&B

## 2023-05-29 PROCEDURE — 6360000002 HC RX W HCPCS: Performed by: GENERAL PRACTICE

## 2023-05-29 PROCEDURE — 94640 AIRWAY INHALATION TREATMENT: CPT

## 2023-05-29 PROCEDURE — 94761 N-INVAS EAR/PLS OXIMETRY MLT: CPT

## 2023-05-29 PROCEDURE — 6370000000 HC RX 637 (ALT 250 FOR IP): Performed by: PHYSICAL MEDICINE & REHABILITATION

## 2023-05-29 PROCEDURE — 6370000000 HC RX 637 (ALT 250 FOR IP): Performed by: PHYSICIAN ASSISTANT

## 2023-05-29 RX ADMIN — HEPARIN SODIUM 5000 UNITS: 5000 INJECTION INTRAVENOUS; SUBCUTANEOUS at 15:06

## 2023-05-29 RX ADMIN — HEPARIN SODIUM 5000 UNITS: 5000 INJECTION INTRAVENOUS; SUBCUTANEOUS at 05:46

## 2023-05-29 RX ADMIN — GUAIFENESIN 600 MG: 600 TABLET, EXTENDED RELEASE ORAL at 17:32

## 2023-05-29 RX ADMIN — METOPROLOL TARTRATE 50 MG: 50 TABLET, FILM COATED ORAL at 20:32

## 2023-05-29 RX ADMIN — SODIUM BICARBONATE 650 MG: 650 TABLET ORAL at 09:39

## 2023-05-29 RX ADMIN — QUETIAPINE FUMARATE 25 MG: 25 TABLET ORAL at 20:32

## 2023-05-29 RX ADMIN — AMLODIPINE BESYLATE 10 MG: 10 TABLET ORAL at 09:39

## 2023-05-29 RX ADMIN — ASPIRIN 81 MG CHEWABLE TABLET 81 MG: 81 TABLET CHEWABLE at 09:26

## 2023-05-29 RX ADMIN — HYDRALAZINE HYDROCHLORIDE 75 MG: 50 TABLET ORAL at 09:25

## 2023-05-29 RX ADMIN — PANTOPRAZOLE SODIUM 40 MG: 40 TABLET, DELAYED RELEASE ORAL at 05:46

## 2023-05-29 RX ADMIN — ISOSORBIDE MONONITRATE 60 MG: 30 TABLET, EXTENDED RELEASE ORAL at 09:25

## 2023-05-29 RX ADMIN — SODIUM BICARBONATE 650 MG: 650 TABLET ORAL at 20:32

## 2023-05-29 RX ADMIN — SODIUM CHLORIDE, PRESERVATIVE FREE 10 ML: 5 INJECTION INTRAVENOUS at 09:39

## 2023-05-29 RX ADMIN — BUDESONIDE AND FORMOTEROL FUMARATE DIHYDRATE 2 PUFF: 160; 4.5 AEROSOL RESPIRATORY (INHALATION) at 11:51

## 2023-05-29 RX ADMIN — HEPARIN SODIUM 5000 UNITS: 5000 INJECTION INTRAVENOUS; SUBCUTANEOUS at 20:33

## 2023-05-29 RX ADMIN — HYDRALAZINE HYDROCHLORIDE 75 MG: 50 TABLET ORAL at 15:06

## 2023-05-29 RX ADMIN — BUDESONIDE AND FORMOTEROL FUMARATE DIHYDRATE 2 PUFF: 160; 4.5 AEROSOL RESPIRATORY (INHALATION) at 19:52

## 2023-05-29 RX ADMIN — SODIUM CHLORIDE, PRESERVATIVE FREE 10 ML: 5 INJECTION INTRAVENOUS at 20:32

## 2023-05-29 RX ADMIN — HYDRALAZINE HYDROCHLORIDE 75 MG: 50 TABLET ORAL at 20:31

## 2023-05-29 RX ADMIN — METOPROLOL TARTRATE 50 MG: 50 TABLET, FILM COATED ORAL at 09:25

## 2023-05-29 RX ADMIN — Medication 3 MG: at 20:32

## 2023-05-29 RX ADMIN — LEVOTHYROXINE SODIUM 137 MCG: 0.07 TABLET ORAL at 05:45

## 2023-05-29 ASSESSMENT — PAIN SCALES - GENERAL: PAINLEVEL_OUTOF10: 0

## 2023-05-29 NOTE — CONSULTS
[address] [address] Continence Nurse  Consult Note       Deb De La Torre  AGE: 82 y.o. GENDER: female  : 1940  TODAY'S DATE:  2023    Subjective:     Reason for Evaluation and Assessment: Wound Care Lauraal       Deb De La Torre is a 82 y.o. female referred by:   [x] Physician  [] Nursing  [] Other:     Wound Identification:  Wound Type: pressure  Contributing Factors: chronic pressure, decreased mobility, and shear force    Plan of Care: Wound 23 Sacrum-Dressing/Treatment: Collagen, Silicone border  Patient in bed agreeable to wound care assessment and treatment. Wounds measured, photographed and treated as described above. Stage 2 on sacrum. Sacral border applied. Atmos Air pump on mattress and functioning. Pt is a high risk for skin breakdown AEB Efrain of 12. Follow Efrain orders. Patient left side lying with pillow support. Heels checked. Heels elevated with pillow support. Bed in lowest position, Call light and bedside table within reach. Nurse Updated. Specialty Bed Required : yes atmos   [] Low Air Loss   [x] Pressure Redistribution  [] Fluid Immersion  [] Bariatric  [] Total Pressure Relief  [] Other:         PAST MEDICAL HISTORY        Diagnosis Date    Abnormal nuclear stress test 2007-EF=67%,Mild->Mod.isch. LAD;-EF=70%,Suggests poss. Mild Ant.wall ischemia.     Cardiac arrest ([facility]     Diabetes mellitus ([facility]     H/O cardiac catheterization 2008    No signif.CAD.    H/O cardiovascular stress test 2008    mild to mod ischemia in the LAD region, abnormal study, EF 67%, patient developed mild chest pain and throat tightness    H/O cardiovascular stress test 2016    lexiscan-moderate ischemia apical,RF66%    H/O Doppler ultrasound 2007    CAROTID DOPLER- intimal thickening but no significant atherosclerotic plaque noted in the right or left internal carotid artery, doppler flow velocities within the right and left internal carotid artery are elevated,

## 2023-05-30 LAB
GLUCOSE BLD-MCNC: 116 MG/DL (ref 70–99)
GLUCOSE BLD-MCNC: 161 MG/DL (ref 70–99)
GLUCOSE BLD-MCNC: 167 MG/DL (ref 70–99)
GLUCOSE BLD-MCNC: 195 MG/DL (ref 70–99)

## 2023-05-30 PROCEDURE — 1280000000 HC REHAB R&B

## 2023-05-30 PROCEDURE — 97535 SELF CARE MNGMENT TRAINING: CPT

## 2023-05-30 PROCEDURE — 97530 THERAPEUTIC ACTIVITIES: CPT

## 2023-05-30 PROCEDURE — 6370000000 HC RX 637 (ALT 250 FOR IP): Performed by: PHYSICAL MEDICINE & REHABILITATION

## 2023-05-30 PROCEDURE — 82962 GLUCOSE BLOOD TEST: CPT

## 2023-05-30 PROCEDURE — 94761 N-INVAS EAR/PLS OXIMETRY MLT: CPT

## 2023-05-30 PROCEDURE — 6360000002 HC RX W HCPCS: Performed by: GENERAL PRACTICE

## 2023-05-30 PROCEDURE — 94640 AIRWAY INHALATION TREATMENT: CPT

## 2023-05-30 PROCEDURE — 94150 VITAL CAPACITY TEST: CPT

## 2023-05-30 PROCEDURE — 97116 GAIT TRAINING THERAPY: CPT

## 2023-05-30 PROCEDURE — 92523 SPEECH SOUND LANG COMPREHEN: CPT

## 2023-05-30 PROCEDURE — 6370000000 HC RX 637 (ALT 250 FOR IP): Performed by: GENERAL PRACTICE

## 2023-05-30 PROCEDURE — 97110 THERAPEUTIC EXERCISES: CPT

## 2023-05-30 PROCEDURE — 2580000003 HC RX 258: Performed by: PHYSICIAN ASSISTANT

## 2023-05-30 PROCEDURE — 6370000000 HC RX 637 (ALT 250 FOR IP): Performed by: PHYSICIAN ASSISTANT

## 2023-05-30 RX ORDER — ALBUTEROL SULFATE 90 UG/1
2 AEROSOL, METERED RESPIRATORY (INHALATION) EVERY 6 HOURS PRN
Status: CANCELLED | OUTPATIENT
Start: 2023-05-30

## 2023-05-30 RX ADMIN — AMLODIPINE BESYLATE 10 MG: 10 TABLET ORAL at 09:09

## 2023-05-30 RX ADMIN — BUDESONIDE AND FORMOTEROL FUMARATE DIHYDRATE 2 PUFF: 160; 4.5 AEROSOL RESPIRATORY (INHALATION) at 07:17

## 2023-05-30 RX ADMIN — HYDRALAZINE HYDROCHLORIDE 75 MG: 50 TABLET ORAL at 21:58

## 2023-05-30 RX ADMIN — HEPARIN SODIUM 5000 UNITS: 5000 INJECTION INTRAVENOUS; SUBCUTANEOUS at 21:57

## 2023-05-30 RX ADMIN — ISOSORBIDE MONONITRATE 60 MG: 30 TABLET, EXTENDED RELEASE ORAL at 09:10

## 2023-05-30 RX ADMIN — HEPARIN SODIUM 5000 UNITS: 5000 INJECTION INTRAVENOUS; SUBCUTANEOUS at 14:38

## 2023-05-30 RX ADMIN — QUETIAPINE FUMARATE 25 MG: 25 TABLET ORAL at 21:58

## 2023-05-30 RX ADMIN — SODIUM CHLORIDE, PRESERVATIVE FREE 10 ML: 5 INJECTION INTRAVENOUS at 09:11

## 2023-05-30 RX ADMIN — HYDRALAZINE HYDROCHLORIDE 75 MG: 50 TABLET ORAL at 14:38

## 2023-05-30 RX ADMIN — METOPROLOL TARTRATE 50 MG: 50 TABLET, FILM COATED ORAL at 09:10

## 2023-05-30 RX ADMIN — BUDESONIDE AND FORMOTEROL FUMARATE DIHYDRATE 2 PUFF: 160; 4.5 AEROSOL RESPIRATORY (INHALATION) at 20:10

## 2023-05-30 RX ADMIN — HEPARIN SODIUM 5000 UNITS: 5000 INJECTION INTRAVENOUS; SUBCUTANEOUS at 05:59

## 2023-05-30 RX ADMIN — LEVOTHYROXINE SODIUM 137 MCG: 0.07 TABLET ORAL at 05:58

## 2023-05-30 RX ADMIN — SODIUM BICARBONATE 650 MG: 650 TABLET ORAL at 21:58

## 2023-05-30 RX ADMIN — HYDRALAZINE HYDROCHLORIDE 75 MG: 50 TABLET ORAL at 09:09

## 2023-05-30 RX ADMIN — PANTOPRAZOLE SODIUM 40 MG: 40 TABLET, DELAYED RELEASE ORAL at 05:59

## 2023-05-30 RX ADMIN — SODIUM BICARBONATE 650 MG: 650 TABLET ORAL at 09:10

## 2023-05-30 RX ADMIN — METOPROLOL TARTRATE 50 MG: 50 TABLET, FILM COATED ORAL at 21:58

## 2023-05-30 RX ADMIN — ASPIRIN 81 MG CHEWABLE TABLET 81 MG: 81 TABLET CHEWABLE at 09:10

## 2023-05-30 RX ADMIN — SODIUM CHLORIDE, PRESERVATIVE FREE 10 ML: 5 INJECTION INTRAVENOUS at 21:57

## 2023-05-30 RX ADMIN — GUAIFENESIN 600 MG: 600 TABLET, EXTENDED RELEASE ORAL at 17:45

## 2023-05-30 ASSESSMENT — PAIN SCALES - WONG BAKER
WONGBAKER_NUMERICALRESPONSE: 0
WONGBAKER_NUMERICALRESPONSE: 0

## 2023-05-30 ASSESSMENT — PAIN SCALES - GENERAL
PAINLEVEL_OUTOF10: 0
PAINLEVEL_OUTOF10: 0

## 2023-05-30 NOTE — CARE COORDINATION
Patient reviewed at today's care conference. She feels ready to dc today but team recommends 6/2. Patient is requesting outpatient therapies. PT/OT recommended. She already has a [id number] & 2WW. Case mgt updated patient in room. She wishes dc was today, but is agreeable to 6/2 discharge. She'd like to attend her previous outpatient provider [facility]. She's confident they can provide all she needs. Family will provide dc transport. Whiteboard updated.

## 2023-05-31 LAB
GLUCOSE BLD-MCNC: 112 MG/DL (ref 70–99)
GLUCOSE BLD-MCNC: 135 MG/DL (ref 70–99)
GLUCOSE BLD-MCNC: 164 MG/DL (ref 70–99)
GLUCOSE BLD-MCNC: 182 MG/DL (ref 70–99)

## 2023-05-31 PROCEDURE — 97110 THERAPEUTIC EXERCISES: CPT

## 2023-05-31 PROCEDURE — 97530 THERAPEUTIC ACTIVITIES: CPT

## 2023-05-31 PROCEDURE — 6370000000 HC RX 637 (ALT 250 FOR IP): Performed by: PHYSICAL MEDICINE & REHABILITATION

## 2023-05-31 PROCEDURE — 94761 N-INVAS EAR/PLS OXIMETRY MLT: CPT

## 2023-05-31 PROCEDURE — 1280000000 HC REHAB R&B

## 2023-05-31 PROCEDURE — 82962 GLUCOSE BLOOD TEST: CPT

## 2023-05-31 PROCEDURE — 6370000000 HC RX 637 (ALT 250 FOR IP): Performed by: PHYSICIAN ASSISTANT

## 2023-05-31 PROCEDURE — 6360000002 HC RX W HCPCS: Performed by: GENERAL PRACTICE

## 2023-05-31 PROCEDURE — 2580000003 HC RX 258: Performed by: PHYSICIAN ASSISTANT

## 2023-05-31 PROCEDURE — 94640 AIRWAY INHALATION TREATMENT: CPT

## 2023-05-31 PROCEDURE — 97535 SELF CARE MNGMENT TRAINING: CPT

## 2023-05-31 PROCEDURE — 6370000000 HC RX 637 (ALT 250 FOR IP): Performed by: GENERAL PRACTICE

## 2023-05-31 PROCEDURE — 97116 GAIT TRAINING THERAPY: CPT

## 2023-05-31 RX ADMIN — ASPIRIN 81 MG CHEWABLE TABLET 81 MG: 81 TABLET CHEWABLE at 08:17

## 2023-05-31 RX ADMIN — ISOSORBIDE MONONITRATE 60 MG: 30 TABLET, EXTENDED RELEASE ORAL at 08:17

## 2023-05-31 RX ADMIN — HEPARIN SODIUM 5000 UNITS: 5000 INJECTION INTRAVENOUS; SUBCUTANEOUS at 20:44

## 2023-05-31 RX ADMIN — SODIUM CHLORIDE, PRESERVATIVE FREE 10 ML: 5 INJECTION INTRAVENOUS at 20:42

## 2023-05-31 RX ADMIN — SODIUM BICARBONATE 650 MG: 650 TABLET ORAL at 08:17

## 2023-05-31 RX ADMIN — HEPARIN SODIUM 5000 UNITS: 5000 INJECTION INTRAVENOUS; SUBCUTANEOUS at 05:41

## 2023-05-31 RX ADMIN — SODIUM BICARBONATE 650 MG: 650 TABLET ORAL at 20:42

## 2023-05-31 RX ADMIN — SODIUM CHLORIDE, PRESERVATIVE FREE 10 ML: 5 INJECTION INTRAVENOUS at 08:18

## 2023-05-31 RX ADMIN — PANTOPRAZOLE SODIUM 40 MG: 40 TABLET, DELAYED RELEASE ORAL at 05:41

## 2023-05-31 RX ADMIN — METOPROLOL TARTRATE 50 MG: 50 TABLET, FILM COATED ORAL at 20:42

## 2023-05-31 RX ADMIN — HYDRALAZINE HYDROCHLORIDE 75 MG: 50 TABLET ORAL at 15:01

## 2023-05-31 RX ADMIN — BUDESONIDE AND FORMOTEROL FUMARATE DIHYDRATE 2 PUFF: 160; 4.5 AEROSOL RESPIRATORY (INHALATION) at 07:40

## 2023-05-31 RX ADMIN — HYDRALAZINE HYDROCHLORIDE 75 MG: 50 TABLET ORAL at 08:17

## 2023-05-31 RX ADMIN — HYDRALAZINE HYDROCHLORIDE 75 MG: 50 TABLET ORAL at 20:42

## 2023-05-31 RX ADMIN — GUAIFENESIN 600 MG: 600 TABLET, EXTENDED RELEASE ORAL at 17:18

## 2023-05-31 RX ADMIN — LEVOTHYROXINE SODIUM 137 MCG: 0.07 TABLET ORAL at 05:42

## 2023-05-31 RX ADMIN — AMLODIPINE BESYLATE 10 MG: 10 TABLET ORAL at 08:17

## 2023-05-31 RX ADMIN — QUETIAPINE FUMARATE 25 MG: 25 TABLET ORAL at 20:42

## 2023-05-31 RX ADMIN — Medication 3 MG: at 20:42

## 2023-05-31 RX ADMIN — HEPARIN SODIUM 5000 UNITS: 5000 INJECTION INTRAVENOUS; SUBCUTANEOUS at 15:01

## 2023-05-31 RX ADMIN — METOPROLOL TARTRATE 50 MG: 50 TABLET, FILM COATED ORAL at 08:17

## 2023-05-31 NOTE — CARE COORDINATION
LSW met with patient to discuss discharge plans and to notify that [facility] does not offer OT services. The patient agreed for the referral for [facility]. Left message for patient's daughter. Patient verbalized understanding. Referral was faxed to [facility]. D/C Plan:  Estimated Date: June 2  DME: has recommended DME  Outpatient:  PT, OT ([facility])  To:   Home with family (family will transport)

## 2023-05-31 NOTE — DISCHARGE INSTR - ACTIVITY
Pt is discharging to home with [address] [facility]  [address] [facility] [address] [phone]  A representative from [facility] will contact you at home to schedule your appointment.

## 2023-05-31 NOTE — DISCHARGE INSTRUCTIONS
Your information:  Name: Jj Platt  : 1940    Your instructions:    Pt is discharging to home with [address] [facility]  [address] [place] [id number] [phone]  A representative from [facility] will contact you at home to schedule your appointment. What to do after you leave the hospital:    Recommended diet: regular diet-No concentrated sweets    Recommended activity: activity as tolerated        The following personal items were collected during your admission and were returned to you:    Belongings  Dental Appliances: None  Vision - Corrective Lenses: Eyeglasses  Hearing Aid: None  Clothing: Undergarments, Socks, Pants, Footwear  Jewelry: None  Body Piercings Removed: N/A  Electronic Devices: Cell Phone,   Weapons (Notify Protective Services/Security): None  Other Valuables: Other (Comment)  Home Medications: None  Valuables Given To: Other (Comment)  Provide Name(s) of Who Valuable(s) Were Given To: n/a  Responsible person(s) in the waiting room: n/a  Patient approves for provider to speak to responsible person post operatively: No    Information obtained by:  By signing below, I understand that if any problems occur once I leave the hospital I am to contact ***. I understand and acknowledge receipt of the instructions indicated above.

## 2023-06-01 LAB
GLUCOSE BLD-MCNC: 131 MG/DL (ref 70–99)
GLUCOSE BLD-MCNC: 131 MG/DL (ref 70–99)
GLUCOSE BLD-MCNC: 140 MG/DL (ref 70–99)
GLUCOSE BLD-MCNC: 143 MG/DL (ref 70–99)

## 2023-06-01 PROCEDURE — 82962 GLUCOSE BLOOD TEST: CPT

## 2023-06-01 PROCEDURE — 97110 THERAPEUTIC EXERCISES: CPT

## 2023-06-01 PROCEDURE — 6360000002 HC RX W HCPCS: Performed by: GENERAL PRACTICE

## 2023-06-01 PROCEDURE — 97535 SELF CARE MNGMENT TRAINING: CPT

## 2023-06-01 PROCEDURE — 97116 GAIT TRAINING THERAPY: CPT

## 2023-06-01 PROCEDURE — 94150 VITAL CAPACITY TEST: CPT

## 2023-06-01 PROCEDURE — 94761 N-INVAS EAR/PLS OXIMETRY MLT: CPT

## 2023-06-01 PROCEDURE — 2580000003 HC RX 258: Performed by: PHYSICIAN ASSISTANT

## 2023-06-01 PROCEDURE — 6370000000 HC RX 637 (ALT 250 FOR IP): Performed by: GENERAL PRACTICE

## 2023-06-01 PROCEDURE — 6370000000 HC RX 637 (ALT 250 FOR IP): Performed by: PHYSICAL MEDICINE & REHABILITATION

## 2023-06-01 PROCEDURE — 6370000000 HC RX 637 (ALT 250 FOR IP): Performed by: PHYSICIAN ASSISTANT

## 2023-06-01 PROCEDURE — 94669 MECHANICAL CHEST WALL OSCILL: CPT

## 2023-06-01 PROCEDURE — 94664 DEMO&/EVAL PT USE INHALER: CPT

## 2023-06-01 PROCEDURE — 94640 AIRWAY INHALATION TREATMENT: CPT

## 2023-06-01 PROCEDURE — 1280000000 HC REHAB R&B

## 2023-06-01 PROCEDURE — 97530 THERAPEUTIC ACTIVITIES: CPT

## 2023-06-01 RX ORDER — BUDESONIDE AND FORMOTEROL FUMARATE DIHYDRATE 160; 4.5 UG/1; UG/1
2 AEROSOL RESPIRATORY (INHALATION) 2 TIMES DAILY
Qty: 10.2 G | Refills: 0 | Status: SHIPPED | OUTPATIENT
Start: 2023-06-01

## 2023-06-01 RX ORDER — QUETIAPINE FUMARATE 25 MG/1
12.5 TABLET, FILM COATED ORAL NIGHTLY
Status: DISCONTINUED | OUTPATIENT
Start: 2023-06-01 | End: 2023-06-02 | Stop reason: HOSPADM

## 2023-06-01 RX ORDER — GUAIFENESIN 600 MG/1
600 TABLET, EXTENDED RELEASE ORAL DAILY
Qty: 40 TABLET | Refills: 0 | Status: SHIPPED | OUTPATIENT
Start: 2023-06-01 | End: 2023-06-21

## 2023-06-01 RX ORDER — PANTOPRAZOLE SODIUM 40 MG/1
40 TABLET, DELAYED RELEASE ORAL
Qty: 30 TABLET | Refills: 0 | Status: SHIPPED | OUTPATIENT
Start: 2023-06-02

## 2023-06-01 RX ADMIN — SODIUM CHLORIDE, PRESERVATIVE FREE 10 ML: 5 INJECTION INTRAVENOUS at 09:34

## 2023-06-01 RX ADMIN — HYDRALAZINE HYDROCHLORIDE 75 MG: 50 TABLET ORAL at 21:18

## 2023-06-01 RX ADMIN — HYDRALAZINE HYDROCHLORIDE 75 MG: 50 TABLET ORAL at 15:05

## 2023-06-01 RX ADMIN — SODIUM BICARBONATE 650 MG: 650 TABLET ORAL at 09:34

## 2023-06-01 RX ADMIN — METOPROLOL TARTRATE 50 MG: 50 TABLET, FILM COATED ORAL at 21:18

## 2023-06-01 RX ADMIN — AMLODIPINE BESYLATE 10 MG: 10 TABLET ORAL at 09:34

## 2023-06-01 RX ADMIN — LEVOTHYROXINE SODIUM 137 MCG: 0.07 TABLET ORAL at 05:39

## 2023-06-01 RX ADMIN — ASPIRIN 81 MG CHEWABLE TABLET 81 MG: 81 TABLET CHEWABLE at 09:34

## 2023-06-01 RX ADMIN — PANTOPRAZOLE SODIUM 40 MG: 40 TABLET, DELAYED RELEASE ORAL at 05:39

## 2023-06-01 RX ADMIN — HEPARIN SODIUM 5000 UNITS: 5000 INJECTION INTRAVENOUS; SUBCUTANEOUS at 15:07

## 2023-06-01 RX ADMIN — Medication 3 MG: at 21:18

## 2023-06-01 RX ADMIN — HEPARIN SODIUM 5000 UNITS: 5000 INJECTION INTRAVENOUS; SUBCUTANEOUS at 05:39

## 2023-06-01 RX ADMIN — HYDRALAZINE HYDROCHLORIDE 75 MG: 50 TABLET ORAL at 09:33

## 2023-06-01 RX ADMIN — QUETIAPINE FUMARATE 12.5 MG: 25 TABLET ORAL at 21:17

## 2023-06-01 RX ADMIN — GUAIFENESIN 600 MG: 600 TABLET, EXTENDED RELEASE ORAL at 18:11

## 2023-06-01 RX ADMIN — BUDESONIDE AND FORMOTEROL FUMARATE DIHYDRATE 2 PUFF: 160; 4.5 AEROSOL RESPIRATORY (INHALATION) at 19:29

## 2023-06-01 RX ADMIN — BUDESONIDE AND FORMOTEROL FUMARATE DIHYDRATE 2 PUFF: 160; 4.5 AEROSOL RESPIRATORY (INHALATION) at 08:36

## 2023-06-01 RX ADMIN — ISOSORBIDE MONONITRATE 60 MG: 30 TABLET, EXTENDED RELEASE ORAL at 09:34

## 2023-06-01 RX ADMIN — SODIUM BICARBONATE 650 MG: 650 TABLET ORAL at 21:18

## 2023-06-01 RX ADMIN — METOPROLOL TARTRATE 50 MG: 50 TABLET, FILM COATED ORAL at 09:34

## 2023-06-01 ASSESSMENT — PAIN SCALES - GENERAL: PAINLEVEL_OUTOF10: 0

## 2023-06-01 NOTE — PLAN OF CARE
Problem: Discharge Planning  Goal: Discharge to home or other facility with appropriate resources  Outcome: Progressing     Problem: Safety - Adult  Goal: Free from fall injury  Outcome: Progressing     Problem: Pain  Goal: Verbalizes/displays adequate comfort level or baseline comfort level  Outcome: Progressing     Problem: Skin/Tissue Integrity  Goal: Absence of new skin breakdown  Description: 1. Monitor for areas of redness and/or skin breakdown  2. Assess vascular access sites hourly  3. Every 4-6 hours minimum:  Change oxygen saturation probe site  4. Every 4-6 hours:  If on nasal continuous positive airway pressure, respiratory therapy assess nares and determine need for appliance change or resting period.   Outcome: Progressing     Problem: ABCDS Injury Assessment  Goal: Absence of physical injury  Outcome: Progressing     Problem: Chronic Conditions and Co-morbidities  Goal: Patient's chronic conditions and co-morbidity symptoms are monitored and maintained or improved  Outcome: Progressing
Problem: Discharge Planning  Goal: Discharge to home or other facility with appropriate resources  Outcome: Progressing     Problem: Safety - Adult  Goal: Free from fall injury  Outcome: Progressing     Problem: Pain  Goal: Verbalizes/displays adequate comfort level or baseline comfort level  Outcome: Progressing     Problem: Skin/Tissue Integrity  Goal: Absence of new skin breakdown  Description: 1. Monitor for areas of redness and/or skin breakdown  2. Assess vascular access sites hourly  3. Every 4-6 hours minimum:  Change oxygen saturation probe site  4. Every 4-6 hours:  If on nasal continuous positive airway pressure, respiratory therapy assess nares and determine need for appliance change or resting period.   Outcome: Progressing     Problem: ABCDS Injury Assessment  Goal: Absence of physical injury  Outcome: Progressing     Problem: Chronic Conditions and Co-morbidities  Goal: Patient's chronic conditions and co-morbidity symptoms are monitored and maintained or improved  Outcome: Progressing     Problem: Nutrition Deficit:  Goal: Optimize nutritional status  Outcome: Progressing
Problem: Discharge Planning  Goal: Discharge to home or other facility with appropriate resources  Outcome: Progressing     Problem: Safety - Adult  Goal: Free from fall injury  Outcome: Progressing     Problem: Pain  Goal: Verbalizes/displays adequate comfort level or baseline comfort level  Outcome: Progressing     Problem: Skin/Tissue Integrity  Goal: Absence of new skin breakdown  Description: 1. Monitor for areas of redness and/or skin breakdown  2. Assess vascular access sites hourly  3. Every 4-6 hours minimum:  Change oxygen saturation probe site  4. Every 4-6 hours:  If on nasal continuous positive airway pressure, respiratory therapy assess nares and determine need for appliance change or resting period.   Outcome: Progressing     Problem: ABCDS Injury Assessment  Goal: Absence of physical injury  Outcome: Progressing     Problem: Chronic Conditions and Co-morbidities  Goal: Patient's chronic conditions and co-morbidity symptoms are monitored and maintained or improved  Outcome: Progressing     Problem: Nutrition Deficit:  Goal: Optimize nutritional status  Outcome: Progressing
evaluate response   Implement non-pharmacological measures as appropriate and evaluate response   Consider cultural and social influences on pain and pain management   Notify Licensed Independent Practitioner if interventions unsuccessful or patient reports new pain     Problem: Skin/Tissue Integrity  Goal: Absence of new skin breakdown  Description: 1. Monitor for areas of redness and/or skin breakdown  2. Assess vascular access sites hourly  3. Every 4-6 hours minimum:  Change oxygen saturation probe site  4. Every 4-6 hours:  If on nasal continuous positive airway pressure, respiratory therapy assess nares and determine need for appliance change or resting period.   5/31/2023 1115 by Stacy Gaitan RN  Outcome: Adequate for Discharge  5/30/2023 2130 by Naomi Vazquez LPN  Outcome: Progressing     Problem: ABCDS Injury Assessment  Goal: Absence of physical injury  5/31/2023 1115 by Stacy Giatan RN  Outcome: Adequate for Discharge  5/30/2023 2130 by Naomi Vazquez LPN  Outcome: Progressing     Problem: Chronic Conditions and Co-morbidities  Goal: Patient's chronic conditions and co-morbidity symptoms are monitored and maintained or improved  5/31/2023 1115 by Stacy Gaitan RN  Outcome: Adequate for Discharge  5/30/2023 2130 by Naomi Vazquez LPN  Outcome: Progressing  Flowsheets (Taken 5/30/2023 [id number] by Julio Thompson RN)  Care Plan - Patient's Chronic Conditions and Co-Morbidity Symptoms are Monitored and Maintained or Improved:   Monitor and assess patient's chronic conditions and comorbid symptoms for stability, deterioration, or improvement   Collaborate with multidisciplinary team to address chronic and comorbid conditions and prevent exacerbation or deterioration   Update acute care plan with appropriate goals if chronic or comorbid symptoms are exacerbated and prevent overall improvement and discharge     Problem: Nutrition Deficit:  Goal: Optimize nutritional status  5/31/2023 1115 by Familia Blount
skin breakdown  Description: 1. Monitor for areas of redness and/or skin breakdown  2. Assess vascular access sites hourly  3. Every 4-6 hours minimum:  Change oxygen saturation probe site  4. Every 4-6 hours:  If on nasal continuous positive airway pressure, respiratory therapy assess nares and determine need for appliance change or resting period.   Outcome: Progressing     Problem: ABCDS Injury Assessment  Goal: Absence of physical injury  Outcome: Progressing     Problem: Chronic Conditions and Co-morbidities  Goal: Patient's chronic conditions and co-morbidity symptoms are monitored and maintained or improved  Outcome: Progressing  Flowsheets (Taken 5/30/2023 [id number] by Torres Mckeon RN)  Care Plan - Patient's Chronic Conditions and Co-Morbidity Symptoms are Monitored and Maintained or Improved:   Monitor and assess patient's chronic conditions and comorbid symptoms for stability, deterioration, or improvement   Collaborate with multidisciplinary team to address chronic and comorbid conditions and prevent exacerbation or deterioration   Update acute care plan with appropriate goals if chronic or comorbid symptoms are exacerbated and prevent overall improvement and discharge     Problem: Nutrition Deficit:  Goal: Optimize nutritional status  Outcome: Progressing
insight and reasoning and cardiac deconditioning, she is at risk for fall with injury during any standing ADLs or mobility activities. Therefore, we must provide her adaptive equipment training with reconditioning exercises and balance retraining. She needs aggressive pulmonary hygiene measures while monitoring her for signs of CHF deterioration. Cautious pain management, monitoring bowel and bladder function and providing DVT prophylaxis. Caregiver training will be an integral part of her discharge planning. Follow-up with her PCP following rehab. DVT prophylaxis: Heparin 5000 units every 8 hours. This places her at risk for spontaneous hemorrhage and GI bleeding. Therefore I must monitor her hemoglobin and platelet count regularly. GI prophylaxis ordered. Weightbearing activities will be pursued daily. Acute respiratory failure with hypoxic encephalopathy: Speech-language pathology needs to evaluate her problem solving, insight and memory. The patient needs aggressive pulmonary hygiene measures and oxygen supplementation to keep saturations greater than 90%. She is receiving multiple inhalers, mucolytic's and diuretics. Caregiver training as she will likely need supervision after discharge. Seroquel for the agitation and sleeplessness of her hypoxic encephalopathy. Colon cancer with laparoscopic colectomy on 5/3/2023: Surgical healing is progressing. No staples remain. Bowel activity is dependent on oral agents. Outpatient follow-up with her surgeon following rehab. Uncontrolled diabetes type 2 with hyperglycemia: Patient requires a diet modified for carbohydrates. She needs a Humalog sliding scale to maintain her blood sugars within the acceptable range. It is not clear to me what her home medication for her diabetes had been prior to this surgery.   Essential hypertension: Norvasc, Apresoline and Lopressor are required to keep her systolic blood pressure near the target range of a systolic

## 2023-06-01 NOTE — CARE COORDINATION
Discussed discharge with patient and provided a copy of the Important Message from Medicare form signed upon admission. Patient verbalized understanding.

## 2023-06-02 VITALS
BODY MASS INDEX: 28.95 KG/M2 | HEART RATE: 64 BPM | OXYGEN SATURATION: 99 % | TEMPERATURE: 97.7 F | RESPIRATION RATE: 16 BRPM | WEIGHT: 180.12 LBS | SYSTOLIC BLOOD PRESSURE: 157 MMHG | DIASTOLIC BLOOD PRESSURE: 70 MMHG | HEIGHT: 66 IN

## 2023-06-02 LAB
ANION GAP SERPL CALCULATED.3IONS-SCNC: 9 MMOL/L (ref 4–16)
BUN SERPL-MCNC: 17 MG/DL (ref 6–23)
CALCIUM SERPL-MCNC: 8.2 MG/DL (ref 8.3–10.6)
CHLORIDE BLD-SCNC: 107 MMOL/L (ref 99–110)
CO2: 24 MMOL/L (ref 21–32)
CREAT SERPL-MCNC: 1.7 MG/DL (ref 0.6–1.1)
GFR SERPL CREATININE-BSD FRML MDRD: 30 ML/MIN/1.73M2
GLUCOSE BLD-MCNC: 108 MG/DL (ref 70–99)
GLUCOSE BLD-MCNC: 136 MG/DL (ref 70–99)
GLUCOSE SERPL-MCNC: 106 MG/DL (ref 70–99)
HCT VFR BLD CALC: 29.2 % (ref 37–47)
HEMOGLOBIN: 8.9 GM/DL (ref 12.5–16)
MAGNESIUM: 1.8 MG/DL (ref 1.8–2.4)
MCH RBC QN AUTO: 27.6 PG (ref 27–31)
MCHC RBC AUTO-ENTMCNC: 30.5 % (ref 32–36)
MCV RBC AUTO: 90.7 FL (ref 78–100)
PDW BLD-RTO: 17.4 % (ref 11.7–14.9)
PLATELET # BLD: 215 K/CU MM (ref 140–440)
PMV BLD AUTO: 9.3 FL (ref 7.5–11.1)
POTASSIUM SERPL-SCNC: 3.9 MMOL/L (ref 3.5–5.1)
RBC # BLD: 3.22 M/CU MM (ref 4.2–5.4)
SODIUM BLD-SCNC: 140 MMOL/L (ref 135–145)
WBC # BLD: 4.9 K/CU MM (ref 4–10.5)

## 2023-06-02 PROCEDURE — 80048 BASIC METABOLIC PNL TOTAL CA: CPT

## 2023-06-02 PROCEDURE — 36415 COLL VENOUS BLD VENIPUNCTURE: CPT

## 2023-06-02 PROCEDURE — 94640 AIRWAY INHALATION TREATMENT: CPT

## 2023-06-02 PROCEDURE — 82962 GLUCOSE BLOOD TEST: CPT

## 2023-06-02 PROCEDURE — 94150 VITAL CAPACITY TEST: CPT

## 2023-06-02 PROCEDURE — 94761 N-INVAS EAR/PLS OXIMETRY MLT: CPT

## 2023-06-02 PROCEDURE — 6370000000 HC RX 637 (ALT 250 FOR IP): Performed by: PHYSICIAN ASSISTANT

## 2023-06-02 PROCEDURE — 83735 ASSAY OF MAGNESIUM: CPT

## 2023-06-02 PROCEDURE — 85027 COMPLETE CBC AUTOMATED: CPT

## 2023-06-02 PROCEDURE — 6370000000 HC RX 637 (ALT 250 FOR IP): Performed by: GENERAL PRACTICE

## 2023-06-02 RX ADMIN — METOPROLOL TARTRATE 50 MG: 50 TABLET, FILM COATED ORAL at 10:38

## 2023-06-02 RX ADMIN — BUDESONIDE AND FORMOTEROL FUMARATE DIHYDRATE 2 PUFF: 160; 4.5 AEROSOL RESPIRATORY (INHALATION) at 07:15

## 2023-06-02 RX ADMIN — LEVOTHYROXINE SODIUM 137 MCG: 0.07 TABLET ORAL at 05:55

## 2023-06-02 RX ADMIN — HYDRALAZINE HYDROCHLORIDE 75 MG: 50 TABLET ORAL at 10:38

## 2023-06-02 RX ADMIN — SODIUM BICARBONATE 650 MG: 650 TABLET ORAL at 10:38

## 2023-06-02 RX ADMIN — PANTOPRAZOLE SODIUM 40 MG: 40 TABLET, DELAYED RELEASE ORAL at 05:55

## 2023-06-02 RX ADMIN — ASPIRIN 81 MG CHEWABLE TABLET 81 MG: 81 TABLET CHEWABLE at 10:37

## 2023-06-02 RX ADMIN — ISOSORBIDE MONONITRATE 60 MG: 30 TABLET, EXTENDED RELEASE ORAL at 10:37

## 2023-06-02 RX ADMIN — AMLODIPINE BESYLATE 10 MG: 10 TABLET ORAL at 10:38

## 2023-06-02 NOTE — CARE COORDINATION
[facility]  Discharge Summary    D/C Date: 06/02/2023    Patient discharged to: Home     Transported by: Family    Referrals made to: [facility]    Additional information: Follow up appointments scheduled with Dr. Stew Lara Monday Jun 5, 2023 2 PM per patient, LSW left message for [employer] Glass[employer] at the office to notify of discharge. Katja Estrella APRN - CNP Wednesday Jun 7, 2023 10:20 AM, Дмитрий Rueda PA-C Thursday Jun 8, 2023 11:00 AM, Brian Burrows OT Friday Jun 9, 2023 3:15 PM, Annalisa Rutledge MD Monday Jun 19, 2023 2:00 PM. Patient notified, AVS updated.      Caregiver training: none requested    Discharge BIMS completed:  [x]

## 2023-06-02 NOTE — PROGRESS NOTES
06/02/23 0931   Encounter Summary   Encounter Overview/Reason  Spiritual/Emotional Needs   Service Provided For: Patient   Referral/Consult From: [facility]   Last Encounter  06/02/23  (Offered prayer and spiritual support. Patient will soon go home and is greatful being off the vent and will celibrate her birthday)   Complexity of Encounter Low   Begin Time 0913   End Time  0933   Total Time Calculated 20 min   Encounter    Type Follow up   Spiritual/Emotional needs   Type Spiritual Support   Advance Care Planning   Type ACP conversation  (Patient has one on file)   Assessment/Intervention/Outcome   Assessment Peaceful; Hopeful   Intervention Active listening;Discussed belief system/Judaism practices/brandon;Discussed illness injury and its impact; Discussed relationship with God;Empowerment;Life review/Legacy; Nurtured Hope;Prayer (assurance of)/Havana;Sustaining Presence/Ministry of presence   Outcome Engaged in conversation;Encouraged;Expressed Gratitude;Expressed feelings of Susy, Peace and/or Love   Plan and Referrals   Plan/Referrals No future visits requested
4 Eyes Skin Assessment     NAME:  Jono Moyer  YOB: 1940  MEDICAL RECORD NUMBER:  [id number]    The patient is being assessed for  Admission    I agree that at least one RN has performed a thorough Head to Toe Skin Assessment on the patient. ALL assessment sites listed below have been assessed. Areas assessed by both nurses:    Head, Face, Ears, Shoulders, Back, Chest, Arms, Elbows, Hands, Sacrum. Buttock, Coccyx, Ischium, and Legs. Feet and Heels        Does the Patient have a Wound? Yes wound(s) were present on assessment.  LDA wound assessment was Initiated and completed by RN- areas on Coccy has areas of non-blachable-skin- wound consult ordered       Efrain Prevention initiated by RN: Yes  Wound Care Orders initiated by RN: Yes    Pressure Injury (Stage 3,4, Unstageable, DTI, NWPT, and Complex wounds) if present, place Wound referral order by RN under : No    New Ostomies, if present place, Ostomy referral order under : Yes     Nurse 1 eSignature: Electronically signed by Rebeca Calhoun RN on 5/25/23 at 4:28 PM EDT    **SHARE this note so that the co-signing nurse can place an eSignature**    Nurse 2 eSignature: Electronically signed by Edwige Hightower LPN on 6/90/71 at 8:59 PM EDT
ARU Admission Assessment - [facility]      A Complete drug regimen review was completed for this patient this date. [x]  No clinically significant medication issue was identified   []  Yes, a clinically significant medication issue was identified     []  Adverse Drug Event:    []  Allergy:    []  Side Effect:    []  Ineffective Therapy:    []  Drug interaction:     []  Duplicate Therapy:    []  Untreated Indication:    []  Non-adherence:    []  Other:  Nursing contacted the physician:       Date:                Time:    Actions recommended by physician were completed:   Date:                 Time:  Action(s) Taken:             []  New Physician Order Received    []  Issue Noted by Physician; However No Action Required    []  Other:        Ethnicity  \"Are you of , /a, or Mongolian origin? \"  Check all that apply:  [x] A. No, not of , /a, or [place] Origin  [] B.  Yes, [place], [place] American, Chicano/a  [] C.  Yes, [address] [place] [address]  [] D.  Yes, [place]  [] E.  Yes, another , , or Mongolian origin  [] X. Patient unable to respond    Race  \"What is your race? \"  Check all that apply:  [x] A. White  [] B. Black or   [] C. American [place] [facility] or [place] Native  [] D.  [place]  [] E. [place]  [] F. Spanish  [] G. [place]  [] Jt Raid  [] I. [place]  [] J.  Other   [] K.   [] L. Burkinan or [place]  [] M. [place]  [] N. Other [facility]  [] X. Patient unable to respond    Language  A. \"What is your preferred language? \"   Marlena Webb. \"Do you need or want an  to communicate with a doctor or health care staff? \"  Check only one:  [x] 0. No  [] 1. Yes  [] 9. Unable to determine    Transportation  \"In the past 6-12 months, has lack of transportation kept you from medical appointments, meetings, work, or from getting things needed for daily living? \"  Check all that apply:  [x] A.  Yes, it has kept me from
ARU Discharge Assessment - [facility]    Transportation  \"In the past 6-12 months, has lack of transportation kept you from medical appointments, meetings, work, or from getting things needed for daily living? \"Check all that apply:  [] A.  Yes, it has kept me from medical appointments or from getting my medications  [] B.  Yes, it has kept me from non-medical meetings, appointments, work, or from getting things that I need  [] C.  No  [] X. Patient unable to respond    Provision of Current Reconciled Medication List to Subsequent Provider at Discharge     [] No, current reconciled medication list not provided to the subsequent provider.  [] Yes, current reconciled medication list provided to the subsequent provider. YES if D/C to Acute hospital, SNF, home with home health  (**We MUST Select route of transmission below**)      [] Via Electronic Health Record   [] [employer] Organization  [] Verbal (e.g. in person, telephone, video conferencing)  [] Paper-based (e.g. fax, copies, printouts)   [] Other Methods (e.g. texting, email, CDs)    Provision of Current Reconciled Medication List to Patient at Discharge  [] No, current reconciled medication list not provided to the patient, family and/or caregiver. NO If D/C to Acute hospital, SNF, home with home health   [] Yes, current reconciled medication list provided to the patient, family and/or caregiver.   YES if D/C home with Outpatient or no services   (**WE MUST Select route of transmission below**)     [] Via Electronic Health Record (e.g., electronic access to patient portal)   [] Via [employer]  [] Verbal (e.g. in person, telephone, video conferencing)  [] Paper-based (e.g. fax, copies, printouts)   [] Other Methods (e.g. texting, email, CDs)    Health Literacy  \"How often do you need to have someone help you when you read instructions, pamphlets, or other written material from your doctor or
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 Has been on soft and bite sized diet as per speech therapy. Patient was not ordering some meals due ot dislike of diet. Able to advance today to regular diet, patient much happier with current diet and expect intake to improve. Has had family bring meals in also. Tried frozen supplement, dislikes magic cups. No significant weight loss in past year. Will continue to follow at moderate nutrition risk at this time. Nutrition Related Findings:    resting in bed, hx DM    last BM 5/24 Wound Type: Surgical Incision       Current Nutrition Intake & Therapies:    Average Meal Intake: 51-75%  Average Supplements Intake: Refusing to take  ADULT ORAL NUTRITION SUPPLEMENT; Dinner; Frozen Oral Supplement  ADULT DIET; Regular; No Concentrated sweets    Anthropometric Measures:  Height: 5' 6\" (167.6 cm)  Ideal Body Weight (IBW): 130 lbs (59 kg)       Current Body Weight: 179 lb 14.3 oz (81.6 kg), 138.4 % IBW. Weight Source: Bed Scale  Current BMI (kg/m2): 29  Usual Body Weight: 215 lb (97.5 kg) (per hx June 2022)  % Weight Change (Calculated): -16.3  Weight Adjustment For: No Adjustment                 BMI Categories: Overweight (BMI 25.0-29. 9)    Estimated Daily Nutrient Needs:  Energy Requirements Based On: Formula  Weight Used for Energy Requirements: Current  Energy (kcal/day): 2191 ([address] je[address])  Weight Used for Protein Requirements:
Comprehensive Nutrition Assessment    Type and Reason for Visit:  Reassess    Nutrition Recommendations/Plan:   Continue no concentrated sweets diet   Will continue to offer oral nutrition supplement during stay as patient will accept  Will continue to follow up during stay      Malnutrition Assessment:  Malnutrition Status:  Insufficient data (05/26/23 1508)    Context:  Chronic Illness       Nutrition Assessment:    Meal intake improving during stay as able to advance to regular diet consistency. Remains on no concentrated sweets diet and offered frozen supplement. Meal intake % at recent meals. With meal intake improving will follow at low nutrition risk at this time. Nutrition Related Findings:    glucose POCT most under 180 mg/dL Wound Type: Surgical Incision       Current Nutrition Intake & Therapies:    Average Meal Intake: %  Average Supplements Intake: Unable to assess  ADULT ORAL NUTRITION SUPPLEMENT; Dinner; Frozen Oral Supplement  ADULT DIET; Regular; No Concentrated sweets    Anthropometric Measures:  Height: 5' 6\" (167.6 cm)  Ideal Body Weight (IBW): 130 lbs (59 kg)       Current Body Weight: 181 lb (82.1 kg), 138.4 % IBW. Weight Source: Bed Scale  Current BMI (kg/m2): 29.2  Usual Body Weight: 215 lb (97.5 kg) (per hx June 2022)  % Weight Change (Calculated): -16.3  Weight Adjustment For: No Adjustment                 BMI Categories: Overweight (BMI 25.0-29. 9)    Estimated Daily Nutrient Needs:  Energy Requirements Based On: Formula  Weight Used for Energy Requirements: Current  Energy (kcal/day): 3116 (miffl[address] jeor)  Weight Used for Protein Requirements: Ideal  Protein (g/day): 71-83 (1.2-1.4 g/kg)  Method Used for Fluid Requirements: 1 ml/kcal  Fluid (ml/day): 1800    Nutrition Diagnosis:   Predicted inadequate energy intake related to other (comment) (reduced appetite in illness) as evidenced by poor intake prior to admission    Nutrition Interventions:   Food and/or Nutrient
Diomedes [place]    : 1940  Acct #: [de-identified]  MRN: [id number]              PM&R Progress Note      Admitting diagnosis: ***    Comorbid diagnoses impacting rehabilitation: ***    Chief complaint: ***    Prior (baseline) level of function: Independent. Current level of function:         Current  IRF-ABBY and Goals:   Occupational Therapy:    Short Term Goals  Time Frame for Short Term Goals: STGs=LTGs :   Long Term Goals  Time Frame for Long Term Goals : 10-14 days or until d/c  Long Term Goal 1: Pt will complete grooming tasks Ind  Long Term Goal 2: Pt will complete total body bathing mod I  Long Term Goal 3: Pt will complete UB dressing Ind  Long Term Goal 4: Pt will complete LB dressing Ind  Long Term Goal 5: Pt will doff/don footwear Ind  Additional Goals?: Yes  Long Term Goal 6: Pt will complete toileting mod I  Long Term Goal 7: Pt will complete functional transfers (bed, chair, toilet, shower) c DME PRN and mod I  Long Term Goal 8: Pt will perform therex/therax to facilitate increased strength/endurance/ax tolerance (c emphasis on dynamic standing balance/tolerance >10 mins, BUE endurance) c SBA  Long Term Goal 9: Pt will complete simple homemaking tasks c DME PRN and mod I :                                       Eating: Eating  Assistance Needed: Independent  Comment: X  CARE Score: 6  Discharge Goal: Independent       Oral Hygiene: Oral Hygiene  Assistance Needed: Independent  Comment: seated at sink  CARE Score: 6  Discharge Goal: Independent    UB/LB Bathing: Shower/Bathe Self  Assistance Needed: Supervision or touching assistance  Comment: Completed simple wash up at sink c Supervision in stance  CARE Score: 4  Discharge Goal: Independent    UB Dressing: Upper Body Dressing  Assistance Needed: Independent  Comment:  Mod I to retrieve clothing demo'ing safety; able to doff/diomedes pullover shirt  CARE Score: 6  Discharge Goal: Independent         LB Dressing: Lower Body Dressing  Assistance Needed:
Elias Licea    : 1940  Acct #: [de-identified]  MRN: [id number]              PM&R Progress Note      Admitting diagnosis: ***    Comorbid diagnoses impacting rehabilitation: ***    Chief complaint: ***    Prior (baseline) level of function: Independent.     Current level of function:         Current  IRF-ABBY and Goals:   Occupational Therapy:    Short Term Goals  Time Frame for Short Term Goals: STGs=LTGs :   Long Term Goals  Time Frame for Long Term Goals : 10-14 days or until d/c  Long Term Goal 1: Pt will complete grooming tasks Ind  Long Term Goal 2: Pt will complete total body bathing mod I  Long Term Goal 3: Pt will complete UB dressing Ind  Long Term Goal 4: Pt will complete LB dressing Ind  Long Term Goal 5: Pt will doff/don footwear Ind  Additional Goals?: Yes  Long Term Goal 6: Pt will complete toileting mod I  Long Term Goal 7: Pt will complete functional transfers (bed, chair, toilet, shower) c DME PRN and mod I  Long Term Goal 8: Pt will perform therex/therax to facilitate increased strength/endurance/ax tolerance (c emphasis on dynamic standing balance/tolerance >10 mins, BUE endurance) c SBA  Long Term Goal 9: Pt will complete simple homemaking tasks c DME PRN and mod I :                                       Eating: Eating  Assistance Needed: Independent  Comment: X  CARE Score: 6  Discharge Goal: Independent       Oral Hygiene: Oral Hygiene  Assistance Needed: Independent  Comment: seated at sink  CARE Score: 6  Discharge Goal: Independent    UB/LB Bathing: Shower/Bathe Self  Assistance Needed: Supervision or touching assistance  Comment: Supervision in stance to bathe perineal area; performed remainder of shower seated  CARE Score: 4  Discharge Goal: Independent    UB Dressing: Upper Body Dressing  Assistance Needed: Supervision or touching assistance  Comment: Pt able to retrieve clothing c SUP; able to gonzalo pullover shirt IND  CARE Score: 4  Discharge Goal: Independent         LB Dressing:
[facility]   CLINICAL BEDSIDE SWALLOW EVALUATION    NAME: Kayode Sears  : 1940  MRN: [id number]    ADMISSION DATE: 2023  ADMITTING DIAGNOSIS: has Hyperlipidemia; Hyperuricemia; Vitamin D deficiency; Complications of bariatric procedures; Generalized weakness; SOB (shortness of breath) on exertion; Obesity; Localized edema; SOB (shortness of breath); Mild persistent asthma without complication; Pulmonary hypertension ([address] Abnormal nuclear cardiac imaging test; Hypothyroidism; Essential hypertension; Type 2 diabetes mellitus with complication, with long-term current use of insulin ([address] Erythrasma; Anemia; B12 deficiency; Chronic kidney disease; Chronic obstructive pulmonary disease, unspecified COPD type ([address] Obesity, Class III, BMI 40-49.9 (morbid obesity) ([address] Uncontrolled type 2 diabetes mellitus with hyperglycemia ([address] Leg swelling; Chronic pain of left knee; Chronic kidney disease, stage IV (severe) ([address] Chronic renal disease, stage III ([address] [382969]; Renal stone; Major depressive disorder, recurrent, mild; Major depressive disorder, recurrent, moderate; Major depressive disorder, recurrent, unspecified; Cystic kidney disease; ASCVD (arteriosclerotic cardiovascular disease); Valvular heart disease; Acute cystitis without hematuria; Cecum mass; Mass of cecum; Moderate malnutrition ([address] Critical illness myopathy; Gait disturbance; Hypoxic encephalopathy ([address] Acute respiratory failure with hypoxia ([address] Primary adenocarcinoma of ascending colon ([address] Acute blood loss anemia; Acute systolic (congestive) heart failure ([address] and Cardiac arrest due to other underlying condition [facility]) on their problem list.  ONSET DATE: 5/3/23    Recent Chest Xray/CT of Chest: FINDINGS:  Interval removal of the endotracheal tube. Stable cardiomediastinal  silhouette with dense retrocardiac opacity and pleural effusion. Ground-glass opacities are seen elsewhere, left greater than right.
Facility/Department: [facility] ARU  Initial Speech/Language/Cognitive Assessment    NAME: Silvia Sanders  : 1940   MRN: [id number]  ADMISSION DATE: 2023  ADMITTING DIAGNOSIS: has Hyperlipidemia; Hyperuricemia; Vitamin D deficiency; Complications of bariatric procedures; Generalized weakness; SOB (shortness of breath) on exertion; Obesity; Localized edema; SOB (shortness of breath); Mild persistent asthma without complication; Pulmonary hypertension ([address] Abnormal nuclear cardiac imaging test; Hypothyroidism; Essential hypertension; Type 2 diabetes mellitus with complication, with long-term current use of insulin ([address] Erythrasma; Anemia; B12 deficiency; Chronic kidney disease; Chronic obstructive pulmonary disease, unspecified COPD type ([address] Obesity, Class III, BMI 40-49.9 (morbid obesity) ([address] Uncontrolled type 2 diabetes mellitus with hyperglycemia ([address] Leg swelling; Chronic pain of left knee; Chronic kidney disease, stage IV (severe) ([address] Chronic renal disease, stage III ([address] [848858]; Renal stone; Major depressive disorder, recurrent, mild; Major depressive disorder, recurrent, moderate; Major depressive disorder, recurrent, unspecified; Cystic kidney disease; ASCVD (arteriosclerotic cardiovascular disease); Valvular heart disease; Acute cystitis without hematuria; Cecum mass; Mass of cecum; Moderate malnutrition ([address] Critical illness myopathy; Gait disturbance; Hypoxic encephalopathy ([address] Acute respiratory failure with hypoxia ([address] Primary adenocarcinoma of ascending colon ([address] Acute blood loss anemia; Acute systolic (congestive) heart failure ([address] and Cardiac arrest due to other underlying condition [facility]) on their problem list.      Date of Eval: 2023   Evaluating Therapist: SONYA Soto      Primary Complaint: I feel like I'm coming along well. I'm way better with my thinking and memory than I was before my surgery. This is huge.     Pain:  Pain Assessment  Pain Assessment:
Jj Platt    : 1940  Acct #: [de-identified]  MRN: [id number]              PM&R Progress Note      Admitting diagnosis: Critical illness myopathy ( [address])     Comorbid diagnoses impacting rehabilitation: Generalized weakness, gait disturbance, hypoxic encephalopathy, acute respiratory failure with hypoxia, colon cancer with laparoscopic colectomy 5/3/2023, uncontrolled diabetes type 2 with hyperglycemia, essential hypertension, acute blood loss anemia, acute systolic congestive heart failure, cardiac arrest with PEA on 2023, hypernatremia    Chief complaint: Feeling stronger. Chest wall pain following her resuscitation efforts. Prior (baseline) level of function: Independent.     Current level of function:         Current  IRF-ABBY and Goals:   Occupational Therapy:    Short Term Goals  Time Frame for Short Term Goals: STGs=LTGs :   Long Term Goals  Time Frame for Long Term Goals : 10-14 days or until d/c  Long Term Goal 1: Pt will complete grooming tasks Ind  Long Term Goal 2: Pt will complete total body bathing mod I  Long Term Goal 3: Pt will complete UB dressing Ind  Long Term Goal 4: Pt will complete LB dressing Ind  Long Term Goal 5: Pt will doff/don footwear Ind  Additional Goals?: Yes  Long Term Goal 6: Pt will complete toileting mod I  Long Term Goal 7: Pt will complete functional transfers (bed, chair, toilet, shower) c DME PRN and mod I  Long Term Goal 8: Pt will perform therex/therax to facilitate increased strength/endurance/ax tolerance (c emphasis on dynamic standing balance/tolerance >10 mins, BUE endurance) c SBA  Long Term Goal 9: Pt will complete simple homemaking tasks c DME PRN and mod I :                                       Eating: Eating  Assistance Needed: Independent  Comment: able to open packages/containers  CARE Score: 6  Discharge Goal: Independent       Oral Hygiene: Oral Hygiene  Assistance Needed: Supervision or touching assistance  Comment: SBA in stance at [facility]  CARE
Occupational Therapy    Physical Rehabilitation: OCCUPATIONAL THERAPY     [] daily progress note       [x] discharge       Patient Name:  Mortimer Neither   :  1940 MRN: [id number]  Room:  [address] [place] [address] Date of Admission: 2023  Rehabilitation Diagnosis:   Critical illness myopathy [G72.81]       Date 2023       Day of ARU Week:  1   Time IN/OUT 2280-0642   Individual Tx Minutes 60   Group Tx Minutes    Co-Treat Minutes    Concurrent Tx Minutes    TOTAL Tx Time Mins 60   Variance Time    Variance Time []   Refusal due to:     []   Medical hold/reason:    []   Illness   []   Off Unit for test/procedure  []   Extra time needed to complete task  []   Therapeutic need  []   Other (specify):   Restrictions Restrictions/Precautions: General Precautions, Fall Risk         Communication with other providers: [x]   OK to see per nursing:     []   Spoke with team member regarding:      Subjective observations and cognitive status: Pt finishing breakfast on approach; pleasant and agreeable to therapy session. Pain level/location:    /10       Location:    Discharge recommendations  Anticipated discharge date:     Destination: []home alone   []home alone w assist prn   [x] home w/ family    [] Continuous supervision       []SNF    [] Assisted living     [] Other:   Continued therapy: []HHC OT  []OUTPATIENT  OT   [x] No Further OT  Equipment needs: none        ADLs:    Eating: Eating  Assistance Needed: Independent  Comment: Pt able to open containers/packages and feed self  CARE Score: 6  Discharge Goal: Independent       Oral Hygiene: Oral Hygiene  Assistance Needed: Independent  Comment: seated at sink  CARE Score: 6  Discharge Goal: Independent    UB/LB Bathing: Shower/Bathe Self  Assistance Needed: Independent  Comment:  Mod I for full shower  CARE Score: 6  Discharge Goal: Independent    UB Dressing: Upper Body Dressing  Assistance Needed: Independent  Comment: Pt able to retrieve clothing and doff/don
Occupational Therapy    Physical Rehabilitation: OCCUPATIONAL THERAPY     [x] daily progress note       [] discharge       Patient Name:  Jj Platt   :  1940 MRN: [id number]  Room:  [address] [place] [address] Date of Admission: 2023  Rehabilitation Diagnosis:   Critical illness myopathy [G72.81]       Date 2023       Day of ARU Week:  7   Time IN/OUT 9362-7475  4476-2552   Individual Tx Minutes 90+30   Group Tx Minutes    Co-Treat Minutes    Concurrent Tx Minutes    TOTAL Tx Time Mins 120   Variance Time    Variance Time []   Refusal due to:     []   Medical hold/reason:    []   Illness   []   Off Unit for test/procedure  []   Extra time needed to complete task  []   Therapeutic need  []   Other (specify):   Restrictions Restrictions/Precautions: General Precautions, Fall Risk         Communication with other providers: [x]   OK to see per nursing:     []   Spoke with team member regarding:      Subjective observations and cognitive status: Pt resting in bed on approach; pleasant and agreeable to therapy session. Pain level/location:    10       Location: none    Discharge recommendations  Anticipated discharge date:     Destination: []home alone   []home alone w assist prn   [x] home w/ family    [] Continuous supervision       []SNF    [] Assisted living     [] Other:   Continued therapy: []HHC OT  []OUTPATIENT  OT   [x] No Further OT  Equipment needs: none        ADLs:    Oral Hygiene: Oral Hygiene  Assistance Needed: Independent  Comment: seated at sink  CARE Score: 6  Discharge Goal: Independent    UB/LB Bathing: Shower/Bathe Self  Assistance Needed: Supervision or touching assistance  Comment: Completed simple wash up at sink c Supervision in stance  CARE Score: 4  Discharge Goal: Independent    UB Dressing: Upper Body Dressing  Assistance Needed: Independent  Comment:  Mod I to retrieve clothing demo'ing safety; able to doff/gonzalo pullover shirt  CARE Score: 6  Discharge Goal: Independent         LB
Physical Rehabilitation: OCCUPATIONAL THERAPY     [x] daily progress note       [] discharge       Patient Name:  Ulises Rob   :  1940 MRN: [id number]  Room:  [address] [place], [place] [address] Date of Admission: 2023  Rehabilitation Diagnosis:   Critical illness myopathy [G72.81]       Date 2023       Day of ARU Week:  3   Time IN/-930   Individual Tx Minutes 60   Group Tx Minutes    Co-Treat Minutes    Concurrent Tx Minutes    TOTAL Tx Time Mins 60   Variance Time    Variance Time []   Refusal due to:     []   Medical hold/reason:    []   Illness   []   Off Unit for test/procedure  []   Extra time needed to complete task  []   Therapeutic need  []   Other (specify):   Restrictions Restrictions/Precautions: General Precautions, Fall Risk         Communication with other providers: [x]   OK to see per nursing:     []   Spoke with team member regarding:      Subjective observations and cognitive status: Pt seen in semi-marie position in ibed upon arrival pleasant and agreeable to OT tx however denying need for bathing. Pain level/location:    /10       Location: denied pain   Discharge recommendations  Anticipated discharge date:  TBD  Destination: []home alone   []home alone w assist prn   [] home w/ family    [] Continuous supervision       []SNF    [] Assisted living     [] Other:   Continued therapy: []HHC OT  []OUTPATIENT  OT   [] No Further OT  Equipment needs: TBD     Toileting:   denied need         Bed Mobility:           []   Pt received out of bed   Supine --> Sit:  Sup  Sit --> Supine:  Sup    Transfers:    Sit--> Stand:  SB-Sup  Stand --> Sit:   SB-Sup  Stand-Pivot:   SB-Sup  Other:    Assistive device required for transfer:   FWW      Functional Mobility:    Assistance:  Pt engaged in fxl mob throughout ARU hallways c FWW and w/c follow behind for increased safety as pt req x 2 seated RB's d/t increased fatigue.    Device:   [x]   Rolling Walker     []   Standard Walker []   Wheelchair        []
Physical Therapy      [x] daily progress note       [] discharge       Patient Name:  Lily Estrada   :  1940 MRN: [id number]  Room:  [address] [place] [address] Date of Admission: 2023  Rehabilitation Diagnosis:   Critical illness myopathy [G72.81]       Date 2023       Day of ARU Week:  7   Time IN/OUT 0540-2582   Individual Tx Minutes 60   TOTAL Tx Time Mins 60   Variance Time    Variance Time []   Refusal due to:     []   Medical hold/reason:    []   Illness   []   Off Unit for test/procedure  []   Extra time needed to complete task  []   Therapeutic need  []   Other (specify):   Restrictions Restrictions/Precautions  Restrictions/Precautions: General Precautions, Fall Risk      Communication with other providers: [x]   OK to see per nursing:     []   Spoke with team member regarding:      Subjective observations and cognitive status: Pt up in [facility], willing to participate, motivated during session     Pain level/location: 0/10       Location:    Discharge recommendations  Anticipated discharge date:  tbd  Destination: []home alone   []home alone with assist PRN     [x] home w/ family      [] Continuous supervision  []SNF    [] Assisted living     [] Other:  Continued therapy: []HHC PT  []OUTPATIENT PT   [] No Further PT  []SNF PT  Caregiver training recommended: []Yes  [] No   Equipment needs: 2ww     Bed Mobility:           [x]   Pt received out of bed   Sit --> Supine:  Indep  Bed features used:    [] HOB elevated      [] Bed rail                                    [x] No     Transfers:    Sit--> Stand:  Indep  Stand --> Sit:   Indep  Includes transfers on/off seat  Stand-Pivot:   Indep    Assistive device required for transfer:   Rollator    Gait:    Distance:  165' x 2   Assistance:  Indep  Device:  [id number]  Gait Quality:   steady recip pattern      Uneven Surfaces:       Assistance:    Supervision - Indep;  decreased eccentric control on descent of apron, and  mild unsteadiness one occasion with turning on grade,
Physical Therapy  [facility] ARU PHYSICAL THERAPY EVALUATION    Chart Review:  Past Medical History:   Diagnosis Date    Abnormal nuclear stress test 04/08/2007 4/08-EF=67%,Mild->Mod.isch. LAD;7/07-EF=70%,Suggests poss. Mild Ant.wall ischemia. Cardiac arrest ([facility] [address] 2008    Diabetes mellitus ([facility] [address]     H/O cardiac catheterization 04/2008    No signif.CAD.    H/O cardiovascular stress test 04/11/2008    mild to mod ischemia in the LAD region, abnormal study, EF 67%, patient developed mild chest pain and throat tightness    H/O cardiovascular stress test 05/03/2016    lexiscan-moderate ischemia apical,RF66%    H/O Doppler ultrasound 07/16/2007    CAROTID DOPLER- intimal thickening but no significant atherosclerotic plaque noted in the right or left internal carotid artery, doppler flow velocities within the right and left internal carotid artery are elevated, consistent with a mild, less than 50% stenosis    H/O echocardiogram 10/14/2008    EF>55%, normal LV systolic function, normal left ventricular wall thickness, impaired LV relaxation    H/O echocardiogram 06/18/2013 6/13- EF >55% Impaired LV relaxation, Mild concentric LV hypertrophy, No sig AS, THEO underestimated. 10/08-EF=>55%,NL study;7/07-EF=>55%,Mild valv. AS.    H/O echocardiogram 05/16/2016 5/16 EF 55-60% normal study 12/14EF 60%. MIld mitral and tricuspid insufficiencies. Mildly sclerotic aortic valve which appears to be mildly stenosed with aortic valve area of 1.4 however this does not appear to be hemodynamically signficant aortic stenosis. Mildy hypertropic left ventricle with normal global and regional left ventricular systolic function. H/O left heart cath 05/23/2016    no significant disease in all vessels.  EF 55%    H/O left heart cath 06/08/2022    No significant disease    Hernia     after lap band removal    Hx of cardiovascular stress test 06/18/2013 6/13-EF 51%, no scitnigraphic evidence of inducible myocardial ischemia
Physical Therapy  [x] daily progress note       [] discharge       Patient Name:  Reina Estevez   :  1940 MRN: [id number]  Room:  [address] [place] [address] Date of Admission: 2023  Rehabilitation Diagnosis:   Critical illness myopathy [G72.81]       Date 2023       Day of ARU Week:  3   Time IN/OUT 4093-1541  5211-3897   Individual Tx Minutes 60   Group Tx Minutes 60   Co-Treat Minutes    Concurrent Tx Minutes    TOTAL Tx Time Mins 120   Variance Time    Variance Time []   Refusal due to:     []   Medical hold/reason:    []   Illness   []   Off Unit for test/procedure  []   Extra time needed to complete task  []   Therapeutic need  []   Other (specify):   Restrictions Restrictions/Precautions  Restrictions/Precautions: General Precautions, Fall Risk      Communication with other providers: [x]   OK to see per nursing:     []   Spoke with team member regarding:      Subjective observations and cognitive status: Pt sitting in chair upon arrival . Pt agreeable to tx     Pain level/location:    None /10       Location:    Discharge recommendations  Anticipated discharge date:  TBD  Destination: []home alone   []home alone with assist PRN     [] home w/ family      [] Continuous supervision  []SNF    [] Assisted living     [] Other:   Continued therapy: []HHC PT  []OUTPATIENT  PT   [] No Further PT  Equipment needs: TBD         Bed Mobility:           []   Pt received out of bed   Rolling R/L:  Supervision  Scooting:  Supervision  Lying --> Sit:  Supervision  Sit --> lying:  Supervision    Transfers:    Sit--> Stand:  Supervision  Stand --> Sit:   Supervision  Chair-->Bed/Bed --> Chair:   Supervision     Assistive device required for transfer:   2WW    Gait:    Distance:  188 ft   Assistance:  SBA  Device:  2WW  Gait Quality:  Pt demo a step through        [employer]   # Completed:  8 steps 25% cues for safe technique  Assistance:  CGA  Supportive Device:  B HR    Uneven Surfaces:       Assistance:    SBA  Device:
Pt identified as a candidate for COPD Gold assessment. Patient does not have Pulmonary Function testing on record. When patient is discharged and able to complete PFT testing as an out-patient, they would benefit from baseline PFT testing and COPD Gold could be assessed if patient were to have readmission at a later date.
clothing; Pt able to thread BLEs into brief and pants and manage over hips IND  CARE Score: 6  Discharge Goal: Independent    Donning and [place] Footwear: Putting On/Taking Off Footwear  Assistance Needed: Independent  Comment: to don personal shoes  CARE Score: 6  Discharge Goal: Independent      Toiletin [address] needed: Independent  Comment: IND for clothing management and bladder hygiene  CARE Score: 6  Discharge Goal: Independent      Toilet Transfers: Toilet Transfer  Assistance needed: Independent  Comment:  Mod I using 4WW and grab bars  CARE Score: 6  Discharge Goal: Independent    Physical Therapy:   Short Term Goals  Time Frame for Short Term Goals: 1 week  Short Term Goal 1: Pt will perform bed mobility with mod I using bed features as needed  Short Term Goal 2: pt will perform sit <>stand, w/c<>bed and car transfers with mod I  Short Term Goal 3: Pt will ambulate with 2ww 150' on level surface and 10' on unlevel surface with mod I  Short Term Goal 4: Pt will ascend/descend curb step with 2ww and 4 steps with B rails with mod I, 12 steps with supervision  Short Term Goal 5: Pt will  light object from floor with 2ww with mod I            Bed Mobility:   Sit to Lying  Assistance Needed: Independent  Comment: standard bed  CARE Score: 6  Discharge Goal: Independent  Roll Left and Right  Assistance Needed: Independent  Comment: standard bed  CARE Score: 6  Discharge Goal: Independent  Lying to Sitting on Side of Bed  Assistance Needed: Independent  Comment: standard bed  CARE Score: 6  Discharge Goal: Independent    Transfers:    Sit to Stand  Assistance Needed: Independent  Comment: with rollator  CARE Score: 6  Discharge Goal: Independent  Chair/Bed-to-Chair Transfer  Assistance Needed: Independent  Comment: with Rollator  CARE Score: 6  Discharge Goal: Independent  Toilet Transfer  Assistance needed: Supervision or touching assistance  Comment:  (deemed usual performance by
myocardial ischemia    Hyperlipidemia     Hypertension     Irritable bowel syndrome     Kidney stones     Obesity     Osteoarthritis     Thyroid disease     Type II or unspecified type diabetes mellitus without mention of complication, not stated as uncontrolled      Past Surgical History:   Procedure Laterality Date    CARDIAC CATHETERIZATION  04/18/08    continue risk factor modification with strict control of her risk factors with diet and control of diabetes, high blood pressure and hyperlipidemia    GASTRIC BAND  08/2008    revision of gastric band placement    HEMICOLECTOMY Right 5/3/2023    RIGHT BOWEL RESECTION HEMICOLECTOMY LAPAROSCOPIC ROBOTIC XI performed by Aruna Berumen MD at [address] [place] [address] Road N/A 5/3/2023    [facility] performed by Aruna Berumen MD at [place] [address] ([facility])      LAP BAND      put in in May Removed in August    LITHOTRIPSY      [address]      right    ROTATOR CUFF REPAIR       Social History:  Social/Functional History  Lives With: Son, Other (comment) (and son's girlfriend; son works from home)  Type of Home: House  Home Layout: One level  Home Access: Stairs to enter without rails  Entrance Stairs - Number of Steps: 2 HENRY onto porch ((holds onto corner post, no true railing by steps) + threshold step into house (for front entrance, which is more commonly used by pt. Also has 3 HENRY in garage)  Bathroom Shower/Tub: Tub/Shower unit  Bathroom Toilet: Handicap height  Bathroom Equipment: Grab bars in shower, Tub transfer bench, Grab bars around toilet  Bathroom Accessibility: Walker accessible  Home Equipment: [address], Walker, rolling (also owns [employer].   Had a hospital bed but it is currently being used by a different family member)  Has the patient had two or more falls in the past year or any fall with injury in the past year?: Yes (3 falls, one in October caused humeral fx and pt is s/p shoulder
Column 1 Column 2   Little interest or pleasure in doing things   [x] 0. No  [] 1. Yes  [] 9. No Response [] 0. Never or one day  [] 1.  2-6 days (several days)  [] 2.  7-11 days (half or more of days)  [] 3.  12-14 days (nearly every day)     Feeling down, depressed, or hopeless   [x] 0. No  [] 1. Yes  [] 9. No Response [] 0. Never or one day  [] 1.  2-6 days (several days)  [] 2.  7-11 days (half or more of days)  [] 3.  12-14 days (nearly every day)   **If Question A or B in column 2 is coded 2 or 3, CONTINUE asking the questions below in box. If not, SKIP down to [facility]" question and continue**       Trouble falling or staying asleep, or sleeping too much   [] 0. No  [] 1. Yes  [] 9. No Response [] 0. Never or one day  [] 1.  2-6 days (several days)  [] 2.  7-11 days (half or more of days)  [] 3.  12-14 days (nearly every day   Feeling tired or having little energy   [] 0. No  [] 1. Yes  [] 9. No Response [] 0. Never or one day  [] 1.  2-6 days (several days)  [] 2.  7-11 days (half or more of days)  [] 3.  12-14 days (nearly every day   Poor appetite or overeating     [] 0. No  [] 1. Yes  [] 9. No Response [] 0. Never or one day  [] 1.  2-6 days (several days)  [] 2.  7-11 days (half or more of days)  [] 3.  12-14 days (nearly every day   Feeling bad about yourself - or that you are a failure or have let yourself or your family down   [] 0. No  [] 1. Yes  [] 9. No Response [] 0. Never or one day  [] 1.  2-6 days (several days)  [] 2.  7-11 days (half or more of days)  [] 3.  12-14 days (nearly every day   Trouble concentrating on things, such as reading the newspaper or watching television   [] 0. No  [] 1. Yes  [] 9. No Response [] 0. Never or one day  [] 1.  2-6 days (several days)  [] 2.  7-11 days (half or more of days)  [] 3.  12-14 days (nearly every day   Moving or speaking so slowly that other people could have noticed.   Or the opposite- being so fidgety or restless
[x]   Balance training :dynamic balance at lama rail for [facility] good foot clearance, bkwd walking with min cues for increased step length; SBA for balance; Side stepping  SBA        []   Postural training    []   Supine ther ex (reps/sets):     []   Seated ther ex (reps/sets):     [x]   Standing ther ex (reps/sets): B LE with B UE support x 15 reps for heel raises, marching, mini squats, hip abd, and hip extension with supervision for balance. Min cues for ex technique reading from handout. Req rest break after each ex due to fatigue towards end of session. []   Picking up object from floor (standing):                   []   Reacher used   [x]   Other:Amb ~ 100' without AD for balance training, pt slightly guarded with decreased speed, step length, arm swing. SB for balance; min postural cues due to increased trunk flexion. []   Other:      Patient/Caregiver Education and Training:   [x]   Bed Mobility/Transfer technique/safety  [x]   Gait technique/sequencing  [x]   Proper use of assistive device  [x]   Advanced mobility safety and technique  []   Reinforced patient's precautions/mobility while maintaining precautions  []   Postural awareness  []   Family training      Treatment Plan for Next Session: Pt to be discharged to home with support of family, OP PT recommended and continued use of rollator      Addendum: Pt has made excellent progress and is independent in basic household mobility, meeting all goals with use of rollator.  Pt does have balance issues which persist as well as decreased activity tolerance and OP PT is recommended for return to PLOF. JS, PT    Treatment/Activity Tolerance:   [x] Tolerated treatment with no adverse effects    [] Patient limited by fatigue  [] Patient limited by pain   [] Patient limited by medical complications:    [] Adverse reaction to Tx:   [] Significant change in status    Safety:       [x]  bed alarm set    []  chair alarm set    []  Pt refused alarms
assistance  Comment: CG with B rails, reciprocating pattern ascending, step to pattern descending  CARE Score: 4  Discharge Goal: Independent     12 Steps  Comment: 4 steps max attempt due to fatigue  Reason if not Attempted: Not attempted due to medical condition or safety concerns  CARE Score: 88  Discharge Goal: Supervision or touching assistance       Wheelchair:  w/c Ability: Wheelchair Ability  Uses a Wheelchair and/or Scooter?: No                Balance:        Object: Picking Up Object  Assistance Needed: Supervision or touching assistance  Comment: CG with 2ww  CARE Score: 4  Discharge Goal: Independent    I      Exam:    Blood pressure (!) 142/49, pulse 55, temperature 97.7 °F (36.5 °C), temperature source Oral, resp. rate 20, height 5' 6\" (1.676 m), weight 179 lb 14.3 oz (81.6 kg), SpO2 95 %, not currently breastfeeding. General: She was seen resting supine in bed. Easily awakened from nap but she was confused then. Passive in conversation. Oriented x1 only. HEENT: Symmetric facial expression. MMM. No JVD. Pulmonary: Blunted breath sounds without coughing or wheezing. Cardiac: Regular rate and rhythm. Anterior thorax tenderness to light palpation. Abdomen: Patient's abdomen is soft and nondistended. Bowel sounds were present throughout. There was no rebound, guarding or masses noted. Upper extremities: Clumsy movements bilaterally. Diminished sensation in the fingertips. No tremor or clonus. 4 -/5 strength throughout. Lower extremities: 3+/5 strength throughout. 1+ edema about the ankles. No signs of DVT. Sitting balance was fair. Standing balance was poor.     Lab Results   Component Value Date    WBC 6.7 05/25/2023    HGB 8.6 (L) 05/25/2023    HCT 27.4 (L) 05/25/2023    MCV 91.6 05/25/2023     05/25/2023     Lab Results   Component Value Date    INR 0.94 05/12/2023    INR 0.93 05/21/2016    PROTIME 11.9 05/12/2023    PROTIME 10.6 05/21/2016     Lab
Fingers should not touch the ruler while reaching forward. The recorded measure is the distance that the fingers reach while the subject is in the most forward position. When possible ask subject to use both arms when reaching to avoid rotation of the trunk. 4 = can reach forward 25cm (10 inches)   3 = can reach forward 12cm (5 inches)   2 = can reach forward 5cm (2 inches)   1 = reaches forward but needs supervision   0 = loses balance while trying - requiring assist  9)  object from the floor from a standing position: Score:4  Instructions: \" the (shoe/cone - some object) which is placed in front of you\"   4 = able to  object safely and easily   3 = able to  object but need supervision   2 = unable to  object but reaches 2-5cm from object and keeps balance    1 = unable to  object and needs supervision while trying   0 = unable to try - needs assist to keep from losing balance or falling  10) Turning to look behind over left and right shoulders while standing: Score:4  Instructions: \"turn to look directly behind you over the left shoulder. Repeat to the right\". Examiner may pick an object to look at directly behind the pt to encourage better twist.   4 = looks behind from both sides and weight shifts well   3 = looks behind one side only or other side shows less weight shift   2 = turns sideways only but maintains balance   1 = needs supervision when turning   0 = needs assist to keep from losing balance or falling  11) Turn 360 degrees: Score:3  Instructions: Turn completely around in a full [place]. Pause. Then turn a full [place] in the other direction.    4 = able to turn 360 degrees (each direction) safely in 4 seconds or less   3 = able to turn 360 degrees (only one direction) safely in 4 seconds or less   2 = needs close supervision or verbal cueing   1 = able to turn 360 degrees safely but slowly   0 = needs assistance while turning  12) Place alternate foot on
evaluation, education, & procurement, Self-Care / ADL, Home management training, Patient/Caregiver education & training, Cognitive/Perceptual training    Electronically signed by   DREW Wilkerson,  5/30/2023, 10:29 AM

## 2023-06-05 ENCOUNTER — CARE COORDINATION (OUTPATIENT)
Dept: CASE MANAGEMENT | Age: 83
End: 2023-06-05

## 2023-06-05 DIAGNOSIS — G72.81 CRITICAL ILLNESS MYOPATHY: Primary | ICD-10-CM

## 2023-06-05 PROCEDURE — 1111F DSCHRG MED/CURRENT MED MERGE: CPT | Performed by: GENERAL PRACTICE

## 2023-06-05 NOTE — CARE COORDINATION
Forrest Baker, APRN - CNP [facility]   6/8/2023 11:00 AM Perez Swain [address] [facility]   6/9/2023  3:15 PM Loretta Cole, OT [facility] OT [facility]   6/19/2023  2:00 PM Dorrie Mai Elyse Severance, MD AFLADVNPHHTN AFL ADV [facility]   10/3/2023 11:40 AM Naseem Alas MD [facility] Heart [facility]       Care Transition Nurse provided contact information. Plan for follow-up call in 3-5 days based on severity of symptoms and risk factors. Plan for next call: symptom management-any issues?   self management-wt/bp/hr/bg?; zone mgt education  follow-up appointment-as above  medication management-Imdur?, any changes?  [facility] Outpt Therapy  referrals-RPM discussion    Abhinav Mcnamara RN

## 2023-06-07 ENCOUNTER — OFFICE VISIT (OUTPATIENT)
Dept: CARDIOLOGY CLINIC | Age: 83
End: 2023-06-07
Payer: MEDICARE

## 2023-06-07 VITALS
WEIGHT: 168 LBS | SYSTOLIC BLOOD PRESSURE: 138 MMHG | HEART RATE: 78 BPM | DIASTOLIC BLOOD PRESSURE: 70 MMHG | BODY MASS INDEX: 27 KG/M2 | HEIGHT: 66 IN

## 2023-06-07 DIAGNOSIS — I38 VALVULAR HEART DISEASE: Primary | ICD-10-CM

## 2023-06-07 DIAGNOSIS — I47.1 SVT (SUPRAVENTRICULAR TACHYCARDIA) (HCC): ICD-10-CM

## 2023-06-07 DIAGNOSIS — I10 ESSENTIAL HYPERTENSION: ICD-10-CM

## 2023-06-07 PROBLEM — I50.21 ACUTE SYSTOLIC (CONGESTIVE) HEART FAILURE (HCC): Status: RESOLVED | Noted: 2023-05-25 | Resolved: 2023-06-07

## 2023-06-07 PROBLEM — I47.10 SVT (SUPRAVENTRICULAR TACHYCARDIA): Status: ACTIVE | Noted: 2023-06-07

## 2023-06-07 PROCEDURE — 3078F DIAST BP <80 MM HG: CPT | Performed by: NURSE PRACTITIONER

## 2023-06-07 PROCEDURE — 1123F ACP DISCUSS/DSCN MKR DOCD: CPT | Performed by: NURSE PRACTITIONER

## 2023-06-07 PROCEDURE — 3075F SYST BP GE 130 - 139MM HG: CPT | Performed by: NURSE PRACTITIONER

## 2023-06-07 PROCEDURE — 99214 OFFICE O/P EST MOD 30 MIN: CPT | Performed by: NURSE PRACTITIONER

## 2023-06-07 RX ORDER — FUROSEMIDE 20 MG/1
25 TABLET ORAL DAILY
COMMUNITY

## 2023-06-07 ASSESSMENT — ENCOUNTER SYMPTOMS
SHORTNESS OF BREATH: 0
ORTHOPNEA: 0

## 2023-06-07 NOTE — PROGRESS NOTES
90 tablet 3    calcium carbonate (TUMS) 500 MG chewable tablet Take 2 tablets by mouth as needed      aspirin 81 MG EC tablet Take 1 tablet by mouth daily       No current facility-administered medications for this visit. All pertinent data reviewed and discussed with patient       ASSESSMENT/PLAN:    S/p cardiac arrest  Most likey seconary to hypoxia       HFpEF  Appears euvolemic. She does have trace swelling around her ankles which we may be secondary to amlodipine. She did start low-dose Lasix and has noted minimal change. She denies symptoms such as shortness of breath or orthopnea. We will continue with ongoing surveillance      SVT  Noted to have SVT while in the hospital and acute illness. She remains asymptomatic. We will continue with Toprol-XL    VHD  Moderate AS  She is totally asymptomatic   Continue ongoing surveillance       Hypertension  Well controlled on hydralazine and amlodipine   Continue meds       Tests ordered:  none  Follow-up  3 mo     Signed:  NIKUNJ Chao CNP, 6/7/2023, 10:42 AM    An electronic signature was used to authenticate this note. Please note this report has been partially produced using speech recognition software and may contain errors related to that system including errors in grammar, punctuation, and spelling, as well as words and phrases that may be inappropriate. If there are any questions or concerns please feel free to contact the dictating provider for clarification.

## 2023-06-08 ENCOUNTER — OFFICE VISIT (OUTPATIENT)
Dept: SURGERY | Age: 83
End: 2023-06-08

## 2023-06-08 VITALS
BODY MASS INDEX: 27.31 KG/M2 | WEIGHT: 169.2 LBS | OXYGEN SATURATION: 99 % | HEART RATE: 69 BPM | DIASTOLIC BLOOD PRESSURE: 74 MMHG | SYSTOLIC BLOOD PRESSURE: 142 MMHG

## 2023-06-08 DIAGNOSIS — Z48.89 ENCOUNTER FOR POSTOPERATIVE CARE: Primary | ICD-10-CM

## 2023-06-08 PROCEDURE — 99024 POSTOP FOLLOW-UP VISIT: CPT | Performed by: PHYSICIAN ASSISTANT

## 2023-06-08 PROCEDURE — APPNB30 APP NON BILLABLE TIME 0-30 MINS: Performed by: PHYSICIAN ASSISTANT

## 2023-06-08 ASSESSMENT — PATIENT HEALTH QUESTIONNAIRE - PHQ9
1. LITTLE INTEREST OR PLEASURE IN DOING THINGS: 0
SUM OF ALL RESPONSES TO PHQ9 QUESTIONS 1 & 2: 1
SUM OF ALL RESPONSES TO PHQ QUESTIONS 1-9: 1
2. FEELING DOWN, DEPRESSED OR HOPELESS: 1

## 2023-06-08 NOTE — PROGRESS NOTES
Chief Complaint   Patient presents with    Post-Op Check     1st PO-RIGHT BOWEL RESECTION HEMICOLECTOMY LAPAROSCOPIC ROBOTIC [email or web address] 5/3/23         SUBJECTIVE:  Patient presents today for her 1st post op visit following Robotic right hemicolectomy and primary ventral hernia repair with prolonged hospital stay including code Blue, intubation x2 and d/c to ARU for rehab prior to returning home. Pt is home now, and reports that she is doing very well. Pt reports that pain is none. Pt is  tolerating a regular diet. BMs are WNL. Had some constipation and is taking senna, which helps. Incisions: well healed. Past Surgical History:   Procedure Laterality Date    CARDIAC CATHETERIZATION  04/18/08    continue risk factor modification with strict control of her risk factors with diet and control of diabetes, high blood pressure and hyperlipidemia    GASTRIC BAND  08/2008    revision of gastric band placement    HEMICOLECTOMY Right 5/3/2023    RIGHT BOWEL RESECTION HEMICOLECTOMY LAPAROSCOPIC ROBOTIC XI performed by Mei Andrews MD at [facility] 5/3/2023    [address] [place] [address] performed by Mei Andrews MD at [place] [address] ([address] [place] [address])      LAP BAND      put in in May Removed in August    LITHOTRIPSY      [address]      right    ROTATOR CUFF REPAIR       Past Medical History:   Diagnosis Date    Abnormal nuclear stress test 04/08/2007 4/08-EF=67%,Mild->Mod.isch. LAD;7/07-EF=70%,Suggests poss. Mild Ant.wall ischemia.     Cardiac arrest ([facility] [address] 2008    Diabetes mellitus ([facility] [address]     H/O cardiac catheterization 04/2008    No signif.CAD.    H/O cardiovascular stress test 04/11/2008    mild to mod ischemia in the LAD region, abnormal study, EF 67%, patient developed mild chest pain and throat tightness    H/O cardiovascular stress test 05/03/2016    lexiscan-moderate ischemia apical,RF66%    H/O Doppler ultrasound 07/16/2007    CAROTID DOPLER- intimal thickening but

## 2023-06-09 ENCOUNTER — CARE COORDINATION (OUTPATIENT)
Dept: CASE MANAGEMENT | Age: 83
End: 2023-06-09

## 2023-06-09 ENCOUNTER — HOSPITAL ENCOUNTER (OUTPATIENT)
Dept: OCCUPATIONAL THERAPY | Age: 83
Setting detail: THERAPIES SERIES
Discharge: HOME | End: 2023-06-09
Payer: MEDICARE

## 2023-06-09 PROCEDURE — 97166 OT EVAL MOD COMPLEX 45 MIN: CPT

## 2023-06-09 ASSESSMENT — 9 HOLE PEG TEST
TESTTIME_SECONDS: 26
TESTTIME_SECONDS: 31

## 2023-06-09 NOTE — CARE COORDINATION
[facility] Transitions Follow Up Call    2023    Patient: Zeina Roca  Patient : 1940   MRN: [id number]  Reason for Admission: Colon Cancer s/p Rt Hemicolectomy >> S/P PEA/Bradycardic Arrest, Ac Resp Failure (vent), Pna, KAREN  Discharge Date: 23 RARS: Readmission Risk Score: 20.6    Upon chart review, it is noted patient is scheduled for OT this afternoon. Will attempt outreach another day to discuss f/u and RPM.   Jarad Orellana LPN CC  Care Transitions  [phone]    Care Transitions Subsequent and Final Call    Subsequent and Final Calls  Care Transitions Interventions  Other Interventions:              Follow Up  Future Appointments   Date Time Provider Teena Joseph   2023  2:00 PM Tip Miles MD AFLADVNPHHTN [facility]   2023 11:00 AM [facility]   2023 12:30 PM Camila Caruso MD [facility]       Jarad Orellana LPN

## 2023-06-09 NOTE — PROGRESS NOTES
Rollator        Prior Level of Function:     Independent        Current Level of Function:        Receives Help From: Family  ADL Assistance: Independent  Homemaking Assistance: Independent  Homemaking Responsibilities: Yes  Ambulation Assistance: Independent  Transfer Assistance: Independent  Active : No    ADLs:   ADL  Feeding: Independent  Grooming: Independent  UE Bathing: Independent  LE Bathing: Independent  UE Dressing: Independent  LE Dressing: Independent  Toileting: Independent    OBJECTIVE EXAMINATION   Restrictions:   Restrictions/Precautions: General Precautions          Observations:        Functional Mobility (including Bed Mobility / Transfers if applicable):    Balance  Sitting Balance: Independent  Standing Balance: Independent  Functional Mobility  Functional - Mobility Device: 4-Wheeled Walker  Activity: To/From therapy gym  Assist Level: Modified independent     Review of Systems (including Visual Assessment if applicable):  Vision  Vision: Within Functional Limits  Vision - Basic Assessment  Prior Vision: No visual deficits  Visual History: No significant visual history  Hearing  Hearing: Within functional limits    Cognition/Perception:   Cognition  Overall Cognitive Status: WNL  Perception  Overall Perceptual Status: WFL    Neuro Screen:   Sensation      Sensation  Overall Sensation Status: WNL       Quality of Movement     Tone  RUE Tone: Normotonic  LUE Tone: Normotonic Motor Control  Gross Motor?: WFL             Left AROM  Right AROM      LUE AROM (degrees)  LUE AROM : WFL  Left Hand AROM (degrees)  Left Hand AROM: WFL RUE AROM (degrees)  RUE AROM : WFL  Right Hand AROM (degrees)  Right Hand AROM: WFL           Left Strength  Right Strength      LUE Strength  Gross LUE Strength: WFL    RUE Strength  Gross RUE Strength: WFL          Hand Dominance:     Left  Right     Strength  Handle Setting 2: 26.2 Handle Setting 2: 37.3   Pinch Strength (if applicable) Left Hand Strength -

## 2023-06-17 ENCOUNTER — HOSPITAL ENCOUNTER (INPATIENT)
Age: 83
LOS: 4 days | Discharge: HOME | DRG: 204 | End: 2023-06-21
Attending: EMERGENCY MEDICINE | Admitting: STUDENT IN AN ORGANIZED HEALTH CARE EDUCATION/TRAINING PROGRAM
Payer: MEDICARE

## 2023-06-17 ENCOUNTER — APPOINTMENT (OUTPATIENT)
Dept: GENERAL RADIOLOGY | Age: 83
DRG: 204 | End: 2023-06-17
Payer: MEDICARE

## 2023-06-17 ENCOUNTER — APPOINTMENT (OUTPATIENT)
Dept: ULTRASOUND IMAGING | Age: 83
DRG: 204 | End: 2023-06-17
Payer: MEDICARE

## 2023-06-17 DIAGNOSIS — N18.9 CHRONIC KIDNEY DISEASE, UNSPECIFIED CKD STAGE: ICD-10-CM

## 2023-06-17 DIAGNOSIS — J18.9 PNEUMONIA OF BOTH LOWER LOBES DUE TO INFECTIOUS ORGANISM: Primary | ICD-10-CM

## 2023-06-17 DIAGNOSIS — R07.1 CHEST PAIN ON BREATHING: ICD-10-CM

## 2023-06-17 DIAGNOSIS — R06.02 SHORTNESS OF BREATH: ICD-10-CM

## 2023-06-17 DIAGNOSIS — R77.8 ELEVATED TROPONIN: ICD-10-CM

## 2023-06-17 DIAGNOSIS — I10 ESSENTIAL HYPERTENSION: ICD-10-CM

## 2023-06-17 PROBLEM — I26.99 ACUTE PULMONARY EMBOLISM, UNSPECIFIED PULMONARY EMBOLISM TYPE, UNSPECIFIED WHETHER ACUTE COR PULMONALE PRESENT (HCC): Status: ACTIVE | Noted: 2023-06-17

## 2023-06-17 LAB
ALBUMIN SERPL-MCNC: 3.7 GM/DL (ref 3.4–5)
ALP BLD-CCNC: 114 IU/L (ref 40–129)
ALT SERPL-CCNC: 10 U/L (ref 10–40)
ANION GAP SERPL CALCULATED.3IONS-SCNC: 13 MMOL/L (ref 4–16)
APTT: 155.5 SECONDS (ref 25.1–37.1)
APTT: 29.5 SECONDS (ref 25.1–37.1)
AST SERPL-CCNC: 14 IU/L (ref 15–37)
BASOPHILS ABSOLUTE: 0.1 K/CU MM
BASOPHILS RELATIVE PERCENT: 1 % (ref 0–1)
BILIRUB SERPL-MCNC: 0.5 MG/DL (ref 0–1)
BUN SERPL-MCNC: 26 MG/DL (ref 6–23)
CALCIUM SERPL-MCNC: 9 MG/DL (ref 8.3–10.6)
CHLORIDE BLD-SCNC: 101 MMOL/L (ref 99–110)
CO2: 25 MMOL/L (ref 21–32)
CREAT SERPL-MCNC: 1.7 MG/DL (ref 0.6–1.1)
D DIMER: 1880 NG/ML(DDU)
DIFFERENTIAL TYPE: ABNORMAL
ESTIMATED AVERAGE GLUCOSE: 120 MG/DL
GFR SERPL CREATININE-BSD FRML MDRD: 30 ML/MIN/1.73M2
GLUCOSE BLD-MCNC: 226 MG/DL (ref 70–99)
GLUCOSE BLD-MCNC: 254 MG/DL (ref 70–99)
GLUCOSE SERPL-MCNC: 133 MG/DL (ref 70–99)
HBA1C MFR BLD: 5.8 % (ref 4.2–6.3)
HCT VFR BLD CALC: 34.7 % (ref 37–47)
HEMOGLOBIN: 10.6 GM/DL (ref 12.5–16)
LACTATE: 1.1 MMOL/L (ref 0.5–1.9)
LACTIC ACID, SEPSIS: 1.2 MMOL/L (ref 0.5–1.9)
LACTIC ACID, SEPSIS: 1.5 MMOL/L (ref 0.5–1.9)
LACTIC ACID, SEPSIS: 1.8 MMOL/L (ref 0.5–1.9)
LYMPHOCYTES ABSOLUTE: 3.9 K/CU MM
LYMPHOCYTES RELATIVE PERCENT: 27 % (ref 24–44)
MAGNESIUM: 2 MG/DL (ref 1.8–2.4)
MAGNESIUM: 2 MG/DL (ref 1.8–2.4)
MCH RBC QN AUTO: 28.4 PG (ref 27–31)
MCHC RBC AUTO-ENTMCNC: 30.5 % (ref 32–36)
MCV RBC AUTO: 93 FL (ref 78–100)
MONOCYTES ABSOLUTE: 1.5 K/CU MM
MONOCYTES RELATIVE PERCENT: 10 % (ref 0–4)
PDW BLD-RTO: 16.3 % (ref 11.7–14.9)
PLATELET # BLD: 273 K/CU MM (ref 140–440)
PMV BLD AUTO: 9.3 FL (ref 7.5–11.1)
POTASSIUM SERPL-SCNC: 3.7 MMOL/L (ref 3.5–5.1)
PRO-BNP: 5229 PG/ML
PROCALCITONIN SERPL-MCNC: 0.18 NG/ML
RBC # BLD: 3.73 M/CU MM (ref 4.2–5.4)
SEGMENTED NEUTROPHILS ABSOLUTE COUNT: 9 K/CU MM
SEGMENTED NEUTROPHILS RELATIVE PERCENT: 62 % (ref 36–66)
SODIUM BLD-SCNC: 139 MMOL/L (ref 135–145)
TOTAL PROTEIN: 6.8 GM/DL (ref 6.4–8.2)
TROPONIN T: 0.02 NG/ML
TROPONIN T: 0.03 NG/ML
TSH SERPL DL<=0.005 MIU/L-ACNC: 0.11 UIU/ML (ref 0.27–4.2)
WBC # BLD: 14.5 K/CU MM (ref 4–10.5)

## 2023-06-17 PROCEDURE — 83735 ASSAY OF MAGNESIUM: CPT

## 2023-06-17 PROCEDURE — 93005 ELECTROCARDIOGRAM TRACING: CPT | Performed by: EMERGENCY MEDICINE

## 2023-06-17 PROCEDURE — 84145 PROCALCITONIN (PCT): CPT

## 2023-06-17 PROCEDURE — 36415 COLL VENOUS BLD VENIPUNCTURE: CPT

## 2023-06-17 PROCEDURE — 87040 BLOOD CULTURE FOR BACTERIA: CPT

## 2023-06-17 PROCEDURE — 83880 ASSAY OF NATRIURETIC PEPTIDE: CPT

## 2023-06-17 PROCEDURE — 93970 EXTREMITY STUDY: CPT

## 2023-06-17 PROCEDURE — 83036 HEMOGLOBIN GLYCOSYLATED A1C: CPT

## 2023-06-17 PROCEDURE — 85007 BL SMEAR W/DIFF WBC COUNT: CPT

## 2023-06-17 PROCEDURE — 80053 COMPREHEN METABOLIC PANEL: CPT

## 2023-06-17 PROCEDURE — 94640 AIRWAY INHALATION TREATMENT: CPT

## 2023-06-17 PROCEDURE — 99285 EMERGENCY DEPT VISIT HI MDM: CPT

## 2023-06-17 PROCEDURE — 85379 FIBRIN DEGRADATION QUANT: CPT

## 2023-06-17 PROCEDURE — 6360000002 HC RX W HCPCS: Performed by: NURSE PRACTITIONER

## 2023-06-17 PROCEDURE — 2140000000 HC CCU INTERMEDIATE R&B

## 2023-06-17 PROCEDURE — 2580000003 HC RX 258: Performed by: NURSE PRACTITIONER

## 2023-06-17 PROCEDURE — 85730 THROMBOPLASTIN TIME PARTIAL: CPT

## 2023-06-17 PROCEDURE — 83605 ASSAY OF LACTIC ACID: CPT

## 2023-06-17 PROCEDURE — 84484 ASSAY OF TROPONIN QUANT: CPT

## 2023-06-17 PROCEDURE — 71045 X-RAY EXAM CHEST 1 VIEW: CPT

## 2023-06-17 PROCEDURE — 2500000003 HC RX 250 WO HCPCS: Performed by: NURSE PRACTITIONER

## 2023-06-17 PROCEDURE — 84443 ASSAY THYROID STIM HORMONE: CPT

## 2023-06-17 PROCEDURE — 85027 COMPLETE CBC AUTOMATED: CPT

## 2023-06-17 PROCEDURE — 6370000000 HC RX 637 (ALT 250 FOR IP): Performed by: NURSE PRACTITIONER

## 2023-06-17 PROCEDURE — 82962 GLUCOSE BLOOD TEST: CPT

## 2023-06-17 RX ORDER — POLYETHYLENE GLYCOL 3350 17 G/17G
17 POWDER, FOR SOLUTION ORAL DAILY PRN
Status: DISCONTINUED | OUTPATIENT
Start: 2023-06-17 | End: 2023-06-21 | Stop reason: HOSPADM

## 2023-06-17 RX ORDER — GUAIFENESIN 600 MG/1
600 TABLET, EXTENDED RELEASE ORAL DAILY
Status: DISCONTINUED | OUTPATIENT
Start: 2023-06-17 | End: 2023-06-21 | Stop reason: HOSPADM

## 2023-06-17 RX ORDER — PROMETHAZINE HYDROCHLORIDE 25 MG/1
12.5 TABLET ORAL EVERY 6 HOURS PRN
Status: DISCONTINUED | OUTPATIENT
Start: 2023-06-17 | End: 2023-06-21 | Stop reason: HOSPADM

## 2023-06-17 RX ORDER — FUROSEMIDE 40 MG/1
40 TABLET ORAL 2 TIMES DAILY
Status: DISCONTINUED | OUTPATIENT
Start: 2023-06-18 | End: 2023-06-21 | Stop reason: HOSPADM

## 2023-06-17 RX ORDER — PANTOPRAZOLE SODIUM 40 MG/1
40 TABLET, DELAYED RELEASE ORAL
Status: DISCONTINUED | OUTPATIENT
Start: 2023-06-18 | End: 2023-06-21 | Stop reason: HOSPADM

## 2023-06-17 RX ORDER — HYDRALAZINE HYDROCHLORIDE 25 MG/1
25 TABLET, FILM COATED ORAL 3 TIMES DAILY
Status: DISCONTINUED | OUTPATIENT
Start: 2023-06-17 | End: 2023-06-21 | Stop reason: HOSPADM

## 2023-06-17 RX ORDER — METOPROLOL SUCCINATE 50 MG/1
100 TABLET, EXTENDED RELEASE ORAL DAILY
Status: DISCONTINUED | OUTPATIENT
Start: 2023-06-17 | End: 2023-06-17

## 2023-06-17 RX ORDER — BUDESONIDE AND FORMOTEROL FUMARATE DIHYDRATE 160; 4.5 UG/1; UG/1
2 AEROSOL RESPIRATORY (INHALATION) 2 TIMES DAILY
Status: DISCONTINUED | OUTPATIENT
Start: 2023-06-17 | End: 2023-06-21 | Stop reason: HOSPADM

## 2023-06-17 RX ORDER — LEVOTHYROXINE SODIUM 0.1 MG/1
100 TABLET ORAL DAILY
Status: DISCONTINUED | OUTPATIENT
Start: 2023-06-18 | End: 2023-06-17

## 2023-06-17 RX ORDER — INSULIN LISPRO 100 [IU]/ML
0-4 INJECTION, SOLUTION INTRAVENOUS; SUBCUTANEOUS
Status: DISCONTINUED | OUTPATIENT
Start: 2023-06-17 | End: 2023-06-21 | Stop reason: HOSPADM

## 2023-06-17 RX ORDER — MIRTAZAPINE 15 MG/1
15 TABLET, FILM COATED ORAL NIGHTLY
COMMUNITY
Start: 2023-06-12

## 2023-06-17 RX ORDER — ACETAMINOPHEN 325 MG/1
650 TABLET ORAL EVERY 6 HOURS PRN
Status: DISCONTINUED | OUTPATIENT
Start: 2023-06-17 | End: 2023-06-21 | Stop reason: HOSPADM

## 2023-06-17 RX ORDER — HEPARIN SODIUM 1000 [USP'U]/ML
40 INJECTION, SOLUTION INTRAVENOUS; SUBCUTANEOUS PRN
Status: DISCONTINUED | OUTPATIENT
Start: 2023-06-17 | End: 2023-06-19

## 2023-06-17 RX ORDER — GLUCAGON 1 MG/ML
1 KIT INJECTION PRN
Status: DISCONTINUED | OUTPATIENT
Start: 2023-06-17 | End: 2023-06-21 | Stop reason: HOSPADM

## 2023-06-17 RX ORDER — ISOSORBIDE MONONITRATE 30 MG/1
60 TABLET, EXTENDED RELEASE ORAL DAILY
Status: DISCONTINUED | OUTPATIENT
Start: 2023-06-17 | End: 2023-06-21 | Stop reason: HOSPADM

## 2023-06-17 RX ORDER — SODIUM CHLORIDE 0.9 % (FLUSH) 0.9 %
5-40 SYRINGE (ML) INJECTION PRN
Status: DISCONTINUED | OUTPATIENT
Start: 2023-06-17 | End: 2023-06-21 | Stop reason: HOSPADM

## 2023-06-17 RX ORDER — SODIUM CHLORIDE 0.9 % (FLUSH) 0.9 %
5-40 SYRINGE (ML) INJECTION EVERY 12 HOURS SCHEDULED
Status: DISCONTINUED | OUTPATIENT
Start: 2023-06-17 | End: 2023-06-21 | Stop reason: HOSPADM

## 2023-06-17 RX ORDER — HEPARIN SODIUM 10000 [USP'U]/100ML
5-30 INJECTION, SOLUTION INTRAVENOUS CONTINUOUS
Status: DISCONTINUED | OUTPATIENT
Start: 2023-06-17 | End: 2023-06-19

## 2023-06-17 RX ORDER — SODIUM CHLORIDE 9 MG/ML
INJECTION, SOLUTION INTRAVENOUS PRN
Status: DISCONTINUED | OUTPATIENT
Start: 2023-06-17 | End: 2023-06-21 | Stop reason: HOSPADM

## 2023-06-17 RX ORDER — LORAZEPAM 2 MG/ML
0.5 INJECTION INTRAMUSCULAR ONCE
Status: COMPLETED | OUTPATIENT
Start: 2023-06-17 | End: 2023-06-17

## 2023-06-17 RX ORDER — METOPROLOL SUCCINATE 25 MG/1
25 TABLET, EXTENDED RELEASE ORAL DAILY
Status: DISCONTINUED | OUTPATIENT
Start: 2023-06-18 | End: 2023-06-21 | Stop reason: HOSPADM

## 2023-06-17 RX ORDER — ONDANSETRON 2 MG/ML
4 INJECTION INTRAMUSCULAR; INTRAVENOUS EVERY 6 HOURS PRN
Status: DISCONTINUED | OUTPATIENT
Start: 2023-06-17 | End: 2023-06-21 | Stop reason: HOSPADM

## 2023-06-17 RX ORDER — HEPARIN SODIUM 1000 [USP'U]/ML
80 INJECTION, SOLUTION INTRAVENOUS; SUBCUTANEOUS ONCE
Status: COMPLETED | OUTPATIENT
Start: 2023-06-17 | End: 2023-06-17

## 2023-06-17 RX ORDER — OXYCODONE HYDROCHLORIDE 5 MG/1
5 TABLET ORAL EVERY 4 HOURS PRN
Status: DISCONTINUED | OUTPATIENT
Start: 2023-06-17 | End: 2023-06-21 | Stop reason: HOSPADM

## 2023-06-17 RX ORDER — CALCIUM CARBONATE 500 MG/1
2 TABLET, CHEWABLE ORAL 2 TIMES DAILY PRN
Status: DISCONTINUED | OUTPATIENT
Start: 2023-06-17 | End: 2023-06-21 | Stop reason: HOSPADM

## 2023-06-17 RX ORDER — MIRTAZAPINE 15 MG/1
15 TABLET, FILM COATED ORAL NIGHTLY
Status: DISCONTINUED | OUTPATIENT
Start: 2023-06-17 | End: 2023-06-21 | Stop reason: HOSPADM

## 2023-06-17 RX ORDER — ACETAMINOPHEN 650 MG/1
650 SUPPOSITORY RECTAL EVERY 6 HOURS PRN
Status: DISCONTINUED | OUTPATIENT
Start: 2023-06-17 | End: 2023-06-21 | Stop reason: HOSPADM

## 2023-06-17 RX ORDER — HEPARIN SODIUM 1000 [USP'U]/ML
80 INJECTION, SOLUTION INTRAVENOUS; SUBCUTANEOUS PRN
Status: DISCONTINUED | OUTPATIENT
Start: 2023-06-17 | End: 2023-06-19

## 2023-06-17 RX ORDER — FUROSEMIDE 20 MG/1
25 TABLET ORAL DAILY
Status: DISCONTINUED | OUTPATIENT
Start: 2023-06-17 | End: 2023-06-17

## 2023-06-17 RX ORDER — AMLODIPINE BESYLATE 10 MG/1
10 TABLET ORAL NIGHTLY
Status: DISCONTINUED | OUTPATIENT
Start: 2023-06-17 | End: 2023-06-21 | Stop reason: HOSPADM

## 2023-06-17 RX ORDER — ASPIRIN 81 MG/1
81 TABLET, CHEWABLE ORAL DAILY
Status: DISCONTINUED | OUTPATIENT
Start: 2023-06-17 | End: 2023-06-19

## 2023-06-17 RX ORDER — INSULIN LISPRO 100 [IU]/ML
0-4 INJECTION, SOLUTION INTRAVENOUS; SUBCUTANEOUS NIGHTLY
Status: DISCONTINUED | OUTPATIENT
Start: 2023-06-17 | End: 2023-06-21 | Stop reason: HOSPADM

## 2023-06-17 RX ORDER — DEXTROSE MONOHYDRATE 100 MG/ML
INJECTION, SOLUTION INTRAVENOUS CONTINUOUS PRN
Status: DISCONTINUED | OUTPATIENT
Start: 2023-06-17 | End: 2023-06-21 | Stop reason: HOSPADM

## 2023-06-17 RX ADMIN — HEPARIN SODIUM 18 UNITS/KG/HR: 10000 INJECTION, SOLUTION INTRAVENOUS at 14:16

## 2023-06-17 RX ADMIN — HYDRALAZINE HYDROCHLORIDE 25 MG: 25 TABLET, FILM COATED ORAL at 19:57

## 2023-06-17 RX ADMIN — BUDESONIDE AND FORMOTEROL FUMARATE DIHYDRATE 2 PUFF: 160; 4.5 AEROSOL RESPIRATORY (INHALATION) at 20:14

## 2023-06-17 RX ADMIN — OXYCODONE HYDROCHLORIDE 5 MG: 5 TABLET ORAL at 19:57

## 2023-06-17 RX ADMIN — AZITHROMYCIN MONOHYDRATE 500 MG: 500 INJECTION, POWDER, LYOPHILIZED, FOR SOLUTION INTRAVENOUS at 17:09

## 2023-06-17 RX ADMIN — GUAIFENESIN 600 MG: 600 TABLET, EXTENDED RELEASE ORAL at 17:00

## 2023-06-17 RX ADMIN — CEFEPIME 2000 MG: 2 INJECTION, POWDER, FOR SOLUTION INTRAVENOUS at 13:18

## 2023-06-17 RX ADMIN — HEPARIN SODIUM 6140 UNITS: 1000 INJECTION INTRAVENOUS; SUBCUTANEOUS at 14:11

## 2023-06-17 RX ADMIN — AMLODIPINE BESYLATE 10 MG: 10 TABLET ORAL at 19:57

## 2023-06-17 RX ADMIN — HYDRALAZINE HYDROCHLORIDE 75 MG: 50 TABLET, FILM COATED ORAL at 17:00

## 2023-06-17 RX ADMIN — OXYCODONE HYDROCHLORIDE 5 MG: 5 TABLET ORAL at 15:28

## 2023-06-17 RX ADMIN — MIRTAZAPINE 15 MG: 15 TABLET, FILM COATED ORAL at 19:57

## 2023-06-17 RX ADMIN — SODIUM CHLORIDE, PRESERVATIVE FREE 10 ML: 5 INJECTION INTRAVENOUS at 19:57

## 2023-06-17 RX ADMIN — INSULIN LISPRO 1 UNITS: 100 INJECTION, SOLUTION INTRAVENOUS; SUBCUTANEOUS at 17:13

## 2023-06-17 RX ADMIN — LORAZEPAM 0.5 MG: 2 INJECTION INTRAMUSCULAR; INTRAVENOUS at 13:16

## 2023-06-17 ASSESSMENT — PAIN - FUNCTIONAL ASSESSMENT: PAIN_FUNCTIONAL_ASSESSMENT: PREVENTS OR INTERFERES SOME ACTIVE ACTIVITIES AND ADLS

## 2023-06-17 ASSESSMENT — PAIN DESCRIPTION - LOCATION: LOCATION: SHOULDER

## 2023-06-17 ASSESSMENT — PAIN SCALES - GENERAL
PAINLEVEL_OUTOF10: 8

## 2023-06-17 ASSESSMENT — PAIN DESCRIPTION - DESCRIPTORS: DESCRIPTORS: ACHING

## 2023-06-18 LAB
ALBUMIN SERPL-MCNC: 3.1 GM/DL (ref 3.4–5)
ALP BLD-CCNC: 96 IU/L (ref 40–128)
ALT SERPL-CCNC: 7 U/L (ref 10–40)
ANION GAP SERPL CALCULATED.3IONS-SCNC: 14 MMOL/L (ref 4–16)
APTT: 157.7 SECONDS (ref 25.1–37.1)
APTT: 71.3 SECONDS (ref 25.1–37.1)
APTT: 93 SECONDS (ref 25.1–37.1)
AST SERPL-CCNC: 17 IU/L (ref 15–37)
BASOPHILS ABSOLUTE: 0 K/CU MM
BASOPHILS RELATIVE PERCENT: 0.2 % (ref 0–1)
BILIRUB SERPL-MCNC: 0.3 MG/DL (ref 0–1)
BUN SERPL-MCNC: 30 MG/DL (ref 6–23)
CALCIUM SERPL-MCNC: 8.3 MG/DL (ref 8.3–10.6)
CHLORIDE BLD-SCNC: 100 MMOL/L (ref 99–110)
CO2: 24 MMOL/L (ref 21–32)
CREAT SERPL-MCNC: 1.8 MG/DL (ref 0.6–1.1)
DIFFERENTIAL TYPE: ABNORMAL
EOSINOPHILS ABSOLUTE: 0 K/CU MM
EOSINOPHILS RELATIVE PERCENT: 0 % (ref 0–3)
GFR SERPL CREATININE-BSD FRML MDRD: 28 ML/MIN/1.73M2
GLUCOSE BLD-MCNC: 149 MG/DL (ref 70–99)
GLUCOSE BLD-MCNC: 158 MG/DL (ref 70–99)
GLUCOSE BLD-MCNC: 164 MG/DL (ref 70–99)
GLUCOSE BLD-MCNC: 209 MG/DL (ref 70–99)
GLUCOSE SERPL-MCNC: 159 MG/DL (ref 70–99)
HCT VFR BLD CALC: 29.8 % (ref 37–47)
HEMOGLOBIN: 9.1 GM/DL (ref 12.5–16)
IMMATURE NEUTROPHIL %: 0.4 % (ref 0–0.43)
INR BLD: 1.05 INDEX
LACTATE: 0.7 MMOL/L (ref 0.5–1.9)
LV EF: 58 %
LVEF MODALITY: NORMAL
LYMPHOCYTES ABSOLUTE: 2.4 K/CU MM
LYMPHOCYTES RELATIVE PERCENT: 17.3 % (ref 24–44)
MCH RBC QN AUTO: 28.7 PG (ref 27–31)
MCHC RBC AUTO-ENTMCNC: 30.5 % (ref 32–36)
MCV RBC AUTO: 94 FL (ref 78–100)
MONOCYTES ABSOLUTE: 1.6 K/CU MM
MONOCYTES RELATIVE PERCENT: 11.3 % (ref 0–4)
NUCLEATED RBC %: 0 %
PDW BLD-RTO: 16.5 % (ref 11.7–14.9)
PLATELET # BLD: 238 K/CU MM (ref 140–440)
PMV BLD AUTO: 9.3 FL (ref 7.5–11.1)
POTASSIUM SERPL-SCNC: 3.7 MMOL/L (ref 3.5–5.1)
PROTHROMBIN TIME: 13.3 SECONDS (ref 11.7–14.5)
RBC # BLD: 3.17 M/CU MM (ref 4.2–5.4)
SEGMENTED NEUTROPHILS ABSOLUTE COUNT: 9.9 K/CU MM
SEGMENTED NEUTROPHILS RELATIVE PERCENT: 70.8 % (ref 36–66)
SODIUM BLD-SCNC: 138 MMOL/L (ref 135–145)
TOTAL IMMATURE NEUTOROPHIL: 0.06 K/CU MM
TOTAL NUCLEATED RBC: 0 K/CU MM
TOTAL PROTEIN: 5.7 GM/DL (ref 6.4–8.2)
TROPONIN T: 0.03 NG/ML
TROPONIN T: 0.04 NG/ML
WBC # BLD: 14 K/CU MM (ref 4–10.5)

## 2023-06-18 PROCEDURE — 80053 COMPREHEN METABOLIC PANEL: CPT

## 2023-06-18 PROCEDURE — 83605 ASSAY OF LACTIC ACID: CPT

## 2023-06-18 PROCEDURE — 94640 AIRWAY INHALATION TREATMENT: CPT

## 2023-06-18 PROCEDURE — 85027 COMPLETE CBC AUTOMATED: CPT

## 2023-06-18 PROCEDURE — 85025 COMPLETE CBC W/AUTO DIFF WBC: CPT

## 2023-06-18 PROCEDURE — 94761 N-INVAS EAR/PLS OXIMETRY MLT: CPT

## 2023-06-18 PROCEDURE — 36415 COLL VENOUS BLD VENIPUNCTURE: CPT

## 2023-06-18 PROCEDURE — 99221 1ST HOSP IP/OBS SF/LOW 40: CPT | Performed by: INTERNAL MEDICINE

## 2023-06-18 PROCEDURE — 82962 GLUCOSE BLOOD TEST: CPT

## 2023-06-18 PROCEDURE — 2500000003 HC RX 250 WO HCPCS: Performed by: NURSE PRACTITIONER

## 2023-06-18 PROCEDURE — 2140000000 HC CCU INTERMEDIATE R&B

## 2023-06-18 PROCEDURE — 85730 THROMBOPLASTIN TIME PARTIAL: CPT

## 2023-06-18 PROCEDURE — 85610 PROTHROMBIN TIME: CPT

## 2023-06-18 PROCEDURE — 6370000000 HC RX 637 (ALT 250 FOR IP): Performed by: NURSE PRACTITIONER

## 2023-06-18 PROCEDURE — 2580000003 HC RX 258: Performed by: NURSE PRACTITIONER

## 2023-06-18 PROCEDURE — 84484 ASSAY OF TROPONIN QUANT: CPT

## 2023-06-18 PROCEDURE — 93306 TTE W/DOPPLER COMPLETE: CPT

## 2023-06-18 PROCEDURE — 6360000002 HC RX W HCPCS: Performed by: NURSE PRACTITIONER

## 2023-06-18 RX ORDER — CHOLECALCIFEROL (VITAMIN D3) 125 MCG
10 CAPSULE ORAL NIGHTLY PRN
Status: DISCONTINUED | OUTPATIENT
Start: 2023-06-18 | End: 2023-06-21 | Stop reason: HOSPADM

## 2023-06-18 RX ORDER — CHOLECALCIFEROL (VITAMIN D3) 125 MCG
10 CAPSULE ORAL NIGHTLY PRN
Status: DISCONTINUED | OUTPATIENT
Start: 2023-06-19 | End: 2023-06-18

## 2023-06-18 RX ORDER — PHENOL 1.4 %
10 AEROSOL, SPRAY (ML) MUCOUS MEMBRANE NIGHTLY PRN
COMMUNITY

## 2023-06-18 RX ADMIN — SODIUM CHLORIDE, PRESERVATIVE FREE 10 ML: 5 INJECTION INTRAVENOUS at 09:40

## 2023-06-18 RX ADMIN — FUROSEMIDE 40 MG: 40 TABLET ORAL at 16:44

## 2023-06-18 RX ADMIN — AMLODIPINE BESYLATE 10 MG: 10 TABLET ORAL at 20:36

## 2023-06-18 RX ADMIN — ISOSORBIDE MONONITRATE 60 MG: 30 TABLET, EXTENDED RELEASE ORAL at 09:35

## 2023-06-18 RX ADMIN — CEFTRIAXONE SODIUM 1000 MG: 1 INJECTION, POWDER, FOR SOLUTION INTRAMUSCULAR; INTRAVENOUS at 23:15

## 2023-06-18 RX ADMIN — FUROSEMIDE 40 MG: 40 TABLET ORAL at 09:35

## 2023-06-18 RX ADMIN — ASPIRIN 81 MG: 81 TABLET, CHEWABLE ORAL at 09:36

## 2023-06-18 RX ADMIN — BUDESONIDE AND FORMOTEROL FUMARATE DIHYDRATE 2 PUFF: 160; 4.5 AEROSOL RESPIRATORY (INHALATION) at 19:51

## 2023-06-18 RX ADMIN — INSULIN LISPRO 1 UNITS: 100 INJECTION, SOLUTION INTRAVENOUS; SUBCUTANEOUS at 16:46

## 2023-06-18 RX ADMIN — MIRTAZAPINE 15 MG: 15 TABLET, FILM COATED ORAL at 20:36

## 2023-06-18 RX ADMIN — METOPROLOL SUCCINATE 25 MG: 25 TABLET, EXTENDED RELEASE ORAL at 09:35

## 2023-06-18 RX ADMIN — AZITHROMYCIN MONOHYDRATE 500 MG: 500 INJECTION, POWDER, LYOPHILIZED, FOR SOLUTION INTRAVENOUS at 16:52

## 2023-06-18 RX ADMIN — OXYCODONE HYDROCHLORIDE 5 MG: 5 TABLET ORAL at 20:36

## 2023-06-18 RX ADMIN — HYDRALAZINE HYDROCHLORIDE 25 MG: 25 TABLET, FILM COATED ORAL at 20:36

## 2023-06-18 RX ADMIN — PANTOPRAZOLE SODIUM 40 MG: 40 TABLET, DELAYED RELEASE ORAL at 05:22

## 2023-06-18 RX ADMIN — CEFTRIAXONE SODIUM 1000 MG: 1 INJECTION, POWDER, FOR SOLUTION INTRAMUSCULAR; INTRAVENOUS at 00:08

## 2023-06-18 RX ADMIN — HEPARIN SODIUM 12 UNITS/KG/HR: 10000 INJECTION, SOLUTION INTRAVENOUS at 13:15

## 2023-06-18 RX ADMIN — BUDESONIDE AND FORMOTEROL FUMARATE DIHYDRATE 2 PUFF: 160; 4.5 AEROSOL RESPIRATORY (INHALATION) at 07:30

## 2023-06-18 RX ADMIN — GUAIFENESIN 600 MG: 600 TABLET, EXTENDED RELEASE ORAL at 09:35

## 2023-06-18 RX ADMIN — HYDRALAZINE HYDROCHLORIDE 25 MG: 25 TABLET, FILM COATED ORAL at 14:39

## 2023-06-18 RX ADMIN — HYDRALAZINE HYDROCHLORIDE 25 MG: 25 TABLET, FILM COATED ORAL at 09:35

## 2023-06-18 RX ADMIN — LEVOTHYROXINE SODIUM 137 MCG: 0.11 TABLET ORAL at 05:22

## 2023-06-18 RX ADMIN — OXYCODONE HYDROCHLORIDE 5 MG: 5 TABLET ORAL at 00:13

## 2023-06-18 ASSESSMENT — ENCOUNTER SYMPTOMS
DIARRHEA: 0
SHORTNESS OF BREATH: 0
WHEEZING: 0
SORE THROAT: 0
BACK PAIN: 0
COUGH: 0
EYE DISCHARGE: 0
ABDOMINAL DISTENTION: 0
CONSTIPATION: 0

## 2023-06-18 ASSESSMENT — PAIN SCALES - GENERAL
PAINLEVEL_OUTOF10: 5
PAINLEVEL_OUTOF10: 4
PAINLEVEL_OUTOF10: 3
PAINLEVEL_OUTOF10: 5
PAINLEVEL_OUTOF10: 5

## 2023-06-19 ENCOUNTER — APPOINTMENT (OUTPATIENT)
Dept: NUCLEAR MEDICINE | Age: 83
DRG: 204 | End: 2023-06-19
Payer: MEDICARE

## 2023-06-19 PROBLEM — J18.9 PNEUMONIA OF BOTH LOWER LOBES DUE TO INFECTIOUS ORGANISM: Status: ACTIVE | Noted: 2023-06-19

## 2023-06-19 LAB
ANION GAP SERPL CALCULATED.3IONS-SCNC: 15 MMOL/L (ref 4–16)
APTT: 54.3 SECONDS (ref 25.1–37.1)
APTT: 55.3 SECONDS (ref 25.1–37.1)
BASOPHILS ABSOLUTE: 0 K/CU MM
BASOPHILS RELATIVE PERCENT: 0.2 % (ref 0–1)
BUN SERPL-MCNC: 30 MG/DL (ref 6–23)
CALCIUM SERPL-MCNC: 8.3 MG/DL (ref 8.3–10.6)
CHLORIDE BLD-SCNC: 101 MMOL/L (ref 99–110)
CO2: 24 MMOL/L (ref 21–32)
CREAT SERPL-MCNC: 1.8 MG/DL (ref 0.6–1.1)
DIFFERENTIAL TYPE: ABNORMAL
EKG ATRIAL RATE: 72 BPM
EKG DIAGNOSIS: NORMAL
EKG P AXIS: 76 DEGREES
EKG P-R INTERVAL: 224 MS
EKG Q-T INTERVAL: 412 MS
EKG QRS DURATION: 126 MS
EKG QTC CALCULATION (BAZETT): 451 MS
EKG R AXIS: -65 DEGREES
EKG T AXIS: 51 DEGREES
EKG VENTRICULAR RATE: 72 BPM
EOSINOPHILS ABSOLUTE: 0.2 K/CU MM
EOSINOPHILS RELATIVE PERCENT: 2 % (ref 0–3)
GFR SERPL CREATININE-BSD FRML MDRD: 28 ML/MIN/1.73M2
GLUCOSE BLD-MCNC: 128 MG/DL (ref 70–99)
GLUCOSE BLD-MCNC: 161 MG/DL (ref 70–99)
GLUCOSE BLD-MCNC: 225 MG/DL (ref 70–99)
GLUCOSE BLD-MCNC: 262 MG/DL (ref 70–99)
GLUCOSE SERPL-MCNC: 149 MG/DL (ref 70–99)
HCT VFR BLD CALC: 28.4 % (ref 37–47)
HEMOGLOBIN: 8.9 GM/DL (ref 12.5–16)
IMMATURE NEUTROPHIL %: 0.3 % (ref 0–0.43)
LYMPHOCYTES ABSOLUTE: 1.8 K/CU MM
LYMPHOCYTES RELATIVE PERCENT: 19.5 % (ref 24–44)
MAGNESIUM: 2.1 MG/DL (ref 1.8–2.4)
MCH RBC QN AUTO: 28.6 PG (ref 27–31)
MCHC RBC AUTO-ENTMCNC: 31.3 % (ref 32–36)
MCV RBC AUTO: 91.3 FL (ref 78–100)
MONOCYTES ABSOLUTE: 0.8 K/CU MM
MONOCYTES RELATIVE PERCENT: 8.7 % (ref 0–4)
NUCLEATED RBC %: 0 %
PDW BLD-RTO: 16.2 % (ref 11.7–14.9)
PLATELET # BLD: 263 K/CU MM (ref 140–440)
PMV BLD AUTO: 9.1 FL (ref 7.5–11.1)
POTASSIUM SERPL-SCNC: 3.3 MMOL/L (ref 3.5–5.1)
PROCALCITONIN SERPL-MCNC: 0.42 NG/ML
RBC # BLD: 3.11 M/CU MM (ref 4.2–5.4)
SEGMENTED NEUTROPHILS ABSOLUTE COUNT: 6.3 K/CU MM
SEGMENTED NEUTROPHILS RELATIVE PERCENT: 69.3 % (ref 36–66)
SODIUM BLD-SCNC: 140 MMOL/L (ref 135–145)
TOTAL IMMATURE NEUTOROPHIL: 0.03 K/CU MM
TOTAL NUCLEATED RBC: 0 K/CU MM
TROPONIN T: 0.03 NG/ML
TROPONIN T: 0.03 NG/ML
WBC # BLD: 9.1 K/CU MM (ref 4–10.5)

## 2023-06-19 PROCEDURE — 84484 ASSAY OF TROPONIN QUANT: CPT

## 2023-06-19 PROCEDURE — 99232 SBSQ HOSP IP/OBS MODERATE 35: CPT | Performed by: INTERNAL MEDICINE

## 2023-06-19 PROCEDURE — 6370000000 HC RX 637 (ALT 250 FOR IP)

## 2023-06-19 PROCEDURE — 6370000000 HC RX 637 (ALT 250 FOR IP): Performed by: NURSE PRACTITIONER

## 2023-06-19 PROCEDURE — A9539 TC99M PENTETATE: HCPCS | Performed by: INTERNAL MEDICINE

## 2023-06-19 PROCEDURE — 78582 LUNG VENTILAT&PERFUS IMAGING: CPT

## 2023-06-19 PROCEDURE — 99211 OFF/OP EST MAY X REQ PHY/QHP: CPT

## 2023-06-19 PROCEDURE — A9540 TC99M MAA: HCPCS | Performed by: INTERNAL MEDICINE

## 2023-06-19 PROCEDURE — 3430000000 HC RX DIAGNOSTIC RADIOPHARMACEUTICAL: Performed by: INTERNAL MEDICINE

## 2023-06-19 PROCEDURE — 6360000002 HC RX W HCPCS: Performed by: NURSE PRACTITIONER

## 2023-06-19 PROCEDURE — 85730 THROMBOPLASTIN TIME PARTIAL: CPT

## 2023-06-19 PROCEDURE — 36415 COLL VENOUS BLD VENIPUNCTURE: CPT

## 2023-06-19 PROCEDURE — 2140000000 HC CCU INTERMEDIATE R&B

## 2023-06-19 PROCEDURE — 94640 AIRWAY INHALATION TREATMENT: CPT

## 2023-06-19 PROCEDURE — 82962 GLUCOSE BLOOD TEST: CPT

## 2023-06-19 PROCEDURE — 84145 PROCALCITONIN (PCT): CPT

## 2023-06-19 PROCEDURE — 80048 BASIC METABOLIC PNL TOTAL CA: CPT

## 2023-06-19 PROCEDURE — 83735 ASSAY OF MAGNESIUM: CPT

## 2023-06-19 PROCEDURE — 2580000003 HC RX 258: Performed by: NURSE PRACTITIONER

## 2023-06-19 PROCEDURE — 85025 COMPLETE CBC W/AUTO DIFF WBC: CPT

## 2023-06-19 PROCEDURE — 93010 ELECTROCARDIOGRAM REPORT: CPT | Performed by: INTERNAL MEDICINE

## 2023-06-19 PROCEDURE — 94761 N-INVAS EAR/PLS OXIMETRY MLT: CPT

## 2023-06-19 PROCEDURE — 6370000000 HC RX 637 (ALT 250 FOR IP): Performed by: FAMILY MEDICINE

## 2023-06-19 RX ORDER — KIT FOR THE PREPARATION OF TECHNETIUM TC 99M PENTETATE 20 MG/1
3 INJECTION, POWDER, LYOPHILIZED, FOR SOLUTION INTRAVENOUS; RESPIRATORY (INHALATION)
Status: COMPLETED | OUTPATIENT
Start: 2023-06-19 | End: 2023-06-19

## 2023-06-19 RX ADMIN — Medication 10 MG: at 00:04

## 2023-06-19 RX ADMIN — Medication 10 MG: at 20:42

## 2023-06-19 RX ADMIN — ISOSORBIDE MONONITRATE 60 MG: 30 TABLET, EXTENDED RELEASE ORAL at 08:04

## 2023-06-19 RX ADMIN — SODIUM CHLORIDE: 9 INJECTION, SOLUTION INTRAVENOUS at 17:18

## 2023-06-19 RX ADMIN — BUDESONIDE AND FORMOTEROL FUMARATE DIHYDRATE 2 PUFF: 160; 4.5 AEROSOL RESPIRATORY (INHALATION) at 08:51

## 2023-06-19 RX ADMIN — HYDRALAZINE HYDROCHLORIDE 25 MG: 25 TABLET, FILM COATED ORAL at 08:04

## 2023-06-19 RX ADMIN — PANTOPRAZOLE SODIUM 40 MG: 40 TABLET, DELAYED RELEASE ORAL at 05:28

## 2023-06-19 RX ADMIN — FUROSEMIDE 40 MG: 40 TABLET ORAL at 17:20

## 2023-06-19 RX ADMIN — AMLODIPINE BESYLATE 10 MG: 10 TABLET ORAL at 20:42

## 2023-06-19 RX ADMIN — APIXABAN 10 MG: 5 TABLET, FILM COATED ORAL at 17:22

## 2023-06-19 RX ADMIN — FUROSEMIDE 40 MG: 40 TABLET ORAL at 08:04

## 2023-06-19 RX ADMIN — LEVOTHYROXINE SODIUM 137 MCG: 0.11 TABLET ORAL at 05:28

## 2023-06-19 RX ADMIN — AZITHROMYCIN MONOHYDRATE 500 MG: 500 INJECTION, POWDER, LYOPHILIZED, FOR SOLUTION INTRAVENOUS at 17:20

## 2023-06-19 RX ADMIN — MIRTAZAPINE 15 MG: 15 TABLET, FILM COATED ORAL at 20:42

## 2023-06-19 RX ADMIN — SODIUM CHLORIDE, PRESERVATIVE FREE 10 ML: 5 INJECTION INTRAVENOUS at 20:43

## 2023-06-19 RX ADMIN — ASPIRIN 81 MG: 81 TABLET, CHEWABLE ORAL at 08:04

## 2023-06-19 RX ADMIN — HYDRALAZINE HYDROCHLORIDE 25 MG: 25 TABLET, FILM COATED ORAL at 20:42

## 2023-06-19 RX ADMIN — SODIUM CHLORIDE, PRESERVATIVE FREE 10 ML: 5 INJECTION INTRAVENOUS at 08:04

## 2023-06-19 RX ADMIN — GUAIFENESIN 600 MG: 600 TABLET, EXTENDED RELEASE ORAL at 08:04

## 2023-06-19 RX ADMIN — HYDRALAZINE HYDROCHLORIDE 25 MG: 25 TABLET, FILM COATED ORAL at 14:28

## 2023-06-19 RX ADMIN — KIT FOR THE PREPARATION OF TECHNETIUM TC 99M PENTETATE 3 MILLICURIE: 20 INJECTION, POWDER, LYOPHILIZED, FOR SOLUTION INTRAVENOUS; RESPIRATORY (INHALATION) at 11:42

## 2023-06-19 RX ADMIN — Medication 6 MILLICURIE: at 11:42

## 2023-06-19 RX ADMIN — INSULIN LISPRO 1 UNITS: 100 INJECTION, SOLUTION INTRAVENOUS; SUBCUTANEOUS at 17:20

## 2023-06-19 RX ADMIN — METOPROLOL SUCCINATE 25 MG: 25 TABLET, EXTENDED RELEASE ORAL at 08:04

## 2023-06-19 ASSESSMENT — ENCOUNTER SYMPTOMS
DIARRHEA: 0
BACK PAIN: 0
ABDOMINAL DISTENTION: 0
WHEEZING: 0
COUGH: 0
SHORTNESS OF BREATH: 0
EYE DISCHARGE: 0
SORE THROAT: 0
CONSTIPATION: 0

## 2023-06-19 ASSESSMENT — PAIN SCALES - GENERAL
PAINLEVEL_OUTOF10: 0

## 2023-06-19 NOTE — CONSULTS
[address] [place] [address] Continence Nurse  Consult Note       Zita Huddlestoner  AGE: 82 y.o. GENDER: female  : 1940  TODAY'S DATE:  2023    Subjective:     Reason for CWOCN Evaluation and Assessment: wound assessment      Zita Hammond is a 82 y.o. female referred by:   [x] Physician  [] Nursing  [] Other:     Wound Identification:  Wound Type: pressure and MASD  Contributing Factors: diabetes, chronic pressure, and decreased mobility        PAST MEDICAL HISTORY        Diagnosis Date    Abnormal nuclear stress test 2007-EF=67%,Mild->Mod.isch. LAD;-EF=70%,Suggests poss. Mild Ant.wall ischemia. Cardiac arrest ([facility] [address]     Diabetes mellitus ([facility] [address]     H/O cardiac catheterization 2008    No signif.CAD.    H/O cardiovascular stress test 2008    mild to mod ischemia in the LAD region, abnormal study, EF 67%, patient developed mild chest pain and throat tightness    H/O cardiovascular stress test 2016    lexiscan-moderate ischemia apical,RF66%    H/O Doppler ultrasound 2007    CAROTID DOPLER- intimal thickening but no significant atherosclerotic plaque noted in the right or left internal carotid artery, doppler flow velocities within the right and left internal carotid artery are elevated, consistent with a mild, less than 50% stenosis    H/O echocardiogram 10/14/2008    EF>55%, normal LV systolic function, normal left ventricular wall thickness, impaired LV relaxation    H/O echocardiogram 2013- EF >55% Impaired LV relaxation, Mild concentric LV hypertrophy, No sig AS, THEO underestimated. 10/08-EF=>55%,NL study;-EF=>55%,Mild valv. AS.    H/O echocardiogram 2016 EF 55-60% normal study EF 60%. MIld mitral and tricuspid insufficiencies. Mildly sclerotic aortic valve which appears to be mildly stenosed with aortic valve area of 1.4 however this does not appear to be hemodynamically signficant aortic stenosis.  Mildy hypertropic left ventricle with

## 2023-06-19 NOTE — PLAN OF CARE
Problem: Discharge Planning  Goal: Discharge to home or other facility with appropriate resources  6/18/2023 2156 by Abhinav Christianson RN  Outcome: Progressing  6/18/2023 1237 by Damián Ross RN  Outcome: Progressing     Problem: Safety - Adult  Goal: Free from fall injury  6/18/2023 2156 by Abhinav Christianson RN  Outcome: Progressing  6/18/2023 1237 by Damián Ross RN  Outcome: Progressing     Problem: ABCDS Injury Assessment  Goal: Absence of physical injury  6/18/2023 2156 by Abhinav Christianson RN  Outcome: Progressing  6/18/2023 1237 by Damián Ross RN  Outcome: Progressing     Problem: Pain  Goal: Verbalizes/displays adequate comfort level or baseline comfort level  6/18/2023 2156 by Abhinav Christianson RN  Outcome: Progressing  6/18/2023 1237 by Damián Ross RN  Outcome: Progressing

## 2023-06-20 LAB
GLUCOSE BLD-MCNC: 142 MG/DL (ref 70–99)
GLUCOSE BLD-MCNC: 270 MG/DL (ref 70–99)

## 2023-06-20 PROCEDURE — 2580000003 HC RX 258: Performed by: NURSE PRACTITIONER

## 2023-06-20 PROCEDURE — 82962 GLUCOSE BLOOD TEST: CPT

## 2023-06-20 PROCEDURE — 6370000000 HC RX 637 (ALT 250 FOR IP): Performed by: NURSE PRACTITIONER

## 2023-06-20 PROCEDURE — 85730 THROMBOPLASTIN TIME PARTIAL: CPT

## 2023-06-20 PROCEDURE — 6370000000 HC RX 637 (ALT 250 FOR IP)

## 2023-06-20 PROCEDURE — 94640 AIRWAY INHALATION TREATMENT: CPT

## 2023-06-20 PROCEDURE — 94761 N-INVAS EAR/PLS OXIMETRY MLT: CPT

## 2023-06-20 PROCEDURE — 6360000004 HC RX CONTRAST MEDICATION

## 2023-06-20 PROCEDURE — 6370000000 HC RX 637 (ALT 250 FOR IP): Performed by: FAMILY MEDICINE

## 2023-06-20 PROCEDURE — 6370000000 HC RX 637 (ALT 250 FOR IP): Performed by: STUDENT IN AN ORGANIZED HEALTH CARE EDUCATION/TRAINING PROGRAM

## 2023-06-20 PROCEDURE — 2140000000 HC CCU INTERMEDIATE R&B

## 2023-06-20 PROCEDURE — 6360000002 HC RX W HCPCS: Performed by: NURSE PRACTITIONER

## 2023-06-20 PROCEDURE — 6360000002 HC RX W HCPCS

## 2023-06-20 RX ORDER — DIPHENHYDRAMINE HCL 25 MG
25 TABLET ORAL
Status: ACTIVE | OUTPATIENT
Start: 2023-06-20 | End: 2023-06-20

## 2023-06-20 RX ORDER — POTASSIUM CHLORIDE 20 MEQ/1
40 TABLET, EXTENDED RELEASE ORAL 2 TIMES DAILY
Status: COMPLETED | OUTPATIENT
Start: 2023-06-20 | End: 2023-06-20

## 2023-06-20 RX ADMIN — CEFTRIAXONE SODIUM 1000 MG: 1 INJECTION, POWDER, FOR SOLUTION INTRAMUSCULAR; INTRAVENOUS at 23:27

## 2023-06-20 RX ADMIN — CEFTRIAXONE SODIUM 1000 MG: 1 INJECTION, POWDER, FOR SOLUTION INTRAMUSCULAR; INTRAVENOUS at 00:21

## 2023-06-20 RX ADMIN — BUDESONIDE AND FORMOTEROL FUMARATE DIHYDRATE 2 PUFF: 160; 4.5 AEROSOL RESPIRATORY (INHALATION) at 11:23

## 2023-06-20 RX ADMIN — METOPROLOL SUCCINATE 25 MG: 25 TABLET, EXTENDED RELEASE ORAL at 08:24

## 2023-06-20 RX ADMIN — SODIUM CHLORIDE, PRESERVATIVE FREE 10 ML: 5 INJECTION INTRAVENOUS at 20:55

## 2023-06-20 RX ADMIN — APIXABAN 10 MG: 5 TABLET, FILM COATED ORAL at 20:55

## 2023-06-20 RX ADMIN — FUROSEMIDE 40 MG: 40 TABLET ORAL at 08:25

## 2023-06-20 RX ADMIN — FUROSEMIDE 40 MG: 40 TABLET ORAL at 18:03

## 2023-06-20 RX ADMIN — POTASSIUM CHLORIDE 40 MEQ: 1500 TABLET, EXTENDED RELEASE ORAL at 20:57

## 2023-06-20 RX ADMIN — AMLODIPINE BESYLATE 10 MG: 10 TABLET ORAL at 20:55

## 2023-06-20 RX ADMIN — GUAIFENESIN 600 MG: 600 TABLET, EXTENDED RELEASE ORAL at 08:24

## 2023-06-20 RX ADMIN — APIXABAN 10 MG: 5 TABLET, FILM COATED ORAL at 08:24

## 2023-06-20 RX ADMIN — ISOSORBIDE MONONITRATE 60 MG: 30 TABLET, EXTENDED RELEASE ORAL at 08:25

## 2023-06-20 RX ADMIN — HYDRALAZINE HYDROCHLORIDE 25 MG: 25 TABLET, FILM COATED ORAL at 20:55

## 2023-06-20 RX ADMIN — POTASSIUM CHLORIDE 40 MEQ: 1500 TABLET, EXTENDED RELEASE ORAL at 09:49

## 2023-06-20 RX ADMIN — MIRTAZAPINE 15 MG: 15 TABLET, FILM COATED ORAL at 20:55

## 2023-06-20 RX ADMIN — HYDRALAZINE HYDROCHLORIDE 25 MG: 25 TABLET, FILM COATED ORAL at 15:15

## 2023-06-20 RX ADMIN — Medication 10 MG: at 23:27

## 2023-06-20 RX ADMIN — HYDRALAZINE HYDROCHLORIDE 25 MG: 25 TABLET, FILM COATED ORAL at 08:24

## 2023-06-20 RX ADMIN — LEVOTHYROXINE SODIUM 137 MCG: 0.11 TABLET ORAL at 05:36

## 2023-06-20 RX ADMIN — BUDESONIDE AND FORMOTEROL FUMARATE DIHYDRATE 2 PUFF: 160; 4.5 AEROSOL RESPIRATORY (INHALATION) at 20:52

## 2023-06-20 RX ADMIN — PANTOPRAZOLE SODIUM 40 MG: 40 TABLET, DELAYED RELEASE ORAL at 05:36

## 2023-06-20 ASSESSMENT — PAIN SCALES - GENERAL
PAINLEVEL_OUTOF10: 0
PAINLEVEL_OUTOF10: 0

## 2023-06-20 NOTE — PLAN OF CARE
Brief Postoperative Note    Deneice Brad  YOB: 1974  [id number]    Pre-operative Diagnosis: Ascites    Post-operative Diagnosis: Same    Findings: small volume ascites in the RUQ, no safe window to access fluid. No paracentesis performed.     Electronically signed by Lisa Lenz MD on 9/23/2022 at 12:32 PM Problem: Discharge Planning  Goal: Discharge to home or other facility with appropriate resources  Outcome: Progressing     Problem: Safety - Adult  Goal: Free from fall injury  Outcome: Progressing     Problem: ABCDS Injury Assessment  Goal: Absence of physical injury  Outcome: Progressing     Problem: Pain  Goal: Verbalizes/displays adequate comfort level or baseline comfort level  Outcome: Progressing     Problem: Chronic Conditions and Co-morbidities  Goal: Patient's chronic conditions and co-morbidity symptoms are monitored and maintained or improved  Outcome: Progressing     Problem: Skin/Tissue Integrity  Goal: Absence of new skin breakdown  Description: 1. Monitor for areas of redness and/or skin breakdown  2. Assess vascular access sites hourly  3. Every 4-6 hours minimum:  Change oxygen saturation probe site  4. Every 4-6 hours:  If on nasal continuous positive airway pressure, respiratory therapy assess nares and determine need for appliance change or resting period.   Outcome: Progressing

## 2023-06-20 NOTE — CARE COORDINATION
.CM met with pt for d/c planning. Introduced self and updated white board. Pt lives with her son and is independent with ADL's. Family provides her transportation. She has a PCP, has insurance, and is able to afford her medication. Pt has a couple of walkers, quad cane, w/c, hospital beed, and a shower seat. [facility] offered and pt declined. Pt states that she has been receiving PT/OT at [facility] and plans to continue. Pt denies any d/c needs at this time. D/c plan is home with son, no needs. Notify CM if any d/c needs arise. TE       06/20/23 0920   Service Assessment   Patient Orientation Alert and Oriented   Cognition Alert   History Provided By Patient   Primary [address]   Patient's Healthcare Decision Maker is:   (SELF)   PCP Verified by CM Yes   Prior Functional Level Independent in ADLs/IADLs   Current Functional Level Independent in ADLs/IADLs   Can patient return to prior living arrangement Yes   Ability to make needs known: Good   Family able to assist with home care needs: Yes   Would you like for me to discuss the discharge plan with any other family members/significant others, and if so, who? No   Financial Resources Medicare   Social/Functional History   Lives With Son   Type of [address] One level   [address] Rollator;Walker, standard; Hospital bed;Cane, quad;Wheelchair-manual

## 2023-06-21 VITALS
WEIGHT: 165.1 LBS | HEART RATE: 79 BPM | SYSTOLIC BLOOD PRESSURE: 123 MMHG | TEMPERATURE: 98.1 F | BODY MASS INDEX: 26.53 KG/M2 | OXYGEN SATURATION: 96 % | DIASTOLIC BLOOD PRESSURE: 61 MMHG | RESPIRATION RATE: 24 BRPM | HEIGHT: 66 IN

## 2023-06-21 LAB — GLUCOSE BLD-MCNC: 129 MG/DL (ref 70–99)

## 2023-06-21 PROCEDURE — 6370000000 HC RX 637 (ALT 250 FOR IP): Performed by: NURSE PRACTITIONER

## 2023-06-21 PROCEDURE — 6360000004 HC RX CONTRAST MEDICATION

## 2023-06-21 PROCEDURE — 36005 INJECTION EXT VENOGRAPHY: CPT

## 2023-06-21 PROCEDURE — 2500000003 HC RX 250 WO HCPCS

## 2023-06-21 PROCEDURE — 6360000002 HC RX W HCPCS

## 2023-06-21 PROCEDURE — 85025 COMPLETE CBC W/AUTO DIFF WBC: CPT

## 2023-06-21 PROCEDURE — C1894 INTRO/SHEATH, NON-LASER: HCPCS

## 2023-06-21 PROCEDURE — 82962 GLUCOSE BLOOD TEST: CPT

## 2023-06-21 PROCEDURE — 75820 VEIN X-RAY ARM/LEG: CPT

## 2023-06-21 PROCEDURE — 94640 AIRWAY INHALATION TREATMENT: CPT

## 2023-06-21 PROCEDURE — 2580000003 HC RX 258

## 2023-06-21 PROCEDURE — 94761 N-INVAS EAR/PLS OXIMETRY MLT: CPT

## 2023-06-21 PROCEDURE — B5191ZZ FLUOROSCOPY OF INFERIOR VENA CAVA USING LOW OSMOLAR CONTRAST: ICD-10-PCS | Performed by: INTERNAL MEDICINE

## 2023-06-21 PROCEDURE — B51C1ZZ FLUOROSCOPY OF LEFT LOWER EXTREMITY VEINS USING LOW OSMOLAR CONTRAST: ICD-10-PCS | Performed by: INTERNAL MEDICINE

## 2023-06-21 PROCEDURE — 2709999900 HC NON-CHARGEABLE SUPPLY

## 2023-06-21 RX ORDER — FUROSEMIDE 40 MG/1
40 TABLET ORAL 2 TIMES DAILY
Qty: 60 TABLET | Refills: 3 | Status: SHIPPED | OUTPATIENT
Start: 2023-06-21

## 2023-06-21 RX ORDER — OXYCODONE HYDROCHLORIDE 5 MG/1
5 TABLET ORAL EVERY 4 HOURS PRN
Qty: 12 TABLET | Refills: 0 | Status: SHIPPED | OUTPATIENT
Start: 2023-06-21 | End: 2023-06-24

## 2023-06-21 RX ORDER — ISOSORBIDE MONONITRATE 60 MG/1
60 TABLET, EXTENDED RELEASE ORAL DAILY
Qty: 30 TABLET | Refills: 3 | Status: SHIPPED | OUTPATIENT
Start: 2023-06-22

## 2023-06-21 RX ADMIN — METOPROLOL SUCCINATE 25 MG: 25 TABLET, EXTENDED RELEASE ORAL at 10:21

## 2023-06-21 RX ADMIN — GUAIFENESIN 600 MG: 600 TABLET, EXTENDED RELEASE ORAL at 10:21

## 2023-06-21 RX ADMIN — ISOSORBIDE MONONITRATE 60 MG: 30 TABLET, EXTENDED RELEASE ORAL at 10:21

## 2023-06-21 RX ADMIN — BUDESONIDE AND FORMOTEROL FUMARATE DIHYDRATE 2 PUFF: 160; 4.5 AEROSOL RESPIRATORY (INHALATION) at 11:24

## 2023-06-21 RX ADMIN — FUROSEMIDE 40 MG: 40 TABLET ORAL at 10:21

## 2023-06-21 RX ADMIN — HYDRALAZINE HYDROCHLORIDE 25 MG: 25 TABLET, FILM COATED ORAL at 10:21

## 2023-06-21 RX ADMIN — PANTOPRAZOLE SODIUM 40 MG: 40 TABLET, DELAYED RELEASE ORAL at 05:18

## 2023-06-21 RX ADMIN — LEVOTHYROXINE SODIUM 137 MCG: 0.11 TABLET ORAL at 05:18

## 2023-06-21 ASSESSMENT — PAIN SCALES - GENERAL
PAINLEVEL_OUTOF10: 0
PAINLEVEL_OUTOF10: 0

## 2023-06-21 NOTE — PLAN OF CARE
Problem: Safety - Adult  Goal: Free from fall injury  Outcome: Progressing     Problem: Discharge Planning  Goal: Discharge to home or other facility with appropriate resources  Outcome: Progressing     Problem: ABCDS Injury Assessment  Goal: Absence of physical injury  Outcome: Progressing     Problem: Pain  Goal: Verbalizes/displays adequate comfort level or baseline comfort level  Outcome: Progressing     Problem: Chronic Conditions and Co-morbidities  Goal: Patient's chronic conditions and co-morbidity symptoms are monitored and maintained or improved  Outcome: Progressing     Problem: Skin/Tissue Integrity  Goal: Absence of new skin breakdown  Description: 1. Monitor for areas of redness and/or skin breakdown  2. Assess vascular access sites hourly  3. Every 4-6 hours minimum:  Change oxygen saturation probe site  4. Every 4-6 hours:  If on nasal continuous positive airway pressure, respiratory therapy assess nares and determine need for appliance change or resting period.   Outcome: Progressing

## 2023-06-21 NOTE — PROGRESS NOTES
06/21/23 0735   Encounter Summary   Encounter Overview/Reason  Initial Encounter   Service Provided For: Patient   Referral/Consult From: [address] [facility]   Last Encounter  06/21/23  (Offered prayer and spiritual support.  Patient did not respond as I prayed and spoke to her, just looked.)   Complexity of Encounter Low   Begin Time 0730   End Time  0737   Total Time Calculated 7 min   Encounter    Type Initial Screen/Assessment   Spiritual/Emotional needs   Type Spiritual Support   Assessment/Intervention/Outcome   Assessment Passive   Intervention Nurtured Hope;Prayer (assurance of)/[place];Sustaining Presence/Ministry of presence   Outcome Did not respond   Plan and Referrals   Plan/Referrals Continue Support (comment)  (If needed)
Educated patient on need for venogram to rule out/in DVT  Procedure was explained in detail as well as risks and benefits. Patient voiced understanding and was agreeable to undergoing procedure. Consent was obtained. Plan for peripheral venogram today.     Cora Witt PA-C 06/20/23 1:11 PM
Outpatient Pharmacy Progress Note for Meds-to-Beds    Total number of Prescriptions Filled: 3  The following medications were dispensed to the patient during the discharge process:  Furosemide  Isosorbide mononitrate   Oxycodone     Additional Documentation:  Medication(s) were delivered to the patient's room prior to discharge      Thank you for letting us serve your patients.   [facility]    [address], [address] [facility]    Phone: [phone]    Fax: [phone]
Patient was unable to lay flat on abdomen for venogram today. Discussed options with patient and family at bedside. Symptoms have improved today. Also spoke with IR about possible imaging strategies, CT venogram unlikely to be helpful in determining presence of DVT. Could obtain a repeat US but unlikely to obtain better image quality in comparison to previous. Patient agreeable to reattempt venogram tomorrow by approaching through the ankle. PLEASE HOLD ELIQUIS TONIGHT AND IN THE MORNING.      Jo Huynh PA-C 06/20/23 3:23 PM
Pt requesting something else to help her sleep at [facility]. Daniela Maki NP notified, order placed. When this nurse re rounded on Pt to inform about new order, Pt was resting quietly in bed with eyes closed. Benadryl held at this time.
Today's plan: Venogram ruled out DVT, it was false positive report on venous doppler, PE was also ruled out with VQ scan, will not recommend eliquis any more, ok for discharge    Admit Date:  6/17/2023    Subjective: No chest pain today      Chief complaints on admission  Chief Complaint   Patient presents with    Chest Pain    Shortness of Breath         History of present illness:Gwendolyn is a 82 y. o.year old who  presents with had concerns including Chest Pain and Shortness of Breath. Past medical history:    has a past medical history of Abnormal nuclear stress test, Cardiac arrest ([facility] [address] Diabetes mellitus ([facility] [address] H/O cardiac catheterization, H/O cardiovascular stress test, H/O cardiovascular stress test, H/O Doppler ultrasound, H/O echocardiogram, H/O echocardiogram, H/O echocardiogram, H/O left heart cath, H/O left heart cath, Hernia, Hx of cardiovascular stress test, Hyperlipidemia, Hypertension, Irritable bowel syndrome, Kidney stones, Obesity, Osteoarthritis, Thyroid disease, and Type II or unspecified type diabetes mellitus without mention of complication, not stated as uncontrolled. Past surgical history:   has a past surgical history that includes Lithotripsy; Rotator cuff repair; Gastric Band (08/2008); Cardiac catheterization (04/18/08); Hysterectomy; Lap Band; Rotator cuff repair; hemicolectomy (Right, 5/3/2023); and hernia repair (N/A, 5/3/2023). Social History:   reports that she quit smoking about 28 years ago. Her smoking use included cigarettes. She has a 1.50 pack-year smoking history. She has never used smokeless tobacco. She reports that she does not drink alcohol and does not use drugs. Family history:  family history includes COPD in her brother; Cancer in her father, mother, and sister; Heart Disease in her brother and father; High Blood Pressure in her father; Migraines in her mother.     Allergies   Allergen Reactions    Scopolamine      Combative and delusional    Amoxicillin
V2.0  [facility] Hospitalist Progress Note      Name:  Romana Ames /Age/Sex: 1940  (82 y.o. female)   MRN & CSN:  [id number] & [id number] Encounter Date/Time: 2023 8:52 AM EDT    Location:  [address] [place] [address] PCP: Dilia Stephenson MD       Hospital Day: 3    Assessment and Plan:   Romana Ames is a 82 y.o. female with pmh of colon cancer status post resection, colectomy, h/o PEA arrest who presents with Acute pulmonary embolism, unspecified pulmonary embolism type, unspecified whether acute cor pulmonale present ([facility] [address]      Plan:  Acute Pulmonary Embolism  D Dimer   Wells score 8.5 for malignancy, recent surgery, PE likely diagnosis and clinical symptoms of DVT              Endorses chest pain/ pleuritic pain  with deep breath                Unable to obtain CTA due to GFR less than 30              ED provider Nelida Webster , Discussed case with Dr Alonzo Paulino with cardiology, Will hold off on VQ scan and treat empirically due to symptoms, elevated D Dimer              venous duplex BLE , no evidence of occlusive DVT  Echo pending to evaluate right heart strain              Consult cardiology - Recommended Heparin Drip, f/u further recs,   Planned for V/Q scan today     Suspected Healthcare Acquired Pneumonia  No evidence of sepsis/SIRS  Chest x-ray shows bibasilar airspace opacitities L> R.  Persistent remarkably improved opacities. BNP trending down significantly from prior admission. Patient afebrile, with leukocytosis, WBC 14.5  blood cultures x2, urine for strep and Legionella - pending  procalcitonin, lactate WNL              Denies productive cough, WBC may be inflammatory secondary to PE              Initiated on emperic Cefepime in ED- Change to Rocephin/ Azithromyxin as patient low risk for pseudomonas and not requiring oxegyn at this time . Deescalate antibiotics if cultures are negative and hemodynamics remain stable over next 24 hrs. Currently requiring Room Air .  No Home oxygen at
VQ scan low probability for PE. Will switch patient to Eliquis 10 mg twice daily for 7 days followed by 5 mg twice daily thereafter.     Marci Mosqueda PA-C 06/19/23 4:11 PM
Wound care for eval pt is in nuclear medicine. Will attempt a later time. Electronically signed by Norma Rowe RN on 6/19/2023 at 11:49 AM'
[place] Hives         Objective:   BP (!) 128/52   Pulse 79   Temp 98.1 °F (36.7 °C) (Oral)   Resp 13   Ht 5' 6\" (1.676 m)   Wt 166 lb 9.6 oz (75.6 kg)   SpO2 95%   BMI 26.89 kg/m²     Intake/Output Summary (Last 24 hours) at 6/19/2023 1057  Last data filed at 6/18/2023 2300  Gross per 24 hour   Intake 600 ml   Output 650 ml   Net -50 ml       TELEMETRY:      Physical Exam:  Constitutional:  Well developed, Well nourished, No acute distress, Non-toxic appearance. HENT:  Normocephalic, Atraumatic, Bilateral external ears normal, Oropharynx moist, No oral exudates, Nose normal. Neck- Normal range of motion, No tenderness, Supple, No stridor. Eyes:  PERRL, EOMI, Conjunctiva normal, No discharge. Respiratory:  Normal breath sounds, No respiratory distress, No wheezing, No chest tenderness. ,no use of accessory muscles, diaphragm movement is normal  Cardiovascular: (PMI) apex non displaced,no lifts no thrills, no s3,no s4, Normal heart rate, Normal rhythm, No murmurs, No rubs, No gallops. Carotid arteries pulse and amplitude are normal no bruit, no abdominal bruit noted ( normal abdominal aorta ausculation), femoral arteries pulse and amplitude are normal no bruit, pedal pulses are normal  GI:  Bowel sounds normal, Soft, No tenderness, No masses, No pulsatile masses. : External genitalia appear normal, No masses or lesions. No discharge. No CVA tenderness. Musculoskeletal:  Intact distal pulses, No edema, No tenderness, No cyanosis, No clubbing. Good range of motion in all major joints. No tenderness to palpation or major deformities noted. Back- No tenderness. Integument:  Warm, Dry, No erythema, No rash. Lymphatic:  No lymphadenopathy noted. Neurologic:  Alert & oriented x 3, Normal motor function, Normal sensory function, No focal deficits noted.    Psychiatric:  Affect normal, Judgment normal, Mood normal.     Medications:    sodium chloride flush  5-40 mL IntraVENous 2 times per day
for          Plan:  Chest pain: Atypical given the history of her prior cardiac arrest, which was PEA, had resuscitation and required temporary pacemaker she also had pneumonia and mild congestion and CHF which has been improving, her heart cath was normal last year because of elevated D-dimer will rule out PE and if that is normal then she can be discharged home with pain medications for her atypical chest pain  History of colon cancer with right hemicolectomy last month  CKD stable  History of hypertension  Moderate aortic stenosis  Health maintenance: exerise and diet  All labs, medications and tests reviewed, continue all other medications of all above medical condition listed as is.       Author MD Grady, MD 6/20/2023 12:27 PM
degree throughout both lung fields with persistent cardiomegaly. Findings are consistent with persistent but radiographically improved congestive heart failure and/or bibasilar pneumonia which remains worse on the left. Small pleural effusions cannot be excluded. Otherwise, lung fields are clear. No detectable pneumothorax or mediastinal widening. Findings consistent with persistent but markedly improved congestive heart failure and/or bibasilar pneumonia compared to 05/17/2023. Findings are generally worse on the left and may be accentuated by underlying chronic lung disease. Small bibasilar pleural effusions cannot be excluded. Subjective skeletal osteopenia. VL DUP LOWER EXTREMITY VENOUS BILATERAL    Result Date: 6/17/2023  EXAMINATION: DUPLEX VENOUS ULTRASOUND OF THE BILATERAL LOWER EXTREMITIES6/17/2023 1:46 pm TECHNIQUE: Duplex ultrasound using B-mode/gray scaled imaging, Doppler spectral analysis and color flow Doppler was obtained of the deep venous structures of the lower bilateral extremities. COMPARISON: None. HISTORY: ORDERING SYSTEM PROVIDED HISTORY: swelling, chest pain, sob TECHNOLOGIST PROVIDED HISTORY: Reason for exam:->swelling, chest pain, sob FINDINGS: There is no evidence of DVT within the right lower extremity. There is possible nonocclusive DVT within the left distal femoral vein. The remaining left lower extremity veins are patent. No evidence of right lower extremity DVT. Possible mild nonocclusive DVT within the distal left femoral vein. The remaining left lower extremity veins are patent.        Electronically signed by Eddi Davenport MD on 6/20/2023 at 10:39 PM

## 2023-06-21 NOTE — PROCEDURES
Indication: Occlusive DVT rt common femoral vein and rt Superficial vein DVT    Procedure:  1- left great sephanous and superficial femoral, popliteal vein, tibial venogram  2- IVC venogram      Procedure detail:    Patient is reji into the lab after consenting for the procedure, under sterile condition, with unltrasoun and ministick needle, left great sephanous venous access was obtained and then 4-5 slending sheath was placed, sheath was injected with contrast and ascending venogram was performed    Left great sephanous vein: patent: no clot  Left posterial tibial vein: no DVT  Left superficial femoral vein: no DVT  Left iliac vein: no DVT  IVC no DVT  Results    There is no DVT or any veinous blood clot in any of the above left leg veinous system    Plan:   No need for anticoagulation      Alfred Angel MD

## 2023-06-21 NOTE — DISCHARGE SUMMARY
V2.0  Discharge Summary    Name:  Fanny Romo /Age/Sex: 1940 (82 y.o. female)   Admit Date: 2023  Discharge Date: 23    MRN & CSN:  [id number] & [id number] Encounter Date and Time 23 12:02 PM EDT    Attending:  Lainey Ellis MD Discharging Provider: Lainey Ellis MD       Hospital Course:     Brief HPI: Fanny Romo is a 82 y.o. female with pmh of recent prolonged hospitalization for colon cancer status post resection, colectomy per Dr. Rajesh Rodriguez complicated by PEA arrest attributed to hypoxia with prolonged ICU stay. Patient was discharged to ARU. She reports she went home at the end of May has been doing well since. States 2 days ago she developed acute shortness of breath on the left side of her chest which has since radiated down to the lower left side of her chest and is now in the right upper side of her chest.  Describes it as a pain with deep breath or inspiration. Denies any difficulty breathing when laying flat. Denies any lightheadedness or dizziness. Denies any pressure on her chest, radiation of pain down her arm or up to the jaw. Denies any nausea vomiting or diarrhea. Denies any blood in stool or urine. Denies any prior history of blood clots, DVT or PE. Denies any family history of blood clots. She has lower extremity edema which she reports reports is chronic for her. Denies any pain in her calfs.      Brief Problem Based Course:   Fanny Romo is a 82 y.o. female with pmh of colon cancer status post resection, colectomy, h/o PEA arrest who presents with Acute pulmonary embolism, unspecified pulmonary embolism type, unspecified whether acute cor pulmonale present ([facility] [address]        Plan:  SOB  Pleuritic chest pain  Concern for PE - ruled out  [address]  Wells score 8.5 for malignancy, recent surgery, PE likely diagnosis and clinical symptoms of DVT              Endorses chest pain/ pleuritic pain  with deep breath                Unable to obtain CTA due to GFR less than 30

## 2023-06-22 ENCOUNTER — CARE COORDINATION (OUTPATIENT)
Dept: CASE MANAGEMENT | Age: 83
End: 2023-06-22

## 2023-06-22 DIAGNOSIS — J18.9 PNEUMONIA OF BOTH LOWER LOBES DUE TO INFECTIOUS ORGANISM: Primary | ICD-10-CM

## 2023-06-22 LAB
CULTURE: NORMAL
CULTURE: NORMAL
Lab: NORMAL
Lab: NORMAL
SPECIMEN: NORMAL
SPECIMEN: NORMAL

## 2023-06-22 PROCEDURE — 1111F DSCHRG MED/CURRENT MED MERGE: CPT | Performed by: GENERAL PRACTICE

## 2023-06-22 NOTE — CARE COORDINATION
This morning FBS was 155. She was previously started on Levemir but her PCP discontinued the medication. She plans to discuss blood sugars at her appt on 6/27/23. She denies having any urinary or bowel issues currently. Reviewed medication list.  She is aware that the TUMS was stopped. She has an appt with the NP at Dr. Stacy Borjas office on 6/27/23 and with the general surgeon on 7/6/23. Her daughter will be taking her to the appts. She plans to schedule a follow up with cardiology as advised. She is aware of when to contact providers. She does not have any questions, concerns or needs at this time. Care Transition Nurse reviewed discharge instructions, medical action plan, and red flags with patient who verbalized understanding. The patient was given an opportunity to ask questions and does not have any further questions or concerns at this time. Were discharge instructions available to patient? Yes. Reviewed appropriate site of care based on symptoms and resources available to patient including: PCP  Specialist. The patient agrees to contact the PCP office for questions related to their healthcare. Advance Care Planning:   Does patient have an Advance Directive:  on file . Medication reconciliation was performed with patient, who verbalizes understanding of administration of home medications. Medications reviewed, 1111F entered: yes    Was patient discharged with a pulse oximeter?  no    Scheduled appointment with PCP-appt on 6/27/23 with NP  Scheduled appointment with [address] with general surgeon on 7/6/23  Obtained and reviewed discharge summary and/or continuity of care documents    Offered patient enrollment in the Remote Patient Monitoring (RPM) program for in-home monitoring: NA.    Care Transitions 24 Hour Call    Do you have a copy of your discharge instructions?: Yes  Do you have all of your prescriptions and are they filled?: Yes  Have you been contacted by a [employer]?:

## 2023-06-28 ENCOUNTER — CARE COORDINATION (OUTPATIENT)
Dept: CASE MANAGEMENT | Age: 83
End: 2023-06-28

## 2023-07-06 ENCOUNTER — OFFICE VISIT (OUTPATIENT)
Dept: SURGERY | Age: 83
End: 2023-07-06

## 2023-07-06 VITALS
DIASTOLIC BLOOD PRESSURE: 66 MMHG | HEART RATE: 92 BPM | BODY MASS INDEX: 27.72 KG/M2 | WEIGHT: 172.5 LBS | SYSTOLIC BLOOD PRESSURE: 146 MMHG | HEIGHT: 66 IN

## 2023-07-06 DIAGNOSIS — K63.89 CECUM MASS: Primary | ICD-10-CM

## 2023-07-06 PROCEDURE — 99024 POSTOP FOLLOW-UP VISIT: CPT | Performed by: SURGERY

## 2023-07-06 NOTE — PROGRESS NOTES
Chief Complaint   Patient presents with    Post-Op Check     2nd P/O Walter Right Bowel Resection Hemicolectomy @ [facility] 5/3/23         SUBJECTIVE:  Patient here for post op visit. Pain is minimal.  Wounds: minbruising and no discharge. Past Surgical History:   Procedure Laterality Date    CARDIAC CATHETERIZATION  04/18/08    continue risk factor modification with strict control of her risk factors with diet and control of diabetes, high blood pressure and hyperlipidemia    GASTRIC BAND  08/2008    revision of gastric band placement    HEMICOLECTOMY Right 5/3/2023    RIGHT BOWEL RESECTION HEMICOLECTOMY LAPAROSCOPIC ROBOTIC XI performed by Rehana Riojas MD at [address] [place] 5/3/2023    [address] performed by Rehana Riojas MD at [address] ([facility])      LAP BAND      put in in May Removed in August    LITHOTRIPSY      [address]      right    ROTATOR CUFF REPAIR       Past Medical History:   Diagnosis Date    Abnormal nuclear stress test 04/08/2007 4/08-EF=67%,Mild->Mod.isch. LAD;7/07-EF=70%,Suggests poss. Mild Ant.wall ischemia.     Cardiac arrest ([address]) 2008    Diabetes mellitus ([address])     H/O cardiac catheterization 04/2008    No signif.CAD.    H/O cardiovascular stress test 04/11/2008    mild to mod ischemia in the LAD region, abnormal study, EF 67%, patient developed mild chest pain and throat tightness    H/O cardiovascular stress test 05/03/2016    lexiscan-moderate ischemia apical,RF66%    H/O Doppler ultrasound 07/16/2007    CAROTID DOPLER- intimal thickening but no significant atherosclerotic plaque noted in the right or left internal carotid artery, doppler flow velocities within the right and left internal carotid artery are elevated, consistent with a mild, less than 50% stenosis    H/O echocardiogram 10/14/2008    EF>55%, normal LV systolic function, normal left ventricular wall thickness, impaired LV relaxation    H/O echocardiogram

## 2023-07-07 ENCOUNTER — CARE COORDINATION (OUTPATIENT)
Dept: CASE MANAGEMENT | Age: 83
End: 2023-07-07

## 2023-07-07 NOTE — CARE COORDINATION
[facility] Care [facility] Follow Up Call    Patient Current Location:  Home: [place] [address]    Care Transition Nurse contacted the patient by telephone to follow up after admission on 23. Verified name and  with patient as identifiers. Patient: Lyle Cervantes  Patient : 1940   MRN: [id number]  Reason for Admission:  CP, SOB, Acute PE, PNA  Discharge Date: 23 RARS: Readmission Risk Score: 23.2      Needs to be reviewed by the provider   Additional needs identified to be addressed with provider: No  none             Method of communication with provider: none. Contacted pt for care transition follow up. Kandis Suarez states she is feeling pretty good. Denies having any c/o chest pain/discomfort, increased shortness of breath, cough, wheezing, fever, chills. Reports she continues to use her breathing tx as prescribed. Denies having any post op pain from her colectomy procedure on 5/3/23. Reports her energy level is slowly improving but states she is still limited on what and how much she can do. Continues to monitor her BP which is running 140's/50-66. She is eating and drinking fluids w/o issues. Blood sugars running between 100-130. Kandis Suarez reports that she is back home. She is aware of when to contact providers. She does not have any questions or needs at this time. Addressed changes since last contact:  none  Discussed follow-up appointments. If no appointment was previously scheduled, appointment scheduling offered: Yes. Is follow up appointment scheduled within 7 days of discharge? Yes.     Follow Up  Future Appointments   Date Time Provider [address]   2023  1:00 PM Theresia Lundborg, APRN - CNP [facility]   2023 12:30 PM Alfredo De La O MD [facility]   2023 11:15 AM Bhavin Gordon MD AFLADVNPHHTN AFL ADV [facility]       Care Transition Nurse reviewed medical action plan with patient and discussed any barriers to care and/or

## 2023-07-13 ENCOUNTER — CARE COORDINATION (OUTPATIENT)
Dept: CASE MANAGEMENT | Age: 83
End: 2023-07-13

## 2023-07-13 NOTE — CARE COORDINATION
[facility] Care Transitions Follow Up Call    Patient Current Location:  Home: [place] [address]    Care Transition Nurse contacted the patient by telephone to follow up after admission on 23. Verified name and  with patient as identifiers. Patient: Ulises Granda  Patient : 1940   MRN: [id number]  Reason for Admission: CP, SOB, Acute PE, PNA  Discharge Date: 23 RARS: Readmission Risk Score: 23.2      Needs to be reviewed by the provider   Additional needs identified to be addressed with provider: No  none             Method of communication with provider: none. Contacted pt for care transition follow up. Santhosh Flynn states she is doing good. Denies having any c/o chest pain/discomfort, pressure, tightness. Reports she may become short of breath at times which usually depends on the weather. Reports she has been able to do her activity/chores-laundry, cooking. SpO2 remaining in the high 90's (95-96%) on RA. She reports having \"a little cough\". Had one episode of acid reflux during the night approximately one week ago. Reports having difficulty breathing and had a cough. She continues to take the Pantoprazole. She admits to eating a lot of tomatoes with cottage cheese. Encouraged to try to cut back on the number of tomatoes she eats. She has not had any episodes since that one approximately one week ago. She is eating and drinking fluids w/o issues. Denies having any urinary or bowel issues. She is aware of when to contact providers. No questions or concerns at this time. Addressed changes since last contact:   had 1 episode of acid reflux during the night one week ago.     Follow Up  Future Appointments   Date Time Provider [address]  [facility]   2023  1:00 PM NIKUNJ Miner - CNP [facility]   2023 12:30 PM Mark Glover MD [facility]   2023 11:15 AM Jaxson Noel MD AFLADVNPHHTN AFL [facility]       Care Transition Nurse reviewed

## 2023-07-17 ENCOUNTER — OFFICE VISIT (OUTPATIENT)
Dept: CARDIOLOGY CLINIC | Age: 83
End: 2023-07-17
Payer: MEDICARE

## 2023-07-17 VITALS
SYSTOLIC BLOOD PRESSURE: 144 MMHG | BODY MASS INDEX: 27.48 KG/M2 | HEIGHT: 66 IN | DIASTOLIC BLOOD PRESSURE: 52 MMHG | HEART RATE: 62 BPM | WEIGHT: 171 LBS

## 2023-07-17 DIAGNOSIS — I50.32 CHRONIC HEART FAILURE WITH PRESERVED EJECTION FRACTION (HCC): ICD-10-CM

## 2023-07-17 DIAGNOSIS — I38 VALVULAR HEART DISEASE: Primary | ICD-10-CM

## 2023-07-17 PROCEDURE — 3077F SYST BP >= 140 MM HG: CPT | Performed by: NURSE PRACTITIONER

## 2023-07-17 PROCEDURE — 99214 OFFICE O/P EST MOD 30 MIN: CPT | Performed by: NURSE PRACTITIONER

## 2023-07-17 PROCEDURE — 1123F ACP DISCUSS/DSCN MKR DOCD: CPT | Performed by: NURSE PRACTITIONER

## 2023-07-17 PROCEDURE — 3078F DIAST BP <80 MM HG: CPT | Performed by: NURSE PRACTITIONER

## 2023-07-17 ASSESSMENT — ENCOUNTER SYMPTOMS
ORTHOPNEA: 0
SHORTNESS OF BREATH: 1

## 2023-07-17 NOTE — PATIENT INSTRUCTIONS
Please be informed that if you contact our office outside of normal business hours the physician on call cannot help with any scheduling or rescheduling issues, procedure instruction questions or any type of medication issue. We advise you for any urgent/emergency that you go to the nearest emergency room! PLEASE CALL OUR OFFICE DURING NORMAL BUSINESS HOURS    Monday - Friday   8 am to 5 pm    [place]: [address] [place]: [phone]    [place]:  [facility] Laboratory Locations - No appointment necessary. Sites open Monday to Friday. Call your preferred location for test preparation, business   hours and other information you need. [employer] accepts 's. [address] [place] [address]. [facility]. [place], [address] [facility]  Phone: [phone]     **It is YOUR responsibilty to bring medication bottles and/or updated medication list to [address] [place] [address]. This will allow us to better serve you and all your healthcare needs**  Thank you for allowing us to care for you today! We want to ensure we can follow your treatment plan and we strive to give you the best outcomes and experience possible. If you ever have a life threatening emergency and call 911 - for an ambulance (EMS)   Our providers can only care for you at:   [facility] or [facility]. Even if you have someone take you or you drive yourself we can only care for you in a [facility] facility. Our providers are not setup at the other healthcare locations!

## 2023-07-17 NOTE — PROGRESS NOTES
Normocephalic and atraumatic. Eyes:      Extraocular Movements: Extraocular movements intact. Pupils: Pupils are equal, round, and reactive to light. Neck:      Vascular: No carotid bruit. Cardiovascular:      Rate and Rhythm: Normal rate and regular rhythm. Pulses: Normal pulses. Heart sounds: Murmur (systolic) heard. Systolic murmur is present with a grade of 3/6. Pulmonary:      Effort: Pulmonary effort is normal.      Breath sounds: Normal breath sounds. No rales. Chest:      Chest wall: No tenderness. Abdominal:      General: There is no distension. Palpations: Abdomen is soft. Tenderness: There is no abdominal tenderness. Musculoskeletal:      Cervical back: No tenderness. Right lower leg: Edema (ankles) present. Left lower leg: Edema (ankles) present. Skin:     General: Skin is warm and dry. Capillary Refill: Capillary refill takes less than 2 seconds. Neurological:      General: No focal deficit present. Mental Status: She is alert and oriented to person, place, and time. Psychiatric:         Mood and Affect: Mood normal.         Behavior: Behavior normal.              Current Outpatient Medications   Medication Sig Dispense Refill    metoprolol succinate (TOPROL XL) 25 MG extended release tablet Take 1 tablet by mouth daily      guaiFENesin (MUCINEX) 600 MG extended release tablet Take 1 tablet by mouth daily      furosemide (LASIX) 40 MG tablet Take 1 tablet by mouth in the morning and 1 tablet in the evening. 60 tablet 3    isosorbide mononitrate (IMDUR) 60 MG extended release tablet Take 1 tablet by mouth daily 30 tablet 3    Melatonin 10 MG TABS Take 10 mg by mouth nightly as needed      mirtazapine (REMERON) 15 MG tablet Take 1 tablet by mouth nightly      budesonide-formoterol (SYMBICORT) 160-4.5 MCG/ACT AERO Inhale 2 puffs into the lungs in the morning and 2 puffs in the evening.  10.2 g 0    pantoprazole (PROTONIX) 40 MG tablet Take 1

## 2023-07-20 ENCOUNTER — CARE COORDINATION (OUTPATIENT)
Dept: CASE MANAGEMENT | Age: 83
End: 2023-07-20

## 2023-07-20 NOTE — CARE COORDINATION
[facility] Care Transitions Follow Up Call    Patient Current Location:  Home: [place] [address]    Care Transition Nurse contacted the patient by telephone to follow up after admission on 23. Verified name and  with patient as identifiers. Patient: Shelton Mckenzie  Patient : 1940   MRN: [id number]  Reason for Admission: CP, SOB, Acute PE, PNA  Discharge Date: 23 RARS: Readmission Risk Score: 23.2      Needs to be reviewed by the provider   Additional needs identified to be addressed with provider: No  none             Method of communication with provider: none. Contacted pt for final care transition follow up. Uriel Kline states that she is doing good. Denies having any further episodes of acid reflux overnight. Reports she has been watching what she is eating especially right before bed. Denies having any c/o chest pain/discomfort, increased shortness of breath although may become short of breath with exertion and depending on the weather. SpO2 remaining in the high 90's. No longer having any cough. Reports having swelling in feet but states the swelling always goes down once legs are elevated. Reports weight has been stable. She has not been weighing herself because she's had so many appts that her doctor's have been weighing her. We discussed reason and importance of monitoring weight and when to contact provider with a weight gain of 2-3 lbs within 24 hours or 5 lbs within a week. She verbalized understanding. Eating and drinking WNL. Urinary and bowel WNL. We briefly discussed when to contact providers. No questions or concerns at this time. Final Call. No further outreach.       Addressed changes since last contact:  none    Follow Up  Future Appointments   Date Time Provider [address]   2023 12:30 PM Tanmay Ordoñez MD [facility]   2023 11:15 AM Deandre Alfred MD AFLGEORGIEVNPHHTN AFL [facility]       Care Transition Nurse reviewed medical

## 2023-09-19 ENCOUNTER — OFFICE VISIT (OUTPATIENT)
Dept: CARDIOLOGY CLINIC | Age: 83
End: 2023-09-19
Payer: MEDICARE

## 2023-09-19 ENCOUNTER — HOSPITAL ENCOUNTER (OUTPATIENT)
Age: 83
Discharge: HOME | End: 2023-09-19
Payer: MEDICARE

## 2023-09-19 VITALS
SYSTOLIC BLOOD PRESSURE: 140 MMHG | HEART RATE: 60 BPM | BODY MASS INDEX: 28.9 KG/M2 | HEIGHT: 66 IN | DIASTOLIC BLOOD PRESSURE: 62 MMHG | WEIGHT: 179.8 LBS

## 2023-09-19 DIAGNOSIS — I10 ESSENTIAL HYPERTENSION: ICD-10-CM

## 2023-09-19 DIAGNOSIS — Z79.4 TYPE 2 DIABETES MELLITUS WITH COMPLICATION, WITH LONG-TERM CURRENT USE OF INSULIN (HCC): ICD-10-CM

## 2023-09-19 DIAGNOSIS — I38 VALVULAR HEART DISEASE: ICD-10-CM

## 2023-09-19 DIAGNOSIS — I38 VALVULAR HEART DISEASE: Primary | ICD-10-CM

## 2023-09-19 DIAGNOSIS — N18.4 CHRONIC KIDNEY DISEASE, STAGE IV (SEVERE) (HCC): ICD-10-CM

## 2023-09-19 DIAGNOSIS — C18.2 PRIMARY ADENOCARCINOMA OF ASCENDING COLON (HCC): ICD-10-CM

## 2023-09-19 DIAGNOSIS — E11.8 TYPE 2 DIABETES MELLITUS WITH COMPLICATION, WITH LONG-TERM CURRENT USE OF INSULIN (HCC): ICD-10-CM

## 2023-09-19 DIAGNOSIS — F33.0 MAJOR DEPRESSIVE DISORDER, RECURRENT, MILD (HCC): ICD-10-CM

## 2023-09-19 LAB
ALBUMIN SERPL-MCNC: 4.1 GM/DL (ref 3.4–5)
ANION GAP SERPL CALCULATED.3IONS-SCNC: 17 MMOL/L (ref 4–16)
BACTERIA: NEGATIVE /HPF
BASOPHILS ABSOLUTE: 0 K/CU MM
BASOPHILS RELATIVE PERCENT: 0.3 % (ref 0–1)
BILIRUBIN URINE: NEGATIVE MG/DL
BLOOD, URINE: NEGATIVE
BUN SERPL-MCNC: 55 MG/DL (ref 6–23)
CALCIUM SERPL-MCNC: 9.6 MG/DL (ref 8.3–10.6)
CHLORIDE BLD-SCNC: 100 MMOL/L (ref 99–110)
CLARITY: CLEAR
CO2: 23 MMOL/L (ref 21–32)
COLOR: YELLOW
CREAT SERPL-MCNC: 2.5 MG/DL (ref 0.6–1.1)
CREATININE URINE: 65.1 MG/DL (ref 28–217)
DIFFERENTIAL TYPE: ABNORMAL
EOSINOPHILS ABSOLUTE: 0.2 K/CU MM
EOSINOPHILS RELATIVE PERCENT: 2.2 % (ref 0–3)
GFR SERPL CREATININE-BSD FRML MDRD: 19 ML/MIN/1.73M2
GLUCOSE SERPL-MCNC: 116 MG/DL (ref 70–99)
GLUCOSE, URINE: NEGATIVE MG/DL
HCT VFR BLD CALC: 30.8 % (ref 37–47)
HEMOGLOBIN: 9.1 GM/DL (ref 12.5–16)
HYALINE CASTS: 5 /LPF
IMMATURE NEUTROPHIL %: 0.2 % (ref 0–0.43)
KETONES, URINE: NEGATIVE MG/DL
LEUKOCYTE ESTERASE, URINE: ABNORMAL
LYMPHOCYTES ABSOLUTE: 2.4 K/CU MM
LYMPHOCYTES RELATIVE PERCENT: 26.1 % (ref 24–44)
MAGNESIUM: 2.7 MG/DL (ref 1.8–2.4)
MCH RBC QN AUTO: 25.8 PG (ref 27–31)
MCHC RBC AUTO-ENTMCNC: 29.5 % (ref 32–36)
MCV RBC AUTO: 87.3 FL (ref 78–100)
MONOCYTES ABSOLUTE: 0.7 K/CU MM
MONOCYTES RELATIVE PERCENT: 8.2 % (ref 0–4)
MUCUS: ABNORMAL HPF
NITRITE URINE, QUANTITATIVE: NEGATIVE
NUCLEATED RBC %: 0 %
PDW BLD-RTO: 15.2 % (ref 11.7–14.9)
PH, URINE: 5 (ref 5–8)
PHOSPHORUS: 5.9 MG/DL (ref 2.5–4.9)
PLATELET # BLD: 352 K/CU MM (ref 140–440)
PMV BLD AUTO: 9.4 FL (ref 7.5–11.1)
POTASSIUM SERPL-SCNC: 5.3 MMOL/L (ref 3.5–5.1)
PROT/CREAT RATIO, UR: 0.5
PROTEIN UA: ABNORMAL MG/DL
RBC # BLD: 3.53 M/CU MM (ref 4.2–5.4)
RBC URINE: 3 /HPF (ref 0–6)
SEGMENTED NEUTROPHILS ABSOLUTE COUNT: 5.7 K/CU MM
SEGMENTED NEUTROPHILS RELATIVE PERCENT: 63 % (ref 36–66)
SODIUM BLD-SCNC: 140 MMOL/L (ref 135–145)
SPECIFIC GRAVITY UA: 1.01 (ref 1–1.03)
TOTAL IMMATURE NEUTOROPHIL: 0.02 K/CU MM
TOTAL NUCLEATED RBC: 0 K/CU MM
TRICHOMONAS: ABNORMAL /HPF
URINE TOTAL PROTEIN: 33.1 MG/DL
UROBILINOGEN, URINE: 0.2 MG/DL (ref 0.2–1)
WBC # BLD: 9 K/CU MM (ref 4–10.5)
WBC UA: 19 /HPF (ref 0–5)

## 2023-09-19 PROCEDURE — 80048 BASIC METABOLIC PNL TOTAL CA: CPT

## 2023-09-19 PROCEDURE — 83735 ASSAY OF MAGNESIUM: CPT

## 2023-09-19 PROCEDURE — 84100 ASSAY OF PHOSPHORUS: CPT

## 2023-09-19 PROCEDURE — 3077F SYST BP >= 140 MM HG: CPT | Performed by: INTERNAL MEDICINE

## 2023-09-19 PROCEDURE — 85025 COMPLETE CBC W/AUTO DIFF WBC: CPT

## 2023-09-19 PROCEDURE — 99214 OFFICE O/P EST MOD 30 MIN: CPT | Performed by: INTERNAL MEDICINE

## 2023-09-19 PROCEDURE — 82040 ASSAY OF SERUM ALBUMIN: CPT

## 2023-09-19 PROCEDURE — 81001 URINALYSIS AUTO W/SCOPE: CPT

## 2023-09-19 PROCEDURE — 82570 ASSAY OF URINE CREATININE: CPT

## 2023-09-19 PROCEDURE — 36415 COLL VENOUS BLD VENIPUNCTURE: CPT

## 2023-09-19 PROCEDURE — 3078F DIAST BP <80 MM HG: CPT | Performed by: INTERNAL MEDICINE

## 2023-09-19 PROCEDURE — 84156 ASSAY OF PROTEIN URINE: CPT

## 2023-09-19 PROCEDURE — 1123F ACP DISCUSS/DSCN MKR DOCD: CPT | Performed by: INTERNAL MEDICINE

## 2023-09-19 RX ORDER — LOSARTAN POTASSIUM 50 MG/1
50 TABLET ORAL DAILY
COMMUNITY
Start: 2023-08-10

## 2023-09-19 NOTE — PATIENT INSTRUCTIONS
We are committed to providing you the best care possible. If you receive a survey after visiting one of our offices, please take time to share your experience concerning your physician office visit. These surveys are confidential and no health information about you is shared. We are eager to improve for you and we are counting on your feedback to help make that happen. **It is YOUR responsibilty to bring medication bottles and/or updated medication list to [address]. This will allow us to better serve you and all your healthcare needs**    Thank you for allowing us to care for you today! We want to ensure we can follow your treatment plan and we strive to give you the best outcomes and experience possible. If you ever have a life threatening emergency and call 911 - for an ambulance (EMS)   Our providers can only care for you at:   [facility] or [facility]. Even if you have someone take you or you drive yourself we can only care for you in a [facility] facility. Our providers are not setup at the other healthcare locations! [address] [facility] Laboratory Locations - No appointment necessary. Sites open Monday to Friday. Call your preferred location for test preparation, business   hours and other information you need. [employer] accepts [employer]. [address] [facility]. [address] [employer] [employer].  [place], [facility]  Phone: [phone]

## 2023-09-19 NOTE — PROGRESS NOTES
CARDIOLOGY NOTE      9/19/2023    RE: Lino Rubi  (1940)                               TO:  MD Richardson Eisenberglydia Franks is a 82 y.o. female who was seen today for management of  htn                                    HPI:                   Pt has h/o htn, hyperlipidimea, DM, seen today for  fu. Pt had COVID has no CP, had a normal cath has normal EF    Brennan Zheng was in the hospital for robotic assisted laparoscopic right hemicolectomy due to cecal mass on 5/3/2023. Early a.m. patient had PEA arrest with profound bradycardia. She did require a temporary pacemaker. Echo shows preserved EF with moderate aortic stenosis. She was noted to be anemic and did require transfusion.     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    Chronic kidney disease, stage IV (severe) ([address]) 04/20/2022    Chronic renal disease, stage III [facility]) [781959] 04/20/2022    Renal stone 04/20/2022    Major depressive disorder, recurrent, mild 04/20/2022    Major depressive disorder, recurrent, moderate 04/20/2022    Major depressive disorder, recurrent, unspecified 04/20/2022    Chronic heart failure with preserved ejection fraction ([address]) 07/17/2023    Pneumonia of both lower lobes due to infectious organism 06/19/2023    Acute pulmonary embolism, unspecified pulmonary embolism type, unspecified whether acute cor pulmonale present ([address]) 06/17/2023    SVT (supraventricular tachycardia) ([address]) 06/07/2023    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

## 2023-11-21 ENCOUNTER — HOSPITAL ENCOUNTER (OUTPATIENT)
Age: 83
Discharge: HOME | End: 2023-11-21
Payer: MEDICARE

## 2023-11-21 DIAGNOSIS — Q61.9 CYSTIC KIDNEY DISEASE: ICD-10-CM

## 2023-11-21 DIAGNOSIS — I50.32 CHRONIC HEART FAILURE WITH PRESERVED EJECTION FRACTION (HCC): ICD-10-CM

## 2023-11-21 DIAGNOSIS — F33.1 MAJOR DEPRESSIVE DISORDER, RECURRENT, MODERATE (HCC): ICD-10-CM

## 2023-11-21 DIAGNOSIS — C18.2 PRIMARY ADENOCARCINOMA OF ASCENDING COLON (HCC): ICD-10-CM

## 2023-11-21 DIAGNOSIS — I27.20 PULMONARY HYPERTENSION (HCC): ICD-10-CM

## 2023-11-21 DIAGNOSIS — N18.4 CHRONIC KIDNEY DISEASE, STAGE IV (SEVERE) (HCC): ICD-10-CM

## 2023-11-21 DIAGNOSIS — I10 ESSENTIAL HYPERTENSION: ICD-10-CM

## 2023-11-21 DIAGNOSIS — E11.65 UNCONTROLLED TYPE 2 DIABETES MELLITUS WITH HYPERGLYCEMIA (HCC): ICD-10-CM

## 2023-11-21 LAB
ALBUMIN SERPL-MCNC: 4.3 GM/DL (ref 3.4–5)
ANION GAP SERPL CALCULATED.3IONS-SCNC: 13 MMOL/L (ref 4–16)
BACTERIA: NEGATIVE /HPF
BASOPHILS ABSOLUTE: 0 K/CU MM
BASOPHILS RELATIVE PERCENT: 0.5 % (ref 0–1)
BILIRUBIN URINE: NEGATIVE MG/DL
BLOOD, URINE: NEGATIVE
BUN SERPL-MCNC: 127 MG/DL (ref 6–23)
CALCIUM SERPL-MCNC: 9.8 MG/DL (ref 8.3–10.6)
CHLORIDE BLD-SCNC: 98 MMOL/L (ref 99–110)
CLARITY: CLEAR
CO2: 24 MMOL/L (ref 21–32)
COLOR: YELLOW
CREAT SERPL-MCNC: 3.2 MG/DL (ref 0.6–1.1)
CREATININE URINE: 59.6 MG/DL (ref 28–217)
DIFFERENTIAL TYPE: ABNORMAL
EOSINOPHILS ABSOLUTE: 0.3 K/CU MM
EOSINOPHILS RELATIVE PERCENT: 4.3 % (ref 0–3)
GFR SERPL CREATININE-BSD FRML MDRD: 14 ML/MIN/1.73M2
GLUCOSE SERPL-MCNC: 133 MG/DL (ref 70–99)
GLUCOSE, URINE: NEGATIVE MG/DL
HCT VFR BLD CALC: 32.5 % (ref 37–47)
HEMOGLOBIN: 10.1 GM/DL (ref 12.5–16)
HYALINE CASTS: 10 /LPF
HYPHENATED YEAST: ABNORMAL /HPF
IMMATURE NEUTROPHIL %: 0.3 % (ref 0–0.43)
KETONES, URINE: NEGATIVE MG/DL
LEUKOCYTE ESTERASE, URINE: ABNORMAL
LYMPHOCYTES ABSOLUTE: 2.5 K/CU MM
LYMPHOCYTES RELATIVE PERCENT: 32.7 % (ref 24–44)
MCH RBC QN AUTO: 26.4 PG (ref 27–31)
MCHC RBC AUTO-ENTMCNC: 31.1 % (ref 32–36)
MCV RBC AUTO: 85.1 FL (ref 78–100)
MONOCYTES ABSOLUTE: 0.7 K/CU MM
MONOCYTES RELATIVE PERCENT: 8.4 % (ref 0–4)
MUCUS: ABNORMAL HPF
NITRITE URINE, QUANTITATIVE: NEGATIVE
NUCLEATED RBC %: 0 %
PDW BLD-RTO: 15.3 % (ref 11.7–14.9)
PH, URINE: 5.5 (ref 5–8)
PHOSPHORUS: 5 MG/DL (ref 2.5–4.9)
PLATELET # BLD: 342 K/CU MM (ref 140–440)
PMV BLD AUTO: 9.4 FL (ref 7.5–11.1)
POTASSIUM SERPL-SCNC: 4 MMOL/L (ref 3.5–5.1)
PROT/CREAT RATIO, UR: 0.4
PROTEIN UA: NEGATIVE MG/DL
RBC # BLD: 3.82 M/CU MM (ref 4.2–5.4)
RBC URINE: 3 /HPF (ref 0–6)
SEGMENTED NEUTROPHILS ABSOLUTE COUNT: 4.1 K/CU MM
SEGMENTED NEUTROPHILS RELATIVE PERCENT: 53.8 % (ref 36–66)
SODIUM BLD-SCNC: 135 MMOL/L (ref 135–145)
SPECIFIC GRAVITY UA: 1.01 (ref 1–1.03)
SQUAMOUS EPITHELIAL: 2 /HPF
TOTAL IMMATURE NEUTOROPHIL: 0.02 K/CU MM
TOTAL NUCLEATED RBC: 0 K/CU MM
TRICHOMONAS: ABNORMAL /HPF
URINE TOTAL PROTEIN: 23.1 MG/DL
UROBILINOGEN, URINE: 0.2 MG/DL (ref 0.2–1)
WBC # BLD: 7.7 K/CU MM (ref 4–10.5)
WBC CLUMP: ABNORMAL /HPF
WBC UA: 116 /HPF (ref 0–5)
YEAST: ABNORMAL /HPF

## 2023-11-21 PROCEDURE — 80048 BASIC METABOLIC PNL TOTAL CA: CPT

## 2023-11-21 PROCEDURE — 82040 ASSAY OF SERUM ALBUMIN: CPT

## 2023-11-21 PROCEDURE — 81001 URINALYSIS AUTO W/SCOPE: CPT

## 2023-11-21 PROCEDURE — 82570 ASSAY OF URINE CREATININE: CPT

## 2023-11-21 PROCEDURE — 85025 COMPLETE CBC W/AUTO DIFF WBC: CPT

## 2023-11-21 PROCEDURE — 84156 ASSAY OF PROTEIN URINE: CPT

## 2023-11-21 PROCEDURE — 84100 ASSAY OF PHOSPHORUS: CPT

## 2023-11-21 PROCEDURE — 36415 COLL VENOUS BLD VENIPUNCTURE: CPT

## 2023-11-28 PROBLEM — N17.0 ACUTE RENAL FAILURE WITH TUBULAR NECROSIS (HCC): Status: ACTIVE | Noted: 2023-11-28

## 2023-12-29 ENCOUNTER — HOSPITAL ENCOUNTER (OUTPATIENT)
Age: 83
Discharge: HOME | End: 2023-12-29
Payer: MEDICARE

## 2023-12-29 DIAGNOSIS — I10 ESSENTIAL HYPERTENSION: ICD-10-CM

## 2023-12-29 DIAGNOSIS — Z79.4 TYPE 2 DIABETES MELLITUS WITH COMPLICATION, WITH LONG-TERM CURRENT USE OF INSULIN (HCC): ICD-10-CM

## 2023-12-29 DIAGNOSIS — C18.2 PRIMARY ADENOCARCINOMA OF ASCENDING COLON (HCC): ICD-10-CM

## 2023-12-29 DIAGNOSIS — E11.8 TYPE 2 DIABETES MELLITUS WITH COMPLICATION, WITH LONG-TERM CURRENT USE OF INSULIN (HCC): ICD-10-CM

## 2023-12-29 DIAGNOSIS — N30.00 ACUTE CYSTITIS WITHOUT HEMATURIA: ICD-10-CM

## 2023-12-29 DIAGNOSIS — Q61.9 CYSTIC KIDNEY DISEASE: ICD-10-CM

## 2023-12-29 DIAGNOSIS — N18.4 CHRONIC KIDNEY DISEASE, STAGE IV (SEVERE) (HCC): ICD-10-CM

## 2023-12-29 DIAGNOSIS — N17.0 ACUTE RENAL FAILURE WITH TUBULAR NECROSIS (HCC): ICD-10-CM

## 2023-12-29 LAB
ALBUMIN SERPL-MCNC: 4.3 GM/DL (ref 3.4–5)
ANION GAP SERPL CALCULATED.3IONS-SCNC: 15 MMOL/L (ref 7–16)
BACTERIA: NEGATIVE /HPF
BASOPHILS ABSOLUTE: 0.1 K/CU MM
BASOPHILS RELATIVE PERCENT: 0.7 % (ref 0–1)
BILIRUBIN URINE: NEGATIVE MG/DL
BLOOD, URINE: NEGATIVE
BUN SERPL-MCNC: 57 MG/DL (ref 6–23)
CALCIUM SERPL-MCNC: 9.5 MG/DL (ref 8.3–10.6)
CHLORIDE BLD-SCNC: 101 MMOL/L (ref 99–110)
CLARITY: CLEAR
CO2: 24 MMOL/L (ref 21–32)
COLOR: YELLOW
CREAT SERPL-MCNC: 3 MG/DL (ref 0.6–1.1)
CREATININE URINE: 49 MG/DL (ref 28–217)
DIFFERENTIAL TYPE: ABNORMAL
EOSINOPHILS ABSOLUTE: 0.3 K/CU MM
EOSINOPHILS RELATIVE PERCENT: 3.5 % (ref 0–3)
GFR SERPL CREATININE-BSD FRML MDRD: 15 ML/MIN/1.73M2
GLUCOSE SERPL-MCNC: 181 MG/DL (ref 70–99)
GLUCOSE, URINE: NEGATIVE MG/DL
HCT VFR BLD CALC: 34.9 % (ref 37–47)
HEMOGLOBIN: 10.5 GM/DL (ref 12.5–16)
IMMATURE NEUTROPHIL %: 0.5 % (ref 0–0.43)
KETONES, URINE: NEGATIVE MG/DL
LEUKOCYTE ESTERASE, URINE: ABNORMAL
LYMPHOCYTES ABSOLUTE: 2.4 K/CU MM
LYMPHOCYTES RELATIVE PERCENT: 28 % (ref 24–44)
MAGNESIUM: 2.2 MG/DL (ref 1.8–2.4)
MCH RBC QN AUTO: 27.2 PG (ref 27–31)
MCHC RBC AUTO-ENTMCNC: 30.1 % (ref 32–36)
MCV RBC AUTO: 90.4 FL (ref 78–100)
MONOCYTES ABSOLUTE: 0.7 K/CU MM
MONOCYTES RELATIVE PERCENT: 8.4 % (ref 0–4)
MUCUS: ABNORMAL HPF
NITRITE URINE, QUANTITATIVE: NEGATIVE
NUCLEATED RBC %: 0 %
PDW BLD-RTO: 15.4 % (ref 11.7–14.9)
PH, URINE: 6 (ref 5–8)
PHOSPHORUS: 3.6 MG/DL (ref 2.5–4.9)
PLATELET # BLD: 345 K/CU MM (ref 140–440)
PMV BLD AUTO: 9.4 FL (ref 7.5–11.1)
POTASSIUM SERPL-SCNC: 4.3 MMOL/L (ref 3.5–5.1)
PROT/CREAT RATIO, UR: 0.6
PROTEIN UA: ABNORMAL MG/DL
RBC # BLD: 3.86 M/CU MM (ref 4.2–5.4)
RBC URINE: 0 /HPF (ref 0–6)
SEGMENTED NEUTROPHILS ABSOLUTE COUNT: 5 K/CU MM
SEGMENTED NEUTROPHILS RELATIVE PERCENT: 58.9 % (ref 36–66)
SODIUM BLD-SCNC: 140 MMOL/L (ref 135–145)
SPECIFIC GRAVITY UA: 1.01 (ref 1–1.03)
SQUAMOUS EPITHELIAL: 1 /HPF
TOTAL IMMATURE NEUTOROPHIL: 0.04 K/CU MM
TOTAL NUCLEATED RBC: 0 K/CU MM
TRICHOMONAS: ABNORMAL /HPF
TSH SERPL DL<=0.005 MIU/L-ACNC: 0.48 UIU/ML (ref 0.27–4.2)
URINE TOTAL PROTEIN: 29.5 MG/DL
UROBILINOGEN, URINE: 0.2 MG/DL (ref 0.2–1)
WBC # BLD: 8.5 K/CU MM (ref 4–10.5)
WBC CLUMP: ABNORMAL /HPF
WBC UA: 747 /HPF (ref 0–5)

## 2023-12-29 PROCEDURE — 84443 ASSAY THYROID STIM HORMONE: CPT

## 2023-12-29 PROCEDURE — 83970 ASSAY OF PARATHORMONE: CPT

## 2023-12-29 PROCEDURE — 81001 URINALYSIS AUTO W/SCOPE: CPT

## 2023-12-29 PROCEDURE — 87077 CULTURE AEROBIC IDENTIFY: CPT

## 2023-12-29 PROCEDURE — 82570 ASSAY OF URINE CREATININE: CPT

## 2023-12-29 PROCEDURE — 83735 ASSAY OF MAGNESIUM: CPT

## 2023-12-29 PROCEDURE — 84156 ASSAY OF PROTEIN URINE: CPT

## 2023-12-29 PROCEDURE — 82040 ASSAY OF SERUM ALBUMIN: CPT

## 2023-12-29 PROCEDURE — 87086 URINE CULTURE/COLONY COUNT: CPT

## 2023-12-29 PROCEDURE — 80048 BASIC METABOLIC PNL TOTAL CA: CPT

## 2023-12-29 PROCEDURE — 36415 COLL VENOUS BLD VENIPUNCTURE: CPT

## 2023-12-29 PROCEDURE — 84100 ASSAY OF PHOSPHORUS: CPT

## 2023-12-29 PROCEDURE — 85025 COMPLETE CBC W/AUTO DIFF WBC: CPT

## 2023-12-30 LAB
CULTURE: ABNORMAL
CULTURE: ABNORMAL
Lab: ABNORMAL
SPECIMEN: ABNORMAL

## 2023-12-31 LAB — PTH-INTACT SERPL-MCNC: 187 PG/ML (ref 15–65)

## 2024-01-02 ENCOUNTER — TELEPHONE (OUTPATIENT)
Dept: NEPHROLOGY | Age: 84
End: 2024-01-02

## 2024-01-02 NOTE — TELEPHONE ENCOUNTER
Call placed to patient to check and see if patient having any s/s of UTI after reviewing labs. Patient denies having s/s at this time

## 2024-01-04 PROBLEM — E21.3 HPTH (HYPERPARATHYROIDISM) (HCC): Status: ACTIVE | Noted: 2024-01-04

## 2024-01-05 ENCOUNTER — HOSPITAL ENCOUNTER (OUTPATIENT)
Age: 84
Discharge: HOME | End: 2024-01-05
Payer: MEDICARE

## 2024-01-05 ENCOUNTER — HOSPITAL ENCOUNTER (OUTPATIENT)
Dept: GENERAL RADIOLOGY | Age: 84
Discharge: HOME | End: 2024-01-05
Payer: MEDICARE

## 2024-01-05 DIAGNOSIS — N18.4 CHRONIC KIDNEY DISEASE, STAGE IV (SEVERE) (HCC): ICD-10-CM

## 2024-01-05 DIAGNOSIS — Z79.4 TYPE 2 DIABETES MELLITUS WITH COMPLICATION, WITH LONG-TERM CURRENT USE OF INSULIN (HCC): ICD-10-CM

## 2024-01-05 DIAGNOSIS — F33.0 MAJOR DEPRESSIVE DISORDER, RECURRENT, MILD (HCC): ICD-10-CM

## 2024-01-05 DIAGNOSIS — I50.32 CHRONIC HEART FAILURE WITH PRESERVED EJECTION FRACTION (HCC): ICD-10-CM

## 2024-01-05 DIAGNOSIS — I10 ESSENTIAL HYPERTENSION: ICD-10-CM

## 2024-01-05 DIAGNOSIS — E11.8 TYPE 2 DIABETES MELLITUS WITH COMPLICATION, WITH LONG-TERM CURRENT USE OF INSULIN (HCC): ICD-10-CM

## 2024-01-05 DIAGNOSIS — N18.6 ESRD (END STAGE RENAL DISEASE) (HCC): ICD-10-CM

## 2024-01-05 DIAGNOSIS — E21.3 HPTH (HYPERPARATHYROIDISM) (HCC): ICD-10-CM

## 2024-01-05 LAB
EKG ATRIAL RATE: 51 BPM
EKG DIAGNOSIS: NORMAL
EKG P AXIS: 79 DEGREES
EKG P-R INTERVAL: 278 MS
EKG Q-T INTERVAL: 474 MS
EKG QRS DURATION: 136 MS
EKG QTC CALCULATION (BAZETT): 436 MS
EKG R AXIS: -64 DEGREES
EKG T AXIS: 9 DEGREES
EKG VENTRICULAR RATE: 51 BPM
HBV SURFACE AB SERPL IA-ACNC: <3.5 M[IU]/ML
HBV SURFACE AG SERPL QL IA: NON REACTIVE

## 2024-01-05 PROCEDURE — 87340 HEPATITIS B SURFACE AG IA: CPT

## 2024-01-05 PROCEDURE — 93005 ELECTROCARDIOGRAM TRACING: CPT | Performed by: INTERNAL MEDICINE

## 2024-01-05 PROCEDURE — 86704 HEP B CORE ANTIBODY TOTAL: CPT

## 2024-01-05 PROCEDURE — 71046 X-RAY EXAM CHEST 2 VIEWS: CPT

## 2024-01-05 PROCEDURE — 86706 HEP B SURFACE ANTIBODY: CPT

## 2024-01-05 PROCEDURE — 36415 COLL VENOUS BLD VENIPUNCTURE: CPT

## 2024-01-05 NOTE — PROGRESS NOTES
IR Procedure at [facility]:  Spoke with patient and she will arrive at 0730 at [facility] on 1/9/2024 for her procedure at 0900. Also went over below instructions and patient will take her medications as scheduled except for aspirin, last dose was on 1/4/2024.    NPO at Midnight      2.   Follow your directions as prescribed by the doctor for your procedure and medications.  3.   Consult your provider as to when to stop blood thinner  4.   Do not take any pain medication within 6 hours of your procedure  5.   Do not drink any alcoholic beverages or use any street drugs 24 hours before procedure.  6.   Please wear simple, loose fitting clothing to the hospital.  Do not bring valuables (money,             credit cards, checkbooks, etc.)     7.   If you  have a Living Will and Durable Power of  for Healthcare, please bring in a copy.  8.   Please bring picture ID,  insurance card, paperwork from the doctors office            (H & P, Consent,  & card for implantable devices).  9.   Report to the information desk on the ground floor.  10. Take a shower the night before or morning of your procedure, do not apply any lotion, oil or powder.  11. If you are going to be sedated for the procedure, you will need a responsible adult to drive you home.

## 2024-01-07 LAB — HBV CORE AB SERPL QL IA: NEGATIVE

## 2024-01-09 ENCOUNTER — HOSPITAL ENCOUNTER (OUTPATIENT)
Dept: INTERVENTIONAL RADIOLOGY/VASCULAR | Age: 84
Discharge: HOME | End: 2024-01-09
Payer: MEDICARE

## 2024-01-09 VITALS
SYSTOLIC BLOOD PRESSURE: 163 MMHG | HEIGHT: 66 IN | WEIGHT: 175 LBS | BODY MASS INDEX: 28.12 KG/M2 | RESPIRATION RATE: 16 BRPM | HEART RATE: 59 BPM | DIASTOLIC BLOOD PRESSURE: 48 MMHG | TEMPERATURE: 97.9 F | OXYGEN SATURATION: 94 %

## 2024-01-09 DIAGNOSIS — N18.4 CHRONIC KIDNEY DISEASE, STAGE IV (SEVERE) (HCC): ICD-10-CM

## 2024-01-09 LAB
APTT: 26.7 SECONDS (ref 25.1–37.1)
INR BLD: 1 INDEX
PROTHROMBIN TIME: 12 SECONDS (ref 11.7–14.5)
PROTHROMBIN TIME: 13.2 SECONDS (ref 11.7–14.5)
REASON FOR REJECTION: NORMAL
REJECTED TEST: NORMAL

## 2024-01-09 PROCEDURE — 85730 THROMBOPLASTIN TIME PARTIAL: CPT

## 2024-01-09 PROCEDURE — 6360000004 HC RX CONTRAST MEDICATION

## 2024-01-09 PROCEDURE — C1769 GUIDE WIRE: HCPCS

## 2024-01-09 PROCEDURE — 2500000003 HC RX 250 WO HCPCS

## 2024-01-09 PROCEDURE — 6360000002 HC RX W HCPCS

## 2024-01-09 PROCEDURE — 77001 FLUOROGUIDE FOR VEIN DEVICE: CPT

## 2024-01-09 PROCEDURE — 76937 US GUIDE VASCULAR ACCESS: CPT

## 2024-01-09 PROCEDURE — 36558 INSERT TUNNELED CV CATH: CPT

## 2024-01-09 PROCEDURE — 85610 PROTHROMBIN TIME: CPT

## 2024-01-09 RX ORDER — SODIUM CHLORIDE 0.9 % (FLUSH) 0.9 %
10 SYRINGE (ML) INJECTION PRN
Status: DISCONTINUED | OUTPATIENT
Start: 2024-01-09 | End: 2024-01-10 | Stop reason: HOSPADM

## 2024-01-09 NOTE — PROGRESS NOTES
TRANSFER - OUT REPORT:    Verbal report given to Makenna RN and Pennie RN on Gwendolyn Grimm being transferred to [facility] for routine post-op       Report consisted of patient's Situation, Background, Assessment and   Recommendations(SBAR).     Successful Tunneled Dialysis Catheter placed in IR with Dr. Layne at this time. Vitals stable throughout procedure.     Information from the following report(s) Nurse Handoff Report was reviewed with the receiving nurse.    Opportunity for questions and clarification was provided.      Patient transported with:   Registered Nurse

## 2024-01-09 NOTE — BRIEF OP NOTE
Brief Postoperative Note      Patient: Gwendolyn Grimm  YOB: 1940  MRN: [id number]    Date of Procedure: 1/9/2024    * No Diagnosis Codes entered *implanted hemodialysis access needed for outpt dialysis    Post-Op Diagnosis: Same       * No procedures listed *tunneled HD catheter placement    * No surgeons listed *kwabena dent do    Assistant:  * No surgical staff found *    Anesthesia: * No anesthesia type entered *    Estimated Blood Loss (mL): Minimal    Complications: None    Specimens:   * No specimens in log *    Implants:  * No implants in log *      Drains: * No LDAs found *    Findings: tunneled HD cat placed via us guided right IJ vein with tip near superior cavoatrial jctn.  No complication suggested.      Electronically signed by Kwabena Dent DO on 1/9/2024 at 9:35 AM

## 2024-01-09 NOTE — PROGRESS NOTES
IR PREPROCEDURE NOTE    1/9/24    TUNNELED DIALYSIS ACCESS CATHETER PLACEMENT EXPLAINED TO PT INCLUDING RISKS, BENEFITS AND ALTERNATIVES AND PT ELECTS TO PROCEED WITH CATHETER PLACEMENT.    ADRIEL CARPENTER DO

## 2024-01-20 PROBLEM — Z99.2 ESRD ON DIALYSIS (HCC): Status: ACTIVE | Noted: 2024-01-20

## 2024-01-20 PROBLEM — N18.6 ESRD ON DIALYSIS (HCC): Status: ACTIVE | Noted: 2024-01-20

## 2024-02-02 ENCOUNTER — HOSPITAL ENCOUNTER (EMERGENCY)
Age: 84
Discharge: HOME | End: 2024-02-03
Payer: MEDICARE

## 2024-02-02 VITALS
OXYGEN SATURATION: 95 % | HEART RATE: 54 BPM | SYSTOLIC BLOOD PRESSURE: 165 MMHG | RESPIRATION RATE: 20 BRPM | DIASTOLIC BLOOD PRESSURE: 57 MMHG | TEMPERATURE: 97.7 F | HEIGHT: 66 IN | BODY MASS INDEX: 26.84 KG/M2 | WEIGHT: 167 LBS

## 2024-02-02 DIAGNOSIS — I95.9 HYPOTENSION, UNSPECIFIED HYPOTENSION TYPE: ICD-10-CM

## 2024-02-02 DIAGNOSIS — Z99.2 STATUS POST DIALYSIS (HCC): Primary | ICD-10-CM

## 2024-02-02 LAB
ALBUMIN SERPL-MCNC: 4.1 GM/DL (ref 3.4–5)
ALP BLD-CCNC: 72 IU/L (ref 40–129)
ALT SERPL-CCNC: 9 U/L (ref 10–40)
ANION GAP SERPL CALCULATED.3IONS-SCNC: 11 MMOL/L (ref 7–16)
AST SERPL-CCNC: 18 IU/L (ref 15–37)
BASOPHILS ABSOLUTE: 0 K/CU MM
BASOPHILS RELATIVE PERCENT: 0.4 % (ref 0–1)
BILIRUB SERPL-MCNC: 0.2 MG/DL (ref 0–1)
BUN SERPL-MCNC: 13 MG/DL (ref 6–23)
CALCIUM SERPL-MCNC: 8.7 MG/DL (ref 8.3–10.6)
CHLORIDE BLD-SCNC: 95 MMOL/L (ref 99–110)
CO2: 28 MMOL/L (ref 21–32)
CREAT SERPL-MCNC: 1.9 MG/DL (ref 0.6–1.1)
DIFFERENTIAL TYPE: ABNORMAL
EOSINOPHILS ABSOLUTE: 0.2 K/CU MM
EOSINOPHILS RELATIVE PERCENT: 2.4 % (ref 0–3)
GFR SERPL CREATININE-BSD FRML MDRD: 26 ML/MIN/1.73M2
GLUCOSE SERPL-MCNC: 107 MG/DL (ref 70–99)
HCT VFR BLD CALC: 32.5 % (ref 37–47)
HEMOGLOBIN: 10.2 GM/DL (ref 12.5–16)
IMMATURE NEUTROPHIL %: 0.3 % (ref 0–0.43)
LYMPHOCYTES ABSOLUTE: 2 K/CU MM
LYMPHOCYTES RELATIVE PERCENT: 29.1 % (ref 24–44)
MCH RBC QN AUTO: 28.6 PG (ref 27–31)
MCHC RBC AUTO-ENTMCNC: 31.4 % (ref 32–36)
MCV RBC AUTO: 91 FL (ref 78–100)
MONOCYTES ABSOLUTE: 0.7 K/CU MM
MONOCYTES RELATIVE PERCENT: 10.1 % (ref 0–4)
NUCLEATED RBC %: 0 %
PDW BLD-RTO: 14.6 % (ref 11.7–14.9)
PLATELET # BLD: 212 K/CU MM (ref 140–440)
PMV BLD AUTO: 9.2 FL (ref 7.5–11.1)
POTASSIUM SERPL-SCNC: 4.2 MMOL/L (ref 3.5–5.1)
RBC # BLD: 3.57 M/CU MM (ref 4.2–5.4)
SEGMENTED NEUTROPHILS ABSOLUTE COUNT: 3.9 K/CU MM
SEGMENTED NEUTROPHILS RELATIVE PERCENT: 57.7 % (ref 36–66)
SODIUM BLD-SCNC: 134 MMOL/L (ref 135–145)
TOTAL IMMATURE NEUTOROPHIL: 0.02 K/CU MM
TOTAL NUCLEATED RBC: 0 K/CU MM
TOTAL PROTEIN: 7.3 GM/DL (ref 6.4–8.2)
WBC # BLD: 6.8 K/CU MM (ref 4–10.5)

## 2024-02-02 PROCEDURE — 85025 COMPLETE CBC W/AUTO DIFF WBC: CPT

## 2024-02-02 PROCEDURE — 99284 EMERGENCY DEPT VISIT MOD MDM: CPT

## 2024-02-02 PROCEDURE — 93005 ELECTROCARDIOGRAM TRACING: CPT | Performed by: PHYSICIAN ASSISTANT

## 2024-02-02 PROCEDURE — 80053 COMPREHEN METABOLIC PANEL: CPT

## 2024-02-03 ASSESSMENT — ENCOUNTER SYMPTOMS
ABDOMINAL PAIN: 0
SHORTNESS OF BREATH: 0

## 2024-02-03 NOTE — ED PROVIDER NOTES
[facility] EMERGENCY DEPARTMENT  EMERGENCY DEPARTMENT ENCOUNTER        Pt Name: Gwendolyn Grimm  MRN: [id number]  Birthdate 1940  Date of evaluation: 2/2/2024  Provider: Karel Clark PA-C  PCP: Pepe Avina MD      RAJESH. I have evaluated this patient.        CHIEF COMPLAINT      Chief Complaint   Patient presents with    Hypotension       HISTORY OF PRESENT ILLNESS:     History from : Patient    Limitations to history : None    Gwendolyn Grimm is a 83 y.o. female who presents from home via EMS with episode of lightheadedness, and reported hypotension.  My discussion with patient, she mentions that today she had her 12th dialysis treatment history of Monday Wednesday Friday dialysis,.  She describes having the dialysis treatment today for 3 and half hours but mentions that typically 3 hours was what her nephrologist wanted her to have.  She had gone home and she was fixing has some meals to eat as she had not eaten all day, hand she and her son noticed that she started to feel lightheaded.  She mentions that this lasted just a few minutes and resolved on its own after she had rested, and eventually ate.  She does tell me that they had assessed her blood pressure at home and it was 77/44, 15 minutes later was 122/55.  She mentions her symptoms were accompanied with some mild headache, and she does mention she was told that this may happen with her dialysis and she has had previous episodes with identical symptoms although does mention that today was more severe.  She tells me her headache is improving at this point and it similar to the past headaches that she had after dialysis.    Nursing reported to EMS reported /84    Patient  denies any fever, URI symptoms, abdominal pain, urinary symptoms, nausea, vomiting, confusion, syncope, seizures, vision changes, slurred speech, vertigo, numbness tingling weakness    Nursing Notes were all reviewed and agreed  additional workup for potential near syncope as I do feel this is likely related to her dialysis and she is feeling better at this point and she is not hypotensive.  Will discharge her home with return precautions, and she was advised to discuss her present emerged part with her nephrologist return with any worsening.         Condition is: mild stable acute illness    Disposition Considerations (tests considered but not done, Shared Decision Making, Pt Expectation of Test or Tx.): none (unless indicated in ED Course, Summary, Reassessment, or MDM    Discussion with Other Professionals : As indicated in SUMMARY, ED COURSE AND REASSESSMENT, MDM    External Records Reviewed : None    Escalation of care, including admission/OBS considered : Appropriate for outpatient management.     Chronic conditions affecting care:    has a past medical history of Abnormal nuclear stress test (04/08/2007), Cardiac arrest (HCC) (2008), Diabetes mellitus (HCC), H/O cardiac catheterization (04/2008), H/O cardiovascular stress test (04/11/2008), H/O cardiovascular stress test (05/03/2016), H/O Doppler ultrasound (07/16/2007), H/O echocardiogram (10/14/2008), H/O echocardiogram (06/18/2013), H/O echocardiogram (05/16/2016), H/O left heart cath (05/23/2016), H/O left heart cath (06/08/2022), Hernia, cardiovascular stress test (06/18/2013), Hyperlipidemia, Hypertension, Irritable bowel syndrome, Kidney stones, Obesity, Osteoarthritis, Thyroid disease, Type II or unspecified type diabetes mellitus without mention of complication, not stated as uncontrolled, and Venogram (06/21/2023).    Social Determinants Significantly Affecting  Health:  Social Determinants of Health : None     Disposition: Discussed need to follow up diagnostics, including incidental findings. Discharged with instructions to obtain outpatient follow up of patient's symptoms and findings, with strict return precautions if patient develops new or worsening

## 2024-02-03 NOTE — ED TRIAGE NOTES
[facility] Emergency Department  [address] [place] [id number]  Phone: [phone]  Fax: [phone]             August 11, 2020    Patient: Brandon Child   YOB: 1989   Date of Visit: 8/10/2020       To Whom It May Concern:    Sangita Celeste was seen and treated in our emergency department on 8/10/2020. He may return to work on Thursday, August 13, 2020. Diogenes Hircandida       Sincerely,             Signature:__________________________________ Pt to the ED via EMS with c/o hypotension. Per EMS, pt started dialysis 12 days ago. Pt's BP is normally in the 160's and was reading in the low 100's on her home monitor. Last BP for medics was 136/84.

## 2024-02-03 NOTE — ED PROVIDER NOTES
12 lead EKG per my interpretation:  Sinus sheri at 54  Axis is   Left axis deviation  QTc is  within an acceptable range  There is no specific T wave changes appreciated.  There is no specific ST wave changes appreciated.  No STEMI    Prior EKG to compare with was available and no clinically significant change in overall morphology.    MD Reza Eddy Andrew, MD  02/02/24 1266

## 2024-02-03 NOTE — DISCHARGE INSTRUCTIONS
Keep your dialysis appointment scheduled for Monday.      Contact your nephrologist Dr. Lopez to discuss your symptoms today and your visit to the emergency department.    Return with any passing out, confusion, difficulty breathing, seizures, weakness, worsening symptoms or any new concerns.

## 2024-02-04 LAB
EKG ATRIAL RATE: 54 BPM
EKG DIAGNOSIS: NORMAL
EKG P AXIS: 92 DEGREES
EKG P-R INTERVAL: 298 MS
EKG Q-T INTERVAL: 484 MS
EKG QRS DURATION: 116 MS
EKG QTC CALCULATION (BAZETT): 458 MS
EKG R AXIS: -68 DEGREES
EKG T AXIS: -60 DEGREES
EKG VENTRICULAR RATE: 54 BPM

## 2024-02-04 PROCEDURE — 93010 ELECTROCARDIOGRAM REPORT: CPT | Performed by: INTERNAL MEDICINE

## 2024-03-19 ENCOUNTER — OFFICE VISIT (OUTPATIENT)
Dept: CARDIOLOGY CLINIC | Age: 84
End: 2024-03-19
Payer: MEDICARE

## 2024-03-19 VITALS
HEART RATE: 56 BPM | OXYGEN SATURATION: 93 % | DIASTOLIC BLOOD PRESSURE: 60 MMHG | SYSTOLIC BLOOD PRESSURE: 104 MMHG | WEIGHT: 166 LBS | BODY MASS INDEX: 26.68 KG/M2 | HEIGHT: 66 IN

## 2024-03-19 DIAGNOSIS — I27.20 PULMONARY HYPERTENSION (HCC): ICD-10-CM

## 2024-03-19 DIAGNOSIS — I38 VALVULAR HEART DISEASE: Primary | ICD-10-CM

## 2024-03-19 PROCEDURE — 99214 OFFICE O/P EST MOD 30 MIN: CPT | Performed by: NURSE PRACTITIONER

## 2024-03-19 PROCEDURE — 1036F TOBACCO NON-USER: CPT | Performed by: NURSE PRACTITIONER

## 2024-03-19 PROCEDURE — G8399 PT W/DXA RESULTS DOCUMENT: HCPCS | Performed by: NURSE PRACTITIONER

## 2024-03-19 PROCEDURE — G8427 DOCREV CUR MEDS BY ELIG CLIN: HCPCS | Performed by: NURSE PRACTITIONER

## 2024-03-19 PROCEDURE — G8484 FLU IMMUNIZE NO ADMIN: HCPCS | Performed by: NURSE PRACTITIONER

## 2024-03-19 PROCEDURE — G8417 CALC BMI ABV UP PARAM F/U: HCPCS | Performed by: NURSE PRACTITIONER

## 2024-03-19 PROCEDURE — 3074F SYST BP LT 130 MM HG: CPT | Performed by: NURSE PRACTITIONER

## 2024-03-19 PROCEDURE — 3078F DIAST BP <80 MM HG: CPT | Performed by: NURSE PRACTITIONER

## 2024-03-19 PROCEDURE — 1123F ACP DISCUSS/DSCN MKR DOCD: CPT | Performed by: NURSE PRACTITIONER

## 2024-03-19 PROCEDURE — 1090F PRES/ABSN URINE INCON ASSESS: CPT | Performed by: NURSE PRACTITIONER

## 2024-03-19 ASSESSMENT — ENCOUNTER SYMPTOMS
SHORTNESS OF BREATH: 0
ORTHOPNEA: 0

## 2024-03-19 NOTE — PROGRESS NOTES
3/19/2024  Primary cardiologist: Dr. Galindo    CC:   Gwendolyn  is an established 83 y.o.  female here for a 6 month follow up on valvular heart disease      SUBJECTIVE/OBJECTIVE:  Gwendolyn is a 83 y.o. female with a history of VHD- moderate AS, hypertension, hyperlipidemia, diabetes mellitus, SVT, CKD, anemia and HFpEF   LH no CAD    Gwendolyn reports she started HD in [facility]. She is is to follow-up with a surgeon for possible placement of peritoneal catheter so that she may be able to start peritoneal dialysis.  She tells me on her dialysis today she feels tired.  She has no shortness of breath, chest pain or palpitations.    Review of Systems   Constitutional: Positive for malaise/fatigue (on HD days). Negative for diaphoresis.   Cardiovascular:  Negative for chest pain, claudication, dyspnea on exertion, irregular heartbeat, leg swelling, near-syncope, orthopnea, palpitations and paroxysmal nocturnal dyspnea.   Respiratory:  Negative for shortness of breath.    Neurological:  Negative for dizziness and light-headedness.       Vitals:    03/19/24 1307   BP: 104/60   Site: Right Upper Arm   Position: Sitting   Cuff Size: Medium Adult   Pulse: 56   SpO2: 93%   Weight: 75.3 kg (166 lb)   Height: 1.676 m (5' 5.98\")     Wt Readings from Last 3 Encounters:   03/19/24 75.3 kg (166 lb)   02/02/24 75.8 kg (167 lb)   01/31/24 76.7 kg (169 lb)      Body mass index is 26.81 kg/m².     Physical Exam  Vitals reviewed.   Eyes:      Pupils: Pupils are equal, round, and reactive to light.   Neck:      Vascular: No carotid bruit.   Cardiovascular:      Rate and Rhythm: Normal rate and regular rhythm.      Pulses: Normal pulses.      Heart sounds: Murmur heard.      Systolic murmur is present with a grade of 3/6.   Pulmonary:      Effort: Pulmonary effort is normal.      Breath sounds: Normal breath sounds. No rales.   Chest:      Chest wall: No tenderness.      Comments: Right-sided tunneled catheter noted  Musculoskeletal:      Cervical

## 2024-03-19 NOTE — PATIENT INSTRUCTIONS
Please be informed that if you contact our office outside of normal business hours the physician on call cannot help with any scheduling or rescheduling issues, procedure instruction questions or any type of medication issue.    We advise you for any urgent/emergency that you go to the nearest emergency room!    PLEASE CALL OUR OFFICE DURING NORMAL BUSINESS HOURS    Monday - Friday   8 am to 5 pm    [place]: [phone]    [place]: [phone]    [place]:  [phone]    **It is YOUR responsibilty to bring medication bottles and/or updated medication list to EACH APPOINTMENT. This will allow us to better serve you and all your healthcare needs**    Thank you for allowing us to care for you today!   We want to ensure we can follow your treatment plan and we strive to give you the best outcomes and experience possible.   If you ever have a life threatening emergency and call 911 - for an ambulance (EMS)   Our providers can only care for you at:   [facility] or [facility].   Even if you have someone take you or you drive yourself we can only care for you in a [facility] facility. Our providers are not setup at the other healthcare locations!

## 2024-09-23 ENCOUNTER — OFFICE VISIT (OUTPATIENT)
Dept: CARDIOLOGY CLINIC | Age: 84
End: 2024-09-23
Payer: MEDICARE

## 2024-09-23 VITALS
SYSTOLIC BLOOD PRESSURE: 126 MMHG | OXYGEN SATURATION: 96 % | HEART RATE: 52 BPM | BODY MASS INDEX: 30.22 KG/M2 | WEIGHT: 188 LBS | HEIGHT: 66 IN | DIASTOLIC BLOOD PRESSURE: 58 MMHG

## 2024-09-23 DIAGNOSIS — R06.02 SHORTNESS OF BREATH: Primary | ICD-10-CM

## 2024-09-23 PROCEDURE — G8427 DOCREV CUR MEDS BY ELIG CLIN: HCPCS | Performed by: INTERNAL MEDICINE

## 2024-09-23 PROCEDURE — 1090F PRES/ABSN URINE INCON ASSESS: CPT | Performed by: INTERNAL MEDICINE

## 2024-09-23 PROCEDURE — 99214 OFFICE O/P EST MOD 30 MIN: CPT | Performed by: INTERNAL MEDICINE

## 2024-09-23 PROCEDURE — 1036F TOBACCO NON-USER: CPT | Performed by: INTERNAL MEDICINE

## 2024-09-23 PROCEDURE — G8417 CALC BMI ABV UP PARAM F/U: HCPCS | Performed by: INTERNAL MEDICINE

## 2024-09-23 PROCEDURE — 3074F SYST BP LT 130 MM HG: CPT | Performed by: INTERNAL MEDICINE

## 2024-09-23 PROCEDURE — 3078F DIAST BP <80 MM HG: CPT | Performed by: INTERNAL MEDICINE

## 2024-09-23 PROCEDURE — 1123F ACP DISCUSS/DSCN MKR DOCD: CPT | Performed by: INTERNAL MEDICINE

## 2024-09-23 PROCEDURE — G8399 PT W/DXA RESULTS DOCUMENT: HCPCS | Performed by: INTERNAL MEDICINE

## 2024-09-23 RX ORDER — HYDRALAZINE HYDROCHLORIDE 50 MG/1
50 TABLET, FILM COATED ORAL 2 TIMES DAILY
COMMUNITY

## 2024-09-23 RX ORDER — SPIRONOLACTONE 50 MG/1
50 TABLET, FILM COATED ORAL 2 TIMES DAILY
COMMUNITY

## 2024-09-27 ENCOUNTER — TELEPHONE (OUTPATIENT)
Dept: CARDIOLOGY CLINIC | Age: 84
End: 2024-09-27

## 2025-01-02 ENCOUNTER — APPOINTMENT (OUTPATIENT)
Dept: ULTRASOUND IMAGING | Age: 85
End: 2025-01-02
Payer: MEDICARE

## 2025-01-02 ENCOUNTER — HOSPITAL ENCOUNTER (OUTPATIENT)
Age: 85
Setting detail: OBSERVATION
Discharge: HOME | End: 2025-01-04
Attending: EMERGENCY MEDICINE | Admitting: STUDENT IN AN ORGANIZED HEALTH CARE EDUCATION/TRAINING PROGRAM
Payer: MEDICARE

## 2025-01-02 ENCOUNTER — APPOINTMENT (OUTPATIENT)
Dept: CT IMAGING | Age: 85
End: 2025-01-02
Payer: MEDICARE

## 2025-01-02 DIAGNOSIS — N18.6 ESRD (END STAGE RENAL DISEASE) (HCC): ICD-10-CM

## 2025-01-02 DIAGNOSIS — R29.898 WEAKNESS OF BOTH LOWER EXTREMITIES: ICD-10-CM

## 2025-01-02 DIAGNOSIS — R53.1 GENERALIZED WEAKNESS: Primary | ICD-10-CM

## 2025-01-02 DIAGNOSIS — M79.89 SWELLING OF LEFT LOWER EXTREMITY: ICD-10-CM

## 2025-01-02 DIAGNOSIS — R79.89 TROPONIN LEVEL ELEVATED: ICD-10-CM

## 2025-01-02 DIAGNOSIS — R79.89 ELEVATED BRAIN NATRIURETIC PEPTIDE (BNP) LEVEL: ICD-10-CM

## 2025-01-02 LAB
ALBUMIN SERPL-MCNC: 3.8 G/DL (ref 3.4–5)
ALBUMIN/GLOB SERPL: 1.3 {RATIO} (ref 1.1–2.2)
ALP SERPL-CCNC: 84 U/L (ref 40–129)
ALT SERPL-CCNC: 12 U/L (ref 10–40)
ANION GAP SERPL CALCULATED.3IONS-SCNC: 20 MMOL/L (ref 9–17)
ARTERIAL PATENCY WRIST A: ABNORMAL
AST SERPL-CCNC: 15 U/L (ref 15–37)
BASOPHILS # BLD: 0.05 K/UL
BASOPHILS NFR BLD: 0 % (ref 0–1)
BILIRUB SERPL-MCNC: 0.3 MG/DL (ref 0–1)
BNP SERPL-MCNC: 5942 PG/ML (ref 0–450)
BODY TEMPERATURE: 37
BUN SERPL-MCNC: 64 MG/DL (ref 7–20)
CALCIUM SERPL-MCNC: 8.7 MG/DL (ref 8.3–10.6)
CHLORIDE SERPL-SCNC: 99 MMOL/L (ref 99–110)
CK SERPL-CCNC: 56 U/L (ref 26–192)
CO2 SERPL-SCNC: 20 MMOL/L (ref 21–32)
COHGB MFR BLD: 0.4 % (ref 0.5–1.5)
CREAT SERPL-MCNC: 7.4 MG/DL (ref 0.6–1.2)
EOSINOPHIL # BLD: 0.17 K/UL
EOSINOPHILS RELATIVE PERCENT: 1 % (ref 0–3)
ERYTHROCYTE [DISTWIDTH] IN BLOOD BY AUTOMATED COUNT: 12.2 % (ref 11.7–14.9)
GFR, ESTIMATED: 5 ML/MIN/1.73M2
GLUCOSE BLD-MCNC: 99 MG/DL (ref 74–99)
GLUCOSE SERPL-MCNC: 148 MG/DL (ref 74–99)
HCO3 VENOUS: 21.3 MMOL/L (ref 22–29)
HCT VFR BLD AUTO: 34 % (ref 37–47)
HGB BLD-MCNC: 10.8 G/DL (ref 12.5–16)
IMM GRANULOCYTES # BLD AUTO: 0.09 K/UL
IMM GRANULOCYTES NFR BLD: 1 %
INFLUENZA A BY PCR: NOT DETECTED
INFLUENZA B BY PCR: NOT DETECTED
LACTATE BLDV-SCNC: 1.3 MMOL/L (ref 0.4–2)
LYMPHOCYTES NFR BLD: 2.14 K/UL
LYMPHOCYTES RELATIVE PERCENT: 16 % (ref 24–44)
MCH RBC QN AUTO: 30.5 PG (ref 27–31)
MCHC RBC AUTO-ENTMCNC: 31.8 G/DL (ref 32–36)
MCV RBC AUTO: 96 FL (ref 78–100)
METHEMOGLOBIN: 0.4 % (ref 0.5–1.5)
MONOCYTES NFR BLD: 0.93 K/UL
MONOCYTES NFR BLD: 7 % (ref 0–4)
NEGATIVE BASE EXCESS, VEN: 3.9 MMOL/L (ref 0–3)
NEUTROPHILS NFR BLD: 75 % (ref 36–66)
NEUTS SEG NFR BLD: 10.13 K/UL
OXYHGB MFR BLD: 60.8 %
PCO2 VENOUS: 39 MM HG (ref 38–54)
PH VENOUS: 7.36 (ref 7.32–7.43)
PLATELET # BLD AUTO: 348 K/UL (ref 140–440)
PMV BLD AUTO: 8.8 FL (ref 7.5–11.1)
PO2 VENOUS: 33.4 MM HG (ref 23–48)
POTASSIUM SERPL-SCNC: 4.1 MMOL/L (ref 3.5–5.1)
PROT SERPL-MCNC: 6.8 G/DL (ref 6.4–8.2)
RBC # BLD AUTO: 3.54 M/UL (ref 4.2–5.4)
SARS-COV-2 RDRP RESP QL NAA+PROBE: NOT DETECTED
SODIUM SERPL-SCNC: 138 MMOL/L (ref 136–145)
SPECIMEN DESCRIPTION: NORMAL
TROPONIN I SERPL HS-MCNC: 78 NG/L (ref 0–14)
TROPONIN I SERPL HS-MCNC: 80 NG/L (ref 0–14)
TSH SERPL DL<=0.05 MIU/L-ACNC: 1.59 UIU/ML (ref 0.27–4.2)
WBC OTHER # BLD: 13.5 K/UL (ref 4–10.5)

## 2025-01-02 PROCEDURE — 80053 COMPREHEN METABOLIC PANEL: CPT

## 2025-01-02 PROCEDURE — 85025 COMPLETE CBC W/AUTO DIFF WBC: CPT

## 2025-01-02 PROCEDURE — 6370000000 HC RX 637 (ALT 250 FOR IP): Performed by: STUDENT IN AN ORGANIZED HEALTH CARE EDUCATION/TRAINING PROGRAM

## 2025-01-02 PROCEDURE — 70450 CT HEAD/BRAIN W/O DYE: CPT

## 2025-01-02 PROCEDURE — 84443 ASSAY THYROID STIM HORMONE: CPT

## 2025-01-02 PROCEDURE — 94640 AIRWAY INHALATION TREATMENT: CPT

## 2025-01-02 PROCEDURE — 94664 DEMO&/EVAL PT USE INHALER: CPT

## 2025-01-02 PROCEDURE — 93005 ELECTROCARDIOGRAM TRACING: CPT | Performed by: EMERGENCY MEDICINE

## 2025-01-02 PROCEDURE — 82550 ASSAY OF CK (CPK): CPT

## 2025-01-02 PROCEDURE — 2500000003 HC RX 250 WO HCPCS: Performed by: STUDENT IN AN ORGANIZED HEALTH CARE EDUCATION/TRAINING PROGRAM

## 2025-01-02 PROCEDURE — 6360000002 HC RX W HCPCS: Performed by: STUDENT IN AN ORGANIZED HEALTH CARE EDUCATION/TRAINING PROGRAM

## 2025-01-02 PROCEDURE — 82962 GLUCOSE BLOOD TEST: CPT

## 2025-01-02 PROCEDURE — G0378 HOSPITAL OBSERVATION PER HR: HCPCS

## 2025-01-02 PROCEDURE — 83605 ASSAY OF LACTIC ACID: CPT

## 2025-01-02 PROCEDURE — 84484 ASSAY OF TROPONIN QUANT: CPT

## 2025-01-02 PROCEDURE — 82805 BLOOD GASES W/O2 SATURATION: CPT

## 2025-01-02 PROCEDURE — 87502 INFLUENZA DNA AMP PROBE: CPT

## 2025-01-02 PROCEDURE — 36415 COLL VENOUS BLD VENIPUNCTURE: CPT

## 2025-01-02 PROCEDURE — 94761 N-INVAS EAR/PLS OXIMETRY MLT: CPT

## 2025-01-02 PROCEDURE — 99285 EMERGENCY DEPT VISIT HI MDM: CPT

## 2025-01-02 PROCEDURE — 87635 SARS-COV-2 COVID-19 AMP PRB: CPT

## 2025-01-02 PROCEDURE — 83880 ASSAY OF NATRIURETIC PEPTIDE: CPT

## 2025-01-02 PROCEDURE — 96372 THER/PROPH/DIAG INJ SC/IM: CPT

## 2025-01-02 PROCEDURE — 93971 EXTREMITY STUDY: CPT

## 2025-01-02 RX ORDER — HYDRALAZINE HYDROCHLORIDE 50 MG/1
50 TABLET, FILM COATED ORAL 2 TIMES DAILY
Status: DISCONTINUED | OUTPATIENT
Start: 2025-01-03 | End: 2025-01-04 | Stop reason: HOSPADM

## 2025-01-02 RX ORDER — SODIUM CHLORIDE 9 MG/ML
INJECTION, SOLUTION INTRAVENOUS PRN
Status: DISCONTINUED | OUTPATIENT
Start: 2025-01-02 | End: 2025-01-04 | Stop reason: HOSPADM

## 2025-01-02 RX ORDER — LEVOTHYROXINE SODIUM 150 UG/1
150 TABLET ORAL DAILY
Status: DISCONTINUED | OUTPATIENT
Start: 2025-01-03 | End: 2025-01-04 | Stop reason: HOSPADM

## 2025-01-02 RX ORDER — ALBUTEROL SULFATE 90 UG/1
1 INHALANT RESPIRATORY (INHALATION)
Status: DISCONTINUED | OUTPATIENT
Start: 2025-01-02 | End: 2025-01-04 | Stop reason: HOSPADM

## 2025-01-02 RX ORDER — ISOSORBIDE MONONITRATE 60 MG/1
60 TABLET, EXTENDED RELEASE ORAL DAILY
Status: DISCONTINUED | OUTPATIENT
Start: 2025-01-02 | End: 2025-01-04 | Stop reason: HOSPADM

## 2025-01-02 RX ORDER — SODIUM CHLORIDE 0.9 % (FLUSH) 0.9 %
5-40 SYRINGE (ML) INJECTION PRN
Status: DISCONTINUED | OUTPATIENT
Start: 2025-01-02 | End: 2025-01-04 | Stop reason: HOSPADM

## 2025-01-02 RX ORDER — ACETAMINOPHEN 325 MG/1
650 TABLET ORAL EVERY 6 HOURS PRN
Status: DISCONTINUED | OUTPATIENT
Start: 2025-01-02 | End: 2025-01-04 | Stop reason: HOSPADM

## 2025-01-02 RX ORDER — ASPIRIN 81 MG/1
81 TABLET, CHEWABLE ORAL DAILY
Status: DISCONTINUED | OUTPATIENT
Start: 2025-01-02 | End: 2025-01-04 | Stop reason: HOSPADM

## 2025-01-02 RX ORDER — MIRTAZAPINE 15 MG/1
15 TABLET, FILM COATED ORAL NIGHTLY
Status: DISCONTINUED | OUTPATIENT
Start: 2025-01-02 | End: 2025-01-04 | Stop reason: HOSPADM

## 2025-01-02 RX ORDER — HEPARIN SODIUM 5000 [USP'U]/ML
5000 INJECTION, SOLUTION INTRAVENOUS; SUBCUTANEOUS EVERY 8 HOURS SCHEDULED
Status: DISCONTINUED | OUTPATIENT
Start: 2025-01-02 | End: 2025-01-04 | Stop reason: HOSPADM

## 2025-01-02 RX ORDER — SODIUM CHLORIDE 0.9 % (FLUSH) 0.9 %
5-40 SYRINGE (ML) INJECTION EVERY 12 HOURS SCHEDULED
Status: DISCONTINUED | OUTPATIENT
Start: 2025-01-02 | End: 2025-01-04 | Stop reason: HOSPADM

## 2025-01-02 RX ORDER — SPIRONOLACTONE 50 MG/1
50 TABLET, FILM COATED ORAL 2 TIMES DAILY
Status: DISCONTINUED | OUTPATIENT
Start: 2025-01-02 | End: 2025-01-04 | Stop reason: HOSPADM

## 2025-01-02 RX ORDER — BUDESONIDE AND FORMOTEROL FUMARATE DIHYDRATE 160; 4.5 UG/1; UG/1
2 AEROSOL RESPIRATORY (INHALATION)
Status: DISCONTINUED | OUTPATIENT
Start: 2025-01-02 | End: 2025-01-04 | Stop reason: HOSPADM

## 2025-01-02 RX ORDER — ONDANSETRON 2 MG/ML
4 INJECTION INTRAMUSCULAR; INTRAVENOUS EVERY 6 HOURS PRN
Status: DISCONTINUED | OUTPATIENT
Start: 2025-01-02 | End: 2025-01-04 | Stop reason: HOSPADM

## 2025-01-02 RX ORDER — METOPROLOL SUCCINATE 25 MG/1
25 TABLET, EXTENDED RELEASE ORAL DAILY
Status: DISCONTINUED | OUTPATIENT
Start: 2025-01-03 | End: 2025-01-04 | Stop reason: HOSPADM

## 2025-01-02 RX ORDER — PANTOPRAZOLE SODIUM 40 MG/1
40 TABLET, DELAYED RELEASE ORAL
Status: DISCONTINUED | OUTPATIENT
Start: 2025-01-03 | End: 2025-01-04 | Stop reason: HOSPADM

## 2025-01-02 RX ORDER — AMLODIPINE BESYLATE 10 MG/1
10 TABLET ORAL DAILY
Status: DISCONTINUED | OUTPATIENT
Start: 2025-01-02 | End: 2025-01-04 | Stop reason: HOSPADM

## 2025-01-02 RX ORDER — ACETAMINOPHEN 650 MG/1
650 SUPPOSITORY RECTAL EVERY 6 HOURS PRN
Status: DISCONTINUED | OUTPATIENT
Start: 2025-01-02 | End: 2025-01-04 | Stop reason: HOSPADM

## 2025-01-02 RX ORDER — ONDANSETRON 4 MG/1
4 TABLET, ORALLY DISINTEGRATING ORAL EVERY 8 HOURS PRN
Status: DISCONTINUED | OUTPATIENT
Start: 2025-01-02 | End: 2025-01-04 | Stop reason: HOSPADM

## 2025-01-02 RX ORDER — INSULIN GLARGINE 100 [IU]/ML
10 INJECTION, SOLUTION SUBCUTANEOUS NIGHTLY
Status: DISCONTINUED | OUTPATIENT
Start: 2025-01-02 | End: 2025-01-04 | Stop reason: HOSPADM

## 2025-01-02 RX ORDER — CITALOPRAM HYDROBROMIDE 20 MG/1
20 TABLET ORAL DAILY
Status: DISCONTINUED | OUTPATIENT
Start: 2025-01-02 | End: 2025-01-04 | Stop reason: HOSPADM

## 2025-01-02 RX ORDER — POLYETHYLENE GLYCOL 3350 17 G/17G
17 POWDER, FOR SOLUTION ORAL DAILY PRN
Status: DISCONTINUED | OUTPATIENT
Start: 2025-01-02 | End: 2025-01-04 | Stop reason: HOSPADM

## 2025-01-02 RX ADMIN — SPIRONOLACTONE 50 MG: 50 TABLET ORAL at 21:10

## 2025-01-02 RX ADMIN — CITALOPRAM HYDROBROMIDE 20 MG: 20 TABLET ORAL at 21:10

## 2025-01-02 RX ADMIN — SODIUM CHLORIDE, PRESERVATIVE FREE 10 ML: 5 INJECTION INTRAVENOUS at 21:11

## 2025-01-02 RX ADMIN — MIRTAZAPINE 15 MG: 15 TABLET, FILM COATED ORAL at 21:10

## 2025-01-02 RX ADMIN — ALBUTEROL SULFATE 1 PUFF: 90 AEROSOL, METERED RESPIRATORY (INHALATION) at 21:04

## 2025-01-02 RX ADMIN — ISOSORBIDE MONONITRATE 60 MG: 60 TABLET, EXTENDED RELEASE ORAL at 21:12

## 2025-01-02 RX ADMIN — AMLODIPINE BESYLATE 10 MG: 10 TABLET ORAL at 21:12

## 2025-01-02 RX ADMIN — ASPIRIN 81 MG: 81 TABLET, CHEWABLE ORAL at 21:10

## 2025-01-02 RX ADMIN — BUDESONIDE AND FORMOTEROL FUMARATE DIHYDRATE 2 PUFF: 160; 4.5 AEROSOL RESPIRATORY (INHALATION) at 21:05

## 2025-01-02 RX ADMIN — HEPARIN SODIUM 5000 UNITS: 5000 INJECTION INTRAVENOUS; SUBCUTANEOUS at 21:10

## 2025-01-02 ASSESSMENT — ENCOUNTER SYMPTOMS
EYES NEGATIVE: 1
ALLERGIC/IMMUNOLOGIC NEGATIVE: 1
RESPIRATORY NEGATIVE: 1
GASTROINTESTINAL NEGATIVE: 1

## 2025-01-02 ASSESSMENT — PAIN - FUNCTIONAL ASSESSMENT: PAIN_FUNCTIONAL_ASSESSMENT: 0-10

## 2025-01-02 ASSESSMENT — PAIN SCALES - GENERAL: PAINLEVEL_OUTOF10: 0

## 2025-01-02 NOTE — ED NOTES
ED TO INPATIENT SBAR HANDOFF    Patient Name: Gwendolyn Grimm   :  1940  84 y.o.   Preferred Name  Gwendolyn   Family/Caregiver Present no   Restraints no   C-SSRS: Risk of Suicide: No Risk  Sitter no   Sepsis Risk Score        Situation  Chief Complaint   Patient presents with    Fatigue     Generalized weakness that began on hakeem     Brief Description of Patient's Condition: Patient presented to ED for fatigue and generalized weakness that began on H[place]em.   Mental Status: oriented  Arrived from: home    Imaging:   CT HEAD WO CONTRAST   Final Result   No acute intracranial findings.            Electronically signed by Andrei Munzo MD      Vascular duplex lower extremity venous left    (Results Pending)     Abnormal labs:   Abnormal Labs Reviewed   CBC WITH AUTO DIFFERENTIAL - Abnormal; Notable for the following components:       Result Value    WBC 13.5 (*)     RBC 3.54 (*)     Hemoglobin 10.8 (*)     Hematocrit 34.0 (*)     MCHC 31.8 (*)     Neutrophils % 75 (*)     Lymphocytes % 16 (*)     Monocytes % 7 (*)     Immature Granulocytes % 1 (*)     All other components within normal limits   COMPREHENSIVE METABOLIC PANEL - Abnormal; Notable for the following components:    CO2 20 (*)     Anion Gap 20 (*)     Glucose 148 (*)     BUN 64 (*)     Creatinine 7.4 (*)     Est, Glom Filt Rate 5 (*)     All other components within normal limits   TROPONIN - Abnormal; Notable for the following components:    Troponin, High Sensitivity 80 (*)     All other components within normal limits   BRAIN NATRIURETIC PEPTIDE - Abnormal; Notable for the following components:    NT Pro-BNP 5,942 (*)     All other components within normal limits   BLOOD GAS, VENOUS - Abnormal; Notable for the following components:    HCO3, Venous 21.3 (*)     Negative Base Excess, Lucius 3.9 (*)     Carboxyhemoglobin 0.4 (*)     Methemoglobin 0.4 (*)     All other components within normal limits   TROPONIN - Abnormal; Notable for the following  evidence of inducible myocardial ischemia    Hyperlipidemia     Hypertension     Irritable bowel syndrome     Kidney stones     Obesity     Osteoarthritis     Thyroid disease     Type II or unspecified type diabetes mellitus without mention of complication, not stated as uncontrolled     Venogram 06/21/2023    There is no DVT or any veinous blood clot in any of the above left leg veinous system       Assessment    Vitals: MEWS Score: 1  Level of Consciousness: Alert (0)   Vitals:    01/02/25 1602   BP: (!) 124/57   Pulse: 60   Resp: 16   Temp: 97.9 °F (36.6 °C)   TempSrc: Oral   SpO2: 98%   Weight: 81.6 kg (180 lb)   Height: 1.676 m (5' 6\")       PO Status: Regular    O2 Flow Rate:        Cardiac Rhythm: Normal Sinus     Last documented pain medication administered:     NIH Score: NIH       Active LDA's:      Pertinent or High Risk Medications/Drips: no   If Yes, please provide details:       Blood Product Administration: no  If Yes, please provide details:     Recommendation    Incomplete orders   Additional Comments:    If any further questions, please call Sending RN at [phone]    Electronically signed by: Electronically signed by Mandy Clark RN on 1/2/2025 at 6:33 PM

## 2025-01-02 NOTE — ED PROVIDER NOTES
[facility]      TRIAGE CHIEF COMPLAINT:   Fatigue (Generalized weakness that began on hakeem)      [place]:  Gwendolyn Grimm is a 84 y.o. female that presents with complaint of generalized malaise fatigue leg weakness bilaterally that began on [place].  Patient denies any fevers nausea vomiting chest pain shortness of breath abdominal pain bowel bladder symptoms.  Has had a slight headache.  But complains that her legs feel like Jell-O she has been feeling off since around Hakeem.  No travel sick contacts.  Did not fall no other questions or concerns.  She is on nightly peritoneal dialysis..    REVIEW OF SYSTEMS:  At least 10 systems reviewed and otherwise acutely negative except as in the [place].    Review of Systems   Constitutional:  Positive for activity change and fatigue.   HENT: Negative.     Eyes: Negative.    Respiratory: Negative.     Cardiovascular: Negative.    Gastrointestinal: Negative.    Endocrine: Negative.    Genitourinary: Negative.    Musculoskeletal: Negative.    Skin: Negative.    Allergic/Immunologic: Negative.    Neurological:  Positive for weakness.   Hematological: Negative.    Psychiatric/Behavioral: Negative.     All other systems reviewed and are negative.      Past Medical History:   Diagnosis Date    Abnormal nuclear stress test 04/08/2007 4/08-EF=67%,Mild->Mod.isch.LAD;7/07-EF=70%,Suggests poss.Mild Ant.wall ischemia.    Cardiac arrest (HCC) 2008    Diabetes mellitus ([facility])     H/O cardiac catheterization 04/2008    No signif.CAD.    H/O cardiovascular stress test 04/11/2008    mild to mod ischemia in the LAD region, abnormal study, EF 67%, patient developed mild chest pain and throat tightness    H/O cardiovascular stress test 05/03/2016    lexiscan-moderate ischemia apical,RF66%    H/O Doppler ultrasound 07/16/2007    CAROTID DOPLER- intimal thickening but no significant atherosclerotic plaque noted in the right or left internal carotid artery, doppler flow  Range    Influenza A by PCR Not Detected Not Detected    Influenza B by PCR Not Detected Not Detected   CBC with Auto Differential   Result Value Ref Range    WBC 13.5 (H) 4.0 - 10.5 k/uL    RBC 3.54 (L) 4.20 - 5.40 m/uL    Hemoglobin 10.8 (L) 12.5 - 16.0 g/dL    Hematocrit 34.0 (L) 37.0 - 47.0 %    MCV 96.0 78.0 - 100.0 fL    MCH 30.5 27.0 - 31.0 pg    MCHC 31.8 (L) 32.0 - 36.0 g/dL    RDW 12.2 11.7 - 14.9 %    Platelets 348 140 - 440 k/uL    MPV 8.8 7.5 - 11.1 fL    Neutrophils % 75 (H) 36 - 66 %    Lymphocytes % 16 (L) 24 - 44 %    Monocytes % 7 (H) 0 - 4 %    Eosinophils % 1 0 - 3 %    Basophils % 0 0 - 1 %    Immature Granulocytes % 1 (H) 0 %    Neutrophils Absolute 10.13 k/uL    Lymphocytes Absolute 2.14 k/uL    Monocytes Absolute 0.93 k/uL    Eosinophils Absolute 0.17 k/uL    Basophils Absolute 0.05 k/uL    Immature Granulocytes Absolute 0.09 k/uL   Comprehensive Metabolic Panel   Result Value Ref Range    Sodium 138 136 - 145 mmol/L    Potassium 4.1 3.5 - 5.1 mmol/L    Chloride 99 99 - 110 mmol/L    CO2 20 (L) 21 - 32 mmol/L    Anion Gap 20 (H) 9 - 17 mmol/L    Glucose 148 (H) 74 - 99 mg/dL    BUN 64 (H) 7 - 20 mg/dL    Creatinine 7.4 (H) 0.6 - 1.2 mg/dL    Est, Glom Filt Rate 5 (L) >60 mL/min/1.73m2    Calcium 8.7 8.3 - 10.6 mg/dL    Total Protein 6.8 6.4 - 8.2 g/dL    Albumin 3.8 3.4 - 5.0 g/dL    Albumin/Globulin Ratio 1.3 1.1 - 2.2    Total Bilirubin 0.3 0.0 - 1.0 mg/dL    Alkaline Phosphatase 84 40 - 129 U/L    ALT 12 10 - 40 U/L    AST 15 15 - 37 U/L   Troponin   Result Value Ref Range    Troponin, High Sensitivity 80 (HH) 0 - 14 ng/L   Brain Natriuretic Peptide   Result Value Ref Range    NT Pro-BNP 5,942 (H) 0 - 450 pg/mL   TSH   Result Value Ref Range    TSH 1.59 0.27 - 4.20 uIU/mL   Lactic Acid   Result Value Ref Range    Lactic Acid 1.3 0.4 - 2.0 mmol/L   CK   Result Value Ref Range    Total CK 56 26 - 192 U/L   Blood Gas, Venous   Result Value Ref Range    pH, Lucius 7.355 7.320 - 7.430    pCO2, Lucius

## 2025-01-02 NOTE — CARE COORDINATION
CM received consult on patient. CM in to room and introduced self to patient and explained job role. At bedside was patients' daughter, Gissel SAINI @ [phone]. Patient stated that she lives at home with her son. Patient stated that she has been able to walk with the use of a walker until about 2 days ago and now her legs grew weak all of a sudden and she is unable to walk at all.. even with the walker. CM gained permission to speak to patients' daughter while in room.     Patient stated that she has no interest in going to a SNF and does not want C. Patient did show some interest towards ARU but is unsure and wants to discuss it. Patient gets dialysis at home each night and wanted to make sure that this was known so she is able to get her dialysis done.    Patient would like to find out why her legs quit working the last two days and hopefully get them to where she can walk again. Patient and daughter will discuss ARU. Patient reports that she has ARU in past.     CM updated Dr. Sal with the above.

## 2025-01-02 NOTE — H&P
weak COMPARISON: None TECHNIQUE: CT of the head was performed without intravenous contrast medium from the skull vertex to the skull base and reconstructed into axial, coronal, and sagittal images utilizing brain and bone filters. One or more of the following dose-optimizing techniques was utilized for this exam: automated exposure control, adjustment of the mA and/or kV according to patient size, and/or use of iterative reconstruction technique. FINDINGS: No intracranial hemorrhage, hydrocephalus, midline shift, or intracranial herniation, or extra-axial fluid collection is identified. No definite evidence of an acute infarct. Nonspecific scattered white matter hypodensities are seen, which are presumed to reflect chronic small vessel white matter ischemic disease. The visualized intraorbital contents are unremarkable. The visualized paranasal sinuses and mastoid air cells are well aerated.     No acute intracranial findings. Electronically signed by Andrei Munoz MD        Electronically signed by Nilay Pringle MD on 1/2/2025 at 6:05 PM

## 2025-01-03 LAB
ALBUMIN SERPL-MCNC: 3.3 G/DL (ref 3.4–5)
ALBUMIN/GLOB SERPL: 1 {RATIO} (ref 1.1–2.2)
ALP SERPL-CCNC: 75 U/L (ref 40–129)
ALT SERPL-CCNC: 7 U/L (ref 10–40)
ANION GAP SERPL CALCULATED.3IONS-SCNC: 17 MMOL/L (ref 9–17)
AST SERPL-CCNC: 12 U/L (ref 15–37)
BASOPHILS # BLD: 0.04 K/UL
BASOPHILS NFR BLD: 0 % (ref 0–1)
BILIRUB SERPL-MCNC: 0.4 MG/DL (ref 0–1)
BUN SERPL-MCNC: 68 MG/DL (ref 7–20)
CALCIUM SERPL-MCNC: 8.7 MG/DL (ref 8.3–10.6)
CHLORIDE SERPL-SCNC: 101 MMOL/L (ref 99–110)
CO2 SERPL-SCNC: 21 MMOL/L (ref 21–32)
CREAT SERPL-MCNC: 7.6 MG/DL (ref 0.6–1.2)
EKG ATRIAL RATE: 59 BPM
EKG DIAGNOSIS: NORMAL
EKG P AXIS: 71 DEGREES
EKG P-R INTERVAL: 260 MS
EKG Q-T INTERVAL: 454 MS
EKG QRS DURATION: 124 MS
EKG QTC CALCULATION (BAZETT): 449 MS
EKG R AXIS: -68 DEGREES
EKG T AXIS: 51 DEGREES
EKG VENTRICULAR RATE: 59 BPM
EOSINOPHIL # BLD: 0.19 K/UL
EOSINOPHILS RELATIVE PERCENT: 2 % (ref 0–3)
ERYTHROCYTE [DISTWIDTH] IN BLOOD BY AUTOMATED COUNT: 12.1 % (ref 11.7–14.9)
GFR, ESTIMATED: 5 ML/MIN/1.73M2
GLUCOSE BLD-MCNC: 244 MG/DL (ref 74–99)
GLUCOSE SERPL-MCNC: 111 MG/DL (ref 74–99)
HCT VFR BLD AUTO: 29.7 % (ref 37–47)
HGB BLD-MCNC: 9.4 G/DL (ref 12.5–16)
IMM GRANULOCYTES # BLD AUTO: 0.1 K/UL
IMM GRANULOCYTES NFR BLD: 1 %
LYMPHOCYTES NFR BLD: 2.83 K/UL
LYMPHOCYTES RELATIVE PERCENT: 23 % (ref 24–44)
MCH RBC QN AUTO: 30.7 PG (ref 27–31)
MCHC RBC AUTO-ENTMCNC: 31.6 G/DL (ref 32–36)
MCV RBC AUTO: 97.1 FL (ref 78–100)
MONOCYTES NFR BLD: 0.98 K/UL
MONOCYTES NFR BLD: 8 % (ref 0–4)
NEUTROPHILS NFR BLD: 67 % (ref 36–66)
NEUTS SEG NFR BLD: 8.32 K/UL
PLATELET # BLD AUTO: 290 K/UL (ref 140–440)
PMV BLD AUTO: 9 FL (ref 7.5–11.1)
POTASSIUM SERPL-SCNC: 4.3 MMOL/L (ref 3.5–5.1)
PROT SERPL-MCNC: 6.6 G/DL (ref 6.4–8.2)
RBC # BLD AUTO: 3.06 M/UL (ref 4.2–5.4)
SODIUM SERPL-SCNC: 139 MMOL/L (ref 136–145)
WBC OTHER # BLD: 12.5 K/UL (ref 4–10.5)

## 2025-01-03 PROCEDURE — 94640 AIRWAY INHALATION TREATMENT: CPT

## 2025-01-03 PROCEDURE — 2500000003 HC RX 250 WO HCPCS: Performed by: STUDENT IN AN ORGANIZED HEALTH CARE EDUCATION/TRAINING PROGRAM

## 2025-01-03 PROCEDURE — 96372 THER/PROPH/DIAG INJ SC/IM: CPT

## 2025-01-03 PROCEDURE — 6360000002 HC RX W HCPCS: Performed by: STUDENT IN AN ORGANIZED HEALTH CARE EDUCATION/TRAINING PROGRAM

## 2025-01-03 PROCEDURE — 90945 DIALYSIS ONE EVALUATION: CPT

## 2025-01-03 PROCEDURE — 85025 COMPLETE CBC W/AUTO DIFF WBC: CPT

## 2025-01-03 PROCEDURE — 6370000000 HC RX 637 (ALT 250 FOR IP): Performed by: STUDENT IN AN ORGANIZED HEALTH CARE EDUCATION/TRAINING PROGRAM

## 2025-01-03 PROCEDURE — G0378 HOSPITAL OBSERVATION PER HR: HCPCS

## 2025-01-03 PROCEDURE — 36415 COLL VENOUS BLD VENIPUNCTURE: CPT

## 2025-01-03 PROCEDURE — 94761 N-INVAS EAR/PLS OXIMETRY MLT: CPT

## 2025-01-03 PROCEDURE — 80053 COMPREHEN METABOLIC PANEL: CPT

## 2025-01-03 PROCEDURE — 97166 OT EVAL MOD COMPLEX 45 MIN: CPT

## 2025-01-03 PROCEDURE — 93010 ELECTROCARDIOGRAM REPORT: CPT | Performed by: INTERNAL MEDICINE

## 2025-01-03 PROCEDURE — 97162 PT EVAL MOD COMPLEX 30 MIN: CPT

## 2025-01-03 PROCEDURE — 97530 THERAPEUTIC ACTIVITIES: CPT

## 2025-01-03 PROCEDURE — 82962 GLUCOSE BLOOD TEST: CPT

## 2025-01-03 RX ORDER — SODIUM CHLORIDE, SODIUM LACTATE, CALCIUM CHLORIDE, MAGNESIUM CHLORIDE AND DEXTROSE 4.25; 538; 448; 18.3; 5.08 G/100ML; MG/100ML; MG/100ML; MG/100ML; MG/100ML
2000 INJECTION, SOLUTION INTRAPERITONEAL CONTINUOUS
Status: DISCONTINUED | OUTPATIENT
Start: 2025-01-03 | End: 2025-01-04 | Stop reason: HOSPADM

## 2025-01-03 RX ADMIN — HEPARIN SODIUM 5000 UNITS: 5000 INJECTION INTRAVENOUS; SUBCUTANEOUS at 15:03

## 2025-01-03 RX ADMIN — CITALOPRAM HYDROBROMIDE 20 MG: 20 TABLET ORAL at 08:29

## 2025-01-03 RX ADMIN — MIRTAZAPINE 15 MG: 15 TABLET, FILM COATED ORAL at 20:39

## 2025-01-03 RX ADMIN — BUDESONIDE AND FORMOTEROL FUMARATE DIHYDRATE 2 PUFF: 160; 4.5 AEROSOL RESPIRATORY (INHALATION) at 10:08

## 2025-01-03 RX ADMIN — LEVOTHYROXINE SODIUM 150 MCG: 0.15 TABLET ORAL at 06:23

## 2025-01-03 RX ADMIN — ALBUTEROL SULFATE 1 PUFF: 90 AEROSOL, METERED RESPIRATORY (INHALATION) at 11:47

## 2025-01-03 RX ADMIN — SODIUM CHLORIDE, PRESERVATIVE FREE 10 ML: 5 INJECTION INTRAVENOUS at 20:39

## 2025-01-03 RX ADMIN — SODIUM CHLORIDE, PRESERVATIVE FREE 10 ML: 5 INJECTION INTRAVENOUS at 08:29

## 2025-01-03 RX ADMIN — SPIRONOLACTONE 50 MG: 50 TABLET ORAL at 08:29

## 2025-01-03 RX ADMIN — ALBUTEROL SULFATE 1 PUFF: 90 AEROSOL, METERED RESPIRATORY (INHALATION) at 17:56

## 2025-01-03 RX ADMIN — INSULIN GLARGINE 10 UNITS: 100 INJECTION, SOLUTION SUBCUTANEOUS at 20:39

## 2025-01-03 RX ADMIN — PANTOPRAZOLE SODIUM 40 MG: 40 TABLET, DELAYED RELEASE ORAL at 06:23

## 2025-01-03 RX ADMIN — ALBUTEROL SULFATE 1 PUFF: 90 AEROSOL, METERED RESPIRATORY (INHALATION) at 10:07

## 2025-01-03 RX ADMIN — SPIRONOLACTONE 50 MG: 50 TABLET ORAL at 20:39

## 2025-01-03 RX ADMIN — HEPARIN SODIUM 5000 UNITS: 5000 INJECTION INTRAVENOUS; SUBCUTANEOUS at 20:40

## 2025-01-03 RX ADMIN — ASPIRIN 81 MG: 81 TABLET, CHEWABLE ORAL at 08:29

## 2025-01-03 RX ADMIN — HEPARIN SODIUM 5000 UNITS: 5000 INJECTION INTRAVENOUS; SUBCUTANEOUS at 06:23

## 2025-01-03 NOTE — PROGRESS NOTES
Pt connected to cycler per order. Unable to pull fluid sample, Dr. Izaguirre notified. Will obtain one tomorrow either from catheter or drain bag. Dressing changed. No redness or drainage noted.

## 2025-01-03 NOTE — PROGRESS NOTES
Occupational Therapy  [facility] ACUTE CARE OCCUPATIONAL THERAPY EVALUATION  Gwendolyn Grimm, 1940, 3004/3004-A, 1/3/2025    Discharge Recommendation: Encourage home with assist/supervision as needed, with home health occupational therapy services to address minimal/mild post-acute rehabilitation needs.     History  [place]:  The primary encounter diagnosis was Generalized weakness. Diagnoses of Weakness of both lower extremities, Troponin level elevated, ESRD (end stage renal disease) (HCC), Elevated brain natriuretic peptide (BNP) level, and Swelling of left lower extremity were also pertinent to this visit.  Patient  has a past medical history of Abnormal nuclear stress test, Cardiac arrest (HCC), Diabetes mellitus (HCC), H/O cardiac catheterization, H/O cardiovascular stress test, H/O cardiovascular stress test, H/O Doppler ultrasound, H/O echocardiogram, H/O echocardiogram, H/O echocardiogram, H/O left heart cath, H/O left heart cath, Hernia, Hx of cardiovascular stress test, Hyperlipidemia, Hypertension, Irritable bowel syndrome, Kidney stones, Obesity, Osteoarthritis, Thyroid disease, Type II or unspecified type diabetes mellitus without mention of complication, not stated as uncontrolled, and Venogram.  Patient  has a past surgical history that includes Lithotripsy; Rotator cuff repair; Gastric Band (08/2008); Cardiac catheterization (04/18/08); Hysterectomy; Lap Band; Rotator cuff repair; hemicolectomy (Right, 5/3/2023); hernia repair (N/A, 5/3/2023); and IR TUNNELED CVC PLACE WO SQ PORT/PUMP > 5 YEARS (1/9/2024).    Subjective:  Patient comments: \"Walking is definitely easier than it was yesterday!\".    Pain:  Denied.    Communication with other providers: Nurse okayed session.  Restrictions: General Precautions, Fall Risk, Contact (VRE)    Home Setup/Prior level of function   Social/Functional History  Lives With: Son, Other (comment) (son's girlfriend; son works from home)  Type of Home:  services to address minimal/mild post-acute rehabilitation needs.     Complexity: Moderate   Prognosis: Good, no significant barriers to participation at this time.   Occupational Therapy Plan  Times Per Week: 2x+  Times Per Day: Once a day  Current Treatment Recommendations: Strengthening, Balance training, ROM, Functional mobility training, Endurance training, Neuromuscular re-education, Cognitive reorientation, Pain management, Safety education & training, Patient/Caregiver education & training, Equipment evaluation, education, & procurement, Positioning, Self-Care / ADL, Home management training, Coordination training, Cognitive/Perceptual training, Co-Treatment       Goals: To be achieved prior to discharge  Goal 1: Pt will perform UE ADLs independently   Goal 2: Pt will perform LE ADLs independently   Goal 3: Pt will perform toileting independently  Goal 4: Pt will perform HH distance functional mobility Diane in preparation for return to home   Goal 5: Pt will perform functional transfers to/from bed, chair, and toilet Diane  Goal 6: Pt will perform therex/theract in order to increase functional activity tolerance  Goal 7: Pt will perform all aspects of session w/ G safety awareness to demonstrate capability to safely discharge to a lower level of supervision/assistance     Treatment plan:  Pt will perform therex/theract in order to increase functional activity tolerance in preparation for ADL participation.     Recommendations for NURSING activity: Up to chair for all 3 meals and up to bathroom for all toileting needs    Time:   Time in: 1051  Time out: 1107  Timed treatment minutes: 8  Total time: 16 minutes     Electronically signed by:    KIRILL Lagos/KRISTEN [id number]  1/3/2025, 1:58 PM

## 2025-01-03 NOTE — CONSULTS
[facility] ACUTE CARE PHYSICAL THERAPY EVALUATION  Gwendolyn Grimm, 1940, 3004/3004-A, 1/3/2025    History  [place]:  The primary encounter diagnosis was Generalized weakness. Diagnoses of Weakness of both lower extremities, Troponin level elevated, ESRD (end stage renal disease) (HCC), Elevated brain natriuretic peptide (BNP) level, and Swelling of left lower extremity were also pertinent to this visit.  Patient  has a past medical history of Abnormal nuclear stress test, Cardiac arrest (HCC), Diabetes mellitus (HCC), H/O cardiac catheterization, H/O cardiovascular stress test, H/O cardiovascular stress test, H/O Doppler ultrasound, H/O echocardiogram, H/O echocardiogram, H/O echocardiogram, H/O left heart cath, H/O left heart cath, Hernia, Hx of cardiovascular stress test, Hyperlipidemia, Hypertension, Irritable bowel syndrome, Kidney stones, Obesity, Osteoarthritis, Thyroid disease, Type II or unspecified type diabetes mellitus without mention of complication, not stated as uncontrolled, and Venogram.  Patient  has a past surgical history that includes Lithotripsy; Rotator cuff repair; Gastric Band (08/2008); Cardiac catheterization (04/18/08); Hysterectomy; Lap Band; Rotator cuff repair; hemicolectomy (Right, 5/3/2023); hernia repair (N/A, 5/3/2023); and IR TUNNELED CVC PLACE WO SQ PORT/PUMP > 5 YEARS (1/9/2024).    Discharge Recommendation:  PT    Equipment: none, pt owns Rollator    Subjective:    Patient states:  \"I'm feeling better.\"      Pain:  denies pain.      Communication with other providers:  Handoff to RN,    Restrictions: general precautions, fall risk, contact isolation    Home Setup/Prior level of function  Lives With: Son, Other (comment) (son's girlfriend; son works from home)  Type of Home: House  Home Layout: One level  Home Access: Stairs to enter with rails  Entrance Stairs - Number of Steps: 3  Bathroom Shower/Tub: Tub/Shower unit  Bathroom Toilet: Standard  Bathroom Equipment:

## 2025-01-03 NOTE — PROGRESS NOTES
mirtazapine  15 mg Oral Nightly    pantoprazole  40 mg Oral QAM AC    spironolactone  50 mg Oral BID    sodium chloride flush  5-40 mL IntraVENous 2 times per day    heparin (porcine)  5,000 Units SubCUTAneous 3 times per day      Infusions:    sodium chloride       PRN Meds: sodium chloride flush, 5-40 mL, PRN  sodium chloride, , PRN  ondansetron, 4 mg, Q8H PRN   Or  ondansetron, 4 mg, Q6H PRN  polyethylene glycol, 17 g, Daily PRN  acetaminophen, 650 mg, Q6H PRN   Or  acetaminophen, 650 mg, Q6H PRN        Labs and Imaging   Vascular duplex lower extremity venous left    Result Date: 1/2/2025  Left lower extremity venous ultrasound INDICATION:  Pain and swelling, COMPARISON: Bilateral lower extremity venous duplex ultrasound 6/17/2023 Doppler ultrasound of the left lower extremity was performed. FINDINGS:  There is normal flow in the great saphenous, common femoral, femoral, and popliteal veins. Normal compression and augmentation is demonstrated. The proximal calf veins are also patent.     No evidence of deep venous thrombosis in the left lower extremity Electronically signed by Martín Harley MD    CT HEAD WO CONTRAST    Result Date: 1/2/2025  EXAM: CT HEAD WITHOUT IV CONTRAST CLINICAL HISTORY: HEAD TRAUMA, CLOSED, MILD, GCS >= 13, NO RISK FACTORS, NEURO EXAM NORMAL; headache/leg weak COMPARISON: None TECHNIQUE: CT of the head was performed without intravenous contrast medium from the skull vertex to the skull base and reconstructed into axial, coronal, and sagittal images utilizing brain and bone filters. One or more of the following dose-optimizing techniques was utilized for this exam: automated exposure control, adjustment of the mA and/or kV according to patient size, and/or use of iterative reconstruction technique. FINDINGS: No intracranial hemorrhage, hydrocephalus, midline shift, or intracranial herniation, or extra-axial fluid collection is identified. No definite evidence of an acute infarct.  Nonspecific scattered white matter hypodensities are seen, which are presumed to reflect chronic small vessel white matter ischemic disease. The visualized intraorbital contents are unremarkable. The visualized paranasal sinuses and mastoid air cells are well aerated.     No acute intracranial findings. Electronically signed by Andrei Munoz MD      CBC:   Recent Labs     01/02/25  1620 01/03/25  0231   WBC 13.5* 12.5*   HGB 10.8* 9.4*    290     BMP:    Recent Labs     01/02/25  1620 01/03/25  0231    139   K 4.1 4.3   CL 99 101   CO2 20* 21   BUN 64* 68*   CREATININE 7.4* 7.6*   GLUCOSE 148* 111*     Hepatic:   Recent Labs     01/02/25  1620 01/03/25  0231   AST 15 12*   ALT 12 7*   BILITOT 0.3 0.4   ALKPHOS 84 75     Lipids:   Lab Results   Component Value Date/Time    CHOL 233 06/29/2021 11:30 AM    HDL 56 06/29/2021 11:30 AM    TRIG 171 05/11/2023 04:55 AM     Hemoglobin A1C:   Lab Results   Component Value Date/Time    LABA1C 5.8 06/17/2023 03:24 PM     TSH:   Lab Results   Component Value Date/Time    TSH 1.59 01/02/2025 04:20 PM     Troponin:   Lab Results   Component Value Date/Time    TROPONINT 0.029 06/19/2023 06:20 AM    TROPONINT 0.030 06/18/2023 10:58 PM    TROPONINT 0.034 06/18/2023 08:12 PM     Lactic Acid:   Recent Labs     01/02/25  1620   LACTA 1.3     BNP:   Recent Labs     01/02/25  1620   PROBNP 5,942*     UA:  Lab Results   Component Value Date/Time    NITRU NEGATIVE 12/29/2023 12:15 PM    COLORU YELLOW 12/29/2023 12:15 PM    PHUR 6.0 12/29/2023 12:15 PM    PHUR 5.5 12/29/2021 11:51 AM    WBCUA 747 12/29/2023 12:15 PM    RBCUA 0 12/29/2023 12:15 PM    MUCUS RARE 12/29/2023 12:15 PM    TRICHOMONAS NONE SEEN 12/29/2023 12:15 PM    YEAST OCCASIONAL 11/21/2023 12:48 PM    BACTERIA NEGATIVE 12/29/2023 12:15 PM    CLARITYU CLEAR 12/29/2023 12:15 PM    SPECGRAV 1.025 12/29/2021 11:51 AM    LEUKOCYTESUR LARGE NUMBER OR AMOUNT OBSERVED 12/29/2023 12:15 PM    UROBILINOGEN 0.2 12/29/2023

## 2025-01-03 NOTE — CARE COORDINATION
Received potential referral for ARU.  Will review patients clinicals and await  PT/OT notes.  Thank you for the referral.

## 2025-01-03 NOTE — CONSULTS
succinate (TOPROL XL) extended release tablet 25 mg, Daily  mirtazapine (REMERON) tablet 15 mg, Nightly  pantoprazole (PROTONIX) tablet 40 mg, QAM AC  spironolactone (ALDACTONE) tablet 50 mg, BID  sodium chloride flush 0.9 % injection 5-40 mL, 2 times per day  sodium chloride flush 0.9 % injection 5-40 mL, PRN  0.9 % sodium chloride infusion, PRN  heparin (porcine) injection 5,000 Units, 3 times per day  ondansetron (ZOFRAN-ODT) disintegrating tablet 4 mg, Q8H PRN   Or  ondansetron (ZOFRAN) injection 4 mg, Q6H PRN  polyethylene glycol (GLYCOLAX) packet 17 g, Daily PRN  acetaminophen (TYLENOL) tablet 650 mg, Q6H PRN   Or  acetaminophen (TYLENOL) suppository 650 mg, Q6H PRN        Allergies   Anesthesia s-i-40 [propofol], Scopolamine, Amoxicillin, Bactrim [sulfamethoxazole-trimethoprim], and Penicillins    Social History     Social History       Tobacco History       Smoking Status  Former Smoking Start Date  8/9/1991 Quit Date  8/9/1994 Average Packs/Day  0.5 packs/day for 3.0 years (1.5 ttl pk-yrs) Smoking Tobacco Type  Cigarettes from 8/9/1991 to 8/9/1994   Pack Year History     Packs/Day From To Years    0 8/9/1994  30.4    0.5 8/9/1991 8/9/1994 3.0      Smokeless Tobacco Use  Never              Alcohol History       Alcohol Use Status  No              Drug Use       Drug Use Status  No              Sexual Activity       Sexually Active  Not Currently Partners  Male Comment                      Family History     Family History   Problem Relation Age of Onset    Cancer Sister     COPD Brother     Heart Disease Brother     Cancer Mother         melonoma    Migraines Mother     Cancer Father         carcinoma    High Blood Pressure Father     Heart Disease Father        Review of Systems   Negative except for  weak tired fatigue peripheral edema.    Physical Exam   BP (!) 116/48   Pulse (!) 49   Temp 98.1 °F (36.7 °C) (Oral)   Resp 18   Ht 1.676 m (5' 6\")   Wt 81.6 kg (180 lb)   SpO2 96%   BMI 29.05  negative 12/29/2021 11:51 AM    KETUA NEGATIVE 12/29/2023 12:15 PM     HgBA1c:    Lab Results   Component Value Date/Time    LABA1C 5.8 06/17/2023 03:24 PM     Microalbumen/Creatinine ratio:  No components found for: \"RUCREAT\"  TSH:    Lab Results   Component Value Date/Time    TSH 1.59 01/02/2025 04:20 PM     IRON:    Lab Results   Component Value Date/Time    IRON 50 05/12/2023 05:40 AM     Iron Saturation:  No components found for: \"PERCENTFE\"  TIBC:    Lab Results   Component Value Date/Time    TIBC 227 05/12/2023 05:40 AM     FERRITIN:    Lab Results   Component Value Date/Time    FERRITIN 344 05/12/2023 05:40 AM         Imaging/Diagnostics   Vascular duplex lower extremity venous left    Result Date: 1/2/2025  No evidence of deep venous thrombosis in the left lower extremity Electronically signed by Martín Harley MD    CT HEAD WO CONTRAST    Result Date: 1/2/2025  No acute intracranial findings. Electronically signed by Andrei Munoz MD            Electronically signed by CHI ROY MD on 1/3/25 at 12:10 PM EST    Comment: Please note this report has been produced using speech recognition software and may contain errors related to that system including errors in grammar, punctuation, and spelling, as well as words and phrases that may be inappropriate. If there are any questions or concerns please feel free to contact the dictating provider for clarification.     Office  [address] [id number]  Phone [phone]  Fax [phone]

## 2025-01-03 NOTE — PROGRESS NOTES
4 Eyes Skin Assessment     NAME:  Gwendolyn Grimm  YOB: 1940  MEDICAL RECORD NUMBER:  [id number]    The patient is being assessed for  Admission    I agree that at least one RN has performed a thorough Head to Toe Skin Assessment on the patient. ALL assessment sites listed below have been assessed.      Areas assessed by both nurses:    Head, Face, Ears, Shoulders, Back, Chest, Arms, Elbows, Hands, Sacrum. Buttock, Coccyx, Ischium, Legs. Feet and Heels, and Under Medical Devices         Does the Patient have a Wound? Yes       Efrain Prevention initiated by RN: No  Wound Care Orders initiated by RN: No    Pressure Injury (Stage 3,4, Unstageable, DTI, NWPT, and Complex wounds) if present, place Wound referral order by RN under : No    New Ostomies, if present place, Ostomy referral order under : No     Nurse 1 eSignature: Electronically signed by ADAM PAPPAS RN on 1/2/25 at 8:10 PM EST    **SHARE this note so that the co-signing nurse can place an eSignature**    Nurse 2 eSignature: Electronically signed by Deanne Lloyd RN on 1/2/25 at 9:19 PM EST

## 2025-01-03 NOTE — WOUND CARE
Assessment:     Patient Active Problem List   Diagnosis    Hyperlipidemia    Hyperuricemia    Vitamin D deficiency    Complications of bariatric procedures    Generalized weakness    SOB (shortness of breath) on exertion    Obesity    Localized edema    SOB (shortness of breath)    Mild persistent asthma without complication    Pulmonary hypertension (HCC)    Abnormal nuclear cardiac imaging test    Hypothyroidism    Essential hypertension    Type 2 diabetes mellitus with complication, with long-term current use of insulin (HCC)    Erythrasma    Anemia    B12 deficiency    Chronic kidney disease    Chronic obstructive pulmonary disease, unspecified COPD type (HCC)    Obesity, Class III, BMI 40-49.9 (morbid obesity)    Uncontrolled type 2 diabetes mellitus with hyperglycemia (HCC)    Leg swelling    Chronic pain of left knee    Chronic kidney disease, stage IV (severe) (HCC)    Chronic renal disease, stage III (HCC) [499060]    Renal stone    Major depressive disorder, recurrent, mild    Major depressive disorder, recurrent, moderate    Major depressive disorder, recurrent, unspecified    Cystic kidney disease    Valvular heart disease    Acute cystitis without hematuria    Cecum mass    Mass of cecum    Moderate malnutrition (HCC)    Critical illness myopathy    Gait disturbance    Hypoxic encephalopathy (HCC)    Acute respiratory failure with hypoxia    Primary adenocarcinoma of ascending colon (HCC)    Acute blood loss anemia    Cardiac arrest due to other underlying condition    SVT (supraventricular tachycardia) (HCC)    Acute pulmonary embolism, unspecified pulmonary embolism type, unspecified whether acute cor pulmonale present (HCC)    Pneumonia of both lower lobes due to infectious organism    Chronic heart failure with preserved ejection fraction (HCC)    Acute renal failure with tubular necrosis (HCC)    HPTH (hyperparathyroidism) (HCC)    ESRD on dialysis (HCC)       Measurements:  Wound 06/17/23  Sacrum Mid small red open area (Active)   Number of days: 565       Wound 01/03/25 Coccyx coccygeal fold (Active)   Wound Image   01/03/25 0845   Wound Etiology Pressure Stage 3 01/03/25 0845   Dressing Status New dressing applied 01/03/25 0845   Wound Cleansed Cleansed with saline 01/03/25 0845   Dressing/Treatment Alginate with Ag;Silicone border 01/03/25 0845   Wound Length (cm) 0.6 cm 01/03/25 0845   Wound Width (cm) 0.6 cm 01/03/25 0845   Wound Depth (cm) 0.3 cm 01/03/25 0845   Wound Surface Area (cm^2) 0.36 cm^2 01/03/25 0845   Wound Volume (cm^3) 0.108 cm^3 01/03/25 0845   Distance Tunneling (cm) 0 cm 01/03/25 0845   Tunneling Position ___ O'Clock 0 01/03/25 0845   Undermining Starts ___ O'Clock 0 01/03/25 0845   Undermining Ends___ O'Clock 0 01/03/25 0845   Undermining Maxium Distance (cm) 0 01/03/25 0845   Wound Assessment Pink/red;Slough 01/03/25 0845   Drainage Amount Scant (moist but unmeasurable) 01/03/25 0845   Drainage Description Thin 01/03/25 0845   Odor None 01/03/25 0845   Vanesa-wound Assessment Maceration 01/03/25 0845   Margins Defined edges 01/03/25 0845   Wound Thickness Description not for Pressure Injury Full thickness 01/03/25 0845   Number of days: 0       Response to treatment:  Well tolerated by patient.     Pain Assessment:  Severity:  none   Quality of pain: none  Wound Pain Timing/Severity: none  Premedicated: none    Plan:     Plan of Care: Wound 01/03/25 Coccyx coccygeal fold-Dressing/Treatment: Alginate with Ag, Silicone border    Alert and agreeable to wound assessment. Small area of MASD  and possible pressure stage 3 noted to coccygeal fold. Cleaned with NS and covered with opticell ag and silicone border. Turned to right side and positioned with pillows. Heels intact.     Specialty Bed Required : no connectors for atmos air, will order skin guard mattress   [] Low Air Loss   [] Pressure Redistribution  [] Fluid Immersion  [] Bariatric  [] Total Pressure Relief  [] Other:

## 2025-01-03 NOTE — PROGRESS NOTES
Patient seen and examined full consult to follow patient with peritoneal dialysis presents with weakness generalized edema and no actual DVT.  Goal is to get her stronger make sure no other acute pathology or infection and resume back on PD dialysis tonight full consult to follow

## 2025-01-04 VITALS
RESPIRATION RATE: 24 BRPM | WEIGHT: 180 LBS | SYSTOLIC BLOOD PRESSURE: 118 MMHG | HEIGHT: 66 IN | DIASTOLIC BLOOD PRESSURE: 88 MMHG | BODY MASS INDEX: 28.93 KG/M2 | HEART RATE: 60 BPM | OXYGEN SATURATION: 96 % | TEMPERATURE: 98.4 F

## 2025-01-04 LAB
APPEARANCE FLD: CLEAR
BODY FLD TYPE: NORMAL
CLOT CHECK: NORMAL
COLOR FLD: COLORLESS
GLUCOSE BLD-MCNC: 117 MG/DL (ref 74–99)
GLUCOSE BLD-MCNC: 150 MG/DL (ref 74–99)
MONOCYTES NFR FLD: 56 %
NEUTROPHILS NFR FLD: 44 %
RBC # FLD: <2000 CELLS/UL
WBC # FLD: 64 CELLS/UL

## 2025-01-04 PROCEDURE — 87075 CULTR BACTERIA EXCEPT BLOOD: CPT

## 2025-01-04 PROCEDURE — 87070 CULTURE OTHR SPECIMN AEROBIC: CPT

## 2025-01-04 PROCEDURE — 96372 THER/PROPH/DIAG INJ SC/IM: CPT

## 2025-01-04 PROCEDURE — 94761 N-INVAS EAR/PLS OXIMETRY MLT: CPT

## 2025-01-04 PROCEDURE — 6370000000 HC RX 637 (ALT 250 FOR IP): Performed by: STUDENT IN AN ORGANIZED HEALTH CARE EDUCATION/TRAINING PROGRAM

## 2025-01-04 PROCEDURE — G0378 HOSPITAL OBSERVATION PER HR: HCPCS

## 2025-01-04 PROCEDURE — 6360000002 HC RX W HCPCS: Performed by: STUDENT IN AN ORGANIZED HEALTH CARE EDUCATION/TRAINING PROGRAM

## 2025-01-04 PROCEDURE — 2500000003 HC RX 250 WO HCPCS: Performed by: STUDENT IN AN ORGANIZED HEALTH CARE EDUCATION/TRAINING PROGRAM

## 2025-01-04 PROCEDURE — 82962 GLUCOSE BLOOD TEST: CPT

## 2025-01-04 PROCEDURE — 87205 SMEAR GRAM STAIN: CPT

## 2025-01-04 PROCEDURE — 94640 AIRWAY INHALATION TREATMENT: CPT

## 2025-01-04 PROCEDURE — 89051 BODY FLUID CELL COUNT: CPT

## 2025-01-04 RX ADMIN — PANTOPRAZOLE SODIUM 40 MG: 40 TABLET, DELAYED RELEASE ORAL at 05:35

## 2025-01-04 RX ADMIN — AMLODIPINE BESYLATE 10 MG: 10 TABLET ORAL at 08:40

## 2025-01-04 RX ADMIN — CITALOPRAM HYDROBROMIDE 20 MG: 20 TABLET ORAL at 08:40

## 2025-01-04 RX ADMIN — SPIRONOLACTONE 50 MG: 50 TABLET ORAL at 08:40

## 2025-01-04 RX ADMIN — ALBUTEROL SULFATE 1 PUFF: 90 AEROSOL, METERED RESPIRATORY (INHALATION) at 11:49

## 2025-01-04 RX ADMIN — METOPROLOL SUCCINATE 25 MG: 25 TABLET, FILM COATED, EXTENDED RELEASE ORAL at 08:40

## 2025-01-04 RX ADMIN — SODIUM CHLORIDE, PRESERVATIVE FREE 10 ML: 5 INJECTION INTRAVENOUS at 08:41

## 2025-01-04 RX ADMIN — LEVOTHYROXINE SODIUM 150 MCG: 0.15 TABLET ORAL at 05:35

## 2025-01-04 RX ADMIN — HYDRALAZINE HYDROCHLORIDE 50 MG: 50 TABLET ORAL at 08:40

## 2025-01-04 RX ADMIN — BUDESONIDE AND FORMOTEROL FUMARATE DIHYDRATE 2 PUFF: 160; 4.5 AEROSOL RESPIRATORY (INHALATION) at 08:16

## 2025-01-04 RX ADMIN — HEPARIN SODIUM 5000 UNITS: 5000 INJECTION INTRAVENOUS; SUBCUTANEOUS at 05:35

## 2025-01-04 RX ADMIN — ISOSORBIDE MONONITRATE 60 MG: 60 TABLET, EXTENDED RELEASE ORAL at 08:40

## 2025-01-04 RX ADMIN — ALBUTEROL SULFATE 1 PUFF: 90 AEROSOL, METERED RESPIRATORY (INHALATION) at 08:16

## 2025-01-04 RX ADMIN — ASPIRIN 81 MG: 81 TABLET, CHEWABLE ORAL at 08:41

## 2025-01-04 NOTE — PROGRESS NOTES
Pt disconnected from cycler post tx. Effluent clear, yellow. UF 1256 ml.     PD fluid sample collected and walked to lab.

## 2025-01-04 NOTE — DISCHARGE SUMMARY
V2.0  Discharge Summary    Name:  Gwendolyn Grimm /Age/Sex: 1940 (84 y.o. female)   Admit Date: 2025  Discharge Date: 25    MRN & CSN:  [id number] & [id number] Encounter Date and Time 25 9:21 AM EST    Attending:  Nilay Pringle MD Discharging Provider: Nilay Pringle MD       Hospital Course:     Brief HPI: Gwendolyn Grimm is a 84 y.o. female who presented with pmh of history of PEA arrest, colon cancer s/p resection colectomy, hypertension, type 2 diabetes mellitus, hypothyroidism, COPD, ESRD on PD, GERD  who presents with Generalized weakness     Brief Problem Based Course:     # Generalized weakness: Presented with generalized weakness, PT/OT evaluation for placement.  Evaluated and recommended home with home health care but patient declined home health care so discharge patient in a stable condition to follow-up with PCP.     # Swelling of the left lower extremity than right: Obtained Doppler ultrasound negative for DVT     # Elevated troponin suspected secondary to demand: No chest pain, EKG sinus bradycardia, trended troponin down, continued to monitor     # Hypertension: On amlodipine, spironolactone, hydralazine, metoprolol continue  # Type 2 diabetes mellitus: Continue on home medication  # Hypothyroidism on levothyroxine continue  # GERD on PPI continue  # COPD: On inhalers continue  # ESRD on PD: EDP consulted nephrology for dialysis management.  # History of PEA arrest  # Colon cancer s/p colectomy     Comment: Please note this report has been produced using speech recognition software and may contain errors related to that system including errors in grammar, punctuation, and spelling, as well as words and phrases that may be inappropriate. If there are any questions or concerns please feel free to contact the dictating provider for clarification.      The patient expressed appropriate understanding of, and agreement with the discharge recommendations, medications, and plan.

## 2025-01-04 NOTE — PROGRESS NOTES
Nephrology Progress Note  1/4/2025 9:50 AM  Subjective:     Interval History: Gwendolyn Grimm is a 84 y.o. female with overall doing better today no acute distress tolerating PD        Data:   Scheduled Meds:   albuterol sulfate HFA  1 puff Inhalation 4x Daily RT    amLODIPine  10 mg Oral Daily    aspirin  81 mg Oral Daily    budesonide-formoterol  2 puff Inhalation BID RT    citalopram  20 mg Oral Daily    hydrALAZINE  50 mg Oral BID    insulin glargine  10 Units SubCUTAneous Nightly    isosorbide mononitrate  60 mg Oral Daily    levothyroxine  150 mcg Oral Daily    metoprolol succinate  25 mg Oral Daily    mirtazapine  15 mg Oral Nightly    pantoprazole  40 mg Oral QAM AC    spironolactone  50 mg Oral BID    sodium chloride flush  5-40 mL IntraVENous 2 times per day    heparin (porcine)  5,000 Units SubCUTAneous 3 times per day     Continuous Infusions:   dianeal lo-duong 4.25%      sodium chloride           CBC   Recent Labs     01/02/25  1620 01/03/25  0231   WBC 13.5* 12.5*   HGB 10.8* 9.4*   HCT 34.0* 29.7*    290      BMP   Recent Labs     01/02/25  1620 01/03/25  0231    139   K 4.1 4.3   CL 99 101   CO2 20* 21   BUN 64* 68*   CREATININE 7.4* 7.6*     Hepatic:   Recent Labs     01/02/25  1620 01/03/25  0231   AST 15 12*   ALT 12 7*   BILITOT 0.3 0.4   ALKPHOS 84 75     Troponin: No results for input(s): \"TROPONINI\" in the last 72 hours.  BNP: No results for input(s): \"BNP\" in the last 72 hours.  Lipids: No results for input(s): \"CHOL\", \"HDL\" in the last 72 hours.    Invalid input(s): \"LDLCALCU\"  ABGs:   Lab Results   Component Value Date/Time    PO2ART 73 05/12/2023 07:00 AM    IDO2FPB 38.0 05/12/2023 07:00 AM     INR: No results for input(s): \"INR\" in the last 72 hours.  Renal Labs  Albumin:    Lab Results   Component Value Date/Time    LABALBU DUPLICATE ORDER 05/27/2022 12:43 PM     Calcium:    Lab Results   Component Value Date/Time    CALCIUM 8.7 01/03/2025 02:31 AM     Phosphorus:    Lab Results

## 2025-01-04 NOTE — PLAN OF CARE
Problem: Chronic Conditions and Co-morbidities  Goal: Patient's chronic conditions and co-morbidity symptoms are monitored and maintained or improved  Outcome: Progressing  Flowsheets (Taken 1/3/2025 2000)  Care Plan - Patient's Chronic Conditions and Co-Morbidity Symptoms are Monitored and Maintained or Improved: Monitor and assess patient's chronic conditions and comorbid symptoms for stability, deterioration, or improvement     Problem: Discharge Planning  Goal: Discharge to home or other facility with appropriate resources  Outcome: Progressing  Flowsheets (Taken 1/3/2025 2000)  Discharge to home or other facility with appropriate resources: Identify barriers to discharge with patient and caregiver     Problem: Pain  Goal: Verbalizes/displays adequate comfort level or baseline comfort level  Outcome: Progressing  Flowsheets (Taken 1/3/2025 2000)  Verbalizes/displays adequate comfort level or baseline comfort level: Encourage patient to monitor pain and request assistance     Problem: Safety - Adult  Goal: Free from fall injury  Outcome: Progressing  Flowsheets (Taken 1/3/2025 2000)  Free From Fall Injury: Instruct family/caregiver on patient safety

## 2025-01-04 NOTE — PLAN OF CARE
Problem: Chronic Conditions and Co-morbidities  Goal: Patient's chronic conditions and co-morbidity symptoms are monitored and maintained or improved  1/4/2025 1110 by Telma Middleton RN  Outcome: Adequate for Discharge  1/4/2025 0144 by Paul Curiel RN  Outcome: Progressing  Flowsheets (Taken 1/3/2025 2000)  Care Plan - Patient's Chronic Conditions and Co-Morbidity Symptoms are Monitored and Maintained or Improved: Monitor and assess patient's chronic conditions and comorbid symptoms for stability, deterioration, or improvement     Problem: Discharge Planning  Goal: Discharge to home or other facility with appropriate resources  1/4/2025 1110 by Telma Middleton RN  Outcome: Adequate for Discharge  1/4/2025 0144 by Paul Curiel RN  Outcome: Progressing  Flowsheets (Taken 1/3/2025 2000)  Discharge to home or other facility with appropriate resources: Identify barriers to discharge with patient and caregiver     Problem: Pain  Goal: Verbalizes/displays adequate comfort level or baseline comfort level  1/4/2025 1110 by Telma Middleton RN  Outcome: Adequate for Discharge  1/4/2025 0144 by Paul Curiel RN  Outcome: Progressing  Flowsheets (Taken 1/3/2025 2000)  Verbalizes/displays adequate comfort level or baseline comfort level: Encourage patient to monitor pain and request assistance     Problem: Safety - Adult  Goal: Free from fall injury  1/4/2025 1110 by Telma Middleton RN  Outcome: Adequate for Discharge  1/4/2025 0144 by Paul Curiel RN  Outcome: Progressing  Flowsheets (Taken 1/3/2025 2000)  Free From Fall Injury: Instruct family/caregiver on patient safety

## 2025-01-05 LAB
MICROORGANISM SPEC CULT: NORMAL
MICROORGANISM/AGENT SPEC: NORMAL
SERVICE CMNT-IMP: NORMAL
SPECIMEN DESCRIPTION: NORMAL

## 2025-01-06 ENCOUNTER — APPOINTMENT (OUTPATIENT)
Dept: CT IMAGING | Age: 85
DRG: 064 | End: 2025-01-06
Payer: MEDICARE

## 2025-01-06 ENCOUNTER — HOSPITAL ENCOUNTER (INPATIENT)
Age: 85
LOS: 2 days | Discharge: NURSING, REHAB OR LONG-TERM CARE | DRG: 064 | End: 2025-01-09
Attending: EMERGENCY MEDICINE
Payer: MEDICARE

## 2025-01-06 DIAGNOSIS — R53.1 RIGHT SIDED WEAKNESS: Primary | ICD-10-CM

## 2025-01-06 DIAGNOSIS — I65.22 STENOSIS OF LEFT CAROTID ARTERY: ICD-10-CM

## 2025-01-06 DIAGNOSIS — R53.1 GENERAL WEAKNESS: ICD-10-CM

## 2025-01-06 DIAGNOSIS — Z99.2 ESRD ON DIALYSIS (HCC): ICD-10-CM

## 2025-01-06 DIAGNOSIS — N18.6 ESRD ON DIALYSIS (HCC): ICD-10-CM

## 2025-01-06 LAB
ALBUMIN SERPL-MCNC: 3.7 G/DL (ref 3.4–5)
ALBUMIN/GLOB SERPL: 1.3 {RATIO} (ref 1.1–2.2)
ALP SERPL-CCNC: 79 U/L (ref 40–129)
ALT SERPL-CCNC: 11 U/L (ref 10–40)
ANION GAP SERPL CALCULATED.3IONS-SCNC: 19 MMOL/L (ref 9–17)
AST SERPL-CCNC: 17 U/L (ref 15–37)
BASOPHILS # BLD: 0.06 K/UL
BASOPHILS NFR BLD: 1 % (ref 0–1)
BILIRUB SERPL-MCNC: 0.3 MG/DL (ref 0–1)
BUN SERPL-MCNC: 71 MG/DL (ref 7–20)
CALCIUM SERPL-MCNC: 8.7 MG/DL (ref 8.3–10.6)
CHLORIDE SERPL-SCNC: 99 MMOL/L (ref 99–110)
CO2 SERPL-SCNC: 19 MMOL/L (ref 21–32)
CREAT SERPL-MCNC: 8.6 MG/DL (ref 0.6–1.2)
EOSINOPHIL # BLD: 0.31 K/UL
EOSINOPHILS RELATIVE PERCENT: 3 % (ref 0–3)
ERYTHROCYTE [DISTWIDTH] IN BLOOD BY AUTOMATED COUNT: 12.2 % (ref 11.7–14.9)
GFR, ESTIMATED: 4 ML/MIN/1.73M2
GLUCOSE BLD-MCNC: 145 MG/DL
GLUCOSE BLD-MCNC: 145 MG/DL (ref 74–99)
GLUCOSE SERPL-MCNC: 149 MG/DL (ref 74–99)
HCT VFR BLD AUTO: 33.9 % (ref 37–47)
HGB BLD-MCNC: 10.5 G/DL (ref 12.5–16)
IMM GRANULOCYTES # BLD AUTO: 0.14 K/UL
IMM GRANULOCYTES NFR BLD: 1 %
INR PPP: 1
LYMPHOCYTES NFR BLD: 2.48 K/UL
LYMPHOCYTES RELATIVE PERCENT: 20 % (ref 24–44)
MCH RBC QN AUTO: 30.1 PG (ref 27–31)
MCHC RBC AUTO-ENTMCNC: 31 G/DL (ref 32–36)
MCV RBC AUTO: 97.1 FL (ref 78–100)
MONOCYTES NFR BLD: 0.85 K/UL
MONOCYTES NFR BLD: 7 % (ref 0–4)
NEUTROPHILS NFR BLD: 69 % (ref 36–66)
NEUTS SEG NFR BLD: 8.73 K/UL
PLATELET # BLD AUTO: 343 K/UL (ref 140–440)
PMV BLD AUTO: 9.4 FL (ref 7.5–11.1)
POTASSIUM SERPL-SCNC: 4.7 MMOL/L (ref 3.5–5.1)
PROT SERPL-MCNC: 6.6 G/DL (ref 6.4–8.2)
PROTHROMBIN TIME: 13.2 SEC (ref 11.7–14.5)
RBC # BLD AUTO: 3.49 M/UL (ref 4.2–5.4)
SODIUM SERPL-SCNC: 137 MMOL/L (ref 136–145)
TROPONIN I SERPL HS-MCNC: 81 NG/L (ref 0–14)
WBC OTHER # BLD: 12.6 K/UL (ref 4–10.5)

## 2025-01-06 PROCEDURE — 70450 CT HEAD/BRAIN W/O DYE: CPT

## 2025-01-06 PROCEDURE — 80053 COMPREHEN METABOLIC PANEL: CPT

## 2025-01-06 PROCEDURE — 94640 AIRWAY INHALATION TREATMENT: CPT

## 2025-01-06 PROCEDURE — 70496 CT ANGIOGRAPHY HEAD: CPT

## 2025-01-06 PROCEDURE — 84484 ASSAY OF TROPONIN QUANT: CPT

## 2025-01-06 PROCEDURE — 6360000004 HC RX CONTRAST MEDICATION: Performed by: EMERGENCY MEDICINE

## 2025-01-06 PROCEDURE — 6370000000 HC RX 637 (ALT 250 FOR IP): Performed by: STUDENT IN AN ORGANIZED HEALTH CARE EDUCATION/TRAINING PROGRAM

## 2025-01-06 PROCEDURE — 85610 PROTHROMBIN TIME: CPT

## 2025-01-06 PROCEDURE — 99285 EMERGENCY DEPT VISIT HI MDM: CPT

## 2025-01-06 PROCEDURE — 82962 GLUCOSE BLOOD TEST: CPT

## 2025-01-06 PROCEDURE — 85025 COMPLETE CBC W/AUTO DIFF WBC: CPT

## 2025-01-06 PROCEDURE — 93005 ELECTROCARDIOGRAM TRACING: CPT

## 2025-01-06 PROCEDURE — G0378 HOSPITAL OBSERVATION PER HR: HCPCS

## 2025-01-06 PROCEDURE — 82746 ASSAY OF FOLIC ACID SERUM: CPT

## 2025-01-06 PROCEDURE — 82607 VITAMIN B-12: CPT

## 2025-01-06 RX ORDER — PANTOPRAZOLE SODIUM 40 MG/1
40 TABLET, DELAYED RELEASE ORAL
Status: DISCONTINUED | OUTPATIENT
Start: 2025-01-07 | End: 2025-01-09 | Stop reason: HOSPADM

## 2025-01-06 RX ORDER — ONDANSETRON 4 MG/1
4 TABLET, ORALLY DISINTEGRATING ORAL EVERY 8 HOURS PRN
Status: DISCONTINUED | OUTPATIENT
Start: 2025-01-06 | End: 2025-01-09 | Stop reason: HOSPADM

## 2025-01-06 RX ORDER — IOPAMIDOL 755 MG/ML
75 INJECTION, SOLUTION INTRAVASCULAR
Status: COMPLETED | OUTPATIENT
Start: 2025-01-06 | End: 2025-01-06

## 2025-01-06 RX ORDER — ACETAMINOPHEN 325 MG/1
650 TABLET ORAL EVERY 6 HOURS PRN
Status: DISCONTINUED | OUTPATIENT
Start: 2025-01-06 | End: 2025-01-09 | Stop reason: HOSPADM

## 2025-01-06 RX ORDER — ACETAMINOPHEN 650 MG/1
650 SUPPOSITORY RECTAL EVERY 6 HOURS PRN
Status: DISCONTINUED | OUTPATIENT
Start: 2025-01-06 | End: 2025-01-09 | Stop reason: HOSPADM

## 2025-01-06 RX ORDER — ATORVASTATIN CALCIUM 10 MG/1
20 TABLET, FILM COATED ORAL NIGHTLY
Status: DISCONTINUED | OUTPATIENT
Start: 2025-01-06 | End: 2025-01-07

## 2025-01-06 RX ORDER — AMLODIPINE BESYLATE 10 MG/1
10 TABLET ORAL DAILY
Status: DISCONTINUED | OUTPATIENT
Start: 2025-01-07 | End: 2025-01-09

## 2025-01-06 RX ORDER — ASPIRIN 81 MG/1
81 TABLET, CHEWABLE ORAL DAILY
Status: DISCONTINUED | OUTPATIENT
Start: 2025-01-07 | End: 2025-01-09 | Stop reason: HOSPADM

## 2025-01-06 RX ORDER — HYDRALAZINE HYDROCHLORIDE 50 MG/1
50 TABLET, FILM COATED ORAL 2 TIMES DAILY
Status: DISCONTINUED | OUTPATIENT
Start: 2025-01-07 | End: 2025-01-08

## 2025-01-06 RX ORDER — CITALOPRAM HYDROBROMIDE 20 MG/1
20 TABLET ORAL DAILY
Status: DISCONTINUED | OUTPATIENT
Start: 2025-01-07 | End: 2025-01-09 | Stop reason: HOSPADM

## 2025-01-06 RX ORDER — BUDESONIDE AND FORMOTEROL FUMARATE DIHYDRATE 160; 4.5 UG/1; UG/1
2 AEROSOL RESPIRATORY (INHALATION)
Status: DISCONTINUED | OUTPATIENT
Start: 2025-01-06 | End: 2025-01-09 | Stop reason: HOSPADM

## 2025-01-06 RX ORDER — ALBUTEROL SULFATE 90 UG/1
1 INHALANT RESPIRATORY (INHALATION) EVERY 6 HOURS PRN
Status: DISCONTINUED | OUTPATIENT
Start: 2025-01-06 | End: 2025-01-09 | Stop reason: HOSPADM

## 2025-01-06 RX ORDER — MIRTAZAPINE 15 MG/1
15 TABLET, FILM COATED ORAL NIGHTLY
Status: DISCONTINUED | OUTPATIENT
Start: 2025-01-06 | End: 2025-01-09 | Stop reason: HOSPADM

## 2025-01-06 RX ORDER — ONDANSETRON 2 MG/ML
4 INJECTION INTRAMUSCULAR; INTRAVENOUS EVERY 6 HOURS PRN
Status: DISCONTINUED | OUTPATIENT
Start: 2025-01-06 | End: 2025-01-09 | Stop reason: HOSPADM

## 2025-01-06 RX ORDER — LEVOTHYROXINE SODIUM 150 UG/1
150 TABLET ORAL DAILY
Status: DISCONTINUED | OUTPATIENT
Start: 2025-01-07 | End: 2025-01-09 | Stop reason: HOSPADM

## 2025-01-06 RX ORDER — POLYETHYLENE GLYCOL 3350 17 G/17G
17 POWDER, FOR SOLUTION ORAL DAILY PRN
Status: DISCONTINUED | OUTPATIENT
Start: 2025-01-06 | End: 2025-01-09 | Stop reason: HOSPADM

## 2025-01-06 RX ORDER — METOPROLOL SUCCINATE 25 MG/1
25 TABLET, EXTENDED RELEASE ORAL DAILY
Status: DISCONTINUED | OUTPATIENT
Start: 2025-01-07 | End: 2025-01-08

## 2025-01-06 RX ORDER — HEPARIN SODIUM 5000 [USP'U]/ML
5000 INJECTION, SOLUTION INTRAVENOUS; SUBCUTANEOUS EVERY 8 HOURS SCHEDULED
Status: DISCONTINUED | OUTPATIENT
Start: 2025-01-07 | End: 2025-01-09 | Stop reason: HOSPADM

## 2025-01-06 RX ORDER — ISOSORBIDE MONONITRATE 60 MG/1
60 TABLET, EXTENDED RELEASE ORAL DAILY
Status: DISCONTINUED | OUTPATIENT
Start: 2025-01-07 | End: 2025-01-09

## 2025-01-06 RX ORDER — SPIRONOLACTONE 50 MG/1
50 TABLET, FILM COATED ORAL 2 TIMES DAILY
Status: DISCONTINUED | OUTPATIENT
Start: 2025-01-07 | End: 2025-01-09

## 2025-01-06 RX ORDER — INSULIN GLARGINE 100 [IU]/ML
10 INJECTION, SOLUTION SUBCUTANEOUS NIGHTLY
Status: DISCONTINUED | OUTPATIENT
Start: 2025-01-06 | End: 2025-01-09 | Stop reason: HOSPADM

## 2025-01-06 RX ADMIN — BUDESONIDE AND FORMOTEROL FUMARATE DIHYDRATE 2 PUFF: 160; 4.5 AEROSOL RESPIRATORY (INHALATION) at 23:07

## 2025-01-06 RX ADMIN — IOPAMIDOL 75 ML: 755 INJECTION, SOLUTION INTRAVENOUS at 19:07

## 2025-01-06 ASSESSMENT — PAIN - FUNCTIONAL ASSESSMENT: PAIN_FUNCTIONAL_ASSESSMENT: NONE - DENIES PAIN

## 2025-01-06 NOTE — ED PROVIDER NOTES
Emergency Department Encounter    Patient: Gwendolyn Grimm  MRN: [id number]  : 1940  Date of Evaluation: 2025  ED Provider:  Sarah Pickett MD    Triage Chief Complaint:   Extremity Weakness (Right arm feels heavy for the last 2 hours and right leg has been \"dragging\" for the last two days)    [place]:  Gwendolyn Grimm is a 84 y.o. female that presents with complaint of right leg heaviness and right arm heaviness and weakness.  She was in the hospital on the second, and admitted for 2 days for general weakness.  Getting around with her walker at discharge and states she felt better.  They was fine, she went to bed last night around 9:30 PM, hooked up to her peritoneal dialysis that she typically does and then went to sleep.  She slept all night, woke up this morning and was having increased right leg weakness.  Notes that she was not able to get up with her walker like typical and required assistance from her family which is not typical.  Feels like it is dragging.  Then a couple of hours later noted that her right arm feels quite heavy as well and feels like her  is not as strong.  No nausea or vomiting.  No fevers.  No headache.  No vision changes.  No numbness or tingling in her extremities.  She denies difficulty with speech.  No chest pain or shortness of breath.    ROS - see HPI, below listed is current ROS at time of my eval:  10 systems reviewed and negative except as above.     Past Medical History:   Diagnosis Date    Abnormal nuclear stress test 2007-EF=67%,Mild->Mod.isch.LAD;-EF=70%,Suggests poss.Mild Ant.wall ischemia.    Cardiac arrest (HCC)     Diabetes mellitus ([facility])     H/O cardiac catheterization 2008    No signif.CAD.    H/O cardiovascular stress test 2008    mild to mod ischemia in the LAD region, abnormal study, EF 67%, patient developed mild chest pain and throat tightness    H/O cardiovascular stress test 2016    lexiscan-moderate ischemia apical,RF66%     revision of gastric band placement    HEMICOLECTOMY Right 5/3/2023    RIGHT BOWEL RESECTION HEMICOLECTOMY LAPAROSCOPIC ROBOTIC XI performed by Amanda Tillman MD at [facility] OR    HERNIA REPAIR N/A 5/3/2023    OPEN HERNIA INCISIONAL REPAIR performed by Amanda Tillman MD at [facility] OR    HYSTERECTOMY (CERVIX STATUS UNKNOWN)      IR TUNNELED CVC PLACE WO SQ PORT/PUMP > 5 YEARS  2024    IR TUNNELED CATHETER PLACEMENT GREATER THAN 5 YEARS 2024 [facility] SPECIAL PROCEDURES    LAP BAND      put in in May Removed in August    LITHOTRIPSY      ROTATOR CUFF REPAIR      right    ROTATOR CUFF REPAIR       Family History   Problem Relation Age of Onset    Cancer Sister     COPD Brother     Heart Disease Brother     Cancer Mother         melonoma    Migraines Mother     Cancer Father         carcinoma    High Blood Pressure Father     Heart Disease Father      Social History     Socioeconomic History    Marital status:      Spouse name: Not on file    Number of children: 3    Years of education: high school    Highest education level: 12th grade   Occupational History    Not on file   Tobacco Use    Smoking status: Former     Current packs/day: 0.00     Average packs/day: 0.5 packs/day for 3.0 years (1.5 ttl pk-yrs)     Types: Cigarettes     Start date: 1991     Quit date: 1994     Years since quittin.4    Smokeless tobacco: Never   Vaping Use    Vaping status: Never Used   Substance and Sexual Activity    Alcohol use: No     Alcohol/week: 0.0 standard drinks of alcohol    Drug use: No    Sexual activity: Not Currently     Partners: Male     Comment:    Other Topics Concern    Not on file   Social History Narrative    Lives with son and daughter in law; is able to drive      Social Determinants of Health     Financial Resource Strain: Medium Risk (5/3/2023)    Overall Financial Resource Strain (CARDIA)     Difficulty of Paying Living Expenses: Somewhat hard   Food Insecurity: No Food Insecurity (2025)     mouth daily 90 tablet 3    levothyroxine (SYNTHROID) 125 MCG tablet Take 150 mcg by mouth Daily      albuterol sulfate HFA (PROVENTIL;VENTOLIN;PROAIR) 108 (90 Base) MCG/ACT inhaler INHALE 2 PUFFS BY MOUTH EVERY 4 TO 6 HOURS AS NEEDED      insulin detemir (LEVEMIR) 100 UNIT/ML injection vial Inject 10 Units into the skin nightly      metoprolol succinate (TOPROL XL) 25 MG extended release tablet Take 1 tablet by mouth daily      isosorbide mononitrate (IMDUR) 60 MG extended release tablet Take 1 tablet by mouth daily 30 tablet 3    Melatonin 10 MG TABS Take 10 mg by mouth nightly as needed      mirtazapine (REMERON) 15 MG tablet Take 1 tablet by mouth nightly      budesonide-formoterol (SYMBICORT) 160-4.5 MCG/ACT AERO Inhale 2 puffs into the lungs in the morning and 2 puffs in the evening. 10.2 g 0    pantoprazole (PROTONIX) 40 MG tablet Take 1 tablet by mouth every morning (before breakfast) 30 tablet 0    Handicap Placard MISC by Does not apply route Expires 5/19/2027 1 each 0    acetaminophen (TYLENOL) 325 MG tablet Take 2 tablets by mouth as needed for Pain      aspirin 81 MG EC tablet Take 1 tablet by mouth daily       Allergies   Allergen Reactions    Anesthesia S-I-40 [Propofol] Anaphylaxis     PATIENT CODED AFTER THREE SURGERIES     Scopolamine      Combative and delusional    Amoxicillin     Bactrim [Sulfamethoxazole-Trimethoprim]      Had increase creatinine not true reaction    Penicillins Hives       Nursing Notes Reviewed    Physical Exam:  ED Triage Vitals [01/06/25 1831]   Encounter Vitals Group      /84      Systolic BP Percentile       Diastolic BP Percentile       Pulse 60      Respirations 14      Temp 98.2 °F (36.8 °C)      Temp src       SpO2 97 %      Weight       Height       Head Circumference       Peak Flow       Pain Score       Pain Loc       Pain Education       Exclude from Growth Chart            My pulse ox interpretation is - normal    General appearance:  No acute distress.  (H) 7 - 20 mg/dL    Creatinine 8.6 (H) 0.6 - 1.2 mg/dL    Est, Glom Filt Rate 4 (L) >60 mL/min/1.73m2    Calcium 8.7 8.3 - 10.6 mg/dL    Total Protein 6.6 6.4 - 8.2 g/dL    Albumin 3.7 3.4 - 5.0 g/dL    Albumin/Globulin Ratio 1.3 1.1 - 2.2    Total Bilirubin 0.3 0.0 - 1.0 mg/dL    Alkaline Phosphatase 79 40 - 129 U/L    ALT 11 10 - 40 U/L    AST 17 15 - 37 U/L   Troponin   Result Value Ref Range    Troponin, High Sensitivity 81 (HH) 0 - 14 ng/L   Protime-INR   Result Value Ref Range    Protime 13.2 11.7 - 14.5 sec    INR 1.0    POCT Glucose   Result Value Ref Range    Glucose 145 mg/dL      Radiographs (if obtained):  Radiologist's Report Reviewed:  No results found.  CT HEAD WO CONTRAST    Result Date: 1/6/2025  PROCEDURE: CT HEAD WO CONTRAST DATE OF EXAM:  1/6/2025 22:06 DEMOGRAPHICS: 84 years old Female INDICATION: Stroke Symptoms COMPARISON: No existing relevant imaging study corresponding to the same anatomical region is available. TECHNIQUE: Contiguous axial slices of the head were submitted without IV contrast.   DOSE OPTIMIZATION: CT radiation dose optimization techniques (automated exposure  control, and use of iterative reconstruction techniques, or adjustment of the mA and/or kV according to patient size) were used to limit patient radiation dose. FINDINGS: No acute intracranial hemorrhage. Mild diffuse cerebral volume loss.  Ventricles are normal in size. Basal cisterns and foramen magnum are patent. No midline shift. Patchy hypoattenuating foci throughout the supratentorial white matter, without associated mass effect, likely sequelae of chronic microvascular ischemic change. Gray-white differentiation is preserved.  Internal carotid and vertebral artery calcifications are noted. Globes are symmetric in size.  Visualized paranasal sinuses and mastoid air cells are well aerated. No suspicious focal lesion of the scalp or bony calvarium. IMPRESSION: No acute intracranial hemorrhage, large territory

## 2025-01-06 NOTE — ED TRIAGE NOTES
Pt arrived to the ED via EMS from home with c/o right arm \"heaviness\" for the last 2 hours. Pt states that her right leg has been \"dragging\" for the last two days. Pt states that she feels as if she \"can not control her arm\". Pt does not take blood thinners.

## 2025-01-07 ENCOUNTER — TELEPHONE (OUTPATIENT)
Age: 85
End: 2025-01-07

## 2025-01-07 ENCOUNTER — APPOINTMENT (OUTPATIENT)
Dept: MRI IMAGING | Age: 85
DRG: 064 | End: 2025-01-07
Payer: MEDICARE

## 2025-01-07 PROBLEM — I65.22 SYMPTOMATIC CAROTID ARTERY STENOSIS, LEFT: Status: ACTIVE | Noted: 2025-01-07

## 2025-01-07 PROBLEM — I63.9 ACUTE CVA (CEREBROVASCULAR ACCIDENT) (HCC): Status: ACTIVE | Noted: 2025-01-07

## 2025-01-07 LAB
25(OH)D3 SERPL-MCNC: 9.2 NG/ML (ref 30–150)
ANION GAP SERPL CALCULATED.3IONS-SCNC: 17 MMOL/L (ref 9–17)
BASOPHILS # BLD: 0.04 K/UL
BASOPHILS NFR BLD: 0 % (ref 0–1)
BUN SERPL-MCNC: 74 MG/DL (ref 7–20)
CALCIUM SERPL-MCNC: 8.5 MG/DL (ref 8.3–10.6)
CHLORIDE SERPL-SCNC: 99 MMOL/L (ref 99–110)
CHOLEST SERPL-MCNC: 202 MG/DL (ref 125–199)
CO2 SERPL-SCNC: 19 MMOL/L (ref 21–32)
CREAT SERPL-MCNC: 8.6 MG/DL (ref 0.6–1.2)
EOSINOPHIL # BLD: 0.27 K/UL
EOSINOPHILS RELATIVE PERCENT: 2 % (ref 0–3)
ERYTHROCYTE [DISTWIDTH] IN BLOOD BY AUTOMATED COUNT: 12.1 % (ref 11.7–14.9)
FOLATE SERPL-MCNC: 7 NG/ML (ref 4.8–24.2)
GFR, ESTIMATED: 4 ML/MIN/1.73M2
GLUCOSE SERPL-MCNC: 101 MG/DL (ref 74–99)
HCT VFR BLD AUTO: 30.1 % (ref 37–47)
HDLC SERPL-MCNC: 33 MG/DL
HGB BLD-MCNC: 9.5 G/DL (ref 12.5–16)
IMM GRANULOCYTES # BLD AUTO: 0.12 K/UL
IMM GRANULOCYTES NFR BLD: 1 %
LDLC SERPL CALC-MCNC: 134 MG/DL
LYMPHOCYTES NFR BLD: 2.9 K/UL
LYMPHOCYTES RELATIVE PERCENT: 26 % (ref 24–44)
MCH RBC QN AUTO: 31 PG (ref 27–31)
MCHC RBC AUTO-ENTMCNC: 31.6 G/DL (ref 32–36)
MCV RBC AUTO: 98.4 FL (ref 78–100)
MONOCYTES NFR BLD: 0.93 K/UL
MONOCYTES NFR BLD: 8 % (ref 0–4)
NEUTROPHILS NFR BLD: 62 % (ref 36–66)
NEUTS SEG NFR BLD: 7 K/UL
PLATELET # BLD AUTO: 300 K/UL (ref 140–440)
PMV BLD AUTO: 8.9 FL (ref 7.5–11.1)
POTASSIUM SERPL-SCNC: 4.8 MMOL/L (ref 3.5–5.1)
RBC # BLD AUTO: 3.06 M/UL (ref 4.2–5.4)
SODIUM SERPL-SCNC: 136 MMOL/L (ref 136–145)
TRIGL SERPL-MCNC: 177 MG/DL
VIT B12 SERPL-MCNC: 237 PG/ML (ref 211–911)
WBC OTHER # BLD: 11.3 K/UL (ref 4–10.5)

## 2025-01-07 PROCEDURE — 1200000000 HC SEMI PRIVATE

## 2025-01-07 PROCEDURE — 80048 BASIC METABOLIC PNL TOTAL CA: CPT

## 2025-01-07 PROCEDURE — 99223 1ST HOSP IP/OBS HIGH 75: CPT | Performed by: PSYCHIATRY & NEUROLOGY

## 2025-01-07 PROCEDURE — 82306 VITAMIN D 25 HYDROXY: CPT

## 2025-01-07 PROCEDURE — 6360000002 HC RX W HCPCS: Performed by: STUDENT IN AN ORGANIZED HEALTH CARE EDUCATION/TRAINING PROGRAM

## 2025-01-07 PROCEDURE — 85025 COMPLETE CBC W/AUTO DIFF WBC: CPT

## 2025-01-07 PROCEDURE — 92610 EVALUATE SWALLOWING FUNCTION: CPT

## 2025-01-07 PROCEDURE — 6370000000 HC RX 637 (ALT 250 FOR IP): Performed by: STUDENT IN AN ORGANIZED HEALTH CARE EDUCATION/TRAINING PROGRAM

## 2025-01-07 PROCEDURE — 6370000000 HC RX 637 (ALT 250 FOR IP): Performed by: CLINICAL NURSE SPECIALIST

## 2025-01-07 PROCEDURE — 96372 THER/PROPH/DIAG INJ SC/IM: CPT

## 2025-01-07 PROCEDURE — 80061 LIPID PANEL: CPT

## 2025-01-07 PROCEDURE — 70551 MRI BRAIN STEM W/O DYE: CPT

## 2025-01-07 PROCEDURE — 94664 DEMO&/EVAL PT USE INHALER: CPT

## 2025-01-07 PROCEDURE — 94761 N-INVAS EAR/PLS OXIMETRY MLT: CPT

## 2025-01-07 PROCEDURE — 82962 GLUCOSE BLOOD TEST: CPT

## 2025-01-07 PROCEDURE — 94640 AIRWAY INHALATION TREATMENT: CPT

## 2025-01-07 PROCEDURE — 99223 1ST HOSP IP/OBS HIGH 75: CPT | Performed by: NURSE PRACTITIONER

## 2025-01-07 RX ORDER — CLOPIDOGREL BISULFATE 75 MG/1
75 TABLET ORAL DAILY
Status: DISCONTINUED | OUTPATIENT
Start: 2025-01-08 | End: 2025-01-09 | Stop reason: HOSPADM

## 2025-01-07 RX ORDER — DIAZEPAM 2 MG/1
2 TABLET ORAL ONCE
Status: COMPLETED | OUTPATIENT
Start: 2025-01-07 | End: 2025-01-07

## 2025-01-07 RX ORDER — ERGOCALCIFEROL 1.25 MG/1
50000 CAPSULE, LIQUID FILLED ORAL WEEKLY
Status: DISCONTINUED | OUTPATIENT
Start: 2025-01-07 | End: 2025-01-09 | Stop reason: HOSPADM

## 2025-01-07 RX ORDER — CLOPIDOGREL BISULFATE 75 MG/1
300 TABLET ORAL ONCE
Status: COMPLETED | OUTPATIENT
Start: 2025-01-07 | End: 2025-01-07

## 2025-01-07 RX ORDER — BUDESONIDE AND FORMOTEROL FUMARATE 80; 4.5 UG/1; UG/1
2 AEROSOL, METERED RESPIRATORY (INHALATION) 2 TIMES DAILY
COMMUNITY
Start: 2024-11-15

## 2025-01-07 RX ORDER — INSULIN GLARGINE 100 [IU]/ML
10 INJECTION, SOLUTION SUBCUTANEOUS NIGHTLY
COMMUNITY
Start: 2024-12-09

## 2025-01-07 RX ORDER — ATORVASTATIN CALCIUM 40 MG/1
40 TABLET, FILM COATED ORAL NIGHTLY
Status: DISCONTINUED | OUTPATIENT
Start: 2025-01-07 | End: 2025-01-09 | Stop reason: HOSPADM

## 2025-01-07 RX ADMIN — MIRTAZAPINE 15 MG: 15 TABLET, FILM COATED ORAL at 00:00

## 2025-01-07 RX ADMIN — ASPIRIN 81 MG: 81 TABLET, CHEWABLE ORAL at 10:54

## 2025-01-07 RX ADMIN — MIRTAZAPINE 15 MG: 15 TABLET, FILM COATED ORAL at 22:52

## 2025-01-07 RX ADMIN — CLOPIDOGREL BISULFATE 300 MG: 75 TABLET ORAL at 10:47

## 2025-01-07 RX ADMIN — ATORVASTATIN CALCIUM 40 MG: 40 TABLET, FILM COATED ORAL at 22:52

## 2025-01-07 RX ADMIN — HEPARIN SODIUM 5000 UNITS: 5000 INJECTION INTRAVENOUS; SUBCUTANEOUS at 05:56

## 2025-01-07 RX ADMIN — LEVOTHYROXINE SODIUM 150 MCG: 0.15 TABLET ORAL at 07:35

## 2025-01-07 RX ADMIN — HEPARIN SODIUM 5000 UNITS: 5000 INJECTION INTRAVENOUS; SUBCUTANEOUS at 22:52

## 2025-01-07 RX ADMIN — PANTOPRAZOLE SODIUM 40 MG: 40 TABLET, DELAYED RELEASE ORAL at 05:56

## 2025-01-07 RX ADMIN — BUDESONIDE AND FORMOTEROL FUMARATE DIHYDRATE 2 PUFF: 160; 4.5 AEROSOL RESPIRATORY (INHALATION) at 19:58

## 2025-01-07 RX ADMIN — DIAZEPAM 2 MG: 2 TABLET ORAL at 10:55

## 2025-01-07 RX ADMIN — CITALOPRAM HYDROBROMIDE 20 MG: 20 TABLET ORAL at 10:55

## 2025-01-07 RX ADMIN — ATORVASTATIN CALCIUM 20 MG: 10 TABLET, FILM COATED ORAL at 00:00

## 2025-01-07 RX ADMIN — INSULIN GLARGINE 10 UNITS: 100 INJECTION, SOLUTION SUBCUTANEOUS at 22:59

## 2025-01-07 RX ADMIN — INSULIN GLARGINE 10 UNITS: 100 INJECTION, SOLUTION SUBCUTANEOUS at 01:12

## 2025-01-07 RX ADMIN — BUDESONIDE AND FORMOTEROL FUMARATE DIHYDRATE 2 PUFF: 160; 4.5 AEROSOL RESPIRATORY (INHALATION) at 07:52

## 2025-01-07 NOTE — ED NOTES
ED TO INPATIENT SBAR HANDOFF    Patient Name: Gwendolyn Grimm   :  1940  84 y.o.   Preferred Name    Family/Caregiver Present yes   Restraints no   C-SSRS: Risk of Suicide: No Risk  Sitter no   Sepsis Risk Score        Situation  Chief Complaint   Patient presents with    Extremity Weakness     Right arm feels heavy for the last 2 hours and right leg has been \"dragging\" for the last two days     Brief Description of Patient's Condition: pt to the ED has a stroke alert and is being admitted for a further workup. Neuro (Gloria ABDULLAHI) evaluated the patient this morning   Mental Status: oriented, alert, coherent, logical, thought processes intact, and able to concentrate and follow conversation  Arrived from: home    Imaging:   MRI Brain WO Contrast   Final Result      CTA HEAD NECK W CONTRAST   Final Result      CT HEAD WO CONTRAST   Final Result        Abnormal labs:   Abnormal Labs Reviewed   CBC WITH AUTO DIFFERENTIAL - Abnormal; Notable for the following components:       Result Value    WBC 12.6 (*)     RBC 3.49 (*)     Hemoglobin 10.5 (*)     Hematocrit 33.9 (*)     MCHC 31.0 (*)     Neutrophils % 69 (*)     Lymphocytes % 20 (*)     Monocytes % 7 (*)     Immature Granulocytes % 1 (*)     All other components within normal limits   COMPREHENSIVE METABOLIC PANEL W/ REFLEX TO MG FOR LOW K - Abnormal; Notable for the following components:    CO2 19 (*)     Anion Gap 19 (*)     Glucose 149 (*)     BUN 71 (*)     Creatinine 8.6 (*)     Est, Glom Filt Rate 4 (*)     All other components within normal limits   TROPONIN - Abnormal; Notable for the following components:    Troponin, High Sensitivity 81 (*)     All other components within normal limits   BASIC METABOLIC PANEL W/ REFLEX TO MG FOR LOW K - Abnormal; Notable for the following components:    CO2 19 (*)     Glucose 101 (*)     BUN 74 (*)     Creatinine 8.6 (*)     Est, Glom Filt Rate 4 (*)     All other components within normal limits   CBC WITH AUTO DIFFERENTIAL -  Abnormal; Notable for the following components:    WBC 11.3 (*)     RBC 3.06 (*)     Hemoglobin 9.5 (*)     Hematocrit 30.1 (*)     MCHC 31.6 (*)     Monocytes % 8 (*)     Immature Granulocytes % 1 (*)     All other components within normal limits   LIPID PANEL - Abnormal; Notable for the following components:    Cholesterol, Total 202 (*)     HDL 33 (*)     LDL Cholesterol 134 (*)     Triglycerides 177 (*)     All other components within normal limits   VITAMIN D 25 HYDROXY - Abnormal; Notable for the following components:    Vit D, 25-Hydroxy 9.2 (*)     All other components within normal limits   POCT GLUCOSE - Abnormal; Notable for the following components:    POC Glucose 145 (*)     All other components within normal limits        Background  History:   Past Medical History:   Diagnosis Date    Abnormal nuclear stress test 04/08/2007 4/08-EF=67%,Mild->Mod.isch.LAD;7/07-EF=70%,Suggests poss.Mild Ant.wall ischemia.    Cardiac arrest (HCC) 2008    Diabetes mellitus ([facility])     H/O cardiac catheterization 04/2008    No signif.CAD.    H/O cardiovascular stress test 04/11/2008    mild to mod ischemia in the LAD region, abnormal study, EF 67%, patient developed mild chest pain and throat tightness    H/O cardiovascular stress test 05/03/2016    lexiscan-moderate ischemia apical,RF66%    H/O Doppler ultrasound 07/16/2007    CAROTID DOPLER- intimal thickening but no significant atherosclerotic plaque noted in the right or left internal carotid artery, doppler flow velocities within the right and left internal carotid artery are elevated, consistent with a mild, less than 50% stenosis    H/O echocardiogram 10/14/2008    EF>55%, normal LV systolic function, normal left ventricular wall thickness, impaired LV relaxation    H/O echocardiogram 06/18/2013 6/13- EF >55% Impaired LV relaxation, Mild concentric LV hypertrophy, No sig AS, THEO underestimated. 10/08-EF=>55%,NL study;7/07-EF=>55%,Mild valv.AS.    H/O  aphasia  Dysarthria (10): Normal  Extinction and Inattention (11): No abnormality  Total: 0     Active LDA's:      Pertinent or High Risk Medications/Drips: no   If Yes, please provide details:       Blood Product Administration: no  If Yes, please provide details:     Recommendation    Incomplete orders   Additional Comments:    If any further questions, please call Sending RN at [phone]    Electronically signed by: Electronically signed by Greta Marie RN on 1/7/2025 at 3:12 PM

## 2025-01-07 NOTE — PROGRESS NOTES
Facility/Department: [facility] EMERGENCY DEPARTMENT   CLINICAL BEDSIDE SWALLOW EVALUATION    NAME: Gwendolyn Grimm  : 1940  MRN: [id number]    ADMISSION DATE: 2025  ADMITTING DIAGNOSIS: has Hyperlipidemia; Hyperuricemia; Vitamin D deficiency; Complications of bariatric procedures; Generalized weakness; SOB (shortness of breath) on exertion; Obesity; Localized edema; SOB (shortness of breath); Mild persistent asthma without complication; Pulmonary hypertension (HCC); Abnormal nuclear cardiac imaging test; Hypothyroidism; Essential hypertension; Type 2 diabetes mellitus with complication, with long-term current use of insulin ([facility]); Erythrasma; Anemia; B12 deficiency; Chronic kidney disease; Chronic obstructive pulmonary disease, unspecified COPD type ([facility]); Obesity, Class III, BMI 40-49.9 (morbid obesity); Uncontrolled type 2 diabetes mellitus with hyperglycemia ([facility]); Leg swelling; Chronic pain of left knee; Chronic kidney disease, stage IV (severe) ([facility]); Chronic renal disease, stage III ([facility]) [120229]; Renal stone; Major depressive disorder, recurrent, mild; Major depressive disorder, recurrent, moderate; Major depressive disorder, recurrent, unspecified; Cystic kidney disease; Valvular heart disease; Acute cystitis without hematuria; Cecum mass; Mass of cecum; Moderate malnutrition ([facility]); Critical illness myopathy; Gait disturbance; Hypoxic encephalopathy ([facility]); Acute respiratory failure with hypoxia; Primary adenocarcinoma of ascending colon ([facility]); Acute blood loss anemia; Cardiac arrest due to other underlying condition; SVT (supraventricular tachycardia) ([facility]); Acute pulmonary embolism, unspecified pulmonary embolism type, unspecified whether acute cor pulmonale present ([facility]); Pneumonia of both lower lobes due to infectious organism; Chronic heart failure with preserved ejection fraction ([facility]); Acute renal failure with tubular necrosis ([facility]); HPTH  (hyperparathyroidism) (HCC); ESRD on dialysis (HCC); Right sided weakness; Acute CVA (cerebrovascular accident) ([facility]); and Symptomatic carotid artery stenosis, left on their problem list.  ONSET DATE: this admission      IMPRESSIONS: Gwendolyn Grimm was seen for a bedside swallow evaluation following admission to [facility] for right sided weakness. MRI results show \"Several small scattered acute infarcts throughout the left cerebral hemisphere, largest at the left frontoparietal junction.\" Pt denies hx of dysphagia and reports a baseline diet of regular/thin liquids. Pertinent medical history includes cardiac arrest, DM, hypertension.    Daughter present for evaluation. Daughter reporting patient had increased speech difficulties with onset of symptoms including delayed response times, wording finding difficulties, and inappropriate word placement during conversational speech. Daughter reporting patient is close to baseline with resolution of most symptoms except delayed response times. Cognitive screen completed with patient showing no deficits in confrontation and responsive naming, thought organization, and command following.     Pt was positioned upright in bed for evalation with clear vocal quality and strong volitional cough. Oral mechanism exam appears grossly WFL with no focal orofacial deficits. PO trials of thin liquids via cup and straw, puree, soft solids, and regular solids were given. Adequate mastication and transit with complete oral clearance following patient initiated liquid wash. No overt s/s of aspiration with any trials and clear voicing appreciated.     Recommend regular/thin liquids - straws ok. Medications as tolerated. Aspiration precautions. Pt educated on recommendations. No further skilled ST warranted at this time. SLP team to sign off.       Date of Eval: 1/7/2025  Evaluating Therapist: SONYA Hernandez    Current Diet level:  Current Diet : Regular  Current Liquid Diet : Thin    Pain:  Pain  Assessment  Pain Assessment: None - Denies Pain    Reason for Referral  Gwendolyn Grimm was referred for a bedside swallow evaluation to assess the efficiency of her swallow function, identify signs and symptoms of aspiration and make recommendations regarding safe dietary consistencies, effective compensatory strategies, and safe eating environment.    Impression  Dysphagia Diagnosis: Swallow function appears WFL;No clinical indicators of dysphagia  Dysphagia Outcome Severity Scale: Level 6: Within functional limits/Modified independence     Treatment Plan  Requires SLP Intervention: No     Recommended Diet and Intervention  Diet Solids Recommendation: Regular  Liquid Consistency Recommendation: Thin  Recommended Form of Meds: PO  Recommendations: Self feed    Compensatory Swallowing Strategies  Compensatory Swallowing Strategies : Upright as possible for all oral intake;Eat/Feed slowly    General  Chart Reviewed: Yes  Subjective  Subjective: Pt alert and agreeable to evaluation  Behavior/Cognition: Alert;Cooperative;Pleasant mood  Respiratory Status: Room air  O2 Device: None (Room air)  Communication Observation: Functional  Follows Directions: Complex  Dentition: Dentures top;Dentures bottom  Patient Positioning: Upright in bed  Baseline Vocal Quality: Normal  Volitional Cough: Strong  Prior Dysphagia History: None  Consistencies Administered: Regular;Thin - straw;Thin - cup;Pureed;Soft and Bite-Sized    Oral Motor Deficits  Consistencies Administered: Regular;Thin - straw;Thin - cup;Pureed;Soft and Bite-Sized    Prognosis  Consulted and agree with results and recommendations: Patient;Family member  Family member consulted: Daughter    Education  Patient Education: Recommendations  Patient Education Response: Verbalizes understanding         Therapy Time  SLP Individual Minutes  Time In: 1503  Time Out: 1518  Minutes: 15     SONYA Hernandez  1/7/2025 3:42 PM

## 2025-01-07 NOTE — CONSULTS
ADVANCED NEPHROLOGY CONSULT NOTE  Dr. Bhavin Izaguirre and Dr Vikram Irving                Assessment    1.  end-stage renal disease on peritoneal dialysis   #2 left carotid stenosis with right-sided weakness   #3 coronary artery disease   #4 hypertension   #5 borderline diabetes   #6 history of colon cancer with colectomy        Plan   1.  resume on peritoneal dialysis starting tonight   #2 follow-up neurology and neurointervention recommendations and plan the stenosis and the weakness do not correlate with each other may be having 2 separate issues but need to verify and today her discomfort and pain is improved   #3 cardiac monitor no chest   #4 blood pressure overall holding stable   #5 monitor control glucose   No. 6 follow-up oncology outpatient no acute issues monitor oral intake and diet agree with swallow eval   monitor with supportive care    Date:1/7/2025        Patient Name:Gwendolyn Grimm     YOB: 1940     Age:84 y.o.        Chief Complaint     Reason for Consult:   end-stage renal disease    History Obtained From   patient, electronic medical record    History of Present Illness   The patient is a 84 y.o. female who presents with  doing well overall until presenting the emergency room with increased discomfort and weakness in right upper extremity more than the lower extremity.  This is new than prior weakness was recently in the hospital with no acute complaints like this before now presents with the above weakness and is better this morning.  Unsure if maybe she laid on it wrong as well but no exacerbating factors prior workup shows left carotid stenosis which would not correlate directly with this discomfort.  Her PD dialysis been going well she did not do PD last night as she came to the hospital.  She has no abdominal pain no fevers chills or other complaints just the weakness in the right upper extremity.  Admitted for evaluation and treatment plan        Patient Active Problem List  lower lobes due to infectious organism J18.9    Chronic heart failure with preserved ejection fraction ([facility]) I50.32    Acute renal failure with tubular necrosis ([facility]) N17.0    HPTH (hyperparathyroidism) ([facility]) E21.3    ESRD on dialysis ([facility]) N18.6, Z99.2    Right sided weakness R53.1       Past Medical History       Past Medical History:   Diagnosis Date    Abnormal nuclear stress test 04/08/2007 4/08-EF=67%,Mild->Mod.isch.LAD;7/07-EF=70%,Suggests poss.Mild Ant.wall ischemia.    Cardiac arrest ([facility]) 2008    Diabetes mellitus ([facility])     H/O cardiac catheterization 04/2008    No signif.CAD.    H/O cardiovascular stress test 04/11/2008    mild to mod ischemia in the LAD region, abnormal study, EF 67%, patient developed mild chest pain and throat tightness    H/O cardiovascular stress test 05/03/2016    lexiscan-moderate ischemia apical,RF66%    H/O Doppler ultrasound 07/16/2007    CAROTID DOPLER- intimal thickening but no significant atherosclerotic plaque noted in the right or left internal carotid artery, doppler flow velocities within the right and left internal carotid artery are elevated, consistent with a mild, less than 50% stenosis    H/O echocardiogram 10/14/2008    EF>55%, normal LV systolic function, normal left ventricular wall thickness, impaired LV relaxation    H/O echocardiogram 06/18/2013 6/13- EF >55% Impaired LV relaxation, Mild concentric LV hypertrophy, No sig AS, THEO underestimated. 10/08-EF=>55%,NL study;7/07-EF=>55%,Mild valv.AS.    H/O echocardiogram 05/16/2016 5/16 EF 55-60% normal study 12/14EF 60%. MIld mitral and tricuspid insufficiencies. Mildly sclerotic aortic valve which appears to be mildly stenosed with aortic valve area of 1.4 however this does not appear to be hemodynamically signficant aortic stenosis. Mildy hypertropic left ventricle with normal global and regional left ventricular systolic function.    H/O left heart cath 05/23/2016    no significant disease in all    spironolactone (ALDACTONE) 50 MG tablet Take 1 tablet by mouth 2 times daily    Amrit Palomino MD   hydrALAZINE (APRESOLINE) 50 MG tablet Take 1 tablet by mouth 2 times daily at 0800 and 1400    Amrit Palomino MD   amLODIPine (NORVASC) 10 MG tablet Take 1 tablet by mouth daily 6/25/24   Bhavin Izaguirre MD   levothyroxine (SYNTHROID) 150 MCG tablet Take 1 tablet by mouth Daily 12/8/23   Amrit Palomino MD   albuterol sulfate HFA (PROVENTIL;VENTOLIN;PROAIR) 108 (90 Base) MCG/ACT inhaler INHALE 2 PUFFS BY MOUTH EVERY 4 TO 6 HOURS AS NEEDED 8/10/23   Amrit Palomino MD   metoprolol succinate (TOPROL XL) 25 MG extended release tablet Take 1 tablet by mouth daily    Amrit Palomino MD   isosorbide mononitrate (IMDUR) 60 MG extended release tablet Take 1 tablet by mouth daily 6/22/23   Jimenez Hernandez MD   Melatonin 10 MG TABS Take 10 mg by mouth nightly as needed    Amrit Palomino MD   mirtazapine (REMERON) 15 MG tablet Take 1 tablet by mouth nightly 6/12/23   Amrit Palomino MD   pantoprazole (PROTONIX) 40 MG tablet Take 1 tablet by mouth every morning (before breakfast) 6/2/23   HERLINDA Alonzo MD   [facility] MISC by Does not apply route Expires 5/19/2027 5/19/22   Cinthya Contreras, APRN - CNP   acetaminophen (TYLENOL) 325 MG tablet Take 2 tablets by mouth as needed for Pain    Amrit Palomino MD   aspirin 81 MG EC tablet Take 1 tablet by mouth daily    Amrit Palomino MD        atorvastatin (LIPITOR) tablet 40 mg, Nightly  clopidogrel (PLAVIX) tablet 300 mg, Once   Followed by  [START ON 1/8/2025] clopidogrel (PLAVIX) tablet 75 mg, Daily  diazePAM (VALIUM) tablet 2 mg, Once  albuterol sulfate HFA (PROVENTIL;VENTOLIN;PROAIR) 108 (90 Base) MCG/ACT inhaler 1 puff, Q6H PRN  [Held by provider] amLODIPine (NORVASC) tablet 10 mg, Daily  aspirin chewable tablet 81 mg, Daily  budesonide-formoterol (SYMBICORT) 160-4.5 MCG/ACT inhaler 2 puff, BID  of Systems   Negative except for  weakness right upper extremity.    Physical Exam   /83   Pulse 56   Temp 98.2 °F (36.8 °C)   Resp 20   SpO2 97%      I/O: No intake/output data recorded.    General appearance: awake weak  HEENT: Head: Normal, normocephalic, atraumatic.  Neck: supple, symmetrical, trachea midline  Lungs: diminished breath sounds bilaterally  Heart: S1, S2 normal  Abdomen: abnormal findings:  soft NT positive PD catheter  Extremities: edema trace weaker right upper extremity  Neurologic: Mental status: alertness: alert    Labs    CBC:  Recent Labs     01/06/25  1840 01/07/25  0555   WBC 12.6* 11.3*   RBC 3.49* 3.06*   HGB 10.5* 9.5*   HCT 33.9* 30.1*   MCV 97.1 98.4   RDW 12.2 12.1    300     CHEMISTRIES:  Recent Labs     01/06/25  1838 01/06/25  1840 01/07/25  0555   NA  --  137 136   K  --  4.7 4.8   CL  --  99 99   CO2  --  19* 19*   BUN  --  71* 74*   CREATININE  --  8.6* 8.6*   GLUCOSE 145 149* 101*     PT/INR:  Recent Labs     01/06/25  1840   PROTIME 13.2   INR 1.0     APTT:No results for input(s): \"APTT\" in the last 72 hours.  LIVER PROFILE:  Recent Labs     01/06/25  1840   AST 17   ALT 11   BILITOT 0.3   ALKPHOS 79       Renal Labs  Albumin:    Lab Results   Component Value Date/Time    LABALBU DUPLICATE ORDER 05/27/2022 12:43 PM     Calcium:    Lab Results   Component Value Date/Time    CALCIUM 8.5 01/07/2025 05:55 AM     Phosphorus:    Lab Results   Component Value Date/Time    PHOS 3.6 12/29/2023 12:15 PM     U/A:    Lab Results   Component Value Date/Time    NITRU NEGATIVE 12/29/2023 12:15 PM    COLORU YELLOW 12/29/2023 12:15 PM    PHUR 6.0 12/29/2023 12:15 PM    PHUR 5.5 12/29/2021 11:51 AM    WBCUA 747 12/29/2023 12:15 PM    RBCUA 0 12/29/2023 12:15 PM    MUCUS RARE 12/29/2023 12:15 PM    TRICHOMONAS NONE SEEN 12/29/2023 12:15 PM    YEAST OCCASIONAL 11/21/2023 12:48 PM    BACTERIA NEGATIVE 12/29/2023 12:15 PM    CLARITYU CLEAR 12/29/2023 12:15 PM    SPECGRAV 1.025  12/29/2021 11:51 AM    UROBILINOGEN 0.2 12/29/2023 12:15 PM    BILIRUBINUR NEGATIVE 12/29/2023 12:15 PM    BILIRUBINUR negative 12/29/2021 11:51 AM    BLOODU NEGATIVE 12/29/2023 12:15 PM    GLUCOSEU NEGATIVE 12/29/2023 12:15 PM    GLUCOSEU negative 12/29/2021 11:51 AM    KETUA NEGATIVE 12/29/2023 12:15 PM     HgBA1c:    Lab Results   Component Value Date/Time    LABA1C 5.8 06/17/2023 03:24 PM     Microalbumen/Creatinine ratio:  No components found for: \"RUCREAT\"  TSH:    Lab Results   Component Value Date/Time    TSH 1.59 01/02/2025 04:20 PM     IRON:    Lab Results   Component Value Date/Time    IRON 50 05/12/2023 05:40 AM     Iron Saturation:  No components found for: \"PERCENTFE\"  TIBC:    Lab Results   Component Value Date/Time    TIBC 227 05/12/2023 05:40 AM     FERRITIN:    Lab Results   Component Value Date/Time    FERRITIN 344 05/12/2023 05:40 AM         Imaging/Diagnostics   Vascular duplex lower extremity venous left    Result Date: 1/2/2025  No evidence of deep venous thrombosis in the left lower extremity Electronically signed by Martín Harley MD    CT HEAD WO CONTRAST    Result Date: 1/2/2025  No acute intracranial findings. Electronically signed by Andrei Munoz MD            Electronically signed by CHI ROY MD on 1/7/25 at 9:20 AM EST    Comment: Please note this report has been produced using speech recognition software and may contain errors related to that system including errors in grammar, punctuation, and spelling, as well as words and phrases that may be inappropriate. If there are any questions or concerns please feel free to contact the dictating provider for clarification.     Office  [address] [address]  Phone [phone]  Fax [phone]

## 2025-01-07 NOTE — ED NOTES
Medication History  [facility]    Patient Name: Gwendolyn Grimm 1940     Medication history has been completed by: Indira Nieves CPhT    Source(s) of information: insurance claims     Primary Care Physician: Pepe Avina MD     Pharmacy: [facility]    Allergies as of 01/06/2025 - Fully Reviewed 01/06/2025   Allergen Reaction Noted    Anesthesia s-i-40 [propofol] Anaphylaxis 01/19/2024    Scopolamine  08/09/2012    Amoxicillin  05/12/2022    Bactrim [sulfamethoxazole-trimethoprim]  04/20/2022    Penicillins Hives 08/09/2012        Prior to Admission medications    Medication Sig Start Date End Date Taking? Authorizing Provider   LANTUS SOLOSTAR 100 UNIT/ML injection pen Inject 10 Units into the skin nightly 12/9/24  Yes Amrit Palomino MD   BREYNA 80-4.5 MCG/ACT AERO Inhale 2 puffs into the lungs 2 times daily 11/15/24  Yes Amrit Palomino MD   citalopram (CELEXA) 20 MG tablet Take 1 tablet by mouth once daily 12/23/24   Bhavin Izaguirre MD   spironolactone (ALDACTONE) 50 MG tablet Take 1 tablet by mouth 2 times daily    Amrit Palomino MD   hydrALAZINE (APRESOLINE) 50 MG tablet Take 1 tablet by mouth 2 times daily at 0800 and 1400    Amrit Palomino MD   amLODIPine (NORVASC) 10 MG tablet Take 1 tablet by mouth daily 6/25/24   Bhavin Izaguirre MD   levothyroxine (SYNTHROID) 150 MCG tablet Take 1 tablet by mouth Daily 12/8/23   Amrit Palomino MD   albuterol sulfate HFA (PROVENTIL;VENTOLIN;PROAIR) 108 (90 Base) MCG/ACT inhaler INHALE 2 PUFFS BY MOUTH EVERY 4 TO 6 HOURS AS NEEDED 8/10/23   Amrit Palomino MD   metoprolol succinate (TOPROL XL) 25 MG extended release tablet Take 1 tablet by mouth daily    Amrit Palomino MD   isosorbide mononitrate (IMDUR) 60 MG extended release tablet Take 1 tablet by mouth daily 6/22/23   Jimenez Hernandez MD   Melatonin 10 MG TABS Take 10 mg by mouth nightly as needed    Amrit Palomino MD   mirtazapine  (REMERON) 15 MG tablet Take 1 tablet by mouth nightly 6/12/23   ProviderAmrit MD   pantoprazole (PROTONIX) 40 MG tablet Take 1 tablet by mouth every morning (before breakfast) 6/2/23   HERLINDA Alonzo MD Handicap Placard MISC by Does not apply route Expires 5/19/2027 5/19/22   Cinthya Contreras, APRN - CNP   acetaminophen (TYLENOL) 325 MG tablet Take 2 tablets by mouth as needed for Pain    ProviderAmrit MD   aspirin 81 MG EC tablet Take 1 tablet by mouth daily    ProviderAmrit MD     Medications added or changed (ex. new medication, dosage change, interval change, formulation change):  Levemir changed to Lantus 10 units at HS ( lantus ordered)  Symbicort changed to Breyna 80-4.5 2 puff BID (Symbicort 160-4.5 ordered.    Medications requiring reconciliation with provider:    Symbicort changed to Breyna 80-4.5 2 puff BID (Symbicort 160-4.5 ordered.)    Comments:  Medication list per insurance claims.    To my knowledge the above medication history is accurate as of 1/7/2025 9:21 AM.   Indira Nieves, MERCEDES   1/7/2025 9:21 AM

## 2025-01-07 NOTE — CONSULTS
Neurology Service Consult Note  [facility]   Patient Name: Gwendolyn Grimm  : 1940        Subjective:   Reason for consult: right side weakness  84 y.o. - female with history of DM, ESRD, HTN, and HLD presenting to [facility] for right side weakness. She states she woke up yesterday morning in her normal state of health. She states around noon she noticed her right hand was weak and clumsy. She also noted some difficulty in right leg. She did not present until later in the evening. CT head was unremarkable. CTA of head and neck with evidence of high grade stenosis of left ICA.     Patient seen and examined. She reports she is feeling better. Continues to have mild weakness to right UE/LE with dysmetria. She denies any history of stroke. She states she was on ASA therapy but unsure about statin therapy. Awaiting for MRI of brain to be completed.       Past Medical History:   Diagnosis Date    Abnormal nuclear stress test 2007-EF=67%,Mild->Mod.isch.LAD;-EF=70%,Suggests poss.Mild Ant.wall ischemia.    Cardiac arrest (HCC)     Diabetes mellitus ([facility])     H/O cardiac catheterization 2008    No signif.CAD.    H/O cardiovascular stress test 2008    mild to mod ischemia in the LAD region, abnormal study, EF 67%, patient developed mild chest pain and throat tightness    H/O cardiovascular stress test 2016    lexiscan-moderate ischemia apical,RF66%    H/O Doppler ultrasound 2007    CAROTID DOPLER- intimal thickening but no significant atherosclerotic plaque noted in the right or left internal carotid artery, doppler flow velocities within the right and left internal carotid artery are elevated, consistent with a mild, less than 50% stenosis    H/O echocardiogram 10/14/2008    EF>55%, normal LV systolic function, normal left ventricular wall thickness, impaired LV relaxation    H/O echocardiogram 2013- EF >55% Impaired LV      Alcohol/week: 0.0 standard drinks of alcohol    Drug use: No    Sexual activity: Not Currently     Partners: Male     Comment:    Other Topics Concern    Not on file   Social History Narrative    Lives with son and daughter in law; is able to drive      Social Determinants of Health     Financial Resource Strain: Medium Risk (5/3/2023)    Overall Financial Resource Strain (CARDIA)     Difficulty of Paying Living Expenses: Somewhat hard   Food Insecurity: No Food Insecurity (1/2/2025)    Hunger Vital Sign     Worried About Running Out of Food in the Last Year: Never true     Ran Out of Food in the Last Year: Never true   Transportation Needs: No Transportation Needs (1/2/2025)    PRAPARE - Transportation     Lack of Transportation (Medical): No     Lack of Transportation (Non-Medical): No   Physical Activity: Insufficiently Active (5/3/2023)    Exercise Vital Sign     Days of Exercise per Week: 3 days     Minutes of Exercise per Session: 10 min   Stress: No Stress Concern Present (5/3/2023)    [place] [employer] - Occupational Stress Questionnaire     Feeling of Stress : Only a little   Social Connections: Socially Isolated (5/3/2023)    Social Connection and Isolation Panel [NHANES]     Frequency of Communication with Friends and Family: More than three times a week     Frequency of Social Gatherings with Friends and Family: More than three times a week     Attends Jew Services: Never     Active Member of Clubs or Organizations: No     Attends Club or Organization Meetings: Never     Marital Status:    Intimate Partner Violence: Not At Risk (5/3/2023)    Humiliation, Afraid, Rape, and Kick questionnaire     Fear of Current or Ex-Partner: No     Emotionally Abused: No     Physically Abused: No     Sexually Abused: No   Housing Stability: Low Risk  (1/2/2025)    Housing Stability Vital Sign     Unable to Pay for Housing in the Last Year: No     Number of Times Moved in the  Last Year: 0     Homeless in the Last Year: No      Family History   Problem Relation Age of Onset    Cancer Sister     COPD Brother     Heart Disease Brother     Cancer Mother         melonoma    Migraines Mother     Cancer Father         carcinoma    High Blood Pressure Father     Heart Disease Father          ROS (10 systems)  In addition to that documented in the HPI above, the additional ROS was obtained:  Constitutional: Denies fevers or chills  Eyes: Denies vision changes  ENMT: Denies sore throat  CV: Denies chest pain  Resp: Denies SOB  GI: Denies vomiting or diarrhea  : Denies painful urination  MSK: Denies recent trauma  Skin: Denies new rashes  Neuro: Denies new numbness or tingling or weakness  Endocrine: Denies unexpected weight loss  Heme: Denies bleeding disorders    Physical Exam:          Wt Readings from Last 3 Encounters:   01/02/25 81.6 kg (180 lb)   09/27/24 85.3 kg (188 lb)   09/23/24 85.3 kg (188 lb)     Temp Readings from Last 3 Encounters:   01/06/25 98.2 °F (36.8 °C)   01/04/25 98.4 °F (36.9 °C) (Oral)   02/02/24 97.7 °F (36.5 °C)     BP Readings from Last 3 Encounters:   01/07/25 133/83   01/04/25 118/88   09/27/24 (!) 126/58     Pulse Readings from Last 3 Encounters:   01/07/25 56   01/04/25 60   09/23/24 52        Gen: A&O x 4, NAD, cooperative  HEENT: NC/AT, EOMI, PERRL, mmm, no carotid bruits, neck supple, no meningeal signs;   Heart: SR/SB on monitor  Lungs: Respirations unlabored  Ext: no edema, no calf tenderness b/l  Psych: normal mood and affect  Skin: no rashes or lesions    NEUROLOGIC EXAM:    Mental Status: A&O to self, location, month and year, NAD, speech clear, language fluent, repetition and naming intact, follows commands appropriately    Cranial Nerve Exam:   CN II-XII: PERRL, VFF, no nystagmus, no gaze paresis, sensation V1-V3 intact b/l, muscles of facial expression symmetric; hearing intact to conversational tone, palate elevates symmetrically, shoulder elevation  symmetric and tongue protrudes midline with movement side to side.    Motor Exam:       Strength 5/5 left UE's/LE's, 4/5 right UE/LE  Tone and bulk normal   Right  pronator drift    Deep Tendon Reflexes: 1/4 biceps, triceps, brachioradialis, patellar, and achilles b/l; flexor plantar responses b/l    Sensation: Intact light touch/pinprick/vibration UE's/LE's b/l    Coordination/Cerebellum:       Tremors--none      Rapidly alternating movements: no dysdiadochokinesia b/l                Heel-to-Shin: no dysmetria b/l      Finger-to-Nose: right UE dysmetria b/l    Gait and stance:      Gait: deferred      LABS:     Recent Labs     01/06/25  1838 01/06/25  1840 01/07/25  0555   WBC  --  12.6* 11.3*   NA  --  137 136   K  --  4.7 4.8   CL  --  99 99   CO2  --  19* 19*   BUN  --  71* 74*   CREATININE  --  8.6* 8.6*   GLUCOSE 145 149* 101*   ALBUMIN  --  3.7  --    INR  --  1.0  --    GETLIPIDS@  Panel:3,Ammonia:3,UA:3@,[unfilled],Last TSH Results,[unfilled],     Last Liver Function Results:  @LABRCNTIP(ALT:3,AST:3,BILITOTAL:3,BILIDIR:3,ALKPHOS:3)@          IMAGING:    CT head:  IMPRESSION:     No acute intracranial hemorrhage, large territory infarct or mass effect.    Please note, MRI is more sensitive to detect acute infarction can be obtained if   there is persistent clinical concern.     Volume loss with sequelae of chronic microvascular ischemic change.       CTA of head and neck:  IMPRESSION:  High-grade stenosis at the left carotid bifurcation. Atherosclerotic   calcification throughout.     No vessel cutoffs.    Above imaging personally reviewed.       ASSESSMENT/PLAN:   This is an 83 y/o female with history of DM, ESRD, HTN, and HLD presenting with right side weakness. This continues on exam this morning. Mild weakness to right UE/LE with dysmetria.     Right side weakness consistent with left lacunar infarction.   CT of head as above  CTA of head and neck as above. Consulted Neuro intervention as suspect left ICA  stenosis is symptomatic  We will initiate DAPT with ASA and Plavix for 21 days. Will load with 300 mg Plavix today  She will be maintained on statin therapy of 40  mg daily.   MRI of brain has been ordered  PT/OT/ST per their recommendations  She will need to follow up in stroke clinic after discharge  Further recommendations pending testing.     > 70 minutes of time spent included chart review, obtaining history, patient examination, developing plan of care, and documentation.      Thank you for allowing us to participate in the care of your patient.  If there are any questions regarding evaluation please feel free to contact us.     Rocio Castle, APRN - CNS, 1/7/2025     Patient with acute right sided weakness. She presented outside the TNK window. MRI does show acute infarcts, personally reviewed. Dr. Willoughby consulted for symptomatic carotid stenosis. Echo was done in September, hold on new one for now as stroke likely secondary to carotid stenosis.   MRI Brain WO Contrast  Order: [id number]  Status: Final result       Visible to patient: Yes (not seen)       Next appt: 03/25/2025 at 01:00 PM in Cardiology (Neyda Mcadams, APRN - CNP)    0 Result Notes  Details    Reading Physician Reading Date Result Priority   Yamil Boothe MD  [phone] 1/7/2025      Narrative & Impression  EXAMINATION: MRI BRAIN WO CONTRAST      DATE OF EXAM:  1/7/2025 14:25     DEMOGRAPHICS: 84 years old Female      INDICATION: Right-sided weakness of arm and leg     COMPARISON: Head CT/CTA performed yesterday     TECHNIQUE: Multisequence, multiplanar MRI of the brain was performed without IV   contrast.      FINDINGS:     MRI BRAIN:   There are scattered small areas of diffusion restriction throughout the left   cerebral hemisphere, largest at the left frontoparietal junction.. There are   correlating T2/FLAIR hyperintensities with no significant mass effect or   associated hemorrhagic change. Pituitary and pineal  regions are within normal   limits. Cerebellar tonsils are appropriately positioned. Degenerative findings   in the cervical spine.     Mild scattered T2/FLAIR hyperintensities throughout the subcortical and   periventricular white matter. No evidence of an intracranial mass. The   ventricles are slightly prominent with chronic surgical sulcal enlargement.   Findings are consistent with mild generalized atrophy. Large arterial flow voids   are maintained. Paranasal sinuses and mastoid air cells are clear.        IMPRESSION:     1.  Several small scattered acute infarcts throughout the left cerebral   hemisphere, largest at the left frontoparietal junction. No significant mass   effect or associated hemorrhagic change.   2.  Early cerebral atrophy with mild nonspecific white matter disease, likely   chronic small vessel ischemic changes given patient age.                 Dictated and Electronically Signed By:   Yamil Boothe   1/7/2025 12:00        Right ue and le 4+/5 with slight dysmetria in rue. Speech fluent. Sensation intact.     Attending Note:  I have rounded on this patient with KE Gonzalez. I have reviewed the chart and we have discussed this case in detail. The patient was seen and examined by myself. Pertinent labs and imaging have been personally reviewed.   Our findings and impressions were discussed with the patient. I concur with the Nurse Practioner's assessment and plan.    Electronically signed by Dolores Ibarra DO on 1/7/2025 at 1:03 PM

## 2025-01-07 NOTE — ED NOTES
[id number] perfect serve message sent to Dr Ibarra on in patient consult from hospitalist    [id number] Dr bIarra acknowledged perfect serve message. Added to treatment team

## 2025-01-07 NOTE — TELEPHONE ENCOUNTER
Patient will need to follow up in the stroke clinic with Gloria once discharged from the hospital.

## 2025-01-07 NOTE — CONSULTS
Vascular/Interventional Neurology Service Consult Note  [facility]   Patient Name: Gwendolyn Grimm  : 1940        Subjective:   Reason for consult:   Gwendolyn Grimm is a 84-year-old female with a past medical history of DM, HTN, HLD, peritoneal dialysis,, cardiac arrest presenting to [facility] with complaints of bilateral leg weakness and right arm weakness.  She was recently admitted for generalized weakness and was using a walker to get around.  She went to bed at 9:30 PM after setting up her peritoneal dialysis.  When she woke up she was having bilateral leg weakness and not able to ambulate as normal.  A few hours later she felt right arm heaviness/clumsiness.  A stroke alert was called.  NIH 2.  CT head no acute intercranial hemorrhage.  CTA demonstrated high-grade stenosis of the left carotid bifurcation.     Upon exam the patient states improvement in her right arm.  She tells me she did not have right leg weakness that it was bilateral leg weakness.  Still endorses right fine motor difficulties and dysmetria.  She is on aspirin at home.    Past Medical History:   Diagnosis Date    Abnormal nuclear stress test 2007-EF=67%,Mild->Mod.isch.LAD;-EF=70%,Suggests poss.Mild Ant.wall ischemia.    Cardiac arrest (HCC)     Diabetes mellitus (HCC)     H/O cardiac catheterization 2008    No signif.CAD.    H/O cardiovascular stress test 2008    mild to mod ischemia in the LAD region, abnormal study, EF 67%, patient developed mild chest pain and throat tightness    H/O cardiovascular stress test 2016    lexiscan-moderate ischemia apical,RF66%    H/O Doppler ultrasound 2007    CAROTID DOPLER- intimal thickening but no significant atherosclerotic plaque noted in the right or left internal carotid artery, doppler flow velocities within the right and left internal carotid artery are elevated, consistent with a mild, less than 50% stenosis    H/O  Smokeless tobacco: Never   Vaping Use    Vaping status: Never Used   Substance and Sexual Activity    Alcohol use: No     Alcohol/week: 0.0 standard drinks of alcohol    Drug use: No    Sexual activity: Not Currently     Partners: Male     Comment:    Other Topics Concern    Not on file   Social History Narrative    Lives with son and daughter in law; is able to drive      Social Determinants of Health     Financial Resource Strain: Medium Risk (5/3/2023)    Overall Financial Resource Strain (CARDIA)     Difficulty of Paying Living Expenses: Somewhat hard   Food Insecurity: No Food Insecurity (1/2/2025)    Hunger Vital Sign     Worried About Running Out of Food in the Last Year: Never true     Ran Out of Food in the Last Year: Never true   Transportation Needs: No Transportation Needs (1/2/2025)    PRAPARE - Transportation     Lack of Transportation (Medical): No     Lack of Transportation (Non-Medical): No   Physical Activity: Insufficiently Active (5/3/2023)    Exercise Vital Sign     Days of Exercise per Week: 3 days     Minutes of Exercise per Session: 10 min   Stress: No Stress Concern Present (5/3/2023)    [place] [employer] - Occupational Stress Questionnaire     Feeling of Stress : Only a little   Social Connections: Socially Isolated (5/3/2023)    Social Connection and Isolation Panel [NHANES]     Frequency of Communication with Friends and Family: More than three times a week     Frequency of Social Gatherings with Friends and Family: More than three times a week     Attends Jainism Services: Never     Active Member of Clubs or Organizations: No     Attends Club or Organization Meetings: Never     Marital Status:    Intimate Partner Violence: Not At Risk (5/3/2023)    Humiliation, Afraid, Rape, and Kick questionnaire     Fear of Current or Ex-Partner: No     Emotionally Abused: No     Physically Abused: No     Sexually Abused: No   Housing Stability: Low Risk   angiogram for carotid stenting as an outpatient next week pending discharge plan.   -Continue aspirin and Plavix  -Continue statin for secondary prevention of stroke  -PT/OT/ST as able    Pertinent images discussed/reviewed with Dr. Willoughby who has fully participated in the care of this patient and agrees with plan.      Thank you for allowing us to participate in the care of your patient.  If there are any questions regarding evaluation please feel free to contact us.     NIKUNJ Zhang - CNP, 1/7/2025

## 2025-01-07 NOTE — PROGRESS NOTES
4 Eyes Skin Assessment     NAME:  Gwendolyn Grimm  YOB: 1940  MEDICAL RECORD NUMBER:  [id number]    The patient is being assessed for  Admission    I agree that at least one RN has performed a thorough Head to Toe Skin Assessment on the patient. ALL assessment sites listed below have been assessed.      Areas assessed by both nurses:    Head, Face, Ears, Shoulders, Back, Chest, Arms, Elbows, Hands, Sacrum. Buttock, Coccyx, Ischium, and Legs. Feet and Heels        Does the Patient have a Wound? Yes wound(s) were present on assessment. LDA wound assessment was Initiated and completed by RN       Efrain Prevention initiated by RN: Yes  Wound Care Orders initiated by RN: No    Pressure Injury (Stage 3,4, Unstageable, DTI, NWPT, and Complex wounds) if present, place Wound referral order by RN under : No    New Ostomies, if present place, Ostomy referral order under : No     Nurse 1 eSignature: Electronically signed by Anita Leon RN on 1/7/25 at 6:12 PM EST    **SHARE this note so that the co-signing nurse can place an eSignature**    Nurse 2 eSignature: Electronically signed by Kenna Rowley RN on 1/7/25 at 6:44 PM EST

## 2025-01-07 NOTE — ED NOTES
Pt reports ongoing generalized weakness to R leg and now reports 'R arm feels heavy and it's like I cannot control it'. Pt is able to move extremity without weakness, no drift noted

## 2025-01-07 NOTE — H&P
History and Physical      Name:  Gwendolyn Grimm /Age/Sex: 1940  (84 y.o. female)   MRN & CSN:  [id number] & [id number] Encounter Date/Time: 2025 9:26 PM   Location:  ED18/ED-18 PCP: Pepe Avina MD       Hospital Day: 1    Assessment and Plan:     Patient is a 84 y.o. female who presented with right-sided weakness.     # Right-sided weakness  # Left carotid stenosis  - Reported worsening right arm and leg weakness since morning  after waking up which is different than her generalized. No extremity numbness, facial droop or aphasia generalized weakness. LKW . Ambulates with walker at home, no falls.   - In ED, CTH non-acute, CTA H/N showed high-grade stenosis of left carotid bifurcation. Initial NIHSS 2.   - Continue ASA, started on Lipitor. MRI ordered, consider differential neurology consultation if positive for acute CVA. PT/OT consulted, appreciate assistance. NPO pending swallow evaluation. Fall precautions. Telemetry.    # ESRD on PD  - Last treatment on .   - Nephrology consulted, plan for PD tomorrow, appreciate assistance.     # Non-obstructive CAD  # Non-MI chronic troponin elevation  - Denied any typical CP. Negative LHC in 6 2022.  - Initial Tn elevated but at baseline. ECG without acute ischemic changes.  - Likely secondary to above. Continue ASA, Lipitor, Toprol-XL and Imdur.    # Essential hypertension  - Continue Norvasc, hydralazine, Toprol-XL and Aldactone.    # Mixed hyperlipidemia  - Started on Lipitor.  - Follow-up FLP.    # T2DM with hyperglycemia  - Last A1c 5.8% in 2023.  - Continue insulin Lantus 10 u qhs with LCSI.    # Suspected COPD without exacerbation  - Not on oxygen at baseline. No PFTs on file.   - Continue home inhalers.     # Acquired hypothyroidism  - Last TSH normal on 2025.   - Continue Synthroid.     # GERD  - Continue PPI.    # Hx of PEA arrest from anesthesia    # Hx of colon cancer s/p colectomy in 2023    # Depression  -  Continue Celexa and Remeron.    Checklist:  Advanced care planning: full  Diet: NPO pending swallow evaluation  Sugar: BG goal of 140-180 while inpatient  VTE ppx: heparin      Disposition: place in observation.  Estimated discharge: 1-2 day(s).  Current living situation: home.  Expected disposition: TBD.      Spoke with ED provider who recommended admission for the patient and I agree with that plan.  Personally reviewed lab studies and imaging.  EKG interpreted personally and results as stated above.  Imaging that was interpreted personally and results as stated above.    History of Present Illness:     Chief Complaint: Right arm weakness    Patient is a 84 y.o. female with a PMHx as above who presented to the ED with worsening right arm and leg weakness since morning 1/6 after waking up which is different than her generalized. No extremity numbness, facial droop or aphasia generalized weakness. LKW 2130 1/5. Ambulates with walker at home, no falls. Denied any fevers, chills, CP, SOB, cough, N/V, abdominal pain, C/D or bleeding. Denied any tobacco or alcohol use.    History obtained from: patient, daughter and ED provider.    ROS:     Pertinent positives and negatives discussed in HPI above.    Objective:     No intake or output data in the 24 hours ending 01/06/25 2126     Vitals:   Vitals:    01/06/25 2000 01/06/25 2030 01/06/25 2045 01/06/25 2100   BP: (!) 134/41 125/66     Pulse: 62 55 56 56   Resp: 18 13 18 13   Temp:       SpO2: 95% 100% 98% 97%     BMI: There is no height or weight on file to calculate BMI.  General: Awake.    HEENT: PERRLA. Vision grossly intact. Normal hearing. Oropharynx clear.  Neck: Supple. No JVD.   CV: RRR. NL S1/S2. No BLE pitting edema.   Pulm: NL effort on RA. CTAB.   GI: +BS x4. Soft. NT/ND. PD catheter in lower abdomen.  : No CVA tenderness.   Skin: Intact, warm and dry.  MSK: No gross joint deformities. Full ROM.   Neuro: A&Ox4, CNs grossly intact, face symmetrical, no  obvious droop, speech clear and coherent, no pronator drift, rapid and repetitive movements are intact, no significant finger-to-nose dysmetria, sensation intact to light touch. Normal heel-to-shin. No focal deficit. Bilateral UEs and LEs 5/5 throughout.  Psych: Good judgement and reason.     Past History:     PMHx:   Past Medical History:   Diagnosis Date   • Abnormal nuclear stress test 04/08/2007 4/08-EF=67%,Mild->Mod.isch.LAD;7/07-EF=70%,Suggests poss.Mild Ant.wall ischemia.   • Cardiac arrest (HCC) 2008   • Diabetes mellitus ([facility])    • H/O cardiac catheterization 04/2008    No signif.CAD.   • H/O cardiovascular stress test 04/11/2008    mild to mod ischemia in the LAD region, abnormal study, EF 67%, patient developed mild chest pain and throat tightness   • H/O cardiovascular stress test 05/03/2016    lexiscan-moderate ischemia apical,RF66%   • H/O Doppler ultrasound 07/16/2007    CAROTID DOPLER- intimal thickening but no significant atherosclerotic plaque noted in the right or left internal carotid artery, doppler flow velocities within the right and left internal carotid artery are elevated, consistent with a mild, less than 50% stenosis   • H/O echocardiogram 10/14/2008    EF>55%, normal LV systolic function, normal left ventricular wall thickness, impaired LV relaxation   • H/O echocardiogram 06/18/2013 6/13- EF >55% Impaired LV relaxation, Mild concentric LV hypertrophy, No sig AS, THEO underestimated. 10/08-EF=>55%,NL study;7/07-EF=>55%,Mild valv.AS.   • H/O echocardiogram 05/16/2016 5/16 EF 55-60% normal study 12/14EF 60%. MIld mitral and tricuspid insufficiencies. Mildly sclerotic aortic valve which appears to be mildly stenosed with aortic valve area of 1.4 however this does not appear to be hemodynamically signficant aortic stenosis. Mildy hypertropic left ventricle with normal global and regional left ventricular systolic function.   • H/O left heart cath 05/23/2016    no significant disease  in all vessels. EF 55%   • H/O left heart cath 06/08/2022    No significant disease   • Hernia     after lap band removal   • Hx of cardiovascular stress test 06/18/2013 6/13-EF 51%, no scitnigraphic evidence of inducible myocardial ischemia   • Hyperlipidemia    • Hypertension    • Irritable bowel syndrome    • Kidney stones    • Obesity    • Osteoarthritis    • Thyroid disease    • Type II or unspecified type diabetes mellitus without mention of complication, not stated as uncontrolled    • Venogram 06/21/2023    There is no DVT or any veinous blood clot in any of the above left leg veinous system       PSHx:   Past Surgical History:   Procedure Laterality Date   • CARDIAC CATHETERIZATION  04/18/08    continue risk factor modification with strict control of her risk factors with diet and control of diabetes, high blood pressure and hyperlipidemia   • GASTRIC BAND  08/2008    revision of gastric band placement   • HEMICOLECTOMY Right 5/3/2023    RIGHT BOWEL RESECTION HEMICOLECTOMY LAPAROSCOPIC ROBOTIC XI performed by Amanda Tillman MD at [facility] OR   • HERNIA REPAIR N/A 5/3/2023    OPEN HERNIA INCISIONAL REPAIR performed by Amanda Tillman MD at [facility] OR   • HYSTERECTOMY (CERVIX STATUS UNKNOWN)     • IR TUNNELED CVC PLACE WO SQ PORT/PUMP > 5 YEARS  1/9/2024    IR TUNNELED CATHETER PLACEMENT GREATER THAN 5 YEARS 1/9/2024 [facility] SPECIAL PROCEDURES   • LAP BAND      put in in May Removed in August   • LITHOTRIPSY     • ROTATOR CUFF REPAIR      right   • ROTATOR CUFF REPAIR         Allergies:   Allergies   Allergen Reactions   • Anesthesia S-I-40 [Propofol] Anaphylaxis     PATIENT CODED AFTER THREE SURGERIES    • Scopolamine      Combative and delusional   • Amoxicillin    • Bactrim [Sulfamethoxazole-Trimethoprim]      Had increase creatinine not true reaction   • Penicillins Hives       FHx: family history includes COPD in her brother; Cancer in her father, mother, and sister; Heart Disease in her brother and father; High        Jax Trinidad MD  01/06/25 9:26 PM

## 2025-01-07 NOTE — PROGRESS NOTES
V2.0    [facility] Progress Note      Name:  Gwendolyn Grimm /Age/Sex: 1940  (84 y.o. female)   MRN & CSN:  [id number] & [id number] Encounter Date/Time: 2025 2:04 PM EST   Location:  ED18/ED-18 PCP: Pepe Avina MD     Attending:Aramis Miller MD       Hospital Day: 2    Assessment and Recommendations   Gwendolyn Grimm is a 84 y.o. female with pmh of ESRD on PD, CAD, HTN, HLD who presents with Right sided weaknessLabs showed Cr 8.6, BUN 71, , chol 202, wbc 11.3, hgb 9.5. CT head with no acute findings.  CTA head/neck showed high-grade stenosis of the left carotid bifurcation.  MRI brain showing several small scattered acute infarcts throughout the left cerebral hemisphere, largest at the left frontoparietal junction..      Plan:   Acute CVA: Presented with right-sided weakness and CTA head/neck showing high-grade stenosis of the left carotid bifurcation.  MRI brain showing several small scattered acute infarcts in the left cerebral hemisphere, largest at the left frontoparietal junction with no significant mass effect or hemorrhage.  Neuroconsulted with recs to consult neurointerventional radiology.  Plan for DAPT x 21 days followed by monotherapy afterwards.  Continue statin as patient did have some dyslipidemia.  Blood pressure meds currently on hold for permissive hypertension will resume as able.  PT/OT/ST to consult.  Essential hypertension: Blood pressure overall stable on BP meds on hold for permissive hypertension.  Plan to resume 2025.  ESRD on PD: Nephrology consulted with plan to resume PD .  Nonobstructive CAD: Non-MI chronic troponin elevation: No chest pain.  Negative left heart cath in .  EKG without evidence of acute ischemia.  Continue ASA, Plavix, statin, beta-blocker, and Imdur.  Mixed hyperlipidemia: , cholesterol 202.  Initiated on Lipitor as above.  Type 2 diabetes with hyperglycemia: Continue basal insulin with SSI and will titrate as needed.  COPD: Per  Mood appropriate.         Medications:   Medications:    atorvastatin  40 mg Oral Nightly    [START ON 1/8/2025] clopidogrel  75 mg Oral Daily    [Held by provider] amLODIPine  10 mg Oral Daily    aspirin  81 mg Oral Daily    budesonide-formoterol  2 puff Inhalation BID RT    citalopram  20 mg Oral Daily    [Held by provider] hydrALAZINE  50 mg Oral BID    insulin glargine  10 Units SubCUTAneous Nightly    [Held by provider] isosorbide mononitrate  60 mg Oral Daily    levothyroxine  150 mcg Oral Daily    [Held by provider] metoprolol succinate  25 mg Oral Daily    mirtazapine  15 mg Oral Nightly    pantoprazole  40 mg Oral QAM AC    [Held by provider] spironolactone  50 mg Oral BID    heparin (porcine)  5,000 Units SubCUTAneous 3 times per day      Infusions:   PRN Meds: albuterol sulfate HFA, 1 puff, Q6H PRN  ondansetron, 4 mg, Q8H PRN   Or  ondansetron, 4 mg, Q6H PRN  melatonin, 3 mg, Nightly PRN  polyethylene glycol, 17 g, Daily PRN  acetaminophen, 650 mg, Q6H PRN   Or  acetaminophen, 650 mg, Q6H PRN        Labs and Imaging   MRI Brain WO Contrast    Result Date: 1/7/2025  EXAMINATION: MRI BRAIN WO CONTRAST DATE OF EXAM:  1/7/2025 14:25 DEMOGRAPHICS: 84 years old Female INDICATION: Right-sided weakness of arm and leg COMPARISON: Head CT/CTA performed yesterday TECHNIQUE: Multisequence, multiplanar MRI of the brain was performed without IV contrast. FINDINGS: MRI BRAIN: There are scattered small areas of diffusion restriction throughout the left cerebral hemisphere, largest at the left frontoparietal junction.. There are correlating T2/FLAIR hyperintensities with no significant mass effect or associated hemorrhagic change. Pituitary and pineal regions are within normal limits. Cerebellar tonsils are appropriately positioned. Degenerative findings in the cervical spine. Mild scattered T2/FLAIR hyperintensities throughout the subcortical and periventricular white matter. No evidence of an intracranial mass. The  ventricles are slightly prominent with chronic surgical sulcal enlargement. Findings are consistent with mild generalized atrophy. Large arterial flow voids  are maintained. Paranasal sinuses and mastoid air cells are clear. IMPRESSION: 1.  Several small scattered acute infarcts throughout the left cerebral hemisphere, largest at the left frontoparietal junction. No significant mass effect or associated hemorrhagic change. 2.  Early cerebral atrophy with mild nonspecific white matter disease, likely chronic small vessel ischemic changes given patient age.  Dictated and Electronically Signed By: Yamil Boothe 1/7/2025 12:00        CTA HEAD NECK W CONTRAST    Result Date: 1/6/2025  CTA HEAD NECK W CONTRAST 1/6/2025 22:06 COMPARISON: None Procedure and Materials: IOPAMIDOL 76 % IV SOLN Helical images were obtained in the axial plane during the rapid administration of intravenous contrast. The exam was timed to best evaluate and opacify the arterial structures. The examination is reviewed at  soft tissue, and lung windows. 2D and 3D reconstructions are performed of the target arterial structures. CT radiation dose optimization techniques (automated exposure control, and use of iterative reconstruction techniques, or adjustment of the mA and/or kV according to patient size) were used to limit patient radiation dose. Measurements and estimates of internal carotid artery stenosis are based on the NASCET criteria. Limitations: None. FINDINGS: Brain: No significant abnormalities in the arterial phase. Soft Tissues: No significant soft tissue findings in the neck or upper chest. Upper Lungs:  No nodules or other significant lung pathology. Aortic Arch: Classic origin of the brachiocephalic vessels. No significant abnormalities in the common carotid or subclavian arteries. Right Cervical Carotid: Normal appearing Carotid bifurcation. No evidence of significant stenosis or dissection. Left Cervical Carotid: High-grade stenosis  paranasal sinuses and mastoid air cells are well aerated.     No acute intracranial findings. Electronically signed by Andrei Munoz MD      CBC:   Recent Labs     01/06/25  1840 01/07/25  0555   WBC 12.6* 11.3*   HGB 10.5* 9.5*    300     BMP:    Recent Labs     01/06/25  1838 01/06/25  1840 01/07/25  0555   NA  --  137 136   K  --  4.7 4.8   CL  --  99 99   CO2  --  19* 19*   BUN  --  71* 74*   CREATININE  --  8.6* 8.6*   GLUCOSE 145 149* 101*     Hepatic:   Recent Labs     01/06/25  1840   AST 17   ALT 11   BILITOT 0.3   ALKPHOS 79     Lipids:   Lab Results   Component Value Date/Time    CHOL 202 01/07/2025 05:55 AM    HDL 33 01/07/2025 05:55 AM    TRIG 177 01/07/2025 05:55 AM     Hemoglobin A1C:   Lab Results   Component Value Date/Time    LABA1C 5.8 06/17/2023 03:24 PM     TSH:   Lab Results   Component Value Date/Time    TSH 1.59 01/02/2025 04:20 PM     Troponin:   Lab Results   Component Value Date/Time    TROPONINT 0.029 06/19/2023 06:20 AM    TROPONINT 0.030 06/18/2023 10:58 PM    TROPONINT 0.034 06/18/2023 08:12 PM     Lactic Acid: No results for input(s): \"LACTA\" in the last 72 hours.  BNP: No results for input(s): \"PROBNP\" in the last 72 hours.  UA:  Lab Results   Component Value Date/Time    NITRU NEGATIVE 12/29/2023 12:15 PM    COLORU YELLOW 12/29/2023 12:15 PM    PHUR 6.0 12/29/2023 12:15 PM    PHUR 5.5 12/29/2021 11:51 AM    WBCUA 747 12/29/2023 12:15 PM    RBCUA 0 12/29/2023 12:15 PM    MUCUS RARE 12/29/2023 12:15 PM    TRICHOMONAS NONE SEEN 12/29/2023 12:15 PM    YEAST OCCASIONAL 11/21/2023 12:48 PM    BACTERIA NEGATIVE 12/29/2023 12:15 PM    CLARITYU CLEAR 12/29/2023 12:15 PM    SPECGRAV 1.025 12/29/2021 11:51 AM    LEUKOCYTESUR LARGE NUMBER OR AMOUNT OBSERVED 12/29/2023 12:15 PM    UROBILINOGEN 0.2 12/29/2023 12:15 PM    BILIRUBINUR NEGATIVE 12/29/2023 12:15 PM    BILIRUBINUR negative 12/29/2021 11:51 AM    BLOODU NEGATIVE 12/29/2023 12:15 PM    GLUCOSEU NEGATIVE 12/29/2023 12:15 PM

## 2025-01-08 ENCOUNTER — TELEPHONE (OUTPATIENT)
Age: 85
End: 2025-01-08

## 2025-01-08 PROBLEM — I63.232 CEREBROVASCULAR ACCIDENT (CVA) DUE TO STENOSIS OF LEFT CAROTID ARTERY (HCC): Status: ACTIVE | Noted: 2025-01-07

## 2025-01-08 LAB
ANION GAP SERPL CALCULATED.3IONS-SCNC: 19 MMOL/L (ref 9–17)
BASOPHILS # BLD: 0.07 K/UL
BASOPHILS NFR BLD: 1 % (ref 0–1)
BUN SERPL-MCNC: 77 MG/DL (ref 7–20)
CALCIUM SERPL-MCNC: 8.9 MG/DL (ref 8.3–10.6)
CHLORIDE SERPL-SCNC: 98 MMOL/L (ref 99–110)
CO2 SERPL-SCNC: 19 MMOL/L (ref 21–32)
CREAT SERPL-MCNC: 9 MG/DL (ref 0.6–1.2)
EOSINOPHIL # BLD: 0.29 K/UL
EOSINOPHILS RELATIVE PERCENT: 3 % (ref 0–3)
ERYTHROCYTE [DISTWIDTH] IN BLOOD BY AUTOMATED COUNT: 12 % (ref 11.7–14.9)
GFR, ESTIMATED: 4 ML/MIN/1.73M2
GLUCOSE BLD-MCNC: 190 MG/DL (ref 74–99)
GLUCOSE BLD-MCNC: 213 MG/DL (ref 74–99)
GLUCOSE SERPL-MCNC: 159 MG/DL (ref 74–99)
HCT VFR BLD AUTO: 32.6 % (ref 37–47)
HGB BLD-MCNC: 10.6 G/DL (ref 12.5–16)
IMM GRANULOCYTES # BLD AUTO: 0.11 K/UL
IMM GRANULOCYTES NFR BLD: 1 %
LYMPHOCYTES NFR BLD: 2.47 K/UL
LYMPHOCYTES RELATIVE PERCENT: 22 % (ref 24–44)
MCH RBC QN AUTO: 31.6 PG (ref 27–31)
MCHC RBC AUTO-ENTMCNC: 32.5 G/DL (ref 32–36)
MCV RBC AUTO: 97.3 FL (ref 78–100)
MONOCYTES NFR BLD: 0.93 K/UL
MONOCYTES NFR BLD: 8 % (ref 0–4)
NEUTROPHILS NFR BLD: 65 % (ref 36–66)
NEUTS SEG NFR BLD: 7.2 K/UL
PLATELET # BLD AUTO: 326 K/UL (ref 140–440)
PMV BLD AUTO: 8.9 FL (ref 7.5–11.1)
POTASSIUM SERPL-SCNC: 4.8 MMOL/L (ref 3.5–5.1)
RBC # BLD AUTO: 3.35 M/UL (ref 4.2–5.4)
SODIUM SERPL-SCNC: 136 MMOL/L (ref 136–145)
WBC OTHER # BLD: 11.1 K/UL (ref 4–10.5)

## 2025-01-08 PROCEDURE — 90945 DIALYSIS ONE EVALUATION: CPT

## 2025-01-08 PROCEDURE — 94640 AIRWAY INHALATION TREATMENT: CPT

## 2025-01-08 PROCEDURE — 6370000000 HC RX 637 (ALT 250 FOR IP): Performed by: INTERNAL MEDICINE

## 2025-01-08 PROCEDURE — 97166 OT EVAL MOD COMPLEX 45 MIN: CPT

## 2025-01-08 PROCEDURE — 6370000000 HC RX 637 (ALT 250 FOR IP): Performed by: CLINICAL NURSE SPECIALIST

## 2025-01-08 PROCEDURE — 6370000000 HC RX 637 (ALT 250 FOR IP): Performed by: STUDENT IN AN ORGANIZED HEALTH CARE EDUCATION/TRAINING PROGRAM

## 2025-01-08 PROCEDURE — 99232 SBSQ HOSP IP/OBS MODERATE 35: CPT | Performed by: CLINICAL NURSE SPECIALIST

## 2025-01-08 PROCEDURE — 80048 BASIC METABOLIC PNL TOTAL CA: CPT

## 2025-01-08 PROCEDURE — 36415 COLL VENOUS BLD VENIPUNCTURE: CPT

## 2025-01-08 PROCEDURE — 6360000002 HC RX W HCPCS: Performed by: STUDENT IN AN ORGANIZED HEALTH CARE EDUCATION/TRAINING PROGRAM

## 2025-01-08 PROCEDURE — 82962 GLUCOSE BLOOD TEST: CPT

## 2025-01-08 PROCEDURE — 94761 N-INVAS EAR/PLS OXIMETRY MLT: CPT

## 2025-01-08 PROCEDURE — 97530 THERAPEUTIC ACTIVITIES: CPT

## 2025-01-08 PROCEDURE — 97116 GAIT TRAINING THERAPY: CPT

## 2025-01-08 PROCEDURE — 99232 SBSQ HOSP IP/OBS MODERATE 35: CPT | Performed by: NURSE PRACTITIONER

## 2025-01-08 PROCEDURE — 85025 COMPLETE CBC W/AUTO DIFF WBC: CPT

## 2025-01-08 PROCEDURE — 97162 PT EVAL MOD COMPLEX 30 MIN: CPT

## 2025-01-08 PROCEDURE — 3E1M39Z IRRIGATION OF PERITONEAL CAVITY USING DIALYSATE, PERCUTANEOUS APPROACH: ICD-10-PCS

## 2025-01-08 PROCEDURE — 1200000000 HC SEMI PRIVATE

## 2025-01-08 RX ORDER — METOPROLOL SUCCINATE 25 MG/1
25 TABLET, EXTENDED RELEASE ORAL DAILY
Status: DISCONTINUED | OUTPATIENT
Start: 2025-01-08 | End: 2025-01-09 | Stop reason: HOSPADM

## 2025-01-08 RX ORDER — HYDRALAZINE HYDROCHLORIDE 25 MG/1
25 TABLET, FILM COATED ORAL EVERY 8 HOURS SCHEDULED
Status: DISCONTINUED | OUTPATIENT
Start: 2025-01-08 | End: 2025-01-09 | Stop reason: HOSPADM

## 2025-01-08 RX ADMIN — HEPARIN SODIUM 5000 UNITS: 5000 INJECTION INTRAVENOUS; SUBCUTANEOUS at 21:10

## 2025-01-08 RX ADMIN — ATORVASTATIN CALCIUM 40 MG: 40 TABLET, FILM COATED ORAL at 21:10

## 2025-01-08 RX ADMIN — LEVOTHYROXINE SODIUM 150 MCG: 0.15 TABLET ORAL at 06:47

## 2025-01-08 RX ADMIN — INSULIN GLARGINE 10 UNITS: 100 INJECTION, SOLUTION SUBCUTANEOUS at 21:10

## 2025-01-08 RX ADMIN — PANTOPRAZOLE SODIUM 40 MG: 40 TABLET, DELAYED RELEASE ORAL at 06:47

## 2025-01-08 RX ADMIN — HEPARIN SODIUM 5000 UNITS: 5000 INJECTION INTRAVENOUS; SUBCUTANEOUS at 06:47

## 2025-01-08 RX ADMIN — ASPIRIN 81 MG: 81 TABLET, CHEWABLE ORAL at 09:42

## 2025-01-08 RX ADMIN — CITALOPRAM HYDROBROMIDE 20 MG: 20 TABLET ORAL at 09:42

## 2025-01-08 RX ADMIN — METOPROLOL SUCCINATE 25 MG: 25 TABLET, EXTENDED RELEASE ORAL at 16:13

## 2025-01-08 RX ADMIN — HEPARIN SODIUM 5000 UNITS: 5000 INJECTION INTRAVENOUS; SUBCUTANEOUS at 16:09

## 2025-01-08 RX ADMIN — MIRTAZAPINE 15 MG: 15 TABLET, FILM COATED ORAL at 21:10

## 2025-01-08 RX ADMIN — BUDESONIDE AND FORMOTEROL FUMARATE DIHYDRATE 2 PUFF: 160; 4.5 AEROSOL RESPIRATORY (INHALATION) at 13:29

## 2025-01-08 RX ADMIN — CLOPIDOGREL BISULFATE 75 MG: 75 TABLET ORAL at 09:42

## 2025-01-08 NOTE — CONSULTS
[facility] ACUTE CARE PHYSICAL THERAPY EVALUATION  Gwendolyn Grimm, 1940, 3021/3021-A, 1/8/2025    History  [place]:  The primary encounter diagnosis was Right sided weakness. Diagnoses of General weakness, ESRD on dialysis (HCC), and Stenosis of left carotid artery were also pertinent to this visit.  Patient  has a past medical history of Abnormal nuclear stress test, Cardiac arrest (HCC), Diabetes mellitus (HCC), H/O cardiac catheterization, H/O cardiovascular stress test, H/O cardiovascular stress test, H/O Doppler ultrasound, H/O echocardiogram, H/O echocardiogram, H/O echocardiogram, H/O left heart cath, H/O left heart cath, Hernia, Hx of cardiovascular stress test, Hyperlipidemia, Hypertension, Irritable bowel syndrome, Kidney stones, Obesity, Osteoarthritis, Thyroid disease, Type II or unspecified type diabetes mellitus without mention of complication, not stated as uncontrolled, and Venogram.  Patient  has a past surgical history that includes Lithotripsy; Rotator cuff repair; Gastric Band (08/2008); Cardiac catheterization (04/18/08); Hysterectomy; Lap Band; Rotator cuff repair; hemicolectomy (Right, 5/3/2023); hernia repair (N/A, 5/3/2023); and IR TUNNELED CVC PLACE WO SQ PORT/PUMP > 5 YEARS (1/9/2024).    Recommendation: Facility for intensive rehabilitation, anticipate 3 hours per day and 5 days per week.    If pt discharges home, recommend HH PT, initial 24/7 supervision/assist, and RW use    Subjective:  Patient states: \"I was dragging my right leg and that's when I knew something was wrong\".    Pain: denies.    Communication with other providers:  RN approval for therapy session, co-eval with OT Dalila and OT student Faisal  Restrictions: general precautions, falls, contact precautions    Home Setup/Prior level of function  Lives With: Son, Other (comment) (son's girlfriend; son works from home)  Type of Home: House  Home Layout: One level  Home Access: Stairs to enter with rails  Entrance  sequencing  Educated pt on PT POC, role of PT, DME, discharge    [facility] 6 Clicks Inpatient Mobility:  AM-PAC Inpatient Mobility Raw Score : 17    Safety: patient left in chair with alarm, call light within reach, gait belt used.    Assessment:  Patient is an 84 year old female who presents with right sided weakness. Upon discharge, recommend Facility for intensive rehabilitation, anticipate 3 hours per day and 5 days per week. If pt discharges home, recommend HH PT, initial 24/7 supervision/assist, and RW use. At baseline, patient is independent with all ADLs, IADLs, and gross mobility. They performed below their baseline with impaired gait, strength, balance, and activity tolerance. They would benefit from continued therapy to address their deficits, reduce fall risk, decrease burden of care, and restore PLOF.    Complexity: Moderate  Prognosis: Good, no significant barriers to participation at this time.    General Plan: 3-5 times per week  Equipment: continue to assess    Goals:  Short Term Goals  Time Frame for Short Term Goals: 2 weeks  Short Term Goal 1: Pt will complete supine <> sit Diane  Short Term Goal 2: Pt will complete sit <> stand supervision  Short Term Goal 3: Pt will ambulate 50ft with LRAD supervision  Short Term Goal 4: Pt will complete light dynamic standing activity x5 minutes with single UE support, supervision  Short Term Goal 5: Pt will complete 3 steps with use of single handrail, SBA       Treatment plan:  Bed mobility, transfers, balance, gait, TA, TX, stairs    Recommendations for NURSING mobility: RW CGA, gait belt    Time:   Time in: 1003  Time out: 1019  Timed treatment minutes: 8  Total time: 16    Electronically signed by:    Romina Pollack PT  1/8/2025, 12:33 PM

## 2025-01-08 NOTE — PLAN OF CARE
Problem: Chronic Conditions and Co-morbidities  Goal: Patient's chronic conditions and co-morbidity symptoms are monitored and maintained or improved  1/8/2025 0343 by Ramesh Clark RN  Outcome: Progressing  1/8/2025 0341 by Ramesh Clark RN  Outcome: Progressing     Problem: Discharge Planning  Goal: Discharge to home or other facility with appropriate resources  1/8/2025 0343 by Ramesh Clark RN  Outcome: Progressing  1/8/2025 0341 by Ramesh Clark, RN  Outcome: Progressing     Problem: Safety - Adult  Goal: Free from fall injury  1/8/2025 0343 by Ramesh Clark RN  Outcome: Progressing  1/8/2025 0341 by Ramesh Clark, RN  Outcome: Progressing

## 2025-01-08 NOTE — PROGRESS NOTES
Occupational Therapy  [facility] ACUTE CARE OCCUPATIONAL THERAPY EVALUATION  Gwendolyn Grimm, 1940, 3021/3021-A, 1/8/2025    Discharge Recommendation: Facility for intensive rehabilitation, anticipate 3 hours per day and 5 days per week.    History  [place]:  The primary encounter diagnosis was Right sided weakness. Diagnoses of General weakness, ESRD on dialysis (HCC), and Stenosis of left carotid artery were also pertinent to this visit.  Patient  has a past medical history of Abnormal nuclear stress test, Cardiac arrest (HCC), Diabetes mellitus (HCC), H/O cardiac catheterization, H/O cardiovascular stress test, H/O cardiovascular stress test, H/O Doppler ultrasound, H/O echocardiogram, H/O echocardiogram, H/O echocardiogram, H/O left heart cath, H/O left heart cath, Hernia, Hx of cardiovascular stress test, Hyperlipidemia, Hypertension, Irritable bowel syndrome, Kidney stones, Obesity, Osteoarthritis, Thyroid disease, Type II or unspecified type diabetes mellitus without mention of complication, not stated as uncontrolled, and Venogram.  Patient  has a past surgical history that includes Lithotripsy; Rotator cuff repair; Gastric Band (08/2008); Cardiac catheterization (04/18/08); Hysterectomy; Lap Band; Rotator cuff repair; hemicolectomy (Right, 5/3/2023); hernia repair (N/A, 5/3/2023); and IR TUNNELED CVC PLACE WO SQ PORT/PUMP > 5 YEARS (1/9/2024).    Subjective:  Patient comments: \"I heard about the rehab here\".    Pain:  Denied.    Communication with other providers: Nurse rafa session, co-eval with ELIZABETH Vanegas.  Restrictions: General Precautions, Fall Risk, Contact (VRE)     Home Setup/Prior level of function   Social/Functional History  Lives With: Son, Other (comment) (son's girlfriend; son works from home)  Type of Home: House  Home Layout: One level  Home Access: Stairs to enter with rails  Entrance Stairs - Number of Steps: 3  Bathroom Shower/Tub: Tub/Shower unit  Bathroom Toilet:  training, ROM, Pain management, Functional mobility training, Safety education & training, Endurance training, Patient/Caregiver education & training, Equipment evaluation, education, & procurement, Neuromuscular re-education, Positioning, Cognitive/Perceptual training, Self-Care / ADL, Home management training, Co-Treatment, Coordination training       Goals: To be achieved prior to discharge  Goal 1: Pt will perform UE ADLs independently   Goal 2: Pt will perform LE ADLs independently   Goal 3: Pt will perform toileting independently   Goal 4: Pt will perform HH distance functional mobility Diane in preparation for return to home   Goal 5: Pt will perform functional transfers to/from bed, chair, and toilet Diane  Goal 6: Pt will perform therex/theract in order to increase functional activity tolerance  Goal 7: Pt will perform all aspects of session w/ G safety awareness to demonstrate capability to safely discharge to a lower level of supervision/assistance     Treatment plan:  Pt will perform therex/theract in order to increase functional activity tolerance in preparation for ADL participation.     Recommendations for NURSING activity: Up to chair for all 3 meals and up to bathroom for all toileting needs    Time:   Time in: 1003  Time out: 1020  Timed treatment minutes: 9  Total time: 17 minutes     Electronically signed by:    KIRILL Lagos/KRISTEN [id number]  1/8/2025, 12:30 PM

## 2025-01-08 NOTE — CARE COORDINATION
01/08/25 1319   Service Assessment   Patient Orientation Alert and Oriented   Cognition Alert   History Provided By Patient   Primary Caregiver Self   Support Systems Children   Patient's Healthcare Decision Maker is: Named in Scanned ACP Document   PCP Verified by CM Yes   Prior Functional Level Assistance with the following:;Mobility   Current Functional Level Assistance with the following:;Mobility   Can patient return to prior living arrangement Unknown at present   Ability to make needs known: Good   Family able to assist with home care needs: Yes   Would you like for me to discuss the discharge plan with any other family members/significant others, and if so, who? No   Financial Resources Medicare   Community Resources None     CM in to see Pt to initiate discharge planning.  Pt is from home.      Pt is agreeable with OT recommendations.  Referral made to [facility].  If accepted, Pt insurance will require precert.  CM following

## 2025-01-08 NOTE — PROGRESS NOTES
Neurology Progress Note  [facility]  Patient Name: Gwendolyn Grimm     : 1940      Subjective:   C C: right side weakness  The patient was seen and examined. Chart reviewed in detail. Patient reports improvement to her right side. She states she was able to use her right hand to eat this morning. Plans for outpatient follow up with Dr. Willoughby and possible stent placement. She is awaiting therapy evaluation.     Objective:   Scheduled Meds:   [Held by provider] hydrALAZINE  25 mg Oral 3 times per day    atorvastatin  40 mg Oral Nightly    clopidogrel  75 mg Oral Daily    vitamin D  50,000 Units Oral Weekly    [Held by provider] amLODIPine  10 mg Oral Daily    aspirin  81 mg Oral Daily    budesonide-formoterol  2 puff Inhalation BID RT    citalopram  20 mg Oral Daily    insulin glargine  10 Units SubCUTAneous Nightly    [Held by provider] isosorbide mononitrate  60 mg Oral Daily    levothyroxine  150 mcg Oral Daily    [Held by provider] metoprolol succinate  25 mg Oral Daily    mirtazapine  15 mg Oral Nightly    pantoprazole  40 mg Oral QAM AC    [Held by provider] spironolactone  50 mg Oral BID    heparin (porcine)  5,000 Units SubCUTAneous 3 times per day     Continuous Infusions:  PRN Meds:.albuterol sulfate HFA, ondansetron **OR** ondansetron, melatonin, polyethylene glycol, acetaminophen **OR** acetaminophen    Vital Signs:  [unfilled]     General: A&O x 4, NAD, cooperative  HEENT: NC/AT, EOMI, PERRL, mmm, neck supple  Extremities: no edema, no calf tenderness b/l    Neurological Exam:         Mental Status:  A&O to self, location, and year. NAD, speech clear, language fluent, repetition and naming intact, follows commands appropriately     Cranial Nerves:  CN II-XII intact     Sensation: intact LT/temp UE/LE's b/l     Motor: 5/5 left UE/Le's. 4/5 right UE b/l, tone and bulk normal, no pronator drift     Reflexes: 2/4 biceps, triceps, brachioradialis, patellar, and achilles bilaterally;  flexor plantar responses bilaterally     Coordination:       Tremors-- None            Gait/Station: Deferred    Labs:   Recent Labs     01/06/25  1840 01/07/25  0555 01/08/25  0303   WBC 12.6* 11.3* 11.1*    136 136   K 4.7 4.8 4.8   CL 99 99 98*   CO2 19* 19* 19*   BUN 71* 74* 77*   CREATININE 8.6* 8.6* 9.0*   GLUCOSE 149* 101* 159*   INR 1.0  --   --    ,Last TSH Results[unfilled],Last Free T4 Results[unfilled],[unfilled],[unfilled]  Last Liver Function Results:  @LABRCNTIP(ALT:3,AST:3,BILITOTAL:3,BILIDIR:3,ALKPHOS:3)@     Imaging Studies:   MRI of brain:  IMPRESSION:     1.  Several small scattered acute infarcts throughout the left cerebral   hemisphere, largest at the left frontoparietal junction. No significant mass   effect or associated hemorrhagic change.   2.  Early cerebral atrophy with mild nonspecific white matter disease, likely   chronic small vessel ischemic changes given patient age.     CT head:  IMPRESSION:     No acute intracranial hemorrhage, large territory infarct or mass effect.    Please note, MRI is more sensitive to detect acute infarction can be obtained if   there is persistent clinical concern.     Volume loss with sequelae of chronic microvascular ischemic change.        CTA of head and neck:  IMPRESSION:  High-grade stenosis at the left carotid bifurcation. Atherosclerotic   calcification throughout.     No vessel cutoffs.     Above imaging personally reviewed.       Assessment/Plan:   85 y/o female with history of DM, ESRD, HTN, and HLD presenting with right side weakness. This continues on exam this morning. Mild weakness to right UE/LE with dysmetria. Slight improvement in exam this morning     Right side weakness consistent with left lacunar infarction.   CT of head as above  CTA of head and neck as above. Consulted Neuro intervention as suspect left ICA stenosis is symptomatic  Continue DAPT with ASA and Plavix.   She will be maintained on statin therapy of 40  mg daily. LDL  134  MRI of brain as above  PT/OT/ST per their recommendations  She will need to follow up in stroke clinic after discharge  No further neurological testing is warranted at this time.  We will sign off.  Call us if needed.  Thank you    > 30 minutes of time spent included chart review, obtaining history, patient examination, developing plan of care, and documentation.      Patient was discussed with Attending Neurologist Dr. Ibarra and Neuro Intervention    Rocio Castle AG-CNS  Neurology

## 2025-01-08 NOTE — PROGRESS NOTES
Comprehensive Nutrition Assessment    Type and Reason for Visit:  Initial, Wound (Stage III pressure injury)    Nutrition Recommendations/Plan:   Continue 4 CHO choices/Heart Healthy diet as ordered.   Please document all po intake of snacks and meals in pt's chart for review.   Will monitor pt's GI status, wt, skin, po intake, labs, lytes, and POC.   Will follow pt as a moderate nutrition risk.     Malnutrition Assessment:  Malnutrition Status:  No malnutrition (01/08/25 1054)    Context:  Chronic Illness     Findings of the 6 clinical characteristics of malnutrition:  Energy Intake:  No decrease in energy intake  Weight Loss:  No weight loss (no wt loss upon chart review of wt hx over ~ 1 yr)     Body Fat Loss:  No body fat loss Fat Overlying Ribs, Orbital, Triceps, Buccal region   Muscle Mass Loss:  No muscle mass loss Temples (temporalis), Clavicles (pectoralis & deltoids), Scapula (trapezius), Thigh (quadriceps), Calf (gastrocnemius), Hand (interosseous)  Fluid Accumulation:  No fluid accumulation     Strength:  Not Performed    Nutrition Assessment:    Pt admitted with right side weakness and a pmh of PEA arrest, colon cancer s/p resection colectomy, hypertension, type 2 diabetes mellitus, hypothyroidism, COPD, ESRD on PD, GERD. Pt is on a 4 CHO choice/cardiac diet order. No documented po intake since admission. Pt has a noted stage III pressure area. Pt has no measured body wt since 1/2/25 which was prior to current admission.Pt weight appears stable over the past year upon chart review of weight history. Pt typically consumes home cooked meals consisting of lean protein and vegetables. Pt follows, and is knowledgable, about diet as a PD hemodialysis pt. Pt has a supportive son and son's spouse. Patient reports that the three of them split up coooking meals at home. Pt's son also was present at PD \"training\" and knowledgeable about diet/set up etc. Pt reports she would typically have a sugar substitute on  strawberries but not sugar. Pt may have one z-up or sprite, non-dark cola over the course of a few days. Pt has avoided regular dark cola soda pop since being on PD especially the amount. Pt reports she used to consume up to one case in a day. Pt also watches her diet but in moderation while also keeping renal labwork and diabetes managed. Pt understands the importance of protein and states she will be offered a zone bar oral nutrition supplement at PD on the months her albumin may drop to meet criteria. Otherwise, her diet is meeting her protein needs . Will follow pt as a moderate nutrition risk due to wounds.    Nutrition Related Findings:    POCT Glucose 213,BUN 77, Crt 9, RBC 3.35, Hemaglobin 10.6, Vitamin D 25 9.2, Cholesterol 202, HDL 33, , Triglycerides 177, lipitor, celexa, plavix, heparin, insulin lantus, remeron, protonix, vitamin D Wound Type: Stage III       Current Nutrition Intake & Therapies:    Average Meal Intake: Unable to assess  Average Supplements Intake: None Ordered  ADULT DIET; Regular; 4 carb choices (60 gm/meal); Low Fat/Low Chol/High Fiber/2 gm Na    Anthropometric Measures:  Height: 167.6 cm (5' 5.98\")  Ideal Body Weight (IBW): 130 lbs (59 kg)    Admission Body Weight:  (No wt obtained since admission. 1/6/24)  Current Body Weight: 81.6 kg (179 lb 14.3 oz) (1/2/2025), 138.4 % IBW. Weight Source: Stated  Current BMI (kg/m2): 29  Usual Body Weight: 78.3 kg (172 lb 9.9 oz) (1/4/24 office visit ~ 1 yr ago)     % Weight Change (Calculated): 4.2  Weight Adjustment For: No Adjustment                 BMI Categories: Overweight (BMI 25.0-29.9)    Estimated Daily Nutrient Needs:  Energy Requirements Based On: Formula  Weight Used for Energy Requirements: Current  Energy (kcal/day): 3025-1325 kcals/day (MSJ)  Weight Used for Protein Requirements: Ideal  Protein (g/day): 74-89 g/day (1.25-1.5 g/kg IBW)  Method Used for Fluid Requirements: 1 ml/kcal  Fluid (ml/day): 4303-0584

## 2025-01-08 NOTE — TELEPHONE ENCOUNTER
----- Message from NIKUNJ Sigala CNP sent at 1/8/2025  3:05 PM EST -----  Hello,    This patient will need a follow-up appointment on my schedule as soon as she is discharged from ARU to discuss carotid stenting.  Thanks

## 2025-01-08 NOTE — PROGRESS NOTES
V2.0    [facility] Progress Note      Name:  Gwendolyn Grimm /Age/Sex: 1940  (84 y.o. female)   MRN & CSN:  [id number] & [id number] Encounter Date/Time: 2025 2:04 PM EST   Location:  [facility]/3021A PCP: Pepe Avina MD     Attending:Marcial Hendricks MD       Hospital Day: 3    Assessment and Recommendations   Gwendolyn Grimm is a 84 y.o. female with pmh of ESRD on PD, CAD, HTN, HLD who presents with Right sided weaknessLabs showed Cr 8.6, BUN 71, , chol 202, wbc 11.3, hgb 9.5. CT head with no acute findings.  CTA head/neck showed high-grade stenosis of the left carotid bifurcation.  MRI brain showing several small scattered acute infarcts throughout the left cerebral hemisphere, largest at the left frontoparietal junction..      Plan:   Acute Lt. Cerebral CVA: Presented with right-sided weakness and CTA head/neck showing high-grade stenosis of the left carotid bifurcation.  MRI brain showing several small scattered acute infarcts in the left cerebral hemisphere, largest at the left frontoparietal junction with no significant mass effect or hemorrhage.  Neurology and interventional neuro on board. DAPT X 21 days and aspirin only after. Continue statin. F/U PT/OT. Possibly to ARU   Lt. Carotid Stenosis. Will need intervention. Plan for next week as outpatient. Interventional neurology on board.   Essential hypertension: BP slightly elevated. Resume metoprolol today. Resume amlodipine, hydralazine and imdur slowly.   ESRD on PD: Nephrology on board. Pt. On PD.   Nonobstructive CAD: Non-MI chronic troponin elevation: No chest pain.  Negative left heart cath in .  EKG without evidence of acute ischemia.  Continue ASA, Plavix, statin, beta-blocker, and Imdur.  Mixed hyperlipidemia: , cholesterol 202.  Initiated on Lipitor as above.  Type 2 diabetes with hyperglycemia: Continue basal insulin with SSI and will titrate as needed.  COPD: Per history, no evidence of exacerbation. Continue home  note, MRI is more sensitive to detect acute infarction can be obtained if  there is persistent clinical concern. Volume loss with sequelae of chronic microvascular ischemic change. This dictation was created with voice recognition software.  While attempts have  been made to review the dictation as it is transcribed, on occasion the spoken word can be misinterpreted by the technology leading to omissions or inappropriate words, phrases or sentences.  Dictated and Electronically Signed By: Marek Snider 1/6/2025 19:12        Vascular duplex lower extremity venous left    Result Date: 1/2/2025  Left lower extremity venous ultrasound INDICATION:  Pain and swelling, COMPARISON: Bilateral lower extremity venous duplex ultrasound 6/17/2023 Doppler ultrasound of the left lower extremity was performed. FINDINGS:  There is normal flow in the great saphenous, common femoral, femoral, and popliteal veins. Normal compression and augmentation is demonstrated. The proximal calf veins are also patent.     No evidence of deep venous thrombosis in the left lower extremity Electronically signed by Martín Harley MD    CT HEAD WO CONTRAST    Result Date: 1/2/2025  EXAM: CT HEAD WITHOUT IV CONTRAST CLINICAL HISTORY: HEAD TRAUMA, CLOSED, MILD, GCS >= 13, NO RISK FACTORS, NEURO EXAM NORMAL; headache/leg weak COMPARISON: None TECHNIQUE: CT of the head was performed without intravenous contrast medium from the skull vertex to the skull base and reconstructed into axial, coronal, and sagittal images utilizing brain and bone filters. One or more of the following dose-optimizing techniques was utilized for this exam: automated exposure control, adjustment of the mA and/or kV according to patient size, and/or use of iterative reconstruction technique. FINDINGS: No intracranial hemorrhage, hydrocephalus, midline shift, or intracranial herniation, or extra-axial fluid collection is identified. No definite evidence of an acute infarct. Nonspecific  scattered white matter hypodensities are seen, which are presumed to reflect chronic small vessel white matter ischemic disease. The visualized intraorbital contents are unremarkable. The visualized paranasal sinuses and mastoid air cells are well aerated.     No acute intracranial findings. Electronically signed by Andrei Munoz MD         CBC:   Recent Labs     01/06/25  1840 01/07/25  0555 01/08/25  0303   WBC 12.6* 11.3* 11.1*   HGB 10.5* 9.5* 10.6*    300 326     BMP:    Recent Labs     01/06/25  1840 01/07/25  0555 01/08/25  0303    136 136   K 4.7 4.8 4.8   CL 99 99 98*   CO2 19* 19* 19*   BUN 71* 74* 77*   CREATININE 8.6* 8.6* 9.0*   GLUCOSE 149* 101* 159*     Hepatic:   Recent Labs     01/06/25  1840   AST 17   ALT 11   BILITOT 0.3   ALKPHOS 79     Lipids:   Lab Results   Component Value Date/Time    CHOL 202 01/07/2025 05:55 AM    HDL 33 01/07/2025 05:55 AM    TRIG 177 01/07/2025 05:55 AM     Hemoglobin A1C:   Lab Results   Component Value Date/Time    LABA1C 5.8 06/17/2023 03:24 PM     TSH:   Lab Results   Component Value Date/Time    TSH 1.59 01/02/2025 04:20 PM     Troponin:   Lab Results   Component Value Date/Time    TROPONINT 0.029 06/19/2023 06:20 AM    TROPONINT 0.030 06/18/2023 10:58 PM    TROPONINT 0.034 06/18/2023 08:12 PM     Lactic Acid: No results for input(s): \"LACTA\" in the last 72 hours.  BNP: No results for input(s): \"PROBNP\" in the last 72 hours.  UA:  Lab Results   Component Value Date/Time    NITRU NEGATIVE 12/29/2023 12:15 PM    COLORU YELLOW 12/29/2023 12:15 PM    PHUR 6.0 12/29/2023 12:15 PM    PHUR 5.5 12/29/2021 11:51 AM    WBCUA 747 12/29/2023 12:15 PM    RBCUA 0 12/29/2023 12:15 PM    MUCUS RARE 12/29/2023 12:15 PM    TRICHOMONAS NONE SEEN 12/29/2023 12:15 PM    YEAST OCCASIONAL 11/21/2023 12:48 PM    BACTERIA NEGATIVE 12/29/2023 12:15 PM    CLARITYU CLEAR 12/29/2023 12:15 PM    SPECGRAV 1.025 12/29/2021 11:51 AM    LEUKOCYTESUR LARGE NUMBER OR AMOUNT OBSERVED  12/29/2023 12:15 PM    UROBILINOGEN 0.2 12/29/2023 12:15 PM    BILIRUBINUR NEGATIVE 12/29/2023 12:15 PM    BILIRUBINUR negative 12/29/2021 11:51 AM    BLOODU NEGATIVE 12/29/2023 12:15 PM    GLUCOSEU NEGATIVE 12/29/2023 12:15 PM    GLUCOSEU negative 12/29/2021 11:51 AM    KETUA NEGATIVE 12/29/2023 12:15 PM     Urine Cultures: No results found for: \"LABURIN\"  Blood Cultures: No results found for: \"BC\"  No results found for: \"BLOODCULT2\"  Organism: No results found for: \"ORG\"      Electronically signed by Marcial Hendricks MD on 1/8/2025 at 12:31 PM

## 2025-01-08 NOTE — PROGRESS NOTES
Vascular/Interventional Neurology Progress Note  [facility]  Patient Name: Gwendolyn Grimm     : 1940      Subjective:     The patient was seen and examined.  Patient is sitting in bed.  Reports improvement in her right hand strength.  Tells me she was able to grasp her fork better today.  Plan to be evaluated by the therapy today.       Objective:   Scheduled Meds:   [Held by provider] hydrALAZINE  25 mg Oral 3 times per day    atorvastatin  40 mg Oral Nightly    clopidogrel  75 mg Oral Daily    vitamin D  50,000 Units Oral Weekly    [Held by provider] amLODIPine  10 mg Oral Daily    aspirin  81 mg Oral Daily    budesonide-formoterol  2 puff Inhalation BID RT    citalopram  20 mg Oral Daily    insulin glargine  10 Units SubCUTAneous Nightly    [Held by provider] isosorbide mononitrate  60 mg Oral Daily    levothyroxine  150 mcg Oral Daily    [Held by provider] metoprolol succinate  25 mg Oral Daily    mirtazapine  15 mg Oral Nightly    pantoprazole  40 mg Oral QAM AC    [Held by provider] spironolactone  50 mg Oral BID    heparin (porcine)  5,000 Units SubCUTAneous 3 times per day     Continuous Infusions:  PRN Meds:.albuterol sulfate HFA, ondansetron **OR** ondansetron, melatonin, polyethylene glycol, acetaminophen **OR** acetaminophen    Vital Signs:  Vitals:    25 2233 25 0303 25 0941 25 1055   BP: 124/60 (!) 170/60 (!) 164/48    Pulse:   58    Resp:   18    Temp: 97.9 °F (36.6 °C) 97.7 °F (36.5 °C)     TempSrc: Oral Oral     SpO2: 94% 95% 96%    Height:    1.676 m (5' 5.98\")        General: A&O x 4, NAD, cooperative  HEENT: NC/AT, EOMI, PERRL, mmm, neck supple  Extremities: no edema, no calf tenderness b/l    Neurological Exam:         Mental Status:  A&O to self, location, and year. NAD, speech clear, language fluent, repetition and naming intact, follows commands appropriately     Cranial Nerves:  CN II-XII intact     Sensation: intact LT/temp UE/LE's b/l, + DDS  this time.  Plan for cerebral angiogram for carotid stenting as an outpatient after discharge from ARU.  -Continue aspirin and Plavix  -Continue statin for secondary prevention of stroke., Goal < 70  -PT/OT/ST as able     Discussed with hospitalist, Dr Hendricks    Pertinent images discussed/reviewed with Dr. Willoughby who has fully participated in the care of this patient and agrees with plan.      Electronically signed by NIKUNJ Zhang CNP on 1/8/2025 at 11:56 AM   1/8/2025

## 2025-01-08 NOTE — PROGRESS NOTES
Nephrology Progress Note  1/8/2025 7:43 AM  Subjective:     Interval History: Gwendolyn Grimm is a 84 y.o. female with overall doing little better with less weakness right hand no other acute distress tolerated PD dialysis well        Data:   Scheduled Meds:   atorvastatin  40 mg Oral Nightly    clopidogrel  75 mg Oral Daily    vitamin D  50,000 Units Oral Weekly    [Held by provider] amLODIPine  10 mg Oral Daily    aspirin  81 mg Oral Daily    budesonide-formoterol  2 puff Inhalation BID RT    citalopram  20 mg Oral Daily    [Held by provider] hydrALAZINE  50 mg Oral BID    insulin glargine  10 Units SubCUTAneous Nightly    [Held by provider] isosorbide mononitrate  60 mg Oral Daily    levothyroxine  150 mcg Oral Daily    [Held by provider] metoprolol succinate  25 mg Oral Daily    mirtazapine  15 mg Oral Nightly    pantoprazole  40 mg Oral QAM AC    [Held by provider] spironolactone  50 mg Oral BID    heparin (porcine)  5,000 Units SubCUTAneous 3 times per day     Continuous Infusions:      CBC   Recent Labs     01/06/25  1840 01/07/25  0555 01/08/25  0303   WBC 12.6* 11.3* 11.1*   HGB 10.5* 9.5* 10.6*   HCT 33.9* 30.1* 32.6*    300 326      BMP   Recent Labs     01/06/25  1840 01/07/25  0555 01/08/25  0303    136 136   K 4.7 4.8 4.8   CL 99 99 98*   CO2 19* 19* 19*   BUN 71* 74* 77*   CREATININE 8.6* 8.6* 9.0*     Hepatic:   Recent Labs     01/06/25  1840   AST 17   ALT 11   BILITOT 0.3   ALKPHOS 79     Troponin: No results for input(s): \"TROPONINI\" in the last 72 hours.  BNP: No results for input(s): \"BNP\" in the last 72 hours.  Lipids:   Recent Labs     01/07/25  0555   CHOL 202*   HDL 33*     ABGs:   Lab Results   Component Value Date/Time    PO2ART 73 05/12/2023 07:00 AM    FEV2NQD 38.0 05/12/2023 07:00 AM     INR:   Recent Labs     01/06/25  1840   INR 1.0     Renal Labs  Albumin:    Lab Results   Component Value Date/Time    LABALBU DUPLICATE ORDER 05/27/2022 12:43 PM     Calcium:    Lab Results    Component Value Date/Time    CALCIUM 8.9 01/08/2025 03:03 AM     Phosphorus:    Lab Results   Component Value Date/Time    PHOS 3.6 12/29/2023 12:15 PM     U/A:    Lab Results   Component Value Date/Time    NITRU NEGATIVE 12/29/2023 12:15 PM    COLORU YELLOW 12/29/2023 12:15 PM    PHUR 6.0 12/29/2023 12:15 PM    PHUR 5.5 12/29/2021 11:51 AM    WBCUA 747 12/29/2023 12:15 PM    RBCUA 0 12/29/2023 12:15 PM    MUCUS RARE 12/29/2023 12:15 PM    TRICHOMONAS NONE SEEN 12/29/2023 12:15 PM    YEAST OCCASIONAL 11/21/2023 12:48 PM    BACTERIA NEGATIVE 12/29/2023 12:15 PM    CLARITYU CLEAR 12/29/2023 12:15 PM    SPECGRAV 1.025 12/29/2021 11:51 AM    UROBILINOGEN 0.2 12/29/2023 12:15 PM    BILIRUBINUR NEGATIVE 12/29/2023 12:15 PM    BILIRUBINUR negative 12/29/2021 11:51 AM    BLOODU NEGATIVE 12/29/2023 12:15 PM    GLUCOSEU NEGATIVE 12/29/2023 12:15 PM    GLUCOSEU negative 12/29/2021 11:51 AM    KETUA NEGATIVE 12/29/2023 12:15 PM     ABG:    Lab Results   Component Value Date/Time    NBG3GJV 38.0 05/12/2023 07:00 AM    PO2ART 73 05/12/2023 07:00 AM    ENZ2PSC 22.5 05/12/2023 07:00 AM     HgBA1c:    Lab Results   Component Value Date/Time    LABA1C 5.8 06/17/2023 03:24 PM     Microalbumen/Creatinine ratio:  No components found for: \"RUCREAT\"  TSH:    Lab Results   Component Value Date/Time    TSH 1.59 01/02/2025 04:20 PM     IRON:    Lab Results   Component Value Date/Time    IRON 50 05/12/2023 05:40 AM     Iron Saturation:  No components found for: \"PERCENTFE\"  TIBC:    Lab Results   Component Value Date/Time    TIBC 227 05/12/2023 05:40 AM     FERRITIN:    Lab Results   Component Value Date/Time    FERRITIN 344 05/12/2023 05:40 AM     RPR:  No results found for: \"RPR\"  MJ:  No results found for: \"ANATITER\", \"MJ\"  24 Hour Urine for Creatinine Clearance:  No components found for: \"CREAT4\", \"UHRS10\", \"UTV10\"      Objective:   I/O: No intake/output data recorded.  No intake/output data recorded.  No intake/output data  or 2.5% solution with 4 exchanges in 10 hours  #2 with MRI results correlating with carotid stenosis and weakness on the right side follow-up neurointervention plan  #3 cardiac status stable  #4 blood pressure elevated make adjustments improved  #5 monitor control glucose  #6 tolerating p.o. diet  Supportive care follow-up neurointervention recommendations for discharge planning possible need of surgical intervention versus rehab at home versus skilled nursing which would have to be [facility] for the PD             CHI ROY MD, MD

## 2025-01-09 ENCOUNTER — HOSPITAL ENCOUNTER (INPATIENT)
Age: 85
LOS: 8 days | Discharge: HOME HEALTH | DRG: 056 | End: 2025-01-17
Attending: PHYSICAL MEDICINE & REHABILITATION | Admitting: PHYSICAL MEDICINE & REHABILITATION
Payer: MEDICARE

## 2025-01-09 VITALS
TEMPERATURE: 98.2 F | OXYGEN SATURATION: 96 % | RESPIRATION RATE: 22 BRPM | WEIGHT: 183.42 LBS | BODY MASS INDEX: 29.48 KG/M2 | DIASTOLIC BLOOD PRESSURE: 70 MMHG | SYSTOLIC BLOOD PRESSURE: 114 MMHG | HEART RATE: 72 BPM | HEIGHT: 66 IN

## 2025-01-09 PROBLEM — I63.9 ACUTE CVA (CEREBROVASCULAR ACCIDENT) (HCC): Status: ACTIVE | Noted: 2025-01-09

## 2025-01-09 LAB
GLUCOSE BLD-MCNC: 202 MG/DL (ref 74–99)
MICROORGANISM SPEC CULT: NORMAL
MICROORGANISM/AGENT SPEC: NORMAL
SERVICE CMNT-IMP: NORMAL
SPECIMEN DESCRIPTION: NORMAL

## 2025-01-09 PROCEDURE — 3E1M39Z IRRIGATION OF PERITONEAL CAVITY USING DIALYSATE, PERCUTANEOUS APPROACH: ICD-10-PCS | Performed by: INTERNAL MEDICINE

## 2025-01-09 PROCEDURE — 6370000000 HC RX 637 (ALT 250 FOR IP): Performed by: INTERNAL MEDICINE

## 2025-01-09 PROCEDURE — 6370000000 HC RX 637 (ALT 250 FOR IP): Performed by: STUDENT IN AN ORGANIZED HEALTH CARE EDUCATION/TRAINING PROGRAM

## 2025-01-09 PROCEDURE — 1280000000 HC REHAB R&B

## 2025-01-09 PROCEDURE — 99223 1ST HOSP IP/OBS HIGH 75: CPT | Performed by: PHYSICAL MEDICINE & REHABILITATION

## 2025-01-09 PROCEDURE — 90945 DIALYSIS ONE EVALUATION: CPT

## 2025-01-09 PROCEDURE — 6360000002 HC RX W HCPCS: Performed by: STUDENT IN AN ORGANIZED HEALTH CARE EDUCATION/TRAINING PROGRAM

## 2025-01-09 PROCEDURE — 6370000000 HC RX 637 (ALT 250 FOR IP): Performed by: CLINICAL NURSE SPECIALIST

## 2025-01-09 PROCEDURE — 94761 N-INVAS EAR/PLS OXIMETRY MLT: CPT

## 2025-01-09 PROCEDURE — 99232 SBSQ HOSP IP/OBS MODERATE 35: CPT | Performed by: NURSE PRACTITIONER

## 2025-01-09 PROCEDURE — 82962 GLUCOSE BLOOD TEST: CPT

## 2025-01-09 PROCEDURE — 94640 AIRWAY INHALATION TREATMENT: CPT

## 2025-01-09 RX ORDER — DEXTROSE MONOHYDRATE 100 MG/ML
INJECTION, SOLUTION INTRAVENOUS CONTINUOUS PRN
Status: CANCELLED | OUTPATIENT
Start: 2025-01-09

## 2025-01-09 RX ORDER — ATORVASTATIN CALCIUM 40 MG/1
40 TABLET, FILM COATED ORAL NIGHTLY
Status: DISCONTINUED | OUTPATIENT
Start: 2025-01-09 | End: 2025-01-17 | Stop reason: HOSPADM

## 2025-01-09 RX ORDER — CITALOPRAM HYDROBROMIDE 20 MG/1
20 TABLET ORAL DAILY
Status: CANCELLED | OUTPATIENT
Start: 2025-01-10

## 2025-01-09 RX ORDER — ACETAMINOPHEN 325 MG/1
650 TABLET ORAL EVERY 6 HOURS PRN
Status: CANCELLED | OUTPATIENT
Start: 2025-01-09

## 2025-01-09 RX ORDER — GLUCAGON 1 MG/ML
1 KIT INJECTION PRN
Status: DISCONTINUED | OUTPATIENT
Start: 2025-01-09 | End: 2025-01-17 | Stop reason: HOSPADM

## 2025-01-09 RX ORDER — ONDANSETRON 4 MG/1
4 TABLET, ORALLY DISINTEGRATING ORAL EVERY 8 HOURS PRN
Status: DISCONTINUED | OUTPATIENT
Start: 2025-01-09 | End: 2025-01-17 | Stop reason: HOSPADM

## 2025-01-09 RX ORDER — GLUCAGON 1 MG/ML
1 KIT INJECTION PRN
Status: CANCELLED | OUTPATIENT
Start: 2025-01-09

## 2025-01-09 RX ORDER — ACETAMINOPHEN 650 MG/1
650 SUPPOSITORY RECTAL EVERY 6 HOURS PRN
Status: DISCONTINUED | OUTPATIENT
Start: 2025-01-09 | End: 2025-01-09

## 2025-01-09 RX ORDER — LEVOTHYROXINE SODIUM 150 UG/1
150 TABLET ORAL DAILY
Status: CANCELLED | OUTPATIENT
Start: 2025-01-10

## 2025-01-09 RX ORDER — ACETAMINOPHEN 325 MG/1
650 TABLET ORAL EVERY 6 HOURS PRN
Status: DISCONTINUED | OUTPATIENT
Start: 2025-01-09 | End: 2025-01-17 | Stop reason: HOSPADM

## 2025-01-09 RX ORDER — ONDANSETRON 4 MG/1
4 TABLET, ORALLY DISINTEGRATING ORAL EVERY 8 HOURS PRN
Status: CANCELLED | OUTPATIENT
Start: 2025-01-09

## 2025-01-09 RX ORDER — BUDESONIDE AND FORMOTEROL FUMARATE DIHYDRATE 160; 4.5 UG/1; UG/1
2 AEROSOL RESPIRATORY (INHALATION)
Status: CANCELLED | OUTPATIENT
Start: 2025-01-09

## 2025-01-09 RX ORDER — ONDANSETRON 2 MG/ML
4 INJECTION INTRAMUSCULAR; INTRAVENOUS EVERY 6 HOURS PRN
Status: DISCONTINUED | OUTPATIENT
Start: 2025-01-09 | End: 2025-01-17

## 2025-01-09 RX ORDER — PANTOPRAZOLE SODIUM 40 MG/1
40 TABLET, DELAYED RELEASE ORAL
Status: CANCELLED | OUTPATIENT
Start: 2025-01-10

## 2025-01-09 RX ORDER — ONDANSETRON 2 MG/ML
4 INJECTION INTRAMUSCULAR; INTRAVENOUS EVERY 6 HOURS PRN
Status: CANCELLED | OUTPATIENT
Start: 2025-01-09

## 2025-01-09 RX ORDER — MIRTAZAPINE 15 MG/1
15 TABLET, FILM COATED ORAL NIGHTLY
Status: DISCONTINUED | OUTPATIENT
Start: 2025-01-09 | End: 2025-01-17 | Stop reason: HOSPADM

## 2025-01-09 RX ORDER — POLYETHYLENE GLYCOL 3350 17 G/17G
17 POWDER, FOR SOLUTION ORAL DAILY PRN
Status: DISCONTINUED | OUTPATIENT
Start: 2025-01-09 | End: 2025-01-17 | Stop reason: HOSPADM

## 2025-01-09 RX ORDER — BUDESONIDE AND FORMOTEROL FUMARATE DIHYDRATE 160; 4.5 UG/1; UG/1
2 AEROSOL RESPIRATORY (INHALATION)
Status: DISCONTINUED | OUTPATIENT
Start: 2025-01-09 | End: 2025-01-17 | Stop reason: HOSPADM

## 2025-01-09 RX ORDER — POLYETHYLENE GLYCOL 3350 17 G/17G
17 POWDER, FOR SOLUTION ORAL DAILY PRN
Status: CANCELLED | OUTPATIENT
Start: 2025-01-09

## 2025-01-09 RX ORDER — ALBUTEROL SULFATE 90 UG/1
1 INHALANT RESPIRATORY (INHALATION) EVERY 6 HOURS PRN
Status: DISCONTINUED | OUTPATIENT
Start: 2025-01-09 | End: 2025-01-17

## 2025-01-09 RX ORDER — ATORVASTATIN CALCIUM 40 MG/1
40 TABLET, FILM COATED ORAL NIGHTLY
Status: CANCELLED | OUTPATIENT
Start: 2025-01-09

## 2025-01-09 RX ORDER — GENTAMICIN SULFATE 1 MG/G
CREAM TOPICAL DAILY PRN
Status: DISCONTINUED | OUTPATIENT
Start: 2025-01-09 | End: 2025-01-17 | Stop reason: HOSPADM

## 2025-01-09 RX ORDER — CLOPIDOGREL BISULFATE 75 MG/1
75 TABLET ORAL DAILY
Status: CANCELLED | OUTPATIENT
Start: 2025-01-10 | End: 2025-01-28

## 2025-01-09 RX ORDER — PANTOPRAZOLE SODIUM 40 MG/1
40 TABLET, DELAYED RELEASE ORAL
Status: DISCONTINUED | OUTPATIENT
Start: 2025-01-10 | End: 2025-01-17 | Stop reason: HOSPADM

## 2025-01-09 RX ORDER — METOPROLOL SUCCINATE 25 MG/1
25 TABLET, EXTENDED RELEASE ORAL DAILY
Status: CANCELLED | OUTPATIENT
Start: 2025-01-10

## 2025-01-09 RX ORDER — ERGOCALCIFEROL 1.25 MG/1
50000 CAPSULE, LIQUID FILLED ORAL WEEKLY
Status: DISCONTINUED | OUTPATIENT
Start: 2025-01-14 | End: 2025-01-17 | Stop reason: HOSPADM

## 2025-01-09 RX ORDER — INSULIN GLARGINE 100 [IU]/ML
10 INJECTION, SOLUTION SUBCUTANEOUS NIGHTLY
Status: CANCELLED | OUTPATIENT
Start: 2025-01-09

## 2025-01-09 RX ORDER — ACETAMINOPHEN 650 MG/1
650 SUPPOSITORY RECTAL EVERY 6 HOURS PRN
Status: CANCELLED | OUTPATIENT
Start: 2025-01-09

## 2025-01-09 RX ORDER — BISACODYL 10 MG
10 SUPPOSITORY, RECTAL RECTAL DAILY PRN
Status: CANCELLED | OUTPATIENT
Start: 2025-01-09

## 2025-01-09 RX ORDER — ASPIRIN 81 MG/1
81 TABLET, CHEWABLE ORAL DAILY
Status: DISCONTINUED | OUTPATIENT
Start: 2025-01-10 | End: 2025-01-17 | Stop reason: HOSPADM

## 2025-01-09 RX ORDER — DEXTROSE MONOHYDRATE 100 MG/ML
INJECTION, SOLUTION INTRAVENOUS CONTINUOUS PRN
Status: DISCONTINUED | OUTPATIENT
Start: 2025-01-09 | End: 2025-01-17 | Stop reason: HOSPADM

## 2025-01-09 RX ORDER — ALBUTEROL SULFATE 90 UG/1
1 INHALANT RESPIRATORY (INHALATION) EVERY 6 HOURS PRN
Status: CANCELLED | OUTPATIENT
Start: 2025-01-09

## 2025-01-09 RX ORDER — ASPIRIN 81 MG/1
81 TABLET, CHEWABLE ORAL DAILY
Status: CANCELLED | OUTPATIENT
Start: 2025-01-10

## 2025-01-09 RX ORDER — LEVOTHYROXINE SODIUM 75 UG/1
150 TABLET ORAL DAILY
Status: DISCONTINUED | OUTPATIENT
Start: 2025-01-10 | End: 2025-01-17 | Stop reason: HOSPADM

## 2025-01-09 RX ORDER — CITALOPRAM HYDROBROMIDE 20 MG/1
20 TABLET ORAL DAILY
Status: DISCONTINUED | OUTPATIENT
Start: 2025-01-10 | End: 2025-01-17 | Stop reason: HOSPADM

## 2025-01-09 RX ORDER — BISACODYL 10 MG
10 SUPPOSITORY, RECTAL RECTAL DAILY PRN
Status: DISCONTINUED | OUTPATIENT
Start: 2025-01-09 | End: 2025-01-17 | Stop reason: HOSPADM

## 2025-01-09 RX ORDER — MIRTAZAPINE 15 MG/1
15 TABLET, FILM COATED ORAL NIGHTLY
Status: CANCELLED | OUTPATIENT
Start: 2025-01-09

## 2025-01-09 RX ORDER — INSULIN GLARGINE 100 [IU]/ML
10 INJECTION, SOLUTION SUBCUTANEOUS NIGHTLY
Status: DISCONTINUED | OUTPATIENT
Start: 2025-01-09 | End: 2025-01-17 | Stop reason: HOSPADM

## 2025-01-09 RX ORDER — ERGOCALCIFEROL 1.25 MG/1
50000 CAPSULE, LIQUID FILLED ORAL WEEKLY
Status: CANCELLED | OUTPATIENT
Start: 2025-01-14

## 2025-01-09 RX ORDER — CLOPIDOGREL BISULFATE 75 MG/1
75 TABLET ORAL DAILY
Status: DISCONTINUED | OUTPATIENT
Start: 2025-01-10 | End: 2025-01-17 | Stop reason: HOSPADM

## 2025-01-09 RX ADMIN — GENTAMICIN SULFATE: 1 CREAM TOPICAL at 18:46

## 2025-01-09 RX ADMIN — PANTOPRAZOLE SODIUM 40 MG: 40 TABLET, DELAYED RELEASE ORAL at 07:13

## 2025-01-09 RX ADMIN — ASPIRIN 81 MG: 81 TABLET, CHEWABLE ORAL at 11:07

## 2025-01-09 RX ADMIN — INSULIN GLARGINE 10 UNITS: 100 INJECTION, SOLUTION SUBCUTANEOUS at 21:29

## 2025-01-09 RX ADMIN — ATORVASTATIN CALCIUM 40 MG: 40 TABLET, FILM COATED ORAL at 21:29

## 2025-01-09 RX ADMIN — HEPARIN SODIUM 5000 UNITS: 5000 INJECTION INTRAVENOUS; SUBCUTANEOUS at 07:13

## 2025-01-09 RX ADMIN — HEPARIN SODIUM 5000 UNITS: 5000 INJECTION INTRAVENOUS; SUBCUTANEOUS at 14:59

## 2025-01-09 RX ADMIN — METOPROLOL SUCCINATE 25 MG: 25 TABLET, EXTENDED RELEASE ORAL at 11:07

## 2025-01-09 RX ADMIN — MIRTAZAPINE 15 MG: 15 TABLET, FILM COATED ORAL at 21:29

## 2025-01-09 RX ADMIN — CLOPIDOGREL BISULFATE 75 MG: 75 TABLET ORAL at 11:07

## 2025-01-09 RX ADMIN — CITALOPRAM HYDROBROMIDE 20 MG: 20 TABLET ORAL at 11:07

## 2025-01-09 RX ADMIN — BUDESONIDE AND FORMOTEROL FUMARATE DIHYDRATE 2 PUFF: 160; 4.5 AEROSOL RESPIRATORY (INHALATION) at 08:52

## 2025-01-09 RX ADMIN — LEVOTHYROXINE SODIUM 150 MCG: 0.15 TABLET ORAL at 07:13

## 2025-01-09 NOTE — PROGRESS NOTES
ARU Admission Assessment - [facility]      A Complete drug regimen review was completed for this patient this date.   [x]  No clinically significant medication issue was identified   []  Yes, a clinically significant medication issue was identified     []  Adverse Drug Event:    []  Allergy:    []  Side Effect:    []  Ineffective Therapy:    []  Drug interaction:     []  Duplicate Therapy:    []  Untreated Indication:    []  Non-adherence:    []  Other:  Nursing contacted the physician:       Date:                Time:    Actions recommended by physician were completed:   Date:                 Time:  Action(s) Taken:             []  New Physician Order Received    []  Issue Noted by Physician; However No Action Required    []  Other:      *NEW QUESTION EFFECTIVE OCTOBER 1, 2024 as required by Medicare    COVID Vaccination  Is the patient up-to-date with their COVID vaccination?  *See CDC Guidelines Below      https://www.cdc.gov/covid/vaccines/stay-up-to-date.html    [x] A.  No, patient is NOT up to date.  Includes if they have not received the vaccine for ANY reason, including medical or Hindu reason.   Encourage patient to receive this after discharge.     [] B.  Yes, patient is up to date.       People ages 12 years and older Guidelines for Up to Date  *You are up to date when you have received:  1 dose of the 1990-5951 Moderna COVID-19 vaccine OR  1 dose of the 6953-9633 Pfizer-BioNTech COVID-19 vaccine OR  1 dose of the 3621-0574 Novavax vaccine unless you are receiving a COVID-19 vaccine for the very first time. If you have never received any COVID-19 vaccine and you choose to get Novavax, you need 2 doses of 6134-3456 Novavax COVID-19 vaccine to be up to date.    Information may be determined from medical record, interviews with the patient and/or family members or other healthcare providers but must meet criteria above.      Ethnicity  \"Are you of , /a, or Cuban origin?\"  Check all   Rarely or not at all  []  2.  Occasionally  []  3.  Frequently  []  4.  Almost constantly  []  8.  Unable to answer    Pain Interference with Day-to-Day Activities:  \"Over the past 5 days, how often have you limited your day-to-day activities (excluding rehabilitation therapy session)?\"  []  1.  Rarely or not at all  []  2.  Occasionally  []  3.  Frequently  []  4.  Almost constantly  []  8.  Unable to answer                                                   Confirm you completed the new COVID Vaccination question at the top of this document before signing

## 2025-01-09 NOTE — CARE COORDINATION
Met with patient and discussed ARU.  Explained to patient the required 3 hours of therapy a day.  Also explained the average length of stay is 11 days, could be longer or shorter depending on recommendations of therapy and Dr. Alonzo.  Patient expresses her understanding and states she's agreeable to admit to ARU.     Patient states she  feels like she needs ARU d/t not being at her baseline, still c/o balance issues.     Patient has been ARU in the past and did well with therapy and participation.  Explained to patient she will probably be a short stay d/t functioning higher level.  Patient states she will discharge once MD and therapy feel is appropriate for her to return home      Patients primary insurance is [employer], pre-cert required.  Obtained authorization with [employer].  Auth#  [id number].    Patient meets criteria and is approved to come to ARU.   Patient able to admit once medically stable and after ARU Medical Director and  sign the pre-admission screen (PAS).    Notified MD and CM of approval and bed available today.

## 2025-01-09 NOTE — CARE COORDINATION
Reviewed PT/OT evals. Per therapy notes patient functioning  at SBA with all ADL's and cga with No LOB or instability observed throughout  per PT note.    Concerned patient presenting functionally too high level for ARU.  Need to discuss with ARU MD and PD.  Will notify CM once determination made.

## 2025-01-09 NOTE — PROGRESS NOTES
Vascular/Interventional Neurology Progress Note  [facility]  Patient Name: Gwendolyn Grimm     : 1940      Subjective:     The patient was seen and examined.  Chart reviewed.  Notified by therapy yesterday that patient was a good candidate for ARU.  Upon chart review today noted patient may be functioning too high level for ARU.  She denies any neurological changes.  She has had improvement in her right side states she is almost back to baseline.        Objective:   Scheduled Meds:   [Held by provider] hydrALAZINE  25 mg Oral 3 times per day    metoprolol succinate  25 mg Oral Daily    atorvastatin  40 mg Oral Nightly    clopidogrel  75 mg Oral Daily    vitamin D  50,000 Units Oral Weekly    [Held by provider] amLODIPine  10 mg Oral Daily    aspirin  81 mg Oral Daily    budesonide-formoterol  2 puff Inhalation BID RT    citalopram  20 mg Oral Daily    insulin glargine  10 Units SubCUTAneous Nightly    [Held by provider] isosorbide mononitrate  60 mg Oral Daily    levothyroxine  150 mcg Oral Daily    mirtazapine  15 mg Oral Nightly    pantoprazole  40 mg Oral QAM AC    [Held by provider] spironolactone  50 mg Oral BID    heparin (porcine)  5,000 Units SubCUTAneous 3 times per day     Continuous Infusions:  PRN Meds:.albuterol sulfate HFA, ondansetron **OR** ondansetron, melatonin, polyethylene glycol, acetaminophen **OR** acetaminophen    Vital Signs:  Vitals:    25 1613 25 2101 25 0238 25 0852   BP: (!) 150/57  (!) 145/67    Pulse: 58  51    Resp:   18    Temp:  98.1 °F (36.7 °C) 97.6 °F (36.4 °C)    TempSrc:  Oral Oral    SpO2:  95% 96% 95%   Weight:   83.2 kg (183 lb 6.8 oz)    Height:            General: A&O x 4, NAD, cooperative  HEENT: NC/AT, EOMI, PERRL, mmm, neck supple  Extremities: no edema, no calf tenderness b/l    Neurological Exam:         Mental Status:  A&O to self, location, and year. NAD, speech clear, language fluent, repetition and naming intact,  follows commands appropriately     Cranial Nerves:  CN II-XII intact     Sensation: intact LT/temp UE/LE's b/l     Motor: 5/5 LUE/LE's b/l,  RUE 4/5, tone and bulk normal, no pronator drift     Reflexes: 2/4 biceps, triceps, brachioradialis, patellar, and achilles bilaterally; flexor plantar responses bilaterally     Coordination:       Tremors-- None       Rapidly alternating movements ([place] ROSARIO): No dysdiadonchokinesis b/l      Heel-Shin: No dysmetria b/l      Finger-Nose: No dysmetria b/l     Gait/Station: Deferred    Labs:   Recent Labs     01/06/25  1840 01/07/25  0555 01/08/25  0303   WBC 12.6* 11.3* 11.1*    136 136   K 4.7 4.8 4.8   CL 99 99 98*   CO2 19* 19* 19*   BUN 71* 74* 77*   CREATININE 8.6* 8.6* 9.0*   GLUCOSE 149* 101* 159*   INR 1.0  --   --    ,Last TSH Results[unfilled],Last Free T4 Results[unfilled],[unfilled],[unfilled]  Last Liver Function Results:  @LABRCNTIP(ALT:3,AST:3,BILITOTAL:3,BILIDIR:3,ALKPHOS:3)@     Imaging Studies:       CT  IMPRESSION:  No acute intracranial hemorrhage, large territory infarct or mass effect.    Please note, MRI is more sensitive to detect acute infarction can be obtained if   there is persistent clinical concern.     Volume loss with sequelae of chronic microvascular ischemic change.        Dictated and Electronically Signed By:   Marek Snider   1/6/2025 19:12      CTA head and neck   IMPRESSION:  High-grade stenosis at the left carotid bifurcation. Atherosclerotic   calcification throughout.     No vessel cutoffs     Dictated and Electronically Signed By:   Kwabena Gaspar MD   1/6/2025 21:17      MRI brain   IMPRESSION:     1.  Several small scattered acute infarcts throughout the left cerebral   hemisphere, largest at the left frontoparietal junction. No significant mass   effect or associated hemorrhagic change.   2.  Early cerebral atrophy with mild nonspecific white matter disease, likely   chronic small vessel ischemic changes given patient age.     Dictated and  Electronically Signed By:   Yamil Boothe   1/7/2025 12:00        Assessment/Plan:      84-year-old female presenting with right upper extremity weakness found to have an acute left hemisphere ischemic stroke secondary to symptomatic left ICA stenosis.     -Plan for cerebral angiogram for carotid stenting within 2 weeks as an outpatient after discharge from ARU. Our office will contact the patient to schedule.   -Continue aspirin and Plavix  -Continue statin for secondary prevention of stroke. , goal < 70.   -PT/OT/ST as able    Pertinent images discussed/reviewed with Dr. Willoughby who has fully participated in the care of this patient and agrees with plan.      Electronically signed by NIKUNJ Zhang - CNP on 1/9/2025 at 9:48 AM   1/9/2025

## 2025-01-09 NOTE — PROGRESS NOTES
4 Eyes Skin Assessment     NAME:  Gwendolyn Grimm  YOB: 1940  MEDICAL RECORD NUMBER:  [id number]    The patient is being assessed for  Admission    I agree that at least one RN has performed a thorough Head to Toe Skin Assessment on the patient. ALL assessment sites listed below have been assessed.      Areas assessed by both nurses:    Head, Face, Ears, Shoulders, Back, Chest, Arms, Elbows, Hands, Sacrum. Buttock, Coccyx, Ischium, and Legs. Feet and Heels        Does the Patient have a Wound? Yes wound(s) were present on assessment. LDA wound assessment was Initiated and completed by RN  Open area to right second toe  Moist redness to bilateral buttocks- silicone border applied  Healed coccyx old area       Efrain Prevention initiated by RN: Yes  Wound Care Orders initiated by RN: Yes    Pressure Injury (Stage 3,4, Unstageable, DTI, NWPT, and Complex wounds) if present, place Wound referral order by RN under : No    New Ostomies, if present place, Ostomy referral order under : No     Nurse 1 eSignature: Electronically signed by Fide Allen LPN on 1/9/25 at 3:39 PM EST    **SHARE this note so that the co-signing nurse can place an eSignature**    Nurse 2 eSignature: Electronically signed by Yusra Haque RN on 1/9/25 at 4:00 PM EST

## 2025-01-09 NOTE — PROGRESS NOTES
Pt seen and examined. Pd tonite. Await ARU plan. Intervention outpt. Concern drop heart rate. Asked for cardio eval. Full note to follow

## 2025-01-09 NOTE — DISCHARGE SUMMARY
Please use this note as a progress note for the day if the patient does not discharge today      Discharge Summary    Name:  Gwendolyn Grimm /Age/Sex: 1940  (84 y.o. female)   MRN & CSN:  [id number] & [id number] Admission Date/Time: 2025  6:26 PM   Attending:  Marcial Hendricks MD Discharging Physician: Marcial Hendricks MD       Admission Diagnosis:   Right-sided weakness  Left carotid stenosis  ESRD on PD   Non-Obstructive CAD   Non-MI Troponin Elevation   HTN, HLD   Type II DM with Hyperglycemia  Suspected COPD   Hypothyroidism   GERD   H/O PEA Arrest  Anesthesia   H/O Colon Cancer s/p Colectomy   Mood Disorder    Discharge Diagnosis, hospital course, assessment and plan:  Gwendolyn Grimm is a 84 y.o.  female  who presents with Right sided weakness    Patient admitted on 2025  6:26 PM    Per H+P:    Patient is a 84 y.o. female with a PMHx as above who presented to the ED with worsening right arm and leg weakness since morning  after waking up which is different than her generalized. No extremity numbness, facial droop or aphasia generalized weakness. LKW 0 . Ambulates with walker at home, no falls. Denied any fevers, chills, CP, SOB, cough, N/V, abdominal pain, C/D or bleeding. Denied any tobacco or alcohol use.     Acute Lt. Cerebral CVA: Presented with right-sided weakness and CTA head/neck showing high-grade stenosis of the left carotid bifurcation.  MRI brain showing several small scattered acute infarcts in the left cerebral hemisphere, largest at the left frontoparietal junction with no significant mass effect or hemorrhage.  Neurology and interventional neuro on board. DAPT X 21 days and aspirin only after. Continue statin. To ARU   Lt. Carotid Stenosis. Will need intervention. Plan for next week to two weeks as outpatient. Interventional neurology on board.   Essential hypertension: BP slightly elevated. Resumed metoprolol today. Resume amlodipine, hydralazine and imdur slowly based  on BP  Sinus Bradycardia. HR in the 50's. She did drop to 40's overnight when sleeping but asymptomatic.   ESRD on PD: Nephrology on board. Pt. On PD.   Nonobstructive CAD: Non-MI chronic troponin elevation: No chest pain.  Negative left heart cath in 2022.  EKG without evidence of acute ischemia.  Continue ASA, Plavix, statin, beta-blocker, and Imdur.  Mixed hyperlipidemia: , cholesterol 202.  Initiated on Lipitor as above.  Type 2 diabetes with hyperglycemia: Continue basal insulin with SSI and will titrate as needed.  COPD: Per history, no evidence of exacerbation. Continue home inhalers.   Acquired hypothyroidism: Continue home Synthroid.  Vit D Deficiency: Vit D level 9 and started on supplementation.   Depression: Per history, continue Celexa and Remeron.      Patient stable enough to be discharged to ARU.       The patient expressed appropriate understanding of and agreement with the discharge recommendations, medications, and plan.     Consults this admission:  IP CONSULT TO CASE MANAGEMENT  IP CONSULT TO NEPHROLOGY  IP CONSULT TO NEUROLOGY  IP CONSULT TO NEUROINTERVENTIONAL SURGERY        Discharge Exam  BP (!) 133/45   Pulse 51   Temp 98.3 °F (36.8 °C) (Oral)   Resp 21   Ht 1.676 m (5' 5.98\")   Wt 83.2 kg (183 lb 6.8 oz)   SpO2 95%   BMI 29.62 kg/m²     General: NAD, oriented   Eyes: EOMI  ENT: neck supple  Cardiovascular: Regular rate. No murmurs  Respiratory: Clear to auscultation BL on RA. No wheeze or rales.   Gastrointestinal: Soft, non tender, ND, +BS  Genitourinary: no suprapubic tenderness  Musculoskeletal: No edema  Skin: warm, dry  Neuro: Alert. Strength slightly diminished in RUE 4+/5, RLE 5/5. Pronator drift RUE.    Psych: Mood appropriate.       Discharge Instruction:   Handoff to PCP:     Follow up appointments:   Neurology   Interventional Neurology   Nephro   Cardio       Diet: ADULT DIET; Regular; 4 carb choices (60 gm/meal); Low Fat/Low Chol/High Fiber/2 gm Na  Activity:  radiation dose optimization techniques (automated exposure control, and use of iterative reconstruction techniques, or adjustment of the mA and/or kV according to patient size) were used to limit patient radiation dose. Measurements and estimates of internal carotid artery stenosis are based on the NASCET criteria. Limitations: None. FINDINGS: Brain: No significant abnormalities in the arterial phase. Soft Tissues: No significant soft tissue findings in the neck or upper chest. Upper Lungs:  No nodules or other significant lung pathology. Aortic Arch: Classic origin of the brachiocephalic vessels. No significant abnormalities in the common carotid or subclavian arteries. Right Cervical Carotid: Normal appearing Carotid bifurcation. No evidence of significant stenosis or dissection. Left Cervical Carotid: High-grade stenosis at the left carotid bifurcation. Atherosclerotic calcification throughout. Vertebrals: Patent vessels with no evidence of significant stenosis. Intracranial Vasculature:  No evidence of aneurysm, vascular malformation, or major branch occlusion. IMPRESSION: High-grade stenosis at the left carotid bifurcation. Atherosclerotic calcification throughout. No vessel cutoffs.  Dictated and Electronically Signed By: Kwabena Gaspar MD 1/6/2025 21:17        CT HEAD WO CONTRAST    Result Date: 1/6/2025  PROCEDURE: CT HEAD WO CONTRAST DATE OF EXAM:  1/6/2025 22:06 DEMOGRAPHICS: 84 years old Female INDICATION: Stroke Symptoms COMPARISON: No existing relevant imaging study corresponding to the same anatomical region is available. TECHNIQUE: Contiguous axial slices of the head were submitted without IV contrast.   DOSE OPTIMIZATION: CT radiation dose optimization techniques (automated exposure  control, and use of iterative reconstruction techniques, or adjustment of the mA and/or kV according to patient size) were used to limit patient radiation dose. FINDINGS: No acute intracranial hemorrhage. Mild diffuse  cerebral volume loss.  Ventricles are normal in size. Basal cisterns and foramen magnum are patent. No midline shift. Patchy hypoattenuating foci throughout the supratentorial white matter, without associated mass effect, likely sequelae of chronic microvascular ischemic change. Gray-white differentiation is preserved.  Internal carotid and vertebral artery calcifications are noted. Globes are symmetric in size.  Visualized paranasal sinuses and mastoid air cells are well aerated. No suspicious focal lesion of the scalp or bony calvarium. IMPRESSION: No acute intracranial hemorrhage, large territory infarct or mass effect.  Please note, MRI is more sensitive to detect acute infarction can be obtained if  there is persistent clinical concern. Volume loss with sequelae of chronic microvascular ischemic change. This dictation was created with voice recognition software.  While attempts have  been made to review the dictation as it is transcribed, on occasion the spoken word can be misinterpreted by the technology leading to omissions or inappropriate words, phrases or sentences.  Dictated and Electronically Signed By: Marek Snider 1/6/2025 19:12        Vascular duplex lower extremity venous left    Result Date: 1/2/2025  Left lower extremity venous ultrasound INDICATION:  Pain and swelling, COMPARISON: Bilateral lower extremity venous duplex ultrasound 6/17/2023 Doppler ultrasound of the left lower extremity was performed. FINDINGS:  There is normal flow in the great saphenous, common femoral, femoral, and popliteal veins. Normal compression and augmentation is demonstrated. The proximal calf veins are also patent.     No evidence of deep venous thrombosis in the left lower extremity Electronically signed by Martín Harley MD    CT HEAD WO CONTRAST    Result Date: 1/2/2025  EXAM: CT HEAD WITHOUT IV CONTRAST CLINICAL HISTORY: HEAD TRAUMA, CLOSED, MILD, GCS >= 13, NO RISK FACTORS, NEURO EXAM NORMAL; headache/leg weak  COMPARISON: None TECHNIQUE: CT of the head was performed without intravenous contrast medium from the skull vertex to the skull base and reconstructed into axial, coronal, and sagittal images utilizing brain and bone filters. One or more of the following dose-optimizing techniques was utilized for this exam: automated exposure control, adjustment of the mA and/or kV according to patient size, and/or use of iterative reconstruction technique. FINDINGS: No intracranial hemorrhage, hydrocephalus, midline shift, or intracranial herniation, or extra-axial fluid collection is identified. No definite evidence of an acute infarct. Nonspecific scattered white matter hypodensities are seen, which are presumed to reflect chronic small vessel white matter ischemic disease. The visualized intraorbital contents are unremarkable. The visualized paranasal sinuses and mastoid air cells are well aerated.     No acute intracranial findings. Electronically signed by Andrei Munoz MD      Additional Information: Patient seen and examined day of discharge. For more information regarding patient's care please contact [facility] [phone]    Discharge Time of 46 minutes    Electronically signed by Marcial Hendricks MD on 1/9/2025 at 12:38 PM

## 2025-01-09 NOTE — PLAN OF CARE
Problem: Chronic Conditions and Co-morbidities  Goal: Patient's chronic conditions and co-morbidity symptoms are monitored and maintained or improved  Outcome: Progressing     Problem: Discharge Planning  Goal: Discharge to home or other facility with appropriate resources  Outcome: Progressing     Problem: Safety - Adult  Goal: Free from fall injury  Outcome: Progressing     Problem: Nutrition Deficit:  Goal: Optimize nutritional status  Outcome: Progressing

## 2025-01-09 NOTE — PLAN OF CARE
ARU Interdisciplinary Plan of Care (IPOC)  [address] [place]  [address]  [phone]  Fax: [phone]    Gwendolyn Grimm    : 1940  [id number]  MRN: [id number]   PHYSICIAN:  HERLINDA Alonzo MD  Primary Active Problems:   Active Hospital Problems    Diagnosis Date Noted    Coronary artery disease of native artery of native heart with stable angina pectoris (HCC) [I25.118]      Priority: Medium    Poorly controlled type 2 diabetes mellitus with peripheral neuropathy (HCC) [E11.42, E11.65] 01/10/2025    Irritable bowel syndrome [K58.9] 01/10/2025    Dysarthria due to acute stroke (HCC) [I63.9, R47.1] 01/10/2025    Acute CVA (cerebrovascular accident) (HCC) [I63.9] 2025    Hemiparesis of right dominant side as late effect of cerebral infarction (HCC) [I69.351] 2025    End-stage renal disease on peritoneal dialysis (HCC) [N18.6, Z99.2] 2024    Obesity (BMI 30-39.9) [E66.9] 2013     Rehabilitation Diagnosis:     Cerebral infarction, unspecified [I63.9]  Acute CVA (cerebrovascular accident) (HCC) [I63.9]      CARE PLAN   NURSING:  Gwendolyn Grimm while on this unit will:     Bowel and Bladder   [] Be continent of bowel and bladder      [x] Have an adequate number of bowel movements   [] Urinate with no urinary retention >300ml in bladder   [] Bladder Scan: (details)   [] Complete bladder protocol with gracia removal   [] Initiate Bladder Program to toilet every ___ hours   [] Initiate Bowel Program to toilet every ___hours   [] Bladder training    [] Bowel training  Pulmonary   [x] Maintain O2 SATs at 92% or greater  Pain Management   [x] Have pain managed while on ARU        [] Be pain free by discharge    [x] Medication Management and Education  Maintenance of Skin Integrity/Wound Management   [x] Have no skin breakdown while on ARU   [] Have improved skin integrity via wound measurements   [] Have no  functional tasks.        These goals were reviewed with this patient at the time of assessment and Gwendolyn Grimm is in agreement.    CASE MANAGEMENT:  Goals:   Assist patient/family with discharge planning, patient/family counseling, assistance in obtaining recommended equipment and other services, and coordination with insurance during ARU stay.  Patient Goals:  Return to maximum level of independent function. Patient goal is to work on \"my walking\" and return home.      Nutrition goal: Patient will consume at least 75% at meals during stay     Activities Prior to Admit:   Homemaking Responsibilities: Yes  Active : Yes (occasionally; often relies on children though)  Mode of Transportation: Car  Occupation: Retired  Leisure & Hobbies: Word finding books, pt manages pills via 7 day via med box, pt calls in bills. Visit dtr 4-5 days, son works from home. Online grocery.  limited outings due to seasonal illnesses     Intensity of Therapy  Gwendolyn Grimm will be seen a minimum of 3 hours of therapy per day/a minimum of 5 out of 7 days per week.    [] In this rare instance due to the nature of this patient's medical involvement, this patient will be seen 15 hours per week (900 minutes within a 7 day period).    Treatments may include therapeutic exercises, gait training, neuromuscular re-ed, transfer training, community reintegration, bed mobility, w/c mobility and training, self care, home mgmt, cognitive training, energy conservation,dysphagia tx, speech/language/communication therapy, group therapy, and patient/family education. In addition, dietician/nutritionist may monitor calorie count as well as intake and collaboratively work with SLP on dietary upgrades.  Neuropsychology/Psychology may evaluate and provide necessary support.  Group therapy as appropriate to facilitate improved endurance, STR, COORD, function, safety, transfers, awareness and insight into deficits, problem solving, memory, and social interaction

## 2025-01-09 NOTE — PROGRESS NOTES
Nephrology Progress Note  1/9/2025 12:42 PM  Subjective:     Interval History: Gwendolyn Grimm is a 84 y.o. female Appears feeling better overall no acute distress working on rehab plans with recent CVA        Data:   Scheduled Meds:   [Held by provider] hydrALAZINE  25 mg Oral 3 times per day    metoprolol succinate  25 mg Oral Daily    atorvastatin  40 mg Oral Nightly    clopidogrel  75 mg Oral Daily    vitamin D  50,000 Units Oral Weekly    [Held by provider] amLODIPine  10 mg Oral Daily    aspirin  81 mg Oral Daily    budesonide-formoterol  2 puff Inhalation BID RT    citalopram  20 mg Oral Daily    insulin glargine  10 Units SubCUTAneous Nightly    [Held by provider] isosorbide mononitrate  60 mg Oral Daily    levothyroxine  150 mcg Oral Daily    mirtazapine  15 mg Oral Nightly    pantoprazole  40 mg Oral QAM AC    [Held by provider] spironolactone  50 mg Oral BID    heparin (porcine)  5,000 Units SubCUTAneous 3 times per day     Continuous Infusions:      CBC   Recent Labs     01/06/25  1840 01/07/25  0555 01/08/25  0303   WBC 12.6* 11.3* 11.1*   HGB 10.5* 9.5* 10.6*   HCT 33.9* 30.1* 32.6*    300 326      BMP   Recent Labs     01/06/25  1840 01/07/25  0555 01/08/25  0303    136 136   K 4.7 4.8 4.8   CL 99 99 98*   CO2 19* 19* 19*   BUN 71* 74* 77*   CREATININE 8.6* 8.6* 9.0*     Hepatic:   Recent Labs     01/06/25  1840   AST 17   ALT 11   BILITOT 0.3   ALKPHOS 79     Troponin: No results for input(s): \"TROPONINI\" in the last 72 hours.  BNP: No results for input(s): \"BNP\" in the last 72 hours.  Lipids:   Recent Labs     01/07/25  0555   CHOL 202*   HDL 33*     ABGs:   Lab Results   Component Value Date/Time    PO2ART 73 05/12/2023 07:00 AM    JUD5MIA 38.0 05/12/2023 07:00 AM     INR:   Recent Labs     01/06/25  1840   INR 1.0     Renal Labs  Albumin:    Lab Results   Component Value Date/Time    LABALBU DUPLICATE ORDER 05/27/2022 12:43 PM     Calcium:    Lab Results   Component Value Date/Time     colectomy    Plan      #1 maintain on PD dialysis due to night and will ask her to  bring in supplies if she is going to stay for prolonged time with ARU   #2 plan on angiogram outpatient after discharge from ARU follow-up with neurointervention   #3 cardiac stable heart rate in the 50s adjust to monitor blood pressure meds his blood pressure slightly low   #4 monitor control glucose   #5 GI stable     overall improved okay from renal standpoint to discharge to ARU               CHI ROY MD, MD

## 2025-01-09 NOTE — H&P
rehabilitation with occupational and physical therapy 180 minutes 5 out of every 7 days. Will address basic and  advancing mobility with self-care instruction and adaptive equipment training. Caregiver education will be offered. Expected length of stay  prior to a supervised level of function for discharge home with a walker and [facility] OT/PT is 12 to 14 days.    Additional recommendation:    Acute CVA with right hemiparesis: Patient requires daily occupational and physical therapy with speech-language pathology.  With her right sided weakness, impaired sensation and altered balance, she is at risk for fall with injury during standing ADLs and mobility activities.  Therefore, we need to provide her adaptive equipment training with strengthening exercises, balance recovery training and conditioning development.  We must emphasize pulmonary hygiene measures, nutritional support and monitoring of bowel and bladder function with retraining as indicated.  She needs ongoing DVT prophylaxis.  Caregiver training with her family will likely assist with the transition home.  She is on DAPT with Plavix and a baby aspirin as well as a statin (Lipitor).  Optimizing blood sugar and blood pressure control is a focus of our treatment.  Outpatient follow-up with her PCP following rehab.  DVT prophylaxis: SCD no contraindication to using heparin 5000 units twice a day for DVT prophylaxis.  This will raise her risk for spontaneous hemorrhage and GI bleeding.  Therefore, I must monitor her hemoglobin and platelet count regularly while providing GI prophylaxis with a PPI.  Weightbearing activities will be a part of her daily therapy plan.  Poorly controlled diabetes type 2 with peripheral neuropathy: Continuing a diet modified for carbohydrates.  Providing scheduled Lantus nightly and considering a Humalog sliding scale.  Encouraging consistent oral hydration.  End-stage renal disease on peritoneal dialysis: Consulting nephrology to monitor  her renal replacement therapy.  Continuing peritoneal dialysis nightly.  Avoiding nephrotoxic medications when possible.  Consistent oral hydration will be encouraged.  Periodic monitoring of her chemistries.  Essential hypertension with CAD: Continuing antiplatelet therapy with a baby aspirin and Plavix.  Maintaining a statin.  Encouraging heart healthy diet choices.  Currently holding her home blood pressure medications of spironolactone, hydralazine, amlodipine and metoprolol due to low blood pressure.  We will trend her blood pressure patterns and reintroduce medications as indicated.  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  Chronic dyspnea with anxiety (probable COPD): Continuing Symbicort inhalers nightly as well as home medication of Celexa and Remeron.  Bronchodilator inhalers will be used as needed.  Monitoring O2 saturations and respiratory rates regularly.    I personally performed a history and physical on this patient within 24 hours of admission to the rehab unit. I have reviewed the preadmission screening and concur with its findings without change. A detailed plan of care will be established by hospital day 4 and I attest the patient is appropriate for inpatient rehabilitation at this time.  I have compared the patient's current functional status noted during my history and physical with that of the preadmission screen and I have found no significant differences.

## 2025-01-10 PROBLEM — E11.65 POORLY CONTROLLED TYPE 2 DIABETES MELLITUS WITH PERIPHERAL NEUROPATHY (HCC): Status: ACTIVE | Noted: 2025-01-10

## 2025-01-10 PROBLEM — K58.9 IRRITABLE BOWEL SYNDROME: Status: ACTIVE | Noted: 2025-01-10

## 2025-01-10 PROBLEM — I69.351 HEMIPARESIS OF RIGHT DOMINANT SIDE AS LATE EFFECT OF CEREBRAL INFARCTION (HCC): Status: ACTIVE | Noted: 2025-01-06

## 2025-01-10 PROBLEM — I63.9 DYSARTHRIA DUE TO ACUTE STROKE (HCC): Status: ACTIVE | Noted: 2025-01-10

## 2025-01-10 PROBLEM — E11.42 POORLY CONTROLLED TYPE 2 DIABETES MELLITUS WITH PERIPHERAL NEUROPATHY (HCC): Status: ACTIVE | Noted: 2025-01-10

## 2025-01-10 PROBLEM — R47.1 DYSARTHRIA DUE TO ACUTE STROKE (HCC): Status: ACTIVE | Noted: 2025-01-10

## 2025-01-10 LAB
EKG ATRIAL RATE: 61 BPM
EKG DIAGNOSIS: NORMAL
EKG P AXIS: 68 DEGREES
EKG P-R INTERVAL: 276 MS
EKG Q-T INTERVAL: 454 MS
EKG QRS DURATION: 128 MS
EKG QTC CALCULATION (BAZETT): 457 MS
EKG R AXIS: -67 DEGREES
EKG T AXIS: 14 DEGREES
EKG VENTRICULAR RATE: 61 BPM
GLUCOSE BLD-MCNC: 102 MG/DL (ref 74–99)
GLUCOSE BLD-MCNC: 141 MG/DL (ref 74–99)
GLUCOSE BLD-MCNC: 191 MG/DL (ref 74–99)
GLUCOSE BLD-MCNC: 82 MG/DL (ref 74–99)

## 2025-01-10 PROCEDURE — 97167 OT EVAL HIGH COMPLEX 60 MIN: CPT

## 2025-01-10 PROCEDURE — 6370000000 HC RX 637 (ALT 250 FOR IP): Performed by: INTERNAL MEDICINE

## 2025-01-10 PROCEDURE — 6360000002 HC RX W HCPCS: Performed by: PHYSICAL MEDICINE & REHABILITATION

## 2025-01-10 PROCEDURE — 97116 GAIT TRAINING THERAPY: CPT

## 2025-01-10 PROCEDURE — 97535 SELF CARE MNGMENT TRAINING: CPT

## 2025-01-10 PROCEDURE — 94150 VITAL CAPACITY TEST: CPT

## 2025-01-10 PROCEDURE — 97163 PT EVAL HIGH COMPLEX 45 MIN: CPT

## 2025-01-10 PROCEDURE — 94761 N-INVAS EAR/PLS OXIMETRY MLT: CPT

## 2025-01-10 PROCEDURE — 97530 THERAPEUTIC ACTIVITIES: CPT

## 2025-01-10 PROCEDURE — 6370000000 HC RX 637 (ALT 250 FOR IP): Performed by: PHYSICAL MEDICINE & REHABILITATION

## 2025-01-10 PROCEDURE — 82962 GLUCOSE BLOOD TEST: CPT

## 2025-01-10 PROCEDURE — 92523 SPEECH SOUND LANG COMPREHEN: CPT

## 2025-01-10 PROCEDURE — 1280000000 HC REHAB R&B

## 2025-01-10 PROCEDURE — 94640 AIRWAY INHALATION TREATMENT: CPT

## 2025-01-10 PROCEDURE — 94664 DEMO&/EVAL PT USE INHALER: CPT

## 2025-01-10 RX ORDER — HEPARIN SODIUM 5000 [USP'U]/ML
5000 INJECTION, SOLUTION INTRAVENOUS; SUBCUTANEOUS 3 TIMES DAILY
Status: DISCONTINUED | OUTPATIENT
Start: 2025-01-10 | End: 2025-01-17

## 2025-01-10 RX ORDER — INSULIN LISPRO 100 [IU]/ML
0-4 INJECTION, SOLUTION INTRAVENOUS; SUBCUTANEOUS
Status: DISCONTINUED | OUTPATIENT
Start: 2025-01-10 | End: 2025-01-17 | Stop reason: HOSPADM

## 2025-01-10 RX ADMIN — CLOPIDOGREL BISULFATE 75 MG: 75 TABLET ORAL at 07:52

## 2025-01-10 RX ADMIN — BUDESONIDE AND FORMOTEROL FUMARATE DIHYDRATE 2 PUFF: 160; 4.5 AEROSOL RESPIRATORY (INHALATION) at 19:41

## 2025-01-10 RX ADMIN — INSULIN LISPRO 1 UNITS: 100 INJECTION, SOLUTION INTRAVENOUS; SUBCUTANEOUS at 21:52

## 2025-01-10 RX ADMIN — MIRTAZAPINE 15 MG: 15 TABLET, FILM COATED ORAL at 21:51

## 2025-01-10 RX ADMIN — INSULIN GLARGINE 10 UNITS: 100 INJECTION, SOLUTION SUBCUTANEOUS at 21:53

## 2025-01-10 RX ADMIN — GENTAMICIN SULFATE: 1 CREAM TOPICAL at 16:41

## 2025-01-10 RX ADMIN — ALBUTEROL SULFATE 1 PUFF: 90 AEROSOL, METERED RESPIRATORY (INHALATION) at 07:52

## 2025-01-10 RX ADMIN — ATORVASTATIN CALCIUM 40 MG: 40 TABLET, FILM COATED ORAL at 21:54

## 2025-01-10 RX ADMIN — PANTOPRAZOLE SODIUM 40 MG: 40 TABLET, DELAYED RELEASE ORAL at 06:11

## 2025-01-10 RX ADMIN — LEVOTHYROXINE SODIUM 150 MCG: 0.07 TABLET ORAL at 06:11

## 2025-01-10 RX ADMIN — CITALOPRAM HYDROBROMIDE 20 MG: 20 TABLET ORAL at 07:52

## 2025-01-10 RX ADMIN — ASPIRIN 81 MG CHEWABLE TABLET 81 MG: 81 TABLET CHEWABLE at 07:52

## 2025-01-10 RX ADMIN — HEPARIN SODIUM 5000 UNITS: 5000 INJECTION INTRAVENOUS; SUBCUTANEOUS at 21:51

## 2025-01-10 RX ADMIN — BUDESONIDE AND FORMOTEROL FUMARATE DIHYDRATE 2 PUFF: 160; 4.5 AEROSOL RESPIRATORY (INHALATION) at 07:54

## 2025-01-10 ASSESSMENT — PAIN SCALES - GENERAL: PAINLEVEL_OUTOF10: 0

## 2025-01-10 NOTE — PROGRESS NOTES
Comprehensive Nutrition Assessment    Type and Reason for Visit:  Initial, Consult (oral nutrition supplement)    Nutrition Recommendations/Plan:   Continue carb controlled diet with cardiac diet at this time   May offer oral nutrition supplement during stay, renal or diabetic supplement as patient will accept  Will continue to follow up during stay     Malnutrition Assessment:  Malnutrition Status:  No malnutrition (01/10/25 6608)    Context:  Chronic Illness       Nutrition Assessment:    Admit to acute rehab unit with weakness, CVA, hx ESRD on PD. Currently on carb controlled, cardiac diet with meal intake %. Per wt hx no loss noted in past year. Will continue to follow at moderate nutrition risk with increased needs.    Nutrition Related Findings:    asleep in bed,  hx DM, HTN, CAD, ESRD on PD   meds noted:  insulin, remeron, vitamin D with low level Wound Type: Pressure Injury, Stage III       Current Nutrition Intake & Therapies:    Average Meal Intake: 51-75%, %  Average Supplements Intake: None Ordered  ADULT DIET; Regular; 4 carb choices (60 gm/meal); Low Fat/Low Chol/High Fiber/2 gm Na    Anthropometric Measures:  Height: 167.6 cm (5' 6\")  Ideal Body Weight (IBW): 130 lbs (59 kg)    Admission Body Weight: 86.6 kg (190 lb 14.7 oz)  Current Body Weight: 86.6 kg (190 lb 14.7 oz), 146.9 % IBW. Weight Source: Bed scale  Current BMI (kg/m2): 30.8  Usual Body Weight: 78.3 kg (172 lb 9.9 oz) (1/4/24)     % Weight Change (Calculated): 10.6  Weight Adjustment For: No Adjustment                 BMI Categories: Obese Class 1 (BMI 30.0-34.9)    Estimated Daily Nutrient Needs:  Energy Requirements Based On: Formula  Weight Used for Energy Requirements: Current  Energy (kcal/day): 0360-1972 ([address] jeor)  Weight Used for Protein Requirements: Ideal  Protein (g/day): 88 (1.5 g/kg)  Method Used for Fluid Requirements: 1 ml/kcal  Fluid (ml/day): 1742-7104    Nutrition Diagnosis:   Predicted inadequate energy  intake related to increase demand for energy/nutrients as evidenced by rehab for strength and conditioning, dialysis    Nutrition Interventions:   Food and/or Nutrient Delivery: Continue Current Diet  Nutrition Education/Counseling: No recommendation at this time  Coordination of Nutrition Care: Continue to monitor while inpatient  Plan of Care discussed with: n/a    Goals:  Goals: PO intake 75% or greater, by next RD assessment  Type of Goal: New goal  Previous Goal Met: New Goal    Nutrition Monitoring and Evaluation:   Behavioral-Environmental Outcomes: None Identified  Food/Nutrient Intake Outcomes: Food and Nutrient Intake, Supplement Intake  Physical Signs/Symptoms Outcomes: Biochemical Data, Skin, Weight, Meal Time Behavior    Discharge Planning:    Continue current diet     Nicolasa Winn RD, LD  Contact: [phone]

## 2025-01-10 NOTE — PROGRESS NOTES
Facility/Department: [facility]  Initial Speech/Language/Cognitive Assessment    NAME: Gwendolyn Grimm  : 1940   MRN: [id number]  ADMISSION DATE: 2025  ADMITTING DIAGNOSIS: has Hyperlipidemia; Hyperuricemia; Vitamin D deficiency; Complications of bariatric procedures; Generalized weakness; SOB (shortness of breath) on exertion; Obesity; Localized edema; SOB (shortness of breath); Mild persistent asthma without complication; Pulmonary hypertension (Formerly [facility]); Abnormal nuclear cardiac imaging test; Hypothyroidism; Essential hypertension; Type 2 diabetes mellitus with complication, with long-term current use of insulin (Formerly [facility]); Erythrasma; Anemia; B12 deficiency; Chronic kidney disease; Chronic obstructive pulmonary disease, unspecified COPD type (Formerly [facility]); Obesity, Class III, BMI 40-49.9 (morbid obesity); Uncontrolled type 2 diabetes mellitus with hyperglycemia (Formerly [facility]); Leg swelling; Chronic pain of left knee; Chronic kidney disease, stage IV (severe) (Formerly [facility]); Chronic renal disease, stage III (Formerly [facility]) [047027]; Renal stone; Major depressive disorder, recurrent, mild; Major depressive disorder, recurrent, moderate; Major depressive disorder, recurrent, unspecified; Cystic kidney disease; Valvular heart disease; Acute cystitis without hematuria; Cecum mass; Mass of cecum; Moderate malnutrition ([facility]ly [facility]); Critical illness myopathy; Gait disturbance; Hypoxic encephalopathy (Formerly [facility]); Acute respiratory failure with hypoxia; Primary adenocarcinoma of ascending colon ([facility]ly [facility]); Acute blood loss anemia; Cardiac arrest due to other underlying condition; SVT (supraventricular tachycardia) (Formerly [facility]); Acute pulmonary embolism, unspecified pulmonary embolism type, unspecified whether acute cor pulmonale present (Formerly [facility]); Pneumonia of both lower lobes due to infectious organism; Chronic heart failure with preserved ejection fraction (Formerly [facility]); Acute renal failure with tubular necrosis ([facility]ly [facility]); HPTH (hyperparathyroidism) (Formerly [facility]); ESRD on dialysis (Formerly [facility]); Right sided  Assessments:  BIMS  Understanding Verbal and Non-Verbal Content: Understands  Expression of Ideas and Wants: Without difficulty  Cognitive Pattern Assessment Used: BIMS  BIMS Summary Score: 15 out of 15 cognitive score   The Kitchen Picture Test (KPT)provides an accurate indication of current judgment skill; however, it is not designed to make definitive cognitive diagnoses. The KPT Practical Judgment Subtest measures judgment as a specific executive function. The subtest score indicates the patient's ability to recognize problems and think through appropriate solutions.  The Total KPT score is a broader test that combines practical judgment with communication skills and visual memory. Because impairment in verbal skills and visual memory are often seen in persons with advanced dementia, the Total KPT score can be useful when there are concerns about larger questions of independent living and safety.   Gwendolyn Grimm achieved a total score of 21/21    This KPT Practical Judgment Subtest score indicates  judgment. Persons with this score typically indicate if there are  problems with executive functions and could support an underlying dementia.Gwendolyn Grimm achieved a total score of 8/8    8/8  While this score points to adequate judgment, it does not mean that the patient will always exercise good judgment and make appropriate decisions.            Prognosis:  Speech Therapy Prognosis  Prognosis: Excellent  Individuals consulted  Consulted and agree with results and recommendations: Patient    Education:  Patient Education: Results and recommendations; rehab expectations  Patient Education Response: Verbalizes understanding          Therapy Time:   Individual Concurrent Group Co-treatment   Time In 1015         Time Out 1050         Minutes 35                 Electronically signed by Anette Pisano MA, CCC-SLP on 1/10/2025 at 10:59 AM

## 2025-01-10 NOTE — PROGRESS NOTES
Occupational Therapy                              [facility] ARU OCCUPATIONAL THERAPY EVALUATION    Chart Review:  Past Medical History:   Diagnosis Date    Abnormal nuclear stress test 04/08/2007 4/08-EF=67%,Mild->Mod.isch.LAD;7/07-EF=70%,Suggests poss.Mild Ant.wall ischemia.    Cardiac arrest (HCC) 2008    Diabetes mellitus (HCC)     H/O cardiac catheterization 04/2008    No signif.CAD.    H/O cardiovascular stress test 04/11/2008    mild to mod ischemia in the LAD region, abnormal study, EF 67%, patient developed mild chest pain and throat tightness    H/O cardiovascular stress test 05/03/2016    lexiscan-moderate ischemia apical,RF66%    H/O Doppler ultrasound 07/16/2007    CAROTID DOPLER- intimal thickening but no significant atherosclerotic plaque noted in the right or left internal carotid artery, doppler flow velocities within the right and left internal carotid artery are elevated, consistent with a mild, less than 50% stenosis    H/O echocardiogram 10/14/2008    EF>55%, normal LV systolic function, normal left ventricular wall thickness, impaired LV relaxation    H/O echocardiogram 06/18/2013 6/13- EF >55% Impaired LV relaxation, Mild concentric LV hypertrophy, No sig AS, THEO underestimated. 10/08-EF=>55%,NL study;7/07-EF=>55%,Mild valv.AS.    H/O echocardiogram 05/16/2016 5/16 EF 55-60% normal study 12/14EF 60%. MIld mitral and tricuspid insufficiencies. Mildly sclerotic aortic valve which appears to be mildly stenosed with aortic valve area of 1.4 however this does not appear to be hemodynamically signficant aortic stenosis. Mildy hypertropic left ventricle with normal global and regional left ventricular systolic function.    H/O left heart cath 05/23/2016    no significant disease in all vessels. EF 55%    H/O left heart cath 06/08/2022    No significant disease    Hernia     after lap band removal    Hx of cardiovascular stress test 06/18/2013 6/13-EF 51%, no scitnigraphic evidence of inducible  Stand by assistance  Sit to stand: Stand by assistance  Stand to sit: Stand by assistance     Shower Transfers  Shower - Transfer Type: To and From  Shower - Transfer To: Shower seat without back  Shower - Technique: Ambulating  Shower Transfers: Contact Guard  Shower Transfers Comments: required CGA as pt did not use grab bar during approach to shower     Toilet Transfer  Assistance needed: Supervision or touching assistance  Comment: SBA joaquim HERNANDES, grab bar  CARE Score: 4  Discharge Goal: Independent    Functional Mobility:    Balance  Sitting Balance: Independent  Standing Balance: Stand by assistance  Standing Balance  Time: Multiple stands 1-2 mins each  Activity: ADLs  Comment: Pt able to maintain balance c 0-1 UE support  Functional Mobility  Functional - Mobility Device: Rolling Walker  Activity: To/from bathroom  Assist Level: Contact guard assistance     Cognition:  Cognition  Overall Cognitive Status: WFL  Cognition Comment: reports she was confused when CVA initially occurred but this has improved, no overt deficits observed this encounter.  See SLP eval for further details    Perception:  Perception  Overall Perceptual Status: WFL      Assessment:     Performance deficits / Impairments: Decreased functional mobility , Decreased ADL status, Decreased strength, Decreased endurance, Decreased balance, Decreased high-level IADLs, Decreased fine motor control, Decreased coordination    The patient is an 84-year-old right-hand-dominant female with a history of hypertension, diabetes, end-stage renal disease on peritoneal dialysis and chronic IBS after colon cancer and colectomy.  On 1/2/2025 she came through our ED and was hospitalized for 72 hours due to \"generalized weakness\" and difficulty walking.  No focal neurologic diagnosis was confirmed and she was discharged to home but she declined the home health care recommendation.  On 1/6/2025 she returned to our ED with worsening leg weakness and some dizziness.

## 2025-01-10 NOTE — PLAN OF CARE
Problem: Chronic Conditions and Co-morbidities  Goal: Patient's chronic conditions and co-morbidity symptoms are monitored and maintained or improved  1/10/2025 0139 by Pennie Dan LPN  Outcome: Progressing  1/9/2025 1546 by Fide Allen LPN  Outcome: Progressing     Problem: Discharge Planning  Goal: Discharge to home or other facility with appropriate resources  1/10/2025 0139 by Pennie Dan LPN  Outcome: Progressing  1/9/2025 1546 by Fide Allen LPN  Outcome: Progressing     Problem: Safety - Adult  Goal: Free from fall injury  1/10/2025 0139 by Pennie Dan LPN  Outcome: Progressing  1/9/2025 1546 by Fide Allen LPN  Outcome: Progressing

## 2025-01-10 NOTE — PLAN OF CARE
Problem: Chronic Conditions and Co-morbidities  Goal: Patient's chronic conditions and co-morbidity symptoms are monitored and maintained or improved  1/10/2025 0930 by Fide Allen LPN  Outcome: Progressing     Problem: Discharge Planning  Goal: Discharge to home or other facility with appropriate resources  1/10/2025 0930 by Fide Allen LPN  Outcome: Progressing     Problem: Safety - Adult  Goal: Free from fall injury  1/10/2025 0930 by Fide Allen LPN  Outcome: Progressing

## 2025-01-10 NOTE — PROGRESS NOTES
LOVENOX PROPHYLAXIS EVALUATION  (Populations not addressed in this protocol: trauma, obstetrics, or COVID-19)    Wt Readings from Last 3 Encounters:   01/10/25 86.6 kg (190 lb 14.7 oz)   01/09/25 83.2 kg (183 lb 6.8 oz)   01/02/25 81.6 kg (180 lb)       Estimated Creatinine Clearance: 5 mL/min (A) (based on SCr of 9 mg/dL (H)).  Recent Labs     01/08/25  0303   BUN 77*   CREATININE 9.0*      HGB 10.6*   HCT 32.6*       Weight Range: .9 kg    CRCL <15 or dialysis    50.9 kg   and below     .9  kg   101-150.9 kg   151-174.9  kg   175 kg  or greater     Heparin 5,000 units  subq BID     Heparin 5,000 units  subq TID       Heparin 5,000 units  subq TID   Heparin 5,000 units  subq TID   Heparin 7,500 units  subq TID       Per P/T protocol for appropriate subq anticoagulation by weight and CRCL change to:    Heparin 5,000 units subq TID      Harmony Oliver RPH  3:37 PM  01/10/25

## 2025-01-10 NOTE — CARE COORDINATION
Case Management Admission Note    Patient:Gwendolyn Grimm      :1940  MRN:[id number]  Rehab Dx/Hx: Cerebral infarction, unspecified [I63.9]  Acute CVA (cerebrovascular accident) (Formerly [facility]) [I63.9]    Chief Complaint:   Past Medical History:   Diagnosis Date    Abnormal nuclear stress test 2007-EF=67%,Mild->Mod.isch.LAD;-EF=70%,Suggests poss.Mild Ant.wall ischemia.    Cardiac arrest (Formerly [facility])     Diabetes mellitus (Formerly [facility])     H/O cardiac catheterization 2008    No signif.CAD.    H/O cardiovascular stress test 2008    mild to mod ischemia in the LAD region, abnormal study, EF 67%, patient developed mild chest pain and throat tightness    H/O cardiovascular stress test 2016    lexiscan-moderate ischemia apical,RF66%    H/O Doppler ultrasound 2007    CAROTID DOPLER- intimal thickening but no significant atherosclerotic plaque noted in the right or left internal carotid artery, doppler flow velocities within the right and left internal carotid artery are elevated, consistent with a mild, less than 50% stenosis    H/O echocardiogram 10/14/2008    EF>55%, normal LV systolic function, normal left ventricular wall thickness, impaired LV relaxation    H/O echocardiogram 2013- EF >55% Impaired LV relaxation, Mild concentric LV hypertrophy, No sig AS, THEO underestimated. 10/08-EF=>55%,NL study;-EF=>55%,Mild valv.AS.    H/O echocardiogram 2016 EF 55-60% normal study EF 60%. MIld mitral and tricuspid insufficiencies. Mildly sclerotic aortic valve which appears to be mildly stenosed with aortic valve area of 1.4 however this does not appear to be hemodynamically signficant aortic stenosis. Mildy hypertropic left ventricle with normal global and regional left ventricular systolic function.    H/O left heart cath 2016    no significant disease in all vessels. EF 55%    H/O left heart cath 2022    No significant disease    Hernia     after lap band  removal    Hx of cardiovascular stress test 06/18/2013 6/13-EF 51%, no scitnigraphic evidence of inducible myocardial ischemia    Hyperlipidemia     Hypertension     Irritable bowel syndrome     Kidney stones     Obesity     Osteoarthritis     Thyroid disease     Type II or unspecified type diabetes mellitus without mention of complication, not stated as uncontrolled     Venogram 06/21/2023    There is no DVT or any veinous blood clot in any of the above left leg veinous system     Past Surgical History:   Procedure Laterality Date    CARDIAC CATHETERIZATION  04/18/08    continue risk factor modification with strict control of her risk factors with diet and control of diabetes, high blood pressure and hyperlipidemia    GASTRIC BAND  08/2008    revision of gastric band placement    HEMICOLECTOMY Right 5/3/2023    RIGHT BOWEL RESECTION HEMICOLECTOMY LAPAROSCOPIC ROBOTIC XI performed by Amanda Tillman MD at [facility] OR    HERNIA REPAIR N/A 5/3/2023    OPEN HERNIA INCISIONAL REPAIR performed by Amanda Tillman MD at [facility] OR    HYSTERECTOMY (CERVIX STATUS UNKNOWN)      IR TUNNELED CVC PLACE WO SQ PORT/PUMP > 5 YEARS  1/9/2024    IR TUNNELED CATHETER PLACEMENT GREATER THAN 5 YEARS 1/9/2024 [facility] SPECIAL PROCEDURES    LAP BAND      put in in May Removed in August    LITHOTRIPSY      ROTATOR CUFF REPAIR      right    ROTATOR CUFF REPAIR       Allergies   Allergen Reactions    Anesthesia S-I-40 [Propofol] Anaphylaxis     PATIENT CODED AFTER THREE SURGERIES     Scopolamine      Combative and delusional    Amoxicillin     Bactrim [Sulfamethoxazole-Trimethoprim]      Had increase creatinine not true reaction    Penicillins Hives     Precautions: falls    Date of Admit: 1/9/2025  Room #: 1024/1024-A    Current functional status at time of admit:  Home Living/DME Available:  Type of Home: House  Home Access: Stairs to enter with rails  Bathroom Shower/Tub: Tub/Shower unit  Bathroom Toilet: Standard  Bathroom Equipment: Tub transfer

## 2025-01-10 NOTE — PLAN OF CARE
Problem: Chronic Conditions and Co-morbidities  Goal: Patient's chronic conditions and co-morbidity symptoms are monitored and maintained or improved  1/10/2025 0148 by Pennie Dan LPN  Outcome: Progressing  1/10/2025 0139 by Pennie Dan LPN  Outcome: Progressing  1/9/2025 1546 by Fide Allen LPN  Outcome: Progressing     Problem: Discharge Planning  Goal: Discharge to home or other facility with appropriate resources  1/10/2025 0148 by Pennie Dan LPN  Outcome: Progressing  1/10/2025 0139 by Pennie Dan LPN  Outcome: Progressing  1/9/2025 1546 by Fide Allen LPN  Outcome: Progressing     Problem: Safety - Adult  Goal: Free from fall injury  1/10/2025 0148 by Pennie Dan LPN  Outcome: Progressing  1/10/2025 0139 by Pennie Dan LPN  Outcome: Progressing  1/9/2025 1546 by Fide Allen LPN  Outcome: Progressing

## 2025-01-10 NOTE — CONSULTS
(ZOFRAN) injection 4 mg, Q6H PRN  pantoprazole (PROTONIX) tablet 40 mg, QAM AC  polyethylene glycol (GLYCOLAX) packet 17 g, Daily PRN  [START ON 1/14/2025] vitamin D (ERGOCALCIFEROL) capsule 50,000 Units, Weekly  glucose chewable tablet 16 g, PRN  dextrose bolus 10% 125 mL, PRN   Or  dextrose bolus 10% 250 mL, PRN  glucagon injection 1 mg, PRN  dextrose 10 % infusion, Continuous PRN  bisacodyl (DULCOLAX) suppository 10 mg, Daily PRN  gentamicin (GARAMYCIN) 0.1 % cream, Daily PRN        Allergies   Anesthesia s-i-40 [propofol], Scopolamine, Amoxicillin, Bactrim [sulfamethoxazole-trimethoprim], and Penicillins    Social History     Social History       Tobacco History       Smoking Status  Former Smoking Start Date  8/9/1991 Quit Date  8/9/1994 Average Packs/Day  0.5 packs/day for 3.0 years (1.5 ttl pk-yrs) Smoking Tobacco Type  Cigarettes from 8/9/1991 to 8/9/1994   Pack Year History     Packs/Day From To Years    0 8/9/1994  30.4    0.5 8/9/1991 8/9/1994 3.0      Smokeless Tobacco Use  Never              Alcohol History       Alcohol Use Status  No              Drug Use       Drug Use Status  No              Sexual Activity       Sexually Active  Not Currently Partners  Male Comment                      Family History     Family History   Problem Relation Age of Onset    Cancer Sister     COPD Brother     Heart Disease Brother     Cancer Mother         melonoma    Migraines Mother     Cancer Father         carcinoma    High Blood Pressure Father     Heart Disease Father        Review of Systems   Negative except for weak tired at times no chest pain.    Physical Exam   /65   Pulse (!) 46   Temp 97.5 °F (36.4 °C) (Oral)   Resp 16   Ht 1.676 m (5' 6\")   Wt 86.6 kg (190 lb 14.7 oz)   SpO2 93%   BMI 30.81 kg/m²      I/O: No intake/output data recorded.    General appearance: awake weak  HEENT: Head: Normal, normocephalic, atraumatic.  Neck: supple, symmetrical, trachea midline  Lungs: diminished  Results   Component Value Date/Time    TSH 1.59 01/02/2025 04:20 PM     IRON:    Lab Results   Component Value Date/Time    IRON 50 05/12/2023 05:40 AM     Iron Saturation:  No components found for: \"PERCENTFE\"  TIBC:    Lab Results   Component Value Date/Time    TIBC 227 05/12/2023 05:40 AM     FERRITIN:    Lab Results   Component Value Date/Time    FERRITIN 344 05/12/2023 05:40 AM         Imaging/Diagnostics   No results found.          Electronically signed by CHI ROY MD on 1/10/25 at 1:23 PM EST    Comment: Please note this report has been produced using speech recognition software and may contain errors related to that system including errors in grammar, punctuation, and spelling, as well as words and phrases that may be inappropriate. If there are any questions or concerns please feel free to contact the dictating provider for clarification.     Office  [address] [id number]  Phone [phone]  Fax [phone]

## 2025-01-10 NOTE — PROGRESS NOTES
Physical Therapy  [facility] ARU PHYSICAL THERAPY EVALUATION    Chart Review:  Past Medical History:   Diagnosis Date    Abnormal nuclear stress test 04/08/2007 4/08-EF=67%,Mild->Mod.isch.LAD;7/07-EF=70%,Suggests poss.Mild Ant.wall ischemia.    Cardiac arrest (HCC) 2008    Diabetes mellitus (HCC)     H/O cardiac catheterization 04/2008    No signif.CAD.    H/O cardiovascular stress test 04/11/2008    mild to mod ischemia in the LAD region, abnormal study, EF 67%, patient developed mild chest pain and throat tightness    H/O cardiovascular stress test 05/03/2016    lexiscan-moderate ischemia apical,RF66%    H/O Doppler ultrasound 07/16/2007    CAROTID DOPLER- intimal thickening but no significant atherosclerotic plaque noted in the right or left internal carotid artery, doppler flow velocities within the right and left internal carotid artery are elevated, consistent with a mild, less than 50% stenosis    H/O echocardiogram 10/14/2008    EF>55%, normal LV systolic function, normal left ventricular wall thickness, impaired LV relaxation    H/O echocardiogram 06/18/2013 6/13- EF >55% Impaired LV relaxation, Mild concentric LV hypertrophy, No sig AS, THEO underestimated. 10/08-EF=>55%,NL study;7/07-EF=>55%,Mild valv.AS.    H/O echocardiogram 05/16/2016 5/16 EF 55-60% normal study 12/14EF 60%. MIld mitral and tricuspid insufficiencies. Mildly sclerotic aortic valve which appears to be mildly stenosed with aortic valve area of 1.4 however this does not appear to be hemodynamically signficant aortic stenosis. Mildy hypertropic left ventricle with normal global and regional left ventricular systolic function.    H/O left heart cath 05/23/2016    no significant disease in all vessels. EF 55%    H/O left heart cath 06/08/2022    No significant disease    Hernia     after lap band removal    Hx of cardiovascular stress test 06/18/2013 6/13-EF 51%, no scitnigraphic evidence of inducible myocardial ischemia     abandoning her walker in the house, walking outside of the home and that they do not want her to drive. Pt states she has declined in endurance over last year with dialysis.     Pt presents today with deficits as noted above with decreased RLE strength and control affecting balance in gait, only able to ambulate 42'. Pt requires 2ww for adequate stability at this time, but will progress to 4ww if able. Feel that pt will benefit from ARU-PT to address deficits and improve mobility to return home at mod I for short distances and supervision for longer distance or more complex tasks.       Therapy Prognosis: Good  Decision Making: High Complexity  Clinical Presentation: unpredictable characteristics      Patient education:   ARU schedule, ARU expectations for participation, plan of care,   Treatment Initiated:  Functional mobility training, gait training, patient education  Barriers to Improvement:  12 months of declining endurance, decreased insight into deficits  Discharge Recommendations:  home with family  Equipment Recommendations:  2ww    Goals:  Patient Goals   Patient Goals : independence  Short Term Goals  Time Frame for Short Term Goals: 10 days STG=LTG  Short Term Goal 1: Pt will complete bed mobility Diane  Short Term Goal 2: Pt will perform all functional transfers with mod I  Short Term Goal 3: Pt will ambulate with 2ww on level surface 10' with mod I, 50' with supervision and 150' with min assist and uneven surface 10' with CG  Short Term Goal 4: Pt will negotiate curb step with 2ww and 4 steps with B rails with supervision  Short Term Goal 5: pt will  light object from floor with 2ww and reacher with mod I     Plan:       Pt will be seen at least 60 minutes per day for a minimum of 5 days per week, plus group therapy as appropriate  Physical Therapy Plan  Current Treatment Recommendations: Strengthening, Balance training, Functional mobility training, Transfer training, IADL training, Endurance

## 2025-01-11 LAB
GLUCOSE BLD-MCNC: 106 MG/DL (ref 74–99)
GLUCOSE BLD-MCNC: 149 MG/DL (ref 74–99)
GLUCOSE BLD-MCNC: 274 MG/DL (ref 74–99)
GLUCOSE BLD-MCNC: 91 MG/DL (ref 74–99)

## 2025-01-11 PROCEDURE — 6370000000 HC RX 637 (ALT 250 FOR IP): Performed by: INTERNAL MEDICINE

## 2025-01-11 PROCEDURE — 97530 THERAPEUTIC ACTIVITIES: CPT

## 2025-01-11 PROCEDURE — 90945 DIALYSIS ONE EVALUATION: CPT

## 2025-01-11 PROCEDURE — 1280000000 HC REHAB R&B

## 2025-01-11 PROCEDURE — 94640 AIRWAY INHALATION TREATMENT: CPT

## 2025-01-11 PROCEDURE — 97535 SELF CARE MNGMENT TRAINING: CPT

## 2025-01-11 PROCEDURE — 94150 VITAL CAPACITY TEST: CPT

## 2025-01-11 PROCEDURE — 82962 GLUCOSE BLOOD TEST: CPT

## 2025-01-11 PROCEDURE — 6360000002 HC RX W HCPCS: Performed by: PHYSICAL MEDICINE & REHABILITATION

## 2025-01-11 PROCEDURE — 97116 GAIT TRAINING THERAPY: CPT

## 2025-01-11 PROCEDURE — 97110 THERAPEUTIC EXERCISES: CPT

## 2025-01-11 PROCEDURE — 94761 N-INVAS EAR/PLS OXIMETRY MLT: CPT

## 2025-01-11 PROCEDURE — 6370000000 HC RX 637 (ALT 250 FOR IP): Performed by: PHYSICAL MEDICINE & REHABILITATION

## 2025-01-11 RX ADMIN — HEPARIN SODIUM 5000 UNITS: 5000 INJECTION INTRAVENOUS; SUBCUTANEOUS at 15:44

## 2025-01-11 RX ADMIN — BUDESONIDE AND FORMOTEROL FUMARATE DIHYDRATE 2 PUFF: 160; 4.5 AEROSOL RESPIRATORY (INHALATION) at 21:29

## 2025-01-11 RX ADMIN — CITALOPRAM HYDROBROMIDE 20 MG: 20 TABLET ORAL at 08:36

## 2025-01-11 RX ADMIN — ATORVASTATIN CALCIUM 40 MG: 40 TABLET, FILM COATED ORAL at 20:32

## 2025-01-11 RX ADMIN — INSULIN LISPRO 2 UNITS: 100 INJECTION, SOLUTION INTRAVENOUS; SUBCUTANEOUS at 20:32

## 2025-01-11 RX ADMIN — ASPIRIN 81 MG CHEWABLE TABLET 81 MG: 81 TABLET CHEWABLE at 08:36

## 2025-01-11 RX ADMIN — LEVOTHYROXINE SODIUM 150 MCG: 0.07 TABLET ORAL at 05:25

## 2025-01-11 RX ADMIN — GENTAMICIN SULFATE: 1 CREAM TOPICAL at 16:51

## 2025-01-11 RX ADMIN — HEPARIN SODIUM 5000 UNITS: 5000 INJECTION INTRAVENOUS; SUBCUTANEOUS at 08:36

## 2025-01-11 RX ADMIN — CLOPIDOGREL BISULFATE 75 MG: 75 TABLET ORAL at 08:36

## 2025-01-11 RX ADMIN — INSULIN GLARGINE 10 UNITS: 100 INJECTION, SOLUTION SUBCUTANEOUS at 20:31

## 2025-01-11 RX ADMIN — ACETAMINOPHEN 650 MG: 325 TABLET ORAL at 05:24

## 2025-01-11 RX ADMIN — HEPARIN SODIUM 5000 UNITS: 5000 INJECTION INTRAVENOUS; SUBCUTANEOUS at 20:32

## 2025-01-11 RX ADMIN — BUDESONIDE AND FORMOTEROL FUMARATE DIHYDRATE 2 PUFF: 160; 4.5 AEROSOL RESPIRATORY (INHALATION) at 07:26

## 2025-01-11 RX ADMIN — MIRTAZAPINE 15 MG: 15 TABLET, FILM COATED ORAL at 20:32

## 2025-01-11 RX ADMIN — PANTOPRAZOLE SODIUM 40 MG: 40 TABLET, DELAYED RELEASE ORAL at 06:28

## 2025-01-11 ASSESSMENT — PAIN SCALES - GENERAL
PAINLEVEL_OUTOF10: 0
PAINLEVEL_OUTOF10: 0

## 2025-01-11 NOTE — PROGRESS NOTES
positive response to today's treatment as evidenced by motivation.      Treatment/Activity Tolerance:   [] Tolerated treatment with no adverse effects    [x] Patient limited by fatigue  [] Patient limited by pain   [] Patient limited by medical complications:    [] Adverse reaction to Tx:   [] Significant change in status    Safety:       [x]  bed alarm set    []  chair alarm set    []  Pt refused alarms                []  Telesitter activated      [x]  Gait belt used during tx session      []other:       Number of Minutes/Billable Intervention  Therapeutic Exercise 15   ADL Self-care 45   Neuro Re-Ed    Therapeutic Activity    Group    Other:    TOTAL 60       Social History  Social/Functional History  Lives With: Family (Son (Karissa, he works from home) and his girlfriend)  Type of Home: House  Home Layout: One level (there is a basement but pt does not access it)  Home Access: Stairs to enter with rails  Entrance Stairs - Number of Steps: 2 HENRY, reports theres a porch railing to hold onto  Bathroom Shower/Tub: Tub/Shower unit  Bathroom Toilet: Standard  Bathroom Equipment: Tub transfer bench, Grab bars in shower, Hand-held shower  Bathroom Accessibility: Walker accessible  Home Equipment: Rollator, Wheelchair - Manual (has multiple pieces of equipment from her  that she doesn't currently use: multiple W/Cs, BSC, hospital bed, gabriele lift)  Has the patient had two or more falls in the past year or any fall with injury in the past year?: No  Receives Help From: Family  Prior Level of Assist for ADLs: Independent  Homemaking Responsibilities: Yes  Meal Prep Responsibility: Secondary  Laundry Responsibility: Primary  Cleaning Responsibility: Secondary (does dishes, not heavier cleaning)  Bill Paying/Finance Responsibility: Primary  Shopping Responsibility: No  Health Care Management: Primary (uses a pillbox. Son often connects and disconnects pt from PD, there are a few nights a week she connects it  herself)  Prior Level of Assist for Ambulation: Independent household ambulator, with or without device, Independent community ambulator, with or without device (however reports in winter her children have her use the W/C d/t weather conditions; otherwise pt uses rollator for doctors appts.  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 Visit dtr 4-5 days, son works from home. Online grocery.  limited outings due to seasonal illnesses  IADL Comments: No falls in the last year  Additional Comments: Pt typically sleeps in a flat, regular bed at home    Objective                                                                                    Goals:  (Update in navigator)  Short Term Goals  Time Frame for Short Term Goals: STGs=LTGS:  Long Term Goals  Time Frame for Long Term Goals : 7 days or until d/c  Long Term Goal 1: Pt will complete grooming tasks Ind  Long Term Goal 2: Pt will complete total body bathing mod I  Long Term Goal 3: Pt will complete UB dressing Ind  Long Term Goal 4: Pt will complete LB dressing Ind  Long Term Goal 5: Pt will doff/don footwear Ind  Additional Goals?: Yes  Long Term Goal 6: Pt will complete toileting mod I  Long Term Goal 7: Pt will complete functional transfers (bed, chair, toilet, shower) c DME PRN and mod I  Long Term Goal 8: Pt will perform therex/therax to facilitate increased strength/endurance/ax tolerance (c emphasis on dynamic standing balance/tolerance >10 mins, JYOTHI neuro re-ed) c SBA  Long Term Goal 9: Pt will complete simple homemaking tasks c DME PRN and mod I:        Plan of Care

## 2025-01-11 NOTE — PROGRESS NOTES
Physical Therapy  [x] daily progress note       [] discharge       Patient Name:  Gwendolyn Grimm   :  1940 MRN: [id number]  Room:  [address] [place], [place] [address] Date of Admission: 2025  Rehabilitation Diagnosis:   Cerebral infarction, unspecified [I63.9]  Acute CVA (cerebrovascular accident) (HCC) [I63.9]       Date 2025       Day of ARU Week:  3   Time IN/-973 [id number]   Individual Tx Minutes 120   Group Tx Minutes    Co-Treat Minutes    Concurrent Tx Minutes    TOTAL Tx Time Mins 120   Variance Time    Variance Time []   Refusal due to:     []   Medical hold/reason:    []   Illness   []   Off Unit for test/procedure  []   Extra time needed to complete task  []   Therapeutic need  []   Other (specify):   Restrictions Restrictions/Precautions  Restrictions/Precautions: General Precautions, Fall Risk, Contact Precautions (PD port L abdomen, IV LUE, contact isolation (VRE))      Communication with other providers: [x]   OK to see per nursing:     []   Spoke with team member regarding:      Subjective observations and cognitive status: Patient states she is not having any pain today when asked. Patient lying in bed upon arrival for tx session. Patient pleasant and cooperative. Patient returned to supine position in bed at the conclusion of both tx session.      Pain level/location: 0/10       Location: N/A   Discharge recommendations  Anticipated discharge date:  TBD  Destination: []home alone   []home with assist PRN     [] home with continuous supervision     []SNF      [] Assisted living     [] Other:   Continued therapy: []HHC PT  []OUTPATIENT  PT   [] No Further PT  Equipment needs: TBD     Bed Mobility:           []   Pt received out of bed   Rolling R/L:  SBA  Scooting:  SBA  Supine --> Sit:  SBA  Sit --> Supine:  SBA  Bed features used:    [x] HOB elevated      [x] Bed rail                                    [] No     Transfers:    Sit--> Stand:  SBA from w/c  Stand --> Sit:   SBA with cues for patient to

## 2025-01-11 NOTE — PROGRESS NOTES
Gwendolyn Grimm    : 1940  Acct #: [id number]  MRN: [id number]              PM&R Progress Note      Admitting diagnosis: ***    Comorbid diagnoses impacting rehabilitation: ***    Chief complaint: ***    Prior (baseline) level of function: Independent.    Current level of function:         Current  IRF-ABBY and Goals:   Occupational Therapy:    Short Term Goals  Time Frame for Short Term Goals: STGs=LTGS :   Long Term Goals  Time Frame for Long Term Goals : 7 days or until d/c  Long Term Goal 1: Pt will complete grooming tasks Ind  Long Term Goal 2: Pt will complete total body bathing mod I  Long Term Goal 3: Pt will complete UB dressing Ind  Long Term Goal 4: Pt will complete LB dressing Ind  Long Term Goal 5: Pt will doff/don footwear Ind  Additional Goals?: Yes  Long Term Goal 6: Pt will complete toileting mod I  Long Term Goal 7: Pt will complete functional transfers (bed, chair, toilet, shower) c DME PRN and mod I  Long Term Goal 8: Pt will perform therex/therax to facilitate increased strength/endurance/ax tolerance (c emphasis on dynamic standing balance/tolerance >10 mins, RUE neuro re-ed) c SBA  Long Term Goal 9: Pt will complete simple homemaking tasks c DME PRN and mod I :                                       Eating: Eating  Assistance Needed: Independent  Comment: able to open packages, feed self using dominant R hand with min increased time  CARE Score: 6  Discharge Goal: Independent       Oral Hygiene: Oral Hygiene  Assistance Needed: Supervision or touching assistance  Comment: seated at sink  CARE Score: 4  Discharge Goal: Independent    UB/LB Bathing: Shower/Bathe Self  Assistance Needed: Supervision or touching assistance  Comment: SBA for full shower, majority performed seated  CARE Score: 4  Discharge Goal: Independent    UB Dressing: Upper Body Dressing  Assistance Needed: Supervision or touching assistance  Comment: to don long sleeve top  CARE Score: 4  Discharge Goal: Independent        with cues for hand placement  CARE Score: 4  Discharge Goal: Independent  Chair/Bed-to-Chair Transfer  Assistance Needed: Supervision or touching assistance  Comment: CGA with 2ww, pt tends to abandon walker on the turn, sat diagonally at times  CARE Score: 4  Discharge Goal: Independent     Car Transfer  Assistance Needed: Supervision or touching assistance  Comment: CGA with no cues  CARE Score: 4  Discharge Goal: Independent    Ambulation:    Walking Ability  Does the Patient Walk?: Yes     Walk 10 Feet  Assistance Needed: Supervision or touching assistance  Physical Assistance Level: Less than 25%  Comment: CGA with 2ww  CARE Score: 4  Discharge Goal: Independent     Walk 50 Feet with Two Turns  Comment: 42' max attempt due to fatigue and increasing gait deviations and imbalance. min assist with 2ww initially increasing to mod assist as pt had decreased control over step placement of RLE, began to walk more into L side of walker, decreased lateral hip stability  Reason if not Attempted: Not attempted due to medical condition or safety concerns  CARE Score: 88  Discharge Goal: Supervision or touching assistance     Walk 150 Feet  Reason if not Attempted: Not attempted due to medical condition or safety concerns  CARE Score: 88  Discharge Goal: Partial/moderate assistance     Walking 10 Feet on Uneven Surfaces  Assistance Needed: Partial/moderate assistance  Comment: min assist with 2ww with discontinuous steps  CARE Score: 3  Discharge Goal: Supervision or touching assistance     1 Step (Curb)  Assistance Needed: Partial/moderate assistance  Comment: mod assist due to assist with 2ww control and with min assist for balance, max verbal cues for sequence  CARE Score: 3  Discharge Goal: Supervision or touching assistance     4 Steps  Assistance Needed: Partial/moderate assistance  Comment: min assist with B rails with max cues for sequencing and safety  CARE Score: 3  Discharge Goal: Supervision or touching

## 2025-01-11 NOTE — PROGRESS NOTES
Nephrology Progress Note  1/11/2025 10:07 AM        Subjective:   Admit Date: 1/9/2025  PCP: Pepe Avina MD    Interval History: Tolerating dialysis okay had almost 1200 cc of ultrafiltration    Diet: Reasonable    ROS: No overt shortness of breath or confusion, no fever and acceptable blood pressure    Data:     Current meds:    heparin (porcine)  5,000 Units SubCUTAneous TID    insulin lispro  0-4 Units SubCUTAneous 4x Daily AC & HS    aspirin  81 mg Oral Daily    atorvastatin  40 mg Oral Nightly    budesonide-formoterol  2 puff Inhalation BID RT    citalopram  20 mg Oral Daily    clopidogrel  75 mg Oral Daily    insulin glargine  10 Units SubCUTAneous Nightly    levothyroxine  150 mcg Oral Daily    mirtazapine  15 mg Oral Nightly    pantoprazole  40 mg Oral QAM AC    [START ON 1/14/2025] vitamin D  50,000 Units Oral Weekly      dextrose           I/O last 3 completed shifts:  In: 540 [P.O.:540]  Out: 1617     CBC: No results for input(s): \"WBC\", \"HGB\", \"PLT\" in the last 72 hours.     No results for input(s): \"NA\", \"K\", \"CL\", \"CO2\", \"BUN\", \"CREATININE\", \"GLUCOSE\" in the last 72 hours.    Lab Results   Component Value Date    CALCIUM 8.9 01/08/2025    PHOS 3.6 12/29/2023       Objective:     Vitals: BP (!) 148/53   Pulse 64   Temp 97.5 °F (36.4 °C) (Oral)   Resp 16   Ht 1.676 m (5' 6\")   Wt 87 kg (191 lb 12.8 oz)   SpO2 97%   BMI 30.96 kg/m² ,    General appearance: Alert, awake and oriented  HEENT: She is in bed, at least has 1+ conjunctival pallor no scleral icterus  Neck: Seems supple  Lungs: Limited anterior exam few adventitious breath sound  Heart: Regular rate and rhythm but has crescendo decrescendo murmur more pronounced at left lower sternal border  Abdomen: Soft, peritoneal dialysis catheter in place  Extremities: Bilateral leg edema      Problem List :         Impression :     End-stage kidney disease on maintenance peritoneal dialysis with good solute and fluid removal  Generalized

## 2025-01-12 VITALS
BODY MASS INDEX: 31 KG/M2 | DIASTOLIC BLOOD PRESSURE: 63 MMHG | WEIGHT: 192.9 LBS | RESPIRATION RATE: 16 BRPM | OXYGEN SATURATION: 95 % | HEART RATE: 65 BPM | TEMPERATURE: 97.9 F | HEIGHT: 66 IN | SYSTOLIC BLOOD PRESSURE: 134 MMHG

## 2025-01-12 LAB
GLUCOSE BLD-MCNC: 121 MG/DL (ref 74–99)
GLUCOSE BLD-MCNC: 136 MG/DL (ref 74–99)
GLUCOSE BLD-MCNC: 156 MG/DL (ref 74–99)
GLUCOSE BLD-MCNC: 234 MG/DL (ref 74–99)

## 2025-01-12 PROCEDURE — 90945 DIALYSIS ONE EVALUATION: CPT

## 2025-01-12 PROCEDURE — 6360000002 HC RX W HCPCS: Performed by: PHYSICAL MEDICINE & REHABILITATION

## 2025-01-12 PROCEDURE — 94150 VITAL CAPACITY TEST: CPT

## 2025-01-12 PROCEDURE — 89220 SPUTUM SPECIMEN COLLECTION: CPT

## 2025-01-12 PROCEDURE — 6370000000 HC RX 637 (ALT 250 FOR IP): Performed by: INTERNAL MEDICINE

## 2025-01-12 PROCEDURE — 94761 N-INVAS EAR/PLS OXIMETRY MLT: CPT

## 2025-01-12 PROCEDURE — 94640 AIRWAY INHALATION TREATMENT: CPT

## 2025-01-12 PROCEDURE — 94664 DEMO&/EVAL PT USE INHALER: CPT

## 2025-01-12 PROCEDURE — 82962 GLUCOSE BLOOD TEST: CPT

## 2025-01-12 PROCEDURE — 1280000000 HC REHAB R&B

## 2025-01-12 PROCEDURE — 6370000000 HC RX 637 (ALT 250 FOR IP): Performed by: PHYSICAL MEDICINE & REHABILITATION

## 2025-01-12 RX ADMIN — CLOPIDOGREL BISULFATE 75 MG: 75 TABLET ORAL at 08:24

## 2025-01-12 RX ADMIN — MIRTAZAPINE 15 MG: 15 TABLET, FILM COATED ORAL at 20:54

## 2025-01-12 RX ADMIN — CITALOPRAM HYDROBROMIDE 20 MG: 20 TABLET ORAL at 08:24

## 2025-01-12 RX ADMIN — PANTOPRAZOLE SODIUM 40 MG: 40 TABLET, DELAYED RELEASE ORAL at 05:41

## 2025-01-12 RX ADMIN — ATORVASTATIN CALCIUM 40 MG: 40 TABLET, FILM COATED ORAL at 20:56

## 2025-01-12 RX ADMIN — HEPARIN SODIUM 5000 UNITS: 5000 INJECTION INTRAVENOUS; SUBCUTANEOUS at 08:24

## 2025-01-12 RX ADMIN — INSULIN GLARGINE 10 UNITS: 100 INJECTION, SOLUTION SUBCUTANEOUS at 20:54

## 2025-01-12 RX ADMIN — BUDESONIDE AND FORMOTEROL FUMARATE DIHYDRATE 2 PUFF: 160; 4.5 AEROSOL RESPIRATORY (INHALATION) at 08:21

## 2025-01-12 RX ADMIN — HEPARIN SODIUM 5000 UNITS: 5000 INJECTION INTRAVENOUS; SUBCUTANEOUS at 20:54

## 2025-01-12 RX ADMIN — HEPARIN SODIUM 5000 UNITS: 5000 INJECTION INTRAVENOUS; SUBCUTANEOUS at 14:54

## 2025-01-12 RX ADMIN — ASPIRIN 81 MG CHEWABLE TABLET 81 MG: 81 TABLET CHEWABLE at 08:24

## 2025-01-12 RX ADMIN — INSULIN LISPRO 1 UNITS: 100 INJECTION, SOLUTION INTRAVENOUS; SUBCUTANEOUS at 20:53

## 2025-01-12 RX ADMIN — GENTAMICIN SULFATE: 1 CREAM TOPICAL at 18:13

## 2025-01-12 RX ADMIN — BUDESONIDE AND FORMOTEROL FUMARATE DIHYDRATE 2 PUFF: 160; 4.5 AEROSOL RESPIRATORY (INHALATION) at 20:17

## 2025-01-12 RX ADMIN — LEVOTHYROXINE SODIUM 150 MCG: 0.07 TABLET ORAL at 05:41

## 2025-01-12 ASSESSMENT — PAIN SCALES - GENERAL
PAINLEVEL_OUTOF10: 0
PAINLEVEL_OUTOF10: 0

## 2025-01-12 NOTE — PLAN OF CARE
Problem: Chronic Conditions and Co-morbidities  Goal: Patient's chronic conditions and co-morbidity symptoms are monitored and maintained or improved  1/11/2025 2038 by Linda Camarena RN  Outcome: Progressing  Flowsheets (Taken 1/11/2025 2035)  Care Plan - Patient's Chronic Conditions and Co-Morbidity Symptoms are Monitored and Maintained or Improved: Monitor and assess patient's chronic conditions and comorbid symptoms for stability, deterioration, or improvement  1/11/2025 1126 by Joey Vargas, RN  Outcome: Progressing     Problem: Discharge Planning  Goal: Discharge to home or other facility with appropriate resources  1/11/2025 2038 by Linda Camarena RN  Outcome: Progressing  Flowsheets (Taken 1/11/2025 2035)  Discharge to home or other facility with appropriate resources: Identify barriers to discharge with patient and caregiver  1/11/2025 1126 by Joey Vargas RN  Outcome: Progressing     Problem: Safety - Adult  Goal: Free from fall injury  1/11/2025 2038 by Linda Camarena, RN  Outcome: Progressing  1/11/2025 1126 by Joey Vargas, RN  Outcome: Progressing     Problem: Nutrition Deficit:  Goal: Optimize nutritional status  1/11/2025 2038 by Linda Camarena RN  Outcome: Progressing  1/11/2025 1126 by Joey Vargas, RN  Outcome: Progressing

## 2025-01-12 NOTE — PROGRESS NOTES
Nephrology Progress Note  1/12/2025 8:55 AM        Subjective:   Admit Date: 1/9/2025  PCP: Pepe Avina MD    Interval History: No major event, tolerating peritoneal dialysis at night she of course on automated peritoneal dialysis    Diet: Reasonable    ROS: No overt shortness of breath or confusion-acceptable ultrafiltration.  No fever and acceptable blood pressure    Data:     Current meds:    heparin (porcine)  5,000 Units SubCUTAneous TID    insulin lispro  0-4 Units SubCUTAneous 4x Daily AC & HS    aspirin  81 mg Oral Daily    atorvastatin  40 mg Oral Nightly    budesonide-formoterol  2 puff Inhalation BID RT    citalopram  20 mg Oral Daily    clopidogrel  75 mg Oral Daily    insulin glargine  10 Units SubCUTAneous Nightly    levothyroxine  150 mcg Oral Daily    mirtazapine  15 mg Oral Nightly    pantoprazole  40 mg Oral QAM AC    [START ON 1/14/2025] vitamin D  50,000 Units Oral Weekly      dextrose           I/O last 3 completed shifts:  In: 878 [P.O.:878]  Out: 1185     CBC: No results for input(s): \"WBC\", \"HGB\", \"PLT\" in the last 72 hours.     No results for input(s): \"NA\", \"K\", \"CL\", \"CO2\", \"BUN\", \"CREATININE\", \"GLUCOSE\" in the last 72 hours.    Lab Results   Component Value Date    CALCIUM 8.9 01/08/2025    PHOS 3.6 12/29/2023       Objective:     Vitals: /70   Pulse 56   Temp 98.2 °F (36.8 °C) (Oral)   Resp 16   Ht 1.676 m (5' 6\")   Wt 87.5 kg (192 lb 14.4 oz)   SpO2 100%   BMI 31.14 kg/m² ,    General appearance: Alert, awake and oriented without any acute distress  HEENT: At least 1+ conjunctival pallor no scleral icterus  Neck: Supple-head of the bed elevated about 60 degrees  Lungs: Some adventitious breath sound but no gross crackles  Heart: Regular rate and rhythm with crescendo decrescendo murmur most pronounced at left lower sternal border  Abdomen: Soft, peritoneal dialysis catheter in place  Extremities: Bilateral leg edema      Problem List :         Impression :

## 2025-01-13 LAB
ALBUMIN SERPL-MCNC: 3.8 G/DL (ref 3.4–5)
ALBUMIN/GLOB SERPL: 1 {RATIO} (ref 1.1–2.2)
ALP SERPL-CCNC: 67 U/L (ref 40–129)
ALT SERPL-CCNC: 14 U/L (ref 10–40)
ANION GAP SERPL CALCULATED.3IONS-SCNC: 16 MMOL/L (ref 9–17)
AST SERPL-CCNC: 20 U/L (ref 15–37)
BASOPHILS # BLD: 0.07 K/UL
BASOPHILS NFR BLD: 1 % (ref 0–1)
BILIRUB SERPL-MCNC: 0.3 MG/DL (ref 0–1)
BUN SERPL-MCNC: 72 MG/DL (ref 7–20)
CALCIUM SERPL-MCNC: 9.4 MG/DL (ref 8.3–10.6)
CHLORIDE SERPL-SCNC: 101 MMOL/L (ref 99–110)
CO2 SERPL-SCNC: 25 MMOL/L (ref 21–32)
CREAT SERPL-MCNC: 7.6 MG/DL (ref 0.6–1.2)
EOSINOPHIL # BLD: 0.34 K/UL
EOSINOPHILS RELATIVE PERCENT: 3 % (ref 0–3)
ERYTHROCYTE [DISTWIDTH] IN BLOOD BY AUTOMATED COUNT: 12 % (ref 11.7–14.9)
GFR, ESTIMATED: 5 ML/MIN/1.73M2
GLUCOSE BLD-MCNC: 134 MG/DL (ref 74–99)
GLUCOSE BLD-MCNC: 159 MG/DL (ref 74–99)
GLUCOSE BLD-MCNC: 185 MG/DL (ref 74–99)
GLUCOSE BLD-MCNC: 211 MG/DL (ref 74–99)
GLUCOSE SERPL-MCNC: 134 MG/DL (ref 74–99)
HCT VFR BLD AUTO: 34 % (ref 37–47)
HGB BLD-MCNC: 10.4 G/DL (ref 12.5–16)
IMM GRANULOCYTES # BLD AUTO: 0.17 K/UL
IMM GRANULOCYTES NFR BLD: 2 %
LYMPHOCYTES NFR BLD: 3 K/UL
LYMPHOCYTES RELATIVE PERCENT: 28 % (ref 24–44)
MAGNESIUM SERPL-MCNC: 3.2 MG/DL (ref 1.8–2.4)
MCH RBC QN AUTO: 30.6 PG (ref 27–31)
MCHC RBC AUTO-ENTMCNC: 30.6 G/DL (ref 32–36)
MCV RBC AUTO: 100 FL (ref 78–100)
MONOCYTES NFR BLD: 0.99 K/UL
MONOCYTES NFR BLD: 9 % (ref 0–4)
NEUTROPHILS NFR BLD: 57 % (ref 36–66)
NEUTS SEG NFR BLD: 5.99 K/UL
PHOSPHATE SERPL-MCNC: 8.3 MG/DL (ref 2.5–4.9)
PLATELET # BLD AUTO: 316 K/UL (ref 140–440)
PMV BLD AUTO: 9.1 FL (ref 7.5–11.1)
POTASSIUM SERPL-SCNC: 5 MMOL/L (ref 3.5–5.1)
PROT SERPL-MCNC: 7.7 G/DL (ref 6.4–8.2)
RBC # BLD AUTO: 3.4 M/UL (ref 4.2–5.4)
SODIUM SERPL-SCNC: 142 MMOL/L (ref 136–145)
WBC OTHER # BLD: 10.6 K/UL (ref 4–10.5)

## 2025-01-13 PROCEDURE — 6370000000 HC RX 637 (ALT 250 FOR IP): Performed by: INTERNAL MEDICINE

## 2025-01-13 PROCEDURE — 82962 GLUCOSE BLOOD TEST: CPT

## 2025-01-13 PROCEDURE — 94150 VITAL CAPACITY TEST: CPT

## 2025-01-13 PROCEDURE — 80053 COMPREHEN METABOLIC PANEL: CPT

## 2025-01-13 PROCEDURE — 94761 N-INVAS EAR/PLS OXIMETRY MLT: CPT

## 2025-01-13 PROCEDURE — 97535 SELF CARE MNGMENT TRAINING: CPT

## 2025-01-13 PROCEDURE — 1280000000 HC REHAB R&B

## 2025-01-13 PROCEDURE — 94640 AIRWAY INHALATION TREATMENT: CPT

## 2025-01-13 PROCEDURE — 97530 THERAPEUTIC ACTIVITIES: CPT

## 2025-01-13 PROCEDURE — 84100 ASSAY OF PHOSPHORUS: CPT

## 2025-01-13 PROCEDURE — 6370000000 HC RX 637 (ALT 250 FOR IP): Performed by: PHYSICAL MEDICINE & REHABILITATION

## 2025-01-13 PROCEDURE — 83735 ASSAY OF MAGNESIUM: CPT

## 2025-01-13 PROCEDURE — 6360000002 HC RX W HCPCS: Performed by: PHYSICAL MEDICINE & REHABILITATION

## 2025-01-13 PROCEDURE — 97112 NEUROMUSCULAR REEDUCATION: CPT

## 2025-01-13 PROCEDURE — 97116 GAIT TRAINING THERAPY: CPT

## 2025-01-13 PROCEDURE — 36415 COLL VENOUS BLD VENIPUNCTURE: CPT

## 2025-01-13 PROCEDURE — 85025 COMPLETE CBC W/AUTO DIFF WBC: CPT

## 2025-01-13 RX ADMIN — CLOPIDOGREL BISULFATE 75 MG: 75 TABLET ORAL at 08:28

## 2025-01-13 RX ADMIN — HEPARIN SODIUM 5000 UNITS: 5000 INJECTION INTRAVENOUS; SUBCUTANEOUS at 08:27

## 2025-01-13 RX ADMIN — ACETAMINOPHEN 650 MG: 325 TABLET ORAL at 05:25

## 2025-01-13 RX ADMIN — CITALOPRAM HYDROBROMIDE 20 MG: 20 TABLET ORAL at 08:27

## 2025-01-13 RX ADMIN — PANTOPRAZOLE SODIUM 40 MG: 40 TABLET, DELAYED RELEASE ORAL at 05:25

## 2025-01-13 RX ADMIN — ATORVASTATIN CALCIUM 40 MG: 40 TABLET, FILM COATED ORAL at 21:45

## 2025-01-13 RX ADMIN — LEVOTHYROXINE SODIUM 150 MCG: 0.07 TABLET ORAL at 05:25

## 2025-01-13 RX ADMIN — INSULIN LISPRO 1 UNITS: 100 INJECTION, SOLUTION INTRAVENOUS; SUBCUTANEOUS at 21:41

## 2025-01-13 RX ADMIN — MIRTAZAPINE 15 MG: 15 TABLET, FILM COATED ORAL at 21:45

## 2025-01-13 RX ADMIN — GENTAMICIN SULFATE: 1 CREAM TOPICAL at 19:34

## 2025-01-13 RX ADMIN — BUDESONIDE AND FORMOTEROL FUMARATE DIHYDRATE 2 PUFF: 160; 4.5 AEROSOL RESPIRATORY (INHALATION) at 20:53

## 2025-01-13 RX ADMIN — HEPARIN SODIUM 5000 UNITS: 5000 INJECTION INTRAVENOUS; SUBCUTANEOUS at 21:42

## 2025-01-13 RX ADMIN — INSULIN GLARGINE 10 UNITS: 100 INJECTION, SOLUTION SUBCUTANEOUS at 21:40

## 2025-01-13 RX ADMIN — BUDESONIDE AND FORMOTEROL FUMARATE DIHYDRATE 2 PUFF: 160; 4.5 AEROSOL RESPIRATORY (INHALATION) at 07:16

## 2025-01-13 RX ADMIN — ASPIRIN 81 MG CHEWABLE TABLET 81 MG: 81 TABLET CHEWABLE at 08:28

## 2025-01-13 ASSESSMENT — PAIN SCALES - GENERAL: PAINLEVEL_OUTOF10: 0

## 2025-01-13 NOTE — PLAN OF CARE
Problem: Chronic Conditions and Co-morbidities  Goal: Patient's chronic conditions and co-morbidity symptoms are monitored and maintained or improved  1/12/2025 2300 by Herminia Strickland RN  Outcome: Progressing  1/12/2025 2300 by Herminia Strickland RN  Outcome: Progressing  1/12/2025 1052 by Joey Vargas, RN  Outcome: Progressing     Problem: Discharge Planning  Goal: Discharge to home or other facility with appropriate resources  1/12/2025 2300 by Herminia Strickland RN  Outcome: Progressing  1/12/2025 2300 by Herminia Strickland RN  Outcome: Progressing  1/12/2025 1052 by Joey Vargas, RN  Outcome: Progressing     Problem: Safety - Adult  Goal: Free from fall injury  1/12/2025 2300 by Herminia Strickland RN  Outcome: Progressing  1/12/2025 2300 by Herminia Strickland RN  Outcome: Progressing  1/12/2025 1052 by Joey Vargas, RN  Outcome: Progressing     Problem: Nutrition Deficit:  Goal: Optimize nutritional status  1/12/2025 2300 by Herminia Strickland RN  Outcome: Progressing  1/12/2025 2300 by Herminia Strickland RN  Outcome: Progressing  1/12/2025 1052 by Joey Vargas, RN  Outcome: Progressing

## 2025-01-13 NOTE — PROGRESS NOTES
Tub/Shower unit  Bathroom Toilet: Standard  Bathroom Equipment: Tub transfer bench, Grab bars in shower, Hand-held shower  Bathroom Accessibility: Walker accessible  Home Equipment: Rollator, Wheelchair - Manual (has multiple pieces of equipment from her  that she doesn't currently use: multiple W/Cs, BSC, hospital bed, gabriele lift)  Has the patient had two or more falls in the past year or any fall with injury in the past year?: No  Receives Help From: Family  Prior Level of Assist for ADLs: Independent  Homemaking Responsibilities: Yes  Meal Prep Responsibility: Secondary  Laundry Responsibility: Primary  Cleaning Responsibility: Secondary (does dishes, not heavier cleaning)  Bill Paying/Finance Responsibility: Primary  Shopping Responsibility: No  Health Care Management: Primary (uses a pillbox. Son often connects and disconnects pt from PD, there are a few nights a week she connects it herself)  Prior Level of Assist for Ambulation: Independent household ambulator, with or without device, Independent community ambulator, with or without device (however reports in winter her children have her use the W/C d/t weather conditions; otherwise pt uses rollator for doctors appts.  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 Visit dtr 4-5 days, son works from home. Online grocery.  limited outings due to seasonal illnesses  IADL Comments: No falls in the last year  Additional Comments: Pt typically sleeps in a flat, regular bed at home    Objective                                                                                    Goals:  (Update in

## 2025-01-13 NOTE — PROGRESS NOTES
Latest Reference Range & Units 01/13/25 04:50   Sodium 136 - 145 mmol/L 142   Potassium 3.5 - 5.1 mmol/L 5.0   Chloride 99 - 110 mmol/L 101   CARBON DIOXIDE 21 - 32 mmol/L 25   BUN,BUNPL 7 - 20 mg/dL 72 (H)   Creatinine 0.6 - 1.2 mg/dL 7.6 (H)   Anion Gap 9 - 17 mmol/L 16   Est, Glom Filt Rate >60 mL/min/1.73m2 5 (L)   Magnesium 1.8 - 2.4 mg/dL 3.2 (H)   Glucose 74 - 99 mg/dL 134 (H)   Calcium 8.3 - 10.6 mg/dL 9.4   Albumin/Globulin Ratio 1.1 - 2.2  1.0 (L)   Phosphorus 2.5 - 4.9 mg/dL 8.3 (H)   Total Protein 6.4 - 8.2 g/dL 7.7   Albumin 3.4 - 5.0 g/dL 3.8   Alkaline Phosphatase 40 - 129 U/L 67   ALT 10 - 40 U/L 14   AST 15 - 37 U/L 20   Total Bilirubin 0.0 - 1.0 mg/dL 0.3   WBC 4.0 - 10.5 k/uL 10.6 (H)   RBC 4.20 - 5.40 m/uL 3.40 (L)   Hemoglobin Quant 12.5 - 16.0 g/dL 10.4 (L)   Hematocrit 37.0 - 47.0 % 34.0 (L)   MCV 78.0 - 100.0 fL 100.0   MCH 27.0 - 31.0 pg 30.6   MCHC 32.0 - 36.0 g/dL 30.6 (L)   MPV 7.5 - 11.1 fL 9.1   RDW 11.7 - 14.9 % 12.0   Platelet Count 140 - 440 k/uL 316   Neutrophils % 36 - 66 % 57   Lymphocyte % 24 - 44 % 28   Monocytes % 0 - 4 % 9 (H)   Eosinophils % 0 - 3 % 3   Basophils % 0 - 1 % 1   Neutrophils Absolute k/uL 5.99   Lymphocytes Absolute k/uL 3.00   Monocytes Absolute k/uL 0.99   Eosinophils Absolute k/uL 0.34   Basophils Absolute k/uL 0.07   Immature Granulocytes % 0 % 2 (H)   Immature Granulocytes Absolute k/uL 0.17   (H): Data is abnormally high  (L): Data is abnormally low    Results sent to Dr. Irving by perfect serve

## 2025-01-13 NOTE — PROGRESS NOTES
fatigue with pt slightly everting and scuffing ball only of R foot on floor. Pt able to correct with cues, but could not self assess when she was doing it.         []   Postural training    []   Supine ther ex (reps/sets):     []   Seated ther ex (reps/sets):     []   Standing ther ex (reps/sets):     []   Other: Toileting activity completed with    []   Other:    Comments:      Patient/Caregiver Education and Training:   []   Role of PT  [x]   Education about Dx  []   Use of call light for assist   []   HEP provided and explained   [x]   Treatment plan reviewed  []   Home safety  []   Wheelchair mobility/management   []   Body mechanics  [x]   Bed Mobility/Transfer technique  [x]   Gait technique/sequencing  []   Proper use of assistive device/adaptive equipment  [x]   Stair training/Advanced mobility safety and technique  []   Reinforced patient's precautions/mobility while maintaining precautions  [x]   Postural awareness  []   Family/caregiver training  []   Progress was updated and reviewed with patient and/or family this date.  []   Other:    Treatment Plan for Next Session: continue training with 4ww in all tasks, progress balance and coordination challenges      Assessment:  This pt demonstrated a positive   response to today's treatment as evidenced by much improved motor control in BLE allowing for progression to 4ww. The patient is making  progress toward established goals as evidenced by QI scores.     Treatment/Activity Tolerance:   [x] Tolerated treatment with no adverse effects    [] Patient limited by fatigue  [] Patient limited by pain   [] Patient limited by medical complications:    [] Adverse reaction to Tx:   [] Significant change in status    Safety:       [x]  bed alarm set    [x]  chair alarm set    []  Pt refused alarms                []  Telesitter activated      [x]  Gait belt used during tx session      []other:       Number of Minutes/Billable Intervention  Gait Training 45   Therapeutic

## 2025-01-13 NOTE — PATIENT CARE CONFERENCE
[]  Other: (specify)    Estimated Discharge Date: 1/17/25    Estimated Discharge Destination: []home alone   []home alone with assist prn  []Continuous supervision [x]Return home with s/o/spouse/family   [] Assisted living    []SNF     Team members participating in today's conference.    [x] HERLINDA Alonzo, Medical Director    []  Dr Chucho Mehta, Covering Medical Director  []  Dr Phil Leo, Covering Medical Director    [x] Eduin Nur, DPT,         [] Adrian Figueroa, CRISTINA Nurse Manager   [x] Ramesh Laureano RN Nurse Manager     []  Didi Erazo, PT  [x]  Emmie Leary, PT       [x] Kaila Tyler, OT   [] Benedict Chen OT  [] Mercedes Chan OT     [x]  Anette Pisano, SONYA    []  Deanne Clark, SONYA   [] Estela Deal, SONYA     []Yahaira Rodriguez,   [x]Emmie GONZALEZ,      [] Saroj Vargas RN   [] Yusra Haque RN    [] Mir Sweeney RN    [] Margaux Mena RN []     I have led this Team Conference and agree with the plan,  HERLINDA Alonzo MD, 1/14/2025, 12:49 PM  []   I, the Medical Director, was required to lead today's conference via telephone due to an unanticipated scheduling conflict.  I agree with the decisions made by the inter-disciplinary team at today's meeting.      Goals have been updated to reflect recent status.    Team conference note transcribed this date by: Anette Pisano MA, CCC-SLP, Therapy Coordinator

## 2025-01-13 NOTE — PROGRESS NOTES
Nephrology Progress Note  1/13/2025 9:23 AM  Subjective:     Interval History: Gwendolyn Grimm is a 84 y.o. female with overall doing slightly better no acute distress tolerating PD        Data:   Scheduled Meds:   heparin (porcine)  5,000 Units SubCUTAneous TID    insulin lispro  0-4 Units SubCUTAneous 4x Daily AC & HS    aspirin  81 mg Oral Daily    atorvastatin  40 mg Oral Nightly    budesonide-formoterol  2 puff Inhalation BID RT    citalopram  20 mg Oral Daily    clopidogrel  75 mg Oral Daily    insulin glargine  10 Units SubCUTAneous Nightly    levothyroxine  150 mcg Oral Daily    mirtazapine  15 mg Oral Nightly    pantoprazole  40 mg Oral QAM AC    [START ON 1/14/2025] vitamin D  50,000 Units Oral Weekly     Continuous Infusions:   dextrose           CBC   Recent Labs     01/13/25  0450   WBC 10.6*   HGB 10.4*   HCT 34.0*         BMP   Recent Labs     01/13/25  0450      K 5.0      CO2 25   PHOS 8.3*   BUN 72*   CREATININE 7.6*     Hepatic:   Recent Labs     01/13/25  0450   AST 20   ALT 14   BILITOT 0.3   ALKPHOS 67     Troponin: No results for input(s): \"TROPONINI\" in the last 72 hours.  BNP: No results for input(s): \"BNP\" in the last 72 hours.  Lipids: No results for input(s): \"CHOL\", \"HDL\" in the last 72 hours.    Invalid input(s): \"LDLCALCU\"  ABGs:   Lab Results   Component Value Date/Time    PO2ART 73 05/12/2023 07:00 AM    [id number] 38.0 05/12/2023 07:00 AM     INR: No results for input(s): \"INR\" in the last 72 hours.  Renal Labs  Albumin:    Lab Results   Component Value Date/Time    LABALBU DUPLICATE ORDER 05/27/2022 12:43 PM     Calcium:    Lab Results   Component Value Date/Time    CALCIUM 9.4 01/13/2025 04:50 AM     Phosphorus:    Lab Results   Component Value Date/Time    PHOS 8.3 01/13/2025 04:50 AM     U/A:    Lab Results   Component Value Date/Time    NITRU NEGATIVE 12/29/2023 12:15 PM    COLORU YELLOW 12/29/2023 12:15 PM    PHUR 6.0 12/29/2023 12:15 PM    PHUR 5.5 12/29/2021

## 2025-01-14 LAB
GLUCOSE BLD-MCNC: 118 MG/DL (ref 74–99)
GLUCOSE BLD-MCNC: 143 MG/DL (ref 74–99)
GLUCOSE BLD-MCNC: 177 MG/DL (ref 74–99)
GLUCOSE BLD-MCNC: 243 MG/DL (ref 74–99)

## 2025-01-14 PROCEDURE — 94640 AIRWAY INHALATION TREATMENT: CPT

## 2025-01-14 PROCEDURE — 6370000000 HC RX 637 (ALT 250 FOR IP): Performed by: INTERNAL MEDICINE

## 2025-01-14 PROCEDURE — 97116 GAIT TRAINING THERAPY: CPT

## 2025-01-14 PROCEDURE — 1280000000 HC REHAB R&B

## 2025-01-14 PROCEDURE — 97112 NEUROMUSCULAR REEDUCATION: CPT

## 2025-01-14 PROCEDURE — 6370000000 HC RX 637 (ALT 250 FOR IP): Performed by: PHYSICAL MEDICINE & REHABILITATION

## 2025-01-14 PROCEDURE — 97530 THERAPEUTIC ACTIVITIES: CPT

## 2025-01-14 PROCEDURE — 94761 N-INVAS EAR/PLS OXIMETRY MLT: CPT

## 2025-01-14 PROCEDURE — 90945 DIALYSIS ONE EVALUATION: CPT

## 2025-01-14 PROCEDURE — 94150 VITAL CAPACITY TEST: CPT

## 2025-01-14 PROCEDURE — 82962 GLUCOSE BLOOD TEST: CPT

## 2025-01-14 PROCEDURE — 97110 THERAPEUTIC EXERCISES: CPT

## 2025-01-14 PROCEDURE — 6360000002 HC RX W HCPCS: Performed by: PHYSICAL MEDICINE & REHABILITATION

## 2025-01-14 RX ADMIN — LEVOTHYROXINE SODIUM 150 MCG: 0.07 TABLET ORAL at 06:48

## 2025-01-14 RX ADMIN — ATORVASTATIN CALCIUM 40 MG: 40 TABLET, FILM COATED ORAL at 20:36

## 2025-01-14 RX ADMIN — CITALOPRAM HYDROBROMIDE 20 MG: 20 TABLET ORAL at 08:32

## 2025-01-14 RX ADMIN — ERGOCALCIFEROL 50000 UNITS: 1.25 CAPSULE ORAL at 08:32

## 2025-01-14 RX ADMIN — CLOPIDOGREL BISULFATE 75 MG: 75 TABLET ORAL at 08:33

## 2025-01-14 RX ADMIN — HEPARIN SODIUM 5000 UNITS: 5000 INJECTION INTRAVENOUS; SUBCUTANEOUS at 15:38

## 2025-01-14 RX ADMIN — MIRTAZAPINE 15 MG: 15 TABLET, FILM COATED ORAL at 20:36

## 2025-01-14 RX ADMIN — HEPARIN SODIUM 5000 UNITS: 5000 INJECTION INTRAVENOUS; SUBCUTANEOUS at 08:33

## 2025-01-14 RX ADMIN — MELATONIN TAB 3 MG 3 MG: 3 TAB at 20:36

## 2025-01-14 RX ADMIN — GENTAMICIN SULFATE: 1 CREAM TOPICAL at 19:41

## 2025-01-14 RX ADMIN — ACETAMINOPHEN 650 MG: 325 TABLET ORAL at 06:48

## 2025-01-14 RX ADMIN — INSULIN GLARGINE 10 UNITS: 100 INJECTION, SOLUTION SUBCUTANEOUS at 20:36

## 2025-01-14 RX ADMIN — INSULIN LISPRO 1 UNITS: 100 INJECTION, SOLUTION INTRAVENOUS; SUBCUTANEOUS at 18:08

## 2025-01-14 RX ADMIN — BUDESONIDE AND FORMOTEROL FUMARATE DIHYDRATE 2 PUFF: 160; 4.5 AEROSOL RESPIRATORY (INHALATION) at 09:01

## 2025-01-14 RX ADMIN — HEPARIN SODIUM 5000 UNITS: 5000 INJECTION INTRAVENOUS; SUBCUTANEOUS at 20:36

## 2025-01-14 RX ADMIN — ASPIRIN 81 MG CHEWABLE TABLET 81 MG: 81 TABLET CHEWABLE at 08:33

## 2025-01-14 RX ADMIN — PANTOPRAZOLE SODIUM 40 MG: 40 TABLET, DELAYED RELEASE ORAL at 06:49

## 2025-01-14 NOTE — PROGRESS NOTES
(there is a basement but pt does not access it)  Home Access: Stairs to enter with rails  Entrance Stairs - Number of Steps: 2 HENRY, reports theres a porch railing to hold onto  Bathroom Shower/Tub: Tub/Shower unit  Bathroom Toilet: Standard  Bathroom Equipment: Tub transfer bench, Grab bars in shower, Hand-held shower  Bathroom Accessibility: Walker accessible  Home Equipment: Rollator, Wheelchair - Manual (has multiple pieces of equipment from her  that she doesn't currently use: multiple W/Cs, BSC, hospital bed, gabriele lift)  Has the patient had two or more falls in the past year or any fall with injury in the past year?: No  Receives Help From: Family  Prior Level of Assist for ADLs: Independent  Homemaking Responsibilities: Yes  Meal Prep Responsibility: Secondary  Laundry Responsibility: Primary  Cleaning Responsibility: Secondary (does dishes, not heavier cleaning)  Bill Paying/Finance Responsibility: Primary  Shopping Responsibility: No  Health Care Management: Primary (uses a pillbox. Son often connects and disconnects pt from PD, there are a few nights a week she connects it herself)  Prior Level of Assist for Ambulation: Independent household ambulator, with or without device, Independent community ambulator, with or without device (however reports in winter her children have her use the W/C d/t weather conditions; otherwise pt uses rollator for doctors appts.  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 Visit dtr 4-5 days, son works from home. Online grocery.  limited outings due to seasonal illnesses  IADL Comments: No falls in the last

## 2025-01-14 NOTE — PROGRESS NOTES
Occupational Therapy  Physical Rehabilitation: OCCUPATIONAL THERAPY     [x] daily progress note       [] discharge       Patient Name:  Gwendolyn Grimm   :  1940 MRN: [id number]  Room:  [address] [place], [place] [address] Date of Admission: 2025  Rehabilitation Diagnosis:   Cerebral infarction, unspecified [I63.9]  Acute CVA (cerebrovascular accident) (HCC) [I63.9]       Date 2025       Day of ARU Week:  6   Time IN//1055   Individual Tx Minutes 60   Group Tx Minutes    Co-Treat Minutes    Concurrent Tx Minutes    TOTAL Tx Time Mins 60   Variance Time    Variance Reason []   Refusal due to:     []   Medical hold/reason:    []   Illness   []   Off Unit for test/procedure  []   Extra time needed to complete task  []   Therapeutic need  []   Make up mins were attempted but pt unable to complete due to (specify)  []   Other (specify):   Restrictions Restrictions/Precautions: General Precautions, Fall Risk, Contact Precautions (PD port L abdomen, IV LUE, contact isolation (VRE))         Communication with other providers: [x]   OK to see per nursing:     []   Spoke with team member regarding:      Subjective observations and cognitive status: Pt in W/C on approach, pleasant and agreeable to tx session.  Declined need for ADLs as full shower was completed yesterday.  When asked about her performance, pt reported she feels as strong as she did a few months ago.       Pain level/location:    /10       Location: Denied   Discharge recommendations  Anticipated discharge date:  TBD  Destination: []home alone   []home alone w assist prn   [x] home w/ family    [] Continuous supervision       []SNF    [] Assisted living     [] Other:   Continued therapy: []HHC OT  []OUTPATIENT  OT   [] No Further OT  Equipment needs: none      Toileting:   Denied need         Bed Mobility:           [x]   Pt received out of bed     Transfers:    Sit--> Stand:  Ind to countertop  Stand --> Sit:   Ind       Additional Therapeutic activities/exercises

## 2025-01-14 NOTE — DISCHARGE INSTR - ACTIVITY
Pt is discharging to home with [facility]  [address] , [address], [place] [address]  [phone]  A representative from [facility] will contact you at home to schedule your home care needs.    [facility] Stroke Survivor and Caregiver Support Group  First Thursday of each month  5:30 PM - 8:00 PM Mayelin [email or web address]  [facility]

## 2025-01-14 NOTE — PROGRESS NOTES
Nephrology Progress Note  1/14/2025 8:42 AM  Subjective:     Interval History: Gwendolyn Grimm is a 84 y.o. female who appears to be doing well in general tolerating PD and therapy going well    Data:   Scheduled Meds:   heparin (porcine)  5,000 Units SubCUTAneous TID    insulin lispro  0-4 Units SubCUTAneous 4x Daily AC & HS    aspirin  81 mg Oral Daily    atorvastatin  40 mg Oral Nightly    budesonide-formoterol  2 puff Inhalation BID RT    citalopram  20 mg Oral Daily    clopidogrel  75 mg Oral Daily    insulin glargine  10 Units SubCUTAneous Nightly    levothyroxine  150 mcg Oral Daily    mirtazapine  15 mg Oral Nightly    pantoprazole  40 mg Oral QAM AC    vitamin D  50,000 Units Oral Weekly     Continuous Infusions:   dextrose           CBC   Recent Labs     01/13/25  0450   WBC 10.6*   HGB 10.4*   HCT 34.0*         BMP   Recent Labs     01/13/25  0450      K 5.0      CO2 25   PHOS 8.3*   BUN 72*   CREATININE 7.6*     Hepatic:   Recent Labs     01/13/25  0450   AST 20   ALT 14   BILITOT 0.3   ALKPHOS 67     Troponin: No results for input(s): \"TROPONINI\" in the last 72 hours.  BNP: No results for input(s): \"BNP\" in the last 72 hours.  Lipids: No results for input(s): \"CHOL\", \"HDL\" in the last 72 hours.    Invalid input(s): \"LDLCALCU\"  ABGs:   Lab Results   Component Value Date/Time    PO2ART 73 05/12/2023 07:00 AM    [id number] 38.0 05/12/2023 07:00 AM     INR: No results for input(s): \"INR\" in the last 72 hours.  Renal Labs  Albumin:    Lab Results   Component Value Date/Time    LABALBU DUPLICATE ORDER 05/27/2022 12:43 PM     Calcium:    Lab Results   Component Value Date/Time    CALCIUM 9.4 01/13/2025 04:50 AM     Phosphorus:    Lab Results   Component Value Date/Time    PHOS 8.3 01/13/2025 04:50 AM     U/A:    Lab Results   Component Value Date/Time    NITRU NEGATIVE 12/29/2023 12:15 PM    COLORU YELLOW 12/29/2023 12:15 PM    PHUR 6.0 12/29/2023 12:15 PM    PHUR 5.5 12/29/2021 11:51 AM    WBCUA

## 2025-01-14 NOTE — PROGRESS NOTES
Wheelchair and/or Scooter?: No                Balance:        Object: Picking Up Object  Assistance Needed: Supervision or touching assistance  Comment: SBA with 2ww and reacher  CARE Score: 4  Discharge Goal: Independent    I      Exam:    Blood pressure (!) 146/52, pulse 66, temperature 97.9 °F (36.6 °C), temperature source Oral, resp. rate 16, height 1.676 m (5' 6\"), weight 86.9 kg (191 lb 9.3 oz), SpO2 97%, not currently breastfeeding.    General: ***    HEENT: ***    Pulmonary: ***    Cardiac: ***    Abdomen: Patient's abdomen is soft and nondistended.  Bowel sounds were present throughout.  There was no rebound, guarding or masses noted.    Upper extremities: ***    Lower extremities: ***    Sitting balance was ***.  Standing balance was ***.    Lab Results   Component Value Date    WBC 10.6 (H) 01/13/2025    HGB 10.4 (L) 01/13/2025    HCT 34.0 (L) 01/13/2025    .0 01/13/2025     01/13/2025     Lab Results   Component Value Date    INR 1.0 01/06/2025    INR 1.0 01/09/2024    INR 1.05 06/18/2023    PROTIME 13.2 01/06/2025    PROTIME 13.2 01/09/2024    PROTIME 12.0 01/09/2024     Lab Results   Component Value Date    CREATININE 7.6 (H) 01/13/2025    BUN 72 (H) 01/13/2025     01/13/2025    K 5.0 01/13/2025     01/13/2025    CO2 25 01/13/2025     Lab Results   Component Value Date    ALT 14 01/13/2025    AST 20 01/13/2025    ALKPHOS 67 01/13/2025    BILITOT 0.3 01/13/2025       Expected length of stay  prior to a supervised level of function for discharge home with a walker and HHC OT/PT is ***    Recommendations:    ***

## 2025-01-14 NOTE — CARE COORDINATION
LISA met with patient and provided written communication following Care Conference. Daughter was updated by phone. LISA informed patient of recommendations for 4WW, [facility] PT, OT. Patient reported that she was discharging to her daughters home. Provided address for [facility] [address], [address] [place] [address]. Patient verbalized understanding. Whiteboard updated.     Patient provided with list of [facility] participating [facility] in the geographic area of the patient served. Patient selected [facility] and was provided with a comparative data handout from [facility]’s website. The patient (and/or Family) was educated on the quality outcomes for each provider. Patient (and/or Family) demonstrated understanding. Per patient/family request, referral made to [facility].      D/C Plan:  Estimated Date: Jan 17  DME: Has recommended DME  HHC: PT, OT ([facility])  To: Home with daughter (daughter will transport)    Concurrent review was sent to [facility] via routed fax 2:22 PM.

## 2025-01-14 NOTE — PROGRESS NOTES
PD treatment started per cycler as ordered for 9 hour treatment.  PD catheter site care and dressing change completed.

## 2025-01-15 LAB
GLUCOSE BLD-MCNC: 116 MG/DL (ref 74–99)
GLUCOSE BLD-MCNC: 141 MG/DL (ref 74–99)
GLUCOSE BLD-MCNC: 169 MG/DL (ref 74–99)
GLUCOSE BLD-MCNC: 193 MG/DL (ref 74–99)

## 2025-01-15 PROCEDURE — 97112 NEUROMUSCULAR REEDUCATION: CPT

## 2025-01-15 PROCEDURE — 82962 GLUCOSE BLOOD TEST: CPT

## 2025-01-15 PROCEDURE — 6370000000 HC RX 637 (ALT 250 FOR IP): Performed by: INTERNAL MEDICINE

## 2025-01-15 PROCEDURE — 94761 N-INVAS EAR/PLS OXIMETRY MLT: CPT

## 2025-01-15 PROCEDURE — 97116 GAIT TRAINING THERAPY: CPT

## 2025-01-15 PROCEDURE — 94150 VITAL CAPACITY TEST: CPT

## 2025-01-15 PROCEDURE — 97110 THERAPEUTIC EXERCISES: CPT

## 2025-01-15 PROCEDURE — 94640 AIRWAY INHALATION TREATMENT: CPT

## 2025-01-15 PROCEDURE — 1280000000 HC REHAB R&B

## 2025-01-15 PROCEDURE — 97530 THERAPEUTIC ACTIVITIES: CPT

## 2025-01-15 PROCEDURE — 90945 DIALYSIS ONE EVALUATION: CPT

## 2025-01-15 PROCEDURE — 6360000002 HC RX W HCPCS: Performed by: PHYSICAL MEDICINE & REHABILITATION

## 2025-01-15 PROCEDURE — 97535 SELF CARE MNGMENT TRAINING: CPT

## 2025-01-15 RX ADMIN — ASPIRIN 81 MG CHEWABLE TABLET 81 MG: 81 TABLET CHEWABLE at 08:47

## 2025-01-15 RX ADMIN — HEPARIN SODIUM 5000 UNITS: 5000 INJECTION INTRAVENOUS; SUBCUTANEOUS at 21:23

## 2025-01-15 RX ADMIN — PANTOPRAZOLE SODIUM 40 MG: 40 TABLET, DELAYED RELEASE ORAL at 05:09

## 2025-01-15 RX ADMIN — CLOPIDOGREL BISULFATE 75 MG: 75 TABLET ORAL at 08:47

## 2025-01-15 RX ADMIN — ATORVASTATIN CALCIUM 40 MG: 40 TABLET, FILM COATED ORAL at 21:23

## 2025-01-15 RX ADMIN — LEVOTHYROXINE SODIUM 150 MCG: 0.07 TABLET ORAL at 05:09

## 2025-01-15 RX ADMIN — INSULIN GLARGINE 10 UNITS: 100 INJECTION, SOLUTION SUBCUTANEOUS at 21:22

## 2025-01-15 RX ADMIN — BUDESONIDE AND FORMOTEROL FUMARATE DIHYDRATE 2 PUFF: 160; 4.5 AEROSOL RESPIRATORY (INHALATION) at 21:37

## 2025-01-15 RX ADMIN — HEPARIN SODIUM 5000 UNITS: 5000 INJECTION INTRAVENOUS; SUBCUTANEOUS at 08:47

## 2025-01-15 RX ADMIN — MIRTAZAPINE 15 MG: 15 TABLET, FILM COATED ORAL at 21:23

## 2025-01-15 RX ADMIN — CITALOPRAM HYDROBROMIDE 20 MG: 20 TABLET ORAL at 08:47

## 2025-01-15 RX ADMIN — GENTAMICIN SULFATE: 1 CREAM TOPICAL at 20:06

## 2025-01-15 RX ADMIN — BUDESONIDE AND FORMOTEROL FUMARATE DIHYDRATE 2 PUFF: 160; 4.5 AEROSOL RESPIRATORY (INHALATION) at 08:34

## 2025-01-15 ASSESSMENT — PAIN SCALES - GENERAL: PAINLEVEL_OUTOF10: 0

## 2025-01-15 NOTE — PROGRESS NOTES
Nephrology Progress Note  1/15/2025 9:17 AM  Subjective:     Interval History: Gwendolyn Grimm is a 84 y.o. female stable on PD dialysis doing well plan on discharge Friday  Data:   Scheduled Meds:   heparin (porcine)  5,000 Units SubCUTAneous TID    insulin lispro  0-4 Units SubCUTAneous 4x Daily AC & HS    aspirin  81 mg Oral Daily    atorvastatin  40 mg Oral Nightly    budesonide-formoterol  2 puff Inhalation BID RT    citalopram  20 mg Oral Daily    clopidogrel  75 mg Oral Daily    insulin glargine  10 Units SubCUTAneous Nightly    levothyroxine  150 mcg Oral Daily    mirtazapine  15 mg Oral Nightly    pantoprazole  40 mg Oral QAM AC    vitamin D  50,000 Units Oral Weekly     Continuous Infusions:   dextrose           CBC   Recent Labs     01/13/25  0450   WBC 10.6*   HGB 10.4*   HCT 34.0*         BMP   Recent Labs     01/13/25  0450      K 5.0      CO2 25   PHOS 8.3*   BUN 72*   CREATININE 7.6*     Hepatic:   Recent Labs     01/13/25  0450   AST 20   ALT 14   BILITOT 0.3   ALKPHOS 67     Troponin: No results for input(s): \"TROPONINI\" in the last 72 hours.  BNP: No results for input(s): \"BNP\" in the last 72 hours.  Lipids: No results for input(s): \"CHOL\", \"HDL\" in the last 72 hours.    Invalid input(s): \"LDLCALCU\"  ABGs:   Lab Results   Component Value Date/Time    PO2ART 73 05/12/2023 07:00 AM    [id number] 38.0 05/12/2023 07:00 AM     INR: No results for input(s): \"INR\" in the last 72 hours.  Renal Labs  Albumin:    Lab Results   Component Value Date/Time    LABALBU DUPLICATE ORDER 05/27/2022 12:43 PM     Calcium:    Lab Results   Component Value Date/Time    CALCIUM 9.4 01/13/2025 04:50 AM     Phosphorus:    Lab Results   Component Value Date/Time    PHOS 8.3 01/13/2025 04:50 AM     U/A:    Lab Results   Component Value Date/Time    NITRU NEGATIVE 12/29/2023 12:15 PM    COLORU YELLOW 12/29/2023 12:15 PM    PHUR 6.0 12/29/2023 12:15 PM    PHUR 5.5 12/29/2021 11:51 AM    WBCUA [id number] 12/29/2023 12:15

## 2025-01-15 NOTE — PROGRESS NOTES
Strengthening, Balance training, Functional mobility training, Transfer training, IADL training, Endurance training, Home exercise program, Therapeutic activities, Neuromuscular re-education, Stair training, Gait training, Safety education & training, Patient/Caregiver education & training, Equipment evaluation, education, & procurement, Group Therapy    Electronically signed by   Emmie Leary PT,   1/15/2025, 12:33 PM

## 2025-01-15 NOTE — PROGRESS NOTES
Independent  Comment: completed majority of full shower seated, mod I in stance to bathe perineal area  CARE Score: 6  Discharge Goal: Independent    UB Dressing: Upper Body Dressing  Assistance Needed: Independent  Comment: to retrieve clothing from closet using 4WW; able to doff/don long sleeve top  CARE Score: 6  Discharge Goal: Independent         LB Dressing: Lower Body Dressing  Assistance Needed: Independent  Comment: to retrieve clothing from closet using 4WW; pt able to doff/don Depends and pants, perform pants management Ind  CARE Score: 6  Discharge Goal: Independent    Donning and [place] Footwear: Putting On/Taking Off Footwear  Assistance Needed: Independent  Comment: pt able to doff/don socks and shoes  CARE Score: 6  Discharge Goal: Independent      Toileting: Toileting Hygiene  Assistance needed: Independent  Comment: mod I  CARE Score: 6  Discharge Goal: Independent      Toilet Transfers:     Toilet Transfer  Assistance needed: Independent  Comment: mod I c 4WW, grab bar  CARE Score: 6  Discharge Goal: Independent  Device Used:    []   Standard Toilet         [x]   Grab Bars           []  Bedside Commode       []   Elevated Toilet          []   Other:        Bed Mobility:           [x]   Pt received out of bed        Transfers:    Sit--> Stand:  Mod I   Stand --> Sit:   Mod I   Stand-Pivot:   Mod I   Other:    Assistive device required for transfer:   4WW      Functional Mobility:    Assistance:   Ranged from mod I-sup in room d/t requiring 1 safety cue for improved body positioning while opening bathroom door, as pt had to take a large step backwards in middle of task  Device:   []   Rolling Walker     []   Standard Walker []   Wheelchair        []   Cane       [x]   4-Wheeled Walker         []   Cardiac Walker       []   Other:          Additional Therapeutic activities/exercises completed this date:     [x]   ADL Training   []   Balance/Postural training     [x]   Bed/Transfer Training   [x]

## 2025-01-15 NOTE — CARE COORDINATION
Discussed discharge with patient. Notified her that [facility] was ordered. She had questions about her upcoming cerebral angiogram for carotid stenting. Notified her that she has an appointment with NIKUNJ Sigala CNP  Thursday Feb 13, 2025 2:00 PM. She is hoping for an earlier appointment. Provided a copy of the Important Message from Medicare form signed upon admission. Patient verbalized understanding.     LISA spoke with Carley at [facility]. She stated that they will schedule the appointment for the cerebral angiogram with the patient directly. Notified her of patient's discharge date. She is going try to get the information about the procedure to the patient prior to discharge. Patient was notified.

## 2025-01-15 NOTE — PROGRESS NOTES
Gwendolyn Grimm    : 1940  Acct #: [id number]  MRN: [id number]              PM&R Progress Note      Admitting diagnosis: ***    Comorbid diagnoses impacting rehabilitation: ***    Chief complaint: ***    Prior (baseline) level of function: Independent.    Current level of function:         Current  IRF-ABBY and Goals:   Occupational Therapy:    Short Term Goals  Time Frame for Short Term Goals: STGs=LTGS :   Long Term Goals  Time Frame for Long Term Goals : 7 days or until d/c  Long Term Goal 1: Pt will complete grooming tasks Ind  Long Term Goal 2: Pt will complete total body bathing mod I  Long Term Goal 3: Pt will complete UB dressing Ind  Long Term Goal 4: Pt will complete LB dressing Ind  Long Term Goal 5: Pt will doff/don footwear Ind  Additional Goals?: Yes  Long Term Goal 6: Pt will complete toileting mod I  Long Term Goal 7: Pt will complete functional transfers (bed, chair, toilet, shower) c DME PRN and mod I  Long Term Goal 8: Pt will perform therex/therax to facilitate increased strength/endurance/ax tolerance (c emphasis on dynamic standing balance/tolerance >10 mins, GLADYSE neuro re-ed) c SBA  Long Term Goal 9: Pt will complete simple homemaking tasks c DME PRN and mod I :                                       Eating: Eating  Assistance Needed: Setup or clean-up assistance  Comment: requires assist with some containers  CARE Score: 5  Discharge Goal: Independent       Oral Hygiene: Oral Hygiene  Assistance Needed: Independent  Comment: seated at sink  CARE Score: 6  Discharge Goal: Independent    UB/LB Bathing: Shower/Bathe Self  Assistance Needed: Supervision or touching assistance  Comment: Pt performed full shower c majority seated; Sup in stance to wash perineal area  CARE Score: 4  Discharge Goal: Independent    UB Dressing: Upper Body Dressing  Assistance Needed: Setup or clean-up assistance  Comment: to doff/don long sleeve top  CARE Score: 5  Discharge Goal: Independent         LB Dressing:

## 2025-01-15 NOTE — CARE COORDINATION
[facility] Liaison spoke with pt & daughter Asya. Pt is agreeable to [facility] at discharge. Demo info reviewed with pt and noted she is going to daughters (Gissel) at [address], [phone]. Will have [facility]hc initiated when pt is dc'd 1/17.

## 2025-01-15 NOTE — PROGRESS NOTES
PD treatment started per cycler as ordered for 9 hours.  PD site care and dressing change completed.  Denies complaints and without distress.

## 2025-01-15 NOTE — PROGRESS NOTES
Comprehensive Nutrition Assessment    Type and Reason for Visit:  Reassess    Nutrition Recommendations/Plan:   Continue current diet-carb controlled, cardiac diet   May offer oral nutrition supplement as needed-renal or diabetic  Will continue to follow up during stay      Malnutrition Assessment:  Malnutrition Status:  No malnutrition (01/10/25 8)    Context:  Chronic Illness       Nutrition Assessment:    Meal intake remains % at most meals. Remains on carb controlled, cardiac diet. Patient overall content with meals. Will continue to follow at moderate nutrition risk at this time.    Nutrition Related Findings:    up in chair eating lunch   glucose POCT seom over 200 mg/dL  remains on PD Wound Type: Pressure Injury, Stage III       Current Nutrition Intake & Therapies:    Average Meal Intake: %  Average Supplements Intake: None Ordered  ADULT DIET; Regular; 4 carb choices (60 gm/meal); Low Fat/Low Chol/High Fiber/2 gm Na    Anthropometric Measures:  Height: 167.6 cm (5' 6\")  Ideal Body Weight (IBW): 130 lbs (59 kg)    Admission Body Weight: 86.6 kg (190 lb 14.7 oz)  Current Body Weight: 87.8 kg (193 lb 9 oz), 146.9 % IBW. Weight Source: Bed scale  Current BMI (kg/m2): 31.3  Usual Body Weight: 78.3 kg (172 lb 9.9 oz) (1/4/24)     % Weight Change (Calculated): 10.6  Weight Adjustment For: No Adjustment                 BMI Categories: Obese Class 1 (BMI 30.0-34.9)    Estimated Daily Nutrient Needs:  Energy Requirements Based On: Formula  Weight Used for Energy Requirements: Current  Energy (kcal/day): 2296-9659 ([facility])  Weight Used for Protein Requirements: Ideal  Protein (g/day): 88 (1.5 g/kg)  Method Used for Fluid Requirements: 1 ml/kcal  Fluid (ml/day): 6920-3256    Nutrition Diagnosis:   Predicted inadequate energy intake related to increase demand for energy/nutrients as evidenced by rehab for strength and conditioning, dialysis    Nutrition Interventions:   Food and/or Nutrient

## 2025-01-16 LAB
GLUCOSE BLD-MCNC: 147 MG/DL (ref 74–99)
GLUCOSE BLD-MCNC: 152 MG/DL (ref 74–99)
GLUCOSE BLD-MCNC: 156 MG/DL (ref 74–99)
GLUCOSE BLD-MCNC: 224 MG/DL (ref 74–99)

## 2025-01-16 PROCEDURE — 97535 SELF CARE MNGMENT TRAINING: CPT

## 2025-01-16 PROCEDURE — 97530 THERAPEUTIC ACTIVITIES: CPT

## 2025-01-16 PROCEDURE — 94150 VITAL CAPACITY TEST: CPT

## 2025-01-16 PROCEDURE — 6370000000 HC RX 637 (ALT 250 FOR IP): Performed by: PHYSICAL MEDICINE & REHABILITATION

## 2025-01-16 PROCEDURE — 90945 DIALYSIS ONE EVALUATION: CPT

## 2025-01-16 PROCEDURE — 82962 GLUCOSE BLOOD TEST: CPT

## 2025-01-16 PROCEDURE — 6370000000 HC RX 637 (ALT 250 FOR IP): Performed by: INTERNAL MEDICINE

## 2025-01-16 PROCEDURE — 1280000000 HC REHAB R&B

## 2025-01-16 PROCEDURE — 97116 GAIT TRAINING THERAPY: CPT

## 2025-01-16 PROCEDURE — 94640 AIRWAY INHALATION TREATMENT: CPT

## 2025-01-16 PROCEDURE — 94761 N-INVAS EAR/PLS OXIMETRY MLT: CPT

## 2025-01-16 PROCEDURE — 97110 THERAPEUTIC EXERCISES: CPT

## 2025-01-16 PROCEDURE — 6360000002 HC RX W HCPCS: Performed by: PHYSICAL MEDICINE & REHABILITATION

## 2025-01-16 RX ADMIN — INSULIN LISPRO 1 UNITS: 100 INJECTION, SOLUTION INTRAVENOUS; SUBCUTANEOUS at 22:48

## 2025-01-16 RX ADMIN — MIRTAZAPINE 15 MG: 15 TABLET, FILM COATED ORAL at 21:50

## 2025-01-16 RX ADMIN — BUDESONIDE AND FORMOTEROL FUMARATE DIHYDRATE 2 PUFF: 160; 4.5 AEROSOL RESPIRATORY (INHALATION) at 09:39

## 2025-01-16 RX ADMIN — LEVOTHYROXINE SODIUM 150 MCG: 0.07 TABLET ORAL at 05:42

## 2025-01-16 RX ADMIN — CLOPIDOGREL BISULFATE 75 MG: 75 TABLET ORAL at 07:45

## 2025-01-16 RX ADMIN — BUDESONIDE AND FORMOTEROL FUMARATE DIHYDRATE 2 PUFF: 160; 4.5 AEROSOL RESPIRATORY (INHALATION) at 20:16

## 2025-01-16 RX ADMIN — PANTOPRAZOLE SODIUM 40 MG: 40 TABLET, DELAYED RELEASE ORAL at 05:42

## 2025-01-16 RX ADMIN — CITALOPRAM HYDROBROMIDE 20 MG: 20 TABLET ORAL at 07:45

## 2025-01-16 RX ADMIN — INSULIN GLARGINE 10 UNITS: 100 INJECTION, SOLUTION SUBCUTANEOUS at 22:47

## 2025-01-16 RX ADMIN — ASPIRIN 81 MG CHEWABLE TABLET 81 MG: 81 TABLET CHEWABLE at 07:45

## 2025-01-16 RX ADMIN — ATORVASTATIN CALCIUM 40 MG: 40 TABLET, FILM COATED ORAL at 21:50

## 2025-01-16 RX ADMIN — ACETAMINOPHEN 650 MG: 325 TABLET ORAL at 05:42

## 2025-01-16 RX ADMIN — HEPARIN SODIUM 5000 UNITS: 5000 INJECTION INTRAVENOUS; SUBCUTANEOUS at 15:30

## 2025-01-16 RX ADMIN — HEPARIN SODIUM 5000 UNITS: 5000 INJECTION INTRAVENOUS; SUBCUTANEOUS at 21:50

## 2025-01-16 RX ADMIN — HEPARIN SODIUM 5000 UNITS: 5000 INJECTION INTRAVENOUS; SUBCUTANEOUS at 07:45

## 2025-01-16 ASSESSMENT — PAIN SCALES - GENERAL: PAINLEVEL_OUTOF10: 0

## 2025-01-16 NOTE — PROGRESS NOTES
Gwendolyn Grimm    : 1940  Acct #: [id number]  MRN: [id number]              PM&R Progress Note      Admitting diagnosis: ***    Comorbid diagnoses impacting rehabilitation: ***    Chief complaint: ***    Prior (baseline) level of function: Independent.    Current level of function:         Current  IRF-ABBY and Goals:   Occupational Therapy:    Short Term Goals  Time Frame for Short Term Goals: STGs=LTGS :   Long Term Goals  Time Frame for Long Term Goals : 7 days or until d/c  Long Term Goal 1: Pt will complete grooming tasks Ind  Long Term Goal 2: Pt will complete total body bathing mod I  Long Term Goal 3: Pt will complete UB dressing Ind  Long Term Goal 4: Pt will complete LB dressing Ind  Long Term Goal 5: Pt will doff/don footwear Ind  Additional Goals?: Yes  Long Term Goal 6: Pt will complete toileting mod I  Long Term Goal 7: Pt will complete functional transfers (bed, chair, toilet, shower) c DME PRN and mod I  Long Term Goal 8: Pt will perform therex/therax to facilitate increased strength/endurance/ax tolerance (c emphasis on dynamic standing balance/tolerance >10 mins, RUE neuro re-ed) c SBA  Long Term Goal 9: Pt will complete simple homemaking tasks c DME PRN and mod I :                                       Eating: Eating  Assistance Needed: Independent  Comment: able to open packages, feed self  CARE Score: 6  Discharge Goal: Independent       Oral Hygiene: Oral Hygiene  Assistance Needed: Independent  Comment: seated at sink  CARE Score: 6  Discharge Goal: Independent    UB/LB Bathing: Shower/Bathe Self  Assistance Needed: Independent  Comment: completed majority of full shower seated, mod I in stance to bathe perineal area  CARE Score: 6  Discharge Goal: Independent    UB Dressing: Upper Body Dressing  Assistance Needed: Independent  Comment: to retrieve clothing from closet using 4WW; able to doff/don long sleeve top  CARE Score: 6  Discharge Goal: Independent         LB Dressing: Lower Body

## 2025-01-16 NOTE — PROGRESS NOTES
Occupational Therapy    Physical Rehabilitation: OCCUPATIONAL THERAPY     [x] daily progress note       [] discharge       Patient Name:  Gwendolyn Grimm   :  1940 MRN: [id number]  Room:  [address] [place], [place] [address] Date of Admission: 2025  Rehabilitation Diagnosis:   Cerebral infarction, unspecified [I63.9]  Acute CVA (cerebrovascular accident) (HCC) [I63.9]       Date 2025       Day of ARU Week:  1   Time IN/OUT 1100/1200   Individual Tx Minutes 60   Group Tx Minutes    Co-Treat Minutes    Concurrent Tx Minutes    TOTAL Tx Time Mins 60   Variance Time    Variance Reason []   Refusal due to:     []   Medical hold/reason:    []   Illness   []   Off Unit for test/procedure  []   Extra time needed to complete task  []   Therapeutic need  []   Make up mins were attempted but pt unable to complete due to (specify)  []   Other (specify):   Restrictions Restrictions/Precautions: General Precautions, Fall Risk, Contact Precautions (PD port L abdomen, IV LUE, contact isolation (VRE))         Communication with other providers: [x]   OK to see per nursing:     []   Spoke with team member regarding:      Subjective observations and cognitive status: Pt sitting in recliner upon entrance, pleasant and agreeable to therapy.     Pain level/location:    /10       Location: Pt denied   Discharge recommendations  Anticipated discharge date:     Destination: []home alone   []home alone w assist prn   [x] home w/ family    [] Continuous supervision       []SNF    [] Assisted living     [] Other:   Continued therapy: [x]HHC OT  []OUTPATIENT  OT   [] No Further OT  Equipment needs: none          Bed Mobility:           [x]   Pt received out of bed       Transfers:    Sit--> Stand:  Mod I  Stand --> Sit:   Mod I  Stand-Pivot:   Mod I  Other:    Assistive device required for transfer:   Rollator      Functional Mobility:    Assistance:  Pt completed functional mobility ~ 272 ft Mod I with one seated rest break ~ 5 mins. At the end of the

## 2025-01-16 NOTE — PROGRESS NOTES
Gwendolyn Grimm    : 1940  Acct #: [id number]  MRN: [id number]              PM&R Progress Note      Admitting diagnosis: ***    Comorbid diagnoses impacting rehabilitation: ***    Chief complaint: ***    Prior (baseline) level of function: Independent.    Current level of function:         Current  IRF-ABBY and Goals:   Occupational Therapy:    Short Term Goals  Time Frame for Short Term Goals: STGs=LTGS :   Long Term Goals  Time Frame for Long Term Goals : 7 days or until d/c  Long Term Goal 1: Pt will complete grooming tasks Ind  Long Term Goal 2: Pt will complete total body bathing mod I  Long Term Goal 3: Pt will complete UB dressing Ind  Long Term Goal 4: Pt will complete LB dressing Ind  Long Term Goal 5: Pt will doff/don footwear Ind  Additional Goals?: Yes  Long Term Goal 6: Pt will complete toileting mod I  Long Term Goal 7: Pt will complete functional transfers (bed, chair, toilet, shower) c DME PRN and mod I  Long Term Goal 8: Pt will perform therex/therax to facilitate increased strength/endurance/ax tolerance (c emphasis on dynamic standing balance/tolerance >10 mins, RUE neuro re-ed) c SBA  Long Term Goal 9: Pt will complete simple homemaking tasks c DME PRN and mod I :                                       Eating: Eating  Assistance Needed: Independent  Comment: able to open packages/containers, feed self  CARE Score: 6  Discharge Goal: Independent       Oral Hygiene: Oral Hygiene  Assistance Needed: Independent  Comment: seated at sink  CARE Score: 6  Discharge Goal: Independent    UB/LB Bathing: Shower/Bathe Self  Assistance Needed: Independent  Comment: completed majority of full shower seated, mod I in stance to bathe perineal area  CARE Score: 6  Discharge Goal: Independent    UB Dressing: Upper Body Dressing  Assistance Needed: Independent  Comment: pt retrieves clothing from closet using 4WW and was able to doff/don shirt  CARE Score: 6  Discharge Goal: Independent         LB Dressing: Lower

## 2025-01-16 NOTE — DISCHARGE INSTRUCTIONS
Your information:  Name: Gwendolyn Grimm  : 1940    Your instructions:    Pt is discharging to home with [facility]  [address], [address], [place] [address]  [phone]  A representative from [facility] will contact you at home to schedule your home care needs.    What to do after you leave the hospital:    Recommended diet: low fat, low cholesterol diet and renal diet    Recommended activity: {discharge activity:15638}    The following personal items were collected during your admission and were returned to you:    Belongings  Dental Appliances: None  Vision - Corrective Lenses: Eyeglasses  Hearing Aid: None  Clothing: Bathrobe, Pajamas, Footwear, Socks  Jewelry: Ring (x2 silver rings on left ring finger)  Body Piercings Removed: N/A  Electronic Devices: Cell Phone,   Weapons (Notify Protective Services/Security): None  Home Medications: None  Valuables Given To: Patient  Provide Name(s) of Who Valuable(s) Were Given To: pt    Information obtained by:  By signing below, I understand that if any problems occur once I leave the hospital I am to contact 911.  I understand and acknowledge receipt of the instructions indicated above.

## 2025-01-16 NOTE — PROGRESS NOTES
Nephrology Progress Note  1/16/2025 9:07 AM  Subjective:     Interval History: Gwendolyn Grimm is a 84 y.o. female doing ok and stable with pd    Data:   Scheduled Meds:   heparin (porcine)  5,000 Units SubCUTAneous TID    insulin lispro  0-4 Units SubCUTAneous 4x Daily AC & HS    aspirin  81 mg Oral Daily    atorvastatin  40 mg Oral Nightly    budesonide-formoterol  2 puff Inhalation BID RT    citalopram  20 mg Oral Daily    clopidogrel  75 mg Oral Daily    insulin glargine  10 Units SubCUTAneous Nightly    levothyroxine  150 mcg Oral Daily    mirtazapine  15 mg Oral Nightly    pantoprazole  40 mg Oral QAM AC    vitamin D  50,000 Units Oral Weekly     Continuous Infusions:   dextrose           CBC   No results for input(s): \"WBC\", \"HGB\", \"HCT\", \"PLT\" in the last 72 hours.     BMP   No results for input(s): \"NA\", \"K\", \"CL\", \"CO2\", \"PHOS\", \"BUN\", \"CREATININE\" in the last 72 hours.    Invalid input(s): \"CA\"    Hepatic:   No results for input(s): \"AST\", \"ALT\", \"BILITOT\", \"ALKPHOS\" in the last 72 hours.    Invalid input(s): \"ALB\"    Troponin: No results for input(s): \"TROPONINI\" in the last 72 hours.  BNP: No results for input(s): \"BNP\" in the last 72 hours.  Lipids: No results for input(s): \"CHOL\", \"HDL\" in the last 72 hours.    Invalid input(s): \"LDLCALCU\"  ABGs:   Lab Results   Component Value Date/Time    PO2ART 73 05/12/2023 07:00 AM    [id number] 38.0 05/12/2023 07:00 AM     INR: No results for input(s): \"INR\" in the last 72 hours.  Renal Labs  Albumin:    Lab Results   Component Value Date/Time    LABALBU DUPLICATE ORDER 05/27/2022 12:43 PM     Calcium:    Lab Results   Component Value Date/Time    CALCIUM 9.4 01/13/2025 04:50 AM     Phosphorus:    Lab Results   Component Value Date/Time    PHOS 8.3 01/13/2025 04:50 AM     U/A:    Lab Results   Component Value Date/Time    NITRU NEGATIVE 12/29/2023 12:15 PM    COLORU YELLOW 12/29/2023 12:15 PM    PHUR 6.0 12/29/2023 12:15 PM    PHUR 5.5 12/29/2021 11:51 AM    WBCUA [id number]

## 2025-01-16 NOTE — CARE COORDINATION
PATSYYANELI left a voicemail at the office of NIKUNJ Beckman CNP to scheduled follow up. Awaiting a return call.     Office returned call and scheduled appointment with NIKUNJ Beckman CNP Wednesday Jan 22, 2025 2:30 PM.     LISA met with patient and provided her with a list of follow up appointments. BIMS completed.

## 2025-01-16 NOTE — PROGRESS NOTES
Physical Therapy      [] daily progress note       [x] discharge       Patient Name:  Gwendolyn Grimm   :  1940 MRN: [id number]  Room:  [address] [place], [place] [address] Date of Admission: 2025  Rehabilitation Diagnosis:   Cerebral infarction, unspecified [I63.9]  Acute CVA (cerebrovascular accident) (HCC) [I63.9]       Date 2025       Day of ARU Week:  1   Time IN/OUT 1248-5307   Individual Tx Minutes 60   TOTAL Tx Time Mins 60   Variance Time    Variance Time []   Refusal due to:     []   Medical hold/reason:    []   Illness   []   Off Unit for test/procedure  []   Extra time needed to complete task  []   Therapeutic need  []   Other (specify):   Restrictions Restrictions/Precautions  Restrictions/Precautions: General Precautions, Fall Risk, Contact Precautions (PD port L abdomen, IV LUE, contact isolation (VRE))      Communication with other providers: [x]   OK to see per nursing:     []   Spoke with team member regarding:      Subjective observations and cognitive status: Pt up in chair, just finished with OT, motivated toward tx. Reports will be staying with her dgtr initially until next procedure \"then a couple days after\".  Reports feeling more confident in going home,      Pain level/location:  0  /10       Location:    Discharge recommendations  Anticipated discharge date:    Destination: []home alone   []home alone with assist PRN     [x] home w/ family      [] Continuous supervision  []SNF    [] Assisted living     [] Other:  Continued therapy: [x]HHC PT  []OUTPATIENT PT   [] No Further PT  []SNF PT  Caregiver training recommended: []Yes  [] No   Equipment needs: pt has 4ww     PT QI     Bed Mobility:     Roll Left and Right  Assistance Needed: Independent  CARE Score: 6  Discharge Goal: Independent    Sit to Lying  Assistance Needed: Independent  Comment: rail as per home set up  CARE Score: 6  Discharge Goal: Independent    Lying to Sitting on Side of Bed  Assistance Needed: Independent  Comment: rail as per

## 2025-01-16 NOTE — PROGRESS NOTES
Tub/Shower unit  Bathroom Toilet: Standard  Bathroom Equipment: Tub transfer bench, Grab bars in shower, Hand-held shower  Bathroom Accessibility: Walker accessible  Home Equipment: Rollator, Wheelchair - Manual (has multiple pieces of equipment from her  that she doesn't currently use: multiple W/Cs, BSC, hospital bed, gabriele lift)  Has the patient had two or more falls in the past year or any fall with injury in the past year?: No  Receives Help From: Family  Prior Level of Assist for ADLs: Independent  Homemaking Responsibilities: Yes  Meal Prep Responsibility: Secondary  Laundry Responsibility: Primary  Cleaning Responsibility: Secondary (does dishes, not heavier cleaning)  Bill Paying/Finance Responsibility: Primary  Shopping Responsibility: No  Health Care Management: Primary (uses a pillbox. Son often connects and disconnects pt from PD, there are a few nights a week she connects it herself)  Prior Level of Assist for Ambulation: Independent household ambulator, with or without device, Independent community ambulator, with or without device (however reports in winter her children have her use the W/C d/t weather conditions; otherwise pt uses rollator for doctors appts.  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 Visit dtr 4-5 days, son works from home. Online grocery.  limited outings due to seasonal illnesses  IADL Comments: No falls in the last year  Additional Comments: Pt typically sleeps in a flat, regular bed at home    Objective                                                                                    Goals:  (Update in

## 2025-01-17 ENCOUNTER — PREP FOR PROCEDURE (OUTPATIENT)
Age: 85
End: 2025-01-17

## 2025-01-17 VITALS
HEIGHT: 66 IN | SYSTOLIC BLOOD PRESSURE: 142 MMHG | RESPIRATION RATE: 15 BRPM | OXYGEN SATURATION: 100 % | HEART RATE: 78 BPM | DIASTOLIC BLOOD PRESSURE: 56 MMHG | TEMPERATURE: 97.5 F | WEIGHT: 189.82 LBS | BODY MASS INDEX: 30.51 KG/M2

## 2025-01-17 LAB
GLUCOSE BLD-MCNC: 122 MG/DL (ref 74–99)
GLUCOSE BLD-MCNC: 147 MG/DL (ref 74–99)

## 2025-01-17 PROCEDURE — 94664 DEMO&/EVAL PT USE INHALER: CPT

## 2025-01-17 PROCEDURE — 94640 AIRWAY INHALATION TREATMENT: CPT

## 2025-01-17 PROCEDURE — 6360000002 HC RX W HCPCS: Performed by: PHYSICAL MEDICINE & REHABILITATION

## 2025-01-17 PROCEDURE — 94761 N-INVAS EAR/PLS OXIMETRY MLT: CPT

## 2025-01-17 PROCEDURE — 6370000000 HC RX 637 (ALT 250 FOR IP): Performed by: INTERNAL MEDICINE

## 2025-01-17 PROCEDURE — 82962 GLUCOSE BLOOD TEST: CPT

## 2025-01-17 RX ORDER — ERGOCALCIFEROL 1.25 MG/1
50000 CAPSULE ORAL WEEKLY
Qty: 5 CAPSULE | Refills: 0 | Status: SHIPPED | OUTPATIENT
Start: 2025-01-21

## 2025-01-17 RX ORDER — SODIUM CHLORIDE 9 MG/ML
INJECTION, SOLUTION INTRAVENOUS CONTINUOUS
Status: CANCELLED | OUTPATIENT
Start: 2025-01-17

## 2025-01-17 RX ORDER — ATORVASTATIN CALCIUM 40 MG/1
40 TABLET, FILM COATED ORAL NIGHTLY
Qty: 30 TABLET | Refills: 0 | Status: SHIPPED | OUTPATIENT
Start: 2025-01-17

## 2025-01-17 RX ORDER — SODIUM CHLORIDE 9 MG/ML
INJECTION, SOLUTION INTRAVENOUS PRN
Status: CANCELLED | OUTPATIENT
Start: 2025-01-17

## 2025-01-17 RX ORDER — SODIUM CHLORIDE 0.9 % (FLUSH) 0.9 %
5-40 SYRINGE (ML) INJECTION PRN
Status: CANCELLED | OUTPATIENT
Start: 2025-01-17

## 2025-01-17 RX ORDER — CLOPIDOGREL BISULFATE 75 MG/1
75 TABLET ORAL DAILY
Qty: 10 TABLET | Refills: 0 | Status: SHIPPED | OUTPATIENT
Start: 2025-01-18 | End: 2025-01-28

## 2025-01-17 RX ORDER — SODIUM CHLORIDE 0.9 % (FLUSH) 0.9 %
5-40 SYRINGE (ML) INJECTION EVERY 12 HOURS SCHEDULED
Status: CANCELLED | OUTPATIENT
Start: 2025-01-17

## 2025-01-17 RX ADMIN — BUDESONIDE AND FORMOTEROL FUMARATE DIHYDRATE 2 PUFF: 160; 4.5 AEROSOL RESPIRATORY (INHALATION) at 08:08

## 2025-01-17 RX ADMIN — CLOPIDOGREL BISULFATE 75 MG: 75 TABLET ORAL at 08:48

## 2025-01-17 RX ADMIN — CITALOPRAM HYDROBROMIDE 20 MG: 20 TABLET ORAL at 08:48

## 2025-01-17 RX ADMIN — LEVOTHYROXINE SODIUM 150 MCG: 0.07 TABLET ORAL at 06:13

## 2025-01-17 RX ADMIN — HEPARIN SODIUM 5000 UNITS: 5000 INJECTION INTRAVENOUS; SUBCUTANEOUS at 08:49

## 2025-01-17 RX ADMIN — PANTOPRAZOLE SODIUM 40 MG: 40 TABLET, DELAYED RELEASE ORAL at 06:13

## 2025-01-17 RX ADMIN — ASPIRIN 81 MG CHEWABLE TABLET 81 MG: 81 TABLET CHEWABLE at 08:48

## 2025-01-17 NOTE — TELEPHONE ENCOUNTER
Diagnostic angiogram possible stenting of LICA scheduled as an outpatient on Tuesday, 1/21/25 @ 11 am. Gloria KINGSLEY gave patient instructions in the hospital. Will call patient on Monday, 1/20/25 to review instructions again.    Appointment in stroke clinic scheduled on Thursday, 2/13/24 @ 2 pm with Gloria.

## 2025-01-17 NOTE — PROGRESS NOTES
Nephrology Progress Note  1/17/2025 10:03 AM  Subjective:     Interval History: Gwendolyn Grimm is a 84 y.o. female  overall doing well plan on discharge today    Data:   Scheduled Meds:   heparin (porcine)  5,000 Units SubCUTAneous TID    insulin lispro  0-4 Units SubCUTAneous 4x Daily AC & HS    aspirin  81 mg Oral Daily    atorvastatin  40 mg Oral Nightly    budesonide-formoterol  2 puff Inhalation BID RT    citalopram  20 mg Oral Daily    clopidogrel  75 mg Oral Daily    insulin glargine  10 Units SubCUTAneous Nightly    levothyroxine  150 mcg Oral Daily    mirtazapine  15 mg Oral Nightly    pantoprazole  40 mg Oral QAM AC    vitamin D  50,000 Units Oral Weekly     Continuous Infusions:   dextrose           CBC   No results for input(s): \"WBC\", \"HGB\", \"HCT\", \"PLT\" in the last 72 hours.     BMP   No results for input(s): \"NA\", \"K\", \"CL\", \"CO2\", \"PHOS\", \"BUN\", \"CREATININE\" in the last 72 hours.    Invalid input(s): \"CA\"    Hepatic:   No results for input(s): \"AST\", \"ALT\", \"BILITOT\", \"ALKPHOS\" in the last 72 hours.    Invalid input(s): \"ALB\"    Troponin: No results for input(s): \"TROPONINI\" in the last 72 hours.  BNP: No results for input(s): \"BNP\" in the last 72 hours.  Lipids: No results for input(s): \"CHOL\", \"HDL\" in the last 72 hours.    Invalid input(s): \"LDLCALCU\"  ABGs:   Lab Results   Component Value Date/Time    PO2ART 73 05/12/2023 07:00 AM    [id number] 38.0 05/12/2023 07:00 AM     INR: No results for input(s): \"INR\" in the last 72 hours.  Renal Labs  Albumin:    Lab Results   Component Value Date/Time    LABALBU DUPLICATE ORDER 05/27/2022 12:43 PM     Calcium:    Lab Results   Component Value Date/Time    CALCIUM 9.4 01/13/2025 04:50 AM     Phosphorus:    Lab Results   Component Value Date/Time    PHOS 8.3 01/13/2025 04:50 AM     U/A:    Lab Results   Component Value Date/Time    NITRU NEGATIVE 12/29/2023 12:15 PM    COLORU YELLOW 12/29/2023 12:15 PM    PHUR 6.0 12/29/2023 12:15 PM    PHUR 5.5 12/29/2021 11:51

## 2025-01-17 NOTE — CARE COORDINATION
ARU  Discharge Summary    D/C Date: 01/17/2025    Patient discharged to: Home with daughter    Transported by: Daughter    Referrals made to: [facility]    Additional information: Follow up appointments scheduled with NIKUNJ Beckman CNP Wednesday Jan 22, 2025 2:30 PM, NIKUNJ Sigala CNP Thursday Feb 13, 2025 2:00 PM, and NIKUNJ Robles CNP Tuesday Mar 25, 2025 1:00 PM. Patient notified, AVS updated.     Caregiver training: none requested    Discharge BIMS completed: [x]      IMM: [x]       Discharge clinical was sent to [facility] via routed fax 2:53 PM.     Discharge Assessment: At the time of discharge   [x] OT Note 01/16 -Patient fully participated in the program and made good progress towards established goals.  Pt does demo some decreased safety judgement at times, but has good family support.

## 2025-01-17 NOTE — PROGRESS NOTES
Reponse'**      Enter scores in boxes    Column 1 Column 2   Little interest or pleasure in doing things   [x] 0.  No  [] 1.  Yes  [] 9. No Response [x] 0.  Never or one day  [] 1.  2-6 days (several days)  [] 2.  7-11 days (half or more of days)  [] 3.  12-14 days (nearly every day)     Feeling down, depressed, or hopeless   [x] 0.  No  [] 1.  Yes  [] 9. No Response [x] 0.  Never or one day  [] 1.  2-6 days (several days)  [] 2.  7-11 days (half or more of days)  [] 3.  12-14 days (nearly every day)   **If Question A or B in column 2 is coded “2” or “3”, CONTINUE asking the questions below in box.  If not, SKIP down to \"social isolation\" question and continue**       Trouble falling or staying asleep, or sleeping too much   [] 0.  No  [] 1.  Yes  [] 9. No Response [] 0.  Never or one day  [] 1.  2-6 days (several days)  [] 2.  7-11 days (half or more of days)  [] 3.  12-14 days (nearly every day   Feeling tired or having little energy   [] 0.  No  [] 1.  Yes  [] 9. No Response [] 0.  Never or one day  [] 1.  2-6 days (several days)  [] 2.  7-11 days (half or more of days)  [] 3.  12-14 days (nearly every day   Poor appetite or overeating     [] 0.  No  [] 1.  Yes  [] 9. No Response [] 0.  Never or one day  [] 1.  2-6 days (several days)  [] 2.  7-11 days (half or more of days)  [] 3.  12-14 days (nearly every day   Feeling bad about yourself - or that you are a failure or have let yourself or your family down   [] 0.  No  [] 1.  Yes  [] 9. No Response [] 0.  Never or one day  [] 1.  2-6 days (several days)  [] 2.  7-11 days (half or more of days)  [] 3.  12-14 days (nearly every day   Trouble concentrating on things, such as reading the newspaper or watching television   [] 0.  No  [] 1.  Yes  [] 9. No Response [] 0.  Never or one day  [] 1.  2-6 days (several days)  [] 2.  7-11 days (half or more of days)  [] 3.  12-14 days (nearly every day   Moving or speaking so slowly that other people could have noticed.  Or

## 2025-01-17 NOTE — TELEPHONE ENCOUNTER
----- Message from NIKUNJ Sigala CNP sent at 1/9/2025 10:51 AM EST -----  Patient has a symptomatic left carotid artery stenosis.  Plan for angiogram with possible stent after discharge from ARU.    Dr. Willoughby would like us to schedule her ASAP after discharge from ARU possibly that next Monday or Tuesday after discharge.     She is on aspirin and plavix. She does peritoneal dialysis.     Thanks

## 2025-01-17 NOTE — PLAN OF CARE
Problem: Discharge Planning  Goal: Discharge to home or other facility with appropriate resources  1/17/2025 1014 by Mitali Ernandez LPN  Outcome: Progressing

## 2025-01-20 ENCOUNTER — TELEPHONE (OUTPATIENT)
Age: 85
End: 2025-01-20

## 2025-01-20 NOTE — TELEPHONE ENCOUNTER
Patient's daughter Gissel FORRESTER returned call to the office and spoke to her regarding upcoming diagnostic cerebral angiogram possible stenting of LICA procedure scheduled with Dr Willoughby on Tuesday, 1/21/25 @ 11 am.    Patient was informed of the following:  - Arrive 2 hours before the scheduled procedure - 9 am  - Check in at the main registration desk  - Do not eat or drink anything 8 hours before the scheduled procedure  - Confirmed that patient does NOT have any allergies to IV dye/contrast, shellfish, metal or latex  - Patient is not on any oral diabetic medications   - Take all other prescription medication as directed with a small sip of water  - Continue ASA and Plavix   - Make sure you have someone to drive you home after the procedure as you will not be permitted to drive home      Gissel agreeable and verbalized understanding.

## 2025-01-21 ENCOUNTER — HOSPITAL ENCOUNTER (INPATIENT)
Dept: INTERVENTIONAL RADIOLOGY/VASCULAR | Age: 85
LOS: 2 days | Discharge: HOME HEALTH | DRG: 037 | End: 2025-01-23
Attending: PSYCHIATRY & NEUROLOGY | Admitting: PSYCHIATRY & NEUROLOGY
Payer: MEDICARE

## 2025-01-21 PROBLEM — I65.22 LEFT CAROTID STENOSIS: Status: ACTIVE | Noted: 2025-01-21

## 2025-01-21 LAB
ANION GAP SERPL CALCULATED.3IONS-SCNC: 20 MMOL/L (ref 9–17)
BUN SERPL-MCNC: 56 MG/DL (ref 7–20)
CALCIUM SERPL-MCNC: 10.1 MG/DL (ref 8.3–10.6)
CHLORIDE SERPL-SCNC: 98 MMOL/L (ref 99–110)
CO2 SERPL-SCNC: 24 MMOL/L (ref 21–32)
CREAT SERPL-MCNC: 6.6 MG/DL (ref 0.6–1.2)
ERYTHROCYTE [DISTWIDTH] IN BLOOD BY AUTOMATED COUNT: 11.9 % (ref 11.7–14.9)
GFR, ESTIMATED: 6 ML/MIN/1.73M2
GLUCOSE BLD-MCNC: 108 MG/DL (ref 74–99)
GLUCOSE BLD-MCNC: 212 MG/DL (ref 74–99)
GLUCOSE SERPL-MCNC: 191 MG/DL (ref 74–99)
HCT VFR BLD AUTO: 36.5 % (ref 37–47)
HGB BLD-MCNC: 11.8 G/DL (ref 12.5–16)
MCH RBC QN AUTO: 31.2 PG (ref 27–31)
MCHC RBC AUTO-ENTMCNC: 32.3 G/DL (ref 32–36)
MCV RBC AUTO: 96.6 FL (ref 78–100)
PLATELET # BLD AUTO: 332 K/UL (ref 140–440)
PMV BLD AUTO: 9.2 FL (ref 7.5–11.1)
POTASSIUM SERPL-SCNC: 3.9 MMOL/L (ref 3.5–5.1)
RBC # BLD AUTO: 3.78 M/UL (ref 4.2–5.4)
SODIUM SERPL-SCNC: 143 MMOL/L (ref 136–145)
WBC OTHER # BLD: 13.8 K/UL (ref 4–10.5)

## 2025-01-21 PROCEDURE — 94761 N-INVAS EAR/PLS OXIMETRY MLT: CPT

## 2025-01-21 PROCEDURE — B3171ZZ FLUOROSCOPY OF LEFT INTERNAL CAROTID ARTERY USING LOW OSMOLAR CONTRAST: ICD-10-PCS | Performed by: PSYCHIATRY & NEUROLOGY

## 2025-01-21 PROCEDURE — 76937 US GUIDE VASCULAR ACCESS: CPT

## 2025-01-21 PROCEDURE — 03VL3HZ RESTRICTION OF LEFT INTERNAL CAROTID ARTERY WITH INTRALUMINAL DEVICE, FLOW DIVERTER, PERCUTANEOUS APPROACH: ICD-10-PCS | Performed by: PSYCHIATRY & NEUROLOGY

## 2025-01-21 PROCEDURE — B3191ZZ FLUOROSCOPY OF RIGHT EXTERNAL CAROTID ARTERY USING LOW OSMOLAR CONTRAST: ICD-10-PCS | Performed by: PSYCHIATRY & NEUROLOGY

## 2025-01-21 PROCEDURE — B31F1ZZ FLUOROSCOPY OF LEFT VERTEBRAL ARTERY USING LOW OSMOLAR CONTRAST: ICD-10-PCS | Performed by: PSYCHIATRY & NEUROLOGY

## 2025-01-21 PROCEDURE — 2580000003 HC RX 258: Performed by: NURSE PRACTITIONER

## 2025-01-21 PROCEDURE — 6360000002 HC RX W HCPCS: Performed by: PSYCHIATRY & NEUROLOGY

## 2025-01-21 PROCEDURE — 36226 PLACE CATH VERTEBRAL ART: CPT

## 2025-01-21 PROCEDURE — 6360000004 HC RX CONTRAST MEDICATION

## 2025-01-21 PROCEDURE — 99223 1ST HOSP IP/OBS HIGH 75: CPT | Performed by: NURSE PRACTITIONER

## 2025-01-21 PROCEDURE — 82962 GLUCOSE BLOOD TEST: CPT

## 2025-01-21 PROCEDURE — 85027 COMPLETE CBC AUTOMATED: CPT

## 2025-01-21 PROCEDURE — 6370000000 HC RX 637 (ALT 250 FOR IP): Performed by: NURSE PRACTITIONER

## 2025-01-21 PROCEDURE — B3141ZZ FLUOROSCOPY OF LEFT COMMON CAROTID ARTERY USING LOW OSMOLAR CONTRAST: ICD-10-PCS | Performed by: PSYCHIATRY & NEUROLOGY

## 2025-01-21 PROCEDURE — 3E1M39Z IRRIGATION OF PERITONEAL CAVITY USING DIALYSATE, PERCUTANEOUS APPROACH: ICD-10-PCS | Performed by: PSYCHIATRY & NEUROLOGY

## 2025-01-21 PROCEDURE — 2709999900 IR ANGIOGRAM CAROTID CEREBRAL BILATERAL

## 2025-01-21 PROCEDURE — B3161ZZ FLUOROSCOPY OF RIGHT INTERNAL CAROTID ARTERY USING LOW OSMOLAR CONTRAST: ICD-10-PCS | Performed by: PSYCHIATRY & NEUROLOGY

## 2025-01-21 PROCEDURE — 6360000002 HC RX W HCPCS

## 2025-01-21 PROCEDURE — 80048 BASIC METABOLIC PNL TOTAL CA: CPT

## 2025-01-21 PROCEDURE — 37216 TRANSCATH STENT CCA W/O EPS: CPT

## 2025-01-21 PROCEDURE — 2000000000 HC ICU R&B

## 2025-01-21 PROCEDURE — B41F1ZZ FLUOROSCOPY OF RIGHT LOWER EXTREMITY ARTERIES USING LOW OSMOLAR CONTRAST: ICD-10-PCS | Performed by: PSYCHIATRY & NEUROLOGY

## 2025-01-21 PROCEDURE — 99152 MOD SED SAME PHYS/QHP 5/>YRS: CPT

## 2025-01-21 PROCEDURE — 90945 DIALYSIS ONE EVALUATION: CPT

## 2025-01-21 PROCEDURE — 36224 PLACE CATH CAROTD ART: CPT

## 2025-01-21 PROCEDURE — 6360000002 HC RX W HCPCS: Performed by: NURSE PRACTITIONER

## 2025-01-21 PROCEDURE — 94640 AIRWAY INHALATION TREATMENT: CPT

## 2025-01-21 PROCEDURE — 36227 PLACE CATH XTRNL CAROTID: CPT

## 2025-01-21 PROCEDURE — 99153 MOD SED SAME PHYS/QHP EA: CPT

## 2025-01-21 RX ORDER — ALBUTEROL SULFATE 90 UG/1
2 INHALANT RESPIRATORY (INHALATION) EVERY 4 HOURS PRN
Status: DISCONTINUED | OUTPATIENT
Start: 2025-01-21 | End: 2025-01-23 | Stop reason: HOSPADM

## 2025-01-21 RX ORDER — SODIUM CHLORIDE 0.9 % (FLUSH) 0.9 %
5-40 SYRINGE (ML) INJECTION EVERY 12 HOURS SCHEDULED
Status: DISCONTINUED | OUTPATIENT
Start: 2025-01-21 | End: 2025-01-23 | Stop reason: HOSPADM

## 2025-01-21 RX ORDER — PANTOPRAZOLE SODIUM 40 MG/1
40 TABLET, DELAYED RELEASE ORAL
Status: DISCONTINUED | OUTPATIENT
Start: 2025-01-22 | End: 2025-01-23 | Stop reason: HOSPADM

## 2025-01-21 RX ORDER — SODIUM CHLORIDE 9 MG/ML
INJECTION, SOLUTION INTRAVENOUS PRN
Status: DISCONTINUED | OUTPATIENT
Start: 2025-01-21 | End: 2025-01-23 | Stop reason: HOSPADM

## 2025-01-21 RX ORDER — SODIUM CHLORIDE 9 MG/ML
INJECTION, SOLUTION INTRAVENOUS CONTINUOUS
Status: DISPENSED | OUTPATIENT
Start: 2025-01-21 | End: 2025-01-21

## 2025-01-21 RX ORDER — LEVOTHYROXINE SODIUM 150 UG/1
150 TABLET ORAL DAILY
Status: DISCONTINUED | OUTPATIENT
Start: 2025-01-22 | End: 2025-01-23 | Stop reason: HOSPADM

## 2025-01-21 RX ORDER — CLOPIDOGREL BISULFATE 75 MG/1
75 TABLET ORAL DAILY
Status: DISCONTINUED | OUTPATIENT
Start: 2025-01-21 | End: 2025-01-21

## 2025-01-21 RX ORDER — ONDANSETRON 2 MG/ML
4 INJECTION INTRAMUSCULAR; INTRAVENOUS EVERY 6 HOURS PRN
Status: DISCONTINUED | OUTPATIENT
Start: 2025-01-21 | End: 2025-01-23 | Stop reason: HOSPADM

## 2025-01-21 RX ORDER — HEPARIN SODIUM 5000 [USP'U]/ML
INJECTION, SOLUTION INTRAVENOUS; SUBCUTANEOUS PRN
Status: COMPLETED | OUTPATIENT
Start: 2025-01-21 | End: 2025-01-21

## 2025-01-21 RX ORDER — MIDAZOLAM HYDROCHLORIDE 2 MG/2ML
INJECTION, SOLUTION INTRAMUSCULAR; INTRAVENOUS PRN
Status: COMPLETED | OUTPATIENT
Start: 2025-01-21 | End: 2025-01-21

## 2025-01-21 RX ORDER — CITALOPRAM HYDROBROMIDE 20 MG/1
20 TABLET ORAL DAILY
Status: DISCONTINUED | OUTPATIENT
Start: 2025-01-22 | End: 2025-01-23 | Stop reason: HOSPADM

## 2025-01-21 RX ORDER — LABETALOL HYDROCHLORIDE 5 MG/ML
5 INJECTION, SOLUTION INTRAVENOUS EVERY 4 HOURS PRN
Status: DISCONTINUED | OUTPATIENT
Start: 2025-01-21 | End: 2025-01-23 | Stop reason: HOSPADM

## 2025-01-21 RX ORDER — ATROPINE SULFATE 0.1 MG/ML
INJECTION INTRAVENOUS PRN
Status: COMPLETED | OUTPATIENT
Start: 2025-01-21 | End: 2025-01-21

## 2025-01-21 RX ORDER — FENTANYL CITRATE 50 UG/ML
INJECTION, SOLUTION INTRAMUSCULAR; INTRAVENOUS PRN
Status: COMPLETED | OUTPATIENT
Start: 2025-01-21 | End: 2025-01-21

## 2025-01-21 RX ORDER — ONDANSETRON 4 MG/1
4 TABLET, ORALLY DISINTEGRATING ORAL EVERY 8 HOURS PRN
Status: DISCONTINUED | OUTPATIENT
Start: 2025-01-21 | End: 2025-01-23 | Stop reason: HOSPADM

## 2025-01-21 RX ORDER — CLOPIDOGREL BISULFATE 75 MG/1
75 TABLET ORAL
Status: COMPLETED | OUTPATIENT
Start: 2025-01-21 | End: 2025-01-21

## 2025-01-21 RX ORDER — ASPIRIN 81 MG/1
81 TABLET, CHEWABLE ORAL DAILY
Status: DISCONTINUED | OUTPATIENT
Start: 2025-01-22 | End: 2025-01-23 | Stop reason: HOSPADM

## 2025-01-21 RX ORDER — ACETAMINOPHEN 325 MG/1
650 TABLET ORAL EVERY 4 HOURS PRN
Status: DISCONTINUED | OUTPATIENT
Start: 2025-01-21 | End: 2025-01-23 | Stop reason: HOSPADM

## 2025-01-21 RX ORDER — GLUCAGON 1 MG/ML
1 KIT INJECTION PRN
Status: DISCONTINUED | OUTPATIENT
Start: 2025-01-21 | End: 2025-01-23 | Stop reason: HOSPADM

## 2025-01-21 RX ORDER — HYDRALAZINE HYDROCHLORIDE 20 MG/ML
10 INJECTION INTRAMUSCULAR; INTRAVENOUS EVERY 4 HOURS PRN
Status: DISCONTINUED | OUTPATIENT
Start: 2025-01-21 | End: 2025-01-23 | Stop reason: HOSPADM

## 2025-01-21 RX ORDER — SODIUM CHLORIDE 0.9 % (FLUSH) 0.9 %
5-40 SYRINGE (ML) INJECTION PRN
Status: DISCONTINUED | OUTPATIENT
Start: 2025-01-21 | End: 2025-01-23 | Stop reason: HOSPADM

## 2025-01-21 RX ORDER — DEXTROSE MONOHYDRATE 100 MG/ML
INJECTION, SOLUTION INTRAVENOUS CONTINUOUS PRN
Status: DISCONTINUED | OUTPATIENT
Start: 2025-01-21 | End: 2025-01-23 | Stop reason: HOSPADM

## 2025-01-21 RX ORDER — SODIUM CHLORIDE 9 MG/ML
INJECTION, SOLUTION INTRAVENOUS PRN
Status: DISCONTINUED | OUTPATIENT
Start: 2025-01-21 | End: 2025-01-21

## 2025-01-21 RX ORDER — MIRTAZAPINE 15 MG/1
15 TABLET, FILM COATED ORAL NIGHTLY
Status: DISCONTINUED | OUTPATIENT
Start: 2025-01-21 | End: 2025-01-23 | Stop reason: HOSPADM

## 2025-01-21 RX ORDER — ATORVASTATIN CALCIUM 40 MG/1
40 TABLET, FILM COATED ORAL NIGHTLY
Status: DISCONTINUED | OUTPATIENT
Start: 2025-01-21 | End: 2025-01-23 | Stop reason: HOSPADM

## 2025-01-21 RX ORDER — BUDESONIDE AND FORMOTEROL FUMARATE DIHYDRATE 80; 4.5 UG/1; UG/1
2 AEROSOL RESPIRATORY (INHALATION) 2 TIMES DAILY
Status: DISCONTINUED | OUTPATIENT
Start: 2025-01-21 | End: 2025-01-23 | Stop reason: HOSPADM

## 2025-01-21 RX ORDER — INSULIN LISPRO 100 [IU]/ML
0-16 INJECTION, SOLUTION INTRAVENOUS; SUBCUTANEOUS
Status: DISCONTINUED | OUTPATIENT
Start: 2025-01-21 | End: 2025-01-23 | Stop reason: HOSPADM

## 2025-01-21 RX ORDER — SODIUM CHLORIDE 9 MG/ML
INJECTION, SOLUTION INTRAVENOUS CONTINUOUS
Status: DISCONTINUED | OUTPATIENT
Start: 2025-01-21 | End: 2025-01-21

## 2025-01-21 RX ORDER — ERGOCALCIFEROL 1.25 MG/1
50000 CAPSULE, LIQUID FILLED ORAL WEEKLY
Status: DISCONTINUED | OUTPATIENT
Start: 2025-01-26 | End: 2025-01-23 | Stop reason: HOSPADM

## 2025-01-21 RX ORDER — INSULIN GLARGINE 100 [IU]/ML
10 INJECTION, SOLUTION SUBCUTANEOUS NIGHTLY
Status: DISCONTINUED | OUTPATIENT
Start: 2025-01-21 | End: 2025-01-23 | Stop reason: HOSPADM

## 2025-01-21 RX ORDER — CLOPIDOGREL BISULFATE 75 MG/1
75 TABLET ORAL DAILY
Status: DISCONTINUED | OUTPATIENT
Start: 2025-01-22 | End: 2025-01-23 | Stop reason: HOSPADM

## 2025-01-21 RX ORDER — GLYCOPYRROLATE 0.2 MG/ML
INJECTION INTRAMUSCULAR; INTRAVENOUS PRN
Status: COMPLETED | OUTPATIENT
Start: 2025-01-21 | End: 2025-01-21

## 2025-01-21 RX ADMIN — MIRTAZAPINE 15 MG: 15 TABLET, FILM COATED ORAL at 21:50

## 2025-01-21 RX ADMIN — MIDAZOLAM HYDROCHLORIDE 1 MG: 1 INJECTION, SOLUTION INTRAMUSCULAR; INTRAVENOUS at 11:46

## 2025-01-21 RX ADMIN — HEPARIN SODIUM 1000 UNITS: 5000 INJECTION, SOLUTION INTRAVENOUS; SUBCUTANEOUS at 12:11

## 2025-01-21 RX ADMIN — CLOPIDOGREL BISULFATE 75 MG: 75 TABLET ORAL at 09:59

## 2025-01-21 RX ADMIN — ACETAMINOPHEN 650 MG: 325 TABLET ORAL at 21:49

## 2025-01-21 RX ADMIN — PHENYLEPHRINE HYDROCHLORIDE 30 MCG/MIN: 10 INJECTION INTRAVENOUS at 13:12

## 2025-01-21 RX ADMIN — ATROPINE SULFATE 0.5 MG: 0.1 INJECTION, SOLUTION INTRAVENOUS at 12:36

## 2025-01-21 RX ADMIN — INSULIN LISPRO 4 UNITS: 100 INJECTION, SOLUTION INTRAVENOUS; SUBCUTANEOUS at 21:50

## 2025-01-21 RX ADMIN — ACETAMINOPHEN 650 MG: 325 TABLET ORAL at 15:09

## 2025-01-21 RX ADMIN — HEPARIN SODIUM 6000 UNITS: 5000 INJECTION, SOLUTION INTRAVENOUS; SUBCUTANEOUS at 11:59

## 2025-01-21 RX ADMIN — FENTANYL CITRATE 50 MCG: 50 INJECTION, SOLUTION INTRAMUSCULAR; INTRAVENOUS at 11:45

## 2025-01-21 RX ADMIN — ATORVASTATIN CALCIUM 40 MG: 40 TABLET, FILM COATED ORAL at 21:50

## 2025-01-21 RX ADMIN — BUDESONIDE AND FORMOTEROL FUMARATE DIHYDRATE 2 PUFF: 80; 4.5 AEROSOL RESPIRATORY (INHALATION) at 19:24

## 2025-01-21 RX ADMIN — INSULIN GLARGINE 10 UNITS: 100 INJECTION, SOLUTION SUBCUTANEOUS at 21:50

## 2025-01-21 RX ADMIN — GLYCOPYRROLATE 0.2 MG: 0.2 INJECTION INTRAMUSCULAR; INTRAVENOUS at 12:32

## 2025-01-21 ASSESSMENT — PAIN - FUNCTIONAL ASSESSMENT
PAIN_FUNCTIONAL_ASSESSMENT: ACTIVITIES ARE NOT PREVENTED
PAIN_FUNCTIONAL_ASSESSMENT: ACTIVITIES ARE NOT PREVENTED

## 2025-01-21 ASSESSMENT — PAIN SCALES - GENERAL
PAINLEVEL_OUTOF10: 5
PAINLEVEL_OUTOF10: 4

## 2025-01-21 ASSESSMENT — PAIN DESCRIPTION - PAIN TYPE: TYPE: ACUTE PAIN

## 2025-01-21 ASSESSMENT — PAIN DESCRIPTION - DESCRIPTORS
DESCRIPTORS: ACHING
DESCRIPTORS: ACHING

## 2025-01-21 ASSESSMENT — PAIN DESCRIPTION - LOCATION
LOCATION: JAW;HEAD
LOCATION: HEAD
LOCATION: JAW

## 2025-01-21 ASSESSMENT — PAIN DESCRIPTION - ORIENTATION
ORIENTATION: LEFT
ORIENTATION: LEFT

## 2025-01-21 ASSESSMENT — PAIN DESCRIPTION - ONSET: ONSET: ON-GOING

## 2025-01-21 NOTE — H&P
History & Physical Note  [facility]   Patient Name: Gwendolyn Grimm  : 1940 84 y.o.   2025  Pepe Avina MD     Subjective:   Reason for consult:   Gwendolyn Grimm is a 84-year-old female with a past medical history of DM, HTN, HLD, peritoneal dialysis, cardiac arrest postanesthesia, stroke, carotid stenosis presenting to [facility] to undergo cerebral angiogram for evaluation of carotid artery stenosis.  The patient was admitted to [facility] on 2025 with bilateral leg weakness and right arm weakness.  MRI demonstrated an acute left hemispheric ischemic stroke.  CTA of the head and neck demonstrated high-grade stenosis of the carotid bifurcation.  The patient was initiated on aspirin, Plavix and statin for secondary prevention of stroke.  She was discharged to ARU.  The patient returns today to undergo elective cerebral angiogram with possible left carotid artery stenting.       Past Medical History:   Diagnosis Date    Abnormal nuclear stress test 2007-EF=67%,Mild->Mod.isch.LAD;-EF=70%,Suggests poss.Mild Ant.wall ischemia.    Cardiac arrest (HCC)     Diabetes mellitus ([facility])     H/O cardiac catheterization 2008    No signif.CAD.    H/O cardiovascular stress test 2008    mild to mod ischemia in the LAD region, abnormal study, EF 67%, patient developed mild chest pain and throat tightness    H/O cardiovascular stress test 2016    lexiscan-moderate ischemia apical,RF66%    H/O Doppler ultrasound 2007    CAROTID DOPLER- intimal thickening but no significant atherosclerotic plaque noted in the right or left internal carotid artery, doppler flow velocities within the right and left internal carotid artery are elevated, consistent with a mild, less than 50% stenosis    H/O echocardiogram 10/14/2008    EF>55%, normal LV systolic function, normal left ventricular wall thickness, impaired LV relaxation

## 2025-01-21 NOTE — FLOWSHEET NOTE
To holding area.  Assessment completed and questions answered. Call light in reach. Pt assisted from WC to bed per staff assist x2. Transfer made without incident  
Negative

## 2025-01-21 NOTE — PRE SEDATION
Sedation Pre-Procedure Note    Patient Name: Gwendolyn Grimm   YOB: 1940  Room/Bed: Room/bed info not found  Medical Record Number: [id number]  Date: 1/21/2025   Time: 11:04 AM       Indication: Symptomatic left carotid artery stenosis    Consent: I have discussed with the patient and/or the patient representative the indication, alternatives, and the possible risks and/or complications of the planned procedure and the anesthesia methods. The patient and/or patient representative appear to understand and agree to proceed.    Vital Signs:   Vitals:    01/21/25 0930   BP:    Pulse: 88   Resp:    SpO2: 99%       Past Medical History:   has a past medical history of Abnormal nuclear stress test, Cardiac arrest (HCC), Diabetes mellitus (HCC), H/O cardiac catheterization, H/O cardiovascular stress test, H/O cardiovascular stress test, H/O Doppler ultrasound, H/O echocardiogram, H/O echocardiogram, H/O echocardiogram, H/O left heart cath, H/O left heart cath, Hernia, Hx of cardiovascular stress test, Hyperlipidemia, Hypertension, Irritable bowel syndrome, Kidney stones, Obesity, Osteoarthritis, Thyroid disease, Type II or unspecified type diabetes mellitus without mention of complication, not stated as uncontrolled, and Venogram.    Past Surgical History:   has a past surgical history that includes Lithotripsy; Rotator cuff repair; Gastric Band (08/2008); Cardiac catheterization (04/18/08); Hysterectomy; Lap Band; Rotator cuff repair; hemicolectomy (Right, 5/3/2023); hernia repair (N/A, 5/3/2023); and IR TUNNELED CVC PLACE WO SQ PORT/PUMP > 5 YEARS (1/9/2024).    Medications:   Scheduled Meds:    sodium chloride flush  5-40 mL IntraVENous 2 times per day     Continuous Infusions:    sodium chloride      phenylephrine      niCARdipine      sodium chloride       PRN Meds: sodium chloride flush, sodium chloride, phenylephrine, niCARdipine  Home Meds:   Prior to Admission medications    Medication Sig Start Date End

## 2025-01-21 NOTE — CONSULTS
1/18/25 1/28/25 Yes HERLINDA Alonzo MD   LANTUS SOLOSTAR 100 UNIT/ML injection pen Inject 10 Units into the skin nightly 12/9/24  Yes Amrit Palomino MD   BREYNA 80-4.5 MCG/ACT AERO Inhale 2 puffs into the lungs 2 times daily 11/15/24  Yes Amrit Palomino MD   citalopram (CELEXA) 20 MG tablet Take 1 tablet by mouth once daily 12/23/24  Yes Bhavin Izaguirre MD   levothyroxine (SYNTHROID) 150 MCG tablet Take 1 tablet by mouth Daily 12/8/23  Yes Amrit Palomino MD   albuterol sulfate HFA (PROVENTIL;VENTOLIN;PROAIR) 108 (90 Base) MCG/ACT inhaler INHALE 2 PUFFS BY MOUTH EVERY 4 TO 6 HOURS AS NEEDED 8/10/23  Yes Amrit Palomino MD   Melatonin 10 MG TABS Take 10 mg by mouth nightly as needed   Yes Amrit Palomino MD   mirtazapine (REMERON) 15 MG tablet Take 1 tablet by mouth nightly 6/12/23  Yes Amrit Palomino MD   pantoprazole (PROTONIX) 40 MG tablet Take 1 tablet by mouth every morning (before breakfast) 6/2/23  Yes HERLINDA Alonzo MD   Handicap Placard MISC by Does not apply route Expires 5/19/2027 5/19/22  Yes Cinthya Contreras APRN - CNP   acetaminophen (TYLENOL) 325 MG tablet Take 2 tablets by mouth as needed for Pain   Yes Armit Palomino MD   aspirin 81 MG EC tablet Take 1 tablet by mouth daily   Yes Amrit Palomino MD   Ergocalciferol (VITAMIN D) 32534 units CAPS Take 50,000 Units by mouth once a week 1/21/25   HERLINDA Alonzo MD        sodium chloride flush 0.9 % injection 5-40 mL, 2 times per day  sodium chloride flush 0.9 % injection 5-40 mL, PRN  acetaminophen (TYLENOL) tablet 650 mg, Q4H PRN  albuterol sulfate HFA (PROVENTIL;VENTOLIN;PROAIR) 108 (90 Base) MCG/ACT inhaler 2 puff, Q4H PRN  [START ON 1/22/2025] aspirin chewable tablet 81 mg, Daily  atorvastatin (LIPITOR) tablet 40 mg, Nightly  budesonide-formoterol (SYMBICORT) 80-4.5 MCG/ACT inhaler 2 puff, BID  [START ON 1/22/2025] citalopram (CELEXA) tablet 20 mg, Daily  [START ON 1/26/2025] vitamin D 
with Dr. Hermilo Briceno    Critical care attestation:    I spent 58 cumulative minutes (excluding procedures), in full attention to this critically ill patient.    I have personally reviewed the patient's history and interval events in the EMR, along with vitals, labs and radiology imaging. Critical care time was spent personally providing care for this patient, excluding billable procedures, and no overlapping with any other provider.  This includes documenting my assessment and plan of care and developing the care plan to treat the problems below.    The high probability of deterioration required my full and direct attention, intervention, and personal management due to do to underlying diagnosis and clinical problems including the treatment of active or impending:  [x] Neurological monitoring and treatment  [] Respiratory failure -assessment of ventilator support, adjustment, ventilator weaning  [x] Hemodynamic and volume assessment with volume resuscitation  [] Mechanical and/or chemical support of the circulation,  [] Frequent vasoactive agent adjustment,  [] Renal replacement therapy,  [x] For rapid decompensation,  [] Electrolyte instability  [] Suctioning every 2 hours  [x] Every hour neuro checks  [x] Every hour neurovascular checks  [] Frequent evaluation of patient's response to treatment and titration of therapies,  [] Interpretation of laboratory and radiological data,  [] xApplication and interpretation of advanced monitoring technologies,  [] Extensive interpretation of multiple databases,  [] Development of treatment plan with patient, surrogate, or consultants.  [] Others:      Electronically signed by NIKUNJ Sparks CNP on 1/21/2025 at 2:44 PM

## 2025-01-21 NOTE — OR NURSING
PROCEDURE START: 1153  PROCEDURE END:1243  FLUORO TIME: 15.1  AK: 585  DAP: 148  CONTRAST VOLUME: 100cc    ARRHYTHMIA: sinus bradycardia   INTERVENTION: atropine/robinol

## 2025-01-22 LAB
ALBUMIN SERPL-MCNC: 3.7 G/DL (ref 3.4–5)
ALBUMIN/GLOB SERPL: 1.1 {RATIO} (ref 1.1–2.2)
ALP SERPL-CCNC: 71 U/L (ref 40–129)
ALT SERPL-CCNC: 15 U/L (ref 10–40)
ANION GAP SERPL CALCULATED.3IONS-SCNC: 17 MMOL/L (ref 9–17)
AST SERPL-CCNC: 18 U/L (ref 15–37)
BASOPHILS # BLD: 0.03 K/UL
BASOPHILS NFR BLD: 0 % (ref 0–1)
BILIRUB SERPL-MCNC: 0.3 MG/DL (ref 0–1)
BUN SERPL-MCNC: 57 MG/DL (ref 7–20)
CALCIUM SERPL-MCNC: 9.4 MG/DL (ref 8.3–10.6)
CHLORIDE SERPL-SCNC: 100 MMOL/L (ref 99–110)
CO2 SERPL-SCNC: 24 MMOL/L (ref 21–32)
CREAT SERPL-MCNC: 6.7 MG/DL (ref 0.6–1.2)
EOSINOPHIL # BLD: 0.25 K/UL
EOSINOPHILS RELATIVE PERCENT: 2 % (ref 0–3)
ERYTHROCYTE [DISTWIDTH] IN BLOOD BY AUTOMATED COUNT: 12 % (ref 11.7–14.9)
GFR, ESTIMATED: 6 ML/MIN/1.73M2
GLUCOSE BLD-MCNC: 164 MG/DL (ref 74–99)
GLUCOSE BLD-MCNC: 188 MG/DL (ref 74–99)
GLUCOSE BLD-MCNC: 231 MG/DL (ref 74–99)
GLUCOSE BLD-MCNC: 275 MG/DL (ref 74–99)
GLUCOSE SERPL-MCNC: 153 MG/DL (ref 74–99)
HCT VFR BLD AUTO: 32.9 % (ref 37–47)
HGB BLD-MCNC: 10.1 G/DL (ref 12.5–16)
IMM GRANULOCYTES # BLD AUTO: 0.09 K/UL
IMM GRANULOCYTES NFR BLD: 1 %
LYMPHOCYTES NFR BLD: 2.07 K/UL
LYMPHOCYTES RELATIVE PERCENT: 15 % (ref 24–44)
MCH RBC QN AUTO: 30.2 PG (ref 27–31)
MCHC RBC AUTO-ENTMCNC: 30.7 G/DL (ref 32–36)
MCV RBC AUTO: 98.5 FL (ref 78–100)
MONOCYTES NFR BLD: 1.13 K/UL
MONOCYTES NFR BLD: 8 % (ref 0–4)
NEUTROPHILS NFR BLD: 74 % (ref 36–66)
NEUTS SEG NFR BLD: 10.13 K/UL
PLATELET # BLD AUTO: 280 K/UL (ref 140–440)
PMV BLD AUTO: 8.9 FL (ref 7.5–11.1)
POTASSIUM SERPL-SCNC: 4.2 MMOL/L (ref 3.5–5.1)
PROT SERPL-MCNC: 7.2 G/DL (ref 6.4–8.2)
RBC # BLD AUTO: 3.34 M/UL (ref 4.2–5.4)
SODIUM SERPL-SCNC: 141 MMOL/L (ref 136–145)
WBC OTHER # BLD: 13.7 K/UL (ref 4–10.5)

## 2025-01-22 PROCEDURE — 90945 DIALYSIS ONE EVALUATION: CPT

## 2025-01-22 PROCEDURE — 6370000000 HC RX 637 (ALT 250 FOR IP): Performed by: NURSE PRACTITIONER

## 2025-01-22 PROCEDURE — 2000000000 HC ICU R&B

## 2025-01-22 PROCEDURE — 99024 POSTOP FOLLOW-UP VISIT: CPT | Performed by: NURSE PRACTITIONER

## 2025-01-22 PROCEDURE — 85025 COMPLETE CBC W/AUTO DIFF WBC: CPT

## 2025-01-22 PROCEDURE — 2500000003 HC RX 250 WO HCPCS: Performed by: NURSE PRACTITIONER

## 2025-01-22 PROCEDURE — 94010 BREATHING CAPACITY TEST: CPT

## 2025-01-22 PROCEDURE — 94761 N-INVAS EAR/PLS OXIMETRY MLT: CPT

## 2025-01-22 PROCEDURE — 6370000000 HC RX 637 (ALT 250 FOR IP): Performed by: STUDENT IN AN ORGANIZED HEALTH CARE EDUCATION/TRAINING PROGRAM

## 2025-01-22 PROCEDURE — 80053 COMPREHEN METABOLIC PANEL: CPT

## 2025-01-22 PROCEDURE — 82962 GLUCOSE BLOOD TEST: CPT

## 2025-01-22 PROCEDURE — 94640 AIRWAY INHALATION TREATMENT: CPT

## 2025-01-22 RX ORDER — PSEUDOEPHEDRINE HCL 30 MG/1
60 TABLET, FILM COATED ORAL EVERY 4 HOURS PRN
Status: DISCONTINUED | OUTPATIENT
Start: 2025-01-22 | End: 2025-01-23 | Stop reason: HOSPADM

## 2025-01-22 RX ADMIN — ATORVASTATIN CALCIUM 40 MG: 40 TABLET, FILM COATED ORAL at 21:42

## 2025-01-22 RX ADMIN — LEVOTHYROXINE SODIUM 150 MCG: 0.15 TABLET ORAL at 06:43

## 2025-01-22 RX ADMIN — MIRTAZAPINE 15 MG: 15 TABLET, FILM COATED ORAL at 21:42

## 2025-01-22 RX ADMIN — CITALOPRAM HYDROBROMIDE 20 MG: 20 TABLET ORAL at 08:12

## 2025-01-22 RX ADMIN — PANTOPRAZOLE SODIUM 40 MG: 40 TABLET, DELAYED RELEASE ORAL at 06:43

## 2025-01-22 RX ADMIN — INSULIN LISPRO 4 UNITS: 100 INJECTION, SOLUTION INTRAVENOUS; SUBCUTANEOUS at 15:58

## 2025-01-22 RX ADMIN — BUDESONIDE AND FORMOTEROL FUMARATE DIHYDRATE 2 PUFF: 80; 4.5 AEROSOL RESPIRATORY (INHALATION) at 21:31

## 2025-01-22 RX ADMIN — INSULIN LISPRO 4 UNITS: 100 INJECTION, SOLUTION INTRAVENOUS; SUBCUTANEOUS at 21:43

## 2025-01-22 RX ADMIN — BUDESONIDE AND FORMOTEROL FUMARATE DIHYDRATE 2 PUFF: 80; 4.5 AEROSOL RESPIRATORY (INHALATION) at 11:59

## 2025-01-22 RX ADMIN — INSULIN LISPRO 8 UNITS: 100 INJECTION, SOLUTION INTRAVENOUS; SUBCUTANEOUS at 11:16

## 2025-01-22 RX ADMIN — PSEUDOEPHEDRINE HCL 60 MG: 30 TABLET, FILM COATED ORAL at 04:14

## 2025-01-22 RX ADMIN — PSEUDOEPHEDRINE HCL 60 MG: 30 TABLET, FILM COATED ORAL at 08:37

## 2025-01-22 RX ADMIN — SODIUM CHLORIDE, PRESERVATIVE FREE 10 ML: 5 INJECTION INTRAVENOUS at 21:43

## 2025-01-22 RX ADMIN — INSULIN GLARGINE 10 UNITS: 100 INJECTION, SOLUTION SUBCUTANEOUS at 21:42

## 2025-01-22 RX ADMIN — ASPIRIN 81 MG CHEWABLE TABLET 81 MG: 81 TABLET CHEWABLE at 08:11

## 2025-01-22 RX ADMIN — CLOPIDOGREL BISULFATE 75 MG: 75 TABLET ORAL at 08:12

## 2025-01-22 ASSESSMENT — COPD QUESTIONNAIRES
QUESTION6_LEAVINGHOUSE: 0
QUESTION8_ENERGYLEVEL: 2
QUESTION1_COUGHFREQUENCY: 2
QUESTION5_HOMEACTIVITIES: 4
CAT_TOTALSCORE: 12
TOTAL_EXACERBATIONS_PASTYEAR: 1
QUESTION2_CHESTPHLEGM: 0
GOLD_GROUP: GROUP B
QUESTION3_CHESTTIGHTNESS: 0
QUESTION7_SLEEPQUALITY: 0
QUESTION4_WALKINCLINE: 4

## 2025-01-22 ASSESSMENT — ENCOUNTER SYMPTOMS
GASTROINTESTINAL NEGATIVE: 1
EYES NEGATIVE: 1
ALLERGIC/IMMUNOLOGIC NEGATIVE: 1
RESPIRATORY NEGATIVE: 1

## 2025-01-22 ASSESSMENT — PAIN SCALES - GENERAL
PAINLEVEL_OUTOF10: 0

## 2025-01-22 ASSESSMENT — PULMONARY FUNCTION TESTS
POST BRONCHODILATOR FEV1/FVC: 78
PIF_VALUE: 100
FEV1 (%PREDICTED): 87

## 2025-01-22 NOTE — CARE COORDINATION
01/22/25 1400   Service Assessment   Patient Orientation Alert and Oriented   Cognition Alert   History Provided By Patient;Medical Record   Primary Caregiver Self   Support Systems Children   Patient's Healthcare Decision Maker is: Named in Scanned ACP Document   PCP Verified by CM Yes   Prior Functional Level Assistance with the following:;Mobility   Current Functional Level Assistance with the following:;Toileting;Mobility  (Hospital policy)   Can patient return to prior living arrangement Yes   Family able to assist with home care needs: Yes   Would you like for me to discuss the discharge plan with any other family members/significant others, and if so, who? Yes  (Daughter)   Financial Resources Medicare   Community Resources None   Social/Functional History   Lives With Alone  (Will be staying with daughter on discharge)   Type of Home House   Home Equipment Rollator;Wheelchair - Manual   Receives Help From Family   Prior Level of Assist for ADLs Independent   Active  Yes  (Just very short distances)   Mode of Transportation Car   Occupation Retired     Met with patient for discharge planning. She is awake and up in the chair; able to participate. Patient is from home but will be staying with her daughter upon discharge. Patient stated independence. She drives but very short distances. She has a PCP and denies medication affordability issues. She has all PD equipment at home; stated no issues with PD. Her plan is home- \"hopeful for today\". Daughter will provide transportation.

## 2025-01-23 VITALS
RESPIRATION RATE: 22 BRPM | WEIGHT: 177.03 LBS | OXYGEN SATURATION: 99 % | TEMPERATURE: 97.9 F | HEIGHT: 66 IN | SYSTOLIC BLOOD PRESSURE: 116 MMHG | DIASTOLIC BLOOD PRESSURE: 62 MMHG | BODY MASS INDEX: 28.45 KG/M2 | HEART RATE: 60 BPM

## 2025-01-23 LAB — GLUCOSE BLD-MCNC: 234 MG/DL (ref 74–99)

## 2025-01-23 PROCEDURE — 82962 GLUCOSE BLOOD TEST: CPT

## 2025-01-23 PROCEDURE — 94761 N-INVAS EAR/PLS OXIMETRY MLT: CPT

## 2025-01-23 PROCEDURE — 94664 DEMO&/EVAL PT USE INHALER: CPT

## 2025-01-23 PROCEDURE — 99024 POSTOP FOLLOW-UP VISIT: CPT | Performed by: NURSE PRACTITIONER

## 2025-01-23 PROCEDURE — 94640 AIRWAY INHALATION TREATMENT: CPT

## 2025-01-23 PROCEDURE — 6370000000 HC RX 637 (ALT 250 FOR IP): Performed by: NURSE PRACTITIONER

## 2025-01-23 PROCEDURE — 2500000003 HC RX 250 WO HCPCS: Performed by: NURSE PRACTITIONER

## 2025-01-23 RX ORDER — CLOPIDOGREL BISULFATE 75 MG/1
75 TABLET ORAL DAILY
Qty: 30 TABLET | Refills: 3 | Status: SHIPPED | OUTPATIENT
Start: 2025-01-23

## 2025-01-23 RX ADMIN — SODIUM CHLORIDE, PRESERVATIVE FREE 10 ML: 5 INJECTION INTRAVENOUS at 09:34

## 2025-01-23 RX ADMIN — PANTOPRAZOLE SODIUM 40 MG: 40 TABLET, DELAYED RELEASE ORAL at 09:32

## 2025-01-23 RX ADMIN — INSULIN LISPRO 4 UNITS: 100 INJECTION, SOLUTION INTRAVENOUS; SUBCUTANEOUS at 09:31

## 2025-01-23 RX ADMIN — CITALOPRAM HYDROBROMIDE 20 MG: 20 TABLET ORAL at 09:32

## 2025-01-23 RX ADMIN — ASPIRIN 81 MG CHEWABLE TABLET 81 MG: 81 TABLET CHEWABLE at 09:33

## 2025-01-23 RX ADMIN — BUDESONIDE AND FORMOTEROL FUMARATE DIHYDRATE 2 PUFF: 80; 4.5 AEROSOL RESPIRATORY (INHALATION) at 07:07

## 2025-01-23 RX ADMIN — LEVOTHYROXINE SODIUM 150 MCG: 0.15 TABLET ORAL at 09:33

## 2025-01-23 RX ADMIN — CLOPIDOGREL BISULFATE 75 MG: 75 TABLET ORAL at 09:33

## 2025-01-23 ASSESSMENT — PAIN SCALES - GENERAL
PAINLEVEL_OUTOF10: 0

## 2025-01-23 NOTE — PROGRESS NOTES
Inpatient Critical Care Progress Note 1/22/2025        Gwendolyn Grimm  1940  [id number]      Assessment/Plan:    Peripheral vascular disease (history of left hemispheric stroke diagnosed 1/7/2025), severe left carotid arterial stenosis status post PCI stent placement  Diabetes mellitus type 2  Hypertension  Hyperlipidemia  End-stage renal disease (managed with peritoneal dialysis)     Aspirin Plavix per neurointerventional service  High-dose statin  Maintain systolic blood pressure greater than 90 less than 140 mmHg (currently Booker-Synephrine)  Glycemic control  Peritoneal dialysis per nephrology service (consulted to assist with management)  Medical management of other comorbidities per primary service    Once free of pressor, transfer out of ICU setting     Complex medical decisions required for evaluation, management, reviewed during critical care rounds.    [facility]  [phone]    Subjective:    No new, acute overnight events noted    Patient remains on low dose Neosynepherine infusion, 10 ug    Acceptable BP, NSR      Review of Systems   Constitutional:  Positive for fatigue.   HENT: Negative.     Eyes: Negative.    Respiratory: Negative.     Cardiovascular: Negative.    Gastrointestinal: Negative.    Endocrine: Negative.    Genitourinary: Negative.    Musculoskeletal: Negative.    Skin: Negative.    Allergic/Immunologic: Negative.    Neurological:  Positive for weakness.   Hematological: Negative.    Psychiatric/Behavioral: Negative.              Physical Exam:            BP (!) 127/52   Pulse 64   Temp 97.7 °F (36.5 °C) (Oral)   Resp 14   Ht 1.676 m (5' 6\")   Wt 80.3 kg (177 lb 0.5 oz)   SpO2 95%   BMI 28.57 kg/m²       General: Elderly, obese female, awake and alert, vitals reviewed  Eyes: Pupils, motor intact  ENT: Head normal. Hearing intact. Normal speech, class III airway. Neck supple  Cardiovascular: Regular rate, gallop.  Respiratory: Clear to auscultation  Gastrointestinal: Soft, non 
330cc removed with PD, per Dialysis RN.  
4 Eyes Skin Assessment     NAME:  Gwendolyn Grimm  YOB: 1940  MEDICAL RECORD NUMBER:  [id number]    The patient is being assess for  Admission    I agree that 2 RN's have performed a thorough Head to Toe Skin Assessment on the patient. ALL assessment sites listed below have been assessed.      Areas assessed by both nurses:    Head, Face, Ears, Shoulders, Back, Chest, Arms, Elbows, Hands, Sacrum. Buttock, Coccyx, Ischium, and Legs. Feet and Heels        Does the Patient have a Wound? Yes wound(s) were present on assessment. LDA wound assessment was Initiated and completed by RN  Healing scabs left leg, healing scab/wound right intergluteal fold.        Efrain Prevention initiated:  Yes   Wound Care Orders initiated:  No    Pressure Injury (Stage 3,4, Unstageable, DTI, NWPT, and Complex wounds) if present place referral/consult order under :: No    New and Established Ostomies if present place consult order under : No      Nurse 1 eSignature: Electronically signed by Veena Marino RN on 1/21/25 at 1:22 PM EST    **SHARE this note so that the co-signing nurse is able to place an eSignature**    Nurse 2 eSignature: Electronically signed by Adriana Coleman RN on 1/21/25 at 6:54 PM EST  
[facility]  [facility]  
Current GOLD classification       GOLD Stage:    Group:  B  Recorded domestic exacerbations past 12 months:  1  Current recorded COPD Assessment Tool (CAT) score of  12  Current eosinophil count: 0.4     Inhaler Device   Acceptable for Use   Respimat  Not Breath Actuated Yes   MDI  Not Breath Actuated Yes           DPI  Observed PIF   using  In-Check Meter   Optimal PIF   Acceptable for Use   HANDIHALER 100 >30 Y   Pressair 100 >45 Y   NEOHALER 100 >50 Y   Diskus 100 >60 Y   ELLIPTA 100 >60 Y     Records show this patient was using ALBUTEROL maintenance therapy prior to admission.          LONG-ACTING (LABA)   Arformoterol (Brovana) NEBULIZER   Indacaterol (Arcapta) NEOHALER   Olodaterol (Striverdi) Respimat   Salmeterol (Serevent) MDI, DISKUS   LONG-ACTING (LAMA)   Aclidinium bromide (Tudorza) PRESSAIR   Glycopyrronium bromide (Seebri) NEOHALER   Tiotropium (Spiriva) Respimat, HANDIHALER   Umeclidinium (Incruse) ELLIPTA   (LABA/LAMA)   Formoterol/glycopyrronium (Bevespi) MDI   Indacaterol/glycopyrronium (Utibron) NEOHALER   Vilanterol/umeclidinium (Anoro) ELLIPTA   Olodaterol/tiotropium (Stiolto) Respimat   (LABA/ICS)   Formoterol/budesomide (Symbicort) MDI   Formoterol/mometasone (Dulera) MDI   Salmeterol/fluticasone (Advair) MDI, DISKUS   Vilanterol/fluticasone (Breo) ELLIPTA   (LABA/LAMA/ICS)   Fluticasone/umeclidinium/vilanterol (Trelegy) ELLIPTA   Budesonide/glycopyrrolate/formoterol fumarate (Beztri) aerosphere      01/22/25 1511   Spirometry Assessment   FEV1 (%PRED) 87   Post Bronchodilator FEV/FVC 78   COPD Exacerbations in last year 1    L/min   COPD Assessment (CAT Score)   Cough Assessment 2   Phlegm Assessment 0   Chest tightness 0   Walking on an incline 4   Home Activities 4   Confident Leaving The Home 0   Sleeping Soundly 0   Have Energy 2   Assessment Score 12   $RT COPD Assessment Yes   GOLD Staging   Group Group B       
Discussed with nursing staff.  Patient was sitting in the chair.  When patient stood up to ambulate became lightheaded and needed to sit back down.  She was then able to ambulate without difficulty in the hallway.  RN reported patient ambulated well however was lightheaded and placed back to bed.  Patient denies any neurological changes.  Discussed with the patient at bedside we will plan to monitor overnight with hopeful discharge in a.m.  She was agreeable.   
IR Procedure at [facility]:  Spoke with patient's daughter Gissel, and patient will arrive at 0900 for her procedure at 1100 at [facility] on 1/21/2025. Also went over below instructions and told her patient can take her medications as scheduled and will need someone to drive her that day.     NPO at Midnight      Follow your directions as prescribed by the doctor for your procedure and medications.  3.   Consult your provider as to when to stop blood thinner  4.   Do not take any pain medication within 6 hours of your procedure  5.   Do not drink any alcoholic beverages or use any street drugs 24 hours before procedure.  6.   Please wear simple, loose fitting clothing to the hospital.  Do not bring valuables (money,             credit cards, checkbooks, etc.)     7.   If you  have a Living Will and Durable Power of  for Healthcare, please bring in a copy.  8.   Please bring picture ID,  insurance card, paperwork from the doctors office            (H & P, Consent,  & card for implantable devices).  9.   Report to the information desk on the ground floor.  10. Take a shower the night before or morning of your procedure, do not apply any lotion, oil or powder.  11. You will need a responsible adult       
Neuro assessment completed during bedside report with Karin EVANS. Pt alert and oriented, follows commands.    HR 55 and . PRN orders reviewed for hemodynamics. PRN pseudoephedrine ordered. Booker-synephrine gtt infusing at 10 mcg/min.  
Over to Neuro IR for procedure with Dr. Willoughby at this time.  
PD complete. Dialysis RN called and notified. Will send someone up to disconnect.  
PD cycler set up per orders. Dressing changed to exit site, no s/s infection noted. Pt connected to PD cycler without issues. Denies needs. Will continue to follow.  
Patient ambulated in hallway with RN and student. Patient tolerated well. No complaints of dizziness or weakness. Blood pressure remained WDL  
Patient evaluated post procedure at bedside.  She is alert and oriented x 4.  Denies any neurological changes.  States \" I am doing very well\" .  She does complain of some mild left neck discomfort.  No neck swelling noted, denies difficulty breathing or swallowing.  Right groin site soft without hematoma.  The patient denies any further needs.  Discussed with bedside RN.   
Per Gloria, NP try and get pt up to chair. Pt repositioned into chair, tolerated well. Pt has been asymptomatic.  
Plavix given with a small sip of water at this time.   
Pt connected to the cycler per order. Dressing changed. No redness or drainage noted. Call light with in reach.  
Pt disconnected from cycler. Fluid is clear and yellow. 335 UF removed. Pt has no other needs at this time.  
Pt disconnected from cycler. No problems noted.   
Pt identified as a candidate for COPD Gold assessment.  Patients last recorded pft was in 2016.  Patient would benefit if a new pft were to be ordered, after discharged and able to complete PFT testing as an out-patient.  
Spiritual Health History and Assessment/Progress Note  [facility]    Spiritual/Emotional Needs,  ,  ,      Name: Gwendolyn Grimm MRN: [id number]    Age: 84 y.o.     Sex: female   Language: English   Nondenominational: Sikhism   <principal problem not specified>     Date: 1/22/2025            Total Time Calculated: 9 min              [facility]        Referral/Consult From: Rounding   Encounter Overview/Reason: Spiritual/Emotional Needs  Service Provided For: Patient    Sanam, Belief, Meaning:   Patient identifies as spiritual, is connected with a sanam tradition or spiritual practice, and has beliefs or practices that help with coping during difficult times  Family/Friends No family/friends present      Importance and Influence:  Patient has spiritual/personal beliefs that influence decisions regarding their health  Family/Friends No family/friends present    Community:  Patient feels well-supported. Support system includes: Children and Extended family  Family/Friends No family/friends present    Assessment and Plan of Care:     Patient Interventions include: Facilitated expression of thoughts and feelings  Family/Friends Interventions include: No family/friends present    Patient Plan of Care: Spiritual Care available upon further referral  Family/Friends Plan of Care: No family/friends present    Electronically signed by Chaplain Donnell on 1/22/2025 at 2:28 PM   
TRANSFER - OUT REPORT:    Verbal report given to ICU RN on Gwendolyn Grimm being transferred to ICU  for routine post-op.  Diagnostic cerebral angiogram with LICA performed by Dr. Willoughby.  Vitals WNL.  Dressing WNL.  Pulses palpable.      Report consisted of patient's Situation, Background, Assessment and   Recommendations(SBAR).     Information from the following report(s) Nurse Handoff Report was reviewed with the receiving nurse.    Opportunity for questions and clarification was provided.      Patient transported with:  Monitor  Registered Nurse    
This RN spoke with KAYLEN Castro from Neuro IR. Per NP attempt to get pt into chair today as long as Booker-Synephrine remains off. RN notified NP of HR 53 and administration of Sudafed. Per NP, HR high 40's - 60's is ok per Neuro team, as long as pt is not symptomatic.   
Vascular/Interventional Neurology Progress Note  [facility]  Patient Name: Gwendolyn Grimm     : 1940      Subjective:     The patient was seen and examined.  Chart reviewed.  The patient is sitting up in the chair.  She denies any neurological changes.  She tolerated breakfast.  She is going to walk in the hallway with nursing staff.  She is eager to be discharged home.  Tolerated PD overnight.  Discussed follow-up.  Patient denies any further needs.        Objective:   Scheduled Meds:   sodium chloride flush  5-40 mL IntraVENous 2 times per day    aspirin  81 mg Oral Daily    atorvastatin  40 mg Oral Nightly    budesonide-formoterol  2 puff Inhalation BID    citalopram  20 mg Oral Daily    [START ON 2025] vitamin D  50,000 Units Oral Weekly    insulin glargine  10 Units SubCUTAneous Nightly    levothyroxine  150 mcg Oral Daily    mirtazapine  15 mg Oral Nightly    pantoprazole  40 mg Oral QAM AC    sodium chloride flush  5-40 mL IntraVENous 2 times per day    clopidogrel  75 mg Oral Daily    insulin lispro  0-16 Units SubCUTAneous 4x Daily AC & HS     Continuous Infusions:   sodium chloride      phenylephrine Stopped (25 0830)    niCARdipine      dextrose      dextrose       PRN Meds:.pseudoephedrine, sodium chloride flush, acetaminophen, albuterol sulfate HFA, melatonin, sodium chloride flush, sodium chloride, ondansetron **OR** ondansetron, phenylephrine, niCARdipine, hydrALAZINE, labetalol, glucose, dextrose bolus **OR** dextrose bolus, glucagon (rDNA), dextrose, glucose, dextrose bolus **OR** dextrose bolus, dextrose    Vital Signs:  Vitals:    25 0723 25 0740 25 0800 25 0809   BP: (!) 123/51 (!) 141/49 (!) 142/122 90/76   Pulse: 62  67 60   Resp: 14   19   Temp: 97.6 °F (36.4 °C)  97.9 °F (36.6 °C)    TempSrc:   Oral    SpO2: 100%  97% 100%   Weight:       Height:            General: A&O x 4, NAD, cooperative  HEENT: NC/AT, EOMI, PERRL, mmm, neck 
Results   Component Value Date/Time    PHOS 8.3 01/13/2025 04:50 AM     U/A:    Lab Results   Component Value Date/Time    NITRU NEGATIVE 12/29/2023 12:15 PM    COLORU YELLOW 12/29/2023 12:15 PM    PHUR 6.0 12/29/2023 12:15 PM    PHUR 5.5 12/29/2021 11:51 AM    WBCUA 747 12/29/2023 12:15 PM    RBCUA 0 12/29/2023 12:15 PM    MUCUS RARE 12/29/2023 12:15 PM    TRICHOMONAS NONE SEEN 12/29/2023 12:15 PM    YEAST OCCASIONAL 11/21/2023 12:48 PM    BACTERIA NEGATIVE 12/29/2023 12:15 PM    CLARITYU CLEAR 12/29/2023 12:15 PM    SPECGRAV 1.025 12/29/2021 11:51 AM    UROBILINOGEN 0.2 12/29/2023 12:15 PM    BILIRUBINUR NEGATIVE 12/29/2023 12:15 PM    BILIRUBINUR negative 12/29/2021 11:51 AM    BLOODU NEGATIVE 12/29/2023 12:15 PM    GLUCOSEU NEGATIVE 12/29/2023 12:15 PM    GLUCOSEU negative 12/29/2021 11:51 AM    KETUA NEGATIVE 12/29/2023 12:15 PM     ABG:    Lab Results   Component Value Date/Time    QPN5MFI 38.0 05/12/2023 07:00 AM    PO2ART 73 05/12/2023 07:00 AM    PCN5RCF 22.5 05/12/2023 07:00 AM     HgBA1c:    Lab Results   Component Value Date/Time    LABA1C 5.8 06/17/2023 03:24 PM     Microalbumen/Creatinine ratio:  No components found for: \"RUCREAT\"  TSH:    Lab Results   Component Value Date/Time    TSH 1.59 01/02/2025 04:20 PM     IRON:    Lab Results   Component Value Date/Time    IRON 50 05/12/2023 05:40 AM     Iron Saturation:  No components found for: \"PERCENTFE\"  TIBC:    Lab Results   Component Value Date/Time    TIBC 227 05/12/2023 05:40 AM     FERRITIN:    Lab Results   Component Value Date/Time    FERRITIN 344 05/12/2023 05:40 AM     RPR:  No results found for: \"RPR\"  MJ:  No results found for: \"ANATITER\", \"MJ\"  24 Hour Urine for Creatinine Clearance:  No components found for: \"CREAT4\", \"UHRS10\", \"UTV10\"      Objective:   I/O: 01/21 0701 - 01/22 0700  In: 250 [P.O.:250]  Out: 200 [Urine:200]  I/O last 3 completed shifts:  In: 250 [P.O.:250]  Out: 200 [Urine:200]  No intake/output data recorded.  Vitals: BP 
  Temp 97.9 °F (36.6 °C) (Oral)   Resp 19   Ht 1.676 m (5' 6\")   Wt 80.3 kg (177 lb 0.5 oz)   SpO2 100%   BMI 28.57 kg/m²  {  General appearance: awake weak  HEENT: Head: Normal, normocephalic, atraumatic.  Neck: supple, symmetrical, trachea midline  Lungs: diminished breath sounds bilaterally  Heart: S1, S2 normal  Abdomen: abnormal findings:  soft nt positive PD catheter  Extremities: edema trace  Neurologic: Mental status: alertness: alert        Assessment and Plan:      IMP:  1.  End-stage renal disease on peritoneal dialysis  #2 left carotid stenosis status post stent  #3 hypertension  #4 weakness right side  #5 anxiety  #6 type 2 diabetes    Plan      #1 resume home PD   #2 status post carotid stent stable   #3 blood pressure improved   #4 weakness at baseline   #5 monitor glucose   No. 6 stable from renal for discharge follow-up outpatient             CHI ROY MD, MD    
Plavix  -Maintain SBP  mmHg.  Booker-Synephrine has been weaned off.    -Asymptomatic bradycardia, Sudafed as needed.    -Critical care following for medical management  -Nephrology following, ESRD on peritoneal dialysis  -Potential discharge home later today    Pertinent images discussed/reviewed with Dr. Willoughby who has fully participated in the care of this patient and agrees with plan.      Electronically signed by NIKUNJ Zhang CNP on 1/22/2025 at 8:35 AM   1/22/2025

## 2025-01-23 NOTE — DISCHARGE SUMMARY
[facility]  Vascular and Interventional Neurology   Discharge Summary  RICARDO BrownO      Gwendolyn Grimm  [id number]  1/23/2025  84 y.o. female    1/21/2025  1:03 PM   1/23/2025 11:56 AM     ADMISSION DIAGNOSIS: Left hemispheric ischemic stroke secondary to symptomatic left internal carotid artery stenosis    SECONDARY DIAGNOSIS:    Past Medical History:   Diagnosis Date    Abnormal nuclear stress test 04/08/2007 4/08-EF=67%,Mild->Mod.isch.LAD;7/07-EF=70%,Suggests poss.Mild Ant.wall ischemia.    Cardiac arrest (HCC) 2008    Diabetes mellitus ([facility])     H/O cardiac catheterization 04/2008    No signif.CAD.    H/O cardiovascular stress test 04/11/2008    mild to mod ischemia in the LAD region, abnormal study, EF 67%, patient developed mild chest pain and throat tightness    H/O cardiovascular stress test 05/03/2016    lexiscan-moderate ischemia apical,RF66%    H/O Doppler ultrasound 07/16/2007    CAROTID DOPLER- intimal thickening but no significant atherosclerotic plaque noted in the right or left internal carotid artery, doppler flow velocities within the right and left internal carotid artery are elevated, consistent with a mild, less than 50% stenosis    H/O echocardiogram 10/14/2008    EF>55%, normal LV systolic function, normal left ventricular wall thickness, impaired LV relaxation    H/O echocardiogram 06/18/2013 6/13- EF >55% Impaired LV relaxation, Mild concentric LV hypertrophy, No sig AS, THEO underestimated. 10/08-EF=>55%,NL study;7/07-EF=>55%,Mild valv.AS.    H/O echocardiogram 05/16/2016 5/16 EF 55-60% normal study 12/14EF 60%. MIld mitral and tricuspid insufficiencies. Mildly sclerotic aortic valve which appears to be mildly stenosed with aortic valve area of 1.4 however this does not appear to be hemodynamically signficant aortic stenosis. Mildy hypertropic left ventricle with normal global and regional left ventricular systolic function.    H/O left heart cath 05/23/2016    no

## 2025-01-23 NOTE — PLAN OF CARE
Problem: Skin/Tissue Integrity  Goal: Absence of new skin breakdown  Description: 1.  Monitor for areas of redness and/or skin breakdown  2.  Assess vascular access sites hourly  3.  Every 4-6 hours minimum:  Change oxygen saturation probe site  4.  Every 4-6 hours:  If on nasal continuous positive airway pressure, respiratory therapy assess nares and determine need for appliance change or resting period.  Outcome: Progressing     Problem: Safety - Adult  Goal: Free from fall injury  Outcome: Progressing     Problem: Chronic Conditions and Co-morbidities  Goal: Patient's chronic conditions and co-morbidity symptoms are monitored and maintained or improved  Outcome: Progressing     Problem: Discharge Planning  Goal: Discharge to home or other facility with appropriate resources  Outcome: Progressing     Problem: Pain  Goal: Verbalizes/displays adequate comfort level or baseline comfort level  Outcome: Progressing

## 2025-01-23 NOTE — PLAN OF CARE
Problem: Skin/Tissue Integrity  Goal: Absence of new skin breakdown  Description: 1.  Monitor for areas of redness and/or skin breakdown  2.  Assess vascular access sites hourly  3.  Every 4-6 hours minimum:  Change oxygen saturation probe site  4.  Every 4-6 hours:  If on nasal continuous positive airway pressure, respiratory therapy assess nares and determine need for appliance change or resting period.  1/23/2025 1121 by Veena Torres RN  Outcome: Completed  1/23/2025 1032 by Veena Torres RN  Outcome: Progressing     Problem: Safety - Adult  Goal: Free from fall injury  1/23/2025 1121 by Veena Torres RN  Outcome: Completed  1/23/2025 1032 by Veena Torres RN  Outcome: Progressing     Problem: Chronic Conditions and Co-morbidities  Goal: Patient's chronic conditions and co-morbidity symptoms are monitored and maintained or improved  1/23/2025 1121 by Veena Torres RN  Outcome: Completed  1/23/2025 1032 by Veena Torres RN  Outcome: Progressing     Problem: Discharge Planning  Goal: Discharge to home or other facility with appropriate resources  1/23/2025 1121 by Veena Torres RN  Outcome: Completed  1/23/2025 1032 by Veena Torres RN  Outcome: Progressing     Problem: Pain  Goal: Verbalizes/displays adequate comfort level or baseline comfort level  1/23/2025 1121 by Veena Torres RN  Outcome: Completed  1/23/2025 1032 by Veena Torres RN  Outcome: Progressing

## 2025-01-23 NOTE — DISCHARGE INSTRUCTIONS
Continue aspirin and Plavix.  Follow-up with Dr. Willoughby in 2 weeks.  May shower, no soaking of incision for 7 days.  No lifting more than 10 pounds for 7 days.  No driving for 24 hours.

## 2025-01-24 ENCOUNTER — TELEPHONE (OUTPATIENT)
Age: 85
End: 2025-01-24

## 2025-01-27 NOTE — TELEPHONE ENCOUNTER
Appointment in stroke clinic to be scheduled in 3 months. Not emergent per Gloria. Patient can be seen for 3 month s/p LICA and stroke clinic at same visit.

## 2025-01-31 LAB — ACTIVATED CLOTTING TIME, LOW RANGE: 235 SEC (ref 89–169)

## 2025-02-03 NOTE — PATIENT INSTRUCTIONS
Follow up in 3 months. Continue aspirin, Plavix and Lipitor for secondary prevention of stroke. If any refills needed before appointment, please contact our office at [phone].

## 2025-02-03 NOTE — PROGRESS NOTES
Patient seen in clinic today for a nurse visit for follow up after LICA stent - DOS 1/21/25. Patient denies pain. Right groin access site soft, non-tender, no swelling noted. Minimal bruising noted. Pedal pulses good. Patient is taking ASA/Plavix/Lipitor as prescribed. Patient will follow up with Gloria KINGSLEY in 3 months and also for stroke clinic at this time. Notified patient of appointment date and time. Patient verbalized understanding.

## 2025-02-04 ENCOUNTER — NURSE ONLY (OUTPATIENT)
Age: 85
End: 2025-02-04

## 2025-02-04 DIAGNOSIS — Z09 ENCOUNTER FOR FOLLOW-UP IN OUTPATIENT CLINIC: Primary | ICD-10-CM

## 2025-02-04 DIAGNOSIS — I65.22 LEFT CAROTID STENOSIS: ICD-10-CM

## 2025-02-04 DIAGNOSIS — Z95.820 S/P ANGIOPLASTY WITH STENT: ICD-10-CM

## 2025-02-12 RX ORDER — ATORVASTATIN CALCIUM 40 MG/1
40 TABLET, FILM COATED ORAL NIGHTLY
Qty: 30 TABLET | Refills: 0 | OUTPATIENT
Start: 2025-02-12

## 2025-02-17 RX ORDER — ERGOCALCIFEROL 1.25 MG/1
50000 CAPSULE, LIQUID FILLED ORAL WEEKLY
Qty: 5 CAPSULE | Refills: 0 | OUTPATIENT
Start: 2025-02-17

## 2025-02-18 RX ORDER — ATORVASTATIN CALCIUM 40 MG/1
40 TABLET, FILM COATED ORAL NIGHTLY
Qty: 30 TABLET | Refills: 0 | OUTPATIENT
Start: 2025-02-18

## 2025-03-25 ENCOUNTER — OFFICE VISIT (OUTPATIENT)
Dept: CARDIOLOGY CLINIC | Age: 85
End: 2025-03-25
Payer: MEDICARE

## 2025-03-25 VITALS
BODY MASS INDEX: 29.09 KG/M2 | DIASTOLIC BLOOD PRESSURE: 60 MMHG | SYSTOLIC BLOOD PRESSURE: 90 MMHG | HEART RATE: 64 BPM | WEIGHT: 181 LBS | HEIGHT: 66 IN

## 2025-03-25 DIAGNOSIS — I63.9 CEREBROVASCULAR ACCIDENT (CVA), UNSPECIFIED MECHANISM (HCC): ICD-10-CM

## 2025-03-25 DIAGNOSIS — I38 VALVULAR HEART DISEASE: Primary | ICD-10-CM

## 2025-03-25 DIAGNOSIS — R00.2 PALPITATIONS: ICD-10-CM

## 2025-03-25 PROCEDURE — G8399 PT W/DXA RESULTS DOCUMENT: HCPCS | Performed by: NURSE PRACTITIONER

## 2025-03-25 PROCEDURE — 1160F RVW MEDS BY RX/DR IN RCRD: CPT | Performed by: NURSE PRACTITIONER

## 2025-03-25 PROCEDURE — 1036F TOBACCO NON-USER: CPT | Performed by: NURSE PRACTITIONER

## 2025-03-25 PROCEDURE — 1090F PRES/ABSN URINE INCON ASSESS: CPT | Performed by: NURSE PRACTITIONER

## 2025-03-25 PROCEDURE — 3074F SYST BP LT 130 MM HG: CPT | Performed by: NURSE PRACTITIONER

## 2025-03-25 PROCEDURE — 1159F MED LIST DOCD IN RCRD: CPT | Performed by: NURSE PRACTITIONER

## 2025-03-25 PROCEDURE — G8427 DOCREV CUR MEDS BY ELIG CLIN: HCPCS | Performed by: NURSE PRACTITIONER

## 2025-03-25 PROCEDURE — G8417 CALC BMI ABV UP PARAM F/U: HCPCS | Performed by: NURSE PRACTITIONER

## 2025-03-25 PROCEDURE — 99213 OFFICE O/P EST LOW 20 MIN: CPT | Performed by: NURSE PRACTITIONER

## 2025-03-25 PROCEDURE — 3078F DIAST BP <80 MM HG: CPT | Performed by: NURSE PRACTITIONER

## 2025-03-25 PROCEDURE — 1123F ACP DISCUSS/DSCN MKR DOCD: CPT | Performed by: NURSE PRACTITIONER

## 2025-03-25 ASSESSMENT — ENCOUNTER SYMPTOMS
WHEEZING: 1
COUGH: 1
SHORTNESS OF BREATH: 0
SPUTUM PRODUCTION: 0
ORTHOPNEA: 0

## 2025-03-25 NOTE — PROGRESS NOTES
3/25/2025  Primary cardiologist: Dr. Galindo    CC:   Gwendolyn  is an established 84 y.o.  female here for a follow up on VHD      SUBJECTIVE/OBJECTIVE:  Gwendolyn is a 84 y.o. female with a history of VHD- moderate AS, hypertension, hyperlipidemia, diabetes mellitus T2, SVT, ESRD on PD, anemia, HFpEF, CVA- ischemic and carotid artery disease s/p Left carotid (01/2025)     HPI:  In January Ayana had an acute left cerebral CVA. CTA head/neck showing high-grade stenosis of the left carotid bifurcation. MRI brain showing several small scattered acute infarcts in the left cerebral hemisphere. She underwent placement of a left carotid stent.     Gwendolyn reports that with activity her heart rate goes up and she feels short of breath. Need to sit and rest to catch her breath. She often has to use a fan to catch her breath. She has noticed upper chest congestion/sinus congestion/cough / wheeze over the last few days. No fever or chills. Started mucinex.     Review of Systems   Constitutional: Negative for diaphoresis and malaise/fatigue.   Cardiovascular:  Positive for dyspnea on exertion. Negative for chest pain, claudication, irregular heartbeat, leg swelling, near-syncope, orthopnea, palpitations and paroxysmal nocturnal dyspnea.   Respiratory:  Positive for cough and wheezing. Negative for shortness of breath and sputum production.    Neurological:  Negative for dizziness and light-headedness.       Vitals:    03/25/25 1311   BP: 90/60   BP Site: Left Upper Arm   Patient Position: Sitting   BP Cuff Size: Medium Adult   Pulse: 64   Weight: 82.1 kg (181 lb)   Height: 1.676 m (5' 6\")     Wt Readings from Last 3 Encounters:   03/25/25 82.1 kg (181 lb)   01/21/25 80.3 kg (177 lb 0.5 oz)   01/17/25 86.1 kg (189 lb 13.1 oz)      Body mass index is 29.21 kg/m².     Physical Exam  Vitals reviewed.   Eyes:      Pupils: Pupils are equal, round, and reactive to light.   Cardiovascular:      Rate and Rhythm: Normal rate and regular rhythm.

## 2025-03-25 NOTE — PATIENT INSTRUCTIONS
Thank you for allowing us to care for you today!   We want to ensure we can follow your treatment plan and we strive to give you the best outcomes and experience possible.   If you ever have a life threatening emergency and call 911 - for an ambulance (EMS)  REMEMBER  Our providers can only care for you at:   [facility] or [facility]   Even if you have someone take you or you drive yourself we can only care for you in a [facility] facility. Our providers are not setup at the other healthcare locations!    PLEASE CALL OUR OFFICE DURING NORMAL BUSINESS HOURS  Monday through Friday 8 am to 5 pm  AFTER HOURS the physician on-call cannot help with scheduling, rescheduling, procedure instruction questions or any type of medication need or issue.  [facility] P:[phone] - [facility] P:[phone] - [facility] P:[phone]      If you receive a survey:  We would appreciate you taking the time to share your experience concerning your provider visit in the office.    These surveys are confidential!  We are eager to improve and are counting on you to share your feedback so we can ensure you get the best care possible.

## 2025-03-25 NOTE — PROGRESS NOTES
CLINICAL STAFF DOCUMENTATION    Neyda Mcadams, NELLIE     Gwendolyn Grimm  1940 2029    Have you had any Chest Pain recently? - No  Have you had any Shortness of Breath - Yes nothing new or worsened   Have you had any dizziness - No  Have you had any palpitations recently? - No  Do you have any edema - swelling in No    When did you have your last labs drawn 1/23/25  What doctor ordered Short  Do we have the labs in their chart Yes  Do you have a surgery or procedure scheduled in the near future - No  Do use tobacco products? - No  Do you drink alcohol? - No  Do you use any illicit drugs? - No  Caffeine? - No  Check medication list thoroughly!!! AND RECONCILE OUTSIDE MEDICATIONS  If dose has changed change the entire order not just the MG  BE SURE TO ASK PATIENT IF THEY NEED MEDICATION REFILLS  Verify Pharmacy and update if incorrect    Add to every patient's \"wrap up\" the following dot phrase AFTERVISITCARDIOHEARTHOUSE and ensure we explain this to our patients

## 2025-04-04 ENCOUNTER — TELEPHONE (OUTPATIENT)
Dept: CARDIOLOGY CLINIC | Age: 85
End: 2025-04-04

## 2025-04-04 NOTE — TELEPHONE ENCOUNTER
Called to move appt up from 4;30, pt daughter answered and is medical POA and ride and agreed to move appt up

## 2025-04-07 ENCOUNTER — TELEPHONE (OUTPATIENT)
Dept: CARDIOLOGY CLINIC | Age: 85
End: 2025-04-07

## 2025-04-07 NOTE — TELEPHONE ENCOUNTER
LM for pt to return call to office for results.     Echo-    Left Ventricle: Normal left ventricular systolic function with a visually estimated EF of 50 - 55%. Left ventricle size is normal. Mildly increased wall thickness. Findings consistent with mild concentric hypertrophy. Normal wall motion. Grade I diastolic dysfunction with normal LAP.    Aortic Valve: Moderately calcified cusps. Moderate stenosis of the aortic valve. AV mean gradient is 21 mmHg. AV area by continuity VTI is 1.1 cm2.    Tricuspid Valve: Mild regurgitation. Mildly elevated RVSP, consistent with mild pulmonary hypertension. The estimated RVSP is 42 mmHg.    Mitral Valve: Mild annular calcification at the posterior leaflet. Mild regurgitation.    Pericardium: No pericardial effusion.    Image quality is fair.    Next scheduled OV is 4/25/2025 1:20 PM with Neyda Mcadams APRN - CNP Reecho in 6 months for evaluation of the progression of aortic stenosis

## 2025-04-14 ENCOUNTER — HOSPITAL ENCOUNTER (OUTPATIENT)
Age: 85
Setting detail: SPECIMEN
Discharge: HOME | End: 2025-04-14

## 2025-04-14 LAB — POTASSIUM SERPL-SCNC: 5.2 MMOL/L (ref 3.5–5.1)

## 2025-04-14 PROCEDURE — 84132 ASSAY OF SERUM POTASSIUM: CPT

## 2025-04-15 NOTE — PROGRESS NOTES
Outpatient Vascular/Neurointerventional Neurology Progress Note    Patient Name: Gwendolyn Grimm     : 1940      Subjective:     The patient was seen and examined.  Here for follow-up s/p stenting of symptomatic left ICA stenosis.    2025 left hemispheric stroke status post stenting of symptomatic left internal carotid artery with distal protection device.    She is accompanied by her daughter during the visit.  She has continued aspirin and Plavix.  She has ran out of her Lipitor.  Will send refill x 1.  Follow-up PCP for further refills.    She denies any new strokelike symptoms.  She reports improvement in her right hand weakness.  She still endorses intermittent right foot\" catching\" while walking.  She uses a walker for ambulation.  She does report shortness of breath when ambulating long distances.  She is following up with cardiology had a recent echo and wore an cardiac event monitor.  She has follow-up this Friday for results.      Objective:     Current Outpatient Medications:     atorvastatin (LIPITOR) 40 MG tablet, Take 1 tablet by mouth nightly, Disp: 30 tablet, Rfl: 0    citalopram (CELEXA) 20 MG tablet, Take 1 tablet by mouth once daily, Disp: 90 tablet, Rfl: 0    clopidogrel (PLAVIX) 75 MG tablet, Take 1 tablet by mouth daily, Disp: 30 tablet, Rfl: 3    Ergocalciferol (VITAMIN D) 41092 units CAPS, Take 50,000 Units by mouth once a week, Disp: 5 capsule, Rfl: 0    LANTUS SOLOSTAR 100 UNIT/ML injection pen, Inject 10 Units into the skin nightly, Disp: , Rfl:     BREYNA 80-4.5 MCG/ACT AERO, Inhale 2 puffs into the lungs 2 times daily, Disp: , Rfl:     levothyroxine (SYNTHROID) 150 MCG tablet, Take 137 mcg by mouth Daily, Disp: , Rfl:     albuterol sulfate HFA (PROVENTIL;VENTOLIN;PROAIR) 108 (90 Base) MCG/ACT inhaler, INHALE 2 PUFFS BY MOUTH EVERY 4 TO 6 HOURS AS NEEDED, Disp: , Rfl:     Melatonin 10 MG TABS, Take 10 mg by mouth nightly as needed, Disp: , Rfl:     mirtazapine (REMERON) 15 MG

## 2025-04-22 ENCOUNTER — TELEPHONE (OUTPATIENT)
Dept: CARDIOLOGY CLINIC | Age: 85
End: 2025-04-22

## 2025-04-22 ENCOUNTER — RESULTS FOLLOW-UP (OUTPATIENT)
Dept: CARDIOLOGY CLINIC | Age: 85
End: 2025-04-22

## 2025-04-22 NOTE — TELEPHONE ENCOUNTER
----- Message from NIKUNJ Robles - CNP sent at 4/22/2025  8:38 AM EDT -----  No atrial fib or arrhythmias noted on monitor

## 2025-04-22 NOTE — TELEPHONE ENCOUNTER
Left vm      This is a 14-day event monitor showing that the predominant rhythm is sinus rhythm the maximum heart rate is 123  Minimum is 42  Infrequent APCs and PVCs are seen

## 2025-04-23 ENCOUNTER — OFFICE VISIT (OUTPATIENT)
Age: 85
End: 2025-04-23
Payer: MEDICARE

## 2025-04-23 VITALS
HEART RATE: 62 BPM | SYSTOLIC BLOOD PRESSURE: 134 MMHG | BODY MASS INDEX: 29.15 KG/M2 | DIASTOLIC BLOOD PRESSURE: 64 MMHG | WEIGHT: 180.6 LBS | OXYGEN SATURATION: 96 %

## 2025-04-23 DIAGNOSIS — I65.22 LEFT CAROTID STENOSIS: ICD-10-CM

## 2025-04-23 DIAGNOSIS — I69.30 SEQUELA OF CEREBROVASCULAR ACCIDENT: ICD-10-CM

## 2025-04-23 DIAGNOSIS — Z95.820 S/P ANGIOPLASTY WITH STENT: Primary | ICD-10-CM

## 2025-04-23 PROCEDURE — 1090F PRES/ABSN URINE INCON ASSESS: CPT | Performed by: NURSE PRACTITIONER

## 2025-04-23 PROCEDURE — G8399 PT W/DXA RESULTS DOCUMENT: HCPCS | Performed by: NURSE PRACTITIONER

## 2025-04-23 PROCEDURE — 3075F SYST BP GE 130 - 139MM HG: CPT | Performed by: NURSE PRACTITIONER

## 2025-04-23 PROCEDURE — 99215 OFFICE O/P EST HI 40 MIN: CPT | Performed by: NURSE PRACTITIONER

## 2025-04-23 PROCEDURE — 1160F RVW MEDS BY RX/DR IN RCRD: CPT | Performed by: NURSE PRACTITIONER

## 2025-04-23 PROCEDURE — G8417 CALC BMI ABV UP PARAM F/U: HCPCS | Performed by: NURSE PRACTITIONER

## 2025-04-23 PROCEDURE — 1036F TOBACCO NON-USER: CPT | Performed by: NURSE PRACTITIONER

## 2025-04-23 PROCEDURE — 3078F DIAST BP <80 MM HG: CPT | Performed by: NURSE PRACTITIONER

## 2025-04-23 PROCEDURE — G8427 DOCREV CUR MEDS BY ELIG CLIN: HCPCS | Performed by: NURSE PRACTITIONER

## 2025-04-23 PROCEDURE — 1159F MED LIST DOCD IN RCRD: CPT | Performed by: NURSE PRACTITIONER

## 2025-04-23 PROCEDURE — 1123F ACP DISCUSS/DSCN MKR DOCD: CPT | Performed by: NURSE PRACTITIONER

## 2025-04-23 RX ORDER — ATORVASTATIN CALCIUM 40 MG/1
40 TABLET, FILM COATED ORAL NIGHTLY
Qty: 30 TABLET | Refills: 0 | Status: SHIPPED | OUTPATIENT
Start: 2025-04-23

## 2025-04-25 ENCOUNTER — OFFICE VISIT (OUTPATIENT)
Dept: CARDIOLOGY CLINIC | Age: 85
End: 2025-04-25
Payer: MEDICARE

## 2025-04-25 VITALS
HEART RATE: 64 BPM | DIASTOLIC BLOOD PRESSURE: 80 MMHG | BODY MASS INDEX: 29.22 KG/M2 | WEIGHT: 181.8 LBS | SYSTOLIC BLOOD PRESSURE: 126 MMHG | HEIGHT: 66 IN

## 2025-04-25 DIAGNOSIS — I38 VALVULAR HEART DISEASE: Primary | ICD-10-CM

## 2025-04-25 PROCEDURE — G8427 DOCREV CUR MEDS BY ELIG CLIN: HCPCS | Performed by: NURSE PRACTITIONER

## 2025-04-25 PROCEDURE — 3079F DIAST BP 80-89 MM HG: CPT | Performed by: NURSE PRACTITIONER

## 2025-04-25 PROCEDURE — 1090F PRES/ABSN URINE INCON ASSESS: CPT | Performed by: NURSE PRACTITIONER

## 2025-04-25 PROCEDURE — 1159F MED LIST DOCD IN RCRD: CPT | Performed by: NURSE PRACTITIONER

## 2025-04-25 PROCEDURE — 1160F RVW MEDS BY RX/DR IN RCRD: CPT | Performed by: NURSE PRACTITIONER

## 2025-04-25 PROCEDURE — G8399 PT W/DXA RESULTS DOCUMENT: HCPCS | Performed by: NURSE PRACTITIONER

## 2025-04-25 PROCEDURE — 3074F SYST BP LT 130 MM HG: CPT | Performed by: NURSE PRACTITIONER

## 2025-04-25 PROCEDURE — 99212 OFFICE O/P EST SF 10 MIN: CPT | Performed by: NURSE PRACTITIONER

## 2025-04-25 PROCEDURE — 1123F ACP DISCUSS/DSCN MKR DOCD: CPT | Performed by: NURSE PRACTITIONER

## 2025-04-25 PROCEDURE — 1036F TOBACCO NON-USER: CPT | Performed by: NURSE PRACTITIONER

## 2025-04-25 PROCEDURE — G8417 CALC BMI ABV UP PARAM F/U: HCPCS | Performed by: NURSE PRACTITIONER

## 2025-04-25 ASSESSMENT — ENCOUNTER SYMPTOMS
SHORTNESS OF BREATH: 1
ORTHOPNEA: 0

## 2025-04-25 NOTE — PATIENT INSTRUCTIONS
Thank you for allowing us to care for you today!   We want to ensure we can follow your treatment plan and we strive to give you the best outcomes and experience possible.   If you ever have a life threatening emergency and call 911 - for an ambulance (EMS)  REMEMBER  Our providers can only care for you at:   [facility] or [facility]   Even if you have someone take you or you drive yourself we can only care for you in a [facility] facility. Our providers are not setup at the other healthcare locations!    PLEASE CALL OUR OFFICE DURING NORMAL BUSINESS HOURS  Monday through Friday 8 am to 5 pm  AFTER HOURS the physician on-call cannot help with scheduling, rescheduling, procedure instruction questions or any type of medication need or issue.  [facility] P:[phone] - [facility] P:[phone] - [facility] P:[phone]      If you receive a survey:  We would appreciate you taking the time to share your experience concerning your provider visit in the office.    These surveys are confidential!  We are eager to improve and are counting on you to share your feedback so we can ensure you get the best care possible.

## 2025-04-25 NOTE — PROGRESS NOTES
CLINICAL STAFF DOCUMENTATION    Neyda Mcadams, CNP     Gwendolyn Grimm  1940 2029    Have you had any Chest Pain recently? - No    Have you had any Shortness of Breath -  pt states ongoing and is about the same      Have you had any dizziness - No      Have you had any palpitations recently? - No      Do you have any edema - swelling in No        Is the patient on any of the following medications -  no  If Yes DO EKG - Needs done every 3 months    When did you have your last labs drawn 1/2025  What doctor ordered [place]  Do we have the labs in their chart Yes    If we do not have these labs, you are retrieve these labs for the provider!    Do you need any prescriptions refilled? - No    Do you have a surgery or procedure scheduled in the near future - No      Do use tobacco products? - No  Do you drink alcohol? - No  Do you use any illicit drugs? - No  Caffeine? - Yes  How much caffeine? .Soda occasionally         Check medication list thoroughly!!! AND RECONCILE OUTSIDE MEDICATIONS  If dose has changed change the entire order not just the MG  BE SURE TO ASK PATIENT IF THEY NEED MEDICATION REFILLS  Verify Pharmacy and update if incorrect    Add to every patient's \"wrap up\" the following dot phrase AFTERVISITCARDIOHEARTHOUSE and ensure we explain this to our patients

## 2025-04-25 NOTE — PROGRESS NOTES
4/25/2025  Primary cardiologist: Dr. Galindo    CC:   Gwendolyn  is an established 84 y.o.  female here for a follow up on echo      SUBJECTIVE/OBJECTIVE:  Gwendolyn is a 84 y.o. female with a history of VHD- moderate AS, hypertension, hyperlipidemia, diabetes mellitus T2, SVT, ESRD on PD, anemia, HFpEF, CVA- ischemic and carotid artery disease s/p Left carotid (01/2025)     HPI:  Gwendolyn reports she continues to get short of breath with activity. Waking she gets short of breath. She needs to sit to rest to catch her breath     Review of Systems   Constitutional: Negative for diaphoresis and malaise/fatigue.   Cardiovascular:  Positive for dyspnea on exertion. Negative for chest pain, claudication, irregular heartbeat, leg swelling, near-syncope, orthopnea, palpitations and paroxysmal nocturnal dyspnea.   Respiratory:  Positive for shortness of breath.    Neurological:  Negative for dizziness and light-headedness.       Vitals:    04/25/25 1332   BP: 126/80   BP Site: Left Upper Arm   Patient Position: Sitting   BP Cuff Size: Medium Adult   Pulse: 64   Weight: 82.5 kg (181 lb 12.8 oz)   Height: 1.676 m (5' 6\")     Wt Readings from Last 3 Encounters:   04/25/25 82.5 kg (181 lb 12.8 oz)   04/23/25 81.9 kg (180 lb 9.6 oz)   04/04/25 82.1 kg (181 lb)      Body mass index is 29.34 kg/m².     Physical Exam  Vitals reviewed.   Eyes:      Pupils: Pupils are equal, round, and reactive to light.   Cardiovascular:      Rate and Rhythm: Normal rate and regular rhythm.      Pulses: Normal pulses.      Heart sounds: Murmur heard.      Systolic murmur is present with a grade of 3/6.   Pulmonary:      Effort: Pulmonary effort is normal.      Breath sounds: Examination of the left-lower field reveals rales. Rales present.   Musculoskeletal:      Right lower leg: No edema.      Left lower leg: No edema.   Skin:     General: Skin is warm and dry.      Capillary Refill: Capillary refill takes less than 2 seconds.   Neurological:      Mental

## 2025-06-04 ENCOUNTER — APPOINTMENT (OUTPATIENT)
Dept: GENERAL RADIOLOGY | Age: 85
DRG: 871 | End: 2025-06-04
Payer: MEDICARE

## 2025-06-04 ENCOUNTER — APPOINTMENT (OUTPATIENT)
Dept: CT IMAGING | Age: 85
DRG: 871 | End: 2025-06-04
Payer: MEDICARE

## 2025-06-04 ENCOUNTER — HOSPITAL ENCOUNTER (INPATIENT)
Age: 85
LOS: 5 days | Discharge: HOME | DRG: 871 | End: 2025-06-09
Attending: EMERGENCY MEDICINE | Admitting: STUDENT IN AN ORGANIZED HEALTH CARE EDUCATION/TRAINING PROGRAM
Payer: MEDICARE

## 2025-06-04 DIAGNOSIS — N20.1 URETEROLITHIASIS: Primary | ICD-10-CM

## 2025-06-04 DIAGNOSIS — N10 ACUTE PYELONEPHRITIS: ICD-10-CM

## 2025-06-04 PROBLEM — A41.9 SEPSIS (HCC): Status: ACTIVE | Noted: 2025-06-04

## 2025-06-04 LAB
ALBUMIN SERPL-MCNC: 3.8 G/DL (ref 3.4–5)
ALBUMIN/GLOB SERPL: 1.1 {RATIO} (ref 1.1–2.2)
ALP SERPL-CCNC: 158 U/L (ref 40–129)
ALT SERPL-CCNC: 12 U/L (ref 10–40)
ANION GAP SERPL CALCULATED.3IONS-SCNC: 14 MMOL/L (ref 9–17)
AST SERPL-CCNC: 21 U/L (ref 15–37)
BACTERIA URNS QL MICRO: ABNORMAL
BASOPHILS # BLD: 0.04 K/UL
BASOPHILS NFR BLD: 0 % (ref 0–1)
BILIRUB SERPL-MCNC: 0.3 MG/DL (ref 0–1)
BILIRUB UR QL STRIP: NEGATIVE
BUN SERPL-MCNC: 34 MG/DL (ref 7–20)
CALCIUM SERPL-MCNC: 9.5 MG/DL (ref 8.3–10.6)
CHLORIDE SERPL-SCNC: 104 MMOL/L (ref 99–110)
CLARITY UR: CLEAR
CO2 SERPL-SCNC: 21 MMOL/L (ref 21–32)
COLOR UR: YELLOW
CREAT SERPL-MCNC: 3.8 MG/DL (ref 0.6–1.2)
CRYSTALS URNS MICRO: ABNORMAL /HPF
EOSINOPHIL # BLD: 0.02 K/UL
EOSINOPHILS RELATIVE PERCENT: 0 % (ref 0–3)
EPI CELLS #/AREA URNS HPF: 2 /HPF
ERYTHROCYTE [DISTWIDTH] IN BLOOD BY AUTOMATED COUNT: 12.5 % (ref 11.7–14.9)
GFR, ESTIMATED: 11 ML/MIN/1.73M2
GLUCOSE BLD-MCNC: 116 MG/DL (ref 74–99)
GLUCOSE BLD-MCNC: 118 MG/DL (ref 74–99)
GLUCOSE BLD-MCNC: 128 MG/DL (ref 74–99)
GLUCOSE BLD-MCNC: 131 MG/DL (ref 74–99)
GLUCOSE BLD-MCNC: 76 MG/DL (ref 74–99)
GLUCOSE SERPL-MCNC: 186 MG/DL (ref 74–99)
GLUCOSE UR STRIP-MCNC: 100 MG/DL
HCT VFR BLD AUTO: 39.2 % (ref 37–47)
HGB BLD-MCNC: 12.4 G/DL (ref 12.5–16)
HGB UR QL STRIP.AUTO: ABNORMAL
IMM GRANULOCYTES # BLD AUTO: 0.07 K/UL
IMM GRANULOCYTES NFR BLD: 0 %
KETONES UR STRIP-MCNC: NEGATIVE MG/DL
LACTATE BLDV-SCNC: 1.3 MMOL/L (ref 0.4–2)
LEUKOCYTE ESTERASE UR QL STRIP: ABNORMAL
LIPASE SERPL-CCNC: 86 U/L (ref 13–60)
LYMPHOCYTES NFR BLD: 1.23 K/UL
LYMPHOCYTES RELATIVE PERCENT: 8 % (ref 24–44)
MCH RBC QN AUTO: 30.5 PG (ref 27–31)
MCHC RBC AUTO-ENTMCNC: 31.6 G/DL (ref 32–36)
MCV RBC AUTO: 96.6 FL (ref 78–100)
MONOCYTES NFR BLD: 0.88 K/UL
MONOCYTES NFR BLD: 6 % (ref 0–5)
MUCOUS THREADS URNS QL MICRO: ABNORMAL
NEUTROPHILS NFR BLD: 86 % (ref 36–66)
NEUTS SEG NFR BLD: 13.76 K/UL
NITRITE UR QL STRIP: POSITIVE
PH UR STRIP: 7.5 [PH] (ref 5–8)
PLATELET # BLD AUTO: 279 K/UL (ref 140–440)
PMV BLD AUTO: 8.7 FL (ref 7.5–11.1)
POTASSIUM SERPL-SCNC: 4.5 MMOL/L (ref 3.5–5.1)
PROT SERPL-MCNC: 7.2 G/DL (ref 6.4–8.2)
PROT UR STRIP-MCNC: >=300 MG/DL
RBC # BLD AUTO: 4.06 M/UL (ref 4.2–5.4)
RBC #/AREA URNS HPF: 573 /HPF (ref 0–2)
SODIUM SERPL-SCNC: 138 MMOL/L (ref 136–145)
SP GR UR STRIP: 1.02 (ref 1–1.03)
UROBILINOGEN UR STRIP-ACNC: 0.2 EU/DL (ref 0–1)
WBC #/AREA URNS HPF: 989 /HPF (ref 0–5)
WBC OTHER # BLD: 16 K/UL (ref 4–10.5)
YEAST URNS QL MICRO: ABNORMAL

## 2025-06-04 PROCEDURE — 6370000000 HC RX 637 (ALT 250 FOR IP): Performed by: INTERNAL MEDICINE

## 2025-06-04 PROCEDURE — 2580000003 HC RX 258: Performed by: STUDENT IN AN ORGANIZED HEALTH CARE EDUCATION/TRAINING PROGRAM

## 2025-06-04 PROCEDURE — 87086 URINE CULTURE/COLONY COUNT: CPT

## 2025-06-04 PROCEDURE — 6370000000 HC RX 637 (ALT 250 FOR IP): Performed by: STUDENT IN AN ORGANIZED HEALTH CARE EDUCATION/TRAINING PROGRAM

## 2025-06-04 PROCEDURE — 94761 N-INVAS EAR/PLS OXIMETRY MLT: CPT

## 2025-06-04 PROCEDURE — 6360000002 HC RX W HCPCS: Performed by: STUDENT IN AN ORGANIZED HEALTH CARE EDUCATION/TRAINING PROGRAM

## 2025-06-04 PROCEDURE — 85025 COMPLETE CBC W/AUTO DIFF WBC: CPT

## 2025-06-04 PROCEDURE — 94664 DEMO&/EVAL PT USE INHALER: CPT

## 2025-06-04 PROCEDURE — 74176 CT ABD & PELVIS W/O CONTRAST: CPT

## 2025-06-04 PROCEDURE — 96375 TX/PRO/DX INJ NEW DRUG ADDON: CPT

## 2025-06-04 PROCEDURE — 99285 EMERGENCY DEPT VISIT HI MDM: CPT

## 2025-06-04 PROCEDURE — 94640 AIRWAY INHALATION TREATMENT: CPT

## 2025-06-04 PROCEDURE — 80053 COMPREHEN METABOLIC PANEL: CPT

## 2025-06-04 PROCEDURE — 87186 SC STD MICRODIL/AGAR DIL: CPT

## 2025-06-04 PROCEDURE — 51798 US URINE CAPACITY MEASURE: CPT

## 2025-06-04 PROCEDURE — 87040 BLOOD CULTURE FOR BACTERIA: CPT

## 2025-06-04 PROCEDURE — 82962 GLUCOSE BLOOD TEST: CPT

## 2025-06-04 PROCEDURE — 2140000000 HC CCU INTERMEDIATE R&B

## 2025-06-04 PROCEDURE — 83690 ASSAY OF LIPASE: CPT

## 2025-06-04 PROCEDURE — 6360000002 HC RX W HCPCS: Performed by: EMERGENCY MEDICINE

## 2025-06-04 PROCEDURE — 3E1M39Z IRRIGATION OF PERITONEAL CAVITY USING DIALYSATE, PERCUTANEOUS APPROACH: ICD-10-PCS | Performed by: INTERNAL MEDICINE

## 2025-06-04 PROCEDURE — 96374 THER/PROPH/DIAG INJ IV PUSH: CPT

## 2025-06-04 PROCEDURE — 83605 ASSAY OF LACTIC ACID: CPT

## 2025-06-04 PROCEDURE — 81001 URINALYSIS AUTO W/SCOPE: CPT

## 2025-06-04 PROCEDURE — 87077 CULTURE AEROBIC IDENTIFY: CPT

## 2025-06-04 PROCEDURE — 2500000003 HC RX 250 WO HCPCS: Performed by: EMERGENCY MEDICINE

## 2025-06-04 RX ORDER — GLUCAGON 1 MG/ML
1 KIT INJECTION PRN
Status: DISCONTINUED | OUTPATIENT
Start: 2025-06-04 | End: 2025-06-09 | Stop reason: HOSPADM

## 2025-06-04 RX ORDER — MORPHINE SULFATE 4 MG/ML
4 INJECTION, SOLUTION INTRAMUSCULAR; INTRAVENOUS ONCE
Status: COMPLETED | OUTPATIENT
Start: 2025-06-04 | End: 2025-06-04

## 2025-06-04 RX ORDER — INSULIN LISPRO 100 [IU]/ML
0-4 INJECTION, SOLUTION INTRAVENOUS; SUBCUTANEOUS EVERY 6 HOURS SCHEDULED
Status: DISCONTINUED | OUTPATIENT
Start: 2025-06-04 | End: 2025-06-05

## 2025-06-04 RX ORDER — TAMSULOSIN HYDROCHLORIDE 0.4 MG/1
0.4 CAPSULE ORAL DAILY
Status: DISCONTINUED | OUTPATIENT
Start: 2025-06-04 | End: 2025-06-09 | Stop reason: HOSPADM

## 2025-06-04 RX ORDER — HYDRALAZINE HYDROCHLORIDE 25 MG/1
25 TABLET, FILM COATED ORAL 3 TIMES DAILY
COMMUNITY

## 2025-06-04 RX ORDER — PANTOPRAZOLE SODIUM 40 MG/1
40 TABLET, DELAYED RELEASE ORAL
Status: DISCONTINUED | OUTPATIENT
Start: 2025-06-04 | End: 2025-06-09 | Stop reason: HOSPADM

## 2025-06-04 RX ORDER — DEXTROSE MONOHYDRATE 100 MG/ML
INJECTION, SOLUTION INTRAVENOUS CONTINUOUS PRN
Status: DISCONTINUED | OUTPATIENT
Start: 2025-06-04 | End: 2025-06-09 | Stop reason: HOSPADM

## 2025-06-04 RX ORDER — BUDESONIDE AND FORMOTEROL FUMARATE DIHYDRATE 80; 4.5 UG/1; UG/1
2 AEROSOL RESPIRATORY (INHALATION) 2 TIMES DAILY
Status: DISCONTINUED | OUTPATIENT
Start: 2025-06-04 | End: 2025-06-09 | Stop reason: HOSPADM

## 2025-06-04 RX ORDER — ONDANSETRON 2 MG/ML
8 INJECTION INTRAMUSCULAR; INTRAVENOUS ONCE
Status: COMPLETED | OUTPATIENT
Start: 2025-06-04 | End: 2025-06-04

## 2025-06-04 RX ORDER — ATORVASTATIN CALCIUM 40 MG/1
40 TABLET, FILM COATED ORAL NIGHTLY
Status: DISCONTINUED | OUTPATIENT
Start: 2025-06-04 | End: 2025-06-09 | Stop reason: HOSPADM

## 2025-06-04 RX ORDER — HEPARIN SODIUM 5000 [USP'U]/ML
5000 INJECTION, SOLUTION INTRAVENOUS; SUBCUTANEOUS EVERY 8 HOURS SCHEDULED
Status: DISCONTINUED | OUTPATIENT
Start: 2025-06-04 | End: 2025-06-09 | Stop reason: HOSPADM

## 2025-06-04 RX ORDER — CITALOPRAM HYDROBROMIDE 20 MG/1
20 TABLET ORAL DAILY
Status: DISCONTINUED | OUTPATIENT
Start: 2025-06-04 | End: 2025-06-09 | Stop reason: HOSPADM

## 2025-06-04 RX ORDER — CLOPIDOGREL BISULFATE 75 MG/1
75 TABLET ORAL DAILY
Status: DISCONTINUED | OUTPATIENT
Start: 2025-06-04 | End: 2025-06-09 | Stop reason: HOSPADM

## 2025-06-04 RX ORDER — MIRTAZAPINE 15 MG/1
15 TABLET, FILM COATED ORAL NIGHTLY
Status: DISCONTINUED | OUTPATIENT
Start: 2025-06-04 | End: 2025-06-09 | Stop reason: HOSPADM

## 2025-06-04 RX ORDER — GENTAMICIN SULFATE 1 MG/G
CREAM TOPICAL DAILY PRN
Status: DISCONTINUED | OUTPATIENT
Start: 2025-06-04 | End: 2025-06-09 | Stop reason: HOSPADM

## 2025-06-04 RX ORDER — LINEZOLID 2 MG/ML
600 INJECTION, SOLUTION INTRAVENOUS EVERY 12 HOURS
Status: DISCONTINUED | OUTPATIENT
Start: 2025-06-04 | End: 2025-06-06

## 2025-06-04 RX ORDER — HYDRALAZINE HYDROCHLORIDE 25 MG/1
25 TABLET, FILM COATED ORAL 3 TIMES DAILY
Status: DISCONTINUED | OUTPATIENT
Start: 2025-06-04 | End: 2025-06-07

## 2025-06-04 RX ORDER — HYDROMORPHONE HYDROCHLORIDE 1 MG/ML
0.5 INJECTION, SOLUTION INTRAMUSCULAR; INTRAVENOUS; SUBCUTANEOUS EVERY 4 HOURS PRN
Status: DISCONTINUED | OUTPATIENT
Start: 2025-06-04 | End: 2025-06-09 | Stop reason: HOSPADM

## 2025-06-04 RX ORDER — ALBUTEROL SULFATE 90 UG/1
1 INHALANT RESPIRATORY (INHALATION) EVERY 6 HOURS PRN
Status: DISCONTINUED | OUTPATIENT
Start: 2025-06-04 | End: 2025-06-09 | Stop reason: HOSPADM

## 2025-06-04 RX ADMIN — HYDRALAZINE HYDROCHLORIDE 25 MG: 25 TABLET ORAL at 21:21

## 2025-06-04 RX ADMIN — GENTAMICIN SULFATE: 1 CREAM TOPICAL at 19:20

## 2025-06-04 RX ADMIN — PANTOPRAZOLE SODIUM 40 MG: 40 TABLET, DELAYED RELEASE ORAL at 07:50

## 2025-06-04 RX ADMIN — LINEZOLID 600 MG: 2 INJECTION, SOLUTION INTRAVENOUS at 18:09

## 2025-06-04 RX ADMIN — Medication 10 MG: at 21:28

## 2025-06-04 RX ADMIN — CEFTRIAXONE 1000 MG: 1 INJECTION, POWDER, FOR SOLUTION INTRAMUSCULAR; INTRAVENOUS at 04:37

## 2025-06-04 RX ADMIN — MEROPENEM 1000 MG: 1 INJECTION, POWDER, FOR SOLUTION INTRAVENOUS at 07:57

## 2025-06-04 RX ADMIN — ATORVASTATIN CALCIUM 40 MG: 40 TABLET, FILM COATED ORAL at 21:21

## 2025-06-04 RX ADMIN — HEPARIN SODIUM 5000 UNITS: 5000 INJECTION INTRAVENOUS; SUBCUTANEOUS at 21:23

## 2025-06-04 RX ADMIN — LEVOTHYROXINE SODIUM 137 MCG: 0.11 TABLET ORAL at 07:50

## 2025-06-04 RX ADMIN — MORPHINE SULFATE 4 MG: 4 INJECTION, SOLUTION INTRAMUSCULAR; INTRAVENOUS at 02:38

## 2025-06-04 RX ADMIN — BUDESONIDE AND FORMOTEROL FUMARATE DIHYDRATE 2 PUFF: 80; 4.5 AEROSOL RESPIRATORY (INHALATION) at 20:34

## 2025-06-04 RX ADMIN — HYDRALAZINE HYDROCHLORIDE 25 MG: 25 TABLET ORAL at 16:09

## 2025-06-04 RX ADMIN — BUDESONIDE AND FORMOTEROL FUMARATE DIHYDRATE 2 PUFF: 80; 4.5 AEROSOL RESPIRATORY (INHALATION) at 08:03

## 2025-06-04 RX ADMIN — HYDRALAZINE HYDROCHLORIDE 25 MG: 25 TABLET ORAL at 07:50

## 2025-06-04 RX ADMIN — ONDANSETRON 8 MG: 2 INJECTION INTRAMUSCULAR; INTRAVENOUS at 02:37

## 2025-06-04 RX ADMIN — LINEZOLID 600 MG: 2 INJECTION, SOLUTION INTRAVENOUS at 10:44

## 2025-06-04 ASSESSMENT — PAIN DESCRIPTION - PAIN TYPE
TYPE: ACUTE PAIN

## 2025-06-04 ASSESSMENT — PAIN DESCRIPTION - DESCRIPTORS
DESCRIPTORS: ACHING
DESCRIPTORS: ACHING
DESCRIPTORS: SHARP
DESCRIPTORS: ACHING

## 2025-06-04 ASSESSMENT — PAIN SCALES - GENERAL
PAINLEVEL_OUTOF10: 8
PAINLEVEL_OUTOF10: 10
PAINLEVEL_OUTOF10: 1
PAINLEVEL_OUTOF10: 8
PAINLEVEL_OUTOF10: 0
PAINLEVEL_OUTOF10: 4

## 2025-06-04 ASSESSMENT — PAIN DESCRIPTION - ORIENTATION
ORIENTATION: LOWER;MID
ORIENTATION: INNER
ORIENTATION: MID;LOWER
ORIENTATION: RIGHT;LEFT

## 2025-06-04 ASSESSMENT — PAIN - FUNCTIONAL ASSESSMENT
PAIN_FUNCTIONAL_ASSESSMENT: ACTIVITIES ARE NOT PREVENTED
PAIN_FUNCTIONAL_ASSESSMENT: 0-10
PAIN_FUNCTIONAL_ASSESSMENT: ACTIVITIES ARE NOT PREVENTED

## 2025-06-04 ASSESSMENT — PAIN DESCRIPTION - ONSET
ONSET: ON-GOING
ONSET: PROGRESSIVE

## 2025-06-04 ASSESSMENT — PAIN DESCRIPTION - LOCATION
LOCATION: BACK;FLANK
LOCATION: ABDOMEN
LOCATION: HEAD
LOCATION: ABDOMEN

## 2025-06-04 ASSESSMENT — PAIN SCALES - WONG BAKER
WONGBAKER_NUMERICALRESPONSE: NO HURT
WONGBAKER_NUMERICALRESPONSE: NO HURT

## 2025-06-04 ASSESSMENT — PAIN DESCRIPTION - FREQUENCY
FREQUENCY: CONTINUOUS

## 2025-06-04 NOTE — ED PROVIDER NOTES
orders placed or performed during the hospital encounter of 06/04/25   CBC with Auto Differential   Result Value Ref Range    WBC 16.0 (H) 4.0 - 10.5 k/uL    RBC 4.06 (L) 4.20 - 5.40 m/uL    Hemoglobin 12.4 (L) 12.5 - 16.0 g/dL    Hematocrit 39.2 37.0 - 47.0 %    MCV 96.6 78.0 - 100.0 fL    MCH 30.5 27.0 - 31.0 pg    MCHC 31.6 (L) 32.0 - 36.0 g/dL    RDW 12.5 11.7 - 14.9 %    Platelets 279 140 - 440 k/uL    MPV 8.7 7.5 - 11.1 fL    Neutrophils % 86 (H) 36 - 66 %    Lymphocytes % 8 (L) 24 - 44 %    Monocytes % 6 (H) 0 - 5 %    Eosinophils % 0 0 - 3 %    Basophils % 0 0 - 1 %    Immature Granulocytes % 0 0 %    Neutrophils Absolute 13.76 k/uL    Lymphocytes Absolute 1.23 k/uL    Monocytes Absolute 0.88 k/uL    Eosinophils Absolute 0.02 k/uL    Basophils Absolute 0.04 k/uL    Immature Granulocytes Absolute 0.07 k/uL   Comprehensive Metabolic Panel w/ Reflex to MG   Result Value Ref Range    Sodium 138 136 - 145 mmol/L    Potassium 4.5 3.5 - 5.1 mmol/L    Chloride 104 99 - 110 mmol/L    CO2 21 21 - 32 mmol/L    Anion Gap 14 9 - 17 mmol/L    Glucose 186 (H) 74 - 99 mg/dL    BUN 34 (H) 7 - 20 mg/dL    Creatinine 3.8 (H) 0.6 - 1.2 mg/dL    Est, Glom Filt Rate 11 (L) >60 mL/min/1.73m2    Calcium 9.5 8.3 - 10.6 mg/dL    Total Protein 7.2 6.4 - 8.2 g/dL    Albumin 3.8 3.4 - 5.0 g/dL    Albumin/Globulin Ratio 1.1 1.1 - 2.2    Total Bilirubin 0.3 0.0 - 1.0 mg/dL    Alkaline Phosphatase 158 (H) 40 - 129 U/L    ALT 12 10 - 40 U/L    AST 21 15 - 37 U/L   Lipase   Result Value Ref Range    Lipase 86 (H) 13 - 60 U/L   Lactate, Sepsis   Result Value Ref Range    Lactic Acid, Sepsis 1.3 0.4 - 2.0 mmol/L   Urinalysis   Result Value Ref Range    Color, UA Yellow Yellow    Turbidity UA Clear Clear    Glucose, Ur 100 (A) NEGATIVE mg/dL    Bilirubin, Urine NEGATIVE NEGATIVE    Ketones, Urine NEGATIVE NEGATIVE mg/dL    Specific Gravity, UA 1.020 1.005 - 1.030    Urine Hgb LARGE (A) NEGATIVE    pH, Urine 7.5 5.0 - 8.0    Protein, UA >=300

## 2025-06-04 NOTE — H&P
V2.0  History and Physical      Name:  Gwendolyn Grimm /Age/Sex: 1940  (85 y.o. female)   MRN & CSN:  [id number] & [id number] Encounter Date/Time: 2025 4:56 AM EDT   Location:  [facility]-A PCP: Pepe Avina MD       Assessment and Plan:   Gwendolyn Grimm is a 85 y.o. female with hypertension, hyperlipidemia, moderate AS, T2DM with long-term use of insulin, ESRD on PD, SVT anemia of chronic disease chronic diastolic heart failure, ischemic CVA, left carotid stent placement presented from home with back pain and nausea    Sepsis POA  Secondary to probably infected R ureteral calculi  CT Abdomen and pelvis reviewed 3 mm calculus in distal ureter with extensive R perinephric stranding and mild hydronephrosis   IV Cefepime, follow up urine and blood culture  ER Provider discussed with Urology plan for stent placement this am    ESRD on PD  Consult Nephrology for inpatient management     Hypertensive Urgency  Related to uncontrolled pain  Start IV Dilaudid as needed   Resume home antihypertensive regimen    T2DM with long term use of insulin  Sliding scale insulin POC glucose q6h hypoglycemia protocol     Hx ischemic CVA  S/p Left carotid stent placement     Moderate Aortic Stenosis     Inpatient Medsurg with telemetry  Full Code    Disposition:     Current Living situation: Home  Expected Disposition: Home  Estimated D/C: 2-3 days    Diet Diet NPO Exceptions are: Sips of Water with Meds, Sips of Clear Liquids   DVT Prophylaxis [] Lovenox, [x]  Heparin, [] SCDs, [] Ambulation,  [] Eliquis, [] Xarelto, [] Coumadin   Code Status Full Code   Surrogate Decision Maker/ POA Gissel SAINI     Personally reviewed Lab Studies and Imaging   Discussed management of the case with ER provider who recommended admission due to ureteral calculi with concern for infection    History from:     Patient    History of Present Illness:     Chief Complaint   Patient presents with    Back Pain     Pt arrived via medic for lower back

## 2025-06-04 NOTE — ED NOTES
ED TO INPATIENT SBAR HANDOFF    Patient Name: Gwendolyn Grimm   :  1940  85 y.o.   Preferred Name  Gwendolyn  Family/Caregiver Present no   Restraints no   C-SSRS: Risk of Suicide: No Risk  Sitter no   Sepsis Risk Score Sepsis V2 Risk Score: 50.3      Situation  Chief Complaint   Patient presents with    Back Pain     Pt arrived via medic for lower back pain. Pt states that her pain is in her flank area on both sides. States that her pain started around 1843-5937 yesterday.     Brief Description of Patient's Condition: Gwendolyn Grimm is a 85 y.o. female that presents with back and abdominal pain.  The patient states that around 5 PM she was sitting at home when she had onset of bilateral flank pain that radiates around to her suprapubic region that is sharp, aching in nature and constant without alleviating or exacerbating factors.  She has not had pain like this before in the past.  Has some associated nausea and has had innumerable amounts of nonbloody watery diarrhea since yesterday evening.  No reported fevers, chest pain, shortness of breath, urinary symptoms.  No other acute complaints at this time.   Mental Status: oriented  Arrived from: home    Imaging:   CT ABDOMEN PELVIS WO CONTRAST Additional Contrast? None   Final Result        Abnormal labs:   Abnormal Labs Reviewed   CBC WITH AUTO DIFFERENTIAL - Abnormal; Notable for the following components:       Result Value    WBC 16.0 (*)     RBC 4.06 (*)     Hemoglobin 12.4 (*)     MCHC 31.6 (*)     Neutrophils % 86 (*)     Lymphocytes % 8 (*)     Monocytes % 6 (*)     All other components within normal limits   COMPREHENSIVE METABOLIC PANEL W/ REFLEX TO MG FOR LOW K - Abnormal; Notable for the following components:    Glucose 186 (*)     BUN 34 (*)     Creatinine 3.8 (*)     Est, Glom Filt Rate 11 (*)     Alkaline Phosphatase 158 (*)     All other components within normal limits   LIPASE - Abnormal; Notable for the following components:    Lipase 86 (*)     All other

## 2025-06-05 ENCOUNTER — APPOINTMENT (OUTPATIENT)
Dept: GENERAL RADIOLOGY | Age: 85
DRG: 871 | End: 2025-06-05
Payer: MEDICARE

## 2025-06-05 LAB
ALBUMIN SERPL-MCNC: 3.3 G/DL (ref 3.4–5)
ALBUMIN/GLOB SERPL: 1.1 {RATIO} (ref 1.1–2.2)
ALP SERPL-CCNC: 127 U/L (ref 40–129)
ALT SERPL-CCNC: 10 U/L (ref 10–40)
ANION GAP SERPL CALCULATED.3IONS-SCNC: 12 MMOL/L (ref 9–17)
APPEARANCE FLD: CLEAR
AST SERPL-CCNC: 18 U/L (ref 15–37)
BASOPHILS # BLD: 0.03 K/UL
BASOPHILS NFR BLD: 0 % (ref 0–1)
BILIRUB SERPL-MCNC: 0.4 MG/DL (ref 0–1)
BODY FLD TYPE: NORMAL
BUN SERPL-MCNC: 38 MG/DL (ref 7–20)
CALCIUM SERPL-MCNC: 8.8 MG/DL (ref 8.3–10.6)
CHLORIDE SERPL-SCNC: 104 MMOL/L (ref 99–110)
CLOT CHECK: NORMAL
CO2 SERPL-SCNC: 21 MMOL/L (ref 21–32)
COLOR FLD: YELLOW
CREAT SERPL-MCNC: 3.7 MG/DL (ref 0.6–1.2)
EOSINOPHIL # BLD: 0.1 K/UL
EOSINOPHILS RELATIVE PERCENT: 1 % (ref 0–3)
ERYTHROCYTE [DISTWIDTH] IN BLOOD BY AUTOMATED COUNT: 12.7 % (ref 11.7–14.9)
GFR, ESTIMATED: 12 ML/MIN/1.73M2
GLUCOSE BLD-MCNC: 102 MG/DL (ref 74–99)
GLUCOSE BLD-MCNC: 120 MG/DL (ref 74–99)
GLUCOSE BLD-MCNC: 122 MG/DL (ref 74–99)
GLUCOSE BLD-MCNC: 155 MG/DL (ref 74–99)
GLUCOSE BLD-MCNC: 163 MG/DL (ref 74–99)
GLUCOSE SERPL-MCNC: 119 MG/DL (ref 74–99)
HCT VFR BLD AUTO: 35.7 % (ref 37–47)
HGB BLD-MCNC: 11.3 G/DL (ref 12.5–16)
IMM GRANULOCYTES # BLD AUTO: 0.06 K/UL
IMM GRANULOCYTES NFR BLD: 1 %
LYMPHOCYTES NFR BLD: 1.89 K/UL
LYMPHOCYTES RELATIVE PERCENT: 14 % (ref 24–44)
MCH RBC QN AUTO: 30.9 PG (ref 27–31)
MCHC RBC AUTO-ENTMCNC: 31.7 G/DL (ref 32–36)
MCV RBC AUTO: 97.5 FL (ref 78–100)
MONOCYTES NFR BLD: 0.81 K/UL
MONOCYTES NFR BLD: 6 % (ref 0–5)
MONOCYTES NFR FLD: 12 %
NEUTROPHILS NFR BLD: 78 % (ref 36–66)
NEUTROPHILS NFR FLD: 88 %
NEUTS SEG NFR BLD: 10.38 K/UL
PLATELET # BLD AUTO: 265 K/UL (ref 140–440)
PMV BLD AUTO: 9.1 FL (ref 7.5–11.1)
POTASSIUM SERPL-SCNC: 4.9 MMOL/L (ref 3.5–5.1)
PROT SERPL-MCNC: 6.3 G/DL (ref 6.4–8.2)
RBC # BLD AUTO: 3.66 M/UL (ref 4.2–5.4)
RBC # FLD: <2000 CELLS/UL
SODIUM SERPL-SCNC: 136 MMOL/L (ref 136–145)
WBC # FLD: 2488 CELLS/UL
WBC OTHER # BLD: 13.3 K/UL (ref 4–10.5)

## 2025-06-05 PROCEDURE — 36415 COLL VENOUS BLD VENIPUNCTURE: CPT

## 2025-06-05 PROCEDURE — 2140000000 HC CCU INTERMEDIATE R&B

## 2025-06-05 PROCEDURE — 87205 SMEAR GRAM STAIN: CPT

## 2025-06-05 PROCEDURE — 85025 COMPLETE CBC W/AUTO DIFF WBC: CPT

## 2025-06-05 PROCEDURE — 6370000000 HC RX 637 (ALT 250 FOR IP): Performed by: STUDENT IN AN ORGANIZED HEALTH CARE EDUCATION/TRAINING PROGRAM

## 2025-06-05 PROCEDURE — 80053 COMPREHEN METABOLIC PANEL: CPT

## 2025-06-05 PROCEDURE — 90945 DIALYSIS ONE EVALUATION: CPT

## 2025-06-05 PROCEDURE — 6370000000 HC RX 637 (ALT 250 FOR IP): Performed by: PHYSICIAN ASSISTANT

## 2025-06-05 PROCEDURE — 2580000003 HC RX 258: Performed by: STUDENT IN AN ORGANIZED HEALTH CARE EDUCATION/TRAINING PROGRAM

## 2025-06-05 PROCEDURE — 89051 BODY FLUID CELL COUNT: CPT

## 2025-06-05 PROCEDURE — 87070 CULTURE OTHR SPECIMN AEROBIC: CPT

## 2025-06-05 PROCEDURE — 6360000002 HC RX W HCPCS: Performed by: STUDENT IN AN ORGANIZED HEALTH CARE EDUCATION/TRAINING PROGRAM

## 2025-06-05 PROCEDURE — 82962 GLUCOSE BLOOD TEST: CPT

## 2025-06-05 PROCEDURE — 94761 N-INVAS EAR/PLS OXIMETRY MLT: CPT

## 2025-06-05 PROCEDURE — 87075 CULTR BACTERIA EXCEPT BLOOD: CPT

## 2025-06-05 RX ORDER — ACETAMINOPHEN 325 MG/1
650 TABLET ORAL EVERY 4 HOURS PRN
Status: DISCONTINUED | OUTPATIENT
Start: 2025-06-05 | End: 2025-06-09 | Stop reason: HOSPADM

## 2025-06-05 RX ORDER — INSULIN LISPRO 100 [IU]/ML
0-4 INJECTION, SOLUTION INTRAVENOUS; SUBCUTANEOUS
Status: DISCONTINUED | OUTPATIENT
Start: 2025-06-05 | End: 2025-06-09 | Stop reason: HOSPADM

## 2025-06-05 RX ORDER — INSULIN GLARGINE 100 [IU]/ML
10 INJECTION, SOLUTION SUBCUTANEOUS NIGHTLY
Status: DISCONTINUED | OUTPATIENT
Start: 2025-06-05 | End: 2025-06-09 | Stop reason: HOSPADM

## 2025-06-05 RX ADMIN — HEPARIN SODIUM 5000 UNITS: 5000 INJECTION INTRAVENOUS; SUBCUTANEOUS at 22:58

## 2025-06-05 RX ADMIN — ATORVASTATIN CALCIUM 40 MG: 40 TABLET, FILM COATED ORAL at 22:57

## 2025-06-05 RX ADMIN — ACETAMINOPHEN 650 MG: 325 TABLET ORAL at 23:13

## 2025-06-05 RX ADMIN — HYDRALAZINE HYDROCHLORIDE 25 MG: 25 TABLET ORAL at 14:16

## 2025-06-05 RX ADMIN — GENTAMICIN SULFATE: 1 CREAM TOPICAL at 20:05

## 2025-06-05 RX ADMIN — TAMSULOSIN HYDROCHLORIDE 0.4 MG: 0.4 CAPSULE ORAL at 03:07

## 2025-06-05 RX ADMIN — LINEZOLID 600 MG: 2 INJECTION, SOLUTION INTRAVENOUS at 09:26

## 2025-06-05 RX ADMIN — ACETAMINOPHEN 650 MG: 325 TABLET ORAL at 10:39

## 2025-06-05 RX ADMIN — HYDRALAZINE HYDROCHLORIDE 25 MG: 25 TABLET ORAL at 22:58

## 2025-06-05 RX ADMIN — MEROPENEM 500 MG: 500 INJECTION, POWDER, FOR SOLUTION INTRAVENOUS at 06:30

## 2025-06-05 RX ADMIN — BUDESONIDE AND FORMOTEROL FUMARATE DIHYDRATE 2 PUFF: 80; 4.5 AEROSOL RESPIRATORY (INHALATION) at 20:29

## 2025-06-05 RX ADMIN — INSULIN GLARGINE 10 UNITS: 100 INJECTION, SOLUTION SUBCUTANEOUS at 22:59

## 2025-06-05 RX ADMIN — TAMSULOSIN HYDROCHLORIDE 0.4 MG: 0.4 CAPSULE ORAL at 09:20

## 2025-06-05 RX ADMIN — LEVOTHYROXINE SODIUM 137 MCG: 0.11 TABLET ORAL at 06:19

## 2025-06-05 RX ADMIN — PANTOPRAZOLE SODIUM 40 MG: 40 TABLET, DELAYED RELEASE ORAL at 06:21

## 2025-06-05 RX ADMIN — HYDRALAZINE HYDROCHLORIDE 25 MG: 25 TABLET ORAL at 09:19

## 2025-06-05 RX ADMIN — HEPARIN SODIUM 5000 UNITS: 5000 INJECTION INTRAVENOUS; SUBCUTANEOUS at 14:16

## 2025-06-05 RX ADMIN — HEPARIN SODIUM 5000 UNITS: 5000 INJECTION INTRAVENOUS; SUBCUTANEOUS at 06:13

## 2025-06-05 RX ADMIN — LINEZOLID 600 MG: 2 INJECTION, SOLUTION INTRAVENOUS at 23:25

## 2025-06-05 ASSESSMENT — PAIN DESCRIPTION - ORIENTATION
ORIENTATION: MID
ORIENTATION: RIGHT;LEFT
ORIENTATION: RIGHT;ANTERIOR
ORIENTATION: MID;LOWER

## 2025-06-05 ASSESSMENT — PAIN - FUNCTIONAL ASSESSMENT
PAIN_FUNCTIONAL_ASSESSMENT: ACTIVITIES ARE NOT PREVENTED

## 2025-06-05 ASSESSMENT — PAIN SCALES - GENERAL
PAINLEVEL_OUTOF10: 0
PAINLEVEL_OUTOF10: 3
PAINLEVEL_OUTOF10: 3
PAINLEVEL_OUTOF10: 5

## 2025-06-05 ASSESSMENT — PAIN DESCRIPTION - LOCATION
LOCATION: HEAD
LOCATION: ABDOMEN
LOCATION: HEAD
LOCATION: HEAD

## 2025-06-05 ASSESSMENT — PAIN DESCRIPTION - ONSET
ONSET: GRADUAL
ONSET: ON-GOING
ONSET: ON-GOING
ONSET: GRADUAL

## 2025-06-05 ASSESSMENT — PAIN DESCRIPTION - DESCRIPTORS
DESCRIPTORS: ACHING

## 2025-06-05 ASSESSMENT — PAIN DESCRIPTION - PAIN TYPE
TYPE: ACUTE PAIN

## 2025-06-05 ASSESSMENT — PAIN SCALES - WONG BAKER
WONGBAKER_NUMERICALRESPONSE: NO HURT

## 2025-06-05 ASSESSMENT — PAIN DESCRIPTION - FREQUENCY
FREQUENCY: INTERMITTENT
FREQUENCY: INTERMITTENT
FREQUENCY: CONTINUOUS
FREQUENCY: CONTINUOUS

## 2025-06-05 NOTE — CARE COORDINATION
06/05/25 1241   Service Assessment   Patient Orientation Alert and Oriented   Cognition Alert   History Provided By Patient   Primary Caregiver Self   Support Systems Children   Patient's Healthcare Decision Maker is: Named in Scanned ACP Document   PCP Verified by CM Yes   Prior Functional Level Assistance with the following:;Mobility   Current Functional Level Assistance with the following:;Mobility   Can patient return to prior living arrangement Unknown at present   Ability to make needs known: Good   Family able to assist with home care needs: Yes   Would you like for me to discuss the discharge plan with any other family members/significant others, and if so, who? Yes  (daughter)   Financial Resources Medicare   Community Resources None     CM in to see pt to initiate discharge planning.  Pt is from home with her son.  Pt daughter Gissel present.      Pt states she has all needed DME and denies any needs at this time.     CM following

## 2025-06-05 NOTE — PLAN OF CARE
Problem: Chronic Conditions and Co-morbidities  Goal: Patient's chronic conditions and co-morbidity symptoms are monitored and maintained or improved  Outcome: Progressing  Flowsheets (Taken 6/4/2025 2000)  Care Plan - Patient's Chronic Conditions and Co-Morbidity Symptoms are Monitored and Maintained or Improved: Monitor and assess patient's chronic conditions and comorbid symptoms for stability, deterioration, or improvement     Problem: Pain  Goal: Verbalizes/displays adequate comfort level or baseline comfort level  Outcome: Progressing  Flowsheets (Taken 6/5/2025 0000)  Verbalizes/displays adequate comfort level or baseline comfort level: Encourage patient to monitor pain and request assistance     Problem: Skin/Tissue Integrity  Goal: Skin integrity remains intact  Description: 1.  Monitor for areas of redness and/or skin breakdown2.  Assess vascular access sites hourly3.  Every 4-6 hours minimum:  Change oxygen saturation probe site4.  Every 4-6 hours:  If on nasal continuous positive airway pressure, respiratory therapy assess nares and determine need for appliance change or resting period  Outcome: Progressing  Flowsheets  Taken 6/5/2025 0400  Skin Integrity Remains Intact: Monitor for areas of redness and/or skin breakdown  Taken 6/4/2025 2000  Skin Integrity Remains Intact: Monitor for areas of redness and/or skin breakdown     Problem: Safety - Adult  Goal: Free from fall injury  Outcome: Progressing

## 2025-06-05 NOTE — PLAN OF CARE
Problem: Chronic Conditions and Co-morbidities  Goal: Patient's chronic conditions and co-morbidity symptoms are monitored and maintained or improved  6/5/2025 1136 by Tommy Lopez RN  Outcome: Progressing  Flowsheets (Taken 6/5/2025 0918)  Care Plan - Patient's Chronic Conditions and Co-Morbidity Symptoms are Monitored and Maintained or Improved: Monitor and assess patient's chronic conditions and comorbid symptoms for stability, deterioration, or improvement  6/5/2025 0444 by Yanelis Raymond RN  Outcome: Progressing  Flowsheets (Taken 6/4/2025 2000)  Care Plan - Patient's Chronic Conditions and Co-Morbidity Symptoms are Monitored and Maintained or Improved: Monitor and assess patient's chronic conditions and comorbid symptoms for stability, deterioration, or improvement     Problem: Discharge Planning  Goal: Discharge to home or other facility with appropriate resources  6/5/2025 1136 by Tommy Lopez RN  Outcome: Progressing  Flowsheets (Taken 6/5/2025 0918)  Discharge to home or other facility with appropriate resources: Identify barriers to discharge with patient and caregiver  6/5/2025 0444 by Yanelis Raymond RN  Outcome: Progressing  Flowsheets (Taken 6/4/2025 2000)  Discharge to home or other facility with appropriate resources: Identify barriers to discharge with patient and caregiver     Problem: Pain  Goal: Verbalizes/displays adequate comfort level or baseline comfort level  6/5/2025 1136 by Tommy Lopez RN  Outcome: Progressing  6/5/2025 0444 by Yanelis Raymond RN  Outcome: Progressing  Flowsheets (Taken 6/5/2025 0000)  Verbalizes/displays adequate comfort level or baseline comfort level: Encourage patient to monitor pain and request assistance     Problem: Skin/Tissue Integrity  Goal: Skin integrity remains intact  Description: 1.  Monitor for areas of redness and/or skin breakdown2.  Assess vascular access sites hourly3.  Every 4-6 hours minimum:  Change oxygen saturation probe site4.  Every

## 2025-06-06 LAB
ALBUMIN SERPL-MCNC: 2.7 G/DL (ref 3.4–5)
ALBUMIN/GLOB SERPL: 1.1 {RATIO} (ref 1.1–2.2)
ALP SERPL-CCNC: 94 U/L (ref 40–129)
ALT SERPL-CCNC: 7 U/L (ref 10–40)
ANION GAP SERPL CALCULATED.3IONS-SCNC: 9 MMOL/L (ref 9–17)
AST SERPL-CCNC: 13 U/L (ref 15–37)
BASOPHILS # BLD: 0.02 K/UL
BASOPHILS NFR BLD: 0 % (ref 0–1)
BILIRUB SERPL-MCNC: <0.2 MG/DL (ref 0–1)
BUN SERPL-MCNC: 45 MG/DL (ref 7–20)
CALCIUM SERPL-MCNC: 8.2 MG/DL (ref 8.3–10.6)
CHLORIDE SERPL-SCNC: 105 MMOL/L (ref 99–110)
CO2 SERPL-SCNC: 21 MMOL/L (ref 21–32)
CREAT SERPL-MCNC: 4.2 MG/DL (ref 0.6–1.2)
EKG ATRIAL RATE: 63 BPM
EKG DIAGNOSIS: NORMAL
EKG P AXIS: 114 DEGREES
EKG P-R INTERVAL: 264 MS
EKG Q-T INTERVAL: 458 MS
EKG QRS DURATION: 128 MS
EKG QTC CALCULATION (BAZETT): 468 MS
EKG R AXIS: 247 DEGREES
EKG T AXIS: 126 DEGREES
EKG VENTRICULAR RATE: 63 BPM
EOSINOPHIL # BLD: 0.26 K/UL
EOSINOPHILS RELATIVE PERCENT: 3 % (ref 0–3)
ERYTHROCYTE [DISTWIDTH] IN BLOOD BY AUTOMATED COUNT: 12.7 % (ref 11.7–14.9)
GFR, ESTIMATED: 10 ML/MIN/1.73M2
GLUCOSE BLD-MCNC: 113 MG/DL (ref 74–99)
GLUCOSE BLD-MCNC: 138 MG/DL (ref 74–99)
GLUCOSE BLD-MCNC: 142 MG/DL (ref 74–99)
GLUCOSE BLD-MCNC: 83 MG/DL (ref 74–99)
GLUCOSE SERPL-MCNC: 113 MG/DL (ref 74–99)
HCT VFR BLD AUTO: 29.7 % (ref 37–47)
HGB BLD-MCNC: 9.4 G/DL (ref 12.5–16)
IMM GRANULOCYTES # BLD AUTO: 0.04 K/UL
IMM GRANULOCYTES NFR BLD: 1 %
LYMPHOCYTES NFR BLD: 2.18 K/UL
LYMPHOCYTES RELATIVE PERCENT: 28 % (ref 24–44)
MCH RBC QN AUTO: 30.9 PG (ref 27–31)
MCHC RBC AUTO-ENTMCNC: 31.6 G/DL (ref 32–36)
MCV RBC AUTO: 97.7 FL (ref 78–100)
MICROORGANISM SPEC CULT: ABNORMAL
MICROORGANISM SPEC CULT: ABNORMAL
MONOCYTES NFR BLD: 0.59 K/UL
MONOCYTES NFR BLD: 8 % (ref 0–5)
NEUTROPHILS NFR BLD: 60 % (ref 36–66)
NEUTS SEG NFR BLD: 4.58 K/UL
PLATELET # BLD AUTO: 217 K/UL (ref 140–440)
PMV BLD AUTO: 9 FL (ref 7.5–11.1)
POTASSIUM SERPL-SCNC: 4.4 MMOL/L (ref 3.5–5.1)
PROT SERPL-MCNC: 5.3 G/DL (ref 6.4–8.2)
RBC # BLD AUTO: 3.04 M/UL (ref 4.2–5.4)
SERVICE CMNT-IMP: ABNORMAL
SODIUM SERPL-SCNC: 135 MMOL/L (ref 136–145)
SPECIMEN DESCRIPTION: ABNORMAL
TSH SERPL DL<=0.05 MIU/L-ACNC: 0.55 UIU/ML (ref 0.27–4.2)
WBC OTHER # BLD: 7.7 K/UL (ref 4–10.5)

## 2025-06-06 PROCEDURE — 93005 ELECTROCARDIOGRAM TRACING: CPT | Performed by: INTERNAL MEDICINE

## 2025-06-06 PROCEDURE — 6370000000 HC RX 637 (ALT 250 FOR IP): Performed by: STUDENT IN AN ORGANIZED HEALTH CARE EDUCATION/TRAINING PROGRAM

## 2025-06-06 PROCEDURE — 94640 AIRWAY INHALATION TREATMENT: CPT

## 2025-06-06 PROCEDURE — 82962 GLUCOSE BLOOD TEST: CPT

## 2025-06-06 PROCEDURE — 93010 ELECTROCARDIOGRAM REPORT: CPT | Performed by: INTERNAL MEDICINE

## 2025-06-06 PROCEDURE — 99223 1ST HOSP IP/OBS HIGH 75: CPT | Performed by: INTERNAL MEDICINE

## 2025-06-06 PROCEDURE — 6360000002 HC RX W HCPCS: Performed by: STUDENT IN AN ORGANIZED HEALTH CARE EDUCATION/TRAINING PROGRAM

## 2025-06-06 PROCEDURE — 36415 COLL VENOUS BLD VENIPUNCTURE: CPT

## 2025-06-06 PROCEDURE — 85025 COMPLETE CBC W/AUTO DIFF WBC: CPT

## 2025-06-06 PROCEDURE — 80053 COMPREHEN METABOLIC PANEL: CPT

## 2025-06-06 PROCEDURE — 90945 DIALYSIS ONE EVALUATION: CPT

## 2025-06-06 PROCEDURE — 84443 ASSAY THYROID STIM HORMONE: CPT

## 2025-06-06 PROCEDURE — 6370000000 HC RX 637 (ALT 250 FOR IP): Performed by: PHYSICIAN ASSISTANT

## 2025-06-06 PROCEDURE — 2140000000 HC CCU INTERMEDIATE R&B

## 2025-06-06 PROCEDURE — 2580000003 HC RX 258: Performed by: STUDENT IN AN ORGANIZED HEALTH CARE EDUCATION/TRAINING PROGRAM

## 2025-06-06 PROCEDURE — 94761 N-INVAS EAR/PLS OXIMETRY MLT: CPT

## 2025-06-06 RX ADMIN — HYDRALAZINE HYDROCHLORIDE 25 MG: 25 TABLET ORAL at 09:27

## 2025-06-06 RX ADMIN — LINEZOLID 600 MG: 2 INJECTION, SOLUTION INTRAVENOUS at 07:09

## 2025-06-06 RX ADMIN — HEPARIN SODIUM 5000 UNITS: 5000 INJECTION INTRAVENOUS; SUBCUTANEOUS at 15:05

## 2025-06-06 RX ADMIN — ATORVASTATIN CALCIUM 40 MG: 40 TABLET, FILM COATED ORAL at 21:13

## 2025-06-06 RX ADMIN — BUDESONIDE AND FORMOTEROL FUMARATE DIHYDRATE 2 PUFF: 80; 4.5 AEROSOL RESPIRATORY (INHALATION) at 11:58

## 2025-06-06 RX ADMIN — HYDRALAZINE HYDROCHLORIDE 25 MG: 25 TABLET ORAL at 15:05

## 2025-06-06 RX ADMIN — LEVOTHYROXINE SODIUM 137 MCG: 0.11 TABLET ORAL at 06:55

## 2025-06-06 RX ADMIN — HEPARIN SODIUM 5000 UNITS: 5000 INJECTION INTRAVENOUS; SUBCUTANEOUS at 21:13

## 2025-06-06 RX ADMIN — MEROPENEM 500 MG: 500 INJECTION, POWDER, FOR SOLUTION INTRAVENOUS at 07:02

## 2025-06-06 RX ADMIN — PANTOPRAZOLE SODIUM 40 MG: 40 TABLET, DELAYED RELEASE ORAL at 06:55

## 2025-06-06 RX ADMIN — TAMSULOSIN HYDROCHLORIDE 0.4 MG: 0.4 CAPSULE ORAL at 09:27

## 2025-06-06 RX ADMIN — HEPARIN SODIUM 5000 UNITS: 5000 INJECTION INTRAVENOUS; SUBCUTANEOUS at 06:54

## 2025-06-06 RX ADMIN — BUDESONIDE AND FORMOTEROL FUMARATE DIHYDRATE 2 PUFF: 80; 4.5 AEROSOL RESPIRATORY (INHALATION) at 20:34

## 2025-06-06 RX ADMIN — HYDRALAZINE HYDROCHLORIDE 25 MG: 25 TABLET ORAL at 21:13

## 2025-06-06 ASSESSMENT — PAIN SCALES - WONG BAKER
WONGBAKER_NUMERICALRESPONSE: NO HURT

## 2025-06-06 ASSESSMENT — PAIN SCALES - GENERAL
PAINLEVEL_OUTOF10: 0
PAINLEVEL_OUTOF10: 2
PAINLEVEL_OUTOF10: 0

## 2025-06-06 ASSESSMENT — PAIN DESCRIPTION - FREQUENCY: FREQUENCY: INTERMITTENT

## 2025-06-06 ASSESSMENT — PAIN DESCRIPTION - ONSET: ONSET: ON-GOING

## 2025-06-06 ASSESSMENT — PAIN DESCRIPTION - PAIN TYPE: TYPE: ACUTE PAIN

## 2025-06-06 ASSESSMENT — PAIN DESCRIPTION - LOCATION: LOCATION: HEAD

## 2025-06-06 ASSESSMENT — PAIN DESCRIPTION - DESCRIPTORS: DESCRIPTORS: ACHING

## 2025-06-06 ASSESSMENT — PAIN DESCRIPTION - ORIENTATION: ORIENTATION: MID

## 2025-06-06 NOTE — PLAN OF CARE
Problem: Chronic Conditions and Co-morbidities  Goal: Patient's chronic conditions and co-morbidity symptoms are monitored and maintained or improved  Outcome: Progressing  Flowsheets  Taken 6/6/2025 0000  Care Plan - Patient's Chronic Conditions and Co-Morbidity Symptoms are Monitored and Maintained or Improved: Monitor and assess patient's chronic conditions and comorbid symptoms for stability, deterioration, or improvement  Taken 6/5/2025 2000  Care Plan - Patient's Chronic Conditions and Co-Morbidity Symptoms are Monitored and Maintained or Improved: Monitor and assess patient's chronic conditions and comorbid symptoms for stability, deterioration, or improvement     Problem: Chronic Conditions and Co-morbidities  Goal: Patient's chronic conditions and co-morbidity symptoms are monitored and maintained or improved  Outcome: Progressing  Flowsheets  Taken 6/6/2025 0000  Care Plan - Patient's Chronic Conditions and Co-Morbidity Symptoms are Monitored and Maintained or Improved: Monitor and assess patient's chronic conditions and comorbid symptoms for stability, deterioration, or improvement  Taken 6/5/2025 2000  Care Plan - Patient's Chronic Conditions and Co-Morbidity Symptoms are Monitored and Maintained or Improved: Monitor and assess patient's chronic conditions and comorbid symptoms for stability, deterioration, or improvement     Problem: Discharge Planning  Goal: Discharge to home or other facility with appropriate resources  Outcome: Progressing  Flowsheets  Taken 6/6/2025 0000  Discharge to home or other facility with appropriate resources: Identify barriers to discharge with patient and caregiver  Taken 6/5/2025 2000  Discharge to home or other facility with appropriate resources: Identify barriers to discharge with patient and caregiver     Problem: Pain  Goal: Verbalizes/displays adequate comfort level or baseline comfort level  Outcome: Progressing  Flowsheets  Taken 6/6/2025

## 2025-06-06 NOTE — CONSULTS
Electrophysiology Consult Note      Reason for consultation: Bradycardia with pauses    Chief complaint: Back pain and nausea    Referring physician: Dr. Galindo      Primary care physician: Pepe Avina MD      History of Present Illness:     Chief Complaint   Patient presents with    Back Pain     Pt arrived via medic for lower back pain. Pt states that her pain is in her flank area on both sides. States that her pain started around [id number] yesterday.        Gwendolyn Grimm is a 85 y.o. female with a prior medical history of hypertension, hyperlipidemia, moderate aortic stenosis, SVT, chronic diastolic heart failure, left carotid stent placement history, hypothyroidism, diabetes mellitus type 2, ESRD on peritoneal dialysis, kidney stones, anemia of chronic disease, history of ischemic CVA.  Patient presented 6/4/2025 from home with complaints of back pain and nausea.  Reported that after lunch that day, she started having bilateral flank and back pain radiating to the groin.  Admitted for workup of suspected sepsis secondary to infected right ureteral calculi, WBC was 16.  Patient was started on IV antibiotics, urology was consulted for a ureteral stent placement.  Nephrology was consulted for inpatient management of peritoneal dialysis, creatinine 3.8.  Patient was also having hypertensive urgency related to uncontrolled pain.  On 6/6, it was reported the patient was having bradycardic episode and pauses on telemetry, cardiology and EP were consulted.      Past medical history:   Past Medical History:   Diagnosis Date    Abnormal nuclear stress test 04/08/2007 4/08-EF=67%,Mild->Mod.isch.LAD;7/07-EF=70%,Suggests poss.Mild Ant.wall ischemia.    Cardiac arrest (HCC) 2008    Diabetes mellitus ([facility])     H/O cardiac catheterization 04/2008    No signif.CAD.    H/O cardiovascular stress test 04/11/2008    mild to mod ischemia in the LAD region, abnormal study, EF 67%, patient developed mild chest pain and 
  ADVANCED NEPHROLOGY CONSULT NOTE  Dr. Bhavin Izaguirre and Dr Vikram Irving                Assessment    1.  End-stage kidney disease on peritoneal dialysis  #2 obstructing renal stone with UTI/hydronephrosis  #3 UTI  #4 hypertensive urgency  #5 type 2 diabetes  #6 aortic stenosis with ischemic CVA        Plan   1.  Attempted peritoneal dialysis I had severe pain will hold on the manual change tomorrow  #2 plan for cystoscopy and intervention with urology on antibiotic therapy  #3 monitor control blood pressure also related to pain  #4 sliding scale insulin  #5 monitor fluid volume status    Date:6/4/2025        Patient Name:Gwendolyn Grimm     YOB: 1940     Age:85 y.o.        Chief Complaint     Reason for Consult: End-stage kidney disease    History Obtained From   patient, electronic medical record    History of Present Illness   The patient is a 85 y.o. female who presents with weakness flank pain nausea noted to have renal stone with hydronephrosis possible UTI end-stage renal disease on dialysis peritoneal dialysis uncontrolled hypertension with increased pain type 2 diabetes prior CVA aortic stenosis admitted for workup and treatment needing cystoscopy with urology pain management antibiotic therapy for UTI and resumption of peritoneal dialysis in the above setting patient [place] any other complaints or problems pain was better controlled awaiting cystoscopy today denies any other fevers chills or chest pain other than above        Patient Active Problem List   Diagnosis Code    Hyperlipidemia E78.5    Hyperuricemia E79.0    Vitamin D deficiency E55.9    Complications of bariatric procedures K95.89    Generalized weakness R53.1    SOB (shortness of breath) on exertion R06.02    Obesity (BMI 30-39.9) E66.9    Localized edema R60.0    SOB (shortness of breath) R06.02    Mild persistent asthma without complication J45.30    Pulmonary hypertension (HCC) I27.20    Abnormal nuclear cardiac imaging test R93.1    
Nightly PRN  [Held by provider] mirtazapine (REMERON) tablet 15 mg, 15 mg, Oral, Nightly  pantoprazole (PROTONIX) tablet 40 mg, 40 mg, Oral, QAM AC  glucose chewable tablet 16 g, 4 tablet, Oral, PRN  dextrose bolus 10% 125 mL, 125 mL, IntraVENous, PRN **OR** dextrose bolus 10% 250 mL, 250 mL, IntraVENous, PRN  glucagon injection 1 mg, 1 mg, SubCUTAneous, PRN  dextrose 10 % infusion, , IntraVENous, Continuous PRN  insulin lispro (HUMALOG,ADMELOG) injection vial 0-4 Units, 0-4 Units, SubCUTAneous, 4 times per day  niCARdipine (CARDENE) 25 mg in sodium chloride 0.9 % 250 mL infusion (Qdni9Bte), 2.5-15 mg/hr, IntraVENous, Continuous  meropenem (MERREM) 1,000 mg in sodium chloride 0.9 % 100 mL IVPB (addEASE), 1,000 mg, IntraVENous, Once **FOLLOWED BY** meropenem (MERREM) 1,000 mg in sodium chloride 0.9 % 100 mL IVPB (addEASE), 1,000 mg, IntraVENous, Q8H  linezolid (ZYVOX) IVPB 600 mg, 600 mg, IntraVENous, Q12H    Allergies:  Anesthesia s-i-40 [propofol], Scopolamine, Amoxicillin, Bactrim [sulfamethoxazole-trimethoprim], and Penicillins    Social History:   TOBACCO:   reports that she quit smoking about 30 years ago. Her smoking use included cigarettes. She started smoking about 33 years ago. She has a 1.5 pack-year smoking history. She has never used smokeless tobacco.  ETOH:   reports no history of alcohol use.  DRUGS:   reports no history of drug use.    Family History:       Problem Relation Age of Onset    Cancer Sister     COPD Brother     Heart Disease Brother     Cancer Mother         melonoma    Migraines Mother     Cancer Father         carcinoma    High Blood Pressure Father     Heart Disease Father        REVIEW OF SYSTEMS:     Constitutional symptoms:(-) fever, (-) chills (-) headache  Eyes: (-) blurred vision, (-) double vision  (-) pain  Allergic/Immunologic:  (-) hay fever  Neurological: (-) tremors, (-) dizzy spells  (-) numbness/tingling  Endocrine: (-)  excessive thirst, (-) feeling too hot/cold (-)  
p.o. daily    Type 2 diabetes on coverage    Hypothyroidism on Synthroid 137 mcg p.o. daily will check the TSH    Cardiac cath done in June 2023 showed no significant CAD    History of left carotid stent in January of this year after having left cerebral CVA             Batsheva Galindo MD, 6/6/2025 6:02 AM       Please note this report has been partially produced using speech recognition software and may contain errors related to that system including errors in grammar, punctuation, and spelling, as well as words and phrases that may be inappropriate. If there are any questions or concerns please feel free to contact the dictating provider for clarification.

## 2025-06-07 LAB
ALBUMIN SERPL-MCNC: 2.9 G/DL (ref 3.4–5)
ALBUMIN/GLOB SERPL: 1.1 {RATIO} (ref 1.1–2.2)
ALP SERPL-CCNC: 97 U/L (ref 40–129)
ALT SERPL-CCNC: 8 U/L (ref 10–40)
ANION GAP SERPL CALCULATED.3IONS-SCNC: 9 MMOL/L (ref 9–17)
AST SERPL-CCNC: 15 U/L (ref 15–37)
BASOPHILS # BLD: 0.02 K/UL
BASOPHILS NFR BLD: 0 % (ref 0–1)
BILIRUB SERPL-MCNC: <0.2 MG/DL (ref 0–1)
BUN SERPL-MCNC: 40 MG/DL (ref 7–20)
CALCIUM SERPL-MCNC: 8.5 MG/DL (ref 8.3–10.6)
CHLORIDE SERPL-SCNC: 105 MMOL/L (ref 99–110)
CO2 SERPL-SCNC: 24 MMOL/L (ref 21–32)
CREAT SERPL-MCNC: 3.7 MG/DL (ref 0.6–1.2)
EOSINOPHIL # BLD: 0.3 K/UL
EOSINOPHILS RELATIVE PERCENT: 4 % (ref 0–3)
ERYTHROCYTE [DISTWIDTH] IN BLOOD BY AUTOMATED COUNT: 12.6 % (ref 11.7–14.9)
GFR, ESTIMATED: 12 ML/MIN/1.73M2
GLUCOSE BLD-MCNC: 103 MG/DL (ref 74–99)
GLUCOSE BLD-MCNC: 123 MG/DL (ref 74–99)
GLUCOSE BLD-MCNC: 139 MG/DL (ref 74–99)
GLUCOSE BLD-MCNC: 157 MG/DL (ref 74–99)
GLUCOSE SERPL-MCNC: 100 MG/DL (ref 74–99)
HCT VFR BLD AUTO: 31.8 % (ref 37–47)
HGB BLD-MCNC: 10 G/DL (ref 12.5–16)
IMM GRANULOCYTES # BLD AUTO: 0.02 K/UL
IMM GRANULOCYTES NFR BLD: 0 %
LYMPHOCYTES NFR BLD: 2.15 K/UL
LYMPHOCYTES RELATIVE PERCENT: 30 % (ref 24–44)
MCH RBC QN AUTO: 30.2 PG (ref 27–31)
MCHC RBC AUTO-ENTMCNC: 31.4 G/DL (ref 32–36)
MCV RBC AUTO: 96.1 FL (ref 78–100)
MONOCYTES NFR BLD: 0.62 K/UL
MONOCYTES NFR BLD: 9 % (ref 0–5)
NEUTROPHILS NFR BLD: 57 % (ref 36–66)
NEUTS SEG NFR BLD: 4.17 K/UL
PLATELET # BLD AUTO: 240 K/UL (ref 140–440)
PMV BLD AUTO: 8.9 FL (ref 7.5–11.1)
POTASSIUM SERPL-SCNC: 4.6 MMOL/L (ref 3.5–5.1)
PROT SERPL-MCNC: 5.6 G/DL (ref 6.4–8.2)
RBC # BLD AUTO: 3.31 M/UL (ref 4.2–5.4)
SODIUM SERPL-SCNC: 138 MMOL/L (ref 136–145)
WBC OTHER # BLD: 7.3 K/UL (ref 4–10.5)

## 2025-06-07 PROCEDURE — 99232 SBSQ HOSP IP/OBS MODERATE 35: CPT | Performed by: INTERNAL MEDICINE

## 2025-06-07 PROCEDURE — APPSS60 APP SPLIT SHARED TIME 46-60 MINUTES: Performed by: NURSE PRACTITIONER

## 2025-06-07 PROCEDURE — 94664 DEMO&/EVAL PT USE INHALER: CPT

## 2025-06-07 PROCEDURE — 6370000000 HC RX 637 (ALT 250 FOR IP): Performed by: PHYSICIAN ASSISTANT

## 2025-06-07 PROCEDURE — 82962 GLUCOSE BLOOD TEST: CPT

## 2025-06-07 PROCEDURE — 6370000000 HC RX 637 (ALT 250 FOR IP): Performed by: STUDENT IN AN ORGANIZED HEALTH CARE EDUCATION/TRAINING PROGRAM

## 2025-06-07 PROCEDURE — 2140000000 HC CCU INTERMEDIATE R&B

## 2025-06-07 PROCEDURE — 36415 COLL VENOUS BLD VENIPUNCTURE: CPT

## 2025-06-07 PROCEDURE — 2580000003 HC RX 258: Performed by: STUDENT IN AN ORGANIZED HEALTH CARE EDUCATION/TRAINING PROGRAM

## 2025-06-07 PROCEDURE — 85025 COMPLETE CBC W/AUTO DIFF WBC: CPT

## 2025-06-07 PROCEDURE — 6370000000 HC RX 637 (ALT 250 FOR IP): Performed by: INTERNAL MEDICINE

## 2025-06-07 PROCEDURE — 80053 COMPREHEN METABOLIC PANEL: CPT

## 2025-06-07 PROCEDURE — 94761 N-INVAS EAR/PLS OXIMETRY MLT: CPT

## 2025-06-07 PROCEDURE — 6360000002 HC RX W HCPCS: Performed by: STUDENT IN AN ORGANIZED HEALTH CARE EDUCATION/TRAINING PROGRAM

## 2025-06-07 PROCEDURE — 94640 AIRWAY INHALATION TREATMENT: CPT

## 2025-06-07 RX ORDER — CIPROFLOXACIN 500 MG/1
500 TABLET, FILM COATED ORAL EVERY 12 HOURS SCHEDULED
Status: DISCONTINUED | OUTPATIENT
Start: 2025-06-07 | End: 2025-06-07

## 2025-06-07 RX ORDER — HYDRALAZINE HYDROCHLORIDE 50 MG/1
50 TABLET, FILM COATED ORAL 3 TIMES DAILY
Status: DISCONTINUED | OUTPATIENT
Start: 2025-06-07 | End: 2025-06-09 | Stop reason: HOSPADM

## 2025-06-07 RX ORDER — CIPROFLOXACIN 500 MG/1
500 TABLET, FILM COATED ORAL
Status: DISCONTINUED | OUTPATIENT
Start: 2025-06-07 | End: 2025-06-09 | Stop reason: HOSPADM

## 2025-06-07 RX ADMIN — BUDESONIDE AND FORMOTEROL FUMARATE DIHYDRATE 2 PUFF: 80; 4.5 AEROSOL RESPIRATORY (INHALATION) at 09:38

## 2025-06-07 RX ADMIN — LEVOTHYROXINE SODIUM 137 MCG: 0.11 TABLET ORAL at 05:48

## 2025-06-07 RX ADMIN — HYDRALAZINE HYDROCHLORIDE 50 MG: 50 TABLET ORAL at 20:57

## 2025-06-07 RX ADMIN — HYDRALAZINE HYDROCHLORIDE 25 MG: 25 TABLET ORAL at 08:30

## 2025-06-07 RX ADMIN — ATORVASTATIN CALCIUM 40 MG: 40 TABLET, FILM COATED ORAL at 20:57

## 2025-06-07 RX ADMIN — CIPROFLOXACIN HYDROCHLORIDE 500 MG: 500 TABLET, FILM COATED ORAL at 12:07

## 2025-06-07 RX ADMIN — INSULIN GLARGINE 10 UNITS: 100 INJECTION, SOLUTION SUBCUTANEOUS at 20:58

## 2025-06-07 RX ADMIN — PANTOPRAZOLE SODIUM 40 MG: 40 TABLET, DELAYED RELEASE ORAL at 05:48

## 2025-06-07 RX ADMIN — BUDESONIDE AND FORMOTEROL FUMARATE DIHYDRATE 2 PUFF: 80; 4.5 AEROSOL RESPIRATORY (INHALATION) at 20:17

## 2025-06-07 RX ADMIN — MEROPENEM 500 MG: 500 INJECTION, POWDER, FOR SOLUTION INTRAVENOUS at 08:29

## 2025-06-07 RX ADMIN — HEPARIN SODIUM 5000 UNITS: 5000 INJECTION INTRAVENOUS; SUBCUTANEOUS at 05:48

## 2025-06-07 RX ADMIN — HYDRALAZINE HYDROCHLORIDE 50 MG: 50 TABLET ORAL at 14:21

## 2025-06-07 RX ADMIN — TAMSULOSIN HYDROCHLORIDE 0.4 MG: 0.4 CAPSULE ORAL at 08:30

## 2025-06-07 RX ADMIN — HEPARIN SODIUM 5000 UNITS: 5000 INJECTION INTRAVENOUS; SUBCUTANEOUS at 14:21

## 2025-06-07 RX ADMIN — Medication 10 MG: at 21:25

## 2025-06-07 RX ADMIN — HEPARIN SODIUM 5000 UNITS: 5000 INJECTION INTRAVENOUS; SUBCUTANEOUS at 20:57

## 2025-06-07 NOTE — PLAN OF CARE
Problem: Chronic Conditions and Co-morbidities  Goal: Patient's chronic conditions and co-morbidity symptoms are monitored and maintained or improved  Outcome: Progressing  Flowsheets (Taken 6/7/2025 0831)  Care Plan - Patient's Chronic Conditions and Co-Morbidity Symptoms are Monitored and Maintained or Improved: Monitor and assess patient's chronic conditions and comorbid symptoms for stability, deterioration, or improvement     Problem: Discharge Planning  Goal: Discharge to home or other facility with appropriate resources  Outcome: Progressing  Flowsheets (Taken 6/7/2025 0831)  Discharge to home or other facility with appropriate resources: Identify barriers to discharge with patient and caregiver     Problem: Pain  Goal: Verbalizes/displays adequate comfort level or baseline comfort level  Outcome: Progressing     Problem: Skin/Tissue Integrity  Goal: Skin integrity remains intact  Description: 1.  Monitor for areas of redness and/or skin breakdown2.  Assess vascular access sites hourly3.  Every 4-6 hours minimum:  Change oxygen saturation probe site4.  Every 4-6 hours:  If on nasal continuous positive airway pressure, respiratory therapy assess nares and determine need for appliance change or resting period  Outcome: Progressing     Problem: Safety - Adult  Goal: Free from fall injury  Outcome: Progressing

## 2025-06-08 PROBLEM — N20.1 URETEROLITHIASIS: Status: ACTIVE | Noted: 2025-06-08

## 2025-06-08 LAB
GLUCOSE BLD-MCNC: 103 MG/DL (ref 74–99)
GLUCOSE BLD-MCNC: 133 MG/DL (ref 74–99)
GLUCOSE BLD-MCNC: 84 MG/DL (ref 74–99)
GLUCOSE BLD-MCNC: 85 MG/DL (ref 74–99)
MICROORGANISM SPEC CULT: NORMAL
MICROORGANISM/AGENT SPEC: NORMAL
SERVICE CMNT-IMP: NORMAL
SPECIMEN DESCRIPTION: NORMAL

## 2025-06-08 PROCEDURE — 82962 GLUCOSE BLOOD TEST: CPT

## 2025-06-08 PROCEDURE — APPSS60 APP SPLIT SHARED TIME 46-60 MINUTES: Performed by: NURSE PRACTITIONER

## 2025-06-08 PROCEDURE — 94640 AIRWAY INHALATION TREATMENT: CPT

## 2025-06-08 PROCEDURE — 6370000000 HC RX 637 (ALT 250 FOR IP): Performed by: STUDENT IN AN ORGANIZED HEALTH CARE EDUCATION/TRAINING PROGRAM

## 2025-06-08 PROCEDURE — 94761 N-INVAS EAR/PLS OXIMETRY MLT: CPT

## 2025-06-08 PROCEDURE — 6370000000 HC RX 637 (ALT 250 FOR IP): Performed by: INTERNAL MEDICINE

## 2025-06-08 PROCEDURE — 6360000002 HC RX W HCPCS: Performed by: STUDENT IN AN ORGANIZED HEALTH CARE EDUCATION/TRAINING PROGRAM

## 2025-06-08 PROCEDURE — 99232 SBSQ HOSP IP/OBS MODERATE 35: CPT | Performed by: INTERNAL MEDICINE

## 2025-06-08 PROCEDURE — 2140000000 HC CCU INTERMEDIATE R&B

## 2025-06-08 PROCEDURE — 6370000000 HC RX 637 (ALT 250 FOR IP): Performed by: PHYSICIAN ASSISTANT

## 2025-06-08 RX ADMIN — CIPROFLOXACIN HYDROCHLORIDE 500 MG: 500 TABLET, FILM COATED ORAL at 08:08

## 2025-06-08 RX ADMIN — HEPARIN SODIUM 5000 UNITS: 5000 INJECTION INTRAVENOUS; SUBCUTANEOUS at 14:06

## 2025-06-08 RX ADMIN — ATORVASTATIN CALCIUM 40 MG: 40 TABLET, FILM COATED ORAL at 20:14

## 2025-06-08 RX ADMIN — HYDRALAZINE HYDROCHLORIDE 50 MG: 50 TABLET ORAL at 20:14

## 2025-06-08 RX ADMIN — HEPARIN SODIUM 5000 UNITS: 5000 INJECTION INTRAVENOUS; SUBCUTANEOUS at 20:20

## 2025-06-08 RX ADMIN — HEPARIN SODIUM 5000 UNITS: 5000 INJECTION INTRAVENOUS; SUBCUTANEOUS at 06:01

## 2025-06-08 RX ADMIN — LEVOTHYROXINE SODIUM 137 MCG: 0.11 TABLET ORAL at 06:01

## 2025-06-08 RX ADMIN — TAMSULOSIN HYDROCHLORIDE 0.4 MG: 0.4 CAPSULE ORAL at 08:08

## 2025-06-08 RX ADMIN — HYDRALAZINE HYDROCHLORIDE 50 MG: 50 TABLET ORAL at 14:06

## 2025-06-08 RX ADMIN — HYDRALAZINE HYDROCHLORIDE 50 MG: 50 TABLET ORAL at 08:08

## 2025-06-08 RX ADMIN — INSULIN GLARGINE 10 UNITS: 100 INJECTION, SOLUTION SUBCUTANEOUS at 20:19

## 2025-06-08 RX ADMIN — BUDESONIDE AND FORMOTEROL FUMARATE DIHYDRATE 2 PUFF: 80; 4.5 AEROSOL RESPIRATORY (INHALATION) at 20:10

## 2025-06-08 RX ADMIN — PANTOPRAZOLE SODIUM 40 MG: 40 TABLET, DELAYED RELEASE ORAL at 06:01

## 2025-06-08 RX ADMIN — Medication 10 MG: at 20:14

## 2025-06-08 NOTE — PLAN OF CARE
Problem: Chronic Conditions and Co-morbidities  Goal: Patient's chronic conditions and co-morbidity symptoms are monitored and maintained or improved  Outcome: Progressing  Flowsheets (Taken 6/8/2025 0803)  Care Plan - Patient's Chronic Conditions and Co-Morbidity Symptoms are Monitored and Maintained or Improved: Monitor and assess patient's chronic conditions and comorbid symptoms for stability, deterioration, or improvement     Problem: Discharge Planning  Goal: Discharge to home or other facility with appropriate resources  Outcome: Progressing  Flowsheets (Taken 6/8/2025 0803)  Discharge to home or other facility with appropriate resources: Identify barriers to discharge with patient and caregiver     Problem: Pain  Goal: Verbalizes/displays adequate comfort level or baseline comfort level  Outcome: Progressing     Problem: Skin/Tissue Integrity  Goal: Skin integrity remains intact  Description: 1.  Monitor for areas of redness and/or skin breakdown2.  Assess vascular access sites hourly3.  Every 4-6 hours minimum:  Change oxygen saturation probe site4.  Every 4-6 hours:  If on nasal continuous positive airway pressure, respiratory therapy assess nares and determine need for appliance change or resting period  Outcome: Progressing     Problem: Safety - Adult  Goal: Free from fall injury  Outcome: Progressing

## 2025-06-09 ENCOUNTER — TELEPHONE (OUTPATIENT)
Dept: CARDIOLOGY CLINIC | Age: 85
End: 2025-06-09

## 2025-06-09 VITALS
TEMPERATURE: 97.7 F | DIASTOLIC BLOOD PRESSURE: 78 MMHG | HEART RATE: 89 BPM | WEIGHT: 179.45 LBS | BODY MASS INDEX: 28.84 KG/M2 | OXYGEN SATURATION: 96 % | HEIGHT: 66 IN | RESPIRATION RATE: 17 BRPM | SYSTOLIC BLOOD PRESSURE: 155 MMHG

## 2025-06-09 LAB
GLUCOSE BLD-MCNC: 78 MG/DL (ref 74–99)
GLUCOSE BLD-MCNC: 95 MG/DL (ref 74–99)

## 2025-06-09 PROCEDURE — 6360000002 HC RX W HCPCS: Performed by: STUDENT IN AN ORGANIZED HEALTH CARE EDUCATION/TRAINING PROGRAM

## 2025-06-09 PROCEDURE — 82962 GLUCOSE BLOOD TEST: CPT

## 2025-06-09 PROCEDURE — 6370000000 HC RX 637 (ALT 250 FOR IP): Performed by: INTERNAL MEDICINE

## 2025-06-09 PROCEDURE — 6370000000 HC RX 637 (ALT 250 FOR IP): Performed by: STUDENT IN AN ORGANIZED HEALTH CARE EDUCATION/TRAINING PROGRAM

## 2025-06-09 PROCEDURE — APPNB15 APP NON BILLABLE TIME 0-15 MINS

## 2025-06-09 PROCEDURE — 6370000000 HC RX 637 (ALT 250 FOR IP): Performed by: PHYSICIAN ASSISTANT

## 2025-06-09 PROCEDURE — 94761 N-INVAS EAR/PLS OXIMETRY MLT: CPT

## 2025-06-09 RX ORDER — HYDRALAZINE HYDROCHLORIDE 20 MG/ML
10 INJECTION INTRAMUSCULAR; INTRAVENOUS EVERY 6 HOURS PRN
Status: DISCONTINUED | OUTPATIENT
Start: 2025-06-09 | End: 2025-06-09 | Stop reason: HOSPADM

## 2025-06-09 RX ORDER — TAMSULOSIN HYDROCHLORIDE 0.4 MG/1
0.4 CAPSULE ORAL DAILY
Qty: 30 CAPSULE | Refills: 3 | Status: SHIPPED | OUTPATIENT
Start: 2025-06-10

## 2025-06-09 RX ORDER — CIPROFLOXACIN 500 MG/1
500 TABLET, FILM COATED ORAL
Qty: 10 TABLET | Refills: 0 | Status: SHIPPED | OUTPATIENT
Start: 2025-06-10 | End: 2025-06-20

## 2025-06-09 RX ORDER — ATORVASTATIN CALCIUM 40 MG/1
40 TABLET, FILM COATED ORAL NIGHTLY
Qty: 30 TABLET | Refills: 3 | Status: SHIPPED | OUTPATIENT
Start: 2025-06-09

## 2025-06-09 RX ADMIN — HYDRALAZINE HYDROCHLORIDE 10 MG: 20 INJECTION INTRAMUSCULAR; INTRAVENOUS at 12:22

## 2025-06-09 RX ADMIN — PANTOPRAZOLE SODIUM 40 MG: 40 TABLET, DELAYED RELEASE ORAL at 05:27

## 2025-06-09 RX ADMIN — HYDRALAZINE HYDROCHLORIDE 10 MG: 20 INJECTION INTRAMUSCULAR; INTRAVENOUS at 07:44

## 2025-06-09 RX ADMIN — TAMSULOSIN HYDROCHLORIDE 0.4 MG: 0.4 CAPSULE ORAL at 07:58

## 2025-06-09 RX ADMIN — HYDRALAZINE HYDROCHLORIDE 50 MG: 50 TABLET ORAL at 15:07

## 2025-06-09 RX ADMIN — HEPARIN SODIUM 5000 UNITS: 5000 INJECTION INTRAVENOUS; SUBCUTANEOUS at 05:27

## 2025-06-09 RX ADMIN — CIPROFLOXACIN HYDROCHLORIDE 500 MG: 500 TABLET, FILM COATED ORAL at 07:58

## 2025-06-09 RX ADMIN — LEVOTHYROXINE SODIUM 137 MCG: 0.11 TABLET ORAL at 05:27

## 2025-06-09 RX ADMIN — HYDRALAZINE HYDROCHLORIDE 50 MG: 50 TABLET ORAL at 07:58

## 2025-06-09 NOTE — TELEPHONE ENCOUNTER
Pt needs to schedule appt with Dr. Loza, pt was seen in the hospital for pauses/bradycardia and needs to be seen asap.

## 2025-06-09 NOTE — DISCHARGE INSTRUCTIONS
Please make appointment to see EP    Please complete course of oral antibiotics and follow-up with urology in 2 weeks.

## 2025-06-09 NOTE — PROGRESS NOTES
[facility] [place] [address]  Phone: [phone]    Fax [phone]                  Batsheva Galindo MD, [facility]       Jorge Alberto Nunes MD, [facility]  Des Plunkett MD, [facility]    MD Lisa Franco MD Tariq Rizvi, MD Bilal Alam, MD Dr. Waseem Sajjad MD Melissa Kellis, APRJULEE Contreras, APRJULEE Rodriguez, APRJULEE Kaplan, APRN  Nixon Stubbs PA-C    CARDIOLOGY  NOTE      Name:  Gwendolyn Grimm /Age/Sex: 1940  (85 y.o. female)   MRN & CSN:  [id number] & [id number] Admission Date/Time: 2025  1:56 AM   Location:  [facility] PCP: Pepe Avina MD       Hospital Day: 6    - Cardiology consult is for: Bradycardia      ASSESSMENT/ PLAN:    Bradycardia with brief pause  - Currently sinus rhythm.  - No significant pauses noted overnight.  - Outpatient event monitor.  - Outpatient EP evaluation.  - No emergent need for permanent pacemaker placement at this time  -Avoid AV rae blockers at this time  Moderate aortic stenosis  Hypertension  - Currently appears euvolemic  -Continue conservative measures  -Continue serial echocardiogram as outpatient  -Hydralazine 50 mg 2 times daily  Sepsis with UTI  End-stage renal disease on peritoneal dialysis  History of CVA 2025: Clopidogrel currently held        Cardiology will sign off, please call with any questions.  Patient to follow-up in clinic with Dr. Galindo and EP  Subjective:  Gwendolyn is a 85 y.o.year old   Patient does not have any complaints of shortness of breath, palpitation, lightheadedness, dizziness, lower extremity edema      Objective: Temperature:  Current - Temp: 97.7 °F (36.5 °C); Max - Temp  Av.9 °F (36.6 °C)  Min: 97.7 °F (36.5 °C)  Max: 98 °F (36.7 °C)    Respiratory Rate : Resp  Av.9  Min: 17  Max: 27    Pulse Range: Pulse  Av.7  Min: 59  Max: 98    Blood Presuure Range:  Systolic (24hrs), Av , 
      Hospital Medicine Progress Note  V 5.17      Date of Admission: 6/4/2025    Hospital Day: 3      Chief Admission Complaint: Flank pain    Subjective:      Patient no acute distress today.  She did have some bradycardia in the and pauses on telemetry.  Cardiology has asked electrophysiology to evaluate.  Patient asymptomatic during events.    Presenting Admission History:       85 y.o. female with hypertension, hyperlipidemia, moderate AS, T2DM with long-term use of insulin, ESRD on PD, SVT anemia of chronic disease chronic diastolic heart failure, ischemic CVA, left carotid stent placement presented from home with back pain and nausea. Usual state of health up until Lunch. Soon after that started experiencing bilateral flank/back pain radiating to the groin got IV Morphine upon arrival to ER and since then has been having R.flank pain. No dysuria burning micturition. During my evaluation patient was alert oriented x 3 hemodynamically stable mentioned R flank tenderness.  Denies any chest pain LOC dizziness fever night sweats or chills.     Assessment/Plan:      Sepsis POA, 2/UTI2  Secondary to probably infected R ureteral calculi  CT Abdomen and pelvis reviewed 3 mm calculus in distal ureter with extensive R perinephric stranding and mild hydronephrosis history of both MDRO and VRE  Meropenem and Zyvox  Urology consulted  Pain control  Plan for cystoscopy and stent placement on 6/5  Follow-up urinary culture results     Bradycardia/pauses:  EP has been consulted  Monitor on telemetry  Avoid any AV rae blocking agents    ESRD on PD  Consult Nephrology for inpatient management      Hypertensive Urgency  Related to uncontrolled pain  Pain control  Resume home antihypertensive regimen     T2DM with long term use of insulin  Lantus 10 units nightly  Sliding scale insulin  Point-of-care glucose checks  Hypoglycemia protocol     Hx ischemic CVA  S/p Left carotid stent placement      Moderate Aortic Stenosis 
      [facility] Medicine Progress Note  V 5.17      Date of Admission: 6/4/2025    Hospital Day: 2      Chief Admission Complaint: Flank pain    Subjective:        Patient with continued headache and some flank pain.  Standpoint and plan for today.    Presenting Admission History:       85 y.o. female with hypertension, hyperlipidemia, moderate AS, T2DM with long-term use of insulin, ESRD on PD, SVT anemia of chronic disease chronic diastolic heart failure, ischemic CVA, left carotid stent placement presented from home with back pain and nausea. Usual state of health up until Lunch. Soon after that started experiencing bilateral flank/back pain radiating to the groin got IV Morphine upon arrival to ER and since then has been having R.flank pain. No dysuria burning micturition. During my evaluation patient was alert oriented x 3 hemodynamically stable mentioned R flank tenderness.  Denies any chest pain LOC dizziness fever night sweats or chills.     Assessment/Plan:      Sepsis POA, 2/UTI2  Secondary to probably infected R ureteral calculi  CT Abdomen and pelvis reviewed 3 mm calculus in distal ureter with extensive R perinephric stranding and mild hydronephrosis history of both MDRO and VRE  Meropenem and Zyvox  Urology consulted  Pain control  Plan for cystoscopy and stent placement on 6/5  Follow-up urinary culture results     ESRD on PD  Consult Nephrology for inpatient management      Hypertensive Urgency  Related to uncontrolled pain  Pain control  Resume home antihypertensive regimen     T2DM with long term use of insulin  Lantus 10 units nightly  Sliding scale insulin  Point-of-care glucose checks  Hypoglycemia protocol     Hx ischemic CVA  S/p Left carotid stent placement      Moderate Aortic Stenosis     Ongoing threat to life and/or bodily function without ongoing treatment due to:  UTI and ureteral stone    Consults:      IP CONSULT TO UROLOGY  IP CONSULT TO NEPHROLOGY  
    [address] [address]   Progress Note  [facility] [address]      Date: 2025   Patient: Gwendolyn Grimm   : 1940   DOA: 2025   MRN: [id number]   ROOM#: 3127/3127-A     Admit Date: 2025     Collaborating Urologist on Call at time of admission: Dr. Pat   CC: Back pain   Reason for Consult: Kidney stone, UTI     Subjective:     Pain: mild, no nausea and no vomiting,   Bowel Movement/Flatus: Yes  Voiding: easily,     Pt resting in bed, reports feeling better today. Discussed with patient and her daughter, Gissel, risks/benefits of surgery for her kidney stone and treatment options to include medical expulsion therapy and IV abx vs cystoscopy/stent placement in the OR. She is high risk for surgery. She and her daughter would like to manage her stone and infection conservatively and hold off on surgery unless it is absolutely necessary.    Objective:    Vitals:   BP (!) 153/65   Pulse 68   Temp 98.9 °F (37.2 °C) (Oral)   Resp 13   Ht 1.676 m (5' 6\")   Wt 83.4 kg (183 lb 13.8 oz)   SpO2 95%   BMI 29.68 kg/m²   Temp  Av.8 °F (37.1 °C)  Min: 98 °F (36.7 °C)  Max: 99.7 °F (37.6 °C)    Intake/Output Summary (Last 24 hours) at 2025 1014  Last data filed at 2025 0424  Gross per 24 hour   Intake 736.58 ml   Output --   Net 736.58 ml       Physical Exam:  Gen: Pleasant female, in NAD  Neuro: Non-focal  Resp: Unlabored breathing  CV: Regular rate and rhythm  Abd: Soft, non-distended, TTP in lower abdomen, no rebound  Back:   Right CVAT  Ext: No edema of bilateral LEs    Labs:  WBC:    Lab Results   Component Value Date/Time    WBC 13.3 2025 03:44 AM     Hemoglobin/Hematocrit:    Lab Results   Component Value Date/Time    HGB 11.3 2025 03:44 AM    HCT 35.7 2025 03:44 AM     BMP:   Lab Results   Component Value Date/Time     2025 03:44 AM    K 4.9 2025 03:44 AM     2025 03:44 AM    CO2 21 2025 03:44 AM    BUN 38 2025 03:44 
    [address] [address]   Progress Note  [facility] 0 1 2      Date: 2025   Patient: Gwendolyn Grimm   : 1940   DOA: 2025   MRN: [id number]   ROOM#: 3127/3127-A     Admit Date: 2025     Collaborating Urologist on Call at time of admission: Bi    CC:     Subjective:     Pain: none, no nausea and no vomiting,   Bowel Movement/Flatus:   Yes  Voiding:  easily,   Pt state she is feeling progressively better with less abdominal discomfort    Objective:      Vitals:    BP (!) 187/66   Pulse 67   Temp 97.9 °F (36.6 °C) (Oral)   Resp 19   Ht 1.676 m (5' 6\")   Wt 86.5 kg (190 lb 11.2 oz)   SpO2 98%   BMI 30.78 kg/m²    Temp  Av °F (36.7 °C)  Min: 97.5 °F (36.4 °C)  Max: 99 °F (37.2 °C)     Intake/Output Summary (Last 24 hours) at 2025 0838  Last data filed at 2025 0822  Gross per 24 hour   Intake 6000 ml   Output 4901 ml   Net 1099 ml       Physical Exam:    BP (!) 155/82   Pulse 67   Temp 97.9 °F (36.6 °C) (Oral)   Resp 17   Ht 1.676 m (5' 6\")   Wt 86.5 kg (190 lb 11.2 oz)   SpO2 97%   BMI 30.78 kg/m²   General appearance: alert, appears stated age, and cooperative    Labs:   WBC:    Lab Results   Component Value Date/Time    WBC 7.3 2025 03:13 AM      Hemoglobin/Hematocrit:    Lab Results   Component Value Date/Time    HGB 10.0 2025 03:13 AM    HCT 31.8 2025 03:13 AM      BMP:   Lab Results   Component Value Date/Time     2025 03:13 AM    K 4.6 2025 03:13 AM     2025 03:13 AM    CO2 24 2025 03:13 AM    BUN 40 2025 03:13 AM    LABALBU DUPLICATE ORDER 2022 12:43 PM    CREATININE 3.7 2025 03:13 AM    CALCIUM 8.5 2025 03:13 AM    GFRAA 18 2022 11:54 AM    GFRAA >60 2012 11:08 AM    LABGLOM 12 2025 03:13 AM    LABGLOM 26 2024 09:50 PM      PT/INR:    Lab Results   Component Value Date/Time    PROTIME 13.2 2025 06:40 PM    INR 1.0 2025 06:40 PM      PTT:  
    [facility] [address]   Progress Note  [facility] [address]      Date: 2025   Patient: Gwendolyn Grimm   : 1940   DOA: 2025   MRN: [id number]   ROOM#: 3127/3127-A     Admit Date: 2025     Collaborating Urologist on Call at time of admission: Dr. Pat   CC: Back pain   Reason for Consult: Kidney stone, UTI     Subjective:     Pain: None, no nausea and no vomiting,   Bowel Movement/Flatus: Yes  Voiding: External catheter with concentrated yellow urine    Pt resting in bed, reports feeling great today.    Objective:    Vitals:   BP (!) 120/44   Pulse 68   Temp 97.5 °F (36.4 °C) (Oral)   Resp 20   Ht 1.676 m (5' 6\")   Wt 84 kg (185 lb 3 oz)   SpO2 98%   BMI 29.89 kg/m²   Temp  Av.3 °F (36.8 °C)  Min: 97.5 °F (36.4 °C)  Max: 99.1 °F (37.3 °C)    Intake/Output Summary (Last 24 hours) at 2025 1022  Last data filed at 2025 0955  Gross per 24 hour   Intake 2489.04 ml   Output 850 ml   Net 1639.04 ml       Physical Exam:  Gen: Pleasant female, in NAD  Neuro: Non-focal  Resp: Unlabored breathing  CV: Regular rate and rhythm  Abd: Soft, non-distended, non-tender, no rebound  Back:   Right CVAT  :  External catheter with concentrated yellow urine  Ext: No edema of bilateral LEs    Labs:  WBC:    Lab Results   Component Value Date/Time    WBC 7.7 2025 02:16 AM     Hemoglobin/Hematocrit:    Lab Results   Component Value Date/Time    HGB 9.4 2025 02:16 AM    HCT 29.7 2025 02:16 AM     BMP:   Lab Results   Component Value Date/Time     2025 02:16 AM    K 4.4 2025 02:16 AM     2025 02:16 AM    CO2 21 2025 02:16 AM    BUN 45 2025 02:16 AM    LABALBU DUPLICATE ORDER 2022 12:43 PM    CREATININE 4.2 2025 02:16 AM    CALCIUM 8.2 2025 02:16 AM    GFRAA 18 2022 11:54 AM    GFRAA >60 2012 11:08 AM    LABGLOM 10 2025 02:16 AM    LABGLOM 26 2024 09:50 PM     Blood Culture:  NGTD  Urine 
  Nephrology Progress Note  6/5/2025 12:30 PM  Subjective:     Interval History: Gwendolyn Grimm is a 85 y.o. female with  who appears to be doing somewhat better still having some pain did a manual flush today that was working we will do PD tonight will hopefully do cystoscopy today        Data:   Scheduled Meds:   insulin lispro  0-4 Units SubCUTAneous 4x Daily AC & HS    insulin glargine  10 Units SubCUTAneous Nightly    heparin (porcine)  5,000 Units SubCUTAneous 3 times per day    atorvastatin  40 mg Oral Nightly    budesonide-formoterol  2 puff Inhalation BID    [Held by provider] citalopram  20 mg Oral Daily    [Held by provider] clopidogrel  75 mg Oral Daily    hydrALAZINE  25 mg Oral TID    levothyroxine  137 mcg Oral Daily    [Held by provider] mirtazapine  15 mg Oral Nightly    pantoprazole  40 mg Oral QAM AC    meropenem  500 mg IntraVENous Q24H    linezolid  600 mg IntraVENous Q12H    tamsulosin  0.4 mg Oral Daily     Continuous Infusions:   dextrose           CBC   Recent Labs     06/04/25  0232 06/05/25  0344   WBC 16.0* 13.3*   HGB 12.4* 11.3*   HCT 39.2 35.7*    265      BMP   Recent Labs     06/04/25  0232 06/05/25  0344    136   K 4.5 4.9    104   CO2 21 21   BUN 34* 38*   CREATININE 3.8* 3.7*     Hepatic:   Recent Labs     06/04/25  0232 06/05/25  0344   AST 21 18   ALT 12 10   BILITOT 0.3 0.4   ALKPHOS 158* 127     Troponin: No results for input(s): \"TROPONINI\" in the last 72 hours.  BNP: No results for input(s): \"BNP\" in the last 72 hours.  Lipids: No results for input(s): \"CHOL\", \"HDL\" in the last 72 hours.    Invalid input(s): \"LDLCALCU\"  ABGs:   Lab Results   Component Value Date/Time    PO2ART 73 05/12/2023 07:00 AM    [id number] 38.0 05/12/2023 07:00 AM     INR: No results for input(s): \"INR\" in the last 72 hours.  Renal Labs  Albumin:    Lab Results   Component Value Date/Time    LABALBU DUPLICATE ORDER 05/27/2022 12:43 PM     Calcium:    Lab Results   Component Value Date/Time 
  Nephrology Progress Note  6/6/2025 9:45 AM  Subjective:     Interval History: Gwendolyn Grimm is a 85 y.o. female with still having pain when did flush and fill with cycler yesterday will do manual exchanges today      Data:   Scheduled Meds:   insulin lispro  0-4 Units SubCUTAneous 4x Daily AC & HS    insulin glargine  10 Units SubCUTAneous Nightly    heparin (porcine)  5,000 Units SubCUTAneous 3 times per day    atorvastatin  40 mg Oral Nightly    budesonide-formoterol  2 puff Inhalation BID    [Held by provider] citalopram  20 mg Oral Daily    [Held by provider] clopidogrel  75 mg Oral Daily    hydrALAZINE  25 mg Oral TID    levothyroxine  137 mcg Oral Daily    [Held by provider] mirtazapine  15 mg Oral Nightly    pantoprazole  40 mg Oral QAM AC    meropenem  500 mg IntraVENous Q24H    tamsulosin  0.4 mg Oral Daily     Continuous Infusions:   dextrose           CBC   Recent Labs     06/04/25  0232 06/05/25  0344 06/06/25  0216   WBC 16.0* 13.3* 7.7   HGB 12.4* 11.3* 9.4*   HCT 39.2 35.7* 29.7*    265 217      BMP   Recent Labs     06/04/25  0232 06/05/25  0344 06/06/25  0216    136 135*   K 4.5 4.9 4.4    104 105   CO2 21 21 21   BUN 34* 38* 45*   CREATININE 3.8* 3.7* 4.2*     Hepatic:   Recent Labs     06/04/25  0232 06/05/25  0344 06/06/25  0216   AST 21 18 13*   ALT 12 10 7*   BILITOT 0.3 0.4 <0.2   ALKPHOS 158* 127 94     Troponin: No results for input(s): \"TROPONINI\" in the last 72 hours.  BNP: No results for input(s): \"BNP\" in the last 72 hours.  Lipids: No results for input(s): \"CHOL\", \"HDL\" in the last 72 hours.    Invalid input(s): \"LDLCALCU\"  ABGs:   Lab Results   Component Value Date/Time    PO2ART 73 05/12/2023 07:00 AM    [id number] 38.0 05/12/2023 07:00 AM     INR: No results for input(s): \"INR\" in the last 72 hours.  Renal Labs  Albumin:    Lab Results   Component Value Date/Time    LABALBU DUPLICATE ORDER 05/27/2022 12:43 PM     Calcium:    Lab Results   Component Value Date/Time    
  Nephrology Progress Note  6/7/2025 9:39 AM  Subjective:     Interval History: Gwendolyn Grimm is a 85 y.o. female who appears to be doing somewhat better denies any pain today tolerated the manual flushes PD catheter had some arrhythmias on monitor awaiting electrophysiology evaluation PD fluid showed no active infection and did get intraperitoneal antibiotics yesterday      Data:   Scheduled Meds:   dianeal lo-duong 2.5% 1,500 mL with vancomycin (VANCOCIN), gentamicin (GARAMYCIN)   IntraPERitoneal Once    insulin lispro  0-4 Units SubCUTAneous 4x Daily AC & HS    insulin glargine  10 Units SubCUTAneous Nightly    heparin (porcine)  5,000 Units SubCUTAneous 3 times per day    atorvastatin  40 mg Oral Nightly    budesonide-formoterol  2 puff Inhalation BID    [Held by provider] citalopram  20 mg Oral Daily    [Held by provider] clopidogrel  75 mg Oral Daily    hydrALAZINE  25 mg Oral TID    levothyroxine  137 mcg Oral Daily    [Held by provider] mirtazapine  15 mg Oral Nightly    pantoprazole  40 mg Oral QAM AC    meropenem  500 mg IntraVENous Q24H    tamsulosin  0.4 mg Oral Daily     Continuous Infusions:   dextrose           CBC   Recent Labs     06/05/25  0344 06/06/25  0216 06/07/25  0313   WBC 13.3* 7.7 7.3   HGB 11.3* 9.4* 10.0*   HCT 35.7* 29.7* 31.8*    217 240      BMP   Recent Labs     06/05/25  0344 06/06/25  0216 06/07/25  0313    135* 138   K 4.9 4.4 4.6    105 105   CO2 21 21 24   BUN 38* 45* 40*   CREATININE 3.7* 4.2* 3.7*     Hepatic:   Recent Labs     06/05/25  0344 06/06/25  0216 06/07/25  0313   AST 18 13* 15   ALT 10 7* 8*   BILITOT 0.4 <0.2 <0.2   ALKPHOS 127 94 97     Troponin: No results for input(s): \"TROPONINI\" in the last 72 hours.  BNP: No results for input(s): \"BNP\" in the last 72 hours.  Lipids: No results for input(s): \"CHOL\", \"HDL\" in the last 72 hours.    Invalid input(s): \"LDLCALCU\"  ABGs:   Lab Results   Component Value Date/Time    PO2ART 73 05/12/2023 07:00 AM    
  Nephrology Progress Note  6/9/2025 4:44 PM  Subjective:     Interval History: Gwendolyn Grimm is a 85 y.o. female seen this morning doing better overall    Data:   Scheduled Meds:   hydrALAZINE  50 mg Oral TID    ciprofloxacin  500 mg Oral Daily    dianeal lo-duong 2.5% 1,500 mL with vancomycin (VANCOCIN), gentamicin (GARAMYCIN)   IntraPERitoneal Once    insulin lispro  0-4 Units SubCUTAneous 4x Daily AC & HS    insulin glargine  10 Units SubCUTAneous Nightly    heparin (porcine)  5,000 Units SubCUTAneous 3 times per day    atorvastatin  40 mg Oral Nightly    budesonide-formoterol  2 puff Inhalation BID    [Held by provider] citalopram  20 mg Oral Daily    [Held by provider] clopidogrel  75 mg Oral Daily    levothyroxine  137 mcg Oral Daily    [Held by provider] mirtazapine  15 mg Oral Nightly    pantoprazole  40 mg Oral QAM AC    tamsulosin  0.4 mg Oral Daily     Continuous Infusions:   dextrose           CBC   Recent Labs     06/07/25 0313   WBC 7.3   HGB 10.0*   HCT 31.8*         BMP   Recent Labs     06/07/25 0313      K 4.6      CO2 24   BUN 40*   CREATININE 3.7*     Hepatic:   Recent Labs     06/07/25 0313   AST 15   ALT 8*   BILITOT <0.2   ALKPHOS 97     Troponin: No results for input(s): \"TROPONINI\" in the last 72 hours.  BNP: No results for input(s): \"BNP\" in the last 72 hours.  Lipids: No results for input(s): \"CHOL\", \"HDL\" in the last 72 hours.    Invalid input(s): \"LDLCALCU\"  ABGs:   Lab Results   Component Value Date/Time    PO2ART 73 05/12/2023 07:00 AM    [id number] 38.0 05/12/2023 07:00 AM     INR: No results for input(s): \"INR\" in the last 72 hours.  Renal Labs  Albumin:    Lab Results   Component Value Date/Time    LABALBU DUPLICATE ORDER 05/27/2022 12:43 PM     Calcium:    Lab Results   Component Value Date/Time    CALCIUM 8.5 06/07/2025 03:13 AM     Phosphorus:    Lab Results   Component Value Date/Time    PHOS 8.3 01/13/2025 04:50 AM     U/A:    Lab Results   Component Value 
0620 pt dwelled overnight with ATB per orders. Manual drain preformed 1500ml out. Effluent is clear light yellow    0720 1500ml manually instilled per orders. PD cath care given and dressing changed    1100 pt manually drained. 1200 clear yellow effluent out. Report given to CLAUDIA Lopez RN  
1 L 2.5 % dianeal instilled to dwell for 1 hr.       600 ml drained. Effluent yellow, slightly cloudy.   PD fluid sample collected and walked to lab.   
1.5 HOUR DWELL COMPLETED  MANUAL DRAIN PREFORMED    1200ML NET FLUID REMOVAL  EFFLUENT WAS CLOUDY, DR ROY NOTIFIED    MANUAL FILL OF 1.5L OF 2.5% DIANEAL COMPLETED   PATIENT VOICED NO DISCOMFORT WITH FILL  DWELL FOR 1.5 HOURS  MINI CAP APPLIED    PATIENT VOICED NO NEW NEEDS AT THIS TIME  REMAINS IN BED  CALL LIGHT WITHIN REACH    ELISSA ALANIS OCDT  06/06/2025  3653  
4 Eyes Skin Assessment     NAME:  Gwendolyn Grimm  YOB: 1940  MEDICAL RECORD NUMBER:  [id number]    The patient is being assessed for  Admission    I agree that at least one RN has performed a thorough Head to Toe Skin Assessment on the patient. ALL assessment sites listed below have been assessed.      Areas assessed by both nurses:    Head, Face, Ears        Does the Patient have a Wound? No noted wound(s)       Efrain Prevention initiated by RN: No  Wound Care Orders initiated by RN: No    Pressure Injury (Stage 3,4, Unstageable, DTI, NWPT, and Complex wounds) if present, place Wound referral order by RN under : No    New Ostomies, if present place, Ostomy referral order under : No     Nurse 1 eSignature: Electronically signed by Beba Monzon RN on 6/4/25 at 11:43 AM EDT    **SHARE this note so that the co-signing nurse can place an eSignature**    Nurse 2 eSignature: Electronically signed by Denisse Gagnon LPN on 6/4/25 at 11:44 AM EDT    
Cardiology Progress Note          Admit Date:  6/4/2025    Consult reason/ Seen today for: bradycardia    Subjective and  Overnight Events:  Denies any chest pain, SOB, or palpations. No arrhythmias overnight.     Assessment / Plan / Recommendation:     Bradycardia: w/ pause, no CAD on [facility] in 2023. Recommend OP monitor. EP to see on Monday.   Moderate AS: THEO 1.1, mean gradient 21 mmHg  HTN: stable continue with current medication  HX CVA, S/P LCEA in January of 2025.   ESRD: on PD.   Hyperlipidemia: Continue with statin  Sepsis: UTI, real calculi    Review of Systems:  Review of Systems   Respiratory:  Negative for shortness of breath.    Cardiovascular:  Negative for chest pain, palpitations and leg swelling.   Musculoskeletal: Negative.    Skin: Negative.    Neurological:  Negative for dizziness and weakness.   All other systems reviewed and are negative.       BP (!) 155/53   Pulse 69   Temp 98 °F (36.7 °C) (Oral)   Resp 21   Ht 1.676 m (5' 6\")   Wt 84.3 kg (185 lb 13.6 oz)   SpO2 96%   BMI 30.00 kg/m²     Intake/Output Summary (Last 24 hours) at 6/8/2025 1055  Last data filed at 6/8/2025 0835  Gross per 24 hour   Intake --   Output 621 ml   Net -621 ml       Physical Exam:  Physical Exam  Constitutional:       Appearance: She is well-developed.   Cardiovascular:      Rate and Rhythm: Normal rate and regular rhythm.      Pulses: Intact distal pulses.           Dorsalis pedis pulses are 2+ on the right side and 2+ on the left side.        Posterior tibial pulses are 2+ on the right side and 2+ on the left side.      Heart sounds: Normal heart sounds, S1 normal and S2 normal.   Pulmonary:      Effort: Pulmonary effort is normal.      Breath sounds: Normal breath sounds.   Musculoskeletal:         General: Normal range of motion.   Skin:     General: Skin is warm and dry.   Neurological:      Mental Status: She is alert and oriented to person, place, and time.          Medications:    hydrALAZINE  50 mg Oral 
Cardiology Progress Note          Admit Date:  6/4/2025    Consult reason/ Seen today for: bradycardia    Subjective and  Overnight Events:  Denies any chest pain, SOB, or palpations. No arrhythmias overnight.     Assessment / Plan / Recommendation:     Bradycardia: w/ pause, no CAD on [facility] in 2023. Recommend OP monitor. EP to see on Monday.   Moderate AS: THEO 1.1, mean gradient 21 mmHg  HTN: stable continue with current medication  HX CVA, S/P LCEA in January of 2025.   ESRD: on PD.   Hyperlipidemia: Continue with statin  Sepsis: UTI, real calculi    Review of Systems:  Review of Systems   Respiratory:  Negative for shortness of breath.    Cardiovascular:  Negative for chest pain, palpitations and leg swelling.   Musculoskeletal: Negative.    Skin: Negative.    Neurological:  Negative for dizziness and weakness.   All other systems reviewed and are negative.       BP (!) 155/82   Pulse 67   Temp 97.9 °F (36.6 °C) (Oral)   Resp 17   Ht 1.676 m (5' 6\")   Wt 86.5 kg (190 lb 11.2 oz)   SpO2 97%   BMI 30.78 kg/m²     Intake/Output Summary (Last 24 hours) at 6/7/2025 1438  Last data filed at 6/7/2025 0822  Gross per 24 hour   Intake 1500 ml   Output 1501 ml   Net -1 ml       Physical Exam:  Physical Exam  Constitutional:       Appearance: She is well-developed.   Cardiovascular:      Rate and Rhythm: Normal rate and regular rhythm.      Pulses: Intact distal pulses.           Dorsalis pedis pulses are 2+ on the right side and 2+ on the left side.        Posterior tibial pulses are 2+ on the right side and 2+ on the left side.      Heart sounds: Normal heart sounds, S1 normal and S2 normal.   Pulmonary:      Effort: Pulmonary effort is normal.      Breath sounds: Normal breath sounds.   Musculoskeletal:         General: Normal range of motion.   Skin:     General: Skin is warm and dry.   Neurological:      Mental Status: She is alert and oriented to person, place, and time.          Medications:    hydrALAZINE  50 
DWELL TIME COMPLETED  MANUAL DRAIN PREFORMED  2200ML NET FLUID REMOVAL  EFFLUENT WAS CLEAR AND FREE OF FIBRIN    1.5L OF 2.5% DIANEAL WITH VANCOMYCIN AND GENTAMICIN INSTILLED  TO DWELL OVERNIGHT AND TO BE MANUALLY DRAINED IN THE MORNING  PATIENT VOICED NO DISCOMFORT WITH FILL  MINI CAP APPLIED    DRESSING CLEAN, DRY AND INTACT  PATIENT VOICED NO NEW NEEDS  REMAINS IN BED  CALL LIGHT WITHIN REACH  PRIMARY NURSE NOTIFIED OF DWELL    ELISSA ALANIS OCDT  06/06/2025  [id number]    
MANUAL FILL OF 2.5% DIANEAL COMPLETED  TO DWELL FOR 1.5 HOURS    PATIENT VOICED NO PAIN WITH MANUAL FILL  DRESSING CLEAN, DRY AND INTACT    PATIENT VOICED NO NEW NEEDS  REMAINS IN BED  CALL LIGHT WITHIN REACH    ELISSA ALANIS OCDT  06/06/2025  [id number]    
Outpatient Pharmacy Progress Note for Meds-to-Beds    Total number of Prescriptions Filled: 2    Additional Documentation:  Patient picked-up the medication(s) in the OP Pharmacy      Thank you for letting us serve your patients.  [address] [address]    Phone: [phone]    Fax: [phone]        
PD treatment started as ordered and patient complains of severe pain, Dr Izaguirre notified and orders rec'd to hold PD for tonight. PD catheter site care given and dressing change completed.  RN notified  
Patient admitted overnight and antibiotics have been adjusted to cover for MDRO as well as VRE grown in the past.  Patient complained of a headache today, but otherwise in no acute distress.  Patient is planned for cystoscopy later today with urology for stent placement    Mir Dominguez MD  Hospitalist    
Patient seen and examined full note to follow peritoneal dialysis patient presents with infected renal stone possible need of cystoscopy and stent placement with him peritoneal dialysis tonight full consult to follow
Pt arrived to unit as a confidential encounter. This RN clarified confidential status and pt denies the need to be listed as a confidential encounter.   
Pt connected to cycler per orders. This RN stayed with pt until cycler began first fill. Pt denies pain at this time. RN educated pt that she was tx with intraperitoneal atb yesterday, positioning, and that she had 2 BM's today should help decrease pain r/t to pd. Pt indicates understanding. Pt instructed to tell PCN if she is having any pain and that this RN can be reached at anytime.   
Pt disconnected from cycler per orders. No issues overnight. Pt denies having any pain. Total UF 647ml. Effluent clear and yellow and free of fibrin. Per Dr. Izaguirre, no PD tonight.  
Pt sinus rhythm at atart of shift demnstrated sinus sheri with !st degree AVB. VS. Agebrile. Sleeping soundly. Easy ton wake to voice. A&Ox4. Gale Wharton Re notified. EKG completed as ordered. Cardiology consult ordered.   
Pt stated it's too painful when PD cycler started first fill. MD notified and ordered to stop tx.   
RENAL DOSE ADJUSTMENT MADE PER P/T PROTOCOL    PREVIOUS ORDER:  Ciprofloxacin 500mg oral Q12h    Estimated Creatinine Clearance: 13 mL/min (A) (based on SCr of 3.7 mg/dL (H)).  Recent Labs     06/05/25  0344 06/06/25  0216 06/07/25  0313   BUN 38* 45* 40*   CREATININE 3.7* 4.2* 3.7*    217 240     NEW RENALLY ADJUSTED ORDER:  Ciprofloxacin 500mg oral q24h per [facility] P&T protocol for PD dialysis patients    Curtis Kelley RPH  6/7/2025 11:08 AM     
placement      Moderate Aortic Stenosis     Ongoing threat to life and/or bodily function without ongoing treatment due to: EP evaluation  Consults:      IP CONSULT TO UROLOGY  IP CONSULT TO NEPHROLOGY  IP CONSULT TO CARDIOLOGY  IP CONSULT TO ELECTROPHYSIOLOGY        --------------------------------------------------      Medications:        Infusion Medications    dextrose       Scheduled Medications    hydrALAZINE  50 mg Oral TID    ciprofloxacin  500 mg Oral Daily    dianeal lo-duong 2.5% 1,500 mL with vancomycin (VANCOCIN), gentamicin (GARAMYCIN)   IntraPERitoneal Once    insulin lispro  0-4 Units SubCUTAneous 4x Daily AC & HS    insulin glargine  10 Units SubCUTAneous Nightly    heparin (porcine)  5,000 Units SubCUTAneous 3 times per day    atorvastatin  40 mg Oral Nightly    budesonide-formoterol  2 puff Inhalation BID    [Held by provider] citalopram  20 mg Oral Daily    [Held by provider] clopidogrel  75 mg Oral Daily    levothyroxine  137 mcg Oral Daily    [Held by provider] mirtazapine  15 mg Oral Nightly    pantoprazole  40 mg Oral QAM AC    tamsulosin  0.4 mg Oral Daily     PRN Meds: acetaminophen, HYDROmorphone, albuterol sulfate HFA, melatonin, glucose, dextrose bolus **OR** dextrose bolus, glucagon (rDNA), dextrose, gentamicin      Physical Exam Performed:      General appearance:  No apparent distress  Respiratory:  Normal respiratory effort without tachypnea.   Cardiovascular:  Regular Rate w/ Regular rhythm.  Abdomen:  Soft, non-tender, non-distended. Bowel sounds normal  Musculoskeletal:  No edema  Neurologic:  Neurovascularly intact without focal sensory/motor deficits.  Psychiatric:  Alert and oriented    BP (!) 167/49   Pulse 86   Temp 98.1 °F (36.7 °C) (Oral)   Resp 18   Ht 1.676 m (5' 6\")   Wt 84.3 kg (185 lb 13.6 oz)   SpO2 97%   BMI 30.00 kg/m²     Telemetry:      Personally reviewed and interpreted telemetry (Rhythm Strip) on 6/8/2025.  Patient is currently ON tele demonstrating NSR 
69   Temp 98 °F (36.7 °C) (Oral)   Resp 21   Ht 1.676 m (5' 6\")   Wt 84.3 kg (185 lb 13.6 oz)   SpO2 96%   BMI 30.00 kg/m²  {  General appearance: awake weak  HEENT: Head: Normal, normocephalic, atraumatic.  Neck: supple, symmetrical, trachea midline  Lungs: diminished breath sounds bilaterally  Heart: S1, S2 normal  Abdomen: abnormal findings:  soft positive PD catheter resolved tenderness  Extremities: edema trace  Neurologic: Mental status: alertness: alert    Specimen Description .CLEAN CATCH URINE     Special Requests Site: Urine    Culture ESCHERICHIA COLI >100,000 CFU/ml Sensitivity to follow Abnormal      PROTEUS MIRABILIS >100,000 CFU/ml Sensitivity to follow Abnormal       Latest Reference Range & Units 06/05/25 11:20   Appearance, Fluid  Clear   Color, Fluid  Yellow   RBC, Fluid cells/uL <2,000   Monocyte Count, Fluid % 12   Neutrophil Count, Fluid % 88   WBC, Fluid cells/uL 2,488   Clot Check  None Seen       Assessment and Plan:      IMP:  1.  End-stage kidney disease on peritoneal dialysis  #2 obstructing renal stone with UTI/hydronephrosis  #3 UTI  #4 hypertensive urgency with positive monitor  #5 type 2 diabetes  #6 aortic stenosis with ischemic CVA    Plan    #1 tolerating peritoneal dialysis on Saturday night we will hold on dialysis tonight  #2 monitor with prior stone supportive care no surgery  #3 treat UTI  #4 made adjustments in blood pressure improved  #5 monitor glucose  #6 follow-up with electrophysiology for discharge planning  Okay from renal for discharge               CHI ROY MD, MD    
demonstrating NSR w/ controlled rate.    Diet: ADULT DIET; Regular; 4 carb choices (60 gm/meal)    DVT Prophylaxis: PPX dose LMWH    Code status: Full Code    PT/OT Eval Status: Not yet ordered    Multi-Disciplinary Rounds with Case Management completed on 6/7/2025 with the following recs:     Anticipated Discharge Location: Home     Anticipated Discharge Day/Date:  2-3 days    Barriers to Discharge: Clinical Course and Subspecialty recs    Likely rate limiting factor: UTI culture results    --------------------------------------------------    MDM (any 2 required for High level billing)    A. Problems (any 1)  [] Acute/Chronic Illness/injury posing ongoing threat to life and/or bodily function without ongoing treatment    [] Severe exacerbation of chronic illness    --------------------------------------------------  B. Risk of Treatment (any 1)    [] Drugs/treatments that require intensive monitoring for toxicity    [] IV ABX (Vancomycin, Aminoglycosides, etc)     [] Post-Cath/Contrast study requiring serial monitoring    [] IV Narcotic analgesia    [] Aggressive IV diuresis    [] Hypertonic Saline    [] Critical electrolyte abnormalities requiring IV replacement    [] Insulin - Scheduled/SSI or Insulin gtt    [] Anticoagulation (Heparin gtt or Coumadin - other anticoagulants in special circumstances)    [] Cardiac Medications (IV Amiodarone/Diltiazem, Tikosyn, etc)    [] Hemodialysis    [] Other -    [] Change in code status    [] Decision to escalate care    [] Major surgery/procedure with associated risk factors    --------------------------------------------------  C. Data (any 2)    [] Data Review (any 3)    [] Consultant/subspecialist notes from yesterday/today    [x] All available current labs reviewed interpreted for clinical significance    [x] Appropriate follow-up labs were ordered  [] Collateral history obtained     [] Independent Interpretation of tests (any 1)    [] Telemetry (Rhythm Strip)

## 2025-06-09 NOTE — CARE COORDINATION
CM in to see Pt to follow up on discharge planning.  Plan remains home with family.  Pt denies any needs at this time.

## 2025-06-09 NOTE — PLAN OF CARE
Problem: Chronic Conditions and Co-morbidities  Goal: Patient's chronic conditions and co-morbidity symptoms are monitored and maintained or improved  Outcome: Completed     Problem: Discharge Planning  Goal: Discharge to home or other facility with appropriate resources  Outcome: Completed     Problem: Pain  Goal: Verbalizes/displays adequate comfort level or baseline comfort level  6/9/2025 1536 by Bev Alatorre RN  Outcome: Completed  6/9/2025 1242 by Bev Alatorre RN  Outcome: Progressing     Problem: Skin/Tissue Integrity  Goal: Skin integrity remains intact  Description: 1.  Monitor for areas of redness and/or skin breakdown2.  Assess vascular access sites hourly3.  Every 4-6 hours minimum:  Change oxygen saturation probe site4.  Every 4-6 hours:  If on nasal continuous positive airway pressure, respiratory therapy assess nares and determine need for appliance change or resting period  Outcome: Completed     Problem: Safety - Adult  Goal: Free from fall injury  Outcome: Completed

## 2025-06-10 ENCOUNTER — RESULTS FOLLOW-UP (OUTPATIENT)
Dept: EMERGENCY DEPT | Age: 85
End: 2025-06-10

## 2025-06-10 LAB
MICROORGANISM SPEC CULT: NORMAL
MICROORGANISM SPEC CULT: NORMAL
SERVICE CMNT-IMP: NORMAL
SERVICE CMNT-IMP: NORMAL
SPECIMEN DESCRIPTION: NORMAL
SPECIMEN DESCRIPTION: NORMAL

## 2025-06-11 NOTE — TELEPHONE ENCOUNTER
Spoke with Patient and scheduled consult with Dr. Loza for 7/11/25 @ 1100am, Patient was seen in [facility] for sheri/pauses.    Patient advised understanding.

## 2025-06-12 NOTE — DISCHARGE SUMMARY
calcifications. LIVER: No suspicious hepatic lesions. BILIARY: Normal gallbladder. PANCREAS: Normal parenchyma. No ductal dilatation. SPLEEN: Normal. ADRENALS: Normal. KIDNEYS: 3 mm calculus in the right distal ureter with mild hydronephrosis. Extensive right perinephric stranding. 6 mm calculus in the right interpolar region. Multiple smaller calculi are seen in the parenchyma bilaterally. URINARY BLADDER: Normal. PELVIC CAVITY: Normal GASTROINTESTINAL: Average quantity of stool.  Colonic diverticulosis. Normal appendix. No bowel obstruction. Small hiatal hernia. PERITONEUM: No free air. Trace free fluid. Peritoneal dialysis catheter. LYMPH NODES: No lymphadenopathy by CT size criteria. VASCULAR: Atherosclerotic calcification. BONES AND SOFT TISSUES: No acute or suspicious osseous lesions. Degenerative changes of the spine. Moderate fat-containing hiatal hernia.  IMPRESSION: 1.  3 mm calculus in the right distal ureter with mild hydronephrosis. Extensive  right perinephric stranding. 2.  Bibasilar scarring or atelectasis. 3.  Small hiatal hernia.  Dictated and Electronically Signed By: Kwabena [facility] 6/4/2025 3:14          Consults:     IP CONSULT TO UROLOGY  IP CONSULT TO NEPHROLOGY  IP CONSULT TO CARDIOLOGY  IP CONSULT TO ELECTROPHYSIOLOGY    Labs:     No results for input(s): \"WBC\", \"HGB\", \"HCT\", \"PLT\" in the last 72 hours.  No results for input(s): \"NA\", \"K\", \"CL\", \"CO2\", \"BUN\", \"CREATININE\", \"CALCIUM\", \"MG\", \"PHOS\" in the last 72 hours.  No results for input(s): \"PROBNP\", \"TROPHS\" in the last 72 hours.  No results for input(s): \"LABA1C\" in the last 72 hours.  No results for input(s): \"AST\", \"ALT\", \"BILIDIR\", \"BILITOT\", \"ALKPHOS\" in the last 72 hours.  No results for input(s): \"INR\", \"LACTA\", \"TSH\" in the last 72 hours.    Urine Cultures: No results found for: \"LABURIN\"  Blood Cultures: No results found for: \"BC\"  No results found for: \"BLOODCULT2\"  Organism: No results found for:

## 2025-06-23 PROBLEM — R00.1 SINUS BRADYCARDIA: Status: ACTIVE | Noted: 2025-06-23

## 2025-07-05 ENCOUNTER — APPOINTMENT (OUTPATIENT)
Dept: CT IMAGING | Age: 85
End: 2025-07-05
Payer: MEDICARE

## 2025-07-05 ENCOUNTER — HOSPITAL ENCOUNTER (INPATIENT)
Age: 85
LOS: 6 days | Discharge: HOME | End: 2025-07-11
Attending: EMERGENCY MEDICINE | Admitting: INTERNAL MEDICINE
Payer: MEDICARE

## 2025-07-05 ENCOUNTER — APPOINTMENT (OUTPATIENT)
Dept: GENERAL RADIOLOGY | Age: 85
End: 2025-07-05
Payer: MEDICARE

## 2025-07-05 DIAGNOSIS — R10.9 ABDOMINAL PAIN, UNSPECIFIED ABDOMINAL LOCATION: Primary | ICD-10-CM

## 2025-07-05 DIAGNOSIS — N18.6 ESRD (END STAGE RENAL DISEASE) (HCC): ICD-10-CM

## 2025-07-05 DIAGNOSIS — R06.89 DYSPNEA AND RESPIRATORY ABNORMALITIES: ICD-10-CM

## 2025-07-05 DIAGNOSIS — R42 LIGHTHEADED: ICD-10-CM

## 2025-07-05 DIAGNOSIS — R51.9 NONINTRACTABLE HEADACHE, UNSPECIFIED CHRONICITY PATTERN, UNSPECIFIED HEADACHE TYPE: ICD-10-CM

## 2025-07-05 DIAGNOSIS — R53.83 OTHER FATIGUE: ICD-10-CM

## 2025-07-05 DIAGNOSIS — R06.00 DYSPNEA AND RESPIRATORY ABNORMALITIES: ICD-10-CM

## 2025-07-05 DIAGNOSIS — D72.829 LEUKOCYTOSIS, UNSPECIFIED TYPE: ICD-10-CM

## 2025-07-05 DIAGNOSIS — R53.81 MALAISE: ICD-10-CM

## 2025-07-05 LAB
ALBUMIN SERPL-MCNC: 3.9 G/DL (ref 3.4–5)
ALBUMIN/GLOB SERPL: 1.5 {RATIO} (ref 1.1–2.2)
ALP SERPL-CCNC: 154 U/L (ref 40–129)
ALT SERPL-CCNC: 21 U/L (ref 10–40)
ANION GAP SERPL CALCULATED.3IONS-SCNC: 15 MMOL/L (ref 9–17)
ARTERIAL PATENCY WRIST A: NO
AST SERPL-CCNC: 32 U/L (ref 15–37)
BASOPHILS # BLD: 0.04 K/UL
BASOPHILS NFR BLD: 0 % (ref 0–1)
BILIRUB SERPL-MCNC: 0.4 MG/DL (ref 0–1)
BNP SERPL-MCNC: 7097 PG/ML (ref 0–450)
BODY TEMPERATURE: 37
BUN SERPL-MCNC: 29 MG/DL (ref 7–20)
CALCIUM SERPL-MCNC: 8.8 MG/DL (ref 8.3–10.6)
CHLORIDE SERPL-SCNC: 103 MMOL/L (ref 99–110)
CO2 SERPL-SCNC: 24 MMOL/L (ref 21–32)
COHGB MFR BLD: 0.9 % (ref 0.5–1.5)
CREAT SERPL-MCNC: 3.3 MG/DL (ref 0.6–1.2)
EOSINOPHIL # BLD: 0.05 K/UL
EOSINOPHILS RELATIVE PERCENT: 0 % (ref 0–3)
ERYTHROCYTE [DISTWIDTH] IN BLOOD BY AUTOMATED COUNT: 12.9 % (ref 11.7–14.9)
GFR, ESTIMATED: 13 ML/MIN/1.73M2
GLUCOSE SERPL-MCNC: 136 MG/DL (ref 74–99)
HCO3 VENOUS: 23 MMOL/L (ref 22–29)
HCT VFR BLD AUTO: 36.7 % (ref 37–47)
HGB BLD-MCNC: 11.7 G/DL (ref 12.5–16)
IMM GRANULOCYTES # BLD AUTO: 0.09 K/UL
IMM GRANULOCYTES NFR BLD: 1 %
LACTATE BLDV-SCNC: 1.6 MMOL/L (ref 0.4–2)
LIPASE SERPL-CCNC: 62 U/L (ref 13–60)
LYMPHOCYTES NFR BLD: 1.87 K/UL
LYMPHOCYTES RELATIVE PERCENT: 12 % (ref 24–44)
MAGNESIUM SERPL-MCNC: 1.8 MG/DL (ref 1.8–2.4)
MCH RBC QN AUTO: 30.3 PG (ref 27–31)
MCHC RBC AUTO-ENTMCNC: 31.9 G/DL (ref 32–36)
MCV RBC AUTO: 95.1 FL (ref 78–100)
METHEMOGLOBIN: 0.1 % (ref 0.5–1.5)
MONOCYTES NFR BLD: 1.03 K/UL
MONOCYTES NFR BLD: 6 % (ref 0–5)
NEGATIVE BASE EXCESS, VEN: 1.4 MMOL/L (ref 0–3)
NEUTROPHILS NFR BLD: 81 % (ref 36–66)
NEUTS SEG NFR BLD: 13.13 K/UL
OXYHGB MFR BLD: 44 %
PCO2 VENOUS: 37.6 MM HG (ref 38–54)
PH VENOUS: 7.41 (ref 7.32–7.43)
PLATELET # BLD AUTO: 222 K/UL (ref 140–440)
PMV BLD AUTO: 9.5 FL (ref 7.5–11.1)
PO2 VENOUS: 23.4 MM HG (ref 23–48)
POTASSIUM SERPL-SCNC: 3.7 MMOL/L (ref 3.5–5.1)
PROT SERPL-MCNC: 6.6 G/DL (ref 6.4–8.2)
RBC # BLD AUTO: 3.86 M/UL (ref 4.2–5.4)
SODIUM SERPL-SCNC: 142 MMOL/L (ref 136–145)
TROPONIN I SERPL HS-MCNC: 82 NG/L (ref 0–14)
TROPONIN I SERPL HS-MCNC: 83 NG/L (ref 0–14)
WBC OTHER # BLD: 16.2 K/UL (ref 4–10.5)

## 2025-07-05 PROCEDURE — 70450 CT HEAD/BRAIN W/O DYE: CPT

## 2025-07-05 PROCEDURE — 36415 COLL VENOUS BLD VENIPUNCTURE: CPT

## 2025-07-05 PROCEDURE — 99285 EMERGENCY DEPT VISIT HI MDM: CPT

## 2025-07-05 PROCEDURE — 80053 COMPREHEN METABOLIC PANEL: CPT

## 2025-07-05 PROCEDURE — 87040 BLOOD CULTURE FOR BACTERIA: CPT

## 2025-07-05 PROCEDURE — 82805 BLOOD GASES W/O2 SATURATION: CPT

## 2025-07-05 PROCEDURE — 83690 ASSAY OF LIPASE: CPT

## 2025-07-05 PROCEDURE — 1200000000 HC SEMI PRIVATE

## 2025-07-05 PROCEDURE — 85025 COMPLETE CBC W/AUTO DIFF WBC: CPT

## 2025-07-05 PROCEDURE — 83735 ASSAY OF MAGNESIUM: CPT

## 2025-07-05 PROCEDURE — 83605 ASSAY OF LACTIC ACID: CPT

## 2025-07-05 PROCEDURE — 6370000000 HC RX 637 (ALT 250 FOR IP): Performed by: EMERGENCY MEDICINE

## 2025-07-05 PROCEDURE — 74176 CT ABD & PELVIS W/O CONTRAST: CPT

## 2025-07-05 PROCEDURE — 84484 ASSAY OF TROPONIN QUANT: CPT

## 2025-07-05 PROCEDURE — 93005 ELECTROCARDIOGRAM TRACING: CPT | Performed by: EMERGENCY MEDICINE

## 2025-07-05 PROCEDURE — 71045 X-RAY EXAM CHEST 1 VIEW: CPT

## 2025-07-05 PROCEDURE — 83880 ASSAY OF NATRIURETIC PEPTIDE: CPT

## 2025-07-05 RX ORDER — ACETAMINOPHEN 500 MG
1000 TABLET ORAL ONCE
Status: COMPLETED | OUTPATIENT
Start: 2025-07-05 | End: 2025-07-05

## 2025-07-05 RX ORDER — MORPHINE SULFATE 2 MG/ML
2 INJECTION, SOLUTION INTRAMUSCULAR; INTRAVENOUS EVERY 30 MIN PRN
Status: DISCONTINUED | OUTPATIENT
Start: 2025-07-05 | End: 2025-07-11 | Stop reason: HOSPADM

## 2025-07-05 RX ORDER — ONDANSETRON 2 MG/ML
4 INJECTION INTRAMUSCULAR; INTRAVENOUS EVERY 30 MIN PRN
Status: DISCONTINUED | OUTPATIENT
Start: 2025-07-05 | End: 2025-07-11 | Stop reason: HOSPADM

## 2025-07-05 RX ADMIN — ACETAMINOPHEN 1000 MG: 500 TABLET ORAL at 21:23

## 2025-07-05 ASSESSMENT — PAIN SCALES - GENERAL
PAINLEVEL_OUTOF10: 6
PAINLEVEL_OUTOF10: 4
PAINLEVEL_OUTOF10: 0

## 2025-07-05 ASSESSMENT — PAIN DESCRIPTION - LOCATION
LOCATION: ABDOMEN;HEAD
LOCATION: ABDOMEN;HEAD

## 2025-07-05 ASSESSMENT — LIFESTYLE VARIABLES
HOW OFTEN DO YOU HAVE A DRINK CONTAINING ALCOHOL: NEVER
HOW MANY STANDARD DRINKS CONTAINING ALCOHOL DO YOU HAVE ON A TYPICAL DAY: PATIENT DOES NOT DRINK

## 2025-07-05 ASSESSMENT — PAIN DESCRIPTION - DESCRIPTORS
DESCRIPTORS: ACHING
DESCRIPTORS: ACHING

## 2025-07-05 ASSESSMENT — PAIN - FUNCTIONAL ASSESSMENT: PAIN_FUNCTIONAL_ASSESSMENT: 0-10

## 2025-07-06 LAB
ANION GAP SERPL CALCULATED.3IONS-SCNC: 13 MMOL/L (ref 9–17)
APPEARANCE FLD: NORMAL
ARTERIAL PATENCY WRIST A: NO
BACTERIA URNS QL MICRO: ABNORMAL
BASOPHILS # BLD: 0.03 K/UL
BASOPHILS NFR BLD: 0 % (ref 0–1)
BILIRUB UR QL STRIP: NEGATIVE
BODY FLD TYPE: NORMAL
BODY TEMPERATURE: 37
BUN SERPL-MCNC: 30 MG/DL (ref 7–20)
CALCIUM SERPL-MCNC: 8.1 MG/DL (ref 8.3–10.6)
CHLORIDE SERPL-SCNC: 105 MMOL/L (ref 99–110)
CLARITY UR: ABNORMAL
CLOT CHECK: NORMAL
CO2 SERPL-SCNC: 23 MMOL/L (ref 21–32)
COHGB MFR BLD: 0.4 % (ref 0.5–1.5)
COLOR FLD: COLORLESS
COLOR UR: YELLOW
CREAT SERPL-MCNC: 3.2 MG/DL (ref 0.6–1.2)
EKG ATRIAL RATE: 113 BPM
EKG DIAGNOSIS: NORMAL
EKG Q-T INTERVAL: 316 MS
EKG QRS DURATION: 122 MS
EKG QTC CALCULATION (BAZETT): 411 MS
EKG R AXIS: -69 DEGREES
EKG T AXIS: 106 DEGREES
EKG VENTRICULAR RATE: 102 BPM
EOSINOPHIL # BLD: 0 K/UL
EOSINOPHILS RELATIVE PERCENT: 0 % (ref 0–3)
EPI CELLS #/AREA URNS HPF: 3 /HPF
ERYTHROCYTE [DISTWIDTH] IN BLOOD BY AUTOMATED COUNT: 13.1 % (ref 11.7–14.9)
GFR, ESTIMATED: 14 ML/MIN/1.73M2
GLUCOSE BLD-MCNC: 103 MG/DL (ref 74–99)
GLUCOSE BLD-MCNC: 115 MG/DL (ref 74–99)
GLUCOSE BLD-MCNC: 149 MG/DL (ref 74–99)
GLUCOSE BLD-MCNC: 213 MG/DL (ref 74–99)
GLUCOSE BLD-MCNC: 98 MG/DL (ref 74–99)
GLUCOSE SERPL-MCNC: 136 MG/DL (ref 74–99)
GLUCOSE UR STRIP-MCNC: NEGATIVE MG/DL
HCO3 VENOUS: 26.3 MMOL/L (ref 22–29)
HCT VFR BLD AUTO: 29.7 % (ref 37–47)
HGB BLD-MCNC: 9.3 G/DL (ref 12.5–16)
HGB UR QL STRIP.AUTO: NEGATIVE
IMM GRANULOCYTES # BLD AUTO: 0.07 K/UL
IMM GRANULOCYTES NFR BLD: 1 %
KETONES UR STRIP-MCNC: NEGATIVE MG/DL
LEUKOCYTE ESTERASE UR QL STRIP: ABNORMAL
LYMPHOCYTES NFR BLD: 2.02 K/UL
LYMPHOCYTES RELATIVE PERCENT: 14 % (ref 24–44)
MCH RBC QN AUTO: 30.4 PG (ref 27–31)
MCHC RBC AUTO-ENTMCNC: 31.3 G/DL (ref 32–36)
MCV RBC AUTO: 97.1 FL (ref 78–100)
METHEMOGLOBIN: 0.3 % (ref 0.5–1.5)
MONOCYTES NFR BLD: 1.16 K/UL
MONOCYTES NFR BLD: 8 % (ref 0–5)
MONOCYTES NFR FLD: 9 %
NEUTROPHILS NFR BLD: 77 % (ref 36–66)
NEUTROPHILS NFR FLD: 92 %
NEUTS SEG NFR BLD: 11.1 K/UL
NITRITE UR QL STRIP: NEGATIVE
OXYHGB MFR BLD: 54 %
PCO2 VENOUS: 44.3 MM HG (ref 38–54)
PH UR STRIP: 7 [PH] (ref 5–8)
PH VENOUS: 7.39 (ref 7.32–7.43)
PLATELET # BLD AUTO: 180 K/UL (ref 140–440)
PMV BLD AUTO: 9.6 FL (ref 7.5–11.1)
PO2 VENOUS: 29.2 MM HG (ref 23–48)
POSITIVE BASE EXCESS, VEN: 1.1 MMOL/L (ref 0–3)
POTASSIUM SERPL-SCNC: 3.9 MMOL/L (ref 3.5–5.1)
PROCALCITONIN SERPL-MCNC: 0.24 NG/ML
PROT UR STRIP-MCNC: 100 MG/DL
RBC # BLD AUTO: 3.06 M/UL (ref 4.2–5.4)
RBC # FLD: <2000 CELLS/UL
RBC #/AREA URNS HPF: 0 /HPF (ref 0–2)
SODIUM SERPL-SCNC: 140 MMOL/L (ref 136–145)
SP GR UR STRIP: 1.01 (ref 1–1.03)
UROBILINOGEN UR STRIP-ACNC: 0.2 EU/DL (ref 0–1)
WBC # FLD: NORMAL CELLS/UL
WBC #/AREA URNS HPF: 53 /HPF (ref 0–5)
WBC OTHER # BLD: 14.4 K/UL (ref 4–10.5)

## 2025-07-06 PROCEDURE — 6370000000 HC RX 637 (ALT 250 FOR IP): Performed by: INTERNAL MEDICINE

## 2025-07-06 PROCEDURE — 2500000003 HC RX 250 WO HCPCS: Performed by: EMERGENCY MEDICINE

## 2025-07-06 PROCEDURE — 87186 SC STD MICRODIL/AGAR DIL: CPT

## 2025-07-06 PROCEDURE — 87086 URINE CULTURE/COLONY COUNT: CPT

## 2025-07-06 PROCEDURE — 87205 SMEAR GRAM STAIN: CPT

## 2025-07-06 PROCEDURE — 87077 CULTURE AEROBIC IDENTIFY: CPT

## 2025-07-06 PROCEDURE — 87070 CULTURE OTHR SPECIMN AEROBIC: CPT

## 2025-07-06 PROCEDURE — 93010 ELECTROCARDIOGRAM REPORT: CPT | Performed by: INTERNAL MEDICINE

## 2025-07-06 PROCEDURE — 82805 BLOOD GASES W/O2 SATURATION: CPT

## 2025-07-06 PROCEDURE — 89051 BODY FLUID CELL COUNT: CPT

## 2025-07-06 PROCEDURE — 94640 AIRWAY INHALATION TREATMENT: CPT

## 2025-07-06 PROCEDURE — 36415 COLL VENOUS BLD VENIPUNCTURE: CPT

## 2025-07-06 PROCEDURE — 94761 N-INVAS EAR/PLS OXIMETRY MLT: CPT

## 2025-07-06 PROCEDURE — 6360000002 HC RX W HCPCS: Performed by: EMERGENCY MEDICINE

## 2025-07-06 PROCEDURE — 80048 BASIC METABOLIC PNL TOTAL CA: CPT

## 2025-07-06 PROCEDURE — 84145 PROCALCITONIN (PCT): CPT

## 2025-07-06 PROCEDURE — 2500000003 HC RX 250 WO HCPCS: Performed by: INTERNAL MEDICINE

## 2025-07-06 PROCEDURE — 2580000003 HC RX 258: Performed by: INTERNAL MEDICINE

## 2025-07-06 PROCEDURE — 2580000003 HC RX 258: Performed by: EMERGENCY MEDICINE

## 2025-07-06 PROCEDURE — 85025 COMPLETE CBC W/AUTO DIFF WBC: CPT

## 2025-07-06 PROCEDURE — 82962 GLUCOSE BLOOD TEST: CPT

## 2025-07-06 PROCEDURE — 81001 URINALYSIS AUTO W/SCOPE: CPT

## 2025-07-06 PROCEDURE — 1200000000 HC SEMI PRIVATE

## 2025-07-06 PROCEDURE — 6360000002 HC RX W HCPCS: Performed by: INTERNAL MEDICINE

## 2025-07-06 PROCEDURE — 87075 CULTR BACTERIA EXCEPT BLOOD: CPT

## 2025-07-06 RX ORDER — GLUCAGON 1 MG/ML
1 KIT INJECTION PRN
Status: DISCONTINUED | OUTPATIENT
Start: 2025-07-06 | End: 2025-07-11 | Stop reason: HOSPADM

## 2025-07-06 RX ORDER — ATORVASTATIN CALCIUM 40 MG/1
40 TABLET, FILM COATED ORAL NIGHTLY
Status: DISCONTINUED | OUTPATIENT
Start: 2025-07-06 | End: 2025-07-11 | Stop reason: HOSPADM

## 2025-07-06 RX ORDER — GENTAMICIN SULFATE 1 MG/G
CREAM TOPICAL DAILY PRN
Status: DISCONTINUED | OUTPATIENT
Start: 2025-07-06 | End: 2025-07-11 | Stop reason: HOSPADM

## 2025-07-06 RX ORDER — SODIUM CHLORIDE 0.9 % (FLUSH) 0.9 %
5-40 SYRINGE (ML) INJECTION PRN
Status: DISCONTINUED | OUTPATIENT
Start: 2025-07-06 | End: 2025-07-11 | Stop reason: HOSPADM

## 2025-07-06 RX ORDER — ALBUTEROL SULFATE 90 UG/1
2 INHALANT RESPIRATORY (INHALATION) EVERY 6 HOURS PRN
Status: DISCONTINUED | OUTPATIENT
Start: 2025-07-06 | End: 2025-07-11 | Stop reason: HOSPADM

## 2025-07-06 RX ORDER — ACETAMINOPHEN 325 MG/1
650 TABLET ORAL EVERY 6 HOURS PRN
Status: DISCONTINUED | OUTPATIENT
Start: 2025-07-06 | End: 2025-07-11 | Stop reason: HOSPADM

## 2025-07-06 RX ORDER — ACETAMINOPHEN 650 MG/1
650 SUPPOSITORY RECTAL EVERY 6 HOURS PRN
Status: DISCONTINUED | OUTPATIENT
Start: 2025-07-06 | End: 2025-07-11 | Stop reason: HOSPADM

## 2025-07-06 RX ORDER — HYDRALAZINE HYDROCHLORIDE 25 MG/1
25 TABLET, FILM COATED ORAL 3 TIMES DAILY
Status: DISCONTINUED | OUTPATIENT
Start: 2025-07-06 | End: 2025-07-08

## 2025-07-06 RX ORDER — CITALOPRAM HYDROBROMIDE 20 MG/1
20 TABLET ORAL DAILY
Status: DISCONTINUED | OUTPATIENT
Start: 2025-07-06 | End: 2025-07-11 | Stop reason: HOSPADM

## 2025-07-06 RX ORDER — MIRTAZAPINE 15 MG/1
15 TABLET, FILM COATED ORAL NIGHTLY
Status: DISCONTINUED | OUTPATIENT
Start: 2025-07-06 | End: 2025-07-11 | Stop reason: HOSPADM

## 2025-07-06 RX ORDER — INSULIN LISPRO 100 [IU]/ML
0-4 INJECTION, SOLUTION INTRAVENOUS; SUBCUTANEOUS
Status: DISCONTINUED | OUTPATIENT
Start: 2025-07-06 | End: 2025-07-11 | Stop reason: HOSPADM

## 2025-07-06 RX ORDER — INSULIN GLARGINE 100 [IU]/ML
10 INJECTION, SOLUTION SUBCUTANEOUS NIGHTLY
Status: DISCONTINUED | OUTPATIENT
Start: 2025-07-06 | End: 2025-07-11 | Stop reason: HOSPADM

## 2025-07-06 RX ORDER — SODIUM CHLORIDE 0.9 % (FLUSH) 0.9 %
5-40 SYRINGE (ML) INJECTION EVERY 12 HOURS SCHEDULED
Status: DISCONTINUED | OUTPATIENT
Start: 2025-07-06 | End: 2025-07-11 | Stop reason: HOSPADM

## 2025-07-06 RX ORDER — PANTOPRAZOLE SODIUM 40 MG/1
40 TABLET, DELAYED RELEASE ORAL
Status: DISCONTINUED | OUTPATIENT
Start: 2025-07-06 | End: 2025-07-11 | Stop reason: HOSPADM

## 2025-07-06 RX ORDER — TAMSULOSIN HYDROCHLORIDE 0.4 MG/1
0.4 CAPSULE ORAL DAILY
Status: DISCONTINUED | OUTPATIENT
Start: 2025-07-06 | End: 2025-07-11 | Stop reason: HOSPADM

## 2025-07-06 RX ORDER — SODIUM CHLORIDE 9 MG/ML
INJECTION, SOLUTION INTRAVENOUS PRN
Status: DISCONTINUED | OUTPATIENT
Start: 2025-07-06 | End: 2025-07-11 | Stop reason: HOSPADM

## 2025-07-06 RX ORDER — HEPARIN SODIUM 5000 [USP'U]/ML
5000 INJECTION, SOLUTION INTRAVENOUS; SUBCUTANEOUS EVERY 8 HOURS SCHEDULED
Status: DISCONTINUED | OUTPATIENT
Start: 2025-07-06 | End: 2025-07-11 | Stop reason: HOSPADM

## 2025-07-06 RX ORDER — BUDESONIDE AND FORMOTEROL FUMARATE DIHYDRATE 80; 4.5 UG/1; UG/1
2 AEROSOL RESPIRATORY (INHALATION) 2 TIMES DAILY
Status: DISCONTINUED | OUTPATIENT
Start: 2025-07-06 | End: 2025-07-11 | Stop reason: HOSPADM

## 2025-07-06 RX ORDER — CLOPIDOGREL BISULFATE 75 MG/1
75 TABLET ORAL DAILY
Status: DISCONTINUED | OUTPATIENT
Start: 2025-07-06 | End: 2025-07-11 | Stop reason: HOSPADM

## 2025-07-06 RX ORDER — DEXTROSE MONOHYDRATE 100 MG/ML
INJECTION, SOLUTION INTRAVENOUS CONTINUOUS PRN
Status: DISCONTINUED | OUTPATIENT
Start: 2025-07-06 | End: 2025-07-11 | Stop reason: HOSPADM

## 2025-07-06 RX ADMIN — HYDRALAZINE HYDROCHLORIDE 25 MG: 25 TABLET ORAL at 20:51

## 2025-07-06 RX ADMIN — PANTOPRAZOLE SODIUM 40 MG: 40 TABLET, DELAYED RELEASE ORAL at 06:14

## 2025-07-06 RX ADMIN — BUDESONIDE AND FORMOTEROL FUMARATE DIHYDRATE 2 PUFF: 80; 4.5 AEROSOL RESPIRATORY (INHALATION) at 13:17

## 2025-07-06 RX ADMIN — CLOPIDOGREL BISULFATE 75 MG: 75 TABLET, FILM COATED ORAL at 08:50

## 2025-07-06 RX ADMIN — ATORVASTATIN CALCIUM 40 MG: 40 TABLET, FILM COATED ORAL at 01:47

## 2025-07-06 RX ADMIN — SODIUM CHLORIDE, PRESERVATIVE FREE 10 ML: 5 INJECTION INTRAVENOUS at 20:52

## 2025-07-06 RX ADMIN — Medication 10 MG: at 01:47

## 2025-07-06 RX ADMIN — HYDRALAZINE HYDROCHLORIDE 25 MG: 25 TABLET ORAL at 15:05

## 2025-07-06 RX ADMIN — CITALOPRAM HYDROBROMIDE 20 MG: 20 TABLET ORAL at 08:50

## 2025-07-06 RX ADMIN — INSULIN GLARGINE 10 UNITS: 100 INJECTION, SOLUTION SUBCUTANEOUS at 20:52

## 2025-07-06 RX ADMIN — WATER 1000 MG: 1 INJECTION INTRAMUSCULAR; INTRAVENOUS; SUBCUTANEOUS at 00:12

## 2025-07-06 RX ADMIN — HEPARIN SODIUM 5000 UNITS: 5000 INJECTION INTRAVENOUS; SUBCUTANEOUS at 06:15

## 2025-07-06 RX ADMIN — HEPARIN SODIUM 5000 UNITS: 5000 INJECTION INTRAVENOUS; SUBCUTANEOUS at 22:34

## 2025-07-06 RX ADMIN — INSULIN LISPRO 1 UNITS: 100 INJECTION, SOLUTION INTRAVENOUS; SUBCUTANEOUS at 20:51

## 2025-07-06 RX ADMIN — MIRTAZAPINE 15 MG: 15 TABLET, FILM COATED ORAL at 20:51

## 2025-07-06 RX ADMIN — HYDRALAZINE HYDROCHLORIDE 25 MG: 25 TABLET ORAL at 08:50

## 2025-07-06 RX ADMIN — MIRTAZAPINE 15 MG: 15 TABLET, FILM COATED ORAL at 01:47

## 2025-07-06 RX ADMIN — HYDRALAZINE HYDROCHLORIDE 25 MG: 25 TABLET ORAL at 01:47

## 2025-07-06 RX ADMIN — CEFTRIAXONE SODIUM 2000 MG: 2 INJECTION, POWDER, FOR SOLUTION INTRAMUSCULAR; INTRAVENOUS at 22:35

## 2025-07-06 RX ADMIN — VANCOMYCIN HYDROCHLORIDE 2000 MG: 5 INJECTION, POWDER, LYOPHILIZED, FOR SOLUTION INTRAVENOUS at 00:16

## 2025-07-06 RX ADMIN — BUDESONIDE AND FORMOTEROL FUMARATE DIHYDRATE 2 PUFF: 80; 4.5 AEROSOL RESPIRATORY (INHALATION) at 21:13

## 2025-07-06 RX ADMIN — HEPARIN SODIUM 5000 UNITS: 5000 INJECTION INTRAVENOUS; SUBCUTANEOUS at 15:05

## 2025-07-06 RX ADMIN — TAMSULOSIN HYDROCHLORIDE 0.4 MG: 0.4 CAPSULE ORAL at 08:50

## 2025-07-06 RX ADMIN — LEVOTHYROXINE SODIUM 137 MCG: 0.11 TABLET ORAL at 06:15

## 2025-07-06 RX ADMIN — Medication 10 MG: at 22:34

## 2025-07-06 RX ADMIN — ATORVASTATIN CALCIUM 40 MG: 40 TABLET, FILM COATED ORAL at 20:51

## 2025-07-06 ASSESSMENT — PAIN SCALES - WONG BAKER: WONGBAKER_NUMERICALRESPONSE: NO HURT

## 2025-07-06 ASSESSMENT — PAIN SCALES - GENERAL: PAINLEVEL_OUTOF10: 0

## 2025-07-06 NOTE — ED PROVIDER NOTES
[facility]      TRIAGE CHIEF COMPLAINT:   Fatigue, Generalized Weakness, and Shortness of Breath      [place]:  Gwendolyn Grimm is a 85 y.o. female that presents with multiple complaints.  Patient states that she today has been having some abdominal pain, nausea shortness of breath headache malaise fatigue swelling generalized weakness.  She has history of CAD, pulmonary hypertension, chronic kidney disease she is on peritoneal dialysis daily.  She has had subjective chills, malaise fatigue as mentioned.  No vomiting has had nausea no chest pain no other question concerns she has felt lightheaded.  Also has a headache.  Has not taken medicine for headache    REVIEW OF SYSTEMS:  At least 10 systems reviewed and otherwise acutely negative except as in the [place].    Review of Systems   Constitutional:  Positive for chills and fatigue.   HENT: Negative.     Eyes: Negative.    Respiratory:  Positive for shortness of breath.    Cardiovascular:  Positive for leg swelling.   Gastrointestinal:  Positive for abdominal pain and nausea.   Endocrine: Negative.    Genitourinary: Negative.    Musculoskeletal: Negative.    Skin: Negative.    Allergic/Immunologic: Negative.    Neurological:  Positive for light-headedness and headaches.   Hematological: Negative.    Psychiatric/Behavioral: Negative.     All other systems reviewed and are negative.      Past Medical History:   Diagnosis Date    Abnormal nuclear stress test 04/08/2007 4/08-EF=67%,Mild->Mod.isch.LAD;7/07-EF=70%,Suggests poss.Mild Ant.wall ischemia.    Cardiac arrest (HCC) 2008    Diabetes mellitus ([facility])     H/O cardiac catheterization 04/2008    No signif.CAD.    H/O cardiovascular stress test 04/11/2008    mild to mod ischemia in the LAD region, abnormal study, EF 67%, patient developed mild chest pain and throat tightness    H/O cardiovascular stress test 05/03/2016    lexiscan-moderate ischemia apical,RF66%    H/O Doppler ultrasound 07/16/2007     HEMICOLECTOMY Right 5/3/2023    RIGHT BOWEL RESECTION HEMICOLECTOMY LAPAROSCOPIC ROBOTIC XI performed by Amanda Tillman MD at [facility] OR    HERNIA REPAIR N/A 5/3/2023    OPEN HERNIA INCISIONAL REPAIR performed by Amanda Tillman MD at [facility] OR    HYSTERECTOMY (CERVIX STATUS UNKNOWN)      IR TUNNELED CVC PLACE WO SQ PORT/PUMP > 5 YEARS  2024    IR TUNNELED CATHETER PLACEMENT GREATER THAN 5 YEARS 2024 [facility] SPECIAL PROCEDURES    LAP BAND      put in in May Removed in August    LITHOTRIPSY      ROTATOR CUFF REPAIR      right    ROTATOR CUFF REPAIR       Family History   Problem Relation Age of Onset    Cancer Sister     COPD Brother     Heart Disease Brother     Cancer Mother         melonoma    Migraines Mother     Cancer Father         carcinoma    High Blood Pressure Father     Heart Disease Father      Social History     Socioeconomic History    Marital status:      Spouse name: Not on file    Number of children: 3    Years of education: high school    Highest education level: 12th grade   Occupational History    Not on file   Tobacco Use    Smoking status: Former     Current packs/day: 0.00     Average packs/day: 0.5 packs/day for 3.0 years (1.5 ttl pk-yrs)     Types: Cigarettes     Start date: 1991     Quit date: 1994     Years since quittin.9    Smokeless tobacco: Never   Vaping Use    Vaping status: Never Used   Substance and Sexual Activity    Alcohol use: No     Alcohol/week: 0.0 standard drinks of alcohol    Drug use: No    Sexual activity: Not Currently     Partners: Male     Comment:    Other Topics Concern    Not on file   Social History Narrative    Lives with son and daughter in law; is able to drive      Social Drivers of Health     Financial Resource Strain: Medium Risk (5/3/2023)    Overall Financial Resource Strain (CARDIA)     Difficulty of Paying Living Expenses: Somewhat hard   Food Insecurity: No Food Insecurity (2025)    Hunger Vital Sign     Worried About Running

## 2025-07-06 NOTE — PROGRESS NOTES
Progress note    Name:  Gwendolyn Grimm /Age/Sex: 1940  (85 y.o. female)   MRN & CSN:  [id number] & [id number] Encounter Date/Time: 2025 11:49 PM EDT   [facility]: Pepe Avina MD       Hospital Day: 2    Assessment and Plan:     Sepsis  Acute peritonitis  - Criteria-leukocytosis, tachycardia  - Lactic acid 1.6  - UA suggestive of infection  - Urine culture, blood cultures in process  - CT abdomen/pelvis-nonacute  - PD catheter fluid to be sent for analysis in a.m.  - Continue vancomycin, ceftriaxone   discussed with nephrology dialysis  - plan to send peritoneal fluid through PD catheter today   peritoneal fluid consistent with peritonitis  Consult ID.  Wait for culture    #.  Recent admission -2025 for sepsis secondary to UTI, obstructive right ureteral calculus  - Urine culture grew E. coli and Proteus, discharged on oral ciprofloxacin  Patient still making urine.  Pending urine culture    #.  ESRD on peritoneal dialysis  - Dr. Izaguirre consulted from ED    #.  History of CVA-2025  Several small scattered acute infarcts in the left cerebral hemisphere  - Status post cerebral angiogram-2025-status post stenting of left carotid artery  - On aspirin, Plavix, statin    #.  Nonobstructive coronary artery disease  - Chronically elevated troponin    #.  Aortic stenosis  #.  Hypertension-on hydralazine  #.  Depression, anxiety-on mirtazapine  #.  GERD-on Protonix  #.  Hyperlipidemia-atorvastatin  #.  Hypothyroidism-continue levothyroxine  #.  Diabetes mellitus type 2- On Lantus 10 units nightly  #.  COPD- On breyna, albuterol HFA as needed    #.  History of cardiac arrest after surgery 2023  #.  Colon cancer status post right hemicolectomy-2023      Disposition:   Current Living situation: Home    Diet Renal   DVT Prophylaxis  Heparin   Code Status Full-discussed with patient   Surrogate Decision Maker/ POA Daughter     History from:   EMR,

## 2025-07-06 NOTE — ED TRIAGE NOTES
Patient arrived to ED via EMS. Patient c/o generalized weakness and lethargic. Patient states she does peritoneal dialysis every night. Patient . Patient received Duoneb in route. EMS states diminished lung sounds. Patient has hx of mitral valve. Patient A& x4.

## 2025-07-06 NOTE — PROGRESS NOTES
Pt connected to cycler as ordered. Cath care provided. New dressing applied. Site clean,dry. No discoloration or drainage noted.

## 2025-07-06 NOTE — PROGRESS NOTES
4 Eyes Skin Assessment     NAME:  Gwendolyn Grimm  YOB: 1940  MEDICAL RECORD NUMBER:  [id number]    The patient is being assessed for  Admission    I agree that at least one RN has performed a thorough Head to Toe Skin Assessment on the patient. ALL assessment sites listed below have been assessed.      Areas assessed by both nurses:    Head, Face, Ears, Shoulders, Back, Chest, Arms, Elbows, Hands, Sacrum. Buttock, Coccyx, Ischium, Legs. Feet and Heels, and Under Medical Devices         Does the Patient have a Wound? No noted wound(s)       Excoriation breast and abdominal pannus  Redness on coccyx    Efrain Prevention initiated by RN: No  Wound Care Orders initiated by RN: No    Pressure Injury (Stage 1,2,3,4, Unstageable, DTI, NWPT, and Complex wounds) if present, place Wound referral order by RN under : No    New Ostomies, if present place, Ostomy referral order under : No     Nurse 1 eSignature: Electronically signed by Cady Ghotra RN on 7/6/25 at 1:27 AM EDT    **SHARE this note so that the co-signing nurse can place an eSignature**    Nurse 2 eSignature: Electronically signed by NEERU GIBBS RN on 7/6/25 at 2:25 AM EDT

## 2025-07-06 NOTE — PROGRESS NOTES
PHARMACY VANCOMYCIN MONITORING SERVICE  Pharmacy consulted by Dr. Carter for monitoring and adjustment.    Indication for treatment: Vancomycin indication: Other: IAI  Goal trough: Trough Goal: 15-20 mcg/mL  AUC/RACHELL: 400-600    Risk Factors for MRSA Identified:   Hospitalization within the past 90 days, Received IV antibiotics within the past 90 days    Pertinent Laboratory Values:   Temp Readings from Last 3 Encounters:   07/06/25 99 °F (37.2 °C) (Oral)   06/09/25 97.7 °F (36.5 °C) (Oral)   01/23/25 97.9 °F (36.6 °C) (Oral)     Recent Labs     07/05/25  2116   WBC 16.2*     Recent Labs     07/05/25  2116   BUN 29*   CREATININE 3.3*     Estimated Creatinine Clearance: 14 mL/min (A) (based on SCr of 3.3 mg/dL (H)).  No intake or output data in the 24 hours ending 07/06/25 0129  Last Encounter Weight:  Wt Readings from Last 3 Encounters:   07/06/25 83 kg (182 lb 15.7 oz)   06/09/25 81.4 kg (179 lb 7.3 oz)   04/25/25 82.5 kg (181 lb 12.8 oz)       Pertinent Cultures:   Date    Source    Results  7/5   Blood    Sent     Vancomycin level:   TROUGH:  No results for input(s): \"VANCOTROUGH\" in the last 72 hours.  RANDOM:  No results for input(s): \"VANCORANDOM\" in the last 72 hours.    Assessment:  HPI: Patient is a 85 y.o. female who presented with Intra-abdominal infection.  SCr, BUN, and urine output: ESRD on PD   Day(s) of therapy: 1   Vancomycin concentration: TBD     Plan:  Loading dose of vancomycin administered: 2000 mg. Intermittent dosing of vancomycin will be used due to renal function.   Level 7/7 AM or sooner if clinically needed   Pharmacy will continue to monitor patient and adjust therapy as indicated    VANCOMYCIN CONCENTRATION SCHEDULED FOR 7/7 @0600    Thank you for the consult.  Kwabena Rg [facility]  7/6/2025 1:29 AM

## 2025-07-06 NOTE — CONSULTS
ADVANCED NEPHROLOGY CONSULT NOTE  Dr. Bhavin Izaguirre and Dr Vikram Irving                Assessment    1.  Abdominal pain concern for peritonitis  #2 end-stage kidney disease on peritoneal dialysis  #3 history of UTI with renal cysts  #4 history of CVA  #5 aortic stenosis  #6 hypertension  #7 type 2 diabetes        Plan   1.  Send PD fluid this morning for evaluation for peritonitis panculture got 1 dose of cefepime and vancomycin yesterday and adjust antibiotics based on culture results and cell count  #2 resume peritoneal dialysis tonight denies any cloudy fluid most recently had a UTI need to make sure no other acute pathology  #3 monitor neurostatus and affect  #4 cardiac status monitor volume removal with dialysis  #5 blood pressure slight increase but also anxious with pain will monitor  #6 monitor control glucose  Plan supportive care    Date:7/6/2025        Patient Name:Gwendolyn Grimm     YOB: 1940     Age:85 y.o.        Chief Complaint     Reason for Consult: End-stage kidney disease    History Obtained From   patient, electronic medical record    History of Present Illness   The patient is a 85 y.o. female who presents with weakness abdominal pain did dialysis on Friday no acute pathology had no issues with PD fluid was clear in nature.  Just in the hospital a month ago with a UTI and treated with antibiotics.  Initially presented with abdominal pain that started yesterday did not do PD dialysis last night send the PD fluid this morning for evaluation started on antibiotic therapy.  Patient states abdominal pain is already improved unsure the etiology send history of kidney stones history of UTI the need to make sure is not peritonitis as well.  WBC count is already improving had a low-grade fever CAT scan shows no acute pathology and in the above setting nephrology asked to evaluate and treat with dialysis and evaluation of peritonitis and treatment plan for that as well.  Will monitor results of    BILITOT 0.4   ALKPHOS 154*       Renal Labs  Albumin:    Lab Results   Component Value Date/Time    LABALBU DUPLICATE ORDER 05/27/2022 12:43 PM     Calcium:    Lab Results   Component Value Date/Time    CALCIUM 8.1 07/06/2025 01:49 AM     Phosphorus:    Lab Results   Component Value Date/Time    PHOS 8.3 01/13/2025 04:50 AM     U/A:    Lab Results   Component Value Date/Time    NITRU NEGATIVE 07/06/2025 12:02 AM    COLORU Yellow 07/06/2025 12:02 AM    PHUR 7.0 07/06/2025 12:02 AM    PHUR 5.5 12/29/2021 11:51 AM    WBCUA 53 07/06/2025 12:02 AM    RBCUA 0 07/06/2025 12:02 AM    RBCUA 0 12/29/2023 12:15 PM    MUCUS RARE 06/04/2025 03:43 AM    TRICHOMONAS NONE SEEN 12/29/2023 12:15 PM    YEAST RARE 06/04/2025 03:43 AM    BACTERIA MANY 07/06/2025 12:02 AM    CLARITYU CLEAR 12/29/2023 12:15 PM    SPECGRAV 1.025 12/29/2021 11:51 AM    UROBILINOGEN 0.2 07/06/2025 12:02 AM    BILIRUBINUR NEGATIVE 07/06/2025 12:02 AM    BLOODU NEGATIVE 12/29/2023 12:15 PM    GLUCOSEU NEGATIVE 07/06/2025 12:02 AM    KETUA NEGATIVE 07/06/2025 12:02 AM     HgBA1c:    Lab Results   Component Value Date/Time    LABA1C 5.8 06/17/2023 03:24 PM     Microalbumen/Creatinine ratio:  No components found for: \"RUCREAT\"  TSH:    Lab Results   Component Value Date/Time    TSH 0.55 06/06/2025 02:16 AM     IRON:    Lab Results   Component Value Date/Time    IRON 50 05/12/2023 05:40 AM     Iron Saturation:  No components found for: \"PERCENTFE\"  TIBC:    Lab Results   Component Value Date/Time    TIBC 227 05/12/2023 05:40 AM     FERRITIN:    Lab Results   Component Value Date/Time    FERRITIN 344 05/12/2023 05:40 AM         Imaging/Diagnostics   No results found.          Electronically signed by CHI ROY MD on 7/6/25 at 9:30 AM EDT    Comment: Please note this report has been produced using speech recognition software and may contain errors related to that system including errors in grammar, punctuation, and spelling, as well as words and phrases

## 2025-07-06 NOTE — ED NOTES
ED TO INPATIENT SBAR HANDOFF    Patient Name: Gwendolyn Grimm   :  1940  85 y.o.   Preferred Name  Gwendolyn  Family/Caregiver Present yes   Restraints no   C-SSRS: Risk of Suicide: No Risk  Sitter no   Sepsis Risk Score Sepsis V2 Risk Score: 42.9    PLEASE NOTE--Encounter / Re-Admission Within 30 Days  This patient has had another encounter or admission within the last 30 days.      Readmission Risk Score: 22.7      Situation  Chief Complaint   Patient presents with    Fatigue    Generalized Weakness    Shortness of Breath     Brief Description of Patient's Condition:     Patient arrived to ED via EMS. Patient c/o generalized weakness and lethargic. Patient states she does peritoneal dialysis every night. Patient . Patient received Duoneb in route. EMS states diminished lung sounds. Patient has hx of mitral valve. Patient A& x4        Mental Status: oriented and alert  Arrived from: home    Imaging:   CT ABDOMEN PELVIS WO CONTRAST Additional Contrast? None   Final Result      CT HEAD WO CONTRAST   Final Result      XR CHEST PORTABLE   Final Result        Abnormal labs:   Abnormal Labs Reviewed   CBC WITH AUTO DIFFERENTIAL - Abnormal; Notable for the following components:       Result Value    WBC 16.2 (*)     RBC 3.86 (*)     Hemoglobin 11.7 (*)     Hematocrit 36.7 (*)     MCHC 31.9 (*)     Neutrophils % 81 (*)     Lymphocytes % 12 (*)     Monocytes % 6 (*)     Immature Granulocytes % 1 (*)     All other components within normal limits   COMPREHENSIVE METABOLIC PANEL - Abnormal; Notable for the following components:    Glucose 136 (*)     BUN 29 (*)     Creatinine 3.3 (*)     Est, Glom Filt Rate 13 (*)     Alkaline Phosphatase 154 (*)     All other components within normal limits   TROPONIN - Abnormal; Notable for the following components:    Troponin, High Sensitivity 82 (*)     All other components within normal limits   BRAIN NATRIURETIC PEPTIDE - Abnormal; Notable for the following components:    NT Pro-BNP  7,097 (*)     All other components within normal limits   LIPASE - Abnormal; Notable for the following components:    Lipase 62 (*)     All other components within normal limits   BLOOD GAS, VENOUS - Abnormal; Notable for the following components:    pCO2, Lucius 37.6 (*)     Methemoglobin 0.1 (*)     All other components within normal limits   TROPONIN - Abnormal; Notable for the following components:    Troponin, High Sensitivity 83 (*)     All other components within normal limits        Background  History:   Past Medical History:   Diagnosis Date    Abnormal nuclear stress test 04/08/2007 4/08-EF=67%,Mild->Mod.isch.LAD;7/07-EF=70%,Suggests poss.Mild Ant.wall ischemia.    Cardiac arrest (HCC) 2008    Diabetes mellitus ([facility])     H/O cardiac catheterization 04/2008    No signif.CAD.    H/O cardiovascular stress test 04/11/2008    mild to mod ischemia in the LAD region, abnormal study, EF 67%, patient developed mild chest pain and throat tightness    H/O cardiovascular stress test 05/03/2016    lexiscan-moderate ischemia apical,RF66%    H/O Doppler ultrasound 07/16/2007    CAROTID DOPLER- intimal thickening but no significant atherosclerotic plaque noted in the right or left internal carotid artery, doppler flow velocities within the right and left internal carotid artery are elevated, consistent with a mild, less than 50% stenosis    H/O echocardiogram 10/14/2008    EF>55%, normal LV systolic function, normal left ventricular wall thickness, impaired LV relaxation    H/O echocardiogram 06/18/2013 6/13- EF >55% Impaired LV relaxation, Mild concentric LV hypertrophy, No sig AS, THEO underestimated. 10/08-EF=>55%,NL study;7/07-EF=>55%,Mild valv.AS.    H/O echocardiogram 05/16/2016 5/16 EF 55-60% normal study 12/14EF 60%. MIld mitral and tricuspid insufficiencies. Mildly sclerotic aortic valve which appears to be mildly stenosed with aortic valve area of 1.4 however this does not appear to be hemodynamically

## 2025-07-06 NOTE — H&P
of acute colitis or diverticulitis. There are postoperative changes consistent with a resection of the most proximal portions of the colon. The appendix is absent. Lymphatic system: No pathologically enlarged lymph nodes are seen. Peritoneal structures: No free air. Small amount of ascites along the anterior margin of the liver. There is a peritoneal dialysis catheter which is coiled in the right anterior abdomen.  No masses. The omentum and small bowel mesentery are normal.  Retroperitoneum: No focal retroperitoneal abnormality is seen. Abdominal wall:  There is a small fat-containing midline ventral abdominal wall hernia.. CT PELVIS FINDINGS: Soft tissues: There is no free fluid or inflammatory change in the pelvis. The bladder is mildly distended with a smooth thin wall. The uterus has been removed. There is no pelvic or inguinal lymphadenopathy. Bones: Multilevel degenerative disc and facet disease is demonstrated throughout  the lumbar spine. There is no evidence of a fracture. IMPRESSION: 1. Nonacute noncontrast CT of the abdomen and pelvis. 2. Bilateral renal cysts for which no further follow-up is indicated. 3. Nonobstructive 5 mm stone in the right kidney. 4. Diverticulosis without evidence of diverticulitis. 5. Cholelithiasis. 6. Postoperative changes consistent with a resection of the proximal colon and a  peritoneal dialysis catheter. This dictation was created with voice recognition software.  While attempts have  been made to review the dictation as it is transcribed, on occasion the spoken word can be misinterpreted by the technology leading to omissions or inappropriate words, phrases or sentences.  Dictated and Electronically Signed By: Rangel Cárdenas [facility] 7/5/2025 22:46        CT HEAD WO CONTRAST  Result Date: 7/5/2025  CT OF THE HEAD WITHOUT CONTRAST DATE OF SERVICE: 7/5/2025 22:30 DEMOGRAPHICS: 85 years old Female INDICATION: HEAD TRAUMA, CLOSED, MILD, GCS >= 13, NO  RISK FACTORS, NEURO EXAM NORMAL, headache/nausea COMPARISON:  1/6/2025 TECHNIQUE: Axial images were obtained of the brain without the administration of  intravenous contrast. Coronal reconstructions were obtained. Coronal reconstructions were obtained. CT radiation dose optimization techniques (automated exposure control, and use of iterative reconstruction techniques, or adjustment of the mA or kV according to patient size) were used to limit patient  radiation dose. FINDINGS: There is no soft tissue swelling. The osseous structures are intact. The paranasal sinuses and mastoid air cells are well aerated. No intra-axial or extra-axial fluid collections or masses are identified. There is no mass effect or midline shift. There is no acute major vascular territory infarct, intracranial hemorrhage, or mass. There is no hydrocephalus. IMPRESSION: No acute major vascular territory infarct, intracranial hemorrhage, or mass.  Dictated and Electronically Signed By: Rangel Cárdenas DO [facility] 7/5/2025 22:36        XR CHEST PORTABLE  Result Date: 7/5/2025  PORTABLE CHEST X-RAY Date of Service: 7/5/2025 21:53 HISTORY: 85 years female with dyspnea. COMPARISON:  1/5/2024 FINDINGS: There are no pleural effusions. The cardiac and mediastinal silhouettes appear within normal limits when allowing for portable technique. The lungs are clear. There is partial visualization of postoperative changes related to a left reverse total shoulder arthroplasty. There has been no significant interval change when compared to the prior study. IMPRESSION: No acute findings.  Dictated and Electronically Signed By: Rangel Cárdenas DO [facility] 7/5/2025 21:59          Personally reviewed Lab Studies, Imaging, and discussed case with ED physician.    Electronically signed by Yanely Carter MD on 7/5/2025 at 11:49 PM    Comment: Please note this report has been produced using speech recognition

## 2025-07-07 PROBLEM — N18.6 ESRD (END STAGE RENAL DISEASE) (HCC): Status: ACTIVE | Noted: 2025-07-07

## 2025-07-07 PROBLEM — T85.71XA DIALYSIS-ASSOCIATED PERITONITIS: Status: ACTIVE | Noted: 2025-07-07

## 2025-07-07 LAB
ALBUMIN SERPL-MCNC: 2.8 G/DL (ref 3.4–5)
ALBUMIN/GLOB SERPL: 1.1 {RATIO} (ref 1.1–2.2)
ALP SERPL-CCNC: 107 U/L (ref 40–129)
ALT SERPL-CCNC: 16 U/L (ref 10–40)
ANION GAP SERPL CALCULATED.3IONS-SCNC: 10 MMOL/L (ref 9–17)
AST SERPL-CCNC: 22 U/L (ref 15–37)
BASOPHILS # BLD: 0.02 K/UL
BASOPHILS NFR BLD: 0 % (ref 0–1)
BILIRUB SERPL-MCNC: 0.3 MG/DL (ref 0–1)
BUN SERPL-MCNC: 28 MG/DL (ref 7–20)
CALCIUM SERPL-MCNC: 8.6 MG/DL (ref 8.3–10.6)
CHLORIDE SERPL-SCNC: 105 MMOL/L (ref 99–110)
CO2 SERPL-SCNC: 27 MMOL/L (ref 21–32)
CREAT SERPL-MCNC: 2.6 MG/DL (ref 0.6–1.2)
CRP SERPL HS-MCNC: 83.7 MG/L (ref 0–5)
EOSINOPHIL # BLD: 0.19 K/UL
EOSINOPHILS RELATIVE PERCENT: 3 % (ref 0–3)
ERYTHROCYTE [DISTWIDTH] IN BLOOD BY AUTOMATED COUNT: 12.9 % (ref 11.7–14.9)
GFR, ESTIMATED: 18 ML/MIN/1.73M2
GLUCOSE BLD-MCNC: 109 MG/DL (ref 74–99)
GLUCOSE BLD-MCNC: 117 MG/DL (ref 74–99)
GLUCOSE BLD-MCNC: 186 MG/DL (ref 74–99)
GLUCOSE BLD-MCNC: 74 MG/DL (ref 74–99)
GLUCOSE SERPL-MCNC: 114 MG/DL (ref 74–99)
HCT VFR BLD AUTO: 28.2 % (ref 37–47)
HGB BLD-MCNC: 8.9 G/DL (ref 12.5–16)
IMM GRANULOCYTES # BLD AUTO: 0.03 K/UL
IMM GRANULOCYTES NFR BLD: 0 %
LYMPHOCYTES NFR BLD: 2.23 K/UL
LYMPHOCYTES RELATIVE PERCENT: 31 % (ref 24–44)
MCH RBC QN AUTO: 30.6 PG (ref 27–31)
MCHC RBC AUTO-ENTMCNC: 31.6 G/DL (ref 32–36)
MCV RBC AUTO: 96.9 FL (ref 78–100)
MICROORGANISM SPEC CULT: NORMAL
MONOCYTES NFR BLD: 0.5 K/UL
MONOCYTES NFR BLD: 7 % (ref 0–5)
MRSA, DNA, NASAL: NOT DETECTED
NEUTROPHILS NFR BLD: 58 % (ref 36–66)
NEUTS SEG NFR BLD: 4.16 K/UL
PLATELET # BLD AUTO: 166 K/UL (ref 140–440)
PMV BLD AUTO: 9.8 FL (ref 7.5–11.1)
POTASSIUM SERPL-SCNC: 3.6 MMOL/L (ref 3.5–5.1)
PROT SERPL-MCNC: 5.3 G/DL (ref 6.4–8.2)
RBC # BLD AUTO: 2.91 M/UL (ref 4.2–5.4)
SERVICE CMNT-IMP: NORMAL
SODIUM SERPL-SCNC: 141 MMOL/L (ref 136–145)
SPECIMEN DESCRIPTION: NORMAL
SPECIMEN DESCRIPTION: NORMAL
VANCOMYCIN SERPL-MCNC: 12.8 UG/ML (ref 10–20)
WBC OTHER # BLD: 7.1 K/UL (ref 4–10.5)

## 2025-07-07 PROCEDURE — 6370000000 HC RX 637 (ALT 250 FOR IP): Performed by: INTERNAL MEDICINE

## 2025-07-07 PROCEDURE — 86140 C-REACTIVE PROTEIN: CPT

## 2025-07-07 PROCEDURE — 6360000002 HC RX W HCPCS: Performed by: STUDENT IN AN ORGANIZED HEALTH CARE EDUCATION/TRAINING PROGRAM

## 2025-07-07 PROCEDURE — 82962 GLUCOSE BLOOD TEST: CPT

## 2025-07-07 PROCEDURE — 87641 MR-STAPH DNA AMP PROBE: CPT

## 2025-07-07 PROCEDURE — A4722 DIALYS SOL FLD VOL > 1999CC: HCPCS | Performed by: STUDENT IN AN ORGANIZED HEALTH CARE EDUCATION/TRAINING PROGRAM

## 2025-07-07 PROCEDURE — 1200000000 HC SEMI PRIVATE

## 2025-07-07 PROCEDURE — 2580000003 HC RX 258: Performed by: INTERNAL MEDICINE

## 2025-07-07 PROCEDURE — 94761 N-INVAS EAR/PLS OXIMETRY MLT: CPT

## 2025-07-07 PROCEDURE — 6360000002 HC RX W HCPCS: Performed by: INTERNAL MEDICINE

## 2025-07-07 PROCEDURE — 99223 1ST HOSP IP/OBS HIGH 75: CPT | Performed by: INTERNAL MEDICINE

## 2025-07-07 PROCEDURE — 36415 COLL VENOUS BLD VENIPUNCTURE: CPT

## 2025-07-07 PROCEDURE — 94640 AIRWAY INHALATION TREATMENT: CPT

## 2025-07-07 PROCEDURE — APPSS60 APP SPLIT SHARED TIME 46-60 MINUTES: Performed by: NURSE PRACTITIONER

## 2025-07-07 PROCEDURE — 2500000003 HC RX 250 WO HCPCS: Performed by: INTERNAL MEDICINE

## 2025-07-07 PROCEDURE — 80202 ASSAY OF VANCOMYCIN: CPT

## 2025-07-07 PROCEDURE — 85025 COMPLETE CBC W/AUTO DIFF WBC: CPT

## 2025-07-07 PROCEDURE — 80053 COMPREHEN METABOLIC PANEL: CPT

## 2025-07-07 RX ORDER — FLUCONAZOLE 100 MG/1
100 TABLET ORAL DAILY
Status: DISCONTINUED | OUTPATIENT
Start: 2025-07-07 | End: 2025-07-11 | Stop reason: HOSPADM

## 2025-07-07 RX ORDER — SODIUM CHLORIDE, SODIUM LACTATE, CALCIUM CHLORIDE, MAGNESIUM CHLORIDE AND DEXTROSE 1.5; 538; 448; 25.7; 5.08 G/100ML; MG/100ML; MG/100ML; MG/100ML; MG/100ML
2000 INJECTION, SOLUTION INTRAPERITONEAL ONCE
Status: DISCONTINUED | OUTPATIENT
Start: 2025-07-07 | End: 2025-07-07

## 2025-07-07 RX ADMIN — PANTOPRAZOLE SODIUM 40 MG: 40 TABLET, DELAYED RELEASE ORAL at 05:35

## 2025-07-07 RX ADMIN — CLOPIDOGREL BISULFATE 75 MG: 75 TABLET, FILM COATED ORAL at 09:07

## 2025-07-07 RX ADMIN — SODIUM CHLORIDE, SODIUM LACTATE, CALCIUM CHLORIDE, MAGNESIUM CHLORIDE AND DEXTROSE 2000 ML: 1.5; 538; 448; 18.3; 5.08 INJECTION, SOLUTION INTRAPERITONEAL at 11:53

## 2025-07-07 RX ADMIN — BUDESONIDE AND FORMOTEROL FUMARATE DIHYDRATE 2 PUFF: 80; 4.5 AEROSOL RESPIRATORY (INHALATION) at 19:30

## 2025-07-07 RX ADMIN — HEPARIN SODIUM 5000 UNITS: 5000 INJECTION INTRAVENOUS; SUBCUTANEOUS at 21:51

## 2025-07-07 RX ADMIN — ATORVASTATIN CALCIUM 40 MG: 40 TABLET, FILM COATED ORAL at 21:50

## 2025-07-07 RX ADMIN — GENTAMICIN SULFATE: 40 INJECTION, SOLUTION INTRAMUSCULAR; INTRAVENOUS at 11:54

## 2025-07-07 RX ADMIN — HEPARIN SODIUM 5000 UNITS: 5000 INJECTION INTRAVENOUS; SUBCUTANEOUS at 05:35

## 2025-07-07 RX ADMIN — INSULIN GLARGINE 10 UNITS: 100 INJECTION, SOLUTION SUBCUTANEOUS at 21:50

## 2025-07-07 RX ADMIN — Medication 10 MG: at 22:00

## 2025-07-07 RX ADMIN — SODIUM CHLORIDE, PRESERVATIVE FREE 10 ML: 5 INJECTION INTRAVENOUS at 09:07

## 2025-07-07 RX ADMIN — INSULIN LISPRO 1 UNITS: 100 INJECTION, SOLUTION INTRAVENOUS; SUBCUTANEOUS at 21:50

## 2025-07-07 RX ADMIN — BUDESONIDE AND FORMOTEROL FUMARATE DIHYDRATE 2 PUFF: 80; 4.5 AEROSOL RESPIRATORY (INHALATION) at 08:52

## 2025-07-07 RX ADMIN — LEVOTHYROXINE SODIUM 137 MCG: 0.11 TABLET ORAL at 05:35

## 2025-07-07 RX ADMIN — FLUCONAZOLE 100 MG: 100 TABLET ORAL at 13:09

## 2025-07-07 RX ADMIN — TAMSULOSIN HYDROCHLORIDE 0.4 MG: 0.4 CAPSULE ORAL at 09:07

## 2025-07-07 RX ADMIN — MIRTAZAPINE 15 MG: 15 TABLET, FILM COATED ORAL at 21:50

## 2025-07-07 RX ADMIN — HYDRALAZINE HYDROCHLORIDE 25 MG: 25 TABLET ORAL at 09:07

## 2025-07-07 RX ADMIN — HYDRALAZINE HYDROCHLORIDE 25 MG: 25 TABLET ORAL at 13:09

## 2025-07-07 RX ADMIN — HEPARIN SODIUM 5000 UNITS: 5000 INJECTION INTRAVENOUS; SUBCUTANEOUS at 13:09

## 2025-07-07 RX ADMIN — HYDRALAZINE HYDROCHLORIDE 25 MG: 25 TABLET ORAL at 21:50

## 2025-07-07 RX ADMIN — CITALOPRAM HYDROBROMIDE 20 MG: 20 TABLET ORAL at 09:07

## 2025-07-07 RX ADMIN — SODIUM CHLORIDE, PRESERVATIVE FREE 10 ML: 5 INJECTION INTRAVENOUS at 22:20

## 2025-07-07 NOTE — CONSULTS
[facility] Nurse  Consult Note       Gwendolyn Grimm  AGE: 85 y.o.   GENDER: female  : 1940  TODAY'S DATE:  2025    Subjective:     Reason for Evaluation and Assessment: wound assessment       Gwendolyn Grimm is a 85 y.o. female referred by:   [x] Physician  [] Nursing  [] Other:     Wound Identification:  Wound Type: pressure and MASD   Contributing Factors: chronic pressure, decreased mobility, obesity, and moisture         PAST MEDICAL HISTORY        Diagnosis Date    Abnormal nuclear stress test 2007-EF=67%,Mild->Mod.isch.LAD;-EF=70%,Suggests poss.Mild Ant.wall ischemia.    Cardiac arrest (HCC)     Diabetes mellitus ([facility])     H/O cardiac catheterization 2008    No signif.CAD.    H/O cardiovascular stress test 2008    mild to mod ischemia in the LAD region, abnormal study, EF 67%, patient developed mild chest pain and throat tightness    H/O cardiovascular stress test 2016    lexiscan-moderate ischemia apical,RF66%    H/O Doppler ultrasound 2007    CAROTID DOPLER- intimal thickening but no significant atherosclerotic plaque noted in the right or left internal carotid artery, doppler flow velocities within the right and left internal carotid artery are elevated, consistent with a mild, less than 50% stenosis    H/O echocardiogram 10/14/2008    EF>55%, normal LV systolic function, normal left ventricular wall thickness, impaired LV relaxation    H/O echocardiogram 2013- EF >55% Impaired LV relaxation, Mild concentric LV hypertrophy, No sig AS, THEO underestimated. 10/08-EF=>55%,NL study;-EF=>55%,Mild valv.AS.    H/O echocardiogram 2016 EF 55-60% normal study EF 60%. MIld mitral and tricuspid insufficiencies. Mildly sclerotic aortic valve which appears to be mildly stenosed with aortic valve area of 1.4 however this does not appear to be hemodynamically signficant aortic stenosis. Mildy hypertropic left ventricle with  cerebral infarction (HCC)    Cerebrovascular accident (CVA) due to stenosis of left carotid artery (HCC)    Symptomatic carotid artery stenosis, left    Acute CVA (cerebrovascular accident) (HCC)    Poorly controlled type 2 diabetes mellitus with peripheral neuropathy (HCC)    Irritable bowel syndrome    Dysarthria due to acute stroke (HCC)    Left carotid stenosis    Sepsis (HCC)    Ureterolithiasis    Sinus bradycardia       Measurements:  Puncture 01/21/25 Femoral (Active)   Number of days: 166       Wound 06/17/23 Sacrum Mid small red open area (Active)   Number of days: 750       Wound 01/03/25 Coccyx coccygeal fold (Active)   Wound Image   07/07/25 0920   Wound Etiology Pressure Stage 2 07/07/25 0920   Dressing Status New dressing applied 07/07/25 0920   Wound Cleansed Cleansed with saline 07/07/25 0920   Dressing/Treatment Collagen;Silicone border 07/07/25 0920   Wound Length (cm) 0.4 cm 07/07/25 0920   Wound Width (cm) 0.4 cm 07/07/25 0920   Wound Depth (cm) 0.1 cm 07/07/25 0920   Wound Surface Area (cm^2) 0.16 cm^2 07/07/25 0920   Change in Wound Size % (l*w) 55.56 07/07/25 0920   Wound Volume (cm^3) 0.016 cm^3 07/07/25 0920   Wound Healing % 85 07/07/25 0920   Distance Tunneling (cm) 0 cm 07/07/25 0920   Tunneling Position ___ O'Clock 0 07/07/25 0920   Undermining Starts ___ O'Clock 0 07/07/25 0920   Undermining Ends___ O'Clock 0 07/07/25 0920   Undermining Maxium Distance (cm) 0 07/07/25 0920   Wound Assessment Pink/red 07/07/25 0920   Drainage Amount None (dry) 07/07/25 0920   Odor None 07/07/25 0920   Vanesa-wound Assessment Maceration 07/07/25 0920   Margins Defined edges 07/07/25 0920   Wound Thickness Description not for Pressure Injury Partial thickness 07/07/25 0920   Number of days: 185       Response to treatment:  Well tolerated by patient.     Pain Assessment:  Severity:  none  Quality of pain: none  Wound Pain Timing/Severity: none  Premedicated: no    Plan:     Plan of Care: Wound 01/03/25

## 2025-07-07 NOTE — CARE COORDINATION
07/07/25 5300   Service Assessment   Patient Orientation Alert and Oriented   Cognition Alert   History Provided By Patient;Medical Record   Primary Caregiver Family   Support Systems Children;Family Members   Patient's Healthcare Decision Maker is: Named in Scanned ACP Document  (MPOA Daughter Akanksha Owens , alt Daughter Asya Jain.)   PCP Verified by CM Yes   Last Visit to PCP Within last 3 months   Prior Functional Level Independent in ADLs/IADLs   Current Functional Level Independent in ADLs/IADLs   Can patient return to prior living arrangement Yes   Ability to make needs known: Good   Family able to assist with home care needs: Yes   Financial Resources Medicare  ([employer] Medicare)   Social/Functional History   Lives With Son   Type of Home House   Home Layout One level   Home Equipment Cane;Hospital bed;Mechanical lift;Rollator;Walker - Rolling;Wheelchair - Manual   Active  No   Patient's  Info daughter provides all transportation   Discharge Planning   Potential Assistance Purchasing Medications No   Condition of Participation: Discharge Planning   The Patient and/Or Patient Representative agree with the Discharge Plan? Yes     Reviewed chart, discussed in IDR and spoke with pt about discharge needs/plans. Pt lives with her son and has 2 daughters who help pt as needed. Pt rarely left alone.  Son and both daughters trained to do PD at home with pt.   We discussed HC, SNU and DME.  Pt denied need for any of the above.   Plan is home with family to assist her as needed.

## 2025-07-07 NOTE — PLAN OF CARE
Problem: Chronic Conditions and Co-morbidities  Goal: Patient's chronic conditions and co-morbidity symptoms are monitored and maintained or improved  7/7/2025 1005 by Katya Cuenca LPN  Outcome: Progressing  7/6/2025 2122 by Cady Ghotra RN  Outcome: Progressing     Problem: Discharge Planning  Goal: Discharge to home or other facility with appropriate resources  7/7/2025 1005 by Katya Cuenca LPN  Outcome: Progressing  7/6/2025 2122 by Cady Ghotra RN  Outcome: Progressing

## 2025-07-07 NOTE — PROGRESS NOTES
Pt connected to cycler as ordered. Cath care given. New dressing applied. No discoloration or drainage noted. ABX cream applied to exit site

## 2025-07-07 NOTE — PROGRESS NOTES
ORDERS RECEIVED FOR A 2000ML OF 1.5% DIANEAL ADDED WITH VANCOMYCIN AND GENTAMICIN TO BE GIVEN    PATIENT ASEPTICALLY  INSTILLED WITH 2000ML OF 1.5% DIANEAL WITH VANCOMYCIN AND GENTAMICIN  TO DWELL TIL PD INITIATION WITH CYCLER TONIGHT    PATIENT TOLERATED FILL WELL  DRESSING WAS CLEAN, DRY AND INTACT    PATIENT VOICED NO NEW NEEDS AT THIS TIME  REMAINS IN RECLINER, CALL LIGHT WITHIN REACH  PRIMARY NURSE NOTIFIED     ELISSA SANDOVAL  07/07/2025  3013

## 2025-07-07 NOTE — PROGRESS NOTES
Progress note    Name:  Gwendolyn Grimm /Age/Sex: 1940  (85 y.o. female)   MRN & CSN:  [id number] & [id number] Encounter Date/Time: 2025 11:49 PM EDT   Location:  Counts include 234 beds at the [facility]/Counts include 234 beds at the [facility]-A PCP: Pepe Avina MD       Hospital Day: 3    Assessment and Plan:     Sepsis  Acute peritonitis  - Criteria-leukocytosis, tachycardia  - Lactic acid 1.6  - UA suggestive of infection  - Urine culture, blood cultures in process  - CT abdomen/pelvis-nonacute  - PD catheter fluid to be sent for analysis in a.m.   discussed with nephrology dialysis  - plan to send peritoneal fluid through PD catheter today   peritoneal fluid consistent with peritonitis  Consult ID.  Wait for culture  DW with nephro - start on Van + Gentamicin and Diflucan     #.  Recent admission -2025 for sepsis secondary to UTI, obstructive right ureteral calculus  - Urine culture grew E. coli and Proteus, discharged on oral ciprofloxacin  Patient still making urine.  urine culture showed Mixed paras     #.  ESRD on peritoneal dialysis  - Dr. Izaguirre consulted from ED    #.  History of CVA-2025  Several small scattered acute infarcts in the left cerebral hemisphere  - Status post cerebral angiogram-2025-status post stenting of left carotid artery  - On aspirin, Plavix, statin    #.  Nonobstructive coronary artery disease  - Chronically elevated troponin    #.  Aortic stenosis  #.  Hypertension-on hydralazine  #.  Depression, anxiety-on mirtazapine  #.  GERD-on Protonix  #.  Hyperlipidemia-atorvastatin  #.  Hypothyroidism-continue levothyroxine  #.  Diabetes mellitus type 2- On Lantus 10 units nightly  #.  COPD- On breyna, albuterol HFA as needed    #.  History of cardiac arrest after surgery 2023  #.  Colon cancer status post right hemicolectomy-2023      Disposition:   Current Living situation: Home    Diet Renal   DVT Prophylaxis  Heparin   Code Status Full-discussed with patient   Surrogate Decision Maker/ POA Daughter

## 2025-07-07 NOTE — PROGRESS NOTES
Nephrology Progress Note  7/7/2025 11:48 AM  Subjective:     Interval History: Gwendolyn Grimm is a 85 y.o. female with abdominal pain slowly improving still present with possible peritonitis tolerating PD        Data:   Scheduled Meds:   dianeal lo-duong 1.5% 6,000 mL with gentamicin 240 mg, vancomycin 4,500 mg solution   IntraPERitoneal Once    sodium chloride flush  5-40 mL IntraVENous 2 times per day    heparin (porcine)  5,000 Units SubCUTAneous 3 times per day    atorvastatin  40 mg Oral Nightly    budesonide-formoterol  2 puff Inhalation BID    citalopram  20 mg Oral Daily    clopidogrel  75 mg Oral Daily    hydrALAZINE  25 mg Oral TID    insulin glargine  10 Units SubCUTAneous Nightly    levothyroxine  137 mcg Oral Daily    mirtazapine  15 mg Oral Nightly    pantoprazole  40 mg Oral QAM AC    tamsulosin  0.4 mg Oral Daily    insulin lispro  0-4 Units SubCUTAneous 4x Daily AC & HS    cefTRIAXone (ROCEPHIN) IV  2,000 mg IntraVENous Q24H    vancomycin (VANCOCIN) intermittent dosing (placeholder)   Other RX Placeholder     Continuous Infusions:   sodium chloride      dextrose      dianeal lo-duong 1.5%           CBC   Recent Labs     07/05/25 2116 07/06/25  0149 07/07/25  0310   WBC 16.2* 14.4* 7.1   HGB 11.7* 9.3* 8.9*   HCT 36.7* 29.7* 28.2*    180 166      BMP   Recent Labs     07/05/25 2116 07/06/25  0149 07/07/25  0310    140 141   K 3.7 3.9 3.6    105 105   CO2 24 23 27   BUN 29* 30* 28*   CREATININE 3.3* 3.2* 2.6*     Hepatic:   Recent Labs     07/05/25 2116 07/07/25  0310   AST 32 22   ALT 21 16   BILITOT 0.4 0.3   ALKPHOS 154* 107     Troponin: No results for input(s): \"TROPONINI\" in the last 72 hours.  BNP: No results for input(s): \"BNP\" in the last 72 hours.  Lipids: No results for input(s): \"CHOL\", \"HDL\" in the last 72 hours.    Invalid input(s): \"LDLCALCU\"  ABGs:   Lab Results   Component Value Date/Time    PO2ART 73 05/12/2023 07:00 AM    AFH8NRJ 38.0 05/12/2023 07:00 AM     INR: No  \"UTV10\"      Objective:   I/O: 07/06 0701 - 07/07 0700  In: 600 [P.O.:600]  Out: 100 [Urine:100]  I/O last 3 completed shifts:  In: 600 [P.O.:600]  Out: 200 [Urine:200]  I/O this shift:  In: 240 [P.O.:240]  Out: 1834   Vitals: BP (!) 148/50   Pulse 62   Temp 98.1 °F (36.7 °C) (Oral)   Resp 16   Ht 1.676 m (5' 6\")   Wt 83.2 kg (183 lb 6.8 oz)   SpO2 95%   BMI 29.61 kg/m²  {  General appearance: awake weak  HEENT: Head: Normal, normocephalic, atraumatic.  Neck: supple, symmetrical, trachea midline  Lungs: diminished breath sounds bilaterally  Heart: S1, S2 normal  Abdomen: abnormal findings:  soft positive PD catheter tenderness  Extremities: edema trace  Neurologic: Mental status: alertness: alert        Assessment and Plan:      IMP:  1.  Abdominal pain concern for peritonitis positive gram-positive cocci culture  #2 end-stage kidney disease on peritoneal dialysis  #3 history of UTI with renal cysts  #4 history of CVA  #5 aortic stenosis  #6 hypertension  #7 type 2 diabetes    Plan     #1 will give intraperitoneal vancomycin and gentamicin today will give course of Diflucan as well await final cultures  #2 maintain on peritoneal dialysis/PD catheter  #3 monitor for any other infections  #4 cardiac status monitor  #5 blood pressure stable  #6 monitor control glucose  For now treat with PD catheter with antibiotics             CHI ROY MD, MD

## 2025-07-07 NOTE — PLAN OF CARE
Problem: Chronic Conditions and Co-morbidities  Goal: Patient's chronic conditions and co-morbidity symptoms are monitored and maintained or improved  7/6/2025 2122 by Cady Ghotra RN  Outcome: Progressing  7/6/2025 1631 by Carmen Tompkins LPN  Outcome: Progressing  7/6/2025 1631 by Carmen Tompkins LPN  Outcome: Progressing     Problem: Discharge Planning  Goal: Discharge to home or other facility with appropriate resources  7/6/2025 2122 by Cady Ghotra RN  Outcome: Progressing  7/6/2025 1631 by Carmen Tompkins LPN  Outcome: Progressing  7/6/2025 1631 by Carmen Tompkins LPN  Outcome: Progressing     Problem: Pain  Goal: Verbalizes/displays adequate comfort level or baseline comfort level  7/6/2025 2122 by Cady Ghotra RN  Outcome: Progressing  7/6/2025 1631 by Carmen Tompkins LPN  Outcome: Progressing  7/6/2025 1631 by Carmen Tompkins LPN  Outcome: Progressing     Problem: Skin/Tissue Integrity  Goal: Skin integrity remains intact  Description: 1.  Monitor for areas of redness and/or skin breakdown  2.  Assess vascular access sites hourly  3.  Every 4-6 hours minimum:  Change oxygen saturation probe site  4.  Every 4-6 hours:  If on nasal continuous positive airway pressure, respiratory therapy assess nares and determine need for appliance change or resting period  7/6/2025 2122 by Cady Ghotra RN  Outcome: Progressing  7/6/2025 1631 by Carmen Tompkins LPN  Outcome: Progressing  7/6/2025 1631 by Carmen Tompkins LPN  Outcome: Progressing     Problem: Safety - Adult  Goal: Free from fall injury  7/6/2025 2122 by Cady Ghotra RN  Outcome: Progressing  7/6/2025 1631 by Carmen Tompkins LPN  Outcome: Progressing  7/6/2025 1631 by Carmen Tompkins LPN  Outcome: Progressing

## 2025-07-07 NOTE — CONSULTS
patient.  ------------------------  REASON FOR CONSULT: \"Infective syndrome\"\" acute peritonitis with PD catheter\"  Requested by: Dr. Painting  HPI:Patient is a 85 y.o. female with hx CVA, VHD-mod AS, HTN, DM2, ESRD on PD, HLD chronic anemia who was admitted 7/5/2025 for further evaluation and management of abdominal pain nausea chills shortness of breath headache and fatigue.  The patient presented afebrile with hypertensive  BP. Noncontrast CTAP 7/5 nonacute + bilateral renal cysts, nonobstructing 5 mm stone in right kidney, cholelithiasis, diverticulosis.  Post op changes of the proximal colon and a peritoneal dialysis catheter.  CT head nonacute 7/5.  CXR 7/5 nonacute.  Nephrology consulted.  PD fluid analysis 7/6 +17,107 WBC, 92 neutrophils, 9 monocytes less than 2K RBC, cloudy. PD fluid culture 7/6 + GPC, many neutrophils.  Blood cultures obtained 7/5 and 7/6 with no growth.  UA showed pyuria, urine culture with mixed skin urogenital paras.  WBC 16.2 on admission currently 7.1.  Pro-Eyal 0.24.  The patient has been afebrile since admission.  The patient has been managed on IV Ceftriaxone and IV Vancomycin.?    Infectious diseases service was consulted to evaluate the pt, and recommend further investigative and therapeutic measures.    Pt reports chills and abdominal pain the day of presentation. Had nausea. Denies cloudy effluent with PD exchanges however she states her son reported she had some cloudiness a few days prior.     Reviewed old records  ROS: Other systems reviewed Including eyes, ENT, respiratory, cardiovascular, GI, , dermatologic, neurologic, psych, hem/lymphatic, musculoskeletal and endocrine were negative other than what is mentioned above.     Patient Active Problem List    Diagnosis Date Noted    Valvular heart disease 03/01/2023    Acute cystitis without hematuria 03/01/2023    Coronary artery disease of native artery of native heart with stable angina pectoris     Cystic kidney disease     H/O echocardiogram 06/18/2013 6/13- EF >55% Impaired LV relaxation, Mild concentric LV hypertrophy, No sig AS, THEO underestimated. 10/08-EF=>55%,NL study;7/07-EF=>55%,Mild valv.AS.    H/O echocardiogram 05/16/2016 5/16 EF 55-60% normal study 12/14EF 60%. MIld mitral and tricuspid insufficiencies. Mildly sclerotic aortic valve which appears to be mildly stenosed with aortic valve area of 1.4 however this does not appear to be hemodynamically signficant aortic stenosis. Mildy hypertropic left ventricle with normal global and regional left ventricular systolic function.    H/O left heart cath 05/23/2016    no significant disease in all vessels. EF 55%    H/O left heart cath 06/08/2022    No significant disease    Hernia     after lap band removal    Hx of cardiovascular stress test 06/18/2013 6/13-EF 51%, no scitnigraphic evidence of inducible myocardial ischemia    Hyperlipidemia     Hypertension     Irritable bowel syndrome     Kidney stones     Obesity     Osteoarthritis     Thyroid disease     Type II or unspecified type diabetes mellitus without mention of complication, not stated as uncontrolled     Venogram 06/21/2023    There is no DVT or any veinous blood clot in any of the above left leg veinous system      Past Surgical History:   Procedure Laterality Date    CARDIAC CATHETERIZATION  04/18/08    continue risk factor modification with strict control of her risk factors with diet and control of diabetes, high blood pressure and hyperlipidemia    GASTRIC BAND  08/2008    revision of gastric band placement    HEMICOLECTOMY Right 5/3/2023    RIGHT BOWEL RESECTION HEMICOLECTOMY LAPAROSCOPIC ROBOTIC XI performed by Amanda Tillman MD at [facility] OR    HERNIA REPAIR N/A 5/3/2023    OPEN HERNIA INCISIONAL REPAIR performed by Amanda Tillman MD at [facility] OR    HYSTERECTOMY (CERVIX STATUS UNKNOWN)      IR TUNNELED CVC PLACE WO SQ PORT/PUMP > 5 YEARS  1/9/2024    IR TUNNELED CATHETER PLACEMENT GREATER THAN 5 YEARS

## 2025-07-08 LAB
ALBUMIN SERPL-MCNC: 2.8 G/DL (ref 3.4–5)
ALBUMIN/GLOB SERPL: 1.1 {RATIO} (ref 1.1–2.2)
ALP SERPL-CCNC: 104 U/L (ref 40–129)
ALT SERPL-CCNC: 17 U/L (ref 10–40)
ANION GAP SERPL CALCULATED.3IONS-SCNC: 10 MMOL/L (ref 9–17)
AST SERPL-CCNC: 22 U/L (ref 15–37)
BASOPHILS # BLD: 0.03 K/UL
BASOPHILS NFR BLD: 0 % (ref 0–1)
BILIRUB SERPL-MCNC: <0.2 MG/DL (ref 0–1)
BUN SERPL-MCNC: 23 MG/DL (ref 7–20)
CALCIUM SERPL-MCNC: 8.3 MG/DL (ref 8.3–10.6)
CHLORIDE SERPL-SCNC: 103 MMOL/L (ref 99–110)
CO2 SERPL-SCNC: 25 MMOL/L (ref 21–32)
CREAT SERPL-MCNC: 2.3 MG/DL (ref 0.6–1.2)
CRP SERPL HS-MCNC: 47.4 MG/L (ref 0–5)
EOSINOPHIL # BLD: 0.28 K/UL
EOSINOPHILS RELATIVE PERCENT: 3 % (ref 0–3)
ERYTHROCYTE [DISTWIDTH] IN BLOOD BY AUTOMATED COUNT: 12.6 % (ref 11.7–14.9)
GFR, ESTIMATED: 20 ML/MIN/1.73M2
GLUCOSE BLD-MCNC: 147 MG/DL (ref 74–99)
GLUCOSE BLD-MCNC: 82 MG/DL (ref 74–99)
GLUCOSE BLD-MCNC: 88 MG/DL (ref 74–99)
GLUCOSE BLD-MCNC: 90 MG/DL (ref 74–99)
GLUCOSE SERPL-MCNC: 115 MG/DL (ref 74–99)
HCT VFR BLD AUTO: 29.1 % (ref 37–47)
HGB BLD-MCNC: 9.4 G/DL (ref 12.5–16)
IMM GRANULOCYTES # BLD AUTO: 0.06 K/UL
IMM GRANULOCYTES NFR BLD: 1 %
LYMPHOCYTES NFR BLD: 1.85 K/UL
LYMPHOCYTES RELATIVE PERCENT: 18 % (ref 24–44)
MAGNESIUM SERPL-MCNC: 1.6 MG/DL (ref 1.8–2.4)
MCH RBC QN AUTO: 30.3 PG (ref 27–31)
MCHC RBC AUTO-ENTMCNC: 32.3 G/DL (ref 32–36)
MCV RBC AUTO: 93.9 FL (ref 78–100)
MONOCYTES NFR BLD: 0.87 K/UL
MONOCYTES NFR BLD: 9 % (ref 0–5)
NEUTROPHILS NFR BLD: 69 % (ref 36–66)
NEUTS SEG NFR BLD: 6.96 K/UL
PLATELET # BLD AUTO: 178 K/UL (ref 140–440)
PMV BLD AUTO: 9.6 FL (ref 7.5–11.1)
POTASSIUM SERPL-SCNC: 3.1 MMOL/L (ref 3.5–5.1)
PROCALCITONIN SERPL-MCNC: 0.22 NG/ML
PROT SERPL-MCNC: 5.5 G/DL (ref 6.4–8.2)
RBC # BLD AUTO: 3.1 M/UL (ref 4.2–5.4)
SODIUM SERPL-SCNC: 139 MMOL/L (ref 136–145)
WBC OTHER # BLD: 10.1 K/UL (ref 4–10.5)

## 2025-07-08 PROCEDURE — 99232 SBSQ HOSP IP/OBS MODERATE 35: CPT | Performed by: NURSE PRACTITIONER

## 2025-07-08 PROCEDURE — 6370000000 HC RX 637 (ALT 250 FOR IP)

## 2025-07-08 PROCEDURE — 6370000000 HC RX 637 (ALT 250 FOR IP): Performed by: INTERNAL MEDICINE

## 2025-07-08 PROCEDURE — 83735 ASSAY OF MAGNESIUM: CPT

## 2025-07-08 PROCEDURE — 82962 GLUCOSE BLOOD TEST: CPT

## 2025-07-08 PROCEDURE — 1200000000 HC SEMI PRIVATE

## 2025-07-08 PROCEDURE — 84145 PROCALCITONIN (PCT): CPT

## 2025-07-08 PROCEDURE — 94761 N-INVAS EAR/PLS OXIMETRY MLT: CPT

## 2025-07-08 PROCEDURE — 2500000003 HC RX 250 WO HCPCS: Performed by: INTERNAL MEDICINE

## 2025-07-08 PROCEDURE — 80053 COMPREHEN METABOLIC PANEL: CPT

## 2025-07-08 PROCEDURE — 94640 AIRWAY INHALATION TREATMENT: CPT

## 2025-07-08 PROCEDURE — 6360000002 HC RX W HCPCS

## 2025-07-08 PROCEDURE — 85025 COMPLETE CBC W/AUTO DIFF WBC: CPT

## 2025-07-08 PROCEDURE — 6360000002 HC RX W HCPCS: Performed by: INTERNAL MEDICINE

## 2025-07-08 PROCEDURE — 86140 C-REACTIVE PROTEIN: CPT

## 2025-07-08 PROCEDURE — 36415 COLL VENOUS BLD VENIPUNCTURE: CPT

## 2025-07-08 RX ORDER — MAGNESIUM SULFATE IN WATER 40 MG/ML
2000 INJECTION, SOLUTION INTRAVENOUS ONCE
Status: COMPLETED | OUTPATIENT
Start: 2025-07-08 | End: 2025-07-08

## 2025-07-08 RX ORDER — HYDRALAZINE HYDROCHLORIDE 50 MG/1
50 TABLET, FILM COATED ORAL 3 TIMES DAILY
Status: DISCONTINUED | OUTPATIENT
Start: 2025-07-08 | End: 2025-07-11

## 2025-07-08 RX ORDER — POTASSIUM CHLORIDE 1500 MG/1
40 TABLET, EXTENDED RELEASE ORAL EVERY 4 HOURS
Status: COMPLETED | OUTPATIENT
Start: 2025-07-08 | End: 2025-07-08

## 2025-07-08 RX ORDER — CARVEDILOL 6.25 MG/1
3.12 TABLET ORAL 2 TIMES DAILY WITH MEALS
Status: DISCONTINUED | OUTPATIENT
Start: 2025-07-08 | End: 2025-07-11 | Stop reason: HOSPADM

## 2025-07-08 RX ADMIN — ACETAMINOPHEN 650 MG: 325 TABLET ORAL at 15:38

## 2025-07-08 RX ADMIN — HYDRALAZINE HYDROCHLORIDE 50 MG: 50 TABLET ORAL at 10:52

## 2025-07-08 RX ADMIN — Medication 10 MG: at 22:21

## 2025-07-08 RX ADMIN — CLOPIDOGREL BISULFATE 75 MG: 75 TABLET, FILM COATED ORAL at 10:51

## 2025-07-08 RX ADMIN — TAMSULOSIN HYDROCHLORIDE 0.4 MG: 0.4 CAPSULE ORAL at 10:53

## 2025-07-08 RX ADMIN — BUDESONIDE AND FORMOTEROL FUMARATE DIHYDRATE 2 PUFF: 80; 4.5 AEROSOL RESPIRATORY (INHALATION) at 11:21

## 2025-07-08 RX ADMIN — POTASSIUM CHLORIDE 40 MEQ: 1500 TABLET, EXTENDED RELEASE ORAL at 04:01

## 2025-07-08 RX ADMIN — FLUCONAZOLE 100 MG: 100 TABLET ORAL at 10:53

## 2025-07-08 RX ADMIN — HEPARIN SODIUM 5000 UNITS: 5000 INJECTION INTRAVENOUS; SUBCUTANEOUS at 05:55

## 2025-07-08 RX ADMIN — HEPARIN SODIUM 5000 UNITS: 5000 INJECTION INTRAVENOUS; SUBCUTANEOUS at 20:47

## 2025-07-08 RX ADMIN — HYDRALAZINE HYDROCHLORIDE 50 MG: 50 TABLET ORAL at 15:38

## 2025-07-08 RX ADMIN — SODIUM CHLORIDE, PRESERVATIVE FREE 10 ML: 5 INJECTION INTRAVENOUS at 10:53

## 2025-07-08 RX ADMIN — BUDESONIDE AND FORMOTEROL FUMARATE DIHYDRATE 2 PUFF: 80; 4.5 AEROSOL RESPIRATORY (INHALATION) at 19:27

## 2025-07-08 RX ADMIN — ATORVASTATIN CALCIUM 40 MG: 40 TABLET, FILM COATED ORAL at 20:48

## 2025-07-08 RX ADMIN — PANTOPRAZOLE SODIUM 40 MG: 40 TABLET, DELAYED RELEASE ORAL at 05:55

## 2025-07-08 RX ADMIN — LEVOTHYROXINE SODIUM 137 MCG: 0.11 TABLET ORAL at 05:55

## 2025-07-08 RX ADMIN — SODIUM CHLORIDE, PRESERVATIVE FREE 10 ML: 5 INJECTION INTRAVENOUS at 20:48

## 2025-07-08 RX ADMIN — CARVEDILOL 3.12 MG: 6.25 TABLET, FILM COATED ORAL at 10:54

## 2025-07-08 RX ADMIN — POTASSIUM CHLORIDE 40 MEQ: 1500 TABLET, EXTENDED RELEASE ORAL at 10:57

## 2025-07-08 RX ADMIN — MAGNESIUM SULFATE HEPTAHYDRATE 2000 MG: 40 INJECTION, SOLUTION INTRAVENOUS at 04:02

## 2025-07-08 RX ADMIN — CITALOPRAM HYDROBROMIDE 20 MG: 20 TABLET ORAL at 10:53

## 2025-07-08 RX ADMIN — MIRTAZAPINE 15 MG: 15 TABLET, FILM COATED ORAL at 20:48

## 2025-07-08 RX ADMIN — CARVEDILOL 3.12 MG: 6.25 TABLET, FILM COATED ORAL at 17:12

## 2025-07-08 RX ADMIN — ALBUTEROL SULFATE 2 PUFF: 90 AEROSOL, METERED RESPIRATORY (INHALATION) at 17:26

## 2025-07-08 RX ADMIN — HYDRALAZINE HYDROCHLORIDE 50 MG: 50 TABLET ORAL at 20:48

## 2025-07-08 RX ADMIN — HEPARIN SODIUM 5000 UNITS: 5000 INJECTION INTRAVENOUS; SUBCUTANEOUS at 15:39

## 2025-07-08 ASSESSMENT — PAIN DESCRIPTION - DESCRIPTORS: DESCRIPTORS: ACHING

## 2025-07-08 ASSESSMENT — PAIN SCALES - GENERAL: PAINLEVEL_OUTOF10: 6

## 2025-07-08 ASSESSMENT — PAIN DESCRIPTION - ORIENTATION: ORIENTATION: ANTERIOR

## 2025-07-08 ASSESSMENT — PAIN DESCRIPTION - LOCATION: LOCATION: HEAD

## 2025-07-08 NOTE — PROGRESS NOTES
Nephrology Progress Note  7/8/2025 7:18 AM  Subjective:     Interval History: Gwendolyn Grimm is a 85 y.o. female abdominal pain improving tolerating peritoneal dialysis PD fluid is getting more clear at this time we will check cell count and culture denies any other complaints      Data:   Scheduled Meds:   potassium chloride  40 mEq Oral Q4H    fluconazole  100 mg Oral Daily    sodium chloride flush  5-40 mL IntraVENous 2 times per day    heparin (porcine)  5,000 Units SubCUTAneous 3 times per day    atorvastatin  40 mg Oral Nightly    budesonide-formoterol  2 puff Inhalation BID    citalopram  20 mg Oral Daily    clopidogrel  75 mg Oral Daily    hydrALAZINE  25 mg Oral TID    insulin glargine  10 Units SubCUTAneous Nightly    levothyroxine  137 mcg Oral Daily    mirtazapine  15 mg Oral Nightly    pantoprazole  40 mg Oral QAM AC    tamsulosin  0.4 mg Oral Daily    insulin lispro  0-4 Units SubCUTAneous 4x Daily AC & HS     Continuous Infusions:   sodium chloride      dextrose      dianeal lo-duong 1.5%           CBC   Recent Labs     07/06/25  0149 07/07/25  0310 07/08/25  0203   WBC 14.4* 7.1 10.1   HGB 9.3* 8.9* 9.4*   HCT 29.7* 28.2* 29.1*    166 178      BMP   Recent Labs     07/06/25  0149 07/07/25  0310 07/08/25  0203    141 139   K 3.9 3.6 3.1*    105 103   CO2 23 27 25   BUN 30* 28* 23*   CREATININE 3.2* 2.6* 2.3*     Hepatic:   Recent Labs     07/05/25  2116 07/07/25  0310 07/08/25  0203   AST 32 22 22   ALT 21 16 17   BILITOT 0.4 0.3 <0.2   ALKPHOS 154* 107 104     Troponin: No results for input(s): \"TROPONINI\" in the last 72 hours.  BNP: No results for input(s): \"BNP\" in the last 72 hours.  Lipids: No results for input(s): \"CHOL\", \"HDL\" in the last 72 hours.    Invalid input(s): \"LDLCALCU\"  ABGs:   Lab Results   Component Value Date/Time    PO2ART 73 05/12/2023 07:00 AM    ERC9SPS 38.0 05/12/2023 07:00 AM     INR: No results for input(s): \"INR\" in the last 72 hours.  Renal Labs  Albumin:

## 2025-07-08 NOTE — PROGRESS NOTES
Pt connected to cycler as ordered. Attempted to get fluid. PD catheter would not drain enough fluid for adequate sample. Catheter seemed to pull better once treatment started will attempt again in the morning. Cath care provided. New dressing applied. Exit site clean, dry and free of drainage or discoloration.

## 2025-07-08 NOTE — PROGRESS NOTES
Infectious Disease Progress Note  2025   Patient Name: Gwendolyn Grimm : 1940     Assessment  PD associated peritonitis  Patient presented for management of multiple complaints including abdominal pain chills nausea and fatigue.  PD fluid analysis suggestive of infection.  PD fluid culture + GPC. Blood cx with no growth to date. The patient has been managed on empiric IV antibiotics.  Nephrology following.  ID consulted for management.  Called Micro and reporting GPC in clusters.  IP antibiotics and oral fluconazole initiated per Nephrology.   Leukocytosis resolved, CRP with dwt. Af  Imaging: Noncontrast CTAP  nonacute + bilateral renal cysts, nonobstructing 5 mm stone in right kidney, cholelithiasis, diverticulosis.  Post op changes of the proximal colon and a peritoneal dialysis catheter.  CT head nonacute .  CXR  nonacute.  Micro data: PD fluid culture  + GPC in clusters(per Micro), many neutrophils.  PD fluid analysis  +17,107 WBC, 92 neutrophils, 9 monocytes less than 2K RBC, cloudy.  Blood culture  and  NGTD.  Urine culture  <50k cfu mixed skin urogenital paras.  UA  + 53 WBC, 0 RBC, 3 epi, many bacteria, moderate leuk  WBC 10.1<--14.4<--16.2(adm), CRP 47.4<--83.7. Procal 0.22<--0.24. Afebrile since admission  Antimicrobial summary: IV ceftriaxone -; IV vancomycin -; IP Van/Gent -     ESRD on PD  Nephrology following  Bilateral renal cyst  Nonobstructing right renal calculus  Per primary team  DM II, A1C 5.8 ))  On basal and corrective insulin per primary team  Allergy: Amoxicillin-not specified; Bactrim-not specified; PCN-hives  CrCl 18ml/min  QTc 411  Comorbid conditions: CVA, VHD-mod AS, HTN, DM2, ESRD on PD, HLD chronic anemia     Plan  Therapeutic:  Agree with IP Vancomycin/Gentamicin and oral fluconazole per Nephrology, extended course recommended  Diagnostic:   Trend CRP, CBC  F/u:   PD fluid culture  Other:      Thank you for allowing me to consult in the

## 2025-07-08 NOTE — PROGRESS NOTES
Comprehensive Nutrition Assessment    Type and Reason for Visit:  Initial, Positive nutrition screen, Wound    Nutrition Recommendations/Plan:   Liberalize K+ restriction?  Begin daily frozen supplement trial     Malnutrition Assessment:  Malnutrition Status:  No malnutrition (07/08/25 1440)    Context:  Chronic Illness       Nutrition Assessment:    Pt admitted with sepsis, malaise, acute peritonitis, stage II pressure injury. H/O end-stage kidney disease on peritoneal dialysis, history of UTI with renal cysts, CVA, aortic stenosis, hypertension, type 2 diabetes, hypokalemia, colon cancer s/p right hemicolectomy. Reports abdominal pain, chills, nausea and fatigue PTA. Currently feeding self with adequate intake, consuming 76% to all of meals. Her wt is stable over the past 6 mos. Generally, does not care for oral supplements. Will trial daily frozen for her to optimize healing.    Nutrition Related Findings:    K 3.1, BUN 23, Cr 2.3,GFR 20, Mg 1.6, Glu 88   Wound Type: Pressure Injury, Stage II       Current Nutrition Intake & Therapies:    Average Meal Intake: %  Average Supplements Intake: None Ordered  ADULT DIET; Regular; Low Sodium (2 gm); Low Potassium (Less than 3000 mg/day); Low Phosphorus (Less than 1000 mg)    Anthropometric Measures:  Height: 167.6 cm (5' 6\")  Ideal Body Weight (IBW): 130 lbs (59 kg)    Admission Body Weight: 83 kg (182 lb 15.7 oz)  Current Body Weight: 82.4 kg (181 lb 10.5 oz),   IBW. Weight Source: Bed scale  Current BMI (kg/m2): 29.3  Usual Body Weight: 83.2 kg (183 lb 7 oz) (1/9/25)     % Weight Change (Calculated): -1                    BMI Categories: Overweight (BMI 25.0-29.9)    Estimated Daily Nutrient Needs:  Energy Requirements Based On: Kcal/kg  Weight Used for Energy Requirements: Ideal  Energy (kcal/day): 6310-0076 (30-35 kcal/kg IBW)  Weight Used for Protein Requirements: Ideal  Protein (g/day): 59-77 (1-1.3 g/kg IBW)  Method Used for Fluid Requirements: Defer to

## 2025-07-08 NOTE — PROGRESS NOTES
Progress note    Name:  Gwendolyn Grimm /Age/Sex: 1940  (85 y.o. female)   MRN & CSN:  [id number] & [id number] Encounter Date/Time: 2025 11:49 PM EDT   [facility]: Pepe Avina MD       Hospital Day: 4    Assessment and Plan:     Sepsis  Acute peritonitis  - Criteria-leukocytosis, tachycardia  - Lactic acid 1.6  - UA suggestive of infection  - Urine culture, blood cultures in process  - CT abdomen/pelvis-nonacute  - PD catheter fluid to be sent for analysis in a.m.   discussed with nephrology dialysis  - plan to send peritoneal fluid through PD catheter today   peritoneal fluid consistent with peritonitis  Consult ID.  Wait for culture  DW with nephro - continue Van + Gentamicin and Diflucan     #.  Recent admission -2025 for sepsis secondary to UTI, obstructive right ureteral calculus  - Urine culture grew E. coli and Proteus, discharged on oral ciprofloxacin  Patient still making urine.  urine culture showed Mixed paras     #.  ESRD on peritoneal dialysis  - Dr. Izaguirre consulted from ED    #.  History of CVA-2025  Several small scattered acute infarcts in the left cerebral hemisphere  - Status post cerebral angiogram-2025-status post stenting of left carotid artery  - On aspirin, Plavix, statin    #.  Nonobstructive coronary artery disease  - Chronically elevated troponin    #.  Aortic stenosis  #.  Hypertension-on hydralazine  #.  Depression, anxiety-on mirtazapine  #.  GERD-on Protonix  #.  Hyperlipidemia-atorvastatin  #.  Hypothyroidism-continue levothyroxine  #.  Diabetes mellitus type 2- On Lantus 10 units nightly  #.  COPD- On breyna, albuterol HFA as needed    #.  History of cardiac arrest after surgery 2023  #.  Colon cancer status post right hemicolectomy-2023      Disposition:   Current Living situation: Home    Diet Renal   DVT Prophylaxis  Heparin   Code Status Full-discussed with patient   Surrogate Decision Maker/ POA Daughter  unremarkable. Biliary tree: There is no evidence for intra-or extrahepatic biliary ductal dilatation. Pancreas: Atrophic morphology without masses or inflammatory changes. Spleen: Normal size and morphology is seen. Calcified granulomas. No masses are identified. Adrenals: Normal size without masses. Kidneys and ureters: There are 2 low-attenuation cysts in the right kidney which  measure up to 1.8 x 3.6 cm. There are 2 low-attenuation cysts in the left kidney which measure up to 3.1 x 3.4 cm. There are vascular calcifications in the bilateral kidneys. There is a nonobstructive stone in the right kidney measuring 5 mm. There is a calcified phlebolith adjacent to the posterior wall of the distal right ureter on image 63 of series 2. Vasculature: Diffuse atherosclerotic changes. No evidence of an abdominal aortic  aneurysm. Bowel: There is a small hiatal hernia status post sleeve gastrectomy. The small bowel is nondistended and grossly unremarkable. There is diverticulosis involving the descending and sigmoid colon without evidence of acute colitis or diverticulitis. There are postoperative changes consistent with a resection of the most proximal portions of the colon. The appendix is absent. Lymphatic system: No pathologically enlarged lymph nodes are seen. Peritoneal structures: No free air. Small amount of ascites along the anterior margin of the liver. There is a peritoneal dialysis catheter which is coiled in the right anterior abdomen.  No masses. The omentum and small bowel mesentery are normal.  Retroperitoneum: No focal retroperitoneal abnormality is seen. Abdominal wall:  There is a small fat-containing midline ventral abdominal wall hernia.. CT PELVIS FINDINGS: Soft tissues: There is no free fluid or inflammatory change in the pelvis. The bladder is mildly distended with a smooth thin wall. The uterus has been removed. There is no pelvic or inguinal lymphadenopathy. Bones: Multilevel degenerative disc and

## 2025-07-08 NOTE — PLAN OF CARE
Problem: Chronic Conditions and Co-morbidities  Goal: Patient's chronic conditions and co-morbidity symptoms are monitored and maintained or improved  7/7/2025 2247 by Bailey Brewster RN  Outcome: Progressing  7/7/2025 1005 by Katya Cuenca LPN  Outcome: Progressing     Problem: Discharge Planning  Goal: Discharge to home or other facility with appropriate resources  7/7/2025 2247 by Bailey Brewster RN  Outcome: Progressing  7/7/2025 1005 by Katya Cuenca LPN  Outcome: Progressing     Problem: Pain  Goal: Verbalizes/displays adequate comfort level or baseline comfort level  7/7/2025 2247 by Bailey Brewster RN  Outcome: Progressing  7/7/2025 1005 by Katya Cuenca LPN  Outcome: Progressing     Problem: Skin/Tissue Integrity  Goal: Skin integrity remains intact  Description: 1.  Monitor for areas of redness and/or skin breakdown  2.  Assess vascular access sites hourly  3.  Every 4-6 hours minimum:  Change oxygen saturation probe site  4.  Every 4-6 hours:  If on nasal continuous positive airway pressure, respiratory therapy assess nares and determine need for appliance change or resting period  7/7/2025 2247 by Bailey Brewster RN  Outcome: Progressing  Flowsheets (Taken 7/7/2025 2246)  Skin Integrity Remains Intact:   Monitor for areas of redness and/or skin breakdown   Assess vascular access sites hourly  7/7/2025 1005 by Katya Cuenca LPN  Outcome: Progressing     Problem: Safety - Adult  Goal: Free from fall injury  7/7/2025 2247 by Bailey Brewster RN  Outcome: Progressing  Flowsheets (Taken 7/7/2025 2246)  Free From Fall Injury:   Instruct family/caregiver on patient safety   Based on caregiver fall risk screen, instruct family/caregiver to ask for assistance with transferring infant if caregiver noted to have fall risk factors  7/7/2025 1005 by Katya Cuenca LPN  Outcome: Progressing

## 2025-07-09 LAB
ALBUMIN SERPL-MCNC: 3.2 G/DL (ref 3.4–5)
ALBUMIN/GLOB SERPL: 1 {RATIO} (ref 1.1–2.2)
ALP SERPL-CCNC: 120 U/L (ref 40–129)
ALT SERPL-CCNC: 15 U/L (ref 10–40)
ANION GAP SERPL CALCULATED.3IONS-SCNC: 10 MMOL/L (ref 9–17)
APPEARANCE FLD: CLEAR
AST SERPL-CCNC: 20 U/L (ref 15–37)
BASOPHILS # BLD: 0.04 K/UL
BASOPHILS NFR BLD: 0 % (ref 0–1)
BILIRUB SERPL-MCNC: 0.4 MG/DL (ref 0–1)
BODY FLD TYPE: NORMAL
BUN SERPL-MCNC: 19 MG/DL (ref 7–20)
CALCIUM SERPL-MCNC: 9.1 MG/DL (ref 8.3–10.6)
CHLORIDE SERPL-SCNC: 103 MMOL/L (ref 99–110)
CLOT CHECK: NORMAL
CO2 SERPL-SCNC: 25 MMOL/L (ref 21–32)
COLOR FLD: COLORLESS
CREAT SERPL-MCNC: 2.1 MG/DL (ref 0.6–1.2)
CRP SERPL HS-MCNC: 44.2 MG/L (ref 0–5)
EOSINOPHIL # BLD: 0.21 K/UL
EOSINOPHILS RELATIVE PERCENT: 2 % (ref 0–3)
ERYTHROCYTE [DISTWIDTH] IN BLOOD BY AUTOMATED COUNT: 12.7 % (ref 11.7–14.9)
GFR, ESTIMATED: 22 ML/MIN/1.73M2
GLUCOSE BLD-MCNC: 100 MG/DL (ref 74–99)
GLUCOSE BLD-MCNC: 102 MG/DL (ref 74–99)
GLUCOSE BLD-MCNC: 102 MG/DL (ref 74–99)
GLUCOSE BLD-MCNC: 161 MG/DL (ref 74–99)
GLUCOSE SERPL-MCNC: 149 MG/DL (ref 74–99)
HCT VFR BLD AUTO: 32 % (ref 37–47)
HGB BLD-MCNC: 10.3 G/DL (ref 12.5–16)
IMM GRANULOCYTES # BLD AUTO: 0.04 K/UL
IMM GRANULOCYTES NFR BLD: 0 %
LYMPHOCYTES NFR BLD: 1.8 K/UL
LYMPHOCYTES RELATIVE PERCENT: 17 % (ref 24–44)
MCH RBC QN AUTO: 30.5 PG (ref 27–31)
MCHC RBC AUTO-ENTMCNC: 32.2 G/DL (ref 32–36)
MCV RBC AUTO: 94.7 FL (ref 78–100)
MONOCYTES NFR BLD: 1.05 K/UL
MONOCYTES NFR BLD: 10 % (ref 0–5)
MONOCYTES NFR FLD: 85 %
NEUTROPHILS NFR BLD: 70 % (ref 36–66)
NEUTROPHILS NFR FLD: 15 %
NEUTS SEG NFR BLD: 7.26 K/UL
PLATELET # BLD AUTO: 227 K/UL (ref 140–440)
PMV BLD AUTO: 9.6 FL (ref 7.5–11.1)
POTASSIUM SERPL-SCNC: 4.2 MMOL/L (ref 3.5–5.1)
PROT SERPL-MCNC: 6.3 G/DL (ref 6.4–8.2)
RBC # BLD AUTO: 3.38 M/UL (ref 4.2–5.4)
RBC # FLD: <2000 CELLS/UL
SODIUM SERPL-SCNC: 138 MMOL/L (ref 136–145)
WBC # FLD: 53 CELLS/UL
WBC OTHER # BLD: 10.4 K/UL (ref 4–10.5)

## 2025-07-09 PROCEDURE — 99232 SBSQ HOSP IP/OBS MODERATE 35: CPT | Performed by: NURSE PRACTITIONER

## 2025-07-09 PROCEDURE — 2500000003 HC RX 250 WO HCPCS: Performed by: INTERNAL MEDICINE

## 2025-07-09 PROCEDURE — 6360000002 HC RX W HCPCS: Performed by: INTERNAL MEDICINE

## 2025-07-09 PROCEDURE — 6370000000 HC RX 637 (ALT 250 FOR IP): Performed by: INTERNAL MEDICINE

## 2025-07-09 PROCEDURE — 1200000000 HC SEMI PRIVATE

## 2025-07-09 PROCEDURE — 87070 CULTURE OTHR SPECIMN AEROBIC: CPT

## 2025-07-09 PROCEDURE — 94761 N-INVAS EAR/PLS OXIMETRY MLT: CPT

## 2025-07-09 PROCEDURE — 86140 C-REACTIVE PROTEIN: CPT

## 2025-07-09 PROCEDURE — 82962 GLUCOSE BLOOD TEST: CPT

## 2025-07-09 PROCEDURE — 80053 COMPREHEN METABOLIC PANEL: CPT

## 2025-07-09 PROCEDURE — 87205 SMEAR GRAM STAIN: CPT

## 2025-07-09 PROCEDURE — 85025 COMPLETE CBC W/AUTO DIFF WBC: CPT

## 2025-07-09 PROCEDURE — 36415 COLL VENOUS BLD VENIPUNCTURE: CPT

## 2025-07-09 PROCEDURE — 94640 AIRWAY INHALATION TREATMENT: CPT

## 2025-07-09 PROCEDURE — 89051 BODY FLUID CELL COUNT: CPT

## 2025-07-09 PROCEDURE — 87075 CULTR BACTERIA EXCEPT BLOOD: CPT

## 2025-07-09 RX ORDER — LACTULOSE 10 G/15ML
20 SOLUTION ORAL 3 TIMES DAILY
Status: DISPENSED | OUTPATIENT
Start: 2025-07-09 | End: 2025-07-10

## 2025-07-09 RX ORDER — HYDRALAZINE HYDROCHLORIDE 20 MG/ML
5 INJECTION INTRAMUSCULAR; INTRAVENOUS ONCE
Status: DISCONTINUED | OUTPATIENT
Start: 2025-07-09 | End: 2025-07-11 | Stop reason: HOSPADM

## 2025-07-09 RX ORDER — AMLODIPINE BESYLATE 5 MG/1
5 TABLET ORAL DAILY
Status: DISCONTINUED | OUTPATIENT
Start: 2025-07-09 | End: 2025-07-11

## 2025-07-09 RX ADMIN — CLOPIDOGREL BISULFATE 75 MG: 75 TABLET, FILM COATED ORAL at 08:38

## 2025-07-09 RX ADMIN — HEPARIN SODIUM 5000 UNITS: 5000 INJECTION INTRAVENOUS; SUBCUTANEOUS at 15:40

## 2025-07-09 RX ADMIN — LEVOTHYROXINE SODIUM 137 MCG: 0.11 TABLET ORAL at 05:16

## 2025-07-09 RX ADMIN — CARVEDILOL 3.12 MG: 6.25 TABLET, FILM COATED ORAL at 08:38

## 2025-07-09 RX ADMIN — ATORVASTATIN CALCIUM 40 MG: 40 TABLET, FILM COATED ORAL at 20:31

## 2025-07-09 RX ADMIN — CITALOPRAM HYDROBROMIDE 20 MG: 20 TABLET ORAL at 08:38

## 2025-07-09 RX ADMIN — FLUCONAZOLE 100 MG: 100 TABLET ORAL at 08:39

## 2025-07-09 RX ADMIN — MIRTAZAPINE 15 MG: 15 TABLET, FILM COATED ORAL at 20:31

## 2025-07-09 RX ADMIN — HYDRALAZINE HYDROCHLORIDE 50 MG: 50 TABLET ORAL at 08:39

## 2025-07-09 RX ADMIN — LACTULOSE 20 G: 20 SOLUTION ORAL at 20:30

## 2025-07-09 RX ADMIN — ALBUTEROL SULFATE 2 PUFF: 90 AEROSOL, METERED RESPIRATORY (INHALATION) at 07:56

## 2025-07-09 RX ADMIN — AMLODIPINE BESYLATE 5 MG: 5 TABLET ORAL at 11:34

## 2025-07-09 RX ADMIN — SODIUM CHLORIDE, PRESERVATIVE FREE 10 ML: 5 INJECTION INTRAVENOUS at 08:33

## 2025-07-09 RX ADMIN — PANTOPRAZOLE SODIUM 40 MG: 40 TABLET, DELAYED RELEASE ORAL at 05:16

## 2025-07-09 RX ADMIN — Medication 10 MG: at 20:31

## 2025-07-09 RX ADMIN — TAMSULOSIN HYDROCHLORIDE 0.4 MG: 0.4 CAPSULE ORAL at 08:38

## 2025-07-09 RX ADMIN — CARVEDILOL 3.12 MG: 6.25 TABLET, FILM COATED ORAL at 18:29

## 2025-07-09 RX ADMIN — INSULIN GLARGINE 10 UNITS: 100 INJECTION, SOLUTION SUBCUTANEOUS at 20:31

## 2025-07-09 RX ADMIN — LACTULOSE 20 G: 20 SOLUTION ORAL at 11:34

## 2025-07-09 RX ADMIN — SODIUM CHLORIDE, PRESERVATIVE FREE 10 ML: 5 INJECTION INTRAVENOUS at 20:32

## 2025-07-09 RX ADMIN — HEPARIN SODIUM 5000 UNITS: 5000 INJECTION INTRAVENOUS; SUBCUTANEOUS at 22:43

## 2025-07-09 RX ADMIN — BUDESONIDE AND FORMOTEROL FUMARATE DIHYDRATE 2 PUFF: 80; 4.5 AEROSOL RESPIRATORY (INHALATION) at 07:56

## 2025-07-09 RX ADMIN — HYDRALAZINE HYDROCHLORIDE 50 MG: 50 TABLET ORAL at 20:30

## 2025-07-09 RX ADMIN — HEPARIN SODIUM 5000 UNITS: 5000 INJECTION INTRAVENOUS; SUBCUTANEOUS at 05:16

## 2025-07-09 NOTE — PROGRESS NOTES
Pt disconnected from cycler. Catheter cleaned and capped. Effluent still a little cloudy and yellow.

## 2025-07-09 NOTE — CARE COORDINATION
Reviewed chart, discussed in IDR and attempted to speak with pt but sleeping soundly.  Plan remains home once medically ready.  PD fluid cult pending.  CM will continue to follow for possible needs.

## 2025-07-09 NOTE — PROGRESS NOTES
Infectious Disease Progress Note  2025   Patient Name: Gwendolyn Grimm : 1940     Assessment  PD associated peritonitis  Patient presented for management of multiple complaints including abdominal pain chills nausea and fatigue.  PD fluid analysis suggestive of infection.  PD fluid culture + GPC. Blood cx with no growth to date. The patient has been managed on empiric IV antibiotics.  Nephrology following.  ID consulted for management.  Called Micro and reporting GPC in clusters.  IP antibiotics and oral fluconazole initiated per Nephrology.   Leukocytosis resolved, CRP with dwt. Af  Imaging: Noncontrast CTAP  nonacute + bilateral renal cysts, nonobstructing 5 mm stone in right kidney, cholelithiasis, diverticulosis.  Post op changes of the proximal colon and a peritoneal dialysis catheter.  CT head nonacute .  CXR  nonacute.  Micro data: PD fluid culture  + Staph Epi(suscep pending-repeating).  PD fluid analysis  +17,107 WBC, 92 neutrophils, 9 monocytes less than 2K RBC, cloudy.  Blood culture  and  NGTD.  Urine culture  <50k cfu mixed skin urogenital paras.  UA  + 53 WBC, 0 RBC, 3 epi, many bacteria, moderate leuk  WBC 10.4<--14.4<--16.2(adm), CRP 44.2<--83.7. Procal 0.22<--0.24. Afebrile since admission  Antimicrobial summary: IV ceftriaxone -; IV vancomycin -; IP Van/Gent -     ESRD on PD  Nephrology following  Bilateral renal cyst  Nonobstructing right renal calculus  Per primary team  DM II, A1C 5.8 ))  On basal and corrective insulin per primary team  Allergy: Amoxicillin-not specified; Bactrim-not specified; PCN-hives  CrCl 18ml/min  QTc 411  Comorbid conditions: CVA, VHD-mod AS, HTN, DM2, ESRD on PD, HLD chronic anemia     Plan  Therapeutic:  Agree with IP Vancomycin/Gentamicin and oral fluconazole per Nephrology, extended 2 week course recommended  Diagnostic:   Trend CRP, CBC  F/u:   PD fluid culture  Other:      Thank you for allowing me to consult in the

## 2025-07-09 NOTE — PROGRESS NOTES
Progress note    Name:  Gwendolyn Grimm /Age/Sex: 1940  (85 y.o. female)   MRN & CSN:  [id number] & [id number] Encounter Date/Time: 2025 11:49 PM EDT   [facility]: Pepe Avina MD       Hospital Day: 5    Assessment and Plan:     Sepsis  Acute peritonitis  - Criteria-leukocytosis, tachycardia  - Lactic acid 1.6  - UA suggestive of infection  - Urine culture, blood cultures in process  - CT abdomen/pelvis-nonacute  - PD catheter fluid to be sent for analysis in a.m.   discussed with nephrology dialysis  - plan to send peritoneal fluid through PD catheter today   peritoneal fluid consistent with peritonitis  Consult ID.  Culture staph epi  DW with nephro - continue Van + Gentamicin and Diflucan. Likely DC Friday. Repeat Fluid analysis today     #.  Recent admission -2025 for sepsis secondary to UTI, obstructive right ureteral calculus  - Urine culture grew E. coli and Proteus, discharged on oral ciprofloxacin  Patient still making urine.  urine culture showed Mixed paras     #.  ESRD on peritoneal dialysis  - Dr. Izaguirre consulted from ED    #.  History of CVA-2025  Several small scattered acute infarcts in the left cerebral hemisphere  - Status post cerebral angiogram-2025-status post stenting of left carotid artery  - On aspirin, Plavix, statin    #.  Nonobstructive coronary artery disease  - Chronically elevated troponin    #.  Aortic stenosis  #.  Hypertension-on hydralazine  #.  Depression, anxiety-on mirtazapine  #.  GERD-on Protonix  #.  Hyperlipidemia-atorvastatin  #.  Hypothyroidism-continue levothyroxine  #.  Diabetes mellitus type 2- On Lantus 10 units nightly  #.  COPD- On breyna, albuterol HFA as needed    #.  History of cardiac arrest after surgery 2023  #.  Colon cancer status post right hemicolectomy-2023      Disposition:   Current Living situation: Home    Diet Renal   DVT Prophylaxis  Heparin   Code Status Full-discussed with patient  mildly distended with a smooth thin wall. The uterus has been removed. There is no pelvic or inguinal lymphadenopathy. Bones: Multilevel degenerative disc and facet disease is demonstrated throughout  the lumbar spine. There is no evidence of a fracture. IMPRESSION: 1. Nonacute noncontrast CT of the abdomen and pelvis. 2. Bilateral renal cysts for which no further follow-up is indicated. 3. Nonobstructive 5 mm stone in the right kidney. 4. Diverticulosis without evidence of diverticulitis. 5. Cholelithiasis. 6. Postoperative changes consistent with a resection of the proximal colon and a  peritoneal dialysis catheter. This dictation was created with voice recognition software.  While attempts have  been made to review the dictation as it is transcribed, on occasion the spoken word can be misinterpreted by the technology leading to omissions or inappropriate words, phrases or sentences.  Dictated and Electronically Signed By: Rangel Cárdenas [facility] 7/5/2025 22:46        CT HEAD WO CONTRAST  Result Date: 7/5/2025  CT OF THE HEAD WITHOUT CONTRAST DATE OF SERVICE: 7/5/2025 22:30 DEMOGRAPHICS: 85 years old Female INDICATION: HEAD TRAUMA, CLOSED, MILD, GCS >= 13, NO RISK FACTORS, NEURO EXAM NORMAL, headache/nausea COMPARISON:  1/6/2025 TECHNIQUE: Axial images were obtained of the brain without the administration of  intravenous contrast. Coronal reconstructions were obtained. Coronal reconstructions were obtained. CT radiation dose optimization techniques (automated exposure control, and use of iterative reconstruction techniques, or adjustment of the mA or kV according to patient size) were used to limit patient  radiation dose. FINDINGS: There is no soft tissue swelling. The osseous structures are intact. The paranasal sinuses and mastoid air cells are well aerated. No intra-axial or extra-axial fluid collections or masses are identified. There is no mass effect or midline shift. There is no

## 2025-07-09 NOTE — PROGRESS NOTES
Nephrology Progress Note  7/9/2025 8:34 AM  Subjective:     Interval History: Gwendolyn Grimm is a 85 y.o. female with abdominal pain resolving PD culture grew Staph epidermidis could be possibly contamination as well but symptoms improved with antibiotic therapy      Data:   Scheduled Meds:   hydrALAZINE  5 mg IntraVENous Once    hydrALAZINE  50 mg Oral TID    carvedilol  3.125 mg Oral BID WC    fluconazole  100 mg Oral Daily    sodium chloride flush  5-40 mL IntraVENous 2 times per day    heparin (porcine)  5,000 Units SubCUTAneous 3 times per day    atorvastatin  40 mg Oral Nightly    budesonide-formoterol  2 puff Inhalation BID    citalopram  20 mg Oral Daily    clopidogrel  75 mg Oral Daily    insulin glargine  10 Units SubCUTAneous Nightly    levothyroxine  137 mcg Oral Daily    mirtazapine  15 mg Oral Nightly    pantoprazole  40 mg Oral QAM AC    tamsulosin  0.4 mg Oral Daily    insulin lispro  0-4 Units SubCUTAneous 4x Daily AC & HS     Continuous Infusions:   sodium chloride      dextrose      dianeal lo-duong 1.5%           CBC   Recent Labs     07/07/25 0310 07/08/25  0203 07/09/25  0236   WBC 7.1 10.1 10.4   HGB 8.9* 9.4* 10.3*   HCT 28.2* 29.1* 32.0*    178 227      BMP   Recent Labs     07/07/25 0310 07/08/25  0203 07/09/25  0236    139 138   K 3.6 3.1* 4.2    103 103   CO2 27 25 25   BUN 28* 23* 19   CREATININE 2.6* 2.3* 2.1*     Hepatic:   Recent Labs     07/07/25 0310 07/08/25  0203 07/09/25  0236   AST 22 22 20   ALT 16 17 15   BILITOT 0.3 <0.2 0.4   ALKPHOS 107 104 120     Troponin: No results for input(s): \"TROPONINI\" in the last 72 hours.  BNP: No results for input(s): \"BNP\" in the last 72 hours.  Lipids: No results for input(s): \"CHOL\", \"HDL\" in the last 72 hours.    Invalid input(s): \"LDLCALCU\"  ABGs:   Lab Results   Component Value Date/Time    PO2ART 73 05/12/2023 07:00 AM    ZAT6JSC 38.0 05/12/2023 07:00 AM     INR: No results for input(s): \"INR\" in the last 72 hours.  Renal

## 2025-07-09 NOTE — PROGRESS NOTES
Pt connected to cycler as ordered. Cath care given New dressing applied. No discoloration or drainage noted.

## 2025-07-10 LAB
ALBUMIN SERPL-MCNC: 2.9 G/DL (ref 3.4–5)
ALBUMIN/GLOB SERPL: 1 {RATIO} (ref 1.1–2.2)
ALP SERPL-CCNC: 109 U/L (ref 40–129)
ALT SERPL-CCNC: 15 U/L (ref 10–40)
ANION GAP SERPL CALCULATED.3IONS-SCNC: 8 MMOL/L (ref 9–17)
AST SERPL-CCNC: 19 U/L (ref 15–37)
BASOPHILS # BLD: 0.03 K/UL
BASOPHILS NFR BLD: 0 % (ref 0–1)
BILIRUB SERPL-MCNC: 0.3 MG/DL (ref 0–1)
BUN SERPL-MCNC: 20 MG/DL (ref 7–20)
CALCIUM SERPL-MCNC: 9.4 MG/DL (ref 8.3–10.6)
CHLORIDE SERPL-SCNC: 106 MMOL/L (ref 99–110)
CO2 SERPL-SCNC: 28 MMOL/L (ref 21–32)
CREAT SERPL-MCNC: 2.6 MG/DL (ref 0.6–1.2)
CRP SERPL HS-MCNC: 48.2 MG/L (ref 0–5)
DATE LAST DOSE: NORMAL
EOSINOPHIL # BLD: 0.27 K/UL
EOSINOPHILS RELATIVE PERCENT: 3 % (ref 0–3)
ERYTHROCYTE [DISTWIDTH] IN BLOOD BY AUTOMATED COUNT: 12.8 % (ref 11.7–14.9)
GFR, ESTIMATED: 17 ML/MIN/1.73M2
GLUCOSE BLD-MCNC: 131 MG/DL (ref 74–99)
GLUCOSE BLD-MCNC: 136 MG/DL (ref 74–99)
GLUCOSE SERPL-MCNC: 91 MG/DL (ref 74–99)
HCT VFR BLD AUTO: 31.4 % (ref 37–47)
HGB BLD-MCNC: 9.7 G/DL (ref 12.5–16)
IMM GRANULOCYTES # BLD AUTO: 0.06 K/UL
IMM GRANULOCYTES NFR BLD: 1 %
LYMPHOCYTES NFR BLD: 2.47 K/UL
LYMPHOCYTES RELATIVE PERCENT: 30 % (ref 24–44)
MCH RBC QN AUTO: 30.1 PG (ref 27–31)
MCHC RBC AUTO-ENTMCNC: 30.9 G/DL (ref 32–36)
MCV RBC AUTO: 97.5 FL (ref 78–100)
MICROORGANISM SPEC CULT: ABNORMAL
MICROORGANISM/AGENT SPEC: ABNORMAL
MONOCYTES NFR BLD: 0.88 K/UL
MONOCYTES NFR BLD: 11 % (ref 0–5)
NEUTROPHILS NFR BLD: 55 % (ref 36–66)
NEUTS SEG NFR BLD: 4.52 K/UL
PLATELET # BLD AUTO: 217 K/UL (ref 140–440)
PMV BLD AUTO: 9.6 FL (ref 7.5–11.1)
POTASSIUM SERPL-SCNC: 4.1 MMOL/L (ref 3.5–5.1)
PROT SERPL-MCNC: 5.7 G/DL (ref 6.4–8.2)
RBC # BLD AUTO: 3.22 M/UL (ref 4.2–5.4)
SERVICE CMNT-IMP: ABNORMAL
SODIUM SERPL-SCNC: 143 MMOL/L (ref 136–145)
SPECIMEN DESCRIPTION: ABNORMAL
TME LAST DOSE: NORMAL H
VANCOMYCIN DOSE: NORMAL MG
VANCOMYCIN SERPL-MCNC: 11.1 UG/ML (ref 10–20)
WBC OTHER # BLD: 8.2 K/UL (ref 4–10.5)

## 2025-07-10 PROCEDURE — A4722 DIALYS SOL FLD VOL > 1999CC: HCPCS | Performed by: INTERNAL MEDICINE

## 2025-07-10 PROCEDURE — 1200000000 HC SEMI PRIVATE

## 2025-07-10 PROCEDURE — 80202 ASSAY OF VANCOMYCIN: CPT

## 2025-07-10 PROCEDURE — 6370000000 HC RX 637 (ALT 250 FOR IP): Performed by: INTERNAL MEDICINE

## 2025-07-10 PROCEDURE — 85025 COMPLETE CBC W/AUTO DIFF WBC: CPT

## 2025-07-10 PROCEDURE — 2500000003 HC RX 250 WO HCPCS: Performed by: INTERNAL MEDICINE

## 2025-07-10 PROCEDURE — 3E1M39Z IRRIGATION OF PERITONEAL CAVITY USING DIALYSATE, PERCUTANEOUS APPROACH: ICD-10-PCS | Performed by: INTERNAL MEDICINE

## 2025-07-10 PROCEDURE — 82962 GLUCOSE BLOOD TEST: CPT

## 2025-07-10 PROCEDURE — 6360000002 HC RX W HCPCS: Performed by: INTERNAL MEDICINE

## 2025-07-10 PROCEDURE — 86140 C-REACTIVE PROTEIN: CPT

## 2025-07-10 PROCEDURE — 94640 AIRWAY INHALATION TREATMENT: CPT

## 2025-07-10 PROCEDURE — 94761 N-INVAS EAR/PLS OXIMETRY MLT: CPT

## 2025-07-10 PROCEDURE — 80053 COMPREHEN METABOLIC PANEL: CPT

## 2025-07-10 PROCEDURE — 2580000003 HC RX 258: Performed by: INTERNAL MEDICINE

## 2025-07-10 PROCEDURE — 36415 COLL VENOUS BLD VENIPUNCTURE: CPT

## 2025-07-10 PROCEDURE — 90945 DIALYSIS ONE EVALUATION: CPT

## 2025-07-10 RX ADMIN — MIRTAZAPINE 15 MG: 15 TABLET, FILM COATED ORAL at 20:17

## 2025-07-10 RX ADMIN — INSULIN GLARGINE 10 UNITS: 100 INJECTION, SOLUTION SUBCUTANEOUS at 20:17

## 2025-07-10 RX ADMIN — AMLODIPINE BESYLATE 5 MG: 5 TABLET ORAL at 09:00

## 2025-07-10 RX ADMIN — HEPARIN SODIUM 5000 UNITS: 5000 INJECTION INTRAVENOUS; SUBCUTANEOUS at 05:41

## 2025-07-10 RX ADMIN — HEPARIN SODIUM 5000 UNITS: 5000 INJECTION INTRAVENOUS; SUBCUTANEOUS at 20:18

## 2025-07-10 RX ADMIN — VANCOMYCIN HYDROCHLORIDE 1000 MG: 1 INJECTION, POWDER, LYOPHILIZED, FOR SOLUTION INTRAVENOUS at 10:54

## 2025-07-10 RX ADMIN — HYDRALAZINE HYDROCHLORIDE 50 MG: 50 TABLET ORAL at 20:16

## 2025-07-10 RX ADMIN — TAMSULOSIN HYDROCHLORIDE 0.4 MG: 0.4 CAPSULE ORAL at 09:00

## 2025-07-10 RX ADMIN — HYDRALAZINE HYDROCHLORIDE 50 MG: 50 TABLET ORAL at 09:00

## 2025-07-10 RX ADMIN — SODIUM CHLORIDE, PRESERVATIVE FREE 5 ML: 5 INJECTION INTRAVENOUS at 20:22

## 2025-07-10 RX ADMIN — SODIUM CHLORIDE, PRESERVATIVE FREE 10 ML: 5 INJECTION INTRAVENOUS at 09:01

## 2025-07-10 RX ADMIN — PANTOPRAZOLE SODIUM 40 MG: 40 TABLET, DELAYED RELEASE ORAL at 05:41

## 2025-07-10 RX ADMIN — CARVEDILOL 3.12 MG: 6.25 TABLET, FILM COATED ORAL at 09:00

## 2025-07-10 RX ADMIN — FLUCONAZOLE 100 MG: 100 TABLET ORAL at 09:00

## 2025-07-10 RX ADMIN — ALBUTEROL SULFATE 2 PUFF: 90 AEROSOL, METERED RESPIRATORY (INHALATION) at 07:56

## 2025-07-10 RX ADMIN — BUDESONIDE AND FORMOTEROL FUMARATE DIHYDRATE 2 PUFF: 80; 4.5 AEROSOL RESPIRATORY (INHALATION) at 07:57

## 2025-07-10 RX ADMIN — HYDRALAZINE HYDROCHLORIDE 50 MG: 50 TABLET ORAL at 16:26

## 2025-07-10 RX ADMIN — SODIUM CHLORIDE, SODIUM LACTATE, CALCIUM CHLORIDE, MAGNESIUM CHLORIDE AND DEXTROSE: 1.5; 538; 448; 18.3; 5.08 INJECTION, SOLUTION INTRAPERITONEAL at 12:28

## 2025-07-10 RX ADMIN — ATORVASTATIN CALCIUM 40 MG: 40 TABLET, FILM COATED ORAL at 20:16

## 2025-07-10 RX ADMIN — CITALOPRAM HYDROBROMIDE 20 MG: 20 TABLET ORAL at 09:00

## 2025-07-10 RX ADMIN — BUDESONIDE AND FORMOTEROL FUMARATE DIHYDRATE 2 PUFF: 80; 4.5 AEROSOL RESPIRATORY (INHALATION) at 20:50

## 2025-07-10 RX ADMIN — HEPARIN SODIUM 5000 UNITS: 5000 INJECTION INTRAVENOUS; SUBCUTANEOUS at 16:32

## 2025-07-10 RX ADMIN — LEVOTHYROXINE SODIUM 137 MCG: 0.11 TABLET ORAL at 05:41

## 2025-07-10 RX ADMIN — CLOPIDOGREL BISULFATE 75 MG: 75 TABLET, FILM COATED ORAL at 09:00

## 2025-07-10 NOTE — PROGRESS NOTES
Progress note    Name:  Gwendolyn Grimm /Age/Sex: 1940  (85 y.o. female)   MRN & CSN:  [id number] & [id number] Encounter Date/Time: 7/10/2025 11:49 PM EDT   [facility]  [facility] PCP: Pepe Avina MD       Hospital Day: 6    Assessment and Plan:     Sepsis  Acute peritonitis  - Criteria-leukocytosis, tachycardia  - Lactic acid 1.6  - UA suggestive of infection  - Urine culture, blood cultures in process  - CT abdomen/pelvis-nonacute  - PD catheter fluid to be sent for analysis in a.m.   discussed with nephrology dialysis  - plan to send peritoneal fluid through PD catheter today   peritoneal fluid consistent with peritonitis  Consult ID.  Culture staph epi -methicillin-resistant  DW with nephro - continue Van + Gentamicin and Diflucan.  Plan for discharge tomorrow.  Dr. Izaguirre will arrange antibiotics for the patient tomorrow.    7/10 Repeat peritoneal analysis showed improvement of the infection    #.  Recent admission -2025 for sepsis secondary to UTI, obstructive right ureteral calculus  - Urine culture grew E. coli and Proteus, discharged on oral ciprofloxacin  Patient still making urine.  urine culture showed Mixed paras     #.  ESRD on peritoneal dialysis  - Dr. Izaguirre consulted from ED    #.  History of CVA-2025  Several small scattered acute infarcts in the left cerebral hemisphere  - Status post cerebral angiogram-2025-status post stenting of left carotid artery  - On aspirin, Plavix, statin    #.  Nonobstructive coronary artery disease  - Chronically elevated troponin    #.  Aortic stenosis  #.  Hypertension-on hydralazine  #.  Depression, anxiety-on mirtazapine  #.  GERD-on Protonix  #.  Hyperlipidemia-atorvastatin  #.  Hypothyroidism-continue levothyroxine  #.  Diabetes mellitus type 2- On Lantus 10 units nightly  #.  COPD- On breyna, albuterol HFA as needed    #.  History of cardiac arrest after surgery 2023  #.  Colon cancer status post right  ventral abdominal wall hernia.. CT PELVIS FINDINGS: Soft tissues: There is no free fluid or inflammatory change in the pelvis. The bladder is mildly distended with a smooth thin wall. The uterus has been removed. There is no pelvic or inguinal lymphadenopathy. Bones: Multilevel degenerative disc and facet disease is demonstrated throughout  the lumbar spine. There is no evidence of a fracture. IMPRESSION: 1. Nonacute noncontrast CT of the abdomen and pelvis. 2. Bilateral renal cysts for which no further follow-up is indicated. 3. Nonobstructive 5 mm stone in the right kidney. 4. Diverticulosis without evidence of diverticulitis. 5. Cholelithiasis. 6. Postoperative changes consistent with a resection of the proximal colon and a  peritoneal dialysis catheter. This dictation was created with voice recognition software.  While attempts have  been made to review the dictation as it is transcribed, on occasion the spoken word can be misinterpreted by the technology leading to omissions or inappropriate words, phrases or sentences.  Dictated and Electronically Signed By: Rangel Cárdenas [facility] 7/5/2025 22:46        CT HEAD WO CONTRAST  Result Date: 7/5/2025  CT OF THE HEAD WITHOUT CONTRAST DATE OF SERVICE: 7/5/2025 22:30 DEMOGRAPHICS: 85 years old Female INDICATION: HEAD TRAUMA, CLOSED, MILD, GCS >= 13, NO RISK FACTORS, NEURO EXAM NORMAL, headache/nausea COMPARISON:  1/6/2025 TECHNIQUE: Axial images were obtained of the brain without the administration of  intravenous contrast. Coronal reconstructions were obtained. Coronal reconstructions were obtained. CT radiation dose optimization techniques (automated exposure control, and use of iterative reconstruction techniques, or adjustment of the mA or kV according to patient size) were used to limit patient  radiation dose. FINDINGS: There is no soft tissue swelling. The osseous structures are intact. The paranasal sinuses and mastoid air cells are

## 2025-07-10 NOTE — PROGRESS NOTES
PATIENT ASEPTICALLY INSTILLED WITH 2000ML OF 1.5% DIANEAL WITH VANCOMYCIN AS ORDERED    TO DWELL TIL PD CYCLER TREATMENT TONIGHT    DRESSING CLEAN, DRY, AND INTACT    PATIENT VOICED NO NEW NEEDS  REMAINS IN BED, CALL LIGHT WITHIN REACH  PRIMARY NURSE NOTIFIED OF INSTILMENT    ELISSA ALANIS OCDT  07/10/2025  0280

## 2025-07-10 NOTE — PROGRESS NOTES
Pt disconnected from PD cycler without issues. Effluent clear/yellow, total  ml. Pt denies needs. Will continue to follow.

## 2025-07-10 NOTE — PROGRESS NOTES
Nephrology Progress Note  7/10/2025 1:39 PM  Subjective:     Interval History: Gwendolyn Grimm is a 85 y.o. female  abdominal pain improved tolerating peritoneal dialysis WBC count and PD fluid improving      Data:   Scheduled Meds:   hydrALAZINE  5 mg IntraVENous Once    amLODIPine  5 mg Oral Daily    lactulose  20 g Oral TID    hydrALAZINE  50 mg Oral TID    carvedilol  3.125 mg Oral BID WC    fluconazole  100 mg Oral Daily    sodium chloride flush  5-40 mL IntraVENous 2 times per day    heparin (porcine)  5,000 Units SubCUTAneous 3 times per day    atorvastatin  40 mg Oral Nightly    budesonide-formoterol  2 puff Inhalation BID    citalopram  20 mg Oral Daily    clopidogrel  75 mg Oral Daily    insulin glargine  10 Units SubCUTAneous Nightly    levothyroxine  137 mcg Oral Daily    mirtazapine  15 mg Oral Nightly    pantoprazole  40 mg Oral QAM AC    tamsulosin  0.4 mg Oral Daily    insulin lispro  0-4 Units SubCUTAneous 4x Daily AC & HS     Continuous Infusions:   sodium chloride      dextrose      dianeal lo-duong 1.5%           CBC   Recent Labs     07/08/25  0203 07/09/25  0236 07/10/25  0316   WBC 10.1 10.4 8.2   HGB 9.4* 10.3* 9.7*   HCT 29.1* 32.0* 31.4*    227 217      BMP   Recent Labs     07/08/25  0203 07/09/25 0236 07/10/25  0316    138 143   K 3.1* 4.2 4.1    103 106   CO2 25 25 28   BUN 23* 19 20   CREATININE 2.3* 2.1* 2.6*     Hepatic:   Recent Labs     07/08/25  0203 07/09/25  0236 07/10/25  0316   AST 22 20 19   ALT 17 15 15   BILITOT <0.2 0.4 0.3   ALKPHOS 104 120 109     Troponin: No results for input(s): \"TROPONINI\" in the last 72 hours.  BNP: No results for input(s): \"BNP\" in the last 72 hours.  Lipids: No results for input(s): \"CHOL\", \"HDL\" in the last 72 hours.    Invalid input(s): \"LDLCALCU\"  ABGs:   Lab Results   Component Value Date/Time    PO2ART 73 05/12/2023 07:00 AM    [id number] 38.0 05/12/2023 07:00 AM     INR: No results for input(s): \"INR\" in the last 72 hours.  Renal

## 2025-07-10 NOTE — PROGRESS NOTES
Dialysis catheter machine was stating the bags were empty. I called the house supervisor who gave me the dialysis center's phone number. She mentioned to restart it and if it continued to malfunction I should shut it off. I shut it off a short time later.

## 2025-07-10 NOTE — PROGRESS NOTES
Give vanco today and then IP vanco next week I will set up. Ip vanco today and can plan dc in am  Full note to follow

## 2025-07-10 NOTE — PLAN OF CARE
Problem: Chronic Conditions and Co-morbidities  Goal: Patient's chronic conditions and co-morbidity symptoms are monitored and maintained or improved  7/9/2025 1559 by Marlene Mcleod LPN  Outcome: Progressing     Problem: Discharge Planning  Goal: Discharge to home or other facility with appropriate resources  7/9/2025 1559 by Marlene Mcleod LPN  Outcome: Progressing     Problem: Pain  Goal: Verbalizes/displays adequate comfort level or baseline comfort level  7/9/2025 1559 by Marlene Mcleod LPN  Outcome: Progressing     Problem: Skin/Tissue Integrity  Goal: Skin integrity remains intact  Description: 1.  Monitor for areas of redness and/or skin breakdown  2.  Assess vascular access sites hourly  3.  Every 4-6 hours minimum:  Change oxygen saturation probe site  4.  Every 4-6 hours:  If on nasal continuous positive airway pressure, respiratory therapy assess nares and determine need for appliance change or resting period  7/10/2025 0535 by Ophelia Main, RN  Outcome: Progressing  7/9/2025 1559 by Marlene Mcleod LPN  Outcome: Progressing     Problem: Safety - Adult  Goal: Free from fall injury  7/10/2025 0535 by Ophelia Main, RN  Outcome: Progressing  7/9/2025 1559 by Marlene Mcleod LPN  Outcome: Progressing     Problem: Nutrition Deficit:  Goal: Optimize nutritional status  7/9/2025 1559 by Marlene Mcleod LPN  Outcome: Progressing

## 2025-07-10 NOTE — PLAN OF CARE
Problem: Chronic Conditions and Co-morbidities  Goal: Patient's chronic conditions and co-morbidity symptoms are monitored and maintained or improved  Outcome: Progressing     Problem: Discharge Planning  Goal: Discharge to home or other facility with appropriate resources  Outcome: Progressing     Problem: Pain  Goal: Verbalizes/displays adequate comfort level or baseline comfort level  Outcome: Progressing     Problem: Skin/Tissue Integrity  Goal: Skin integrity remains intact  Description: 1.  Monitor for areas of redness and/or skin breakdown  2.  Assess vascular access sites hourly  3.  Every 4-6 hours minimum:  Change oxygen saturation probe site  4.  Every 4-6 hours:  If on nasal continuous positive airway pressure, respiratory therapy assess nares and determine need for appliance change or resting period  7/10/2025 0800 by Sue Robb RN  Outcome: Progressing  7/10/2025 0535 by Ophelia Main RN  Outcome: Progressing     Problem: Safety - Adult  Goal: Free from fall injury  7/10/2025 0800 by Sue Robb, RN  Outcome: Progressing  7/10/2025 0535 by Ophelia Main RN  Outcome: Progressing     Problem: Nutrition Deficit:  Goal: Optimize nutritional status  Outcome: Progressing

## 2025-07-11 VITALS
HEIGHT: 66 IN | WEIGHT: 180.34 LBS | BODY MASS INDEX: 28.98 KG/M2 | HEART RATE: 81 BPM | DIASTOLIC BLOOD PRESSURE: 51 MMHG | OXYGEN SATURATION: 94 % | SYSTOLIC BLOOD PRESSURE: 157 MMHG | RESPIRATION RATE: 18 BRPM | TEMPERATURE: 98.1 F

## 2025-07-11 LAB
ALBUMIN SERPL-MCNC: 2.9 G/DL (ref 3.4–5)
ALBUMIN/GLOB SERPL: 1 {RATIO} (ref 1.1–2.2)
ALP SERPL-CCNC: 107 U/L (ref 40–129)
ALT SERPL-CCNC: 18 U/L (ref 10–40)
ANION GAP SERPL CALCULATED.3IONS-SCNC: 11 MMOL/L (ref 9–17)
AST SERPL-CCNC: 24 U/L (ref 15–37)
BASOPHILS # BLD: 0.03 K/UL
BASOPHILS NFR BLD: 0 % (ref 0–1)
BILIRUB SERPL-MCNC: <0.2 MG/DL (ref 0–1)
BUN SERPL-MCNC: 23 MG/DL (ref 7–20)
CALCIUM SERPL-MCNC: 9 MG/DL (ref 8.3–10.6)
CHLORIDE SERPL-SCNC: 106 MMOL/L (ref 99–110)
CO2 SERPL-SCNC: 26 MMOL/L (ref 21–32)
CREAT SERPL-MCNC: 2.8 MG/DL (ref 0.6–1.2)
EOSINOPHIL # BLD: 0.34 K/UL
EOSINOPHILS RELATIVE PERCENT: 4 % (ref 0–3)
ERYTHROCYTE [DISTWIDTH] IN BLOOD BY AUTOMATED COUNT: 12.6 % (ref 11.7–14.9)
GFR, ESTIMATED: 16 ML/MIN/1.73M2
GLUCOSE BLD-MCNC: 146 MG/DL (ref 74–99)
GLUCOSE BLD-MCNC: 87 MG/DL (ref 74–99)
GLUCOSE SERPL-MCNC: 105 MG/DL (ref 74–99)
HCT VFR BLD AUTO: 30.2 % (ref 37–47)
HGB BLD-MCNC: 9.7 G/DL (ref 12.5–16)
IMM GRANULOCYTES # BLD AUTO: 0.06 K/UL
IMM GRANULOCYTES NFR BLD: 1 %
LYMPHOCYTES NFR BLD: 2.82 K/UL
LYMPHOCYTES RELATIVE PERCENT: 34 % (ref 24–44)
MAGNESIUM SERPL-MCNC: 1.8 MG/DL (ref 1.8–2.4)
MCH RBC QN AUTO: 30.6 PG (ref 27–31)
MCHC RBC AUTO-ENTMCNC: 32.1 G/DL (ref 32–36)
MCV RBC AUTO: 95.3 FL (ref 78–100)
MONOCYTES NFR BLD: 0.77 K/UL
MONOCYTES NFR BLD: 9 % (ref 0–5)
NEUTROPHILS NFR BLD: 51 % (ref 36–66)
NEUTS SEG NFR BLD: 4.18 K/UL
PLATELET # BLD AUTO: 232 K/UL (ref 140–440)
PMV BLD AUTO: 9.4 FL (ref 7.5–11.1)
POTASSIUM SERPL-SCNC: 3.4 MMOL/L (ref 3.5–5.1)
PROT SERPL-MCNC: 5.7 G/DL (ref 6.4–8.2)
RBC # BLD AUTO: 3.17 M/UL (ref 4.2–5.4)
SODIUM SERPL-SCNC: 142 MMOL/L (ref 136–145)
WBC OTHER # BLD: 8.2 K/UL (ref 4–10.5)

## 2025-07-11 PROCEDURE — 6370000000 HC RX 637 (ALT 250 FOR IP): Performed by: INTERNAL MEDICINE

## 2025-07-11 PROCEDURE — 80053 COMPREHEN METABOLIC PANEL: CPT

## 2025-07-11 PROCEDURE — 2500000003 HC RX 250 WO HCPCS: Performed by: INTERNAL MEDICINE

## 2025-07-11 PROCEDURE — 82962 GLUCOSE BLOOD TEST: CPT

## 2025-07-11 PROCEDURE — 94761 N-INVAS EAR/PLS OXIMETRY MLT: CPT

## 2025-07-11 PROCEDURE — 36415 COLL VENOUS BLD VENIPUNCTURE: CPT

## 2025-07-11 PROCEDURE — 6360000002 HC RX W HCPCS: Performed by: INTERNAL MEDICINE

## 2025-07-11 PROCEDURE — 6370000000 HC RX 637 (ALT 250 FOR IP): Performed by: STUDENT IN AN ORGANIZED HEALTH CARE EDUCATION/TRAINING PROGRAM

## 2025-07-11 PROCEDURE — 83735 ASSAY OF MAGNESIUM: CPT

## 2025-07-11 PROCEDURE — 85025 COMPLETE CBC W/AUTO DIFF WBC: CPT

## 2025-07-11 PROCEDURE — 99232 SBSQ HOSP IP/OBS MODERATE 35: CPT | Performed by: NURSE PRACTITIONER

## 2025-07-11 PROCEDURE — 94640 AIRWAY INHALATION TREATMENT: CPT

## 2025-07-11 RX ORDER — FLUCONAZOLE 100 MG/1
100 TABLET ORAL DAILY
Qty: 5 TABLET | Refills: 0 | Status: SHIPPED | OUTPATIENT
Start: 2025-07-12 | End: 2025-07-17

## 2025-07-11 RX ORDER — POTASSIUM CHLORIDE 1500 MG/1
40 TABLET, EXTENDED RELEASE ORAL 2 TIMES DAILY WITH MEALS
Status: DISCONTINUED | OUTPATIENT
Start: 2025-07-11 | End: 2025-07-11 | Stop reason: HOSPADM

## 2025-07-11 RX ORDER — LOSARTAN POTASSIUM 50 MG/1
50 TABLET ORAL DAILY
Qty: 30 TABLET | Refills: 3 | Status: SHIPPED | OUTPATIENT
Start: 2025-07-12

## 2025-07-11 RX ORDER — AMLODIPINE BESYLATE 10 MG/1
10 TABLET ORAL DAILY
Status: DISCONTINUED | OUTPATIENT
Start: 2025-07-11 | End: 2025-07-11 | Stop reason: HOSPADM

## 2025-07-11 RX ORDER — LOSARTAN POTASSIUM 25 MG/1
50 TABLET ORAL DAILY
Status: DISCONTINUED | OUTPATIENT
Start: 2025-07-11 | End: 2025-07-11 | Stop reason: HOSPADM

## 2025-07-11 RX ORDER — AMLODIPINE BESYLATE 10 MG/1
10 TABLET ORAL DAILY
Qty: 30 TABLET | Refills: 3 | Status: SHIPPED | OUTPATIENT
Start: 2025-07-12

## 2025-07-11 RX ORDER — CARVEDILOL 3.12 MG/1
3.12 TABLET ORAL 2 TIMES DAILY WITH MEALS
Qty: 60 TABLET | Refills: 3 | Status: SHIPPED | OUTPATIENT
Start: 2025-07-11

## 2025-07-11 RX ORDER — HYDRALAZINE HYDROCHLORIDE 25 MG/1
75 TABLET, FILM COATED ORAL 3 TIMES DAILY
Qty: 90 TABLET | Refills: 3 | Status: SHIPPED | OUTPATIENT
Start: 2025-07-11

## 2025-07-11 RX ADMIN — CLOPIDOGREL BISULFATE 75 MG: 75 TABLET, FILM COATED ORAL at 09:20

## 2025-07-11 RX ADMIN — HEPARIN SODIUM 5000 UNITS: 5000 INJECTION INTRAVENOUS; SUBCUTANEOUS at 06:04

## 2025-07-11 RX ADMIN — POTASSIUM CHLORIDE 40 MEQ: 1500 TABLET, EXTENDED RELEASE ORAL at 09:20

## 2025-07-11 RX ADMIN — LEVOTHYROXINE SODIUM 137 MCG: 0.11 TABLET ORAL at 06:04

## 2025-07-11 RX ADMIN — PANTOPRAZOLE SODIUM 40 MG: 40 TABLET, DELAYED RELEASE ORAL at 06:04

## 2025-07-11 RX ADMIN — TAMSULOSIN HYDROCHLORIDE 0.4 MG: 0.4 CAPSULE ORAL at 09:21

## 2025-07-11 RX ADMIN — BUDESONIDE AND FORMOTEROL FUMARATE DIHYDRATE 2 PUFF: 80; 4.5 AEROSOL RESPIRATORY (INHALATION) at 07:31

## 2025-07-11 RX ADMIN — CARVEDILOL 3.12 MG: 6.25 TABLET, FILM COATED ORAL at 09:21

## 2025-07-11 RX ADMIN — HYDRALAZINE HYDROCHLORIDE 75 MG: 25 TABLET ORAL at 09:21

## 2025-07-11 RX ADMIN — LOSARTAN POTASSIUM 50 MG: 25 TABLET, FILM COATED ORAL at 09:21

## 2025-07-11 RX ADMIN — AMLODIPINE BESYLATE 10 MG: 10 TABLET ORAL at 09:20

## 2025-07-11 RX ADMIN — FLUCONAZOLE 100 MG: 100 TABLET ORAL at 09:21

## 2025-07-11 RX ADMIN — SODIUM CHLORIDE, PRESERVATIVE FREE 10 ML: 5 INJECTION INTRAVENOUS at 09:23

## 2025-07-11 RX ADMIN — CITALOPRAM HYDROBROMIDE 20 MG: 20 TABLET ORAL at 09:20

## 2025-07-11 NOTE — PLAN OF CARE
Problem: Chronic Conditions and Co-morbidities  Goal: Patient's chronic conditions and co-morbidity symptoms are monitored and maintained or improved  7/11/2025 0904 by Sue Robb RN  Outcome: Progressing  7/11/2025 0058 by Vanesa Duffy LPN  Outcome: Progressing     Problem: Discharge Planning  Goal: Discharge to home or other facility with appropriate resources  7/11/2025 0904 by Sue Robb RN  Outcome: Progressing  7/11/2025 0058 by Vanesa Duffy LPN  Outcome: Progressing     Problem: Pain  Goal: Verbalizes/displays adequate comfort level or baseline comfort level  7/11/2025 0904 by Sue Robb RN  Outcome: Progressing  7/11/2025 0058 by Vanesa Duffy LPN  Outcome: Progressing     Problem: Skin/Tissue Integrity  Goal: Skin integrity remains intact  Description: 1.  Monitor for areas of redness and/or skin breakdown  2.  Assess vascular access sites hourly  3.  Every 4-6 hours minimum:  Change oxygen saturation probe site  4.  Every 4-6 hours:  If on nasal continuous positive airway pressure, respiratory therapy assess nares and determine need for appliance change or resting period  7/11/2025 0904 by Sue Robb RN  Outcome: Progressing  7/11/2025 0058 by Vanesa Duffy LPN  Outcome: Progressing     Problem: Safety - Adult  Goal: Free from fall injury  7/11/2025 0904 by Sue Robb RN  Outcome: Progressing  7/11/2025 0058 by Vanesa Duffy LPN  Outcome: Progressing     Problem: Nutrition Deficit:  Goal: Optimize nutritional status  7/11/2025 0904 by Sue Robb RN  Outcome: Progressing  7/11/2025 0058 by Vanesa Duffy LPN  Outcome: Progressing

## 2025-07-11 NOTE — PROGRESS NOTES
Infectious Disease Progress Note  2025   Patient Name: Gwendolyn Grimm : 1940     Assessment  PD associated peritonitis  Patient presented for management of multiple complaints including abdominal pain chills nausea and fatigue.  PD fluid analysis suggestive of infection.  PD fluid culture + GPC. Blood cx with no growth to date. The patient has been managed on empiric IV antibiotics.  Nephrology following.  ID consulted for management.  Called Micro and reporting GPC in clusters.  IP antibiotics and oral fluconazole initiated per Nephrology.   Leukocytosis resolved, CRP with dwt. Af  Imaging: Noncontrast CTAP  nonacute + bilateral renal cysts, nonobstructing 5 mm stone in right kidney, cholelithiasis, diverticulosis.  Post op changes of the proximal colon and a peritoneal dialysis catheter.  CT head nonacute .  CXR  nonacute.  Micro data: PD fluid culture  + Staph Epi(suscep pending-repeating).  PD fluid analysis  +17,107 WBC, 92 neutrophils, 9 monocytes less than 2K RBC, cloudy.  Blood culture  and  NGTD.  Urine culture  <50k cfu mixed skin urogenital paras.  UA  + 53 WBC, 0 RBC, 3 epi, many bacteria, moderate leuk  WBC 8.2-14.4<--16.2(adm), CRP 48.2<--83.7. Procal 0.22<--0.24. Afebrile since admission  Antimicrobial summary: IV ceftriaxone -; IV vancomycin -; IP Van/Gent -     ESRD on PD  Nephrology following  Bilateral renal cyst  Nonobstructing right renal calculus  Per primary team  DM II, A1C 5.8 ))  On basal and corrective insulin per primary team  Allergy: Amoxicillin-not specified; Bactrim-not specified; PCN-hives  CrCl 18ml/min  QTc 411  Comorbid conditions: CVA, VHD-mod AS, HTN, DM2, ESRD on PD, HLD chronic anemia     Plan  Therapeutic:  Continue IP antimicrobials and oral antifungal as per Nephrology, recommended an extended 2 week course     Thank you for allowing me to consult in the care of this patient.   Will sign off, can re-consult as needed    Reason

## 2025-07-11 NOTE — DISCHARGE SUMMARY
improvement of the infection     Recent admission 6/4-6/12/2025 for sepsis secondary to UTI, obstructive right ureteral calculus  Urine culture grew E. coli and Proteus, discharged on oral ciprofloxacin  Patient still making urine.  urine culture showed Mixed paras      ESRD on peritoneal dialysis  Dr. Izaguirre consulted from ED     #.  History of CVA-1/2025  Several small scattered acute infarcts in the left cerebral hemisphere  - Status post cerebral angiogram-1/21/2025-status post stenting of left carotid artery  - On aspirin, Plavix, statin     #.  Nonobstructive coronary artery disease  - Chronically elevated troponin     #.  Aortic stenosis  #.  Hypertension-on hydralazine  #.  Depression, anxiety-on mirtazapine  #.  GERD-on Protonix  #.  Hyperlipidemia-atorvastatin  #.  Hypothyroidism-continue levothyroxine  #.  Diabetes mellitus type 2- On Lantus 10 units nightly  #.  COPD- On breyna, albuterol HFA as needed     #.  History of cardiac arrest after surgery 5/2023  #.  Colon cancer status post right hemicolectomy-5/2023      The patient expressed appropriate understanding of, and agreement with the discharge recommendations, medications, and plan.     Consults this admission:  IP CONSULT TO INFECTIOUS DISEASES    Discharge Diagnosis:   Sepsis (HCC)        Discharge Instruction:   Follow up appointments: Nephrology  Primary care physician: Pepe Avina MD within 1 week  Diet: cardiac diet   Activity: activity as tolerated  Disposition: Discharged to:   [x]Home, [][facility], []SNF, []Acute Rehab, []Hospice   Condition on discharge: Stable  Labs and Tests to be Followed up as an outpatient by PCP or Specialist:     Discharge Medications:        Medication List        START taking these medications      amLODIPine 10 MG tablet  Commonly known as: NORVASC  Take 1 tablet by mouth daily  Start taking on: July 12, 2025     carvedilol 3.125 MG tablet  Commonly known as: COREG  Take 1 tablet by mouth 2 times daily  12:02 AM    CLARITYU CLEAR 12/29/2023 12:15 PM    SPECGRAV 1.025 12/29/2021 11:51 AM    LEUKOCYTESUR MODERATE 07/06/2025 12:02 AM    UROBILINOGEN 0.2 07/06/2025 12:02 AM    BILIRUBINUR NEGATIVE 07/06/2025 12:02 AM    BLOODU NEGATIVE 12/29/2023 12:15 PM    GLUCOSEU NEGATIVE 07/06/2025 12:02 AM    KETUA NEGATIVE 07/06/2025 12:02 AM     Urine Cultures: No results found for: \"LABURIN\"  Blood Cultures: No results found for: \"BC\"  No results found for: \"BLOODCULT2\"  Organism: No results found for: \"ORG\"    Time Spent Discharging patient 50 minutes    Electronically signed by Jaclyn Verde MD on 7/11/2025 at 2:22 PM

## 2025-07-11 NOTE — PROGRESS NOTES
Results   Component Value Date/Time    LABALBU DUPLICATE ORDER 05/27/2022 12:43 PM     Calcium:    Lab Results   Component Value Date/Time    CALCIUM 9.0 07/11/2025 02:44 AM     Phosphorus:    Lab Results   Component Value Date/Time    PHOS 8.3 01/13/2025 04:50 AM     U/A:    Lab Results   Component Value Date/Time    NITRU NEGATIVE 07/06/2025 12:02 AM    COLORU Yellow 07/06/2025 12:02 AM    PHUR 7.0 07/06/2025 12:02 AM    PHUR 5.5 12/29/2021 11:51 AM    WBCUA 53 07/06/2025 12:02 AM    RBCUA 0 07/06/2025 12:02 AM    RBCUA 0 12/29/2023 12:15 PM    MUCUS RARE 06/04/2025 03:43 AM    TRICHOMONAS NONE SEEN 12/29/2023 12:15 PM    YEAST RARE 06/04/2025 03:43 AM    BACTERIA MANY 07/06/2025 12:02 AM    CLARITYU CLEAR 12/29/2023 12:15 PM    SPECGRAV 1.025 12/29/2021 11:51 AM    UROBILINOGEN 0.2 07/06/2025 12:02 AM    BILIRUBINUR NEGATIVE 07/06/2025 12:02 AM    BLOODU NEGATIVE 12/29/2023 12:15 PM    GLUCOSEU NEGATIVE 07/06/2025 12:02 AM    KETUA NEGATIVE 07/06/2025 12:02 AM     ABG:    Lab Results   Component Value Date/Time    DLF2PKX 38.0 05/12/2023 07:00 AM    PO2ART 73 05/12/2023 07:00 AM    HBY0VFF 22.5 05/12/2023 07:00 AM     HgBA1c:    Lab Results   Component Value Date/Time    LABA1C 5.8 06/17/2023 03:24 PM     Microalbumen/Creatinine ratio:  No components found for: \"RUCREAT\"  TSH:    Lab Results   Component Value Date/Time    TSH 0.55 06/06/2025 02:16 AM     IRON:    Lab Results   Component Value Date/Time    IRON 50 05/12/2023 05:40 AM     Iron Saturation:  No components found for: \"PERCENTFE\"  TIBC:    Lab Results   Component Value Date/Time    TIBC 227 05/12/2023 05:40 AM     FERRITIN:    Lab Results   Component Value Date/Time    FERRITIN 344 05/12/2023 05:40 AM     RPR:  No results found for: \"RPR\"  MJ:  No results found for: \"ANATITER\", \"MJ\"  24 Hour Urine for Creatinine Clearance:  No components found for: \"CREAT4\", \"UHRS10\", \"UTV10\"      Objective:   I/O: 07/10 0701 - 07/11 0700  In: 720 [P.O.:720]  Out:

## 2025-07-11 NOTE — PROGRESS NOTES
Physician Progress Note      PATIENT:               LIONEL CATALAN  CSN #:                  [id number]  :                       1940  ADMIT DATE:       2025 9:05 PM  DISCH DATE:  RESPONDING  PROVIDER #:        Jaclyn Verde MD          QUERY TEXT:    Sepsis is documented in the H&P and patient has a peritoneal dialysis   catheter. Please specify the relationship, if any between peritoneal dialysis   catheter and sepsis:    The clinical indicators include:  --86 yo F admitted with sepsis. PMH: ESRD,   Nonobstructive coronary artery   disease, Aortic stenosis, HTN, depression, GERD, HLD, hypothyroidism, DM2,   COPD, CVA    --ID progress note on : \"PD associated peritonitis. Catheter Site: without   erythema or tenderness. Micro data: PD fluid culture  Staph Epi (suscep   pending-repeating).  PD fluid analysis 17,107 WBC, 92 neutrophils, 9   monocytes less than 2K RBC, cloudy.\"    --IV Rocephin, po Diflucan, IV Vancomycin, ID consult, nephrology consult  Options provided:  -- Sepsis related to peritoneal dialysis catheter  -- Sepsis unrelated to peritoneal dialysis catheter  -- Other - I will add my own diagnosis  -- Disagree - Not applicable / Not valid  -- Disagree - Clinically unable to determine / Unknown  -- Refer to Clinical Documentation Reviewer    PROVIDER RESPONSE TEXT:    This patient has sepsis related to peritoneal dialysis catheter    Query created by: Bonnie Holcomb on 7/10/2025 2:32 PM      Electronically signed by:  Jaclyn Verde MD 2025 9:51 AM

## 2025-07-15 LAB
MICROORGANISM SPEC CULT: NORMAL
MICROORGANISM/AGENT SPEC: NORMAL
SERVICE CMNT-IMP: NORMAL
SPECIMEN DESCRIPTION: NORMAL

## (undated) DEVICE — CANNULA SEAL

## (undated) DEVICE — GLOVE SURG SZ 65 L12IN FNGR THK79MIL GRN LTX FREE

## (undated) DEVICE — SUTURE VCRL SZ 4-0 L27IN ABSRB UD L19MM FS-2 3/8 CIR REV J422H

## (undated) DEVICE — TOWEL,OR,DSP,ST,BLUE,STD,6/PK,12PK/CS: Brand: MEDLINE

## (undated) DEVICE — SYRINGE 20ML LL S/C 50

## (undated) DEVICE — SPONGE LAP W18XL18IN WHT COT 4 PLY FLD STRUNG RADPQ DISP ST 2 PER PACK

## (undated) DEVICE — GOWN,ECLIPSE,POLYRNF,BRTHSLV,L,30/CS: Brand: MEDLINE

## (undated) DEVICE — SEAL

## (undated) DEVICE — STAPLER 60 RELOAD BLUE: Brand: SUREFORM

## (undated) DEVICE — SET TBNG DISP TIP FOR AHTO

## (undated) DEVICE — ADHESIVE SKIN CLSR 0.7ML TOP DERMBND ADV

## (undated) DEVICE — WOUND RETRACTOR AND PROTECTOR: Brand: ALEXIS WOUND PROTECTOR-RETRACTOR

## (undated) DEVICE — KIT,ANTI FOG,W/SPONGE & FLUID,SOFT PACK: Brand: MEDLINE

## (undated) DEVICE — Device

## (undated) DEVICE — REDUCER: Brand: ENDOWRIST

## (undated) DEVICE — NEEDLE,20GX1.5",BD,YALE,DISP,RG: Brand: MEDLINE

## (undated) DEVICE — SUTURE PERMAHAND SZ 3-0 L30IN NONABSORBABLE BLK SH L26MM K832H

## (undated) DEVICE — PACK SURG LAP CHOLE

## (undated) DEVICE — PENCIL ES CRD L10FT HND SWCHING ROCK SWCH W/ EDGE COAT BLDE

## (undated) DEVICE — INTENDED FOR TISSUE SEPARATION, AND OTHER PROCEDURES THAT REQUIRE A SHARP SURGICAL BLADE TO PUNCTURE OR CUT.: Brand: BARD-PARKER ® STAINLESS STEEL BLADES

## (undated) DEVICE — STAPLER 60: Brand: SUREFORM

## (undated) DEVICE — GLOVE ORANGE PI 7 1/2   MSG9075

## (undated) DEVICE — GOWN,SIRUS,POLYRNF,BRTHSLV,XLN/XL,20/CS: Brand: MEDLINE

## (undated) DEVICE — SUTURE STRATAFIX SZ 3-0 L20CM ABSRB VLT NDL SH-1 L22MM 1/2 SXPP1B453

## (undated) DEVICE — TUBING, SUCTION, 9/32" X 10', STRAIGHT: Brand: MEDLINE

## (undated) DEVICE — BLADELESS OBTURATOR: Brand: WECK VISTA

## (undated) DEVICE — SUTURE SZ 0 27IN 5/8 CIR UR-6  TAPER PT VIOLET ABSRB VICRYL J603H

## (undated) DEVICE — SUTURE 1 STRATAFIX SYMMETRIC PDS + 30CM CT-1 SXPP1A435

## (undated) DEVICE — TIP COVER ACCESSORY

## (undated) DEVICE — RELOAD STPL L60MM H1.5-3.6MM REG TISS BLU GRIPPING SURF B

## (undated) DEVICE — GLOVE SURG SZ 65 THK91MIL LTX FREE SYN POLYISOPRENE

## (undated) DEVICE — YANKAUER,FLEXIBLE HANDLE,REGLR CAPACITY: Brand: MEDLINE INDUSTRIES, INC.

## (undated) DEVICE — ARM DRAPE

## (undated) DEVICE — COLUMN DRAPE

## (undated) DEVICE — TROCAR: Brand: KII FIOS FIRST ENTRY

## (undated) DEVICE — SUTURE VCRL SZ 3-0 L27IN ABSRB UD L36MM CT-1 1/2 CIR J258H

## (undated) DEVICE — EXCEL 10FT (3.05 M) INSUFFLATION TUBING SET WITH 0.1 MICRON FILTER: Brand: EXCEL

## (undated) DEVICE — GLOVE SURG SZ 6 THK91MIL LTX FREE SYN POLYISOPRENE ANTI